# Patient Record
Sex: FEMALE | Race: WHITE | NOT HISPANIC OR LATINO | Employment: OTHER | URBAN - METROPOLITAN AREA
[De-identification: names, ages, dates, MRNs, and addresses within clinical notes are randomized per-mention and may not be internally consistent; named-entity substitution may affect disease eponyms.]

---

## 2023-08-19 PROBLEM — K55.9 VASCULAR DISORDER OF INTESTINE, UNSPECIFIED (MULTI): Status: ACTIVE | Noted: 2023-08-19

## 2023-08-19 PROBLEM — M15.4 EROSIVE OSTEOARTHRITIS: Status: ACTIVE | Noted: 2023-08-19

## 2023-08-19 PROBLEM — M24.619 JOINT ANKYLOSIS OF THE SHOULDER REGION: Status: ACTIVE | Noted: 2023-08-19

## 2023-08-19 PROBLEM — E03.9 HYPOTHYROIDISM: Status: ACTIVE | Noted: 2023-08-19

## 2023-08-19 PROBLEM — R01.1 MURMUR: Status: ACTIVE | Noted: 2023-08-19

## 2023-08-19 PROBLEM — R26.2 DIFFICULTY WALKING: Status: ACTIVE | Noted: 2023-08-19

## 2023-08-19 PROBLEM — E11.628 TYPE 2 DIABETES MELLITUS WITH OTHER SKIN COMPLICATIONS (MULTI): Status: ACTIVE | Noted: 2023-08-19

## 2023-08-19 PROBLEM — M25.361 INSTABILITY OF RIGHT KNEE JOINT: Status: ACTIVE | Noted: 2023-08-19

## 2023-08-19 PROBLEM — K55.1: Status: ACTIVE | Noted: 2023-08-19

## 2023-08-19 PROBLEM — I70.201: Status: ACTIVE | Noted: 2023-08-19

## 2023-08-19 PROBLEM — H02.831 DERMATOCHALASIS OF RIGHT UPPER EYELID: Status: ACTIVE | Noted: 2023-08-19

## 2023-08-19 PROBLEM — N18.30 CHRONIC KIDNEY DISEASE, STAGE III (MODERATE) (MULTI): Status: ACTIVE | Noted: 2023-08-19

## 2023-08-19 PROBLEM — J31.0 RHINITIS: Status: ACTIVE | Noted: 2023-08-19

## 2023-08-19 PROBLEM — H35.359 CYSTOID MACULAR RETINAL DEGENERATION: Status: ACTIVE | Noted: 2023-08-19

## 2023-08-19 PROBLEM — I25.119 ATHEROSCLEROTIC HEART DISEASE OF NATIVE CORONARY ARTERY WITH UNSPECIFIED ANGINA PECTORIS (CMS-HCC): Status: ACTIVE | Noted: 2023-08-19

## 2023-08-19 PROBLEM — E10.42 TYPE 1 DIABETES MELLITUS WITH DIABETIC POLYNEUROPATHY (MULTI): Status: ACTIVE | Noted: 2023-08-19

## 2023-08-19 PROBLEM — I10 ESSENTIAL HYPERTENSION: Status: ACTIVE | Noted: 2023-08-19

## 2023-08-19 PROBLEM — S09.90XA HEAD INJURY: Status: ACTIVE | Noted: 2023-08-19

## 2023-08-19 PROBLEM — E11.9 TYPE 2 DIABETES MELLITUS WITHOUT COMPLICATION (MULTI): Status: ACTIVE | Noted: 2023-08-19

## 2023-08-19 PROBLEM — G56.00 CARPAL TUNNEL SYNDROME: Status: ACTIVE | Noted: 2023-08-19

## 2023-08-19 PROBLEM — M81.0 AGE-RELATED OSTEOPOROSIS WITHOUT CURRENT PATHOLOGICAL FRACTURE: Status: ACTIVE | Noted: 2023-08-19

## 2023-08-19 PROBLEM — M17.0 ARTHRITIS OF BOTH KNEES: Status: ACTIVE | Noted: 2023-08-19

## 2023-08-19 PROBLEM — E10.649 HYPOGLYCEMIA UNAWARENESS ASSOCIATED WITH TYPE 1 DIABETES MELLITUS (MULTI): Status: ACTIVE | Noted: 2023-08-19

## 2023-08-19 PROBLEM — H35.342 MACULAR HOLE OF LEFT EYE: Status: ACTIVE | Noted: 2023-08-19

## 2023-08-19 PROBLEM — M11.262 CHONDROCALCINOSIS OF LEFT KNEE: Status: ACTIVE | Noted: 2023-08-19

## 2023-08-19 PROBLEM — E55.9 VITAMIN D DEFICIENCY: Status: ACTIVE | Noted: 2023-08-19

## 2023-08-19 PROBLEM — C91.10 CHRONIC LYMPHOCYTIC LEUKEMIA OF B-CELL TYPE NOT HAVING ACHIEVED REMISSION (MULTI): Status: ACTIVE | Noted: 2023-08-19

## 2023-08-19 PROBLEM — E78.5 HYPERLIPIDEMIA: Status: ACTIVE | Noted: 2023-08-19

## 2023-08-19 PROBLEM — G89.29 OTHER CHRONIC PAIN: Status: ACTIVE | Noted: 2023-08-19

## 2023-08-19 PROBLEM — D50.1 IRON DEFICIENCY ANEMIA DUE TO SIDEROPENIC DYSPHAGIA: Status: ACTIVE | Noted: 2023-08-19

## 2023-08-19 PROBLEM — Z95.1 PRESENCE OF AORTOCORONARY BYPASS GRAFT: Status: ACTIVE | Noted: 2023-08-19

## 2023-08-19 PROBLEM — Z96.1 ARTIFICIAL LENS PRESENT: Status: ACTIVE | Noted: 2023-08-19

## 2023-08-19 PROBLEM — H04.129 TEAR FILM INSUFFICIENCY: Status: ACTIVE | Noted: 2023-08-19

## 2023-08-19 PROBLEM — M47.816 LUMBAR SPONDYLOSIS: Status: ACTIVE | Noted: 2023-08-19

## 2023-08-19 PROBLEM — T82.898A: Status: ACTIVE | Noted: 2023-08-19

## 2023-08-19 PROBLEM — M25.519 SHOULDER JOINT PAIN: Status: ACTIVE | Noted: 2023-08-19

## 2023-08-19 PROBLEM — M16.10 PRIMARY LOCALIZED OSTEOARTHRITIS OF PELVIC REGION AND THIGH: Status: ACTIVE | Noted: 2023-08-19

## 2023-08-19 PROBLEM — H02.834 DERMATOCHALASIS OF LEFT UPPER EYELID: Status: ACTIVE | Noted: 2023-08-19

## 2023-08-19 PROBLEM — H52.7 UNSPECIFIED DISORDER OF REFRACTION: Status: ACTIVE | Noted: 2023-08-19

## 2023-08-19 PROBLEM — M25.569 KNEE PAIN: Status: ACTIVE | Noted: 2023-08-19

## 2023-08-19 PROBLEM — I65.23 CALCIFICATION OF BOTH CAROTID ARTERIES: Status: ACTIVE | Noted: 2023-08-19

## 2023-08-19 PROBLEM — E11.3292 BACKGROUND DIABETIC RETINOPATHY OF LEFT EYE DETERMINED BY EXAMINATION (MULTI): Status: ACTIVE | Noted: 2023-08-19

## 2023-08-19 PROBLEM — I73.9 PERIPHERAL VASCULAR DISEASE, UNSPECIFIED (CMS-HCC): Status: ACTIVE | Noted: 2023-08-19

## 2023-08-19 PROBLEM — I77.9 ARTERIOPATHY (CMS-HCC): Status: ACTIVE | Noted: 2023-08-19

## 2023-08-19 PROBLEM — G47.00 INSOMNIA: Status: ACTIVE | Noted: 2023-08-19

## 2023-08-19 PROBLEM — M17.11 OSTEOARTHRITIS OF RIGHT KNEE: Status: ACTIVE | Noted: 2023-08-19

## 2023-08-19 PROBLEM — E10.65 TYPE 1 DIABETES MELLITUS WITH HYPERGLYCEMIA (MULTI): Status: ACTIVE | Noted: 2023-08-19

## 2023-08-19 PROBLEM — I21.9 MYOCARDIAL INFARCTION (MULTI): Status: ACTIVE | Noted: 2023-08-19

## 2023-08-19 RX ORDER — LISINOPRIL 40 MG/1
1 TABLET ORAL DAILY
COMMUNITY
End: 2024-02-06

## 2023-08-19 RX ORDER — LANOLIN ALCOHOL/MO/W.PET/CERES
1 CREAM (GRAM) TOPICAL DAILY
Status: ON HOLD | COMMUNITY

## 2023-08-19 RX ORDER — HYDROCHLOROTHIAZIDE 25 MG/1
1 TABLET ORAL
COMMUNITY
Start: 2023-03-06 | End: 2023-11-21 | Stop reason: WASHOUT

## 2023-08-19 RX ORDER — FOLIC ACID 1 MG/1
1 TABLET ORAL DAILY
COMMUNITY
End: 2024-05-20 | Stop reason: SDUPTHER

## 2023-08-19 RX ORDER — NAPROXEN SODIUM 220 MG/1
1 TABLET, FILM COATED ORAL NIGHTLY
COMMUNITY
End: 2024-04-24 | Stop reason: HOSPADM

## 2023-08-19 RX ORDER — SULFASALAZINE 500 MG/1
2 TABLET, DELAYED RELEASE ORAL 2 TIMES DAILY
Status: ON HOLD | COMMUNITY

## 2023-08-19 RX ORDER — LEVOTHYROXINE SODIUM 100 UG/1
1 TABLET ORAL DAILY
Status: ON HOLD | COMMUNITY

## 2023-08-19 RX ORDER — METOPROLOL TARTRATE 25 MG/1
0.5 TABLET, FILM COATED ORAL 2 TIMES DAILY
COMMUNITY
End: 2024-01-29

## 2023-08-19 RX ORDER — GABAPENTIN 100 MG/1
1 CAPSULE ORAL 2 TIMES DAILY
Status: ON HOLD | COMMUNITY
Start: 2023-08-07

## 2023-08-19 RX ORDER — INSULIN LISPRO 100 [IU]/ML
1 INJECTION, SOLUTION SUBCUTANEOUS
Status: ON HOLD | COMMUNITY

## 2023-08-19 RX ORDER — ACETAMINOPHEN 500 MG/1
2 CAPSULE, LIQUID FILLED ORAL DAILY
COMMUNITY
Start: 2019-04-15 | End: 2024-04-24 | Stop reason: HOSPADM

## 2023-08-19 RX ORDER — INSULIN DEGLUDEC 100 U/ML
12 INJECTION, SOLUTION SUBCUTANEOUS EVERY MORNING
Status: ON HOLD | COMMUNITY

## 2023-08-19 RX ORDER — PRAVASTATIN SODIUM 40 MG/1
1 TABLET ORAL DAILY
COMMUNITY
End: 2024-02-21

## 2023-08-19 RX ORDER — ACETAMINOPHEN 500 MG
1 TABLET ORAL DAILY
Status: ON HOLD | COMMUNITY

## 2023-08-19 RX ORDER — ZOLEDRONIC ACID 4 MG/5ML
5 INJECTION INTRAVENOUS
COMMUNITY
End: 2023-10-17 | Stop reason: ALTCHOICE

## 2023-08-19 RX ORDER — AMLODIPINE BESYLATE 10 MG/1
1 TABLET ORAL DAILY
COMMUNITY
End: 2023-10-18

## 2023-08-19 RX ORDER — NITROGLYCERIN 0.4 MG/1
1 TABLET SUBLINGUAL
COMMUNITY
End: 2024-01-25 | Stop reason: SDUPTHER

## 2023-08-19 RX ORDER — GLUCAGON 3 MG/1
POWDER NASAL AS NEEDED
COMMUNITY
Start: 2021-04-19 | End: 2023-11-21 | Stop reason: WASHOUT

## 2023-09-18 ENCOUNTER — HOSPITAL ENCOUNTER (OUTPATIENT)
Dept: DATA CONVERSION | Facility: HOSPITAL | Age: 83
Discharge: HOME | End: 2023-09-18
Payer: MEDICARE

## 2023-09-18 DIAGNOSIS — R39.9 UNSPECIFIED SYMPTOMS AND SIGNS INVOLVING THE GENITOURINARY SYSTEM: ICD-10-CM

## 2023-09-18 LAB
BACTERIA SPEC CULT: NORMAL
BACTERIA UR QL AUTO: NEGATIVE
BILIRUB UR QL STRIP.AUTO: NEGATIVE
CC # UR: NORMAL /UL
CLARITY UR: CLEAR
COLOR UR: ABNORMAL
GLUCOSE UR STRIP.AUTO-MCNC: 50 MG/DL
HGB UR QL STRIP.AUTO: 1 /HPF (ref 0–3)
HGB UR QL: NEGATIVE
HYALINE CASTS UR QL AUTO: 3 /LPF
KETONES UR QL STRIP.AUTO: NEGATIVE
LEUKOCYTE ESTERASE UR QL STRIP.AUTO: ABNORMAL
MICROSCOPIC (UA): ABNORMAL
NITRITE UR QL STRIP.AUTO: NEGATIVE
PH UR STRIP.AUTO: 6 [PH] (ref 4.6–8)
PROT UR STRIP.AUTO-MCNC: NEGATIVE MG/DL
REPORT STATUS -LH SQ DATA CONVERSION: NORMAL
SERVICE CMNT-IMP: NORMAL
SP GR UR STRIP.AUTO: 1.01 (ref 1–1.03)
SPECIMEN SOURCE: NORMAL
SQUAMOUS UR QL AUTO: ABNORMAL /HPF
URINE CULTURE: ABNORMAL
UROBILINOGEN UR QL STRIP.AUTO: NORMAL MG/DL (ref 0–1)
WBC #/AREA URNS AUTO: 5 /HPF (ref 0–3)

## 2023-10-03 ENCOUNTER — OFFICE VISIT (OUTPATIENT)
Dept: ENDOCRINOLOGY | Facility: CLINIC | Age: 83
End: 2023-10-03
Payer: MEDICARE

## 2023-10-03 VITALS
WEIGHT: 125.6 LBS | SYSTOLIC BLOOD PRESSURE: 142 MMHG | DIASTOLIC BLOOD PRESSURE: 64 MMHG | HEART RATE: 64 BPM | BODY MASS INDEX: 17.52 KG/M2

## 2023-10-03 DIAGNOSIS — E03.9 HYPOTHYROIDISM, UNSPECIFIED TYPE: ICD-10-CM

## 2023-10-03 DIAGNOSIS — E10.65 TYPE 1 DIABETES MELLITUS WITH HYPERGLYCEMIA (MULTI): Primary | ICD-10-CM

## 2023-10-03 DIAGNOSIS — I10 BENIGN HYPERTENSION: ICD-10-CM

## 2023-10-03 DIAGNOSIS — E78.2 MIXED HYPERLIPIDEMIA: ICD-10-CM

## 2023-10-03 LAB — POC HEMOGLOBIN A1C: 6.7 % (ref 4.2–6.5)

## 2023-10-03 PROCEDURE — 3078F DIAST BP <80 MM HG: CPT | Performed by: INTERNAL MEDICINE

## 2023-10-03 PROCEDURE — 99214 OFFICE O/P EST MOD 30 MIN: CPT | Performed by: INTERNAL MEDICINE

## 2023-10-03 PROCEDURE — 1126F AMNT PAIN NOTED NONE PRSNT: CPT | Performed by: INTERNAL MEDICINE

## 2023-10-03 PROCEDURE — 3077F SYST BP >= 140 MM HG: CPT | Performed by: INTERNAL MEDICINE

## 2023-10-03 PROCEDURE — 83036 HEMOGLOBIN GLYCOSYLATED A1C: CPT | Performed by: INTERNAL MEDICINE

## 2023-10-03 ASSESSMENT — ENCOUNTER SYMPTOMS: DEPRESSION: 0

## 2023-10-03 ASSESSMENT — PATIENT HEALTH QUESTIONNAIRE - PHQ9
2. FEELING DOWN, DEPRESSED OR HOPELESS: NOT AT ALL
SUM OF ALL RESPONSES TO PHQ9 QUESTIONS 1 AND 2: 0
1. LITTLE INTEREST OR PLEASURE IN DOING THINGS: NOT AT ALL

## 2023-10-03 ASSESSMENT — PAIN SCALES - GENERAL: PAINLEVEL: 0-NO PAIN

## 2023-10-03 NOTE — PROGRESS NOTES
82yo with Diabetes 1 (dx 1981, +neuropathy/nepropathy/low sugar unawareness), HTN, Dyslipidemia, hypothyroid, osteoporosis, CLL, CVD. A1c was 6.2%, today 6.7%. Pt is testing sugars 4 times per day using mp 2. Pt is having low sugars 1-2 times/week no clear pattern. Pt is following a carb controlled diet and knows reasonable carb allowances. Pt is able to afford their medications. Pt has been walking her dog, active around her house.           Taking tresiba 12 (was 13) units qd, humalog (100-125) with 1/2 unit increase for every 25 increase in sugar, baseline 3-3-4-0.           Taking lisinopril 40mg, amlodipine 5mg, toprol 25mg 1/2 pill bid, hctz 25mg.           Taking pravastatin 40mg qhs for lipids and tolerating.           Taking levothyroxine 100mcg qd, euthyroid without obstructive sx.           Pt has had 3 treatments of IV reclast for osteoporosis sees rheumatology.           90 day mp 2 data: 72% in range, 2% low, pattern: mid 100's overnight, after breakfast low 200's,  low/mid 100's through the day (up to 180 after dinner).    Scheduled medications    Continuous medications    PRN medications      Current Outpatient Medications:     acetaminophen (Tylenol) 500 mg capsule, Take 2 tablets by mouth once daily., Disp: , Rfl:     amLODIPine (Norvasc) 10 mg tablet, Take 1 tablet (10 mg) by mouth once daily., Disp: , Rfl:     aspirin 81 mg chewable tablet, Chew 1 tablet (81 mg) once daily., Disp: , Rfl:     blood sugar diagnostic (ONETOUCH ULTRA BLUE TEST STRIP MISC), USE TO TEST BLOOD SUGARS 7 TIMES DAILY. DX: E10.65 sc 7/day, Disp: , Rfl:     cholecalciferol (Vitamin D-3) 50 mcg (2,000 unit) capsule, Take 1 capsule (50 mcg) by mouth early in the morning.., Disp: , Rfl:     cyanocobalamin (Vitamin B-12) 1,000 mcg tablet, Take 1 tablet (1,000 mcg) by mouth once daily., Disp: , Rfl:     folic acid (Folvite) 1 mg tablet, Take 1 tablet (1 mg) by mouth once daily., Disp: , Rfl:     gabapentin (Neurontin) 100  mg capsule, Take 1 capsule (100 mg) by mouth in the morning and at bedtime. In the morning and before bedtime, Disp: , Rfl:     glucagon (Baqsimi) 3 mg/actuation spray,non-aerosol, Administer into affected nostril(s) if needed (for severe hypoglycemia). As directed, Disp: , Rfl:     hydroCHLOROthiazide (HYDRODiuril) 25 mg tablet, Take 1 tablet (25 mg) by mouth once daily in the morning. Take before meals. For 90 days, Disp: , Rfl:     insulin degludec (Tresiba FlexTouch U-100) 100 unit/mL (3 mL) injection, Inject 11 Units under the skin once daily. As directed 1 box, Disp: , Rfl:     insulin lispro (HumaLOG  KwikPen U-100) 100 unit/mL injection, Inject 30 Units under the skin once daily. At meals, Disp: , Rfl:     levothyroxine (Synthroid, Levoxyl) 100 mcg tablet, Take 1 tablet (100 mcg) by mouth once daily., Disp: , Rfl:     lisinopril 40 mg tablet, Take 1 tablet (40 mg) by mouth once daily., Disp: , Rfl:     metoprolol tartrate (Lopressor) 25 mg tablet, Take 0.5 tablets (12.5 mg) by mouth 2 times a day., Disp: , Rfl:     nitroglycerin (Nitrostat) 0.4 mg SL tablet, Place 1 tablet (0.4 mg) under the tongue. Every 5 minutes x 3 doses prn for chest pain, Disp: , Rfl:     POLYETHYLENE GLYCOL 3350 ORAL, Take 1 packet by mouth once daily. With 8 oz of fluid, Disp: , Rfl:     pravastatin (Pravachol) 40 mg tablet, Take 1 tablet (40 mg) by mouth once daily., Disp: , Rfl:     sulfaSALAzine (Azulfidine) 500 mg DR tablet, Take 2 tablets (1,000 mg) by mouth 2 times a day., Disp: , Rfl:     zoledronic acid (Zometa) 4 mg/5 mL injection, Infuse 5 mL (4 mg) into a venous catheter yearly., Disp: , Rfl:       Cardiology: Lightheadedness-denies.  Chest pain-denies.  Leg edema-denies.  Palpitations-denies.  Respiratory: Cough-denies. Shortness of breath-denies.  Wheezing-denies.  Gastroenterology: Constipation-denies.  Diarrhea-denies.  Heartburn-on occ.  Endocrinology: Cold intolerance-denies.  Heat intolerance-denies.   Sweats-denies.  Neurology: Headache-denies.  Tremor-denies.  Neuropathy in extremities-positive  Psychology: Low energy-denies.  Irritability-denies.  Sleep disturbances-denies.    General Appearance: pleasant, cooperative, no acute distress  HEENT: no chemosis, no proptosis, no lid lag, no lid retraction  Neck: no lymphadenopathy, no thyromegaly, no dominant thyroid nodules  Heart: 3/6 rohit rsb, regular rate and rhythm, S1 and S2  Lungs: no wheezes, no rhonci, no rales  Extremities: no lower extremity swelling    1. Type 1 diabetes mellitus with hyperglycemia (CMS/HCC)  -reviewed 90 day of mp data, more lows when active, use 1/2 or 3/4 dose of prandial in this situation  -could consider further lowering basal if needed in future    2. Benign hypertension    -on meds, follows with cardiology, near target    3. Mixed hyperlipidemia  -on statin and tolerating    4. Hypothyroidism, unspecified type  -euthyroid    -follow up in 6 months  -lab tests reviewed from July 2023

## 2023-10-10 ENCOUNTER — HOSPITAL ENCOUNTER (OUTPATIENT)
Dept: CARDIOLOGY | Facility: HOSPITAL | Age: 83
Discharge: HOME | End: 2023-10-10
Payer: MEDICARE

## 2023-10-10 DIAGNOSIS — I35.0 NONRHEUMATIC AORTIC (VALVE) STENOSIS: ICD-10-CM

## 2023-10-10 LAB — EJECTION FRACTION APICAL 4 CHAMBER: 60.2

## 2023-10-10 PROCEDURE — 93306 TTE W/DOPPLER COMPLETE: CPT

## 2023-10-10 PROCEDURE — 93306 TTE W/DOPPLER COMPLETE: CPT | Performed by: INTERNAL MEDICINE

## 2023-10-17 ENCOUNTER — OFFICE VISIT (OUTPATIENT)
Dept: PRIMARY CARE | Facility: CLINIC | Age: 83
End: 2023-10-17
Payer: MEDICARE

## 2023-10-17 VITALS
TEMPERATURE: 97.1 F | OXYGEN SATURATION: 93 % | HEART RATE: 66 BPM | RESPIRATION RATE: 18 BRPM | WEIGHT: 124.3 LBS | SYSTOLIC BLOOD PRESSURE: 146 MMHG | HEIGHT: 58 IN | BODY MASS INDEX: 26.09 KG/M2 | DIASTOLIC BLOOD PRESSURE: 72 MMHG

## 2023-10-17 DIAGNOSIS — B00.1 COLD SORE: Primary | ICD-10-CM

## 2023-10-17 PROCEDURE — 3078F DIAST BP <80 MM HG: CPT | Performed by: NURSE PRACTITIONER

## 2023-10-17 PROCEDURE — 1159F MED LIST DOCD IN RCRD: CPT | Performed by: NURSE PRACTITIONER

## 2023-10-17 PROCEDURE — 3077F SYST BP >= 140 MM HG: CPT | Performed by: NURSE PRACTITIONER

## 2023-10-17 PROCEDURE — 1125F AMNT PAIN NOTED PAIN PRSNT: CPT | Performed by: NURSE PRACTITIONER

## 2023-10-17 PROCEDURE — 99213 OFFICE O/P EST LOW 20 MIN: CPT | Performed by: NURSE PRACTITIONER

## 2023-10-17 RX ORDER — VALACYCLOVIR HYDROCHLORIDE 1 G/1
2000 TABLET, FILM COATED ORAL 2 TIMES DAILY
Qty: 4 TABLET | Refills: 0 | Status: SHIPPED | OUTPATIENT
Start: 2023-10-17 | End: 2023-10-18

## 2023-10-17 ASSESSMENT — ENCOUNTER SYMPTOMS
CARDIOVASCULAR NEGATIVE: 1
RESPIRATORY NEGATIVE: 1
FEVER: 0

## 2023-10-17 ASSESSMENT — PAIN SCALES - GENERAL: PAINLEVEL: 10-WORST PAIN EVER

## 2023-10-17 NOTE — PROGRESS NOTES
"Subjective   Patient ID: Bonnie Jules is a 83 y.o. female who presents for Mouth Lesions (Pt states she has a cold sore on bottom lip for 2 weeks , states she has tied multiple thing over the counter but nothing is helping. Has two scabs on bottom lip.).    Mouth Lesions   The current episode started more than 2 weeks ago. The onset was gradual. The problem has been unchanged. The problem is mild. The symptoms are relieved by one or more OTC medications (tried different cold sore creams). The symptoms are aggravated by eating. Associated symptoms include mouth sores. Pertinent negatives include no fever. She has been Eating less than usual and drinking less than usual. Urine output has been normal. There were no sick contacts.        Review of Systems   Constitutional:  Negative for fever.   HENT:  Positive for mouth sores.    Respiratory: Negative.     Cardiovascular: Negative.        Objective   /72 (BP Location: Right arm, Patient Position: Sitting, BP Cuff Size: Adult)   Pulse 66   Temp 36.2 °C (97.1 °F) (Temporal)   Resp 18   Ht 1.473 m (4' 10\")   Wt 56.4 kg (124 lb 4.8 oz)   SpO2 93%   BMI 25.98 kg/m²     Physical Exam  Vitals and nursing note reviewed.   Constitutional:       Appearance: Normal appearance.   HENT:      Mouth/Throat:      Comments: Two cold sores bottom lip, vesicular with some scabbing  Cardiovascular:      Rate and Rhythm: Normal rate and regular rhythm.      Pulses: Normal pulses.      Heart sounds: Normal heart sounds.   Pulmonary:      Effort: Pulmonary effort is normal.      Breath sounds: Normal breath sounds.   Neurological:      Mental Status: She is alert.         Assessment/Plan   Diagnoses and all orders for this visit:  Cold sore  -     valACYclovir (Valtrex) 1 gram tablet; Take 2 tablets (2,000 mg) by mouth 2 times a day for 1 day.         "

## 2023-10-18 DIAGNOSIS — I10 PRIMARY HYPERTENSION: Primary | ICD-10-CM

## 2023-10-18 RX ORDER — AMLODIPINE BESYLATE 10 MG/1
10 TABLET ORAL DAILY
Qty: 90 TABLET | Refills: 3 | Status: ON HOLD | OUTPATIENT
Start: 2023-10-18 | End: 2024-10-17

## 2023-10-23 PROBLEM — R93.89 ABNORMAL COMPUTED TOMOGRAPHY SCAN: Status: ACTIVE | Noted: 2023-10-23
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
## 2023-10-23 PROBLEM — I10 HYPERTENSIVE DISORDER: Status: ACTIVE | Noted: 2023-10-23

## 2023-10-23 PROBLEM — M77.41 METATARSALGIA, RIGHT FOOT: Status: ACTIVE | Noted: 2023-06-12

## 2023-10-23 PROBLEM — R10.9 ABDOMINAL PAIN: Status: ACTIVE | Noted: 2023-10-23

## 2023-10-23 PROBLEM — E11.9 ABNORMAL METABOLIC STATE DUE TO DIABETES MELLITUS (MULTI): Status: ACTIVE | Noted: 2023-10-23

## 2023-10-23 PROBLEM — M17.12 OSTEOARTHRITIS OF LEFT KNEE: Status: ACTIVE | Noted: 2023-10-23

## 2023-10-23 PROBLEM — I10 BENIGN ESSENTIAL HYPERTENSION: Status: ACTIVE | Noted: 2023-10-23

## 2023-10-23 PROBLEM — G62.9 NEUROPATHY: Status: ACTIVE | Noted: 2023-06-12

## 2023-10-23 PROBLEM — R23.4 SKIN FISSURE: Status: ACTIVE | Noted: 2023-06-12

## 2023-10-23 PROBLEM — K92.1 HEMATOCHEZIA: Status: ACTIVE | Noted: 2023-10-23

## 2023-10-23 PROBLEM — L97.511 ULCER OF RIGHT FOOT, LIMITED TO BREAKDOWN OF SKIN (MULTI): Status: ACTIVE | Noted: 2023-06-12

## 2023-10-23 PROBLEM — A41.9 SEPSIS (MULTI): Status: ACTIVE | Noted: 2023-10-23

## 2023-10-23 PROBLEM — M19.90 OSTEOARTHRITIS: Status: ACTIVE | Noted: 2023-10-23

## 2023-10-23 PROBLEM — I95.9 LOW BLOOD PRESSURE: Status: ACTIVE | Noted: 2023-10-23

## 2023-10-23 PROBLEM — E07.9 DISORDER OF THYROID GLAND: Status: ACTIVE | Noted: 2023-10-23

## 2023-10-23 PROBLEM — B35.1 ONYCHOMYCOSIS: Status: ACTIVE | Noted: 2023-06-12

## 2023-10-23 PROBLEM — K52.9 COLITIS: Status: ACTIVE | Noted: 2023-10-23

## 2023-10-23 PROBLEM — I73.9 PERIPHERAL VASCULAR DISEASE (CMS-HCC): Status: ACTIVE | Noted: 2023-06-12

## 2023-10-23 PROBLEM — E87.20 LACTIC ACIDOSIS: Status: ACTIVE | Noted: 2023-10-23

## 2023-10-23 PROBLEM — I21.4 ACUTE NON-ST SEGMENT ELEVATION MYOCARDIAL INFARCTION (MULTI): Status: ACTIVE | Noted: 2023-10-23

## 2023-10-23 PROBLEM — R07.9 CHEST PAIN: Status: ACTIVE | Noted: 2023-10-23

## 2023-10-23 PROBLEM — Z95.1 HISTORY OF CORONARY ARTERY BYPASS GRAFT X 3: Status: ACTIVE | Noted: 2023-10-23

## 2023-10-23 PROBLEM — E11.9 DIABETES MELLITUS, TYPE 2 (MULTI): Status: ACTIVE | Noted: 2023-10-23

## 2023-10-23 PROBLEM — E10.9 TYPE 1 DIABETES MELLITUS (MULTI): Status: ACTIVE | Noted: 2023-06-12

## 2023-10-23 PROBLEM — E78.5 DYSLIPIDEMIA: Status: ACTIVE | Noted: 2023-10-23

## 2023-10-23 PROBLEM — E11.42 DIABETIC POLYNEUROPATHY ASSOCIATED WITH TYPE 2 DIABETES MELLITUS (MULTI): Status: ACTIVE | Noted: 2023-06-12

## 2023-10-23 PROBLEM — K55.9 ISCHEMIC COLITIS (MULTI): Status: ACTIVE | Noted: 2017-05-30

## 2023-10-23 PROBLEM — R41.0 DELIRIUM: Status: ACTIVE | Noted: 2023-10-23

## 2023-10-23 PROBLEM — M79.676 PAIN OF GREAT TOE: Status: ACTIVE | Noted: 2023-06-12

## 2023-10-23 PROBLEM — L84 PRE-ULCERATIVE CALLUSES: Status: ACTIVE | Noted: 2023-06-12

## 2023-10-23 PROBLEM — M25.559 HIP PAIN: Status: ACTIVE | Noted: 2023-10-23

## 2023-10-23 PROBLEM — E86.0 DEHYDRATION: Status: ACTIVE | Noted: 2023-10-23

## 2023-10-23 PROBLEM — K92.2 GASTROINTESTINAL HEMORRHAGE: Status: ACTIVE | Noted: 2023-10-23

## 2023-10-23 PROBLEM — R56.9 SEIZURE (MULTI): Status: ACTIVE | Noted: 2023-10-23

## 2023-10-23 PROBLEM — H35.3290 EXUDATIVE AGE-RELATED MACULAR DEGENERATION (MULTI): Status: ACTIVE | Noted: 2023-10-23

## 2023-10-23 PROBLEM — H35.81 MACULAR EDEMA: Status: ACTIVE | Noted: 2023-10-23

## 2023-10-23 PROBLEM — I35.9 AORTIC VALVE DISORDER: Status: ACTIVE | Noted: 2023-10-23

## 2023-10-23 PROBLEM — K62.5 RECTAL HEMORRHAGE: Status: ACTIVE | Noted: 2023-10-23

## 2023-10-23 PROBLEM — M12.9 ARTHROPATHY: Status: ACTIVE | Noted: 2023-06-12

## 2023-10-23 PROBLEM — N39.0 ACUTE URINARY TRACT INFECTION: Status: ACTIVE | Noted: 2023-10-23

## 2023-10-23 PROBLEM — F41.9 ANXIETY: Status: ACTIVE | Noted: 2023-10-23

## 2023-10-23 PROBLEM — E16.2 HYPOGLYCEMIA: Status: ACTIVE | Noted: 2023-10-23

## 2023-10-23 PROBLEM — M16.10 ARTHRITIS OF HIP: Status: ACTIVE | Noted: 2023-10-23

## 2023-10-23 PROBLEM — R11.2 NAUSEA & VOMITING: Status: ACTIVE | Noted: 2023-10-23

## 2023-10-23 PROBLEM — N18.30 CHRONIC KIDNEY DISEASE, STAGE 3 (MULTI): Status: ACTIVE | Noted: 2023-10-23

## 2023-10-23 PROBLEM — R55 NEAR SYNCOPE: Status: ACTIVE | Noted: 2023-10-23

## 2023-10-25 ENCOUNTER — OFFICE VISIT (OUTPATIENT)
Dept: ORTHOPEDIC SURGERY | Facility: CLINIC | Age: 83
End: 2023-10-25
Payer: MEDICARE

## 2023-10-25 VITALS — HEIGHT: 58 IN | BODY MASS INDEX: 26.45 KG/M2 | WEIGHT: 126 LBS

## 2023-10-25 DIAGNOSIS — M17.12 PRIMARY OSTEOARTHRITIS OF LEFT KNEE: ICD-10-CM

## 2023-10-25 DIAGNOSIS — M25.561 CHRONIC PAIN OF BOTH KNEES: Primary | ICD-10-CM

## 2023-10-25 DIAGNOSIS — M25.562 CHRONIC PAIN OF BOTH KNEES: Primary | ICD-10-CM

## 2023-10-25 DIAGNOSIS — M17.11 PRIMARY OSTEOARTHRITIS OF RIGHT KNEE: ICD-10-CM

## 2023-10-25 DIAGNOSIS — G89.29 CHRONIC PAIN OF BOTH KNEES: Primary | ICD-10-CM

## 2023-10-25 PROCEDURE — 1159F MED LIST DOCD IN RCRD: CPT | Performed by: PHYSICIAN ASSISTANT

## 2023-10-25 PROCEDURE — 99213 OFFICE O/P EST LOW 20 MIN: CPT | Performed by: PHYSICIAN ASSISTANT

## 2023-10-25 PROCEDURE — 1125F AMNT PAIN NOTED PAIN PRSNT: CPT | Performed by: PHYSICIAN ASSISTANT

## 2023-10-25 PROCEDURE — 2500000004 HC RX 250 GENERAL PHARMACY W/ HCPCS (ALT 636 FOR OP/ED): Performed by: PHYSICIAN ASSISTANT

## 2023-10-25 PROCEDURE — 2500000005 HC RX 250 GENERAL PHARMACY W/O HCPCS: Performed by: PHYSICIAN ASSISTANT

## 2023-10-25 PROCEDURE — 1160F RVW MEDS BY RX/DR IN RCRD: CPT | Performed by: PHYSICIAN ASSISTANT

## 2023-10-25 PROCEDURE — 20610 DRAIN/INJ JOINT/BURSA W/O US: CPT | Mod: 50 | Performed by: PHYSICIAN ASSISTANT

## 2023-10-25 PROCEDURE — 99213 OFFICE O/P EST LOW 20 MIN: CPT | Mod: 25 | Performed by: PHYSICIAN ASSISTANT

## 2023-10-25 PROCEDURE — 1036F TOBACCO NON-USER: CPT | Performed by: PHYSICIAN ASSISTANT

## 2023-10-25 RX ORDER — TRIAMCINOLONE ACETONIDE 40 MG/ML
40 INJECTION, SUSPENSION INTRA-ARTICULAR; INTRAMUSCULAR
Status: COMPLETED | OUTPATIENT
Start: 2023-10-25 | End: 2023-10-25

## 2023-10-25 RX ORDER — LIDOCAINE HYDROCHLORIDE 10 MG/ML
1 INJECTION INFILTRATION; PERINEURAL
Status: COMPLETED | OUTPATIENT
Start: 2023-10-25 | End: 2023-10-25

## 2023-10-25 RX ADMIN — LIDOCAINE HYDROCHLORIDE 1 ML: 10 INJECTION, SOLUTION INFILTRATION; PERINEURAL at 12:49

## 2023-10-25 RX ADMIN — TRIAMCINOLONE ACETONIDE 40 MG: 40 INJECTION, SUSPENSION INTRA-ARTICULAR; INTRAMUSCULAR at 12:49

## 2023-10-25 ASSESSMENT — PAIN - FUNCTIONAL ASSESSMENT: PAIN_FUNCTIONAL_ASSESSMENT: 0-10

## 2023-10-25 ASSESSMENT — PATIENT HEALTH QUESTIONNAIRE - PHQ9
1. LITTLE INTEREST OR PLEASURE IN DOING THINGS: NOT AT ALL
SUM OF ALL RESPONSES TO PHQ9 QUESTIONS 1 AND 2: 0
2. FEELING DOWN, DEPRESSED OR HOPELESS: NOT AT ALL

## 2023-10-25 ASSESSMENT — ENCOUNTER SYMPTOMS
DEPRESSION: 0
OCCASIONAL FEELINGS OF UNSTEADINESS: 0
LOSS OF SENSATION IN FEET: 0

## 2023-10-25 ASSESSMENT — PAIN SCALES - GENERAL: PAINLEVEL_OUTOF10: 6

## 2023-10-25 NOTE — PROGRESS NOTES
Subjective      Chief Complaint   Patient presents with    Right Knee - Pain, Injections        No surgery found     HPI  This 83 year old patient presents today with  bilateral knee pain. The patient states that this bilateral knee pain has been present for years. The patient rates the knee pain as 8. The patient states that the left and right knee pain is worse with and activity and bearing weight. The patient states the right knee is giving way and locking.The patient has tried ibuprofen and tylenol with no relief. Patient requests a discussion of further treatment options on examination today.     CARDIOLOGY:   Negative for chest pain, shortness of breath.   RESPIRATORY:   Negative for chest pain, shortness of breath.   MUSCULOSKELETAL:   See HPI for details.   NEUROLOGY:   Negative for tingling, numbness, weakness.    Objective    There were no vitals filed for this visit.    Knee Exam  Constitutional: Appears stated age. No apparent distress  Labored Breathing: No  Psychiatric: Normal mood and effect.   Neurological: alert and oriented x3  Skin: intact  MUSCULOSKELETAL: Neck: No tenderness. No pain or limitation with range of motion. Back: No tenderness. Straight leg test negative bilaterally. bilateral knee: There is diffuse tenderness over the left and right knee. diminished range of motion at the right knee from 10- 90 degrees and 5-110 degrees at the left. McMurrays is negative. Anterior drawer and lachmans are negative. There is not an effusion present. The right knee is not stable to valgus and varus stressing. The patient walks with a painful gait favoring the right knee while walking.    AP and lateral x-rays of the right knee done on 9- show osteoarthritis of the right and left knees with loss of joint surface cartilage, sclerosis and spurring.     Patient ID: Bonnie Jules is a 83 y.o. female.    L Inj/Asp: bilateral knee on 10/25/2023 12:49 PM  Indications: pain  Details: 22 G needle,  medial approach  Medications (Right): 40 mg triamcinolone acetonide 40 mg/mL; 1 mL lidocaine 10 mg/mL (1 %)  Medications (Left): 40 mg triamcinolone acetonide 40 mg/mL; 1 mL lidocaine 10 mg/mL (1 %)  Outcome: tolerated well, no immediate complications  Procedure, treatment alternatives, risks and benefits explained, specific risks discussed. Immediately prior to procedure a time out was called to verify the correct patient, procedure, equipment, support staff and site/side marked as required. Patient was prepped and draped in the usual sterile fashion.         Bonnie was seen today for pain and injections.  Diagnoses and all orders for this visit:  Chronic pain of both knees (Primary)  Primary osteoarthritis of right knee  Primary osteoarthritis of left knee  Options are discussed with the patient in detail. The patient is instructed regarding activity modification, ice, provider directed at home gentle strengthening and ROM exercises, and the appropriate use of Tylenol as needed for pain with its potential adverse reactions and side effects. The patient understands. The patient states that despite all the treatment listed above that this left and right knee pain is debilitating and  requests a discussion of further options. Cortisone injection to the left and right knee is discussed in the office today. This is done in the office today. See procedures below. Return as needed, Please note that this report has been produced using speech recognition software.  It may contain errors related to grammar, punctuation or spelling.  Electronically signed, but not reviewed.    Mayela Pickard PA-C

## 2023-11-15 ENCOUNTER — LAB (OUTPATIENT)
Dept: LAB | Facility: LAB | Age: 83
End: 2023-11-15
Payer: MEDICARE

## 2023-11-15 DIAGNOSIS — K52.9 NONINFECTIVE GASTROENTERITIS AND COLITIS, UNSPECIFIED: Primary | ICD-10-CM

## 2023-11-15 LAB
ALBUMIN SERPL-MCNC: 4 G/DL (ref 3.5–5)
ALP BLD-CCNC: 50 U/L (ref 35–125)
ALT SERPL-CCNC: 13 U/L (ref 5–40)
ANION GAP SERPL CALC-SCNC: 11 MMOL/L
APPEARANCE UR: CLEAR
AST SERPL-CCNC: 28 U/L (ref 5–40)
BASOPHILS # BLD AUTO: 0.02 X10*3/UL (ref 0–0.1)
BASOPHILS NFR BLD AUTO: 0.3 %
BILIRUB SERPL-MCNC: 0.3 MG/DL (ref 0.1–1.2)
BILIRUB UR STRIP.AUTO-MCNC: NEGATIVE MG/DL
BUN SERPL-MCNC: 24 MG/DL (ref 8–25)
CALCIUM SERPL-MCNC: 8.9 MG/DL (ref 8.5–10.4)
CHLORIDE SERPL-SCNC: 101 MMOL/L (ref 97–107)
CO2 SERPL-SCNC: 24 MMOL/L (ref 24–31)
COLOR UR: YELLOW
CREAT SERPL-MCNC: 1 MG/DL (ref 0.4–1.6)
EOSINOPHIL # BLD AUTO: 0.2 X10*3/UL (ref 0–0.4)
EOSINOPHIL NFR BLD AUTO: 3 %
ERYTHROCYTE [DISTWIDTH] IN BLOOD BY AUTOMATED COUNT: 13.2 % (ref 11.5–14.5)
GFR SERPL CREATININE-BSD FRML MDRD: 57 ML/MIN/1.73M*2
GLUCOSE SERPL-MCNC: 243 MG/DL (ref 65–99)
GLUCOSE UR STRIP.AUTO-MCNC: NORMAL MG/DL
HCT VFR BLD AUTO: 33.6 % (ref 36–46)
HGB BLD-MCNC: 10.9 G/DL (ref 12–16)
HYALINE CASTS #/AREA URNS AUTO: ABNORMAL /LPF
IMM GRANULOCYTES # BLD AUTO: 0.01 X10*3/UL (ref 0–0.5)
IMM GRANULOCYTES NFR BLD AUTO: 0.2 % (ref 0–0.9)
KETONES UR STRIP.AUTO-MCNC: NEGATIVE MG/DL
LEUKOCYTE ESTERASE UR QL STRIP.AUTO: ABNORMAL
LYMPHOCYTES # BLD AUTO: 3.86 X10*3/UL (ref 0.8–3)
LYMPHOCYTES NFR BLD AUTO: 58.8 %
MCH RBC QN AUTO: 31.8 PG (ref 26–34)
MCHC RBC AUTO-ENTMCNC: 32.4 G/DL (ref 32–36)
MCV RBC AUTO: 98 FL (ref 80–100)
MONOCYTES # BLD AUTO: 0.48 X10*3/UL (ref 0.05–0.8)
MONOCYTES NFR BLD AUTO: 7.3 %
NEUTROPHILS # BLD AUTO: 2 X10*3/UL (ref 1.6–5.5)
NEUTROPHILS NFR BLD AUTO: 30.4 %
NITRITE UR QL STRIP.AUTO: NEGATIVE
NRBC BLD-RTO: 0 /100 WBCS (ref 0–0)
PH UR STRIP.AUTO: 6 [PH]
PLATELET # BLD AUTO: 236 X10*3/UL (ref 150–450)
POTASSIUM SERPL-SCNC: 4.7 MMOL/L (ref 3.4–5.1)
PROT SERPL-MCNC: 5.9 G/DL (ref 5.9–7.9)
PROT UR STRIP.AUTO-MCNC: ABNORMAL MG/DL
RBC # BLD AUTO: 3.43 X10*6/UL (ref 4–5.2)
RBC # UR STRIP.AUTO: NEGATIVE /UL
RBC #/AREA URNS AUTO: ABNORMAL /HPF
SODIUM SERPL-SCNC: 136 MMOL/L (ref 133–145)
SP GR UR STRIP.AUTO: 1.02
TSH SERPL DL<=0.05 MIU/L-ACNC: 1.09 MIU/L (ref 0.27–4.2)
UROBILINOGEN UR STRIP.AUTO-MCNC: NORMAL MG/DL
WBC # BLD AUTO: 6.6 X10*3/UL (ref 4.4–11.3)
WBC #/AREA URNS AUTO: ABNORMAL /HPF

## 2023-11-15 PROCEDURE — 85025 COMPLETE CBC W/AUTO DIFF WBC: CPT

## 2023-11-15 PROCEDURE — 80053 COMPREHEN METABOLIC PANEL: CPT

## 2023-11-15 PROCEDURE — 84443 ASSAY THYROID STIM HORMONE: CPT | Mod: WAIVER OF LIABILITY ON FILE

## 2023-11-15 PROCEDURE — 81001 URINALYSIS AUTO W/SCOPE: CPT

## 2023-11-15 PROCEDURE — 36415 COLL VENOUS BLD VENIPUNCTURE: CPT

## 2023-11-21 ENCOUNTER — OFFICE VISIT (OUTPATIENT)
Dept: HEMATOLOGY/ONCOLOGY | Facility: CLINIC | Age: 83
End: 2023-11-21
Payer: MEDICARE

## 2023-11-21 VITALS
TEMPERATURE: 96.4 F | WEIGHT: 124.23 LBS | RESPIRATION RATE: 18 BRPM | BODY MASS INDEX: 27.95 KG/M2 | OXYGEN SATURATION: 96 % | HEIGHT: 56 IN | DIASTOLIC BLOOD PRESSURE: 84 MMHG | SYSTOLIC BLOOD PRESSURE: 180 MMHG | HEART RATE: 62 BPM

## 2023-11-21 DIAGNOSIS — C91.10 CLL (CHRONIC LYMPHOCYTIC LEUKEMIA) (MULTI): Primary | ICD-10-CM

## 2023-11-21 DIAGNOSIS — D64.9 ANEMIA, UNSPECIFIED TYPE: ICD-10-CM

## 2023-11-21 LAB
ALBUMIN SERPL BCP-MCNC: 4.1 G/DL (ref 3.4–5)
ALP SERPL-CCNC: 43 U/L (ref 33–136)
ALT SERPL W P-5'-P-CCNC: 13 U/L (ref 7–45)
ANION GAP SERPL CALC-SCNC: 13 MMOL/L (ref 10–20)
AST SERPL W P-5'-P-CCNC: 23 U/L (ref 9–39)
BASOPHILS # BLD AUTO: 0.01 X10*3/UL (ref 0–0.1)
BASOPHILS NFR BLD AUTO: 0.1 %
BILIRUB SERPL-MCNC: 0.4 MG/DL (ref 0–1.2)
BUN SERPL-MCNC: 21 MG/DL (ref 6–23)
CALCIUM SERPL-MCNC: 9.3 MG/DL (ref 8.6–10.3)
CHLORIDE SERPL-SCNC: 104 MMOL/L (ref 98–107)
CO2 SERPL-SCNC: 28 MMOL/L (ref 21–32)
CREAT SERPL-MCNC: 0.91 MG/DL (ref 0.5–1.05)
EOSINOPHIL # BLD AUTO: 0.28 X10*3/UL (ref 0–0.4)
EOSINOPHIL NFR BLD AUTO: 4.2 %
ERYTHROCYTE [DISTWIDTH] IN BLOOD BY AUTOMATED COUNT: 13.2 % (ref 11.5–14.5)
GFR SERPL CREATININE-BSD FRML MDRD: 64 ML/MIN/1.73M*2
GLUCOSE SERPL-MCNC: 171 MG/DL (ref 74–99)
HCT VFR BLD AUTO: 34.4 % (ref 36–46)
HGB BLD-MCNC: 11.3 G/DL (ref 12–16)
IMM GRANULOCYTES # BLD AUTO: 0.01 X10*3/UL (ref 0–0.5)
IMM GRANULOCYTES NFR BLD AUTO: 0.1 % (ref 0–0.9)
LDH SERPL L TO P-CCNC: 179 U/L (ref 84–246)
LYMPHOCYTES # BLD AUTO: 3.94 X10*3/UL (ref 0.8–3)
LYMPHOCYTES NFR BLD AUTO: 58.9 %
MCH RBC QN AUTO: 31.8 PG (ref 26–34)
MCHC RBC AUTO-ENTMCNC: 32.8 G/DL (ref 32–36)
MCV RBC AUTO: 97 FL (ref 80–100)
MONOCYTES # BLD AUTO: 0.48 X10*3/UL (ref 0.05–0.8)
MONOCYTES NFR BLD AUTO: 7.2 %
NEUTROPHILS # BLD AUTO: 1.97 X10*3/UL (ref 1.6–5.5)
NEUTROPHILS NFR BLD AUTO: 29.5 %
NRBC BLD-RTO: 0 /100 WBCS (ref 0–0)
PLATELET # BLD AUTO: 247 X10*3/UL (ref 150–450)
POTASSIUM SERPL-SCNC: 3.8 MMOL/L (ref 3.5–5.3)
PROT SERPL-MCNC: 6.5 G/DL (ref 6.4–8.2)
RBC # BLD AUTO: 3.55 X10*6/UL (ref 4–5.2)
SODIUM SERPL-SCNC: 141 MMOL/L (ref 136–145)
WBC # BLD AUTO: 6.7 X10*3/UL (ref 4.4–11.3)

## 2023-11-21 PROCEDURE — 1036F TOBACCO NON-USER: CPT | Performed by: NURSE PRACTITIONER

## 2023-11-21 PROCEDURE — 80053 COMPREHEN METABOLIC PANEL: CPT | Performed by: NURSE PRACTITIONER

## 2023-11-21 PROCEDURE — 1126F AMNT PAIN NOTED NONE PRSNT: CPT | Performed by: NURSE PRACTITIONER

## 2023-11-21 PROCEDURE — 36415 COLL VENOUS BLD VENIPUNCTURE: CPT | Performed by: NURSE PRACTITIONER

## 2023-11-21 PROCEDURE — 83615 LACTATE (LD) (LDH) ENZYME: CPT | Performed by: NURSE PRACTITIONER

## 2023-11-21 PROCEDURE — 3077F SYST BP >= 140 MM HG: CPT | Performed by: NURSE PRACTITIONER

## 2023-11-21 PROCEDURE — 3079F DIAST BP 80-89 MM HG: CPT | Performed by: NURSE PRACTITIONER

## 2023-11-21 PROCEDURE — 85025 COMPLETE CBC W/AUTO DIFF WBC: CPT | Performed by: NURSE PRACTITIONER

## 2023-11-21 PROCEDURE — 1159F MED LIST DOCD IN RCRD: CPT | Performed by: NURSE PRACTITIONER

## 2023-11-21 PROCEDURE — 99213 OFFICE O/P EST LOW 20 MIN: CPT | Performed by: NURSE PRACTITIONER

## 2023-11-21 PROCEDURE — 1160F RVW MEDS BY RX/DR IN RCRD: CPT | Performed by: NURSE PRACTITIONER

## 2023-11-21 RX ORDER — ZOLEDRONIC ACID 4 MG/5ML
4 INJECTION INTRAVENOUS
COMMUNITY
End: 2023-11-21 | Stop reason: WASHOUT

## 2023-11-21 RX ORDER — VALACYCLOVIR HYDROCHLORIDE 1 G/1
1000 TABLET, FILM COATED ORAL DAILY PRN
Status: ON HOLD | COMMUNITY
Start: 2023-11-03

## 2023-11-21 ASSESSMENT — PATIENT HEALTH QUESTIONNAIRE - PHQ9
1. LITTLE INTEREST OR PLEASURE IN DOING THINGS: NOT AT ALL
2. FEELING DOWN, DEPRESSED OR HOPELESS: NOT AT ALL
SUM OF ALL RESPONSES TO PHQ9 QUESTIONS 1 AND 2: 0

## 2023-11-21 ASSESSMENT — ENCOUNTER SYMPTOMS
DEPRESSION: 0
LOSS OF SENSATION IN FEET: 0
OCCASIONAL FEELINGS OF UNSTEADINESS: 0

## 2023-11-21 ASSESSMENT — COLUMBIA-SUICIDE SEVERITY RATING SCALE - C-SSRS
1. IN THE PAST MONTH, HAVE YOU WISHED YOU WERE DEAD OR WISHED YOU COULD GO TO SLEEP AND NOT WAKE UP?: NO
6. HAVE YOU EVER DONE ANYTHING, STARTED TO DO ANYTHING, OR PREPARED TO DO ANYTHING TO END YOUR LIFE?: NO
2. HAVE YOU ACTUALLY HAD ANY THOUGHTS OF KILLING YOURSELF?: NO

## 2023-11-21 ASSESSMENT — PAIN SCALES - GENERAL: PAINLEVEL: 0-NO PAIN

## 2023-11-21 NOTE — PROGRESS NOTES
Patient ID: Bonnie Jules is a 83 y.o. female.    Treatment Synopsis:  1. 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  2. 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  3. Status post admission to [facility] for syncope and anemia in December of 2012.  4. Status post admission to [facility] for lightheadedness and anemia in June of 2013.  5. 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  6. Started IVIg 0.4 mg/kg monthly on 02/07/2014.  7. Bendamustine started on 02/12/2014, stopped in April of 2014, secondary to side effects.  8. Started rituximab on 02/19/2014.    Interval History:  Patient returns today for follow-up evaluation for history of lymphocytosis secondary to combination of CLL and/or second population of CD5 positive lymphocytes of uncertain significance.  Most recently having received treatment with rituximab in February of 2014.  On presentation today she reports she is feeling well.  She has no complaints.  She has had no hospitalizations no notable infections since our last visit.    She denies any fever, chills or night sweats.  No cough, chest pain or shortness of breath.  No nausea or vomiting.  No constipation or diarrhea.  She has not had a lymphadenopathy her weight remained stable..  No current sign or symptoms of active bleeding or unusual bruising, chronic anemia.      Primary complaint is arthritis in her knees.     Review of Systems:  A review of systems has been completed and are negative for complaints except what is stated in the HPI and/or past medical history    Allergies:  Reviewed in EMR    Medications:  Medication list reviewed with patient and updated in EMR    Past Medical History:  Chronic back pain, osteoarthritis, hypertension, coronary artery disease, diabetes mellitus, history of herpes  "zoster, CLL (chronic lymphocytic leukemia)    Past Surgical History:  total hip replacement, hysterectomy, cholecystectomy, coronary artery bypass surgery    Vital Signs:  /84 (BP Location: Left arm, Patient Position: Sitting, BP Cuff Size: Adult)   Pulse 62   Temp 35.8 °C (96.4 °F) (Temporal)   Resp 18   Ht 1.419 m (4' 7.87\")   Wt 56.4 kg (124 lb 3.7 oz)   SpO2 96%   BMI 27.99 kg/m²     Physical Exam:  ECO-1  Pain: chronic knee pain, right  Constitutional: Well developed, awake/alert/oriented x3, no distress, alert and cooperative  Eyes: PER. sclera anicteric  ENMT: Oral mucosa moist  Respiratory/Thorax: Breathing is non-labored. Lungs are clear to auscultation bilaterally. No adventitious breath sounds  Cardiovascular: S1-S2. Regular rate and rhythm. No murmurs, rubs, or gallops appreciated  Gastrointestinal: Abdomen soft nontender, nondistended, normal active bowel sounds.  Musculoskeletal: ROM intact, no joint swelling, normal strength  Extremities: normal extremities, no cyanosis, no edema, no clubbing  Neurologic: alert and oriented x3. Nonfocal exam. No myoclonus  Psychological: Pleasant, appropriate and easily engaged     Labs:  CBC date/time       WBC     HGB     HCT     PLT     Neut      2023 13:13   10.1    12.7    38.7    276     4.14  14-Mar-2023 12:41   6.5     12.2    37.9    269     2.01  2022 12:50   5.6     10.9(L) 33.9(L) 325     1.49(L)    2023  WBC 6.7, hemoglobin 11.3, hematocrit 34.4, platelets 247,000    November 15, 2023  WBC 6.6, hemoglobin 10.9, hematocrit 33.6, platelets 236,000    Assessment:  CLL:  The patient will continue with observation alone at this time. No further treatment is planned at this time.  If she has a relapse, we could consider using venetoclax plus Gazyva.    Anemia:  Chronic in nature. Waxes and wanes.   No anemia today.  Has required IV iron in the past.     Immunodeficiency:  No recent infections.   The patient will " continue with observation alone without IVIg.    Plan:  - 3 month follow-up  - labs on return  - - - CBCd, CMP, LDH, ferritin, iron panel     CARMEN Braswell-CNP

## 2023-12-05 ENCOUNTER — TELEPHONE (OUTPATIENT)
Dept: HEMATOLOGY/ONCOLOGY | Facility: CLINIC | Age: 83
End: 2023-12-05
Payer: MEDICARE

## 2023-12-05 NOTE — TELEPHONE ENCOUNTER
I spoke to Bonnie.  I informed her that her labs were drawn on 11-21, the day she saw Miquel Hull CNP and Miquel reviewed her labs which are stable.  Her endocrinologist saw older labs and told Bonnie to call Mqiuel.  I explained that the most recent labs are the ones Miquel magalis on 11-21 and have been reviewed.  She did a teachback.

## 2024-01-25 ENCOUNTER — HOSPITAL ENCOUNTER (OUTPATIENT)
Dept: RADIOLOGY | Facility: CLINIC | Age: 84
Discharge: HOME | End: 2024-01-25
Payer: MEDICARE

## 2024-01-25 ENCOUNTER — TELEPHONE (OUTPATIENT)
Dept: CARDIOLOGY | Facility: HOSPITAL | Age: 84
End: 2024-01-25

## 2024-01-25 ENCOUNTER — OFFICE VISIT (OUTPATIENT)
Dept: ORTHOPEDIC SURGERY | Facility: CLINIC | Age: 84
End: 2024-01-25
Payer: MEDICARE

## 2024-01-25 DIAGNOSIS — I25.119 ATHEROSCLEROSIS OF NATIVE CORONARY ARTERY OF NATIVE HEART WITH ANGINA PECTORIS (CMS-HCC): Primary | ICD-10-CM

## 2024-01-25 DIAGNOSIS — G89.29 CHRONIC PAIN OF BOTH KNEES: Primary | ICD-10-CM

## 2024-01-25 DIAGNOSIS — M25.561 CHRONIC PAIN OF BOTH KNEES: Primary | ICD-10-CM

## 2024-01-25 DIAGNOSIS — M17.11 PRIMARY OSTEOARTHRITIS OF RIGHT KNEE: ICD-10-CM

## 2024-01-25 DIAGNOSIS — M23.51 RECURRENT RIGHT KNEE INSTABILITY: ICD-10-CM

## 2024-01-25 DIAGNOSIS — M25.562 CHRONIC PAIN OF BOTH KNEES: Primary | ICD-10-CM

## 2024-01-25 DIAGNOSIS — M25.569 KNEE PAIN: ICD-10-CM

## 2024-01-25 PROCEDURE — 73560 X-RAY EXAM OF KNEE 1 OR 2: CPT | Mod: BILATERAL PROCEDURE | Performed by: ORTHOPAEDIC SURGERY

## 2024-01-25 PROCEDURE — 73560 X-RAY EXAM OF KNEE 1 OR 2: CPT | Mod: 50

## 2024-01-25 PROCEDURE — 1036F TOBACCO NON-USER: CPT | Performed by: ORTHOPAEDIC SURGERY

## 2024-01-25 PROCEDURE — 99214 OFFICE O/P EST MOD 30 MIN: CPT | Performed by: ORTHOPAEDIC SURGERY

## 2024-01-25 PROCEDURE — 1159F MED LIST DOCD IN RCRD: CPT | Performed by: ORTHOPAEDIC SURGERY

## 2024-01-25 PROCEDURE — 1160F RVW MEDS BY RX/DR IN RCRD: CPT | Performed by: ORTHOPAEDIC SURGERY

## 2024-01-25 PROCEDURE — 1126F AMNT PAIN NOTED NONE PRSNT: CPT | Performed by: ORTHOPAEDIC SURGERY

## 2024-01-25 RX ORDER — SODIUM CHLORIDE 9 MG/ML
100 INJECTION, SOLUTION INTRAVENOUS CONTINUOUS
Status: CANCELLED | OUTPATIENT
Start: 2024-04-17

## 2024-01-25 RX ORDER — VANCOMYCIN 1 G/200ML
1000 INJECTION, SOLUTION INTRAVENOUS ONCE
Status: CANCELLED | OUTPATIENT
Start: 2024-04-17 | End: 2024-01-25

## 2024-01-25 ASSESSMENT — PAIN DESCRIPTION - DESCRIPTORS: DESCRIPTORS: THROBBING

## 2024-01-25 ASSESSMENT — PAIN - FUNCTIONAL ASSESSMENT: PAIN_FUNCTIONAL_ASSESSMENT: 0-10

## 2024-01-25 ASSESSMENT — PAIN SCALES - GENERAL: PAINLEVEL_OUTOF10: 5 - MODERATE PAIN

## 2024-01-25 NOTE — PROGRESS NOTES
Subjective      Chief Complaint   Patient presents with    Right Knee - Pain     Pt had bilateral knee injections on 10/25/23.  States they didn't really help.  Would like to discuss surgery.        No surgery found     HPI  This 83 year old patient presents today with right knee pain. The patient states that this right knee pain has been present for years. The patient rates the knee pain as 8. The patient states that the right knee pain is worse with and activity and bearing weight. The patient states the right knee is giving way and locking.The patient has tried ibuprofen and tylenol with no relief. She states that cortisone no longer gives relief of the right knee pain. Patient requests a discussion of further treatment options on examination today.     CARDIOLOGY:   Negative for chest pain, shortness of breath.   RESPIRATORY:   Negative for chest pain, shortness of breath.   MUSCULOSKELETAL:   See HPI for details.   NEUROLOGY:   Negative for tingling, numbness, weakness.    JOINT REPLACEMENT HISTORY    Primary joint affected: right knee     Duration of symptoms: years    Joint replacement related history:  Osteoarthritis Severe  Avascular necrosis: No    Failed nonsurgical treatment:   NSAID/ COXIB: Yes  Weight loss: Yes  Physical therapy: Yes  Intra-articular injection: Yes  Braces, orthotics, or assistive devices: Yes    Radiological indications for replacement:   Subchondral cyst: Yes  Subchondral sclerosis: Yes  Periarticular osteophytes: Yes  Joint subluxation: Yes  Joint space narrowing: Yes    Highest level of walking support:   Cane, wheelchair or walker    Pain History:   Pain at rest: Severe  Pain when weigth bearing: Severe  Pain with passive ROM: Severe  Pain related ADL limitation: Severe  Abnormal findings on physical exam: Severe    Ability to walk without significant pain:  Household ambulator or less than 1 block    Is the patients current pain regimen controlling their joint pain?  no      Objective    There were no vitals filed for this visit.    Knee Exam  Constitutional: Appears stated age. No apparent distress  Labored Breathing: No  Psychiatric: Normal mood and effect.   Neurological: alert and oriented x3  Skin: intact  MUSCULOSKELETAL: Neck: No tenderness. No pain or limitation with range of motion. Back: No tenderness. Straight leg test negative bilaterally. bilateral knee: There is diffuse tenderness over the left and right knee. diminished range of motion at the right knee from 10- 90 degrees and 5-110 degrees at the left. McMurrays is negative. Anterior drawer and lachmans are negative. There is not an effusion present. The right knee is not stable to valgus and varus stressing. There is a ten degree valgus deformity. The patient walks with a painful gait favoring the right knee while walking.    AP and lateral x-rays of the right knee done and read in office today show  severe osteoarthritis of the right  knee with loss of joint surface cartilage, sclerosis and spurring  worse at the lateral compartment and also significant valgus deformity of approximately 20 degrees.  I reviewed these x-rays with the patient in the office today..     Patient ID: Bonnie Jules is a 83 y.o. female.      Bonnie was seen today for pain.  Diagnoses and all orders for this visit:  Chronic pain of both knees (Primary)  Primary osteoarthritis of right knee  Recurrent right knee instability    Options are discussed with the patient in detail. The patient is instructed regarding ice, activity modification and risk for further injury with falling or trauma,   over-the-counter bracing and the use of a cane at all times for balance and support and continuing with physician directed at home gentle strengthening and range of motion exercises, and the appropriate use of Tylenol as needed for pain with its potential adverse reactions and side effects. The patient understands. The patient states that this right  knee pain is debilitating. The patient states that since this right knee pain has recurred that it is disabling and impairs the patient´s ability to walk and do other activities that are important. AP and lateral x-rays show osteoarthritis of the right knee that is worse at the medial compartment with loss of joint surface cartilage, bone on bone and sclerosis. Right total knee replacement with indications, alternatives, potential risks including but not limited to blood loss, blood clot, nerve damage, infection, death and stroke, benefits, unforseen risks, the rehab involved and the fact that no guarantee can be made were all discussed with the patient in detail. The patient understands, accepts and wishes to proceed because of the persistent right knee pain and the fact that activity modification, gentle strengthening and ROM exercises, physical therapy, Tylenol, ibuprofen, cortisone injections and visco supplementation did not relieve the right knee pain and because this right knee pain impairs the patient´s ability to complete the normal activities of daily living . I agree. We will be setting this up at a time that is convenient for the patient and as the schedule allows.  the patient has an appointment with her cardiologist, Dr. Araujo, and this operation is not scheduled until after she sees Dr. Araujo.The patient is to follow up  sooner as needed. Please note that this report has been produced using speech recognition software.  It may contain errors related to grammar, punctuation or spelling.  Electronically signed, but not reviewed.    Rudolph Liriano MD

## 2024-01-27 DIAGNOSIS — I25.119 ATHEROSCLEROSIS OF NATIVE CORONARY ARTERY OF NATIVE HEART WITH ANGINA PECTORIS (CMS-HCC): Primary | ICD-10-CM

## 2024-01-28 RX ORDER — NITROGLYCERIN 0.4 MG/1
0.4 TABLET SUBLINGUAL EVERY 5 MIN PRN
Qty: 25 TABLET | Refills: 11 | Status: SHIPPED | OUTPATIENT
Start: 2024-01-28 | End: 2024-05-20 | Stop reason: WASHOUT

## 2024-01-29 RX ORDER — METOPROLOL TARTRATE 25 MG/1
12.5 TABLET, FILM COATED ORAL 2 TIMES DAILY
Qty: 90 TABLET | Refills: 3 | Status: SHIPPED | OUTPATIENT
Start: 2024-01-29 | End: 2024-05-20 | Stop reason: SDUPTHER

## 2024-02-05 DIAGNOSIS — I10 ESSENTIAL HYPERTENSION: Primary | ICD-10-CM

## 2024-02-06 RX ORDER — LISINOPRIL 40 MG/1
40 TABLET ORAL DAILY
Qty: 90 TABLET | Refills: 3 | Status: SHIPPED | OUTPATIENT
Start: 2024-02-06 | End: 2024-06-11 | Stop reason: HOSPADM

## 2024-02-20 ENCOUNTER — OFFICE VISIT (OUTPATIENT)
Dept: HEMATOLOGY/ONCOLOGY | Facility: CLINIC | Age: 84
End: 2024-02-20
Payer: MEDICARE

## 2024-02-20 VITALS
HEART RATE: 68 BPM | RESPIRATION RATE: 18 BRPM | TEMPERATURE: 97.5 F | OXYGEN SATURATION: 94 % | WEIGHT: 124.34 LBS | DIASTOLIC BLOOD PRESSURE: 64 MMHG | BODY MASS INDEX: 28.01 KG/M2 | SYSTOLIC BLOOD PRESSURE: 172 MMHG

## 2024-02-20 DIAGNOSIS — D64.9 ANEMIA, UNSPECIFIED TYPE: Primary | ICD-10-CM

## 2024-02-20 DIAGNOSIS — E78.2 MIXED HYPERLIPIDEMIA: Primary | ICD-10-CM

## 2024-02-20 DIAGNOSIS — C91.10 CLL (CHRONIC LYMPHOCYTIC LEUKEMIA) (MULTI): ICD-10-CM

## 2024-02-20 LAB
ALBUMIN SERPL BCP-MCNC: 4.2 G/DL (ref 3.4–5)
ALP SERPL-CCNC: 45 U/L (ref 33–136)
ALT SERPL W P-5'-P-CCNC: 11 U/L (ref 7–45)
ANION GAP SERPL CALC-SCNC: 13 MMOL/L (ref 10–20)
AST SERPL W P-5'-P-CCNC: 22 U/L (ref 9–39)
BASOPHILS # BLD AUTO: 0.01 X10*3/UL (ref 0–0.1)
BASOPHILS NFR BLD AUTO: 0.2 %
BILIRUB SERPL-MCNC: 0.5 MG/DL (ref 0–1.2)
BUN SERPL-MCNC: 25 MG/DL (ref 6–23)
CALCIUM SERPL-MCNC: 8.9 MG/DL (ref 8.6–10.3)
CHLORIDE SERPL-SCNC: 104 MMOL/L (ref 98–107)
CO2 SERPL-SCNC: 26 MMOL/L (ref 21–32)
CREAT SERPL-MCNC: 0.94 MG/DL (ref 0.5–1.05)
EGFRCR SERPLBLD CKD-EPI 2021: 61 ML/MIN/1.73M*2
EOSINOPHIL # BLD AUTO: 0.21 X10*3/UL (ref 0–0.4)
EOSINOPHIL NFR BLD AUTO: 3.7 %
ERYTHROCYTE [DISTWIDTH] IN BLOOD BY AUTOMATED COUNT: 12.3 % (ref 11.5–14.5)
FERRITIN SERPL-MCNC: 61 NG/ML (ref 8–150)
GLUCOSE SERPL-MCNC: 118 MG/DL (ref 74–99)
HCT VFR BLD AUTO: 35.9 % (ref 36–46)
HGB BLD-MCNC: 11.6 G/DL (ref 12–16)
IMM GRANULOCYTES # BLD AUTO: 0.01 X10*3/UL (ref 0–0.5)
IMM GRANULOCYTES NFR BLD AUTO: 0.2 % (ref 0–0.9)
IRON SATN MFR SERPL: 35 % (ref 25–45)
IRON SERPL-MCNC: 107 UG/DL (ref 35–150)
LDH SERPL L TO P-CCNC: 162 U/L (ref 84–246)
LYMPHOCYTES # BLD AUTO: 3.09 X10*3/UL (ref 0.8–3)
LYMPHOCYTES NFR BLD AUTO: 53.7 %
MCH RBC QN AUTO: 30.4 PG (ref 26–34)
MCHC RBC AUTO-ENTMCNC: 32.3 G/DL (ref 32–36)
MCV RBC AUTO: 94 FL (ref 80–100)
MONOCYTES # BLD AUTO: 0.49 X10*3/UL (ref 0.05–0.8)
MONOCYTES NFR BLD AUTO: 8.5 %
NEUTROPHILS # BLD AUTO: 1.94 X10*3/UL (ref 1.6–5.5)
NEUTROPHILS NFR BLD AUTO: 33.7 %
NRBC BLD-RTO: 0 /100 WBCS (ref 0–0)
PLATELET # BLD AUTO: 207 X10*3/UL (ref 150–450)
POTASSIUM SERPL-SCNC: 4 MMOL/L (ref 3.5–5.3)
PROT SERPL-MCNC: 6.3 G/DL (ref 6.4–8.2)
RBC # BLD AUTO: 3.81 X10*6/UL (ref 4–5.2)
SODIUM SERPL-SCNC: 139 MMOL/L (ref 136–145)
TIBC SERPL-MCNC: 307 UG/DL (ref 240–445)
UIBC SERPL-MCNC: 200 UG/DL (ref 110–370)
WBC # BLD AUTO: 5.8 X10*3/UL (ref 4.4–11.3)

## 2024-02-20 PROCEDURE — 83615 LACTATE (LD) (LDH) ENZYME: CPT | Mod: WESLAB | Performed by: NURSE PRACTITIONER

## 2024-02-20 PROCEDURE — 83540 ASSAY OF IRON: CPT | Mod: WESLAB | Performed by: NURSE PRACTITIONER

## 2024-02-20 PROCEDURE — 1036F TOBACCO NON-USER: CPT | Performed by: NURSE PRACTITIONER

## 2024-02-20 PROCEDURE — 3077F SYST BP >= 140 MM HG: CPT | Performed by: NURSE PRACTITIONER

## 2024-02-20 PROCEDURE — 85025 COMPLETE CBC W/AUTO DIFF WBC: CPT | Performed by: NURSE PRACTITIONER

## 2024-02-20 PROCEDURE — 99213 OFFICE O/P EST LOW 20 MIN: CPT | Performed by: NURSE PRACTITIONER

## 2024-02-20 PROCEDURE — 1159F MED LIST DOCD IN RCRD: CPT | Performed by: NURSE PRACTITIONER

## 2024-02-20 PROCEDURE — 80053 COMPREHEN METABOLIC PANEL: CPT | Performed by: NURSE PRACTITIONER

## 2024-02-20 PROCEDURE — 82728 ASSAY OF FERRITIN: CPT | Mod: WESLAB | Performed by: NURSE PRACTITIONER

## 2024-02-20 PROCEDURE — 1160F RVW MEDS BY RX/DR IN RCRD: CPT | Performed by: NURSE PRACTITIONER

## 2024-02-20 PROCEDURE — 1125F AMNT PAIN NOTED PAIN PRSNT: CPT | Performed by: NURSE PRACTITIONER

## 2024-02-20 PROCEDURE — 3078F DIAST BP <80 MM HG: CPT | Performed by: NURSE PRACTITIONER

## 2024-02-20 ASSESSMENT — ENCOUNTER SYMPTOMS
DEPRESSION: 0
LOSS OF SENSATION IN FEET: 1
OCCASIONAL FEELINGS OF UNSTEADINESS: 1

## 2024-02-20 ASSESSMENT — PAIN SCALES - GENERAL: PAINLEVEL: 6

## 2024-02-20 NOTE — PROGRESS NOTES
Patient ID: Bonnie Jules is a 83 y.o. female.    Cancer History:  CLL    Treatment Synopsis:  1. Lymphocytosis secondary to combination of CLL and/or second population of CD5 positive lymphocytes of uncertain significance.  2. 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  3. Started IVIg 0.4 mg/kg monthly on 02/07/2014.  4. Bendamustine started on February 12, 2014, stopped in April of 2014, secondary to side effects.  5. Started rituximab on 02/19/2014.    Interval History:  Patient returns today for follow-up evaluation for history of lymphocytosis secondary to combination of CLL and/or second population of CD5 positive lymphocytes of uncertain significance.  Most recently having received treatment with rituximab in February of 2014.    On presentation today she reports she is feeling well.  She has no complaints.  She has had no hospitalizations no notable infections since our last visit.  She denies any fever, chills or night sweats.  No cough, chest pain or shortness of breath.  No nausea or vomiting.  No constipation or diarrhea.  She has not had a lymphadenopathy her weight remained stable. No current sign or symptoms of active bleeding or unusual bruising, chronic anemia.      Primary complaint is arthritis in her knees. She is scheduled to undergo right knee replacement in April.     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 sclera anicteric  ENMT: Oral mucosa moist  Respiratory/Thorax: Breathing is non-labored. Lungs are clear to auscultation bilaterally. No adventitious breath sounds  Cardiovascular: S1-S2. Regular rate and rhythm. No murmurs, rubs, or gallops appreciated  Gastrointestinal: Abdomen soft nontender, nondistended, normal active bowel sounds.  Musculoskeletal: ROM intact, no joint swelling, normal strength  Extremities: normal extremities, no cyanosis, no edema, no clubbing  Neurologic: alert and oriented x3. Nonfocal exam. No myoclonus  Psychological: Pleasant, appropriate and easily engaged     Labs:  CBC date/time       WBC     HGB     HCT     PLT     Neut      2023 13:13   10.1    12.7    38.7    276     4.14  14-Mar-2023 12:41   6.5     12.2    37.9    269     2.01  2022 12:50   5.6     10.9(L) 33.9(L) 325     1.49(L)    2023  WBC 6.7, hemoglobin 11.3, hematocrit 34.4, platelets 247,000    November 15, 2023  WBC 6.6, hemoglobin 10.9, hematocrit 33.6, platelets 236,000    2024  WBC 5.8, hemoglobin 11.6, hematocrit 35.9, platelets 207,000    Assessment:  CLL:  The patient will continue with observation alone at this time. No further treatment is planned at this time.  If she has a relapse, we could consider using venetoclax plus Gazyva.    Anemia:  Chronic in nature. Waxes and wanes.   Mild anemia today.  Has required IV iron in the past.     Immunodeficiency:  No recent infections.   The patient will continue with observation alone without IVIg.    Plan:  - 4 month follow-up  - labs on return  - - - CBCd, CMP, LDH, ferritin, iron panel       Miquel Hull  APRN-CNP

## 2024-02-21 RX ORDER — PRAVASTATIN SODIUM 40 MG/1
40 TABLET ORAL DAILY
Qty: 90 TABLET | Refills: 1 | Status: SHIPPED | OUTPATIENT
Start: 2024-02-21

## 2024-03-01 ENCOUNTER — APPOINTMENT (OUTPATIENT)
Dept: PREADMISSION TESTING | Facility: HOSPITAL | Age: 84
End: 2024-03-01
Payer: MEDICARE

## 2024-03-06 NOTE — PROGRESS NOTES
Subjective      Chief Complaint   Patient presents with    6 month follow up     Atherosclerotic heart disease of native coronary artery with unspecified angina pectoris     s/c 04/17/2024  rt knee           She does have a history of coronary disease and had open heart surgery in 2005 she also has hypertension as well as CLL.  Her last echocardiogram was in 2021 showing normal left ventricular function and aortic sclerosis.  Her last stress test was in 2013 which showed inferior scar but no ischemia.  She is to have a knee replacement in April.  She is not complaining of chest discomfort.  No complaints of PND or orthopnea.  The legs are not swelling on her.  NO palpitaions.  The BP has been high and says it is from the knee hurting her  The chol has been controlled           ROS     Past Surgical History:   Procedure Laterality Date    CARPAL TUNNEL RELEASE          Active Ambulatory Problems     Diagnosis Date Noted    Age-related osteoporosis without current pathological fracture 08/19/2023    Arteriopathy (CMS/[facility]) 08/19/2023    Arthritis of both knees 08/19/2023    Artificial lens present 08/19/2023    Calcification of both carotid arteries 08/19/2023    Background diabetic retinopathy of left eye determined by examination (CMS/[facility]) 08/19/2023    Atherosclerotic heart disease of native coronary artery with unspecified angina pectoris (CMS/[facility]) 08/19/2023    Chondrocalcinosis of left knee 08/19/2023    Chronic ischemic colitis (CMS/[facility]) 08/19/2023    Chronic kidney disease, stage III (moderate) (CMS/[facility]) 08/19/2023    Chronic lymphocytic leukemia of B-cell type not having achieved remission (CMS/[facility]) 08/19/2023    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    Difficulty walking 08/19/2023    Dermatochalasis of right upper eyelid 08/19/2023    Dermatochalasis of left upper eyelid 08/19/2023    Erosive osteoarthritis 08/19/2023    Essential hypertension 08/19/2023    Extravasation injury of IV  catheter site with other complication (CMS/[facility]) 08/19/2023    Head injury 08/19/2023    Hypoglycemia unawareness associated with type 1 diabetes mellitus (CMS/[facility]) 08/19/2023    Hypothyroidism 08/19/2023    Recurrent right knee instability 08/19/2023    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 08/19/2023    Joint ankylosis of the shoulder region 08/19/2023    Knee pain 08/19/2023    Macular hole of left eye 08/19/2023    Carpal tunnel syndrome 08/19/2023    Murmur 08/19/2023    Hyperlipidemia 08/19/2023    Myocardial infarction (CMS/[facility]) 08/19/2023    Other chronic pain 08/19/2023    Insomnia 08/19/2023    Peripheral vascular disease, unspecified (CMS/[facility]) 08/19/2023    Osteoarthritis of right knee 08/19/2023    Presence of aortocoronary bypass graft 08/19/2023    Primary localized osteoarthritis of pelvic region and thigh 08/19/2023    Rhinitis 08/19/2023    Shoulder joint pain 08/19/2023    Lumbar spondylosis 08/19/2023    Stenosis of right femoral artery (CMS/[facility]) 08/19/2023    Tear film insufficiency 08/19/2023    Type 1 diabetes mellitus with hyperglycemia (CMS/[facility]) 08/19/2023    Type 2 diabetes mellitus with other skin complications (CMS/[facility]) 08/19/2023    Type 1 diabetes mellitus with diabetic polyneuropathy (CMS/[facility]) 08/19/2023    Type 2 diabetes mellitus without complication (CMS/[facility]) 08/19/2023    Unspecified disorder of refraction 08/19/2023    Vascular disorder of intestine, unspecified (CMS/[facility]) 08/19/2023    Vitamin D deficiency 08/19/2023    History of coronary artery bypass graft x 3 10/23/2023    Hematochezia 10/23/2023    Gastrointestinal hemorrhage 10/23/2023    Exudative age-related macular degeneration (CMS/[facility]) 10/23/2023    Dyslipidemia 10/23/2023    Diabetic polyneuropathy associated with type 2 diabetes mellitus (CMS/[facility]) 06/12/2023    Delirium 10/23/2023    Lactic acidosis 10/23/2023    Macular edema 10/23/2023    Metatarsalgia, right foot 06/12/2023    Nausea & vomiting  10/23/2023    Neuropathy 06/12/2023    Onychomycosis 06/12/2023    Pain of great toe 06/12/2023    Pre-ulcerative calluses 06/12/2023    Rectal hemorrhage 10/23/2023    Seizure (CMS/[facility]) 10/23/2023    Skin fissure 06/12/2023    Ulcer of right foot, limited to breakdown of skin (CMS/[facility]) 06/12/2023    Osteoarthritis of left knee 10/23/2023    Chronic kidney disease, stage 3 (CMS/[facility]) 10/23/2023    Arthropathy 06/12/2023    Osteoarthritis 10/23/2023    Benign essential hypertension 10/23/2023    Hypertensive disorder 10/23/2023    Low blood pressure 10/23/2023    Near syncope 10/23/2023    Peripheral vascular disease (CMS/[facility]) 06/12/2023    Disorder of thyroid gland 10/23/2023    Hypoglycemia 10/23/2023    Colitis 10/23/2023    Ischemic colitis (CMS/[facility]) 05/30/2017    Abdominal pain 10/23/2023    Chest pain 10/23/2023    Hip pain 10/23/2023    Pain in joint, ankle and foot 08/07/2008    Sepsis (CMS/[facility]) 10/23/2023    Diabetes mellitus, type 2 (CMS/[facility]) 10/23/2023    Type 1 diabetes mellitus (CMS/[facility]) 06/12/2023    Dehydration 10/23/2023    Closed fracture of calcaneus 07/02/2008    Arthritis of hip 10/23/2023    Aortic valve disorder 10/23/2023    Anxiety 10/23/2023    Acute urinary tract infection 10/23/2023    Acute non-ST segment elevation myocardial infarction (CMS/[facility]) 10/23/2023    Accidental fall 10/23/2023    Abnormality of gait 08/07/2008    Abnormal metabolic state due to diabetes mellitus (CMS/[facility]) 10/23/2023    Abnormal computed tomography scan 10/23/2023    Preop cardiovascular exam 03/07/2024     Resolved Ambulatory Problems     Diagnosis Date Noted    No Resolved Ambulatory Problems     Past Medical History:   Diagnosis Date    CTS (carpal tunnel syndrome)     History of left hip replacement         Visit Vitals  /80   Pulse 55   Wt 57.6 kg (127 lb)   SpO2 96%   BMI 28.61 kg/m²   Smoking Status Never   BSA 1.51 m²        Objective     Constitutional:       Appearance: Healthy appearance.   Eyes:  "     Pupils: Pupils are equal, round, and reactive to light.   Neck:      Vascular: JVD normal.   Pulmonary:      Breath sounds: Normal breath sounds.   Cardiovascular:      PMI at left midclavicular line. Normal rate. Regular rhythm. Normal S1. Normal S2.       Murmurs: There is a grade 2/6 harsh systolic murmur.      No gallop.  No click. No rub.   Pulses:     Intact distal pulses.   Edema:     Peripheral edema absent.   Abdominal:      Palpations: Abdomen is soft.      Tenderness: There is no abdominal tenderness.   Musculoskeletal:      Extremities: No clubbing present.Skin:     General: Skin is warm and dry.   Neurological:      General: No focal deficit present.            Lab Review:         Lab Results   Component Value Date    CHOL 162 03/06/2023    CHOL 167 02/25/2022    CHOL 163 01/05/2022     Lab Results   Component Value Date    HDL 70 03/06/2023    HDL 74 02/25/2022    HDL 71 01/05/2022     Lab Results   Component Value Date    LDLCALC 67 03/06/2023    LDLCALC 75 02/25/2022    LDLCALC 73 01/05/2022     Lab Results   Component Value Date    TRIG 125 03/06/2023    TRIG 88 02/25/2022    TRIG 97 01/05/2022     No components found for: \"CHOLHDL\"     Assessment/Plan     Preop cardiovascular exam  She is to have TKR next month.  No angina and no chf.  The OHS was in 2005 and doing well. The last stress was years ago. The EKG is unchanged from 2021.  The BP is up and will adjust the med.  Cardiac wise is low risk for surgery and will clear her.    Aortic valve disorder  The valve sounds the same    Essential hypertension  Remains high and will adjus the meds.     "

## 2024-03-07 ENCOUNTER — OFFICE VISIT (OUTPATIENT)
Dept: CARDIOLOGY | Facility: CLINIC | Age: 84
End: 2024-03-07
Payer: MEDICARE

## 2024-03-07 VITALS
BODY MASS INDEX: 28.61 KG/M2 | OXYGEN SATURATION: 96 % | HEART RATE: 55 BPM | SYSTOLIC BLOOD PRESSURE: 180 MMHG | DIASTOLIC BLOOD PRESSURE: 80 MMHG | WEIGHT: 127 LBS

## 2024-03-07 DIAGNOSIS — I10 ESSENTIAL HYPERTENSION: ICD-10-CM

## 2024-03-07 DIAGNOSIS — I35.9 AORTIC VALVE DISORDER: ICD-10-CM

## 2024-03-07 DIAGNOSIS — Z01.810 PREOP CARDIOVASCULAR EXAM: Primary | ICD-10-CM

## 2024-03-07 PROCEDURE — 99214 OFFICE O/P EST MOD 30 MIN: CPT | Performed by: INTERNAL MEDICINE

## 2024-03-07 PROCEDURE — 1126F AMNT PAIN NOTED NONE PRSNT: CPT | Performed by: INTERNAL MEDICINE

## 2024-03-07 PROCEDURE — 1160F RVW MEDS BY RX/DR IN RCRD: CPT | Performed by: INTERNAL MEDICINE

## 2024-03-07 PROCEDURE — 93005 ELECTROCARDIOGRAM TRACING: CPT | Performed by: INTERNAL MEDICINE

## 2024-03-07 PROCEDURE — 93010 ELECTROCARDIOGRAM REPORT: CPT | Performed by: INTERNAL MEDICINE

## 2024-03-07 PROCEDURE — 1159F MED LIST DOCD IN RCRD: CPT | Performed by: INTERNAL MEDICINE

## 2024-03-07 PROCEDURE — 3079F DIAST BP 80-89 MM HG: CPT | Performed by: INTERNAL MEDICINE

## 2024-03-07 PROCEDURE — 1036F TOBACCO NON-USER: CPT | Performed by: INTERNAL MEDICINE

## 2024-03-07 PROCEDURE — 3077F SYST BP >= 140 MM HG: CPT | Performed by: INTERNAL MEDICINE

## 2024-03-07 RX ORDER — HYDRALAZINE HYDROCHLORIDE 50 MG/1
50 TABLET, FILM COATED ORAL 2 TIMES DAILY
Qty: 180 TABLET | Refills: 3 | Status: SHIPPED | OUTPATIENT
Start: 2024-03-07 | End: 2025-03-07

## 2024-03-07 RX ORDER — HYDROCHLOROTHIAZIDE 25 MG/1
25 TABLET ORAL DAILY
COMMUNITY
End: 2024-06-09 | Stop reason: ENTERED-IN-ERROR

## 2024-03-07 ASSESSMENT — PAIN SCALES - GENERAL: PAINLEVEL: 0-NO PAIN

## 2024-03-07 ASSESSMENT — ENCOUNTER SYMPTOMS
OCCASIONAL FEELINGS OF UNSTEADINESS: 0
LOSS OF SENSATION IN FEET: 0
DEPRESSION: 0

## 2024-03-07 NOTE — ASSESSMENT & PLAN NOTE
She is to have TKR next month.  No angina and no chf.  The OHS was in 2005 and doing well. The last stress was years ago. The EKG is unchanged from 2021.  The BP is up and will adjust the med.  Cardiac wise is low risk for surgery and will clear her.

## 2024-03-13 ENCOUNTER — OFFICE VISIT (OUTPATIENT)
Dept: PRIMARY CARE | Facility: CLINIC | Age: 84
End: 2024-03-13
Payer: MEDICARE

## 2024-03-13 VITALS
TEMPERATURE: 97 F | WEIGHT: 127.8 LBS | HEART RATE: 65 BPM | SYSTOLIC BLOOD PRESSURE: 140 MMHG | BODY MASS INDEX: 28.79 KG/M2 | DIASTOLIC BLOOD PRESSURE: 90 MMHG | OXYGEN SATURATION: 96 %

## 2024-03-13 DIAGNOSIS — J32.9 SINUSITIS, UNSPECIFIED CHRONICITY, UNSPECIFIED LOCATION: ICD-10-CM

## 2024-03-13 DIAGNOSIS — J06.9 UPPER RESPIRATORY TRACT INFECTION, UNSPECIFIED TYPE: Primary | ICD-10-CM

## 2024-03-13 DIAGNOSIS — Z71.85 IMMUNIZATION COUNSELING: ICD-10-CM

## 2024-03-13 DIAGNOSIS — Z71.9 HEALTH COUNSELING: ICD-10-CM

## 2024-03-13 PROCEDURE — 3077F SYST BP >= 140 MM HG: CPT | Performed by: FAMILY MEDICINE

## 2024-03-13 PROCEDURE — 3080F DIAST BP >= 90 MM HG: CPT | Performed by: FAMILY MEDICINE

## 2024-03-13 PROCEDURE — 99214 OFFICE O/P EST MOD 30 MIN: CPT | Performed by: FAMILY MEDICINE

## 2024-03-13 PROCEDURE — 1160F RVW MEDS BY RX/DR IN RCRD: CPT | Performed by: FAMILY MEDICINE

## 2024-03-13 PROCEDURE — 87637 SARSCOV2&INF A&B&RSV AMP PRB: CPT | Performed by: FAMILY MEDICINE

## 2024-03-13 PROCEDURE — 1159F MED LIST DOCD IN RCRD: CPT | Performed by: FAMILY MEDICINE

## 2024-03-13 PROCEDURE — 1036F TOBACCO NON-USER: CPT | Performed by: FAMILY MEDICINE

## 2024-03-13 RX ORDER — METHYLPREDNISOLONE 4 MG/1
TABLET ORAL
Qty: 21 TABLET | Refills: 0 | Status: SHIPPED | OUTPATIENT
Start: 2024-03-13 | End: 2024-03-20

## 2024-03-13 RX ORDER — AZITHROMYCIN 250 MG/1
TABLET, FILM COATED ORAL
Qty: 6 TABLET | Refills: 0 | Status: SHIPPED | OUTPATIENT
Start: 2024-03-13 | End: 2024-03-18

## 2024-03-13 NOTE — PROGRESS NOTES
"This is an 83 year old female with CHIEF COMPLAINT of HEAD COLD x one week with HEADACHE and has Hx VERTIGO with exacerbation and had HEAD ACHE resolving now and denies HEAD INJURY.    Has intermittent VERTIGO in past had CT 2022 no acute issues but SINUS issues:         IMPRESSION:     NO ACUTE INTRACRANIAL PROCESS.     AIR-FLUID LEVEL IN THE LEFT GLOBE. CORRELATE WITH HISTORY OF RECENT  OPHTHALMOLOGIC PROCEDURE.     PARANASAL SINUS DISEASE    BP is noted to be a bit elevated and she had FU with Dr Araujo and had med added but only one week along and received PRE OP clearance from him.    DENIES CP and DENIES SOB    STATES she \"gotta have something\" when adv to wait for AB to rule out COVID/RSV etc but she is obliged Z PACK and steroid dose pack    ROS is NEG for HEADACHE, NAUSEA, VOMITING, DIARRHEA, CHEST PAIN, SOB, and BLEEDING and as further REVIEWED BELOW.      Subjective   Bonnie Jules is a 83 y.o. female who presents for EVAL of URI Sxs primarily SINUS Sxs.    HPI:    Per nursing intake, pt here for No chief complaint on file.       Review of systems is essentially negative for all systems except for any identified issues in HPI above.    Objective     /90   Pulse 65   Temp 36.1 °C (97 °F) (Temporal)   Wt 58 kg (127 lb 12.8 oz)   SpO2 96%   BMI 28.79 kg/m²      Physical Examination:       GENERAL           General Appearance: well-appearing, well-developed, well-hydrated, well-nourished, no acute distress.        HEENT           NECK supple, no masses or thyromegaly, no carotid bruit.        EYES           Extraocular Movements: normal, bilateral eyes ZORAIDA, no conjunctival injection.        HEART           Rate and Rhythm regular rate and rhythm. Heart sounds: normal S1S2, no S3 or S4. Murmurs: none.        CHEST           Breath sounds: Clear to IPPA, RR<16 no use of accessory muscles.        ABDOMEN           General: Neg for LKKS or masses, no scleral icterus or jaundice.        " MUSCULOSKELETAL           Joints Demonstration: Neg for erythema, swelling or joint deformities. gross abnormalities no gross abnormalities.        EXTREMITIES           Lower Extremities: Neg for cyanosis, clubbing or edema.       Assessment/Plan   Problem List Items Addressed This Visit    None  Visit Diagnoses       Upper respiratory tract infection, unspecified type    -  Primary    Relevant Orders    Sars-CoV-2 and Influenza A/B PCR    RSV PCR    Health counseling        Immunization counseling        Sinusitis, unspecified chronicity, unspecified location        Relevant Orders    RSV PCR            FOLLOW UP:  PRN and as specified above         Ebonie Curtis M.D.

## 2024-03-14 LAB
FLUAV RNA RESP QL NAA+PROBE: NOT DETECTED
FLUBV RNA RESP QL NAA+PROBE: NOT DETECTED
RSV RNA RESP QL NAA+PROBE: NOT DETECTED
SARS-COV-2 RNA RESP QL NAA+PROBE: NOT DETECTED

## 2024-03-30 NOTE — PROGRESS NOTES
HPI   82 yo with Diabetes 1 (dx 1981, +neuropathy/nepropathy/low sugar unawareness), HTN, Dyslipidemia, hypothyroid, osteoporosis, CLL, CVD. A1c was 6.7%, this month 6.7%.     Pt is testing sugars 4 times per day using mp 2. Pt is having low sugars 1-2 times/week no clear pattern. Pt is following a carb controlled diet and knows reasonable carb allowances. Pt is able to afford their medications. Pt has been walking her dog, active around her house.           Taking tresiba 12 (was 13) units qd, humalog (100-125) with 1/2 unit increase for every 25 increase in sugar, baseline 3-3-4-0.           Taking lisinopril 40mg, amlodipine 10mg, toprol 25mg 1/2 pill bid, hctz 25mg, hydralazine 50mg bid.           Taking pravastatin 40mg qhs for lipids and tolerating.          Taking levothyroxine 100mcg qd, euthyroid without obstructive sx.           Pt has had 3 treatments of IV reclast for osteoporosis sees rheumatology.           90 day mp 2 data: 72% in range, 2% low, pattern: mid 100's overnight, after breakfast low 200's,  low/mid 100's through the day (up to 180 after dinner), mid/upper 100's at bedtime.        Current Outpatient Medications:     acetaminophen (Tylenol) 500 mg capsule, Take 2 tablets by mouth once daily., Disp: , Rfl:     aluminum-magnesium hydroxide 200-200 mg/5 mL suspension, Take 10 mL by mouth every 6 hours if needed for heartburn., Disp: , Rfl:     amLODIPine (Norvasc) 10 mg tablet, Take 1 tablet (10 mg) by mouth once daily., Disp: 90 tablet, Rfl: 3    aspirin 81 mg chewable tablet, Chew 1 tablet (81 mg) once daily at bedtime., Disp: , Rfl:     [START ON 4/16/2024] chlorhexidine (Peridex) 0.12 % solution, Use 15 mL in the mouth or throat if needed for wound care for up to 2 doses. Use cap to measure 15 mL.  Swish/gargle mouthwash for at least 30 seconds.  Do not swallow.  Use night before surgery after brushing teeth and morning of surgery after brushing teeth. Do not start before April 16,  2024., Disp: 120 mL, Rfl: 0    cholecalciferol (Vitamin D-3) 50 mcg (2,000 unit) capsule, Take 1 capsule (50 mcg) by mouth early in the morning.., Disp: , Rfl:     cyanocobalamin (Vitamin B-12) 1,000 mcg tablet, Take 1 tablet (1,000 mcg) by mouth once daily., Disp: , Rfl:     folic acid (Folvite) 1 mg tablet, Take 1 tablet (1 mg) by mouth once daily., Disp: , Rfl:     gabapentin (Neurontin) 100 mg capsule, Take 1 capsule (100 mg) by mouth in the morning and at bedtime. In the morning and before bedtime, Disp: , Rfl:     hydrALAZINE (Apresoline) 50 mg tablet, Take 1 tablet (50 mg) by mouth 2 times a day., Disp: 180 tablet, Rfl: 3    hydroCHLOROthiazide (HYDRODiuril) 25 mg tablet, Take 1 tablet (25 mg) by mouth once daily., Disp: , Rfl:     insulin degludec (Tresiba FlexTouch U-100) 100 unit/mL (3 mL) injection, Inject 11 Units under the skin once daily in the morning. As directed 1 box, Disp: , Rfl:     insulin lispro (HumaLOG  KwikPen U-100) 100 unit/mL injection, Inject 30 Units under the skin 3 times a day with meals. ON A SLIDING SCALE, Disp: , Rfl:     levothyroxine (Synthroid, Levoxyl) 100 mcg tablet, Take 1 tablet (100 mcg) by mouth once daily., Disp: , Rfl:     lisinopril 40 mg tablet, TAKE 1 TABLET BY MOUTH EVERY DAY, Disp: 90 tablet, Rfl: 3    metoprolol tartrate (Lopressor) 25 mg tablet, Take 0.5 tablets (12.5 mg) by mouth 2 times a day., Disp: 90 tablet, Rfl: 3    nitroglycerin (Nitrostat) 0.4 mg SL tablet, Place 1 tablet (0.4 mg) under the tongue every 5 minutes if needed for chest pain (1 tab every 5 min as needed may repeat up to 3x before seeking medical attention). Every 5 minutes x 3 doses prn for chest pain, Disp: 25 tablet, Rfl: 11    POLYETHYLENE GLYCOL 3350 ORAL, Take 1 packet by mouth once daily. With 8 oz of fluid, Disp: , Rfl:     pravastatin (Pravachol) 40 mg tablet, TAKE 1 TABLET BY MOUTH ONCE DAILY (Patient taking differently: Take 1 tablet (40 mg) by mouth once daily at bedtime.),  Disp: 90 tablet, Rfl: 1    sulfaSALAzine (Azulfidine) 500 mg DR tablet, Take 2 tablets (1,000 mg) by mouth 2 times a day., Disp: , Rfl:     valACYclovir (Valtrex) 1 gram tablet, Take 1 tablet (1,000 mg) by mouth if needed. Twice daily, Disp: , Rfl:       Allergies as of 04/09/2024 - Reviewed 04/09/2024   Allergen Reaction Noted    Quinolones Rash 10/03/2023    Chlorpheniramine-dextromethorp Hives 10/17/2023    Amoxicillin Hives 08/18/2023    Amoxicillin-pot clavulanate Rash 10/03/2023    Ciprofloxacin Rash 08/18/2023    Levofloxacin Rash 08/18/2023         Review of Systems   Cardiology: Lightheadedness-denies.  Chest pain-denies.  Leg edema-denies.  Palpitations-denies.  Respiratory: Cough-denies. Shortness of breath-denies.  Wheezing-denies.  Gastroenterology: Constipation-denies.  Diarrhea-denies.  Heartburn-denies.  Endocrinology: Cold intolerance-denies.  Heat intolerance-denies.  Sweats-denies.  Neurology: Headache-denies.  Tremor-denies.  Neuropathy in extremities-denies.  Psychology: Low energy-denies.  Irritability-denies.  Sleep disturbances-denies.      /54 (BP Location: Right arm, Patient Position: Sitting)   Pulse 67   Wt 57.2 kg (126 lb 3.2 oz)   LMP  (LMP Unknown)   BMI 25.49 kg/m²       Labs:  Lab Results   Component Value Date    WBC 5.8 02/20/2024    NRBC 0.0 02/20/2024    RBC 3.81 (L) 02/20/2024    HGB 11.6 (L) 02/20/2024    HCT 35.9 (L) 02/20/2024     02/20/2024     Lab Results   Component Value Date    CALCIUM 8.9 02/20/2024    AST 22 02/20/2024    ALKPHOS 45 02/20/2024    BILITOT 0.5 02/20/2024    PROT 6.3 (L) 02/20/2024    ALBUMIN 4.2 02/20/2024    GLOB 2.3 10/11/2022    AGR 1.3 (L) 10/11/2022     02/20/2024    K 4.0 02/20/2024     02/20/2024    CO2 26 02/20/2024    ANIONGAP 13 02/20/2024    BUN 25 (H) 02/20/2024    CREATININE 0.94 02/20/2024    UREACREAUR 18.9 10/11/2022    GLUCOSE 118 (H) 02/20/2024    ALT 11 02/20/2024    EGFR 61 02/20/2024     Lab Results    Component Value Date    CHOL 162 03/06/2023    TRIG 125 03/06/2023    HDL 70 03/06/2023    LDLCALC 67 03/06/2023     Lab Results   Component Value Date    MICROALBCREA 40.1 (H) 01/05/2022     Lab Results   Component Value Date    TSH 1.09 11/15/2023     Lab Results   Component Value Date    NVNTENRN48 785 11/17/2022     Lab Results   Component Value Date    HGBA1C 6.7 (H) 04/03/2024         Physical Exam   General Appearance: pleasant, cooperative, no acute distress  HEENT: no chemosis, no proptosis, no lid lag, no lid retraction  Neck: no lymphadenopathy, no thyromegaly, no dominant thyroid nodules  Heart: no murmurs, regular rate and rhythm, S1 and S2  Lungs: no wheezes, no rhonci, no rales  Extremities: no lower extremity swelling      Assessment/Plan   1. Type 1 diabetes mellitus with hyperglycemia (CMS/HCC)  -A1c and labs reviewed  -mp data reviewed    -overall doing well, dose partial dose when active  -ok for surgery from a dm standpt, having R knee replacment    2. Benign hypertension  -at target on therapy after resting, follows with cardiology no change    3. Mixed hyperlipidemia  -on statin and tolerating, labs reviewed, no change    4. Hypothyroidism, unspecified type  -euthyroid on therapy, no change, labs reviewed         Follow Up:  Ashley 6 months, 30 min    Medical Decision Making  Complexity of problem: Chronic illness of diabetes mellitus uncontrolled, progressing  Data analyzed and reviewed: Reviewed prior notes, blood glucose data, labs including HgbA1c, lipids, serum chemistries.  Ordered tests.   Risk of complications and morbidities: Is definite because of use of insulin and risk of hypoglycemia.  Prescription medications reviewed and modifications made.  Compliance assessed.  Addressed social determinants of health including food insecurity.

## 2024-04-03 ENCOUNTER — LAB (OUTPATIENT)
Dept: LAB | Facility: LAB | Age: 84
End: 2024-04-03
Payer: MEDICARE

## 2024-04-03 ENCOUNTER — APPOINTMENT (OUTPATIENT)
Dept: ENDOCRINOLOGY | Facility: CLINIC | Age: 84
End: 2024-04-03
Payer: MEDICARE

## 2024-04-03 ENCOUNTER — PRE-ADMISSION TESTING (OUTPATIENT)
Dept: PREADMISSION TESTING | Facility: HOSPITAL | Age: 84
End: 2024-04-03
Payer: MEDICARE

## 2024-04-03 VITALS
TEMPERATURE: 98.6 F | DIASTOLIC BLOOD PRESSURE: 93 MMHG | HEIGHT: 59 IN | HEART RATE: 71 BPM | SYSTOLIC BLOOD PRESSURE: 120 MMHG | BODY MASS INDEX: 25.68 KG/M2 | OXYGEN SATURATION: 99 % | WEIGHT: 127.4 LBS

## 2024-04-03 DIAGNOSIS — M17.11 PRIMARY OSTEOARTHRITIS OF RIGHT KNEE: ICD-10-CM

## 2024-04-03 DIAGNOSIS — R79.89 OTHER SPECIFIED ABNORMAL FINDINGS OF BLOOD CHEMISTRY: ICD-10-CM

## 2024-04-03 LAB
APPEARANCE UR: ABNORMAL
BACTERIA #/AREA URNS AUTO: ABNORMAL /HPF
BILIRUB UR STRIP.AUTO-MCNC: NEGATIVE MG/DL
COLOR UR: ABNORMAL
EST. AVERAGE GLUCOSE BLD GHB EST-MCNC: 146 MG/DL
GLUCOSE UR STRIP.AUTO-MCNC: NORMAL MG/DL
HBA1C MFR BLD: 6.7 %
HOLD SPECIMEN: NORMAL
KETONES UR STRIP.AUTO-MCNC: NEGATIVE MG/DL
LEUKOCYTE ESTERASE UR QL STRIP.AUTO: ABNORMAL
NITRITE UR QL STRIP.AUTO: NEGATIVE
PH UR STRIP.AUTO: 6 [PH]
PROT UR STRIP.AUTO-MCNC: NEGATIVE MG/DL
RBC # UR STRIP.AUTO: NEGATIVE /UL
RBC #/AREA URNS AUTO: ABNORMAL /HPF
SP GR UR STRIP.AUTO: 1.01
SQUAMOUS #/AREA URNS AUTO: ABNORMAL /HPF
TRANS CELLS #/AREA UR COMP ASSIST: ABNORMAL /HPF
UROBILINOGEN UR STRIP.AUTO-MCNC: NORMAL MG/DL
WBC #/AREA URNS AUTO: ABNORMAL /HPF

## 2024-04-03 PROCEDURE — 81001 URINALYSIS AUTO W/SCOPE: CPT

## 2024-04-03 PROCEDURE — 99204 OFFICE O/P NEW MOD 45 MIN: CPT | Performed by: PHYSICIAN ASSISTANT

## 2024-04-03 PROCEDURE — 87081 CULTURE SCREEN ONLY: CPT | Mod: WESLAB | Performed by: PHYSICIAN ASSISTANT

## 2024-04-03 PROCEDURE — 87086 URINE CULTURE/COLONY COUNT: CPT

## 2024-04-03 PROCEDURE — 36415 COLL VENOUS BLD VENIPUNCTURE: CPT

## 2024-04-03 PROCEDURE — 83036 HEMOGLOBIN GLYCOSYLATED A1C: CPT

## 2024-04-03 RX ORDER — CHLORHEXIDINE GLUCONATE ORAL RINSE 1.2 MG/ML
15 SOLUTION DENTAL AS NEEDED
Qty: 120 ML | Refills: 0 | Status: SHIPPED | OUTPATIENT
Start: 2024-04-16 | End: 2024-04-24 | Stop reason: HOSPADM

## 2024-04-03 ASSESSMENT — DUKE ACTIVITY SCORE INDEX (DASI)
CAN YOU DO MODERATE WORK AROUND THE HOUSE LIKE VACUUMING, SWEEPING FLOORS OR CARRYING GROCERIES: YES
CAN YOU HAVE SEXUAL RELATIONS: NO
CAN YOU CLIMB A FLIGHT OF STAIRS OR WALK UP A HILL: YES
DASI METS SCORE: 6.4
CAN YOU RUN A SHORT DISTANCE: NO
CAN YOU DO LIGHT WORK AROUND THE HOUSE LIKE DUSTING OR WASHING DISHES: YES
CAN YOU TAKE CARE OF YOURSELF (EAT, DRESS, BATHE, OR USE TOILET): YES
TOTAL_SCORE: 29.45
CAN YOU WALK A BLOCK OR TWO ON LEVEL GROUND: YES
CAN YOU WALK INDOORS, SUCH AS AROUND YOUR HOUSE: YES
CAN YOU DO YARD WORK LIKE RAKING LEAVES, WEEDING OR PUSHING A MOWER: YES
CAN YOU DO HEAVY WORK AROUND THE HOUSE LIKE SCRUBBING FLOORS OR LIFTING AND MOVING HEAVY FURNITURE: NO
CAN YOU PARTICIPATE IN STRENOUS SPORTS LIKE SWIMMING, SINGLES TENNIS, FOOTBALL, BASKETBALL, OR SKIING: NO
CAN YOU PARTICIPATE IN MODERATE RECREATIONAL ACTIVITIES LIKE GOLF, BOWLING, DANCING, DOUBLES TENNIS OR THROWING A BASEBALL OR FOOTBALL: YES

## 2024-04-03 ASSESSMENT — CHADS2 SCORE
DIABETES: YES
CHF: NO
AGE GREATER THAN OR EQUAL TO 75: YES
CHADS2 SCORE: 3
PRIOR STROKE OR TIA OR THROMBOEMBOLISM: NO
HYPERTENSION: YES

## 2024-04-03 ASSESSMENT — LIFESTYLE VARIABLES: SMOKING_STATUS: NONSMOKER

## 2024-04-03 ASSESSMENT — PAIN - FUNCTIONAL ASSESSMENT: PAIN_FUNCTIONAL_ASSESSMENT: 0-10

## 2024-04-03 ASSESSMENT — PAIN SCALES - GENERAL: PAINLEVEL_OUTOF10: 0 - NO PAIN

## 2024-04-03 NOTE — CPM/PAT H&P
CPM/PAT Evaluation       Name: Bonnie Jules (Bonnie Jules)  /Age: 1940/83 y.o.     In-Person       Chief Complaint: Right knee pain    HPI 83-year-old female complains of right knee pain, swelling and difficulty.  Patient states has gotten progressively worse over the past year.  Injections are no longer helpful.  Patient has history of coronary artery disease with CABG in .  She has history of peripheral vascular disease, hypothyroidism, anxiety, CLL, chronic kidney disease stage III, hypertension, sleep apnea, diabetes mellitus and osteoporosis.  She denies chest pain, shortness of breath, fevers or chills.  Cardiac clearance 2024 by Dr. Araujo.  She is scheduled for right total knee replacement 2020    Past Medical History:   Diagnosis Date    Anxiety     Chronic ischemic colitis (CMS/HCC)     With history of sepsis and infectious gastroenteritis 10/7/2022-10/11/2022    Chronic kidney disease, stage III (moderate) (CMS/HCC)     CLL (chronic lymphocytic leukemia) (CMS/HCC)     Coronary artery disease     CTS (carpal tunnel syndrome)     Diabetes mellitus (CMS/HCC)     GI bleed     Hypertension     Hypothyroidism     Osteoarthritis     Osteoporosis     Peripheral vascular disease (CMS/HCC)     Seizure disorder (CMS/HCC)        Past Surgical History:   Procedure Laterality Date    CARPAL TUNNEL RELEASE Bilateral     CATARACT EXTRACTION W/ INTRAOCULAR LENS  IMPLANT, BILATERAL      Left 8/10/2017, right 2017    CHOLECYSTECTOMY      COLONOSCOPY      CORONARY ARTERY BYPASS GRAFT  2005    X 3    HYSTERECTOMY      OTHER SURGICAL HISTORY      Bilateral heel spurs    OTHER SURGICAL HISTORY Right 2016    Femoral endarterectomy    TOTAL HIP ARTHROPLASTY Left        Patient Sexual activity questions deferred to the physician.    Family History   Problem Relation Name Age of Onset    Diabetes Mother      Diabetes Daughter      Other (RA) Daughter         Allergies   Allergen Reactions     Quinolones Rash    Chlorpheniramine-Dextromethorp Hives    Amoxicillin Hives    Amoxicillin-Pot Clavulanate Rash    Ciprofloxacin Rash    Levofloxacin Rash       Current Outpatient Medications   Medication Instructions    acetaminophen (Tylenol) 500 mg capsule 2 tablets, oral, Daily    aluminum-magnesium hydroxide 200-200 mg/5 mL suspension 10 mL, oral, Every 6 hours PRN    amLODIPine (NORVASC) 10 mg, oral, Daily    aspirin 81 mg chewable tablet 1 tablet, oral, Nightly    [START ON 4/16/2024] chlorhexidine (Peridex) 0.12 % solution 15 mL, Mouth/Throat, As needed, Use cap to measure 15 mL.  Swish/gargle mouthwash for at least 30 seconds.  Do not swallow.  Use night before surgery after brushing teeth and morning of surgery after brushing teeth.    cholecalciferol (Vitamin D-3) 50 mcg (2,000 unit) capsule 1 capsule, oral, Daily    cyanocobalamin (Vitamin B-12) 1,000 mcg tablet 1 tablet, oral, Daily    folic acid (Folvite) 1 mg tablet 1 tablet, oral, Daily    gabapentin (Neurontin) 100 mg capsule 1 capsule, oral, 2 times daily Nitrate, In the morning and before bedtime     hydrALAZINE (APRESOLINE) 50 mg, oral, 2 times daily    hydroCHLOROthiazide (HYDRODIURIL) 25 mg, oral, Daily    insulin degludec (TRESIBA FLEXTOUCH U-100) 11 Units, subcutaneous, Every morning, As directed 1 box     insulin lispro (HUMALOG KARLIE KWIKPEN U-100) 30 Units, subcutaneous, 3 times daily with meals, ON A SLIDING SCALE    levothyroxine (Synthroid, Levoxyl) 100 mcg tablet 1 tablet, oral, Daily    lisinopril 40 mg, oral, Daily    metoprolol tartrate (LOPRESSOR) 12.5 mg, oral, 2 times daily    nitroglycerin (NITROSTAT) 0.4 mg, sublingual, Every 5 min PRN, Every 5 minutes x 3 doses prn for chest pain    POLYETHYLENE GLYCOL 3350 ORAL 1 packet, oral, Daily, With 8 oz of fluid     pravastatin (PRAVACHOL) 40 mg, oral, Daily    sulfaSALAzine (Azulfidine) 500 mg DR tablet 2 tablets, oral, 2 times daily    valACYclovir (VALTREX) 1,000 mg, oral, As  "needed, Twice daily      Review of Systems   All other systems reviewed and are negative.       Physical Exam  Vitals reviewed. Physical exam within normal limits.   Constitutional:       Appearance: Normal appearance.   HENT:      Head: Normocephalic and atraumatic.   Neck:      Vascular: No carotid bruit.   Cardiovascular:      Rate and Rhythm: Normal rate and regular rhythm.      Heart sounds: Murmur heard.   Pulmonary:      Effort: Pulmonary effort is normal.      Breath sounds: Normal breath sounds.   Abdominal:      General: Bowel sounds are normal.      Palpations: Abdomen is soft.   Musculoskeletal:         General: Normal range of motion.      Cervical back: Normal range of motion.      Right lower leg: Edema present.      Left lower leg: Edema present.   Lymphadenopathy:      Cervical: No cervical adenopathy.   Skin:     General: Skin is warm and dry.   Neurological:      General: No focal deficit present.      Mental Status: She is alert and oriented to person, place, and time.      Gait: Gait abnormal.   Psychiatric:         Mood and Affect: Mood normal.         Behavior: Behavior normal.         Thought Content: Thought content normal.         Judgment: Judgment normal.        PAT AIRWAY:   Airway:     Mallampati::  IV    Neck ROM::  Full   upper dentures and lower dentures      Vitals  Heart Rate:  [71]   Temp:  [37 °C (98.6 °F)]   BP: (120)/(93)   Height:  [149.9 cm (4' 11\")]   Weight:  [57.8 kg (127 lb 6.4 oz)]   SpO2:  [99 %]        DASI Risk Score      Flowsheet Row Most Recent Value   DASI SCORE 29.45   METS Score (Will be calculated only when all the questions are answered) 6.4          Caprini DVT Assessment      Flowsheet Row Most Recent Value   DVT Score 16   Current Status Swollen legs, Elective major lower extremity arthroplasty   History Prior major surgery, Previous malignancy, Acute myocardial infarction, Sepsis, Inflammatory bowel disease   Age Over 75 years   BMI 30 or less      "     Modified Frailty Index    No data to display       CHADS2 Stroke Risk  Current as of 2 hours ago        N/A 3 - 100%: High Risk   2 - 3%: Medium Risk   0 - 2%: Low Risk     Last Change: N/A          This score determines the patient's risk of having a stroke if the patient has atrial fibrillation.        This score is not applicable to this patient. Components are not calculated.          Revised Cardiac Risk Index      Flowsheet Row Most Recent Value   Revised Cardiac Risk Calculator 2          Apfel Simplified Score      Flowsheet Row Most Recent Value   Apfel Simplified Score Calculator 2          Risk Analysis Index Results This Encounter    No data found in the last 1 encounters.       Stop Bang Score      Flowsheet Row Most Recent Value   Do you snore loudly? 0   Do you often feel tired or fatigued after your sleep? 0   Has anyone ever observed you stop breathing in your sleep? 0   Do you have or are you being treated for high blood pressure? 1   Recent BMI (Calculated) 25.7   Is BMI greater than 35 kg/m2? 0=No   Age older than 50 years old? 1=Yes   Is your neck circumference greater than 17 inches (Male) or 16 inches (Female)? 0   Gender - Male 0=No   STOP-BANG Total Score 2            Assessment and Plan:   -Osteoarthritis right knee     Plan: Right total knee replacement  -CAD, s/p CABG  -Hypertension, stable on lisinopril, amlodipine  -Hypothyroidism, stable on Synthroid  -Insulin-dependent diabetes  -Chronic ischemic colitis, stable on sulfasalazine  -Hyperlipidemia stable on Pravachol  -CLL  -Chronic kidney disease stage III  -ASA 3   Caprini DVT score 16   STOP-BANG total score 2   CHADS2 score 3   DASI score 29.45   METS score 6.4   RCRI score 2   Apfel score 2   ARISCAT score 49 = 42.1% high risk postop pulmonary complication  -Labs 2/20/2024 reviewed

## 2024-04-03 NOTE — PREPROCEDURE INSTRUCTIONS
Medication List            Accurate as of April 3, 2024 11:21 AM. Always use your most recent med list.                acetaminophen 500 mg capsule  Commonly known as: Tylenol  Medication Adjustments for Surgery: Take morning of surgery with sip of water, no other fluids     aluminum-magnesium hydroxide 200-200 mg/5 mL suspension  Medication Adjustments for Surgery: Other (Comment)  Notes to patient: HOLD DAY OF SURGERY     amLODIPine 10 mg tablet  Commonly known as: Norvasc  Take 1 tablet (10 mg) by mouth once daily.  Medication Adjustments for Surgery: Take morning of surgery with sip of water, no other fluids     aspirin 81 mg chewable tablet  Medication Adjustments for Surgery: Other (Comment)  Notes to patient: STOP 5 DAYS BEFORE SURGERY - LAST DOSE 4/12/24     cholecalciferol 50 mcg (2,000 unit) capsule  Commonly known as: Vitamin D-3  Medication Adjustments for Surgery: Stop 7 days before surgery     cyanocobalamin 1,000 mcg tablet  Commonly known as: Vitamin B-12  Medication Adjustments for Surgery: Stop 7 days before surgery     folic acid 1 mg tablet  Commonly known as: Folvite  Medication Adjustments for Surgery: Stop 7 days before surgery     gabapentin 100 mg capsule  Commonly known as: Neurontin  Medication Adjustments for Surgery: Take morning of surgery with sip of water, no other fluids     HumaLOG  KwikPen U-100 100 unit/mL injection  Generic drug: insulin lispro  Medication Adjustments for Surgery: Other (Comment)  Notes to patient: TAKE FULL DOSE WITH MEAL DAY BEFORE, HOLD DAY OF SURGERY     hydrALAZINE 50 mg tablet  Commonly known as: Apresoline  Take 1 tablet (50 mg) by mouth 2 times a day.  Medication Adjustments for Surgery: Take morning of surgery with sip of water, no other fluids     hydroCHLOROthiazide 25 mg tablet  Commonly known as: HYDRODiuril  Medication Adjustments for Surgery: Take morning of surgery with sip of water, no other fluids     levothyroxine 100 mcg  tablet  Commonly known as: Synthroid, Levoxyl  Medication Adjustments for Surgery: Take morning of surgery with sip of water, no other fluids     lisinopril 40 mg tablet  TAKE 1 TABLET BY MOUTH EVERY DAY  Medication Adjustments for Surgery: Other (Comment)  Notes to patient: HOLD DAY OF SURGERY     metoprolol tartrate 25 mg tablet  Commonly known as: Lopressor  Take 0.5 tablets (12.5 mg) by mouth 2 times a day.  Medication Adjustments for Surgery: Take morning of surgery with sip of water, no other fluids     nitroglycerin 0.4 mg SL tablet  Commonly known as: Nitrostat  Place 1 tablet (0.4 mg) under the tongue every 5 minutes if needed for chest pain (1 tab every 5 min as needed may repeat up to 3x before seeking medical attention). Every 5 minutes x 3 doses prn for chest pain  Medication Adjustments for Surgery: Take morning of surgery with sip of water, no other fluids     POLYETHYLENE GLYCOL 3350 ORAL  Medication Adjustments for Surgery: Other (Comment)  Notes to patient: HOLD DAY OF SURGERY     pravastatin 40 mg tablet  Commonly known as: Pravachol  TAKE 1 TABLET BY MOUTH ONCE DAILY  Medication Adjustments for Surgery: Other (Comment)  Notes to patient: TAKE EVENING DOSE     sulfaSALAzine 500 mg DR tablet  Commonly known as: Azulfidine     Tresiba FlexTouch U-100 100 unit/mL (3 mL) injection  Generic drug: insulin degludec  Medication Adjustments for Surgery: Other (Comment)  Notes to patient: TAKE 1/2 DOSE DAY OF SURGERY     valACYclovir 1 gram tablet  Commonly known as: Valtrex  Medication Adjustments for Surgery: Take morning of surgery with sip of water, no other fluids                 Preoperative Fasting Guidelines    Why must I stop eating and drinking near surgery time?  With sedation, food or liquid in your stomach can enter your lungs causing serious complications  Increases nausea and vomiting    When do I need to stop eating and drinking before my surgery?  Do not eat any food or drink any liquids  after midnight the night before your surgery/procedure.  You may have sips of water to take medications.      PAT DISCHARGE INSTRUCTIONS    Please call the Same Day Surgery (SDS) Department of the hospital where your procedure will be performed after 2:00 PM the day before your surgery. If you are scheduled on a Monday, or a Tuesday following a Monday holiday, you will need to call on the last business day prior to your surgery.    [facility]  [address] [place] [address], [id number] [phone]    [facility]  [address] [place] [address] [phone]    [facility]  [address] [place] [address]  [phone]    Please let your surgeon know if:      You develop any open sores, shingles, burning or painful urination as these may increase your risk of an infection.   You no longer wish to have the surgery.   Any other personal circumstances change that may lead to the need to cancel or defer this surgery-such as being sick or getting admitted to any hospital within one week of your planned procedure.    Your contact details change, such as a change of address or phone number.    Starting now:     Please DO NOT drink alcohol or smoke for 24 hours before surgery. It is well known that quitting smoking can make a huge difference to your health and recovery from surgery. The longer you abstain from smoking, the better your chances of a healthy recovery. If you need help with quitting, call 8-899-QUIT-NOW to be connected to a trained counselor who will discuss the best methods to help you quit.     Before your surgery:    Please stop all supplements 7 days prior to surgery. Or as directed by your surgeon.   Please stop taking NSAID pain medicine such as Advil and Motrin 7 days before surgery.    If you develop any fever, cough, cold, rashes, cuts, scratches, scrapes, urinary symptoms or infection  anywhere on your body (including teeth and gums) prior to surgery, please call your surgeon’s office as soon as possible. This may require treatment to reduce the chance of cancellation on the day of surgery.    The day before your surgery:   DIET- Please follow the diet instructions at the top of your packet.   Get a good night’s rest.  Use the special soap for bathing if you have been instructed to use one.    Scheduled surgery times may change and you will be notified if this occurs - please check your personal voicemail for any updates.     On the morning of surgery:   Wear comfortable, loose fitting clothes which open in the front. Please do not wear moisturizers, creams, lotions, makeup or perfume.    Please bring with you to surgery:   Photo ID and insurance card   Current list of medicines and allergies   Pacemaker/ Defibrillator/Heart stent cards   CPAP machine and mask    Slings/ splints/ crutches   A copy of your complete advanced directive/DHPOA.    Please do NOT bring with you to surgery:   All jewelry and valuables should be left at home.   Prosthetic devices such as contact lenses, hearing aids, dentures, eyelash extensions, hairpins and body piercings must be removed prior to going in to the surgical suite.    After outpatient surgery:   A responsible adult MUST accompany you at the time of discharge and stay with you for 24 hours after your surgery. You may NOT drive yourself home after surgery.    Do not drive, operate machinery, make critical decisions or do activities that require co-ordination or balance until after a night’s sleep.   Do not drink alcoholic beverages for 24 hours.   Instructions for resuming your medications will be provided by your surgeon.    CALL YOUR DOCTOR AFTER SURGERY IF YOU HAVE:     Chills and/or a fever of 101° F or higher.    Redness, swelling, pus or drainage from your surgical wound or a bad smell from the wound.    Lightheadedness, fainting or confusion.     Persistent vomiting (throwing up) and are not able to eat or drink for 12 hours.    Three or more loose, watery bowel movements in 24 hours (diarrhea).   Difficulty or pain while urinating( after non-urological surgery)    Pain and swelling in your legs, especially if it is only on one side.    Difficulty breathing or are breathing faster than normal.    Any new concerning symptoms.     Home Preoperative Antibacterial Shower    What is a home preoperative antibacterial shower?  This shower is a way of cleaning the skin with a germ killing solution before surgery. The solution contains chlorhexidine, commonly known as CHG. CHG is a skin cleanser with germ killing ability. Let your doctor know if you are allergic to chlorhexidine.      Why do I need to take a preoperative antibacterial shower?  Skin is not sterile. It is best to try to make your skin as free of germs as possible before surgery. Proper cleansing with a germ killing soap before surgery can lower the number of germs on your skin. This helps to reduce the risk of infection at the surgical site. Following the instructions listed below will help you prepare your skin for surgery.    How do I use the solution?      Steps: Begin using your CHG soap FIVE DAYS BEFORE your scheduled surgery on ___4/17/24 START WASH ON 4/13/24_____________.  First, wash and rinse your hair using the CHG soap. Keep CHG away soap away from ear canals and eyes.  Rinse completely, do not condition. Hair extensions should be removed.  Wash your face with your normal soap and rinse.  Apply the CHG solution to a clean wet washcloth. Turn the water off or move away from the water spray to avoid premature rinsing of the CHG soap as you are applying.  Firmly lather your entire body from neck down. Do not use on face.  Pay special attention to the areas(s) where your incision(s) will be located unless they are on your face.  Avoid scrubbing your skin too hard.  The important point is to  have the CHG soap sit on your skin for 3 minutes.  DO NOT wash with regular soap after you have used the CHG soap solution.  Pat yourself dry with a clean, freshly laundered towel.  DO NOT apply powders, deodorants or lotions.  Dress in clean, freshly laundered night clothes.  Be sure to sleep with clean, freshly laundered sheets.  Be aware that CHG will cause stains on fabrics; if you wash them with bleach after use. Rinse your washcloth and other linens that have contact with CHG completely. Use only non-chlorine detergents to launder the items used.  The morning of surgery is the fifth day. Repeat the above steps and dress in clean comfortable clothing.      Who should I call if I have any questions regarding the use of CHG soap?  Call the [facility] at [phone] if you have any questions.     Patient Information: Oral/Dental Rinse    What is oral/dental rinse?  It is a mouthwash. It is a way of cleaning the mouth with a germ killing solution before your surgery. The solution contains chlorhexidine, commonly know as CHG.  It is used inside the mouth to kill bacteria known as Staphylococcus aureus.  Let your doctor know if you are allergic to chlorhexidine.    Why do I need to use CHG oral/dental rinse?  The CHG oral/dental rinse helps to kill bacteria in your mouth known as Staphylococcus aureus. This reduces the risk of infection at the surgical site.    Using your CHG oral/dental rinse.  STEPS: use your CHG oral/dental rinse after you brush your teeth the night before (at bedtime) and the morning of your surgery. Follow the directions on your prescription label.  Use the cap on the container to measure 15ml (fill cap to fill line)  Swish (gargle if you can) the mouthwash in your mouth for at least 30 seconds, (do not swallow) spit out.  After you use your CHG rinse, do not rinse your mouth with water, drink or eat. Please refer to  prescription label for the appropriate time to resume oral intake.    What side effects might I have using the CHG oral/dental rinse?  CHG rinse will stick to the plaque on the teeth. Brush and floss just before use. Teeth brushing will help avoid staining of plaque during use.    Who should I contact if I have questions about the CHG oral/dental rinse?  Please call [facility], [facility] at [phone] if you have any questions.

## 2024-04-04 LAB — BACTERIA UR CULT: NORMAL

## 2024-04-05 LAB — STAPHYLOCOCCUS SPEC CULT: NORMAL

## 2024-04-09 ENCOUNTER — OFFICE VISIT (OUTPATIENT)
Dept: ENDOCRINOLOGY | Facility: CLINIC | Age: 84
End: 2024-04-09
Payer: MEDICARE

## 2024-04-09 VITALS
HEART RATE: 67 BPM | DIASTOLIC BLOOD PRESSURE: 54 MMHG | BODY MASS INDEX: 25.49 KG/M2 | SYSTOLIC BLOOD PRESSURE: 128 MMHG | WEIGHT: 126.2 LBS

## 2024-04-09 DIAGNOSIS — E03.9 HYPOTHYROIDISM, UNSPECIFIED TYPE: ICD-10-CM

## 2024-04-09 DIAGNOSIS — I10 BENIGN HYPERTENSION: ICD-10-CM

## 2024-04-09 DIAGNOSIS — E78.2 MIXED HYPERLIPIDEMIA: ICD-10-CM

## 2024-04-09 DIAGNOSIS — E10.65 TYPE 1 DIABETES MELLITUS WITH HYPERGLYCEMIA (MULTI): Primary | ICD-10-CM

## 2024-04-09 PROCEDURE — 1159F MED LIST DOCD IN RCRD: CPT | Performed by: INTERNAL MEDICINE

## 2024-04-09 PROCEDURE — 1036F TOBACCO NON-USER: CPT | Performed by: INTERNAL MEDICINE

## 2024-04-09 PROCEDURE — 3074F SYST BP LT 130 MM HG: CPT | Performed by: INTERNAL MEDICINE

## 2024-04-09 PROCEDURE — 99214 OFFICE O/P EST MOD 30 MIN: CPT | Performed by: INTERNAL MEDICINE

## 2024-04-09 PROCEDURE — 1160F RVW MEDS BY RX/DR IN RCRD: CPT | Performed by: INTERNAL MEDICINE

## 2024-04-09 PROCEDURE — 1125F AMNT PAIN NOTED PAIN PRSNT: CPT | Performed by: INTERNAL MEDICINE

## 2024-04-09 PROCEDURE — 3078F DIAST BP <80 MM HG: CPT | Performed by: INTERNAL MEDICINE

## 2024-04-09 ASSESSMENT — PAIN SCALES - GENERAL: PAINLEVEL: 6

## 2024-04-09 ASSESSMENT — ENCOUNTER SYMPTOMS: DEPRESSION: 0

## 2024-04-17 ENCOUNTER — ANESTHESIA EVENT (OUTPATIENT)
Dept: OPERATING ROOM | Facility: HOSPITAL | Age: 84
DRG: 469 | End: 2024-04-17
Payer: MEDICARE

## 2024-04-17 ENCOUNTER — APPOINTMENT (OUTPATIENT)
Dept: RADIOLOGY | Facility: HOSPITAL | Age: 84
DRG: 469 | End: 2024-04-17
Payer: MEDICARE

## 2024-04-17 ENCOUNTER — ANESTHESIA (OUTPATIENT)
Dept: OPERATING ROOM | Facility: HOSPITAL | Age: 84
DRG: 469 | End: 2024-04-17
Payer: MEDICARE

## 2024-04-17 ENCOUNTER — HOSPITAL ENCOUNTER (INPATIENT)
Facility: HOSPITAL | Age: 84
LOS: 7 days | Discharge: NURSING, REHAB OR LONG-TERM CARE | DRG: 469 | End: 2024-04-24
Attending: ORTHOPAEDIC SURGERY | Admitting: ORTHOPAEDIC SURGERY
Payer: MEDICARE

## 2024-04-17 DIAGNOSIS — R07.9 CHEST PAIN, UNSPECIFIED TYPE: ICD-10-CM

## 2024-04-17 DIAGNOSIS — N18.30 STAGE 3 CHRONIC KIDNEY DISEASE, UNSPECIFIED WHETHER STAGE 3A OR 3B CKD (MULTI): ICD-10-CM

## 2024-04-17 DIAGNOSIS — Z01.810 PREOP CARDIOVASCULAR EXAM: ICD-10-CM

## 2024-04-17 DIAGNOSIS — M25.561 CHRONIC PAIN OF BOTH KNEES: Primary | ICD-10-CM

## 2024-04-17 DIAGNOSIS — I21.4 ACUTE NON-ST SEGMENT ELEVATION MYOCARDIAL INFARCTION (MULTI): ICD-10-CM

## 2024-04-17 DIAGNOSIS — E86.0 DEHYDRATION: ICD-10-CM

## 2024-04-17 DIAGNOSIS — M25.562 CHRONIC PAIN OF BOTH KNEES: Primary | ICD-10-CM

## 2024-04-17 DIAGNOSIS — I21.4 NSTEMI (NON-ST ELEVATED MYOCARDIAL INFARCTION) (MULTI): ICD-10-CM

## 2024-04-17 DIAGNOSIS — N39.0 ACUTE URINARY TRACT INFECTION: ICD-10-CM

## 2024-04-17 DIAGNOSIS — R33.9 URINARY RETENTION: ICD-10-CM

## 2024-04-17 DIAGNOSIS — Z96.651 HISTORY OF TOTAL KNEE REPLACEMENT, RIGHT: ICD-10-CM

## 2024-04-17 DIAGNOSIS — H35.342 MACULAR HOLE OF LEFT EYE: ICD-10-CM

## 2024-04-17 DIAGNOSIS — G89.29 CHRONIC PAIN OF BOTH KNEES: Primary | ICD-10-CM

## 2024-04-17 DIAGNOSIS — E11.9 ABNORMAL METABOLIC STATE DUE TO DIABETES MELLITUS (MULTI): ICD-10-CM

## 2024-04-17 DIAGNOSIS — I15.9 SECONDARY HYPERTENSION: ICD-10-CM

## 2024-04-17 DIAGNOSIS — M17.11 PRIMARY OSTEOARTHRITIS OF RIGHT KNEE: ICD-10-CM

## 2024-04-17 LAB
GLUCOSE BLD MANUAL STRIP-MCNC: 183 MG/DL (ref 74–99)
GLUCOSE BLD MANUAL STRIP-MCNC: 271 MG/DL (ref 74–99)
GLUCOSE BLD MANUAL STRIP-MCNC: 300 MG/DL (ref 74–99)
GLUCOSE BLD MANUAL STRIP-MCNC: 321 MG/DL (ref 74–99)
GLUCOSE BLD MANUAL STRIP-MCNC: 324 MG/DL (ref 74–99)

## 2024-04-17 PROCEDURE — 2500000005 HC RX 250 GENERAL PHARMACY W/O HCPCS: Performed by: ORTHOPAEDIC SURGERY

## 2024-04-17 PROCEDURE — C1776 JOINT DEVICE (IMPLANTABLE): HCPCS | Performed by: ORTHOPAEDIC SURGERY

## 2024-04-17 PROCEDURE — 3600000018 HC OR TIME - INITIAL BASE CHARGE - PROCEDURE LEVEL SIX: Performed by: ORTHOPAEDIC SURGERY

## 2024-04-17 PROCEDURE — 7100000011 HC EXTENDED STAY RECOVERY HOURLY - NURSING UNIT

## 2024-04-17 PROCEDURE — 2780000003 HC OR 278 NO HCPCS: Performed by: ORTHOPAEDIC SURGERY

## 2024-04-17 PROCEDURE — 3700000001 HC GENERAL ANESTHESIA TIME - INITIAL BASE CHARGE: Performed by: ORTHOPAEDIC SURGERY

## 2024-04-17 PROCEDURE — A27447 PR TOTAL KNEE ARTHROPLASTY: Performed by: NURSE ANESTHETIST, CERTIFIED REGISTERED

## 2024-04-17 PROCEDURE — 2500000001 HC RX 250 WO HCPCS SELF ADMINISTERED DRUGS (ALT 637 FOR MEDICARE OP): Performed by: NURSE PRACTITIONER

## 2024-04-17 PROCEDURE — 2500000005 HC RX 250 GENERAL PHARMACY W/O HCPCS

## 2024-04-17 PROCEDURE — 3600000017 HC OR TIME - EACH INCREMENTAL 1 MINUTE - PROCEDURE LEVEL SIX: Performed by: ORTHOPAEDIC SURGERY

## 2024-04-17 PROCEDURE — 2500000005 HC RX 250 GENERAL PHARMACY W/O HCPCS: Performed by: PHYSICIAN ASSISTANT

## 2024-04-17 PROCEDURE — 0SRC0J9 REPLACEMENT OF RIGHT KNEE JOINT WITH SYNTHETIC SUBSTITUTE, CEMENTED, OPEN APPROACH: ICD-10-PCS | Performed by: ORTHOPAEDIC SURGERY

## 2024-04-17 PROCEDURE — A4649 SURGICAL SUPPLIES: HCPCS | Performed by: ORTHOPAEDIC SURGERY

## 2024-04-17 PROCEDURE — 99100 ANES PT EXTEME AGE<1 YR&>70: CPT | Performed by: ANESTHESIOLOGY

## 2024-04-17 PROCEDURE — 2500000004 HC RX 250 GENERAL PHARMACY W/ HCPCS (ALT 636 FOR OP/ED): Mod: JZ | Performed by: ORTHOPAEDIC SURGERY

## 2024-04-17 PROCEDURE — 82947 ASSAY GLUCOSE BLOOD QUANT: CPT | Mod: 91

## 2024-04-17 PROCEDURE — 2720000007 HC OR 272 NO HCPCS: Performed by: ORTHOPAEDIC SURGERY

## 2024-04-17 PROCEDURE — 2500000004 HC RX 250 GENERAL PHARMACY W/ HCPCS (ALT 636 FOR OP/ED): Performed by: ANESTHESIOLOGY

## 2024-04-17 PROCEDURE — 2500000002 HC RX 250 W HCPCS SELF ADMINISTERED DRUGS (ALT 637 FOR MEDICARE OP, ALT 636 FOR OP/ED): Performed by: NURSE PRACTITIONER

## 2024-04-17 PROCEDURE — G0378 HOSPITAL OBSERVATION PER HR: HCPCS

## 2024-04-17 PROCEDURE — 73560 X-RAY EXAM OF KNEE 1 OR 2: CPT | Mod: RT

## 2024-04-17 PROCEDURE — 1210000001 HC SEMI-PRIVATE ROOM DAILY

## 2024-04-17 PROCEDURE — 64450 NJX AA&/STRD OTHER PN/BRANCH: CPT | Performed by: ANESTHESIOLOGY

## 2024-04-17 PROCEDURE — 7100000001 HC RECOVERY ROOM TIME - INITIAL BASE CHARGE: Performed by: ORTHOPAEDIC SURGERY

## 2024-04-17 PROCEDURE — A27447 PR TOTAL KNEE ARTHROPLASTY: Performed by: ANESTHESIOLOGY

## 2024-04-17 PROCEDURE — 2500000001 HC RX 250 WO HCPCS SELF ADMINISTERED DRUGS (ALT 637 FOR MEDICARE OP): Performed by: PHYSICIAN ASSISTANT

## 2024-04-17 PROCEDURE — 2500000004 HC RX 250 GENERAL PHARMACY W/ HCPCS (ALT 636 FOR OP/ED): Mod: JZ | Performed by: PHYSICIAN ASSISTANT

## 2024-04-17 PROCEDURE — 73560 X-RAY EXAM OF KNEE 1 OR 2: CPT | Mod: RIGHT SIDE | Performed by: RADIOLOGY

## 2024-04-17 PROCEDURE — 27447 TOTAL KNEE ARTHROPLASTY: CPT | Performed by: ORTHOPAEDIC SURGERY

## 2024-04-17 PROCEDURE — 7100000002 HC RECOVERY ROOM TIME - EACH INCREMENTAL 1 MINUTE: Performed by: ORTHOPAEDIC SURGERY

## 2024-04-17 PROCEDURE — 3700000002 HC GENERAL ANESTHESIA TIME - EACH INCREMENTAL 1 MINUTE: Performed by: ORTHOPAEDIC SURGERY

## 2024-04-17 PROCEDURE — 9420000001 HC RT PATIENT EDUCATION 5 MIN

## 2024-04-17 PROCEDURE — 97161 PT EVAL LOW COMPLEX 20 MIN: CPT | Mod: GP | Performed by: PHYSICAL THERAPIST

## 2024-04-17 PROCEDURE — 2500000004 HC RX 250 GENERAL PHARMACY W/ HCPCS (ALT 636 FOR OP/ED)

## 2024-04-17 DEVICE — ATTUNE KNEE SYSTEM TIBIAL INSERT FIXED BEARING POSTERIOR STABILIZED 3 12MM AOX
Type: IMPLANTABLE DEVICE | Site: KNEE | Status: FUNCTIONAL
Brand: ATTUNE

## 2024-04-17 DEVICE — CONCISE CEMENT SCULPS KIT
Type: IMPLANTABLE DEVICE | Site: KNEE | Status: FUNCTIONAL
Brand: CONCISE

## 2024-04-17 DEVICE — TIBAL BASE ATTUNE FB, SZ 4 CEM: Type: IMPLANTABLE DEVICE | Site: KNEE | Status: FUNCTIONAL

## 2024-04-17 DEVICE — SIMPLEX® HV IS A FAST-SETTING ACRYLIC RESIN FOR USE IN BONE SURGERY. MIXING THE TWO SEPARATE STERILE COMPONENTS PRODUCES A DUCTILE BONE CEMENT WHICH, AFTER HARDENING, FIXES THE IMPLANT AND TRANSFERS STRESSES PRODUCED DURING MOVEMENT EVENLY TO THE BONE. SIMPLEX® HV CEMENT POWDER ALSO CONTAINS INSOLUBLE ZIRCONIUM DIOXIDE AS AN X-RAY CONTRAST MEDIUM. SIMPLEX® HV DOES NOT EMIT A SIGNAL AND DOES NOT POSE A SAFETY RISK IN A MAGNETIC RESONANCE ENVIRONMENT.
Type: IMPLANTABLE DEVICE | Site: KNEE | Status: FUNCTIONAL
Brand: SIMPLEX HV

## 2024-04-17 DEVICE — ATTUNE PATELLA MEDIALIZED DOME 35MM CEMENTED AOX
Type: IMPLANTABLE DEVICE | Site: KNEE | Status: FUNCTIONAL
Brand: ATTUNE

## 2024-04-17 DEVICE — ATTUNE KNEE SYSTEM FEMORAL POSTERIOR STABILIZED SIZE 3 RIGHT CEMENTED
Type: IMPLANTABLE DEVICE | Site: KNEE | Status: FUNCTIONAL
Brand: ATTUNE

## 2024-04-17 RX ORDER — INSULIN LISPRO 100 [IU]/ML
0-10 INJECTION, SOLUTION INTRAVENOUS; SUBCUTANEOUS
Status: DISCONTINUED | OUTPATIENT
Start: 2024-04-17 | End: 2024-04-18

## 2024-04-17 RX ORDER — DEXTROSE 50 % IN WATER (D50W) INTRAVENOUS SYRINGE
25
Status: DISCONTINUED | OUTPATIENT
Start: 2024-04-17 | End: 2024-04-21 | Stop reason: SDUPTHER

## 2024-04-17 RX ORDER — ONDANSETRON HYDROCHLORIDE 2 MG/ML
4 INJECTION, SOLUTION INTRAVENOUS ONCE AS NEEDED
Status: DISCONTINUED | OUTPATIENT
Start: 2024-04-17 | End: 2024-04-17 | Stop reason: HOSPADM

## 2024-04-17 RX ORDER — HYDRALAZINE HYDROCHLORIDE 20 MG/ML
5 INJECTION INTRAMUSCULAR; INTRAVENOUS EVERY 30 MIN PRN
Status: DISCONTINUED | OUTPATIENT
Start: 2024-04-17 | End: 2024-04-17 | Stop reason: HOSPADM

## 2024-04-17 RX ORDER — DEXTROSE 50 % IN WATER (D50W) INTRAVENOUS SYRINGE
25
Status: DISCONTINUED | OUTPATIENT
Start: 2024-04-17 | End: 2024-04-24 | Stop reason: HOSPADM

## 2024-04-17 RX ORDER — VANCOMYCIN 1 G/200ML
1000 INJECTION, SOLUTION INTRAVENOUS ONCE
Status: COMPLETED | OUTPATIENT
Start: 2024-04-17 | End: 2024-04-17

## 2024-04-17 RX ORDER — FENTANYL CITRATE 50 UG/ML
INJECTION, SOLUTION INTRAMUSCULAR; INTRAVENOUS AS NEEDED
Status: DISCONTINUED | OUTPATIENT
Start: 2024-04-17 | End: 2024-04-17

## 2024-04-17 RX ORDER — HYDROCHLOROTHIAZIDE 25 MG/1
25 TABLET ORAL DAILY
Status: DISCONTINUED | OUTPATIENT
Start: 2024-04-17 | End: 2024-04-22

## 2024-04-17 RX ORDER — VANCOMYCIN HYDROCHLORIDE 1 G/20ML
INJECTION, POWDER, LYOPHILIZED, FOR SOLUTION INTRAVENOUS AS NEEDED
Status: DISCONTINUED | OUTPATIENT
Start: 2024-04-17 | End: 2024-04-17 | Stop reason: HOSPADM

## 2024-04-17 RX ORDER — OXYCODONE AND ACETAMINOPHEN 5; 325 MG/1; MG/1
1 TABLET ORAL EVERY 4 HOURS PRN
Status: DISCONTINUED | OUTPATIENT
Start: 2024-04-17 | End: 2024-04-24 | Stop reason: HOSPADM

## 2024-04-17 RX ORDER — NALOXONE HYDROCHLORIDE 0.4 MG/ML
0.2 INJECTION, SOLUTION INTRAMUSCULAR; INTRAVENOUS; SUBCUTANEOUS EVERY 5 MIN PRN
Status: DISCONTINUED | OUTPATIENT
Start: 2024-04-17 | End: 2024-04-24 | Stop reason: HOSPADM

## 2024-04-17 RX ORDER — FENTANYL CITRATE 50 UG/ML
50 INJECTION, SOLUTION INTRAMUSCULAR; INTRAVENOUS ONCE
Status: COMPLETED | OUTPATIENT
Start: 2024-04-17 | End: 2024-04-17

## 2024-04-17 RX ORDER — ONDANSETRON HYDROCHLORIDE 2 MG/ML
4 INJECTION, SOLUTION INTRAVENOUS EVERY 8 HOURS PRN
Status: DISCONTINUED | OUTPATIENT
Start: 2024-04-17 | End: 2024-04-24 | Stop reason: HOSPADM

## 2024-04-17 RX ORDER — ONDANSETRON HYDROCHLORIDE 2 MG/ML
INJECTION, SOLUTION INTRAVENOUS AS NEEDED
Status: DISCONTINUED | OUTPATIENT
Start: 2024-04-17 | End: 2024-04-17

## 2024-04-17 RX ORDER — VANCOMYCIN 1 G/200ML
1 INJECTION, SOLUTION INTRAVENOUS EVERY 12 HOURS
Qty: 200 ML | Refills: 0 | Status: COMPLETED | OUTPATIENT
Start: 2024-04-17 | End: 2024-04-17

## 2024-04-17 RX ORDER — ONDANSETRON 4 MG/1
4 TABLET, FILM COATED ORAL EVERY 8 HOURS PRN
Status: DISCONTINUED | OUTPATIENT
Start: 2024-04-17 | End: 2024-04-24 | Stop reason: HOSPADM

## 2024-04-17 RX ORDER — ALBUTEROL SULFATE 0.83 MG/ML
2.5 SOLUTION RESPIRATORY (INHALATION) ONCE AS NEEDED
Status: DISCONTINUED | OUTPATIENT
Start: 2024-04-17 | End: 2024-04-17 | Stop reason: HOSPADM

## 2024-04-17 RX ORDER — INSULIN GLARGINE 100 [IU]/ML
11 INJECTION, SOLUTION SUBCUTANEOUS
Status: DISCONTINUED | OUTPATIENT
Start: 2024-04-18 | End: 2024-04-18

## 2024-04-17 RX ORDER — PROPOFOL 10 MG/ML
INJECTION, EMULSION INTRAVENOUS CONTINUOUS PRN
Status: DISCONTINUED | OUTPATIENT
Start: 2024-04-17 | End: 2024-04-17

## 2024-04-17 RX ORDER — DEXTROSE 50 % IN WATER (D50W) INTRAVENOUS SYRINGE
12.5
Status: DISCONTINUED | OUTPATIENT
Start: 2024-04-17 | End: 2024-04-21 | Stop reason: SDUPTHER

## 2024-04-17 RX ORDER — NALOXONE HYDROCHLORIDE 0.4 MG/ML
0.2 INJECTION, SOLUTION INTRAMUSCULAR; INTRAVENOUS; SUBCUTANEOUS EVERY 5 MIN PRN
Status: DISCONTINUED | OUTPATIENT
Start: 2024-04-17 | End: 2024-04-23 | Stop reason: SDUPTHER

## 2024-04-17 RX ORDER — SODIUM CHLORIDE 9 MG/ML
75 INJECTION, SOLUTION INTRAVENOUS CONTINUOUS
Status: DISCONTINUED | OUTPATIENT
Start: 2024-04-17 | End: 2024-04-19

## 2024-04-17 RX ORDER — LISINOPRIL 40 MG/1
40 TABLET ORAL DAILY
Status: DISCONTINUED | OUTPATIENT
Start: 2024-04-17 | End: 2024-04-22

## 2024-04-17 RX ORDER — METOPROLOL TARTRATE 25 MG/1
12.5 TABLET, FILM COATED ORAL 2 TIMES DAILY
Status: DISCONTINUED | OUTPATIENT
Start: 2024-04-17 | End: 2024-04-21

## 2024-04-17 RX ORDER — NITROGLYCERIN 0.4 MG/1
0.4 TABLET SUBLINGUAL EVERY 5 MIN PRN
Status: DISCONTINUED | OUTPATIENT
Start: 2024-04-17 | End: 2024-04-24 | Stop reason: HOSPADM

## 2024-04-17 RX ORDER — ACETAMINOPHEN 325 MG/1
650 TABLET ORAL EVERY 4 HOURS PRN
Status: DISCONTINUED | OUTPATIENT
Start: 2024-04-17 | End: 2024-04-24 | Stop reason: HOSPADM

## 2024-04-17 RX ORDER — GABAPENTIN 100 MG/1
100 CAPSULE ORAL 2 TIMES DAILY
Status: DISCONTINUED | OUTPATIENT
Start: 2024-04-17 | End: 2024-04-24 | Stop reason: HOSPADM

## 2024-04-17 RX ORDER — LIDOCAINE HYDROCHLORIDE 10 MG/ML
INJECTION INFILTRATION; PERINEURAL AS NEEDED
Status: DISCONTINUED | OUTPATIENT
Start: 2024-04-17 | End: 2024-04-17

## 2024-04-17 RX ORDER — HYDRALAZINE HYDROCHLORIDE 50 MG/1
50 TABLET, FILM COATED ORAL 2 TIMES DAILY
Status: DISCONTINUED | OUTPATIENT
Start: 2024-04-17 | End: 2024-04-21

## 2024-04-17 RX ORDER — SODIUM CHLORIDE, SODIUM LACTATE, POTASSIUM CHLORIDE, CALCIUM CHLORIDE 600; 310; 30; 20 MG/100ML; MG/100ML; MG/100ML; MG/100ML
100 INJECTION, SOLUTION INTRAVENOUS CONTINUOUS
Status: DISCONTINUED | OUTPATIENT
Start: 2024-04-17 | End: 2024-04-18

## 2024-04-17 RX ORDER — IPRATROPIUM BROMIDE 0.5 MG/2.5ML
500 SOLUTION RESPIRATORY (INHALATION) ONCE
Status: DISCONTINUED | OUTPATIENT
Start: 2024-04-17 | End: 2024-04-17 | Stop reason: HOSPADM

## 2024-04-17 RX ORDER — PROPOFOL 10 MG/ML
INJECTION, EMULSION INTRAVENOUS AS NEEDED
Status: DISCONTINUED | OUTPATIENT
Start: 2024-04-17 | End: 2024-04-17

## 2024-04-17 RX ORDER — LEVOTHYROXINE SODIUM 100 UG/1
100 TABLET ORAL
Status: DISCONTINUED | OUTPATIENT
Start: 2024-04-18 | End: 2024-04-24 | Stop reason: HOSPADM

## 2024-04-17 RX ORDER — DIPHENHYDRAMINE HYDROCHLORIDE 50 MG/ML
12.5 INJECTION INTRAMUSCULAR; INTRAVENOUS ONCE AS NEEDED
Status: DISCONTINUED | OUTPATIENT
Start: 2024-04-17 | End: 2024-04-17 | Stop reason: HOSPADM

## 2024-04-17 RX ORDER — OXYCODONE AND ACETAMINOPHEN 10; 325 MG/1; MG/1
1 TABLET ORAL EVERY 4 HOURS PRN
Status: DISCONTINUED | OUTPATIENT
Start: 2024-04-17 | End: 2024-04-24 | Stop reason: HOSPADM

## 2024-04-17 RX ORDER — PHENYLEPHRINE HCL IN 0.9% NACL 0.4MG/10ML
SYRINGE (ML) INTRAVENOUS AS NEEDED
Status: DISCONTINUED | OUTPATIENT
Start: 2024-04-17 | End: 2024-04-17

## 2024-04-17 RX ORDER — TRANEXAMIC ACID 100 MG/ML
INJECTION, SOLUTION INTRAVENOUS AS NEEDED
Status: DISCONTINUED | OUTPATIENT
Start: 2024-04-17 | End: 2024-04-17

## 2024-04-17 RX ORDER — INSULIN DEGLUDEC 100 U/ML
11 INJECTION, SOLUTION SUBCUTANEOUS
Status: DISCONTINUED | OUTPATIENT
Start: 2024-04-18 | End: 2024-04-17

## 2024-04-17 RX ORDER — DEXTROSE 50 % IN WATER (D50W) INTRAVENOUS SYRINGE
12.5
Status: DISCONTINUED | OUTPATIENT
Start: 2024-04-17 | End: 2024-04-24 | Stop reason: HOSPADM

## 2024-04-17 RX ORDER — OXYCODONE HYDROCHLORIDE 5 MG/1
5 TABLET ORAL EVERY 4 HOURS PRN
Status: DISCONTINUED | OUTPATIENT
Start: 2024-04-17 | End: 2024-04-17 | Stop reason: HOSPADM

## 2024-04-17 RX ORDER — POLYETHYLENE GLYCOL 3350 17 G/17G
17 POWDER, FOR SOLUTION ORAL DAILY
Status: DISCONTINUED | OUTPATIENT
Start: 2024-04-17 | End: 2024-04-24 | Stop reason: HOSPADM

## 2024-04-17 RX ORDER — DEXMEDETOMIDINE HYDROCHLORIDE 100 UG/ML
INJECTION, SOLUTION INTRAVENOUS AS NEEDED
Status: DISCONTINUED | OUTPATIENT
Start: 2024-04-17 | End: 2024-04-17

## 2024-04-17 RX ORDER — AMLODIPINE BESYLATE 10 MG/1
10 TABLET ORAL DAILY
Status: DISCONTINUED | OUTPATIENT
Start: 2024-04-17 | End: 2024-04-22

## 2024-04-17 RX ORDER — MIDAZOLAM HYDROCHLORIDE 1 MG/ML
1 INJECTION, SOLUTION INTRAMUSCULAR; INTRAVENOUS ONCE
Status: COMPLETED | OUTPATIENT
Start: 2024-04-17 | End: 2024-04-17

## 2024-04-17 RX ORDER — NALOXONE HYDROCHLORIDE 0.4 MG/ML
0.2 INJECTION, SOLUTION INTRAMUSCULAR; INTRAVENOUS; SUBCUTANEOUS EVERY 5 MIN PRN
Status: DISCONTINUED | OUTPATIENT
Start: 2024-04-17 | End: 2024-04-17 | Stop reason: SDUPTHER

## 2024-04-17 RX ORDER — PRAVASTATIN SODIUM 40 MG/1
40 TABLET ORAL NIGHTLY
Status: DISCONTINUED | OUTPATIENT
Start: 2024-04-17 | End: 2024-04-24 | Stop reason: HOSPADM

## 2024-04-17 RX ORDER — INSULIN LISPRO 100 [IU]/ML
0-20 INJECTION, SOLUTION INTRAVENOUS; SUBCUTANEOUS
Status: DISCONTINUED | OUTPATIENT
Start: 2024-04-17 | End: 2024-04-17

## 2024-04-17 RX ADMIN — MIDAZOLAM 1 MG: 1 INJECTION INTRAMUSCULAR; INTRAVENOUS at 07:30

## 2024-04-17 RX ADMIN — INSULIN LISPRO 6 UNITS: 100 INJECTION, SOLUTION INTRAVENOUS; SUBCUTANEOUS at 22:10

## 2024-04-17 RX ADMIN — Medication 100 MCG: at 08:12

## 2024-04-17 RX ADMIN — ONDANSETRON HYDROCHLORIDE 4 MG: 2 INJECTION INTRAMUSCULAR; INTRAVENOUS at 09:37

## 2024-04-17 RX ADMIN — SODIUM CHLORIDE, POTASSIUM CHLORIDE, SODIUM LACTATE AND CALCIUM CHLORIDE: 600; 310; 30; 20 INJECTION, SOLUTION INTRAVENOUS at 07:57

## 2024-04-17 RX ADMIN — SODIUM CHLORIDE 100 ML/HR: 900 INJECTION, SOLUTION INTRAVENOUS at 06:46

## 2024-04-17 RX ADMIN — FENTANYL CITRATE 50 MCG: 0.05 INJECTION, SOLUTION INTRAMUSCULAR; INTRAVENOUS at 07:52

## 2024-04-17 RX ADMIN — TRANEXAMIC ACID 1000 MG: 100 INJECTION, SOLUTION INTRAVENOUS at 09:19

## 2024-04-17 RX ADMIN — OXYCODONE AND ACETAMINOPHEN 1 TABLET: 5; 325 TABLET ORAL at 18:07

## 2024-04-17 RX ADMIN — PROMETHAZINE HYDROCHLORIDE 6.25 MG: 25 INJECTION INTRAMUSCULAR; INTRAVENOUS at 10:48

## 2024-04-17 RX ADMIN — DEXMEDETOMIDINE HYDROCHLORIDE 4 MCG: 100 INJECTION, SOLUTION, CONCENTRATE INTRAVENOUS at 08:40

## 2024-04-17 RX ADMIN — HYDRALAZINE HYDROCHLORIDE 50 MG: 50 TABLET, FILM COATED ORAL at 22:05

## 2024-04-17 RX ADMIN — Medication 100 MCG: at 09:46

## 2024-04-17 RX ADMIN — Medication 100 MCG: at 08:20

## 2024-04-17 RX ADMIN — Medication 100 MCG: at 08:11

## 2024-04-17 RX ADMIN — FENTANYL CITRATE 50 MCG: 50 INJECTION, SOLUTION INTRAMUSCULAR; INTRAVENOUS at 09:58

## 2024-04-17 RX ADMIN — VANCOMYCIN 1 G: 1 INJECTION, SOLUTION INTRAVENOUS at 17:49

## 2024-04-17 RX ADMIN — TRANEXAMIC ACID 1000 MG: 100 INJECTION, SOLUTION INTRAVENOUS at 08:14

## 2024-04-17 RX ADMIN — PROPOFOL 75 MCG/KG/MIN: 10 INJECTION, EMULSION INTRAVENOUS at 08:10

## 2024-04-17 RX ADMIN — Medication 100 MCG: at 09:52

## 2024-04-17 RX ADMIN — METOPROLOL TARTRATE 12.5 MG: 25 TABLET, FILM COATED ORAL at 22:05

## 2024-04-17 RX ADMIN — PRAVASTATIN SODIUM 40 MG: 40 TABLET ORAL at 22:05

## 2024-04-17 RX ADMIN — SODIUM CHLORIDE, POTASSIUM CHLORIDE, SODIUM LACTATE AND CALCIUM CHLORIDE: 600; 310; 30; 20 INJECTION, SOLUTION INTRAVENOUS at 08:10

## 2024-04-17 RX ADMIN — ACETAMINOPHEN 650 MG: 325 TABLET ORAL at 14:53

## 2024-04-17 RX ADMIN — DEXAMETHASONE SODIUM PHOSPHATE 6 MG: 4 INJECTION, SOLUTION INTRAMUSCULAR; INTRAVENOUS at 08:14

## 2024-04-17 RX ADMIN — LIDOCAINE HYDROCHLORIDE 5 ML: 10 INJECTION, SOLUTION INFILTRATION; PERINEURAL at 08:05

## 2024-04-17 RX ADMIN — PROPOFOL 90 MG: 10 INJECTION, EMULSION INTRAVENOUS at 08:05

## 2024-04-17 RX ADMIN — DEXMEDETOMIDINE HYDROCHLORIDE 4 MCG: 100 INJECTION, SOLUTION, CONCENTRATE INTRAVENOUS at 08:27

## 2024-04-17 RX ADMIN — GABAPENTIN 100 MG: 100 CAPSULE ORAL at 22:04

## 2024-04-17 RX ADMIN — FENTANYL CITRATE 25 MCG: 50 INJECTION, SOLUTION INTRAMUSCULAR; INTRAVENOUS at 10:01

## 2024-04-17 RX ADMIN — DEXMEDETOMIDINE HYDROCHLORIDE 4 MCG: 100 INJECTION, SOLUTION, CONCENTRATE INTRAVENOUS at 09:40

## 2024-04-17 RX ADMIN — Medication 2 L/MIN: at 13:15

## 2024-04-17 RX ADMIN — HYDROMORPHONE HYDROCHLORIDE 0.5 MG: 1 INJECTION, SOLUTION INTRAMUSCULAR; INTRAVENOUS; SUBCUTANEOUS at 10:59

## 2024-04-17 RX ADMIN — VANCOMYCIN 1000 MG: 1 INJECTION, SOLUTION INTRAVENOUS at 06:45

## 2024-04-17 RX ADMIN — FENTANYL CITRATE 50 MCG: 50 INJECTION, SOLUTION INTRAMUSCULAR; INTRAVENOUS at 09:41

## 2024-04-17 RX ADMIN — FENTANYL CITRATE 50 MCG: 50 INJECTION, SOLUTION INTRAMUSCULAR; INTRAVENOUS at 08:27

## 2024-04-17 RX ADMIN — INSULIN LISPRO 8 UNITS: 100 INJECTION, SOLUTION INTRAVENOUS; SUBCUTANEOUS at 17:49

## 2024-04-17 SDOH — HEALTH STABILITY: MENTAL HEALTH: CURRENT SMOKER: 0

## 2024-04-17 SDOH — SOCIAL STABILITY: SOCIAL INSECURITY: DO YOU FEEL UNSAFE GOING BACK TO THE PLACE WHERE YOU ARE LIVING?: NO

## 2024-04-17 SDOH — SOCIAL STABILITY: SOCIAL INSECURITY: ABUSE: ADULT

## 2024-04-17 SDOH — SOCIAL STABILITY: SOCIAL INSECURITY: ARE YOU OR HAVE YOU BEEN THREATENED OR ABUSED PHYSICALLY, EMOTIONALLY, OR SEXUALLY BY ANYONE?: NO

## 2024-04-17 SDOH — SOCIAL STABILITY: SOCIAL INSECURITY: HAS ANYONE EVER THREATENED TO HURT YOUR FAMILY OR YOUR PETS?: NO

## 2024-04-17 SDOH — SOCIAL STABILITY: SOCIAL INSECURITY: ARE THERE ANY APPARENT SIGNS OF INJURIES/BEHAVIORS THAT COULD BE RELATED TO ABUSE/NEGLECT?: NO

## 2024-04-17 SDOH — SOCIAL STABILITY: SOCIAL INSECURITY: HAVE YOU HAD ANY THOUGHTS OF HARMING ANYONE ELSE?: NO

## 2024-04-17 SDOH — SOCIAL STABILITY: SOCIAL INSECURITY: DOES ANYONE TRY TO KEEP YOU FROM HAVING/CONTACTING OTHER FRIENDS OR DOING THINGS OUTSIDE YOUR HOME?: NO

## 2024-04-17 SDOH — SOCIAL STABILITY: SOCIAL INSECURITY: HAVE YOU HAD THOUGHTS OF HARMING ANYONE ELSE?: NO

## 2024-04-17 SDOH — SOCIAL STABILITY: SOCIAL INSECURITY: WERE YOU ABLE TO COMPLETE ALL THE BEHAVIORAL HEALTH SCREENINGS?: YES

## 2024-04-17 SDOH — SOCIAL STABILITY: SOCIAL INSECURITY: DO YOU FEEL ANYONE HAS EXPLOITED OR TAKEN ADVANTAGE OF YOU FINANCIALLY OR OF YOUR PERSONAL PROPERTY?: NO

## 2024-04-17 ASSESSMENT — PAIN - FUNCTIONAL ASSESSMENT
PAIN_FUNCTIONAL_ASSESSMENT: 0-10
PAIN_FUNCTIONAL_ASSESSMENT: WONG-BAKER FACES
PAIN_FUNCTIONAL_ASSESSMENT: CPOT (CRITICAL CARE PAIN OBSERVATION TOOL)
PAIN_FUNCTIONAL_ASSESSMENT: 0-10
PAIN_FUNCTIONAL_ASSESSMENT: WONG-BAKER FACES
PAIN_FUNCTIONAL_ASSESSMENT: 0-10
PAIN_FUNCTIONAL_ASSESSMENT: CPOT (CRITICAL CARE PAIN OBSERVATION TOOL)
PAIN_FUNCTIONAL_ASSESSMENT: 0-10
PAIN_FUNCTIONAL_ASSESSMENT: CPOT (CRITICAL CARE PAIN OBSERVATION TOOL)
PAIN_FUNCTIONAL_ASSESSMENT: 0-10
PAIN_FUNCTIONAL_ASSESSMENT: CPOT (CRITICAL CARE PAIN OBSERVATION TOOL)

## 2024-04-17 ASSESSMENT — COGNITIVE AND FUNCTIONAL STATUS - GENERAL
STANDING UP FROM CHAIR USING ARMS: A LOT
PERSONAL GROOMING: A LITTLE
WALKING IN HOSPITAL ROOM: TOTAL
DRESSING REGULAR UPPER BODY CLOTHING: A LOT
MOVING FROM LYING ON BACK TO SITTING ON SIDE OF FLAT BED WITH BEDRAILS: A LITTLE
TURNING FROM BACK TO SIDE WHILE IN FLAT BAD: TOTAL
HELP NEEDED FOR BATHING: A LITTLE
MOVING TO AND FROM BED TO CHAIR: TOTAL
WALKING IN HOSPITAL ROOM: TOTAL
MOVING FROM LYING ON BACK TO SITTING ON SIDE OF FLAT BED WITH BEDRAILS: A LOT
MOBILITY SCORE: 8
DRESSING REGULAR LOWER BODY CLOTHING: A LITTLE
PATIENT BASELINE BEDBOUND: NO
TOILETING: A LOT
CLIMB 3 TO 5 STEPS WITH RAILING: TOTAL
MOVING FROM LYING ON BACK TO SITTING ON SIDE OF FLAT BED WITH BEDRAILS: A LOT
STANDING UP FROM CHAIR USING ARMS: A LITTLE
STANDING UP FROM CHAIR USING ARMS: A LOT
DAILY ACTIVITIY SCORE: 14
HELP NEEDED FOR BATHING: A LOT
DRESSING REGULAR UPPER BODY CLOTHING: A LITTLE
CLIMB 3 TO 5 STEPS WITH RAILING: TOTAL
TURNING FROM BACK TO SIDE WHILE IN FLAT BAD: A LITTLE
CLIMB 3 TO 5 STEPS WITH RAILING: A LOT
EATING MEALS: A LITTLE
TOILETING: A LITTLE
DAILY ACTIVITIY SCORE: 19
TURNING FROM BACK TO SIDE WHILE IN FLAT BAD: TOTAL
MOVING TO AND FROM BED TO CHAIR: TOTAL
PERSONAL GROOMING: A LITTLE
MOBILITY SCORE: 8
MOBILITY SCORE: 17
WALKING IN HOSPITAL ROOM: A LITTLE
DRESSING REGULAR LOWER BODY CLOTHING: A LOT
MOVING TO AND FROM BED TO CHAIR: A LITTLE

## 2024-04-17 ASSESSMENT — LIFESTYLE VARIABLES
HOW OFTEN DO YOU HAVE A DRINK CONTAINING ALCOHOL: MONTHLY OR LESS
HOW OFTEN DO YOU HAVE 6 OR MORE DRINKS ON ONE OCCASION: NEVER
HOW MANY STANDARD DRINKS CONTAINING ALCOHOL DO YOU HAVE ON A TYPICAL DAY: 1 OR 2
SKIP TO QUESTIONS 9-10: 1
AUDIT-C TOTAL SCORE: 1
AUDIT-C TOTAL SCORE: 1

## 2024-04-17 ASSESSMENT — PAIN SCALES - GENERAL
PAINLEVEL_OUTOF10: 10 - WORST POSSIBLE PAIN
PAINLEVEL_OUTOF10: 3
PAINLEVEL_OUTOF10: 0 - NO PAIN
PAINLEVEL_OUTOF10: 5 - MODERATE PAIN
PAINLEVEL_OUTOF10: 10 - WORST POSSIBLE PAIN
PAINLEVEL_OUTOF10: 7
PAINLEVEL_OUTOF10: 0 - NO PAIN
PAIN_LEVEL: 0

## 2024-04-17 ASSESSMENT — ENCOUNTER SYMPTOMS
NEUROLOGICAL NEGATIVE: 1
PSYCHIATRIC NEGATIVE: 1
CONSTITUTIONAL NEGATIVE: 1
EYES NEGATIVE: 1
HEMATOLOGIC/LYMPHATIC NEGATIVE: 1
ENDOCRINE NEGATIVE: 1
ALLERGIC/IMMUNOLOGIC NEGATIVE: 1
CARDIOVASCULAR NEGATIVE: 1
RESPIRATORY NEGATIVE: 1
NAUSEA: 1

## 2024-04-17 ASSESSMENT — COLUMBIA-SUICIDE SEVERITY RATING SCALE - C-SSRS
2. HAVE YOU ACTUALLY HAD ANY THOUGHTS OF KILLING YOURSELF?: NO
1. IN THE PAST MONTH, HAVE YOU WISHED YOU WERE DEAD OR WISHED YOU COULD GO TO SLEEP AND NOT WAKE UP?: NO
6. HAVE YOU EVER DONE ANYTHING, STARTED TO DO ANYTHING, OR PREPARED TO DO ANYTHING TO END YOUR LIFE?: NO

## 2024-04-17 ASSESSMENT — ACTIVITIES OF DAILY LIVING (ADL)
DRESSING YOURSELF: INDEPENDENT
GROOMING: INDEPENDENT
TOILETING: INDEPENDENT
FEEDING YOURSELF: INDEPENDENT
ADEQUATE_TO_COMPLETE_ADL: YES
BATHING: INDEPENDENT
HEARING - LEFT EAR: FUNCTIONAL
LACK_OF_TRANSPORTATION: NO
ADL_ASSISTANCE: INDEPENDENT
WALKS IN HOME: INDEPENDENT
PATIENT'S MEMORY ADEQUATE TO SAFELY COMPLETE DAILY ACTIVITIES?: YES
HEARING - RIGHT EAR: FUNCTIONAL
JUDGMENT_ADEQUATE_SAFELY_COMPLETE_DAILY_ACTIVITIES: YES
EFFECT OF PAIN ON DAILY ACTIVITIES: LIMITED ROM

## 2024-04-17 ASSESSMENT — PAIN SCALES - WONG BAKER
WONGBAKER_NUMERICALRESPONSE: NO HURT
WONGBAKER_NUMERICALRESPONSE: NO HURT

## 2024-04-17 ASSESSMENT — PATIENT HEALTH QUESTIONNAIRE - PHQ9
2. FEELING DOWN, DEPRESSED OR HOPELESS: NOT AT ALL
SUM OF ALL RESPONSES TO PHQ9 QUESTIONS 1 & 2: 0
1. LITTLE INTEREST OR PLEASURE IN DOING THINGS: NOT AT ALL

## 2024-04-17 ASSESSMENT — PAIN DESCRIPTION - ORIENTATION
ORIENTATION: RIGHT
ORIENTATION: RIGHT

## 2024-04-17 ASSESSMENT — PAIN DESCRIPTION - LOCATION
LOCATION: HEAD
LOCATION: KNEE
LOCATION: KNEE

## 2024-04-17 ASSESSMENT — PAIN DESCRIPTION - DESCRIPTORS: DESCRIPTORS: ACHING

## 2024-04-17 NOTE — ANESTHESIA POSTPROCEDURE EVALUATION
Patient: Bonnie Jules    Procedure Summary       Date: 04/17/24 Room / Location: [facility] OR 10 / Virtual [facility] OR    Anesthesia Start: 0757 Anesthesia Stop: 1022    Procedure: Arthroplasty Total Knee (Right: Knee) Diagnosis:       Primary osteoarthritis of right knee      (Primary osteoarthritis of right knee [M17.11])    Surgeons: Rudolph Liriano MD Responsible Provider: Jim Licea MD    Anesthesia Type: general, regional ASA Status: 3            Anesthesia Type: general, regional    Vitals Value Taken Time   /58 04/17/24 1131   Temp 36 °C (96.8 °F) 04/17/24 1018   Pulse 55 04/17/24 1134   Resp 16 04/17/24 1134   SpO2 100 % 04/17/24 1134   Vitals shown include unfiled device data.    Anesthesia Post Evaluation    Patient location during evaluation: PACU  Patient participation: complete - patient participated  Level of consciousness: awake  Pain score: 0  Pain management: adequate  Multimodal analgesia pain management approach  Airway patency: patent  Two or more strategies used to mitigate risk of obstructive sleep apnea  Cardiovascular status: acceptable  Respiratory status: acceptable  Hydration status: acceptable  Postoperative Nausea and Vomiting: none        There were no known notable events for this encounter.

## 2024-04-17 NOTE — ANESTHESIA PROCEDURE NOTES
Airway  Date/Time: 4/17/2024 8:06 AM  Urgency: elective    Airway not difficult    Staffing  Performed: SRNA   Authorized by: Jim Licea MD    Performed by: Sapna Kirk  Patient location during procedure: OR    Indications and Patient Condition  Indications for airway management: anesthesia  Spontaneous Ventilation: absent  Sedation level: deep  Preoxygenated: yes  Patient position: sniffing  Mask difficulty assessment: 1 - vent by mask    Final Airway Details  Final airway type: supraglottic airway      Successful airway: Supraglottic airway: I-gel.  Size 3     Number of attempts at approach: 1    Additional Comments  Teeth and lips in pre-anesthetic condition.

## 2024-04-17 NOTE — ANESTHESIA PREPROCEDURE EVALUATION
Patient: Bonnie Jules    Procedure Information       Date/Time: 04/17/24 0745    Procedure: Arthroplasty Total Knee (Right: Knee)    Location: [facility] / [facility]    Surgeons: Rudolph Liriano MD            Relevant Problems   Anesthesia (within normal limits)      Cardiac  Low risk- Araujo.  Ef 55-60.  Mild aortic stenosis.  Cabg 2005   (+) Acute non-ST segment elevation myocardial infarction (Multi)   (+) Aortic valve disorder   (+) Atherosclerotic heart disease of native coronary artery with unspecified angina pectoris (CMS-HCC)   (+) Benign essential hypertension   (+) Chest pain   (+) Essential hypertension   (+) History of coronary artery bypass graft   (+) Hyperlipidemia   (+) Hypertensive disorder   (+) Murmur   (+) Myocardial infarction (Multi)   (+) Peripheral vascular disease (CMS-HCC)   (+) Peripheral vascular disease, unspecified (CMS-HCC)   (+) Stenosis of right femoral artery (CMS-HCC)      Pulmonary (within normal limits)      Neuro   (+) Anxiety   (+) Carpal tunnel syndrome   (+) Diabetic polyneuropathy associated with type 2 diabetes mellitus (Multi)   (+) Seizure (Multi)      GI   (+) Gastrointestinal hemorrhage   (+) 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   (+) Rectal hemorrhage      /Renal   (+) Acute urinary tract infection      Liver (within normal limits)      Endocrine   (+) Background diabetic retinopathy of left eye determined by examination (Multi)   (+) Diabetes mellitus, type 2 (Multi)   (+) Diabetic polyneuropathy associated with type 2 diabetes mellitus (Multi)   (+) Hypothyroidism   (+) Type 1 diabetes mellitus (Multi)   (+) Type 1 diabetes mellitus with diabetic polyneuropathy (Multi)   (+) Type 1 diabetes mellitus with hyperglycemia (Multi)   (+) Type 2 diabetes mellitus with other skin complications (Multi)   (+) Type 2 diabetes mellitus without complication (Multi)      Hematology   (+) Chronic lymphocytic leukemia of B-cell type not having achieved  remission (Multi)   (+) 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      Musculoskeletal   (+) Carpal tunnel syndrome   (+) Chondrocalcinosis of left knee   (+) Erosive osteoarthritis   (+) Lumbar spondylosis   (+) Osteoarthritis   (+) Osteoarthritis of left knee   (+) Osteoarthritis of right knee   (+) Primary localized osteoarthritis of pelvic region and thigh      HEENT (within normal limits)      ID   (+) Acute urinary tract infection   (+) Onychomycosis      Skin (within normal limits)      GYN (within normal limits)       Clinical information reviewed:   Tobacco  Allergies  Meds   Med Hx  Surg Hx   Fam Hx  Soc Hx        NPO Detail:  NPO/Void Status  Carbohydrate Drink Given Prior to Surgery? : N  Date of Last Liquid: 04/16/24  Time of Last Liquid: 1900  Date of Last Solid: 04/16/24  Time of Last Solid: 1900  Time of Last Void: 0600         Physical Exam    Airway  Mallampati: III  TM distance: >3 FB  Neck ROM: full     Cardiovascular - normal exam     Dental   (+) upper dentures, lower dentures     Pulmonary - normal exam     Abdominal - normal exam             Anesthesia Plan    History of general anesthesia?: yes  History of complications of general anesthesia?: no    ASA 3     general and regional   (Adductor canal block)  The patient is not a current smoker.  Patient was not previously instructed to abstain from smoking on day of procedure.  Patient did not smoke on day of procedure.  Education provided regarding risk of obstructive sleep apnea.  intravenous induction   Anesthetic plan and risks discussed with patient.    Plan discussed with CRNA and CAA.

## 2024-04-17 NOTE — CONSULTS
Inpatient consult to Medicine  Consult performed by: CARMEN Mcleod-CNP  Consult ordered by: Mayela Pickard PA-C          Reason For Consult  Hospital management of HTN and diabetes. S/P right knee replacement 04/17.    History Of Present Illness  Bonnie Jules is a 83 y.o. female with a PMH of DM type 1, hypertension, hyperlipidemia, hypothyroidism, PVD, CAD with CABG in 2005, CLL, anemia, and osteoarthritis of right knee. Presented to [facility] for an anticipated right total knee replacement. Patient underwent a total right knee replacement on 04/17/2024 by Dr. Liriano. Patient was seen and cleared by cardiologist, Dr. Araujo, outpatient prior to coming in for surgery. Patient seen and examined with the daughter in the room. Patient was sleeping comfortably, even and normal respiratory rate. Patient on oxygen NC 2L. Patient easily awakens and answers questions appropriately but is drowsy and falls asleep during assessment.  Makes note of increased pain, drowsiness, and nausea. Patient denies shortness of breath, trouble breathing, chest pains, fever, chills, or vomiting. No dysuria, no abdominal pain. Denies diarrhea or constipation. No blurred vision, light headed, or dizziness. Daughter was able to provide PMH and current medication list. Medicine consulted for management of chronic medical conditions.      Past Medical History  She has a past medical history of Anxiety, Benign hypertension, Chronic ischemic colitis (Multi), Chronic kidney disease, stage III (moderate) (Multi), CLL (chronic lymphocytic leukemia) (Multi), Coronary artery disease, CTS (carpal tunnel syndrome), Diabetes mellitus (Multi), Essential (primary) hypertension, GI bleed, Hyperlipidemia, unspecified, Hypertension, Hypothyroidism, Hypothyroidism, unspecified type, Mixed hyperlipidemia, Osteoarthritis, Osteoporosis, Peripheral vascular disease (CMS-HCC), Seizure disorder (Multi), and Type 1 diabetes mellitus with hyperglycemia  (Multi).    Surgical History  She has a past surgical history that includes Carpal tunnel release (Bilateral); Total hip arthroplasty (Left, 2012); Coronary artery bypass graft (2005); Cholecystectomy; Cataract extraction w/ intraocular lens  implant, bilateral; Hysterectomy; Colonoscopy; Other surgical history; and Other surgical history (Right, 01/2016).     Social History  She reports that she has never smoked. She has been exposed to tobacco smoke. She has never used smokeless tobacco. She reports that she does not currently use alcohol. She reports that she does not currently use drugs.    Family History  Family History   Problem Relation Name Age of Onset    Diabetes Mother      Diabetes Daughter      Other (RA) Daughter          Allergies  Quinolones, Chlorpheniramine-dextromethorp, Amoxicillin, Amoxicillin-pot clavulanate, Ciprofloxacin, and Levofloxacin    Review of Systems  Review of Systems   Constitutional: Negative.   HENT: Negative.     Eyes: Negative.    Cardiovascular: Negative.    Respiratory: Negative.     Endocrine: Negative.    Hematologic/Lymphatic: Negative.    Skin: Negative.    Musculoskeletal:         Right knee pain   Gastrointestinal:  Positive for nausea.   Genitourinary: Negative.    Neurological: Negative.    Psychiatric/Behavioral: Negative.     Allergic/Immunologic: Negative.    All other systems reviewed and are negative.       Physical Exam  Physical Exam  Vitals and nursing note reviewed.   HENT:      Head: Normocephalic and atraumatic.      Mouth/Throat:      Mouth: Mucous membranes are moist.   Cardiovascular:      Rate and Rhythm: Normal rate and regular rhythm.      Heart sounds: Murmur heard.   Pulmonary:      Effort: Pulmonary effort is normal.      Breath sounds: Normal breath sounds.   Abdominal:      General: Abdomen is flat. Bowel sounds are normal.      Palpations: Abdomen is soft.   Musculoskeletal:      Cervical back: Normal range of motion.      Comments: Right knee  replacement, dry and intact dressing   Skin:     General: Skin is warm and dry.      Capillary Refill: Capillary refill takes less than 2 seconds.             Last Recorded Vitals  /51 (BP Location: Right arm, Patient Position: Lying)   Pulse 64   Temp 36.5 °C (97.7 °F) (Axillary)   Resp 14   Wt 57 kg (125 lb 10.6 oz)   SpO2 100%     Relevant Results  XR knee right 1-2 views    Result Date: 4/17/2024  Interpreted By:  Avni Correa, STUDY: XR KNEE RIGHT 1-2 VIEWS; ;  4/17/2024 12:13 pm   INDICATION: Signs/Symptoms:Post op knee.   COMPARISON: None.   ACCESSION NUMBER(S): [id number]   ORDERING CLINICIAN: KLAUS HANCOCK   FINDINGS: Right knee, two views   Interval right total knee arthroplasty in place. No periprosthetic fracture lucency seen. There is no dislocation. Postsurgical change the soft tissue. There is a moderate-sized effusion       No immediate complication after right total knee arthroplasty     MACRO: None   Signed by: Avni Correa 4/17/2024 12:33 PM Dictation workstation:   [id number]       Assessment/Plan     Diabetes Mellitus, type 1  -Monitor blood glucose  -Monitor glucose AC/HS with SSI coverage   -Hypoglycemia protocol   -HGbA1C 6.7 on 04/03/24    Right knee osteoarthritis  S/P total right knee replacement 04/17/2024 by Dr. Liriano  -PT/OT evaluate  -Pain management, bowel regimen  -Incentive spirometer  -Management per orthopedic surgery    Anemia  -mild   -Monitor H&H    CAD  -Continue home medications   -follows with Dr. Araujo outpatient  -stable    Chronic kidney disease  - Continue IV fluids  - Monitor BMP  - Renally dose meds  - Avoid nephrotoxic medications    Hypertension  -continue metoprolol with hold parameters and hydralazine, amlodipine  -hold hydrochlorothiazide for now  -Monitor blood pressure     Hyperlipidemia  -Continue home medications     Hypothyroidism  -Continue Synthroid     CLL  -In remission  -Monitor WBC     DVT  prophylaxis  -Eliquis  -SCD's    Disposition: Admitted to orthopedic surgery. Monitor pain, blood pressure and blood sugars, bowel regimen, PT/OT, CBC and BMP in AM. Discharger per ortho.     Thank you so much for this consult. Will continue to follow.        CARMEN Mcleod-CNP

## 2024-04-17 NOTE — CARE PLAN
Problem: Pain  Goal: My pain/discomfort is manageable  Outcome: Progressing     Problem: Safety  Goal: Patient will be injury free during hospitalization  Outcome: Progressing  Goal: I will remain free of falls  Outcome: Progressing     Problem: Daily Care  Goal: Daily care needs are met  Outcome: Progressing     Problem: Psychosocial Needs  Goal: Demonstrates ability to cope with hospitalization/illness  Outcome: Progressing  Goal: Collaborate with me, my family, and caregiver to identify my specific goals  Outcome: Progressing  Flowsheets (Taken 4/17/2024 1410)  Cultural Requests During Hospitalization: denies  Spiritual Requests During Hospitalization: denies     Problem: Discharge Barriers  Goal: My discharge needs are met  Outcome: Progressing   The patient's goals for the shift include      The clinical goals for the shift include pain control, saftey    Over the shift, the patient did not make progress toward the following goals. Barriers to progression include weakness, pain. Recommendations to address these barriers include pain control, saftey.

## 2024-04-17 NOTE — ANESTHESIA PROCEDURE NOTES
Peripheral Block    Patient location during procedure: pre-op  Start time: 4/17/2024 7:50 AM  End time: 4/17/2024 7:53 AM  Reason for block: at surgeon's request and post-op pain management  Staffing  Performed: attending   Authorized by: Jim Licea MD    Performed by: Jim Licea MD  Preanesthetic Checklist  Completed: patient identified, IV checked, site marked, risks and benefits discussed, surgical consent, monitors and equipment checked, pre-op evaluation and timeout performed   Timeout performed at: 4/17/2024 7:47 AM  Peripheral Block  Patient position: laying flat  Prep: ChloraPrep  Patient monitoring: heart rate, cardiac monitor and continuous pulse ox  Block type: adductor canal  Laterality: right  Injection technique: single-shot  Guidance: ultrasound guided  Needle  Needle gauge: 21 G  Needle length: 10 cm  Needle localization: ultrasound guidance  Test dose: negative  Assessment  Injection assessment: negative aspiration for heme, no paresthesia on injection, incremental injection and local visualized surrounding nerve on ultrasound  Paresthesia pain: none  Heart rate change: no  Slow fractionated injection: yes  Additional Notes  Ropivicaine 20 ml 0.5 % with 5 mg Decadron

## 2024-04-17 NOTE — OP NOTE
Arthroplasty Total Knee (R) Operative Note     Date: 2024  OR Location: [facility]    Name: Bonnie Jules, : 1940, Age: 83 y.o., MRN: [id number], Sex: female    Diagnosis  Pre-op Diagnosis     * Primary osteoarthritis of right knee [M17.11] Post-op Diagnosis     * Primary osteoarthritis of right knee [M17.11]     Procedures  Arthroplasty Total Knee  49524 - [place] ARTHRP KNE CONDYLE&PLATU MEDIAL&LAT COMPARTMENTS      Surgeons      * Rudolph Liriano - Primary    Resident/Fellow/Other Assistant:  Surgeons and Role:  * No surgeons found with a matching role *    Procedure Summary  Anesthesia: General  ASA: III  Anesthesia Staff: Anesthesiologist: Jim Licea MD  CRNA: CARMEN Murillo-CRNA  SRNA: Sapna Kirk  Estimated Blood Loss: 175 mL  Intra-op Medications:   Administrations occurring from 0745 to 1030 on 24:   Medication Name Total Dose   vancomycin (Vancocin) vial for injection 1 g   thrombin solution 5,000 Units   lactated Ringer's infusion Cannot be calculated   fentaNYL PF (Sublimaze) injection 50 mcg 50 mcg   vancomycin (Xellia) 1 g in 200 mL (Xellia) IVPB 1,000 mg Cannot be calculated              Anesthesia Record               Intraprocedure I/O Totals          Intake    Tranexamic Acid 0.00 mL    The total shown is the total volume documented since Anesthesia Start was filed.    Propofol Drip 0.00 mL    The total shown is the total volume documented since Anesthesia Start was filed.    lactated Ringer's infusion 1100.00 mL    Total Intake 1100 mL          Specimen: No specimens collected     Staff:   Circulator: Raquel Miguel RN; Malgorzata Heart RN  Scrub Person: Matilda Gill         Drains and/or Catheters: * None in log *    Tourniquet Times:     Total Tourniquet Time Documented:  Thigh (Right) - 59 minutes  Total: Thigh (Right) - 59 minutes      Implants:  Implants       Type Name Action Serial No.      Implant CEMENT, BONE, SIMPLEX HV FULL DOSE  40 GM -  [id number] Implanted      Implant CEMENT, BONE, SIMPLEX HV FULL DOSE  40 GM - [id number] Implanted      Implant CEMENT, SCULPS KIT, CONCISE, DISPOSABLE - [id number] Implanted      Joint Knee FEMORAL, ATTUNE PS, KENNEDY, SZ 3, RT - [id number] Implanted      Joint Knee TIBAL BASE ATTUNE FB, SZ 4 KENNEDY - [id number] Implanted      Joint Knee INSERT, ATTUNE PS FB, SZ 3, 12MM - [id number] Implanted      Joint Knee DOME, PATELLA, MEDIALIZED, 35MM - [id number] Implanted               Findings: SEE Procedure details below  Indications: Bonnie Jules is an 83 y.o. female who is having surgery for Primary osteoarthritis of right knee [M17.11].  The patient states that this right the pain is persistent and disabling and impairs the patient´s ability to walk and do other activities that are important. AP and lateral x-rays show osteoarthritis of the right knee that is worse at the medial compartment with loss of joint surface cartilage, bone on bone and sclerosis. Right total knee replacement with indications, alternatives, potential risks including but not limited to blood loss, blood clot, nerve damage, infection, death and stroke, benefits, unforseen risks, the rehab involved and the fact that no guarantee can be made were all discussed with the patient in detail. The patient understands, accepts and wishes to proceed because of the persistent right knee pain and the fact that activity modification, gentle strengthening and ROM exercises, physical therapy, Tylenol, ibuprofen, cortisone injections and visco supplementation did not relieve the right knee pain and because this right knee pain impairs the patient´s ability to complete the normal activities of daily living . I agree.     The patient was seen in the preoperative area. The risks, benefits, complications, treatment options, non-operative alternatives, expected recovery and outcomes were all again discussed with the patient. The possibilities of reaction to medication, pulmonary  aspiration, injury to surrounding structures, bleeding, recurrent infection, the need for additional procedures, failure to diagnose a condition, and creating a complication requiring transfusion or operation were all again discussed with the patient. The patient concurred with the proposed plan, giving informed consent.  The site of surgery was properly noted/marked if necessary per policy. The patient has been actively warmed in preoperative area. Preoperative antibiotics have been ordered and given within 1 hours of incision. Venous thrombosis prophylaxis have been ordered including unilateral sequential compression device    Procedure Details: The patient was taken to the operating room and was placed under general anesthesia without complications. Tourniquet was placed around the right thigh. The right knee was examined under anesthesia and there was loss of extension and also instability with stressing. The right knee was then prepped using triple prep of chlorhexidine, alcohol and ChloraPrep and after allowing the prep to dry was draped in the usual sterile manner. Time-out was done. The right lower extremity was exsanguinated using an Esmarch bandage and the tourniquet was inflated. I first made an anterior midline incision. I carried the incision down through skin and subcutaneous tissue. Throughout the entire procedure hemostasis was achieved using the Bovie and throughout the entire procedure the knee was irrigated with saline. The knee was soaked in Irrisept solution for 2 minutes and then irrigated until clear. The knee joint was entered via a median parapatellar arthrotomy incision. Immediately I noted that there was severe osteoarthritis of the right knee with complete loss of joint surface cartilage, sclerosis and bone-on-bone at the lateral, medial compartment and also at the patellofemoral compartment. I first balanced the soft tissues medially and laterally and then protected the medial and  lateral collateral ligaments throughout the entire procedure with Ricky retractors. I next removed the remnants of the medial and lateral menisci and anterior cruciate ligament. The femoral canal was found using an intramedullary drill. I made the femoral osteotomy at 10 millimeters and 5 degrees valgus. Using the anterior down Sizer I measured the femur at size 3. After making alignment marks in the femur and after checking with an Dario wing to be sure that I would not notch the femur I made the anterior, posterior and chamfer osteotomies along the lines of the size 3 femoral cutting template. I made the notch osteotomy using the size 3 femoral notch cutting template. I then protected the posterior structures with a 2 prong PCL retractor. I made tibial osteotomy along the lines of the tibial cutting template and measured the tibia at size 4. I placed 10 and 12 millimeter fixed bearing stabilized tibial insert trials and there was full extension and excellent stability to anterior-posterior and valgus-varus stressing with the 12 millimeter stabilized insert trial. With the knee in extension I marked alignment marks at the anterior tibia. I then measured the patella at 35 millimeters and using the patellar Reamer reamed away the arthritic surface of the patella. I drilled 3 peg holes in the patella along the lines of the 35 millimeter guide. I snapped the trial oval dome 3 peg 35 millimeter patella trial onto the patella. I placed the knee through range of motion and range of motion was from 0-125 degrees. There was excellent stability to anterior-posterior and valgus-varus stressing at 0 degrees, 30 degrees, 45 degrees, 60 degrees, 90 degrees and 120 degrees. The patella stayed in the midline with range of motion. I then removed all trials. I again placed the size 4 tibial trial onto the tibial along the alignment marks and reamed and then impacted the tibial along the lines of the size 4 tibial trial. I removed  the tibial trial. I then soaked the knee in Irrisept solution for 3 minutes and then irrigated the femoral, tibial and patellar surfaces until clear using the pulse lavage. We then dried the bony surfaces of the femur, tibia and patella. Two packets of simplex HV cement were mixed with 1 gram of vancomycin. When the cement was of the proper consistency I placed the cement over the tibial surface and impacted the size 4  attune fixed bearing tibial tray onto the tibia. There was excellent seating and excess cement was removed using a knife and curette. I then placed cement over the bony surfaces of the femur and the posterior condyles of the femoral prosthesis and impacted the size 3 right attune posterior stabilized femoral prosthesis on to the femur. There was excellent seating and excess cement was removed using a knife and curette. I then irrigated the knee with a pulse lavage and then impacted the size 3-12 millimeter fixed bearing stabilized PFC Sigma tibial insert onto the tibial tray. There was excellent seating. The knee was then held in extension until the cement hardened completely. I placed cement over the bony surface of the patella and snapped the 35 millimeter oval dome 3 peg patella onto the patella. There was excellent seating and the patellar spur prosthesis was held with the patellar clamp until the cement hardened completely. The knee was held in extension until the cement hardened completely. The knee was then soaked in Irrisept solution for 2 minutes and then irrigated until clear. The tourniquet was deflated and the knee was irrigated with saline and hemostasis was achieved using the Bovie. I made a thorough examination for any loose fragments of bone or cement and no loose fragments of bone or cement were seen. I then placed the knee through a range of motion and range of motion was from 0-125 degrees. The patella stayed in the midline with range of motion. The right knee was stable to  anterior-posterior and valgus-varus stressing at 0 degrees 30 degrees 45 degree 60 degrees 90 degrees and 120 degrees. The medial and lateral collateral ligaments were palpated and noted to be intact. The fascia was closed using interrupted and running 1. Vicryl suture. Subcutaneous tissue was closed using running 2 0 Vicryl. Skin was closed using running subcuticular Monocryl. Sterile Mepilex dressing was applied and the patient returned to the PACU in stable condition.  Complications:  None; patient tolerated the procedure well.    Disposition: PACU - hemodynamically stable.  Condition: stable         Additional Details: None  Attending Attestation: I performed the procedure.    Rudolph Liriano  Phone Number: [phone]

## 2024-04-17 NOTE — PROGRESS NOTES
Physical Therapy    Physical Therapy Evaluation    Patient Name: Bonnie Jules  MRN: [id number]  Today's Date: 4/17/2024   Time Calculation  Start Time: 1501  Stop Time: 1523  Time Calculation (min): 22 min    Assessment/Plan   PT Assessment Results: Decreased strength, Decreased endurance, Decreased range of motion, Impaired balance, Decreased mobility, Pain. Today she required significantly increased assist during functional mobility compared to her baseline. She needed instruction for R total knee precautions. The pt would benefit from continued skilled PT services for maximizing independence and safety prior to & after discharge (MODERATE intensity).  Rehab Prognosis: Good  Strengths: Support of extended family/friends, Premorbid level of function, Access to adaptive/assistive products, Housing layout  Barriers to Participation: Comorbidities  End of Session Communication: Bedside nurse    End of Session Patient Position: Bed, 3 rail up, Alarm off, not on at start of session (call light within reach; dtr at bedside)     PT Plan  Treatment/Interventions: Bed mobility, Transfer training, Gait training, Balance training, Strengthening, Range of motion, Therapeutic exercise, Therapeutic activity, Home exercise program  PT Plan: Skilled PT  PT Frequency: BID  PT Discharge Recommendations: Moderate intensity level of continued care  PT Recommended Transfer Status: Assist x1 (with  FWW)  PT - OK to Discharge: Yes      Subjective   General Visit Information:  General  Reason for Referral: Impaired functional mobility. This 83 y.o. female was admitted for an elective surgery. She was now s/p R TKA by Dr. Guy on 4/17/24.  Past Medical History Relevant to Rehab: anxiety, CAD s/p CABG x3, chronic lymphocytic leukemia, HTN, DM, diabetic polyneuropathy, chronic kidney disease, osteoporosis, PVD, sleep apnea, bilateral carpal tunnel release, L ALFONZO,  Family/Caregiver Present: Yes (dtr who helped provide some home set up &  prior level of function info)  Prior to Session Communication: Bedside nurse  Patient Position Received: Bed, 3 rail up, Alarm off, not on at start of session  General Comment: RN cleared pt for participation. She was lethargic but agreed to session and participated as able.    Home Living:  Type of Home: House  Lives With:  (daughter, not the one present this session)  Home Adaptive Equipment:  (straight cane, FWW)  Home Layout: Able to live on main level with bedroom/bathroom  Home Access: Ramped entrance  Bathroom Shower/Tub: Walk-in shower  Bathroom Equipment: Grab bars in shower  Home Living Comments: Dtr able to provide assist, if needed. Pt was never at home alone.    Prior Level of Function:  Prior Function Per Pt/Caregiver Report  Receives Help From: Family  ADL Assistance: Independent  Homemaking Assistance: Needs assistance  Ambulatory Assistance:  (Denied any falls in the past year.)  Transfers:  (mod I)  Gait: Independent  Stairs:  (mod I with rail)  Prior Function Comments: (+) driving    Precautions:  Hearing/Visual Limitations: Hearing WFl; wears bifocals  LE Weight Bearing Status: Weight Bearing as Tolerated (RLE)  Medical Precautions: Fall precautions (2 L/min O2 via NC (none at baseline))  Post-Surgical Precautions: Right total knee precautions     Objective   Pain:  Pain Scale 0-10. (9/10 surgical pain at R knee. Pain limited participation. Ice pack applied to site after session.)  Pain Interventions: Repositioned, Cold applied    Cognition:  Overall Cognitive Status: Within Functional Limits (A&O x4)  Impulsive:  (Not this session)    General Assessments:  Activity Tolerance  Endurance: Decreased tolerance for upright activites due to c/o R knee pain, posterior neck discomfort (FLACC 3/10), and lethargy. Once she returned to supine (HOB elevated), a towel roll placed behind her neck; she reported it felt comfortable.    Sensation  Not tested; reported baseline paresthesia in bilat feet  attributed to neuropathy    AROM/Strength  LLE AROM: WFL  RLE AROM: limited assessment due to c/o R knee pain and observed lethargy. Knee impaired as anticipated post-op. Able to tolerate sitting with >/=80° hip flex. DF </=5°.    LLE strength: based on observation, >/=3+/5  RLE strength: quad >/=3-/5. Hip flex, DF >/=3/5    Perception  Motor Planning: Appears intact    Coordination  Movements are Fluid and Coordinated: Yes (with slower rate of movement at RLE)    Postural Control  Postural Control: Impaired (required up to modA x1 due to retro lean in sitting)  Posture Comment: fwd head, protracted shoulders    Static Sitting Balance  Static Sitting-Balance Support: Feet unsupported, Bilateral upper extremity supported  Static Sitting-Level of Assistance: up to modA x1 due to retro lean  Static Sitting-Comment/Number of Minutes: <1 minute    Functional Assessments:  Bed Mobility  Bed Mobility 1: Supine to sitting  Level of Assistance 1: Maximum assistance (Assist to RLE, hips/pelvis (via draw sheet) & trunk. Cued for sequencing.)  Bed Mobility Comments 1: HOB elevated, used R bed rail    Bed Mobility  2: Sitting to supine  Level of Assistance 2: Maximum assistance (to BLE and trunk)  Bed Mobility Comments 2: HOB elevated    Transfers  Transfer: No (not safe to attempt due to impaired static sitting balance)       Outcome Measures:  [facility] Basic Mobility  Turning from your back to your side while in a flat bed without using bedrails: A lot  Moving from lying on your back to sitting on the side of a flat bed without using bedrails: Total  Moving to and from bed to chair (including a wheelchair): Total  Standing up from a chair using your arms (e.g. wheelchair or bedside chair): A lot  To walk in hospital room: Total  Climbing 3-5 steps with railing: Total  Basic Mobility - Total Score: 8    Encounter Problems       Encounter Problems (Active)       Functional Mobility       Pt will transition supine<>sit at flat bed  with mod I.       Start:  04/17/24    Expected End:  05/14/24            Pt will transfer sit<>stand with FWW & mod I.       Start:  04/17/24    Expected End:  05/14/24            Pt will ambulate >/=100 ft with FWW & mod I.       Start:  04/17/24    Expected End:  05/14/24            Pt will adhere to R total knee precautions during all functional mobility with S and at most 1 verbal cue.       Start:  04/17/24    Expected End:  05/14/24                   Education Documentation  Handouts, taught by Marianela Rock PT at 4/17/2024  4:57 PM.  Learner: Family, Patient  Readiness: Acceptance  Method: Explanation, Handout  Response: Verbalizes Understanding, Needs Reinforcement    Precautions, taught by Marianela Rock PT at 4/17/2024  4:57 PM.  Learner: Family, Patient  Readiness: Acceptance  Method: Explanation, Handout  Response: Verbalizes Understanding, Needs Reinforcement    Mobility Training, taught by Marianela Rock PT at 4/17/2024  4:57 PM.  Learner: Family, Patient  Readiness: Acceptance  Method: Explanation, Handout  Response: Verbalizes Understanding, Needs Reinforcement    Handouts, taught by Marianela Rock PT at 4/17/2024  4:56 PM.  Learner: Family, Patient  Readiness: Acceptance  Method: Explanation, Handout  Response: Verbalizes Understanding, Needs Reinforcement    Precautions, taught by Marianela Rock PT at 4/17/2024  4:56 PM.  Learner: Family, Patient  Readiness: Acceptance  Method: Explanation, Handout  Response: Verbalizes Understanding, Needs Reinforcement    Mobility Training, taught by Marianela Rock PT at 4/17/2024  4:56 PM.  Learner: Family, Patient  Readiness: Acceptance  Method: Explanation, Handout  Response: Verbalizes Understanding, Needs Reinforcement    Education Comments  Handout of post-TKA therex issued. Pt instructed to call for nursing staff assist for all mobility. She verbalized understanding. Instructed her in POC and frequency of tx.

## 2024-04-18 ENCOUNTER — APPOINTMENT (OUTPATIENT)
Dept: RADIOLOGY | Facility: HOSPITAL | Age: 84
DRG: 469 | End: 2024-04-18
Payer: MEDICARE

## 2024-04-18 LAB
ANION GAP SERPL CALC-SCNC: 14 MMOL/L
APPEARANCE UR: CLEAR
BILIRUB UR STRIP.AUTO-MCNC: NEGATIVE MG/DL
BUN SERPL-MCNC: 35 MG/DL (ref 8–25)
CALCIUM SERPL-MCNC: 8.7 MG/DL (ref 8.5–10.4)
CHLORIDE SERPL-SCNC: 104 MMOL/L (ref 97–107)
CO2 SERPL-SCNC: 21 MMOL/L (ref 24–31)
COLOR UR: YELLOW
CREAT SERPL-MCNC: 1.1 MG/DL (ref 0.4–1.6)
EGFRCR SERPLBLD CKD-EPI 2021: 51 ML/MIN/1.73M*2
ERYTHROCYTE [DISTWIDTH] IN BLOOD BY AUTOMATED COUNT: 13.7 % (ref 11.5–14.5)
GLUCOSE BLD MANUAL STRIP-MCNC: 212 MG/DL (ref 74–99)
GLUCOSE BLD MANUAL STRIP-MCNC: 221 MG/DL (ref 74–99)
GLUCOSE BLD MANUAL STRIP-MCNC: 327 MG/DL (ref 74–99)
GLUCOSE BLD MANUAL STRIP-MCNC: 335 MG/DL (ref 74–99)
GLUCOSE SERPL-MCNC: 211 MG/DL (ref 65–99)
GLUCOSE UR STRIP.AUTO-MCNC: ABNORMAL MG/DL
HCT VFR BLD AUTO: 29.7 % (ref 36–46)
HGB BLD-MCNC: 9.3 G/DL (ref 12–16)
IRON SATN MFR SERPL: 16 % (ref 12–50)
IRON SERPL-MCNC: 34 UG/DL (ref 30–160)
KETONES UR STRIP.AUTO-MCNC: NEGATIVE MG/DL
LEUKOCYTE ESTERASE UR QL STRIP.AUTO: NEGATIVE
MCH RBC QN AUTO: 30.6 PG (ref 26–34)
MCHC RBC AUTO-ENTMCNC: 31.3 G/DL (ref 32–36)
MCV RBC AUTO: 98 FL (ref 80–100)
NITRITE UR QL STRIP.AUTO: NEGATIVE
NRBC BLD-RTO: 0 /100 WBCS (ref 0–0)
PH UR STRIP.AUTO: 5.5 [PH]
PLATELET # BLD AUTO: 237 X10*3/UL (ref 150–450)
POTASSIUM SERPL-SCNC: 5 MMOL/L (ref 3.4–5.1)
PROT UR STRIP.AUTO-MCNC: NEGATIVE MG/DL
RBC # BLD AUTO: 3.04 X10*6/UL (ref 4–5.2)
RBC # UR STRIP.AUTO: NEGATIVE /UL
SODIUM SERPL-SCNC: 139 MMOL/L (ref 133–145)
SP GR UR STRIP.AUTO: 1.02
TIBC SERPL-MCNC: 215 UG/DL (ref 228–428)
UIBC SERPL-MCNC: 181 UG/DL (ref 110–370)
UROBILINOGEN UR STRIP.AUTO-MCNC: NORMAL MG/DL
WBC # BLD AUTO: 15 X10*3/UL (ref 4.4–11.3)

## 2024-04-18 PROCEDURE — 2500000004 HC RX 250 GENERAL PHARMACY W/ HCPCS (ALT 636 FOR OP/ED): Performed by: NURSE PRACTITIONER

## 2024-04-18 PROCEDURE — 99024 POSTOP FOLLOW-UP VISIT: CPT | Performed by: PHYSICIAN ASSISTANT

## 2024-04-18 PROCEDURE — 2500000002 HC RX 250 W HCPCS SELF ADMINISTERED DRUGS (ALT 637 FOR MEDICARE OP, ALT 636 FOR OP/ED): Performed by: NURSE PRACTITIONER

## 2024-04-18 PROCEDURE — 99222 1ST HOSP IP/OBS MODERATE 55: CPT | Performed by: NURSE PRACTITIONER

## 2024-04-18 PROCEDURE — 97166 OT EVAL MOD COMPLEX 45 MIN: CPT | Mod: GO

## 2024-04-18 PROCEDURE — 2500000001 HC RX 250 WO HCPCS SELF ADMINISTERED DRUGS (ALT 637 FOR MEDICARE OP): Performed by: PHYSICIAN ASSISTANT

## 2024-04-18 PROCEDURE — 2500000004 HC RX 250 GENERAL PHARMACY W/ HCPCS (ALT 636 FOR OP/ED): Performed by: PHYSICIAN ASSISTANT

## 2024-04-18 PROCEDURE — 71045 X-RAY EXAM CHEST 1 VIEW: CPT | Performed by: RADIOLOGY

## 2024-04-18 PROCEDURE — 81003 URINALYSIS AUTO W/O SCOPE: CPT | Performed by: NURSE PRACTITIONER

## 2024-04-18 PROCEDURE — 36415 COLL VENOUS BLD VENIPUNCTURE: CPT | Performed by: PHYSICIAN ASSISTANT

## 2024-04-18 PROCEDURE — 85027 COMPLETE CBC AUTOMATED: CPT | Performed by: PHYSICIAN ASSISTANT

## 2024-04-18 PROCEDURE — 97530 THERAPEUTIC ACTIVITIES: CPT | Mod: GP | Performed by: PHYSICAL THERAPIST

## 2024-04-18 PROCEDURE — 82947 ASSAY GLUCOSE BLOOD QUANT: CPT | Mod: 91

## 2024-04-18 PROCEDURE — 71045 X-RAY EXAM CHEST 1 VIEW: CPT

## 2024-04-18 PROCEDURE — 83550 IRON BINDING TEST: CPT | Mod: 91 | Performed by: NURSE PRACTITIONER

## 2024-04-18 PROCEDURE — 97110 THERAPEUTIC EXERCISES: CPT | Mod: GP | Performed by: PHYSICAL THERAPIST

## 2024-04-18 PROCEDURE — 1210000001 HC SEMI-PRIVATE ROOM DAILY

## 2024-04-18 PROCEDURE — 7100000011 HC EXTENDED STAY RECOVERY HOURLY - NURSING UNIT

## 2024-04-18 PROCEDURE — 80048 BASIC METABOLIC PNL TOTAL CA: CPT | Performed by: PHYSICIAN ASSISTANT

## 2024-04-18 PROCEDURE — 2500000001 HC RX 250 WO HCPCS SELF ADMINISTERED DRUGS (ALT 637 FOR MEDICARE OP): Performed by: NURSE PRACTITIONER

## 2024-04-18 RX ORDER — INSULIN LISPRO 100 [IU]/ML
0-10 INJECTION, SOLUTION INTRAVENOUS; SUBCUTANEOUS
Status: DISCONTINUED | OUTPATIENT
Start: 2024-04-18 | End: 2024-04-19

## 2024-04-18 RX ORDER — INSULIN GLARGINE 100 [IU]/ML
5 INJECTION, SOLUTION SUBCUTANEOUS
Status: DISCONTINUED | OUTPATIENT
Start: 2024-04-19 | End: 2024-04-20

## 2024-04-18 RX ADMIN — LEVOTHYROXINE SODIUM 100 MCG: 0.1 TABLET ORAL at 05:41

## 2024-04-18 RX ADMIN — OXYCODONE AND ACETAMINOPHEN 1 TABLET: 5; 325 TABLET ORAL at 02:19

## 2024-04-18 RX ADMIN — OXYCODONE AND ACETAMINOPHEN 1 TABLET: 5; 325 TABLET ORAL at 15:52

## 2024-04-18 RX ADMIN — OXYCODONE AND ACETAMINOPHEN 1 TABLET: 5; 325 TABLET ORAL at 08:16

## 2024-04-18 RX ADMIN — METOPROLOL TARTRATE 12.5 MG: 25 TABLET, FILM COATED ORAL at 08:17

## 2024-04-18 RX ADMIN — AMLODIPINE BESYLATE 10 MG: 10 TABLET ORAL at 08:17

## 2024-04-18 RX ADMIN — PRAVASTATIN SODIUM 40 MG: 40 TABLET ORAL at 21:31

## 2024-04-18 RX ADMIN — GABAPENTIN 100 MG: 100 CAPSULE ORAL at 21:32

## 2024-04-18 RX ADMIN — IRON SUCROSE 200 MG: 20 INJECTION, SOLUTION INTRAVENOUS at 10:59

## 2024-04-18 RX ADMIN — ACETAMINOPHEN 650 MG: 325 TABLET ORAL at 21:31

## 2024-04-18 RX ADMIN — METOPROLOL TARTRATE 12.5 MG: 25 TABLET, FILM COATED ORAL at 21:32

## 2024-04-18 RX ADMIN — APIXABAN 2.5 MG: 2.5 TABLET, FILM COATED ORAL at 08:17

## 2024-04-18 RX ADMIN — SODIUM CHLORIDE 75 ML/HR: 900 INJECTION, SOLUTION INTRAVENOUS at 23:25

## 2024-04-18 RX ADMIN — ONDANSETRON 4 MG: 2 INJECTION INTRAMUSCULAR; INTRAVENOUS at 10:59

## 2024-04-18 RX ADMIN — HYDRALAZINE HYDROCHLORIDE 50 MG: 50 TABLET, FILM COATED ORAL at 08:17

## 2024-04-18 RX ADMIN — INSULIN LISPRO 8 UNITS: 100 INJECTION, SOLUTION INTRAVENOUS; SUBCUTANEOUS at 17:36

## 2024-04-18 RX ADMIN — HYDRALAZINE HYDROCHLORIDE 50 MG: 50 TABLET, FILM COATED ORAL at 21:32

## 2024-04-18 RX ADMIN — SODIUM CHLORIDE 100 ML/HR: 900 INJECTION, SOLUTION INTRAVENOUS at 11:01

## 2024-04-18 RX ADMIN — INSULIN LISPRO 8 UNITS: 100 INJECTION, SOLUTION INTRAVENOUS; SUBCUTANEOUS at 11:57

## 2024-04-18 RX ADMIN — APIXABAN 2.5 MG: 2.5 TABLET, FILM COATED ORAL at 21:32

## 2024-04-18 RX ADMIN — GABAPENTIN 100 MG: 100 CAPSULE ORAL at 08:17

## 2024-04-18 RX ADMIN — ACETAMINOPHEN 650 MG: 325 TABLET ORAL at 10:58

## 2024-04-18 SDOH — ECONOMIC STABILITY: FOOD INSECURITY: WITHIN THE PAST 12 MONTHS, YOU WORRIED THAT YOUR FOOD WOULD RUN OUT BEFORE YOU GOT MONEY TO BUY MORE.: OFTEN TRUE

## 2024-04-18 SDOH — ECONOMIC STABILITY: INCOME INSECURITY: HOW HARD IS IT FOR YOU TO PAY FOR THE VERY BASICS LIKE FOOD, HOUSING, MEDICAL CARE, AND HEATING?: NOT HARD AT ALL

## 2024-04-18 SDOH — SOCIAL STABILITY: SOCIAL NETWORK: ARE YOU MARRIED, WIDOWED, DIVORCED, SEPARATED, NEVER MARRIED, OR LIVING WITH A PARTNER?: WIDOWED

## 2024-04-18 SDOH — SOCIAL STABILITY: SOCIAL NETWORK
DO YOU BELONG TO ANY CLUBS OR ORGANIZATIONS SUCH AS CHURCH GROUPS UNIONS, FRATERNAL OR ATHLETIC GROUPS, OR SCHOOL GROUPS?: NO

## 2024-04-18 SDOH — HEALTH STABILITY: MENTAL HEALTH
HOW OFTEN DO YOU NEED TO HAVE SOMEONE HELP YOU WHEN YOU READ INSTRUCTIONS, PAMPHLETS, OR OTHER WRITTEN MATERIAL FROM YOUR DOCTOR OR PHARMACY?: RARELY

## 2024-04-18 SDOH — ECONOMIC STABILITY: HOUSING INSECURITY
IN THE LAST 12 MONTHS, WAS THERE A TIME WHEN YOU DID NOT HAVE A STEADY PLACE TO SLEEP OR SLEPT IN A SHELTER (INCLUDING NOW)?: NO

## 2024-04-18 SDOH — ECONOMIC STABILITY: INCOME INSECURITY: IN THE PAST 12 MONTHS, HAS THE ELECTRIC, GAS, OIL, OR WATER COMPANY THREATENED TO SHUT OFF SERVICE IN YOUR HOME?: NO

## 2024-04-18 SDOH — SOCIAL STABILITY: SOCIAL INSECURITY: WITHIN THE LAST YEAR, HAVE YOU BEEN AFRAID OF YOUR PARTNER OR EX-PARTNER?: NO

## 2024-04-18 SDOH — HEALTH STABILITY: MENTAL HEALTH: HOW OFTEN DO YOU HAVE A DRINK CONTAINING ALCOHOL?: NEVER

## 2024-04-18 SDOH — SOCIAL STABILITY: SOCIAL INSECURITY
WITHIN THE LAST YEAR, HAVE YOU BEEN KICKED, HIT, SLAPPED, OR OTHERWISE PHYSICALLY HURT BY YOUR PARTNER OR EX-PARTNER?: NO

## 2024-04-18 SDOH — SOCIAL STABILITY: SOCIAL NETWORK
IN A TYPICAL WEEK, HOW MANY TIMES DO YOU TALK ON THE PHONE WITH FAMILY, FRIENDS, OR NEIGHBORS?: MORE THAN THREE TIMES A WEEK

## 2024-04-18 SDOH — SOCIAL STABILITY: SOCIAL NETWORK: HOW OFTEN DO YOU GET TOGETHER WITH FRIENDS OR RELATIVES?: MORE THAN THREE TIMES A WEEK

## 2024-04-18 SDOH — ECONOMIC STABILITY: INCOME INSECURITY: IN THE LAST 12 MONTHS, WAS THERE A TIME WHEN YOU WERE NOT ABLE TO PAY THE MORTGAGE OR RENT ON TIME?: NO

## 2024-04-18 SDOH — SOCIAL STABILITY: SOCIAL NETWORK: HOW OFTEN DO YOU ATTEND CHURCH OR RELIGIOUS SERVICES?: 1 TO 4 TIMES PER YEAR

## 2024-04-18 SDOH — SOCIAL STABILITY: SOCIAL INSECURITY: WITHIN THE LAST YEAR, HAVE YOU BEEN HUMILIATED OR EMOTIONALLY ABUSED IN OTHER WAYS BY YOUR PARTNER OR EX-PARTNER?: NO

## 2024-04-18 SDOH — HEALTH STABILITY: MENTAL HEALTH
STRESS IS WHEN SOMEONE FEELS TENSE, NERVOUS, ANXIOUS, OR CAN'T SLEEP AT NIGHT BECAUSE THEIR MIND IS TROUBLED. HOW STRESSED ARE YOU?: ONLY A LITTLE

## 2024-04-18 SDOH — ECONOMIC STABILITY: TRANSPORTATION INSECURITY
IN THE PAST 12 MONTHS, HAS THE LACK OF TRANSPORTATION KEPT YOU FROM MEDICAL APPOINTMENTS OR FROM GETTING MEDICATIONS?: NO

## 2024-04-18 SDOH — HEALTH STABILITY: PHYSICAL HEALTH: ON AVERAGE, HOW MANY MINUTES DO YOU ENGAGE IN EXERCISE AT THIS LEVEL?: 0 MIN

## 2024-04-18 SDOH — ECONOMIC STABILITY: HOUSING INSECURITY: IN THE LAST 12 MONTHS, HOW MANY PLACES HAVE YOU LIVED?: 1

## 2024-04-18 SDOH — ECONOMIC STABILITY: FOOD INSECURITY: WITHIN THE PAST 12 MONTHS, THE FOOD YOU BOUGHT JUST DIDN'T LAST AND YOU DIDN'T HAVE MONEY TO GET MORE.: OFTEN TRUE

## 2024-04-18 SDOH — HEALTH STABILITY: PHYSICAL HEALTH: ON AVERAGE, HOW MANY DAYS PER WEEK DO YOU ENGAGE IN MODERATE TO STRENUOUS EXERCISE (LIKE A BRISK WALK)?: 0 DAYS

## 2024-04-18 SDOH — ECONOMIC STABILITY: TRANSPORTATION INSECURITY
IN THE PAST 12 MONTHS, HAS LACK OF TRANSPORTATION KEPT YOU FROM MEETINGS, WORK, OR FROM GETTING THINGS NEEDED FOR DAILY LIVING?: NO

## 2024-04-18 SDOH — SOCIAL STABILITY: SOCIAL NETWORK: HOW OFTEN DO YOU ATTENT MEETINGS OF THE CLUB OR ORGANIZATION YOU BELONG TO?: NEVER

## 2024-04-18 SDOH — HEALTH STABILITY: MENTAL HEALTH: HOW MANY STANDARD DRINKS CONTAINING ALCOHOL DO YOU HAVE ON A TYPICAL DAY?: PATIENT DOES NOT DRINK

## 2024-04-18 ASSESSMENT — PAIN SCALES - GENERAL
PAINLEVEL_OUTOF10: 0 - NO PAIN
PAINLEVEL_OUTOF10: 5 - MODERATE PAIN
PAINLEVEL_OUTOF10: 3
PAINLEVEL_OUTOF10: 6
PAINLEVEL_OUTOF10: 5 - MODERATE PAIN
PAINLEVEL_OUTOF10: 0 - NO PAIN
PAINLEVEL_OUTOF10: 6
PAINLEVEL_OUTOF10: 7
PAINLEVEL_OUTOF10: 3
PAINLEVEL_OUTOF10: 0 - NO PAIN
PAINLEVEL_OUTOF10: 5 - MODERATE PAIN

## 2024-04-18 ASSESSMENT — COGNITIVE AND FUNCTIONAL STATUS - GENERAL
DAILY ACTIVITIY SCORE: 16
MOVING FROM LYING ON BACK TO SITTING ON SIDE OF FLAT BED WITH BEDRAILS: A LOT
STANDING UP FROM CHAIR USING ARMS: A LOT
CLIMB 3 TO 5 STEPS WITH RAILING: TOTAL
TURNING FROM BACK TO SIDE WHILE IN FLAT BAD: A LOT
TURNING FROM BACK TO SIDE WHILE IN FLAT BAD: A LOT
DRESSING REGULAR LOWER BODY CLOTHING: A LOT
DRESSING REGULAR UPPER BODY CLOTHING: A LITTLE
DAILY ACTIVITIY SCORE: 17
MOBILITY SCORE: 10
DRESSING REGULAR UPPER BODY CLOTHING: A LITTLE
HELP NEEDED FOR BATHING: A LITTLE
WALKING IN HOSPITAL ROOM: A LOT
WALKING IN HOSPITAL ROOM: TOTAL
WALKING IN HOSPITAL ROOM: TOTAL
MOBILITY SCORE: 10
PERSONAL GROOMING: A LITTLE
CLIMB 3 TO 5 STEPS WITH RAILING: TOTAL
TOILETING: A LOT
TURNING FROM BACK TO SIDE WHILE IN FLAT BAD: A LOT
MOVING FROM LYING ON BACK TO SITTING ON SIDE OF FLAT BED WITH BEDRAILS: A LITTLE
DRESSING REGULAR LOWER BODY CLOTHING: A LOT
MOBILITY SCORE: 12
MOVING TO AND FROM BED TO CHAIR: A LOT
HELP NEEDED FOR BATHING: A LOT
MOVING FROM LYING ON BACK TO SITTING ON SIDE OF FLAT BED WITH BEDRAILS: A LOT
CLIMB 3 TO 5 STEPS WITH RAILING: TOTAL
MOVING TO AND FROM BED TO CHAIR: TOTAL
TOILETING: A LOT
PERSONAL GROOMING: A LITTLE
STANDING UP FROM CHAIR USING ARMS: A LITTLE
STANDING UP FROM CHAIR USING ARMS: A LITTLE
MOVING TO AND FROM BED TO CHAIR: TOTAL

## 2024-04-18 ASSESSMENT — ACTIVITIES OF DAILY LIVING (ADL)
BATHING_ASSISTANCE: MODERATE
LACK_OF_TRANSPORTATION: NO
ADL_ASSISTANCE: INDEPENDENT

## 2024-04-18 ASSESSMENT — PAIN - FUNCTIONAL ASSESSMENT
PAIN_FUNCTIONAL_ASSESSMENT: 0-10
PAIN_FUNCTIONAL_ASSESSMENT: 0-10
PAIN_FUNCTIONAL_ASSESSMENT: WONG-BAKER FACES
PAIN_FUNCTIONAL_ASSESSMENT: 0-10
PAIN_FUNCTIONAL_ASSESSMENT: 0-10
PAIN_FUNCTIONAL_ASSESSMENT: FLACC (FACE, LEGS, ACTIVITY, CRY, CONSOLABILITY)
PAIN_FUNCTIONAL_ASSESSMENT: 0-10
PAIN_FUNCTIONAL_ASSESSMENT: 0-10
PAIN_FUNCTIONAL_ASSESSMENT: FLACC (FACE, LEGS, ACTIVITY, CRY, CONSOLABILITY)
PAIN_FUNCTIONAL_ASSESSMENT: 0-10
PAIN_FUNCTIONAL_ASSESSMENT: FLACC (FACE, LEGS, ACTIVITY, CRY, CONSOLABILITY)
PAIN_FUNCTIONAL_ASSESSMENT: FLACC (FACE, LEGS, ACTIVITY, CRY, CONSOLABILITY)
PAIN_FUNCTIONAL_ASSESSMENT: WONG-BAKER FACES

## 2024-04-18 ASSESSMENT — PAIN SCALES - WONG BAKER
WONGBAKER_NUMERICALRESPONSE: NO HURT
WONGBAKER_NUMERICALRESPONSE: NO HURT

## 2024-04-18 ASSESSMENT — ENCOUNTER SYMPTOMS
COUGH: 0
JOINT SWELLING: 1
LIGHT-HEADEDNESS: 0
PALPITATIONS: 0
VOMITING: 0
HEADACHES: 0
BRUISES/BLEEDS EASILY: 0
NUMBNESS: 1
DIZZINESS: 0
ACTIVITY CHANGE: 1
AGITATION: 0
SHORTNESS OF BREATH: 0
ABDOMINAL DISTENTION: 0
DIAPHORESIS: 0
COLOR CHANGE: 0
FATIGUE: 0
DIARRHEA: 1
CONFUSION: 0
CHILLS: 0
NAUSEA: 1
WHEEZING: 0
FEVER: 0
HEMATURIA: 0
ARTHRALGIAS: 1
WOUND: 0
BLOOD IN STOOL: 0
WEAKNESS: 0
APPETITE CHANGE: 0
ABDOMINAL PAIN: 0
DYSURIA: 0

## 2024-04-18 ASSESSMENT — PAIN DESCRIPTION - LOCATION
LOCATION: KNEE
LOCATION: KNEE

## 2024-04-18 ASSESSMENT — PAIN DESCRIPTION - ORIENTATION
ORIENTATION: RIGHT
ORIENTATION: RIGHT

## 2024-04-18 NOTE — PROGRESS NOTES
"Bonnie Jules is a 83 y.o. female on day 1 of admission presenting with Osteoarthritis.    Subjective   Patient status post right total knee replacement day #1.  She is resting in bed.  She does endorse some right knee pain.  She was able to work with physical therapy.  She denies chest pain shortness of breath.       Objective     Physical Exam  Right knee: Ace wrap is clean and dry. There is pain with gentle passive ROM in bed. Nontender in the right calf.  Neurovascular is intact    Last Recorded Vitals  Blood pressure 131/52, pulse 69, temperature 37 °C (98.6 °F), temperature source Oral, resp. rate 16, height 1.49 m (4' 10.66\"), weight 57 kg (125 lb 10.6 oz), SpO2 93%.  Intake/Output last 3 Shifts:  I/O last 3 completed shifts:  In: 2669.2 (46.8 mL/kg) [P.O.:600; I.V.:1869.2 (32.8 mL/kg); IV Piggyback:200]  Out: 215 (3.8 mL/kg) [Urine:200 (0.1 mL/kg/hr); Blood:15]  Weight: 57 kg     Relevant Results  XR knee right 1-2 views    Result Date: 4/17/2024  Interpreted By:  Avni Correa, STUDY: XR KNEE RIGHT 1-2 VIEWS; ;  4/17/2024 12:13 pm   INDICATION: Signs/Symptoms:Post op knee.   COMPARISON: None.   ACCESSION NUMBER(S): [id number]   ORDERING CLINICIAN: KLAUS HANCOCK   FINDINGS: Right knee, two views   Interval right total knee arthroplasty in place. No periprosthetic fracture lucency seen. There is no dislocation. Postsurgical change the soft tissue. There is a moderate-sized effusion       No immediate complication after right total knee arthroplasty     MACRO: None   Signed by: Avni Correa 4/17/2024 12:33 PM Dictation workstation:   [id number]      Scheduled medications  amLODIPine, 10 mg, oral, Daily  apixaban, 2.5 mg, oral, BID  gabapentin, 100 mg, oral, BID  hydrALAZINE, 50 mg, oral, BID  [Held by provider] hydroCHLOROthiazide, 25 mg, oral, Daily  [Held by provider] insulin glargine, 11 Units, subcutaneous, Daily before breakfast  insulin lispro, 0-10 Units, subcutaneous, q4h " [facility]  levothyroxine, 100 mcg, oral, Daily  [Held by provider] lisinopril, 40 mg, oral, Daily  metoprolol tartrate, 12.5 mg, oral, BID  polyethylene glycol, 17 g, oral, Daily  pravastatin, 40 mg, oral, Nightly      Continuous medications  oxygen, 2 L/min  sodium chloride 0.9%, 100 mL/hr, Last Rate: 100 mL/hr (04/17/24 6213)      PRN medications  PRN medications: acetaminophen, dextrose, dextrose, dextrose, dextrose, glucagon, glucagon, glucagon, glucagon, HYDROmorphone, HYDROmorphone, naloxone, naloxone, naloxone, nitroglycerin, ondansetron **OR** ondansetron, oxyCODONE-acetaminophen, oxyCODONE-acetaminophen  Results for orders placed or performed during the hospital encounter of 04/17/24 (from the past 24 hour(s))   POCT GLUCOSE   Result Value Ref Range    POCT Glucose 271 (H) 74 - 99 mg/dL   POCT GLUCOSE   Result Value Ref Range    POCT Glucose 321 (H) 74 - 99 mg/dL   POCT GLUCOSE   Result Value Ref Range    POCT Glucose 324 (H) 74 - 99 mg/dL   POCT GLUCOSE   Result Value Ref Range    POCT Glucose 300 (H) 74 - 99 mg/dL   CBC   Result Value Ref Range    WBC 15.0 (H) 4.4 - 11.3 x10*3/uL    nRBC 0.0 0.0 - 0.0 /100 WBCs    RBC 3.04 (L) 4.00 - 5.20 x10*6/uL    Hemoglobin 9.3 (L) 12.0 - 16.0 g/dL    Hematocrit 29.7 (L) 36.0 - 46.0 %    MCV 98 80 - 100 fL    MCH 30.6 26.0 - 34.0 pg    MCHC 31.3 (L) 32.0 - 36.0 g/dL    RDW 13.7 11.5 - 14.5 %    Platelets 237 150 - 450 x10*3/uL   Basic metabolic panel   Result Value Ref Range    Glucose 211 (H) 65 - 99 mg/dL    Sodium 139 133 - 145 mmol/L    Potassium 5.0 3.4 - 5.1 mmol/L    Chloride 104 97 - 107 mmol/L    Bicarbonate 21 (L) 24 - 31 mmol/L    Urea Nitrogen 35 (H) 8 - 25 mg/dL    Creatinine 1.10 0.40 - 1.60 mg/dL    eGFR 51 (L) >60 mL/min/1.73m*2    Calcium 8.7 8.5 - 10.4 mg/dL    Anion Gap 14 <=19 mmol/L   Iron and TIBC   Result Value Ref Range    Iron 34 30 - 160 ug/dL    UIBC 181 110 - 370 ug/dL    TIBC 215 (L) 228 - 428 ug/dL    % Saturation 16 12 - 50 %   POCT GLUCOSE    Result Value Ref Range    POCT Glucose 221 (H) 74 - 99 mg/dL                Assessment/Plan     RTK  -- PT/OT notes appreciated  -- Pain management  -- IS  -- Walker WBAT  -- Incision care  -- antibiotic prophylaxis    Anemia  -- blood conservation on consult  -- monitor H&H    HTN  -- hospital medicine on consult  -- metoprolol hold parameters     DVT prophylaxis  -- eliquis for DVT prophylaxis   -- SCDs    DM  -- sliding scale  -- monitor blood glucose    We will continue to medically optimize patient. Will plan on discharge home tomorrow with homehealthcare when patient is medically optimized.        I spent 30 minutes in the professional and overall care of this patient.      Mayela Pickard PA-C

## 2024-04-18 NOTE — NURSING NOTE
End of shift order review completed. Call light within reach. Pt continues on IV fluids per order. Dsg dry and intact to rt knee. Bed alarm in place for pt safety.

## 2024-04-18 NOTE — NURSING NOTE
Pt A&O x3 currently resting in bed. No complaints or concerns. Call light within reach.  Dsg dry and intact to rt knee.

## 2024-04-18 NOTE — PROGRESS NOTES
Physical Therapy    Physical Therapy Treatment    Patient Name: Bonnie Jules  MRN: [id number]  Today's Date: 4/18/2024  Time Calculation  Start Time: 1308  Stop Time: 1348  Time Calculation (min): 40 min       Assessment/Plan   PT Assessment  End of Session Communication: Bedside nurse  End of Session Patient Position: Bed, 3 rail up, Alarm off, not on at start of session (call light and needs within reach)     PT Plan  Treatment/Interventions: Bed mobility, Transfer training, Gait training, Balance training, Strengthening, Range of motion, Therapeutic exercise, Therapeutic activity, Home exercise program  PT Plan: Skilled PT  PT Frequency: BID  PT Discharge Recommendations: Moderate intensity level of continued care  PT Recommended Transfer Status: Assist x1 (with Jr FWW)  PT - OK to Discharge: Yes      General Visit Information:   PT  Visit  PT Received On: 04/18/24  General  Co-Treatment: OT (partial cotx for maximal safety with standing activties)  Prior to Session Communication: Bedside nurse  Patient Position Received: Bed, 3 rail up, Alarm off, not on at start of session  General Comment: Pt agreed to session and participated fully.    Subjective   Precautions:  Hearing/Visual Limitations: glasses donned  LE Weight Bearing Status: Weight Bearing as Tolerated (RLE)  Medical Precautions: Fall precautions (1.5 L/min O2 via NC)  Post-Surgical Precautions: Right total knee precautions    Vital Signs:  Heart Rate: 67  SpO2: 95 %  Patient Position: Sitting at EOB after amb; 1.5 L/min O2 via NC    Objective   Pain:  Pain Assessment: 0-10  Pain Score: 5 - Moderate pain  Pain Type: Surgical pain  Pain Location: Knee  Pain Orientation: Right  Pain Interventions: Ambulation/increased activity, Repositioned     Activity Tolerance:  Decreased tolerance for standing activities due to R knee pain. Pt reported nausea once seated after ambulation. Near end of session, in supine, she reported the nausea  resolved.    Treatments:  Therapeutic Exercise in supine. Cued as needed for proper form. She used HEP hand out (issued in the AM).  -bilat: AP, QS, GS x10 each  -RLE: hip/knee flex x10, SAQ x5 AAROM    Therapeutic Activity  -Pt unable to state any knee precautions prior to education.     Bed Mobility  Bed Mobility 1: Supine to sitting  Level of Assistance 1: Minimum assistance (assist to RLE. Increased time/effort but no assist for trunk elevation.)  Bed Mobility Comments 1: HOB elevated & used L bed rail.    Bed Mobility  2: Sitting to supine  Level of Assistance 2: Minimum assistance (assist to elevate RLE.)  Bed Mobility Comments 2: Bed flat/no rail; toward L side    Transfers  Technique 1: Sit to stand  Transfer Device 1: Walker (Jr FWW and EOB)  Transfer Level of Assistance 1: Moderate assistance (assist for lifting torque. Cued for walker safety & RLE start position.)  Trials/Comments 1: x1 trial    Technique 2: Stand to sit  Transfer Device 2: Walker  Transfer Level of Assistance 2:  (David x2. Assist for increased eccentric control. Cued for RLE start position & walker safety/BUE placement.)  Trials/Comments 2: x1 trial    Ambulation/Gait Training  Surface: Level tile  Device: Rolling walker (Jr FWW)  Assistance:  2 person assist: modA x1 & David x1. Assist for steadying at trunk and walker management. Cued for sequencing with walker, increased cervical ext, increased bilat elbow ext with R single limb stance.  Comments/Distance: 2 lateral steps toward L side at EOB. Slow pacing. Able to clear bilat feet. Steady gait. Distance was limtied by R knee pain.      Outcome Measures:  [facility] Basic Mobility  Turning from your back to your side while in a flat bed without using bedrails: A lot  Moving from lying on your back to sitting on the side of a flat bed without using bedrails: A lot  Moving to and from bed to chair (including a wheelchair): Total  Standing up from a chair using your arms (e.g. wheelchair or  bedside chair): A little  To walk in hospital room: Total  Climbing 3-5 steps with railing: Total  Basic Mobility - Total Score: 10    Education Documentation  Handouts, taught by Marianela Rock PT at 4/18/2024  3:44 PM.  Learner: Patient  Readiness: Acceptance  Method: Explanation, Demonstration, Handout  Response: Verbalizes Understanding, Demonstrated Understanding, Needs Reinforcement    Precautions, taught by Marianela Rock PT at 4/18/2024  3:44 PM.  Learner: Patient  Readiness: Acceptance  Method: Explanation, Demonstration, Handout  Response: Verbalizes Understanding, Demonstrated Understanding, Needs Reinforcement    Home Exercise Program, taught by Marianela Rock PT at 4/18/2024  3:44 PM.  Learner: Patient  Readiness: Acceptance  Method: Explanation, Demonstration, Handout  Response: Verbalizes Understanding, Demonstrated Understanding, Needs Reinforcement    Mobility Training, taught by Marianela Rock PT at 4/18/2024  3:44 PM.  Learner: Patient  Readiness: Acceptance  Method: Explanation, Demonstration, Handout  Response: Verbalizes Understanding, Demonstrated Understanding, Needs Reinforcement    Education Comments  No comments found.       Encounter Problems       Encounter Problems (Active)       Functional Mobility       Pt will transition supine<>sit at flat bed with mod I. (Progressing)       Start:  04/17/24    Expected End:  05/14/24            Pt will transfer sit<>stand with FWW & mod I. (Progressing)       Start:  04/17/24    Expected End:  05/14/24            Pt will ambulate >/=100 ft with FWW & mod I. (Progressing)       Start:  04/17/24    Expected End:  05/14/24            Pt will adhere to R total knee precautions during all functional mobility with S and at most 1 verbal cue. (Progressing)       Start:  04/17/24    Expected End:  05/14/24

## 2024-04-18 NOTE — PROGRESS NOTES
"Bonnie Jules is a 83 y.o. female on day 1 of admission presenting with Osteoarthritis.    Patient resides with her daughter in a single family home; patient has a ramp she uses to access the home. Patient reports feeling safe and being independent at home. Patient does not require oxygen and uses a walker to assist with ambulation at home. Patient is active with her primary care physician Dr. Curtis and uses [facility] pharmacy for her medication needs. Patient denies having a MH history or substance abuse issues. Patient does not have a LW/HCPOA.     TCC discussed with patient the recommendation for \"Moderate Intensity Rehabilitation\" Patient declines going to SNF however will consent to home care. Patient would like [facility] to provide services.   Message sen to Dr. Liriano requesting internal referral for services. Per Dr. Liriano they will put in referral for internal services tomorrow when they set patient up for discharge.     Referral sent to [facility], waiting for response     Courtney Robertson RN      "

## 2024-04-18 NOTE — PROGRESS NOTES
Bonnie Jules is a 83 y.o. female on day 1 of admission presenting with Osteoarthritis.      Subjective   Patient seen and examined. She is much more awake today. States that she has been up with physical therapy but experiencing knee pain. States that she is nauseous from the pain and was unable to eat her breakfast. Patient denies shortness of breath, trouble breathing, chest pains, fever, chills, or vomiting. No dysuria, no abdominal pain. Denies diarrhea or constipation. No blurred vision, light headed, or dizziness.          Objective     Last Recorded Vitals  /50 (BP Location: Left arm, Patient Position: Sitting)   Pulse 60   Temp 36.9 °C (98.4 °F) (Oral)   Resp 16   Wt 57 kg (125 lb 10.6 oz)   SpO2 92%   Intake/Output last 3 Shifts:    Intake/Output Summary (Last 24 hours) at 4/18/2024 1229  Last data filed at 4/18/2024 0900  Gross per 24 hour   Intake 1300 ml   Output 550 ml   Net 750 ml       Admission Weight  Weight: 57 kg (125 lb 10.6 oz) (04/17/24 0616)    Daily Weight  04/17/24 : 57 kg (125 lb 10.6 oz)    Image Results  XR chest 1 view  Narrative: Interpreted By:  Milo Leal,   STUDY:  XR CHEST 1 VIEW; 4/18/2024 10:05 am      INDICATION:  Signs/Symptoms:leukocytosis post op      COMPARISON:  None      ACCESSION NUMBER(S):  [id number]      ORDERING CLINICIAN:  THEODORE NAGY      FINDINGS:  The study is limited due to rotation and poor inspiratory effort,  with resultant crowding of the pulmonary vasculature. Median  sternotomy wires and surgical clips are again present, consistent  with previous coronary artery bypass graft. Small, less than 2 cm  metallic wire is also again seen above the proximal aspect of the  left clavicle. The cardiac silhouette is within normal limits for the  technique. Bilateral hilar prominence is noted, left more than right,  most likely due to pulmonary vascular congestion. The minimal  interstitial infiltrates in the perihilar regions, however  no  confluence infiltrates or significant pleural effusions are  identified. There is no pneumothorax.  Hiatal hernia is again noted.  Degenerative changes involve the spine and shoulders. Right rib  deformities are also again seen.      Impression: Limited study. Bilateral hilar prominence, left more than right is  most likely due to pulmonary vascular congestion; correlate  clinically and if there is suspicion for possible lymphadenopathy  further evaluated with contrast-enhanced CT scan. Additional chronic  findings as above.      Signed by: Milo Owensjeimy 4/18/2024 10:36 AM  Dictation workstation:   [id number]      Physical Exam  Vitals and nursing note reviewed.   Constitutional:       Appearance: Normal appearance.   HENT:      Head: Normocephalic and atraumatic.      Mouth/Throat:      Mouth: Mucous membranes are moist.   Eyes:      Extraocular Movements: Extraocular movements intact.      Pupils: Pupils are equal, round, and reactive to light.   Cardiovascular:      Rate and Rhythm: Normal rate and regular rhythm.      Pulses: Normal pulses.      Heart sounds: Murmur heard.   Pulmonary:      Effort: Pulmonary effort is normal.      Breath sounds: Normal breath sounds.   Abdominal:      General: Abdomen is flat. Bowel sounds are normal.      Palpations: Abdomen is soft.   Musculoskeletal:      Comments: Right knee replacement, dressing dry and intact.    Skin:     General: Skin is warm and dry.      Capillary Refill: Capillary refill takes less than 2 seconds.   Neurological:      General: No focal deficit present.      Mental Status: She is alert and oriented to person, place, and time.   Psychiatric:         Mood and Affect: Mood normal.         Behavior: Behavior normal.         Relevant Results  Lab Results   Component Value Date    GLUCOSE 211 (H) 04/18/2024    CALCIUM 8.7 04/18/2024     04/18/2024    K 5.0 04/18/2024    CO2 21 (L) 04/18/2024     04/18/2024    BUN 35 (H) 04/18/2024     CREATININE 1.10 04/18/2024      Lab Results   Component Value Date    WBC 15.0 (H) 04/18/2024    HGB 9.3 (L) 04/18/2024    HCT 29.7 (L) 04/18/2024    MCV 98 04/18/2024     04/18/2024         Assessment/Plan   Diabetes Mellitus, type 1  -Monitor blood glucose  -Monitor glucose AC/HS with SSI coverage   -Hypoglycemia protocol   -HGbA1C 6.7 on 04/03/24    Leukocytosis  -WBC now 15.0  -Chest xray showed no acute changes   -Pending urinalysis  -Completed vancomycin for surgical prophylaxis  -Monitor WBC      Right knee osteoarthritis  S/P total right knee replacement 04/17/2024 by Dr. Liriano  -PT/OT evaluate  -Pain management, bowel regimen  -Incentive spirometer  -Management per orthopedic surgery     Anemia  Likely secondary to recent surgery  -Monitor H&H, now 9.3  -Blood Conservation following  -IV Venofer      Hypertension  -Continue metoprolol with hold parameters and hydralazine, amlodipine  -Hold hydrochlorothiazide for now  -Monitor blood pressure     CAD  -Continue home medications   -Follows with Dr. Araujo outpatient  -Stable     Chronic kidney disease  - Continue IV fluids  - Monitor BMP  - Renally dose meds  - Avoid nephrotoxic medications     Hyperlipidemia  -Continue home medications      Hypothyroidism  -Continue Synthroid      CLL  -In remission  -Monitor WBC      DVT prophylaxis  -Eliquis  -SCD's    Disposition: Monitor pain, blood pressure and blood sugars, bowel regimen, PT/OT, IV iron, CBC in AM. Discharger per ortho.     Cally Mendez APRN-CNP

## 2024-04-18 NOTE — PROGRESS NOTES
Occupational Therapy                 Therapy Communication Note    Patient Name: Bonnie Jules  MRN: [id number]  Today's Date: 4/18/2024     Discipline: Occupational Therapy    Missed Visit Reason: Missed Visit Reason: Patient refused (OT shari received and chart reviewed. Pt requesting therapy return at a later time secondary to pain and fatigue, reporting she recently worked with PT)    Missed Time: Attempt

## 2024-04-18 NOTE — CONSULTS
Consults-blood management    Reason For Consult  Post op anemia    History Of Present Illness  Bonnie Jules is a 83 y.o. female presenting with OA right knee. She is 1 day s/p right TKA. Pt reports severe pain over past year, pain progressivly increasing, she had associated knee swelling and instability without falls. Pain becoming more frequent waking her at night, steroid injections no longer relieving pain. Opt for TKA. PHM CLL, CKD 3, PVD, DM, pt follows with hematology Miquel Hull, every 4months, reports CLL stable, no current treatments at this time. Preop mild chronic normocytic anemia, H/H 11.6/35.9, postop H/H  9.3/29.7. post op FE 34, %Tsat 16. Creat 1.1, baseline Creat 0.9-1.0.    .     Past Medical History  She has a past medical history of Anxiety, Benign hypertension, Chronic ischemic colitis (Multi), Chronic kidney disease, stage III (moderate) (Multi), CLL (chronic lymphocytic leukemia) (Multi), Coronary artery disease, CTS (carpal tunnel syndrome), Diabetes mellitus (Multi), Essential (primary) hypertension, GI bleed, Hyperlipidemia, unspecified, Hypertension, Hypothyroidism, Hypothyroidism, unspecified type, Mixed hyperlipidemia, Osteoarthritis, Osteoporosis, Peripheral vascular disease (CMS-[facility]), Seizure disorder (Multi), and Type 1 diabetes mellitus with hyperglycemia (Multi).    Surgical History  She has a past surgical history that includes Carpal tunnel release (Bilateral); Total hip arthroplasty (Left, 2012); Coronary artery bypass graft (2005); Cholecystectomy; Cataract extraction w/ intraocular lens  implant, bilateral; Hysterectomy; Colonoscopy; Other surgical history; and Other surgical history (Right, 01/2016).     Social History  She reports that she has never smoked. She has been exposed to tobacco smoke. She has never used smokeless tobacco. She reports that she does not currently use alcohol. She reports that she does not currently use drugs.    Family History  Family History    Problem Relation Name Age of Onset    Diabetes Mother      Diabetes Daughter      Other (RA) Daughter          Allergies  Quinolones, Chlorpheniramine-dextromethorp, Amoxicillin, Amoxicillin-pot clavulanate, Ciprofloxacin, and Levofloxacin    Review of Systems   Constitutional:  Positive for activity change. Negative for appetite change, chills, diaphoresis, fatigue and fever.   HENT:  Negative for congestion and nosebleeds.    Eyes:  Negative for visual disturbance.   Respiratory:  Negative for cough, shortness of breath and wheezing.    Cardiovascular:  Negative for chest pain, palpitations and leg swelling.   Gastrointestinal:  Positive for diarrhea and nausea. Negative for abdominal distention, abdominal pain, blood in stool and vomiting.        Hx colitis, alternates diarrhea and constipation, sulfasalazine daily, miralax prn, follows with Dr. Jackman,    Endocrine: Negative for cold intolerance and heat intolerance.   Genitourinary:  Negative for dysuria and hematuria.   Musculoskeletal:  Positive for arthralgias and joint swelling.   Skin:  Negative for color change, pallor, rash and wound.   Neurological:  Positive for numbness (diabetic neuropathy). Negative for dizziness, weakness, light-headedness and headaches.   Hematological:  Does not bruise/bleed easily.   Psychiatric/Behavioral:  Negative for agitation and confusion.         Physical Exam  Constitutional:       Appearance: Normal appearance.   HENT:      Head: Normocephalic and atraumatic.      Right Ear: External ear normal.      Left Ear: External ear normal.      Nose: Nose normal.      Mouth/Throat:      Pharynx: Oropharynx is clear.   Eyes:      Conjunctiva/sclera: Conjunctivae normal.      Pupils: Pupils are equal, round, and reactive to light.   Cardiovascular:      Rate and Rhythm: Normal rate and regular rhythm.      Heart sounds: Murmur heard.   Pulmonary:      Effort: Pulmonary effort is normal. No respiratory distress.      Breath  "sounds: Normal breath sounds. No wheezing, rhonchi or rales.   Chest:      Chest wall: No tenderness.   Abdominal:      General: Bowel sounds are normal. There is no distension.      Palpations: Abdomen is soft.      Tenderness: There is no abdominal tenderness.   Musculoskeletal:         General: Tenderness present.      Cervical back: Normal range of motion and neck supple.      Right lower leg: Edema present.      Left lower leg: No edema.      Comments: Right knee tenderness, right pedal edema   Skin:     General: Skin is warm and dry.      Capillary Refill: Capillary refill takes 2 to 3 seconds.      Coloration: Skin is not pale.      Findings: No bruising, erythema, lesion or rash.      Comments: Ace wrap right leg dry/intact   Neurological:      General: No focal deficit present.      Mental Status: She is alert and oriented to person, place, and time. Mental status is at baseline.   Psychiatric:         Mood and Affect: Mood normal.         Behavior: Behavior normal.          Last Recorded Vitals  Blood pressure 131/52, pulse 69, temperature 37 °C (98.6 °F), temperature source Oral, resp. rate 16, height 1.49 m (4' 10.66\"), weight 57 kg (125 lb 10.6 oz), SpO2 93%.    Relevant Results  Results for orders placed or performed during the hospital encounter of 04/17/24 (from the past 24 hour(s))   POCT GLUCOSE   Result Value Ref Range    POCT Glucose 321 (H) 74 - 99 mg/dL   POCT GLUCOSE   Result Value Ref Range    POCT Glucose 324 (H) 74 - 99 mg/dL   POCT GLUCOSE   Result Value Ref Range    POCT Glucose 300 (H) 74 - 99 mg/dL   CBC   Result Value Ref Range    WBC 15.0 (H) 4.4 - 11.3 x10*3/uL    nRBC 0.0 0.0 - 0.0 /100 WBCs    RBC 3.04 (L) 4.00 - 5.20 x10*6/uL    Hemoglobin 9.3 (L) 12.0 - 16.0 g/dL    Hematocrit 29.7 (L) 36.0 - 46.0 %    MCV 98 80 - 100 fL    MCH 30.6 26.0 - 34.0 pg    MCHC 31.3 (L) 32.0 - 36.0 g/dL    RDW 13.7 11.5 - 14.5 %    Platelets 237 150 - 450 x10*3/uL   Basic metabolic panel   Result Value " Ref Range    Glucose 211 (H) 65 - 99 mg/dL    Sodium 139 133 - 145 mmol/L    Potassium 5.0 3.4 - 5.1 mmol/L    Chloride 104 97 - 107 mmol/L    Bicarbonate 21 (L) 24 - 31 mmol/L    Urea Nitrogen 35 (H) 8 - 25 mg/dL    Creatinine 1.10 0.40 - 1.60 mg/dL    eGFR 51 (L) >60 mL/min/1.73m*2    Calcium 8.7 8.5 - 10.4 mg/dL    Anion Gap 14 <=19 mmol/L   Iron and TIBC   Result Value Ref Range    Iron 34 30 - 160 ug/dL    UIBC 181 110 - 370 ug/dL    TIBC 215 (L) 228 - 428 ug/dL    % Saturation 16 12 - 50 %   POCT GLUCOSE   Result Value Ref Range    POCT Glucose 221 (H) 74 - 99 mg/dL     Current Facility-Administered Medications:     acetaminophen (Tylenol) tablet 650 mg, 650 mg, oral, q4h PRN, Mayela iPckard PA-C, 650 mg at 04/18/24 1058    amLODIPine (Norvasc) tablet 10 mg, 10 mg, oral, Daily, Luna Smith APRN-CNP, 10 mg at 04/18/24 0817    apixaban (Eliquis) tablet 2.5 mg, 2.5 mg, oral, BID, Mayela Pickard PA-C, 2.5 mg at 04/18/24 0817    dextrose 50 % injection 12.5 g, 12.5 g, intravenous, q15 min PRN, Cally Mendez APRN-CNP    dextrose 50 % injection 12.5 g, 12.5 g, intravenous, q15 min PRN, Cally Mendez APRN-CNP    dextrose 50 % injection 25 g, 25 g, intravenous, q15 min PRN, Cally Mendez APRN-CNP    dextrose 50 % injection 25 g, 25 g, intravenous, q15 min PRN, CARMEN Mcleod-CNP    gabapentin (Neurontin) capsule 100 mg, 100 mg, oral, BID, Mayela Pickard PA-C, 100 mg at 04/18/24 0817    glucagon (Glucagen) injection 1 mg, 1 mg, intramuscular, q15 min PRN, CARMEN Mcleod-CNP    glucagon (Glucagen) injection 1 mg, 1 mg, intramuscular, q15 min PRN, Cally Mendez APRN-CNP    glucagon (Glucagen) injection 1 mg, 1 mg, intramuscular, q15 min PRN, Cally Mendez APRN-CNP    glucagon (Glucagen) injection 1 mg, 1 mg, intramuscular, q15 min PRN, Cally Mendez APRN-CNP    hydrALAZINE (Apresoline) tablet 50 mg, 50 mg, oral, BID, Mayela Pickard PA-C, 50 mg at 04/18/24  0817    [Held by provider] hydroCHLOROthiazide (HYDRODiuril) tablet 25 mg, 25 mg, oral, Daily, Mayela Pickard PA-C    HYDROmorphone (Dilaudid) injection 0.5 mg, 0.5 mg, intravenous, q4h PRN, Mayela Pickard PA-C    HYDROmorphone (Dilaudid) injection 0.5 mg, 0.5 mg, intravenous, q2h PRN, Mayela Pickard PA-C    [Held by provider] insulin glargine (Lantus) injection 11 Units, 11 Units, subcutaneous, Daily before breakfast, CARMEN Mcleod-CNP    insulin lispro (HumaLOG) injection 0-10 Units, 0-10 Units, subcutaneous, q4h MARI, CARMEN Barillas-CNP, 6 Units at 04/17/24 2210    iron sucrose (Venofer) injection 200 mg, 200 mg, intravenous, Daily, CARMEN Boyd-CNP, 200 mg at 04/18/24 1059    levothyroxine (Synthroid, Levoxyl) tablet 100 mcg, 100 mcg, oral, Daily, Mayela Pickard PA-C, 100 mcg at 04/18/24 0541    [Held by provider] lisinopril tablet 40 mg, 40 mg, oral, Daily, Mayela Pickard PA-C    metoprolol tartrate (Lopressor) tablet 12.5 mg, 12.5 mg, oral, BID, EMILIANA Barillas, 12.5 mg at 04/18/24 0817    naloxone (Narcan) injection 0.2 mg, 0.2 mg, intravenous, q5 min PRN, Mayela Pickard PA-C    naloxone (Narcan) injection 0.2 mg, 0.2 mg, intravenous, q5 min PRN, Mayela Pickard PA-C    naloxone (Narcan) injection 0.2 mg, 0.2 mg, intravenous, q5 min PRN, Mayela Pickard PA-C    nitroglycerin (Nitrostat) SL tablet 0.4 mg, 0.4 mg, sublingual, q5 min PRN, Mayela Pickard PA-C    ondansetron (Zofran) tablet 4 mg, 4 mg, oral, q8h PRN **OR** ondansetron (Zofran) injection 4 mg, 4 mg, intravenous, q8h PRN, Mayela Pickard PA-C, 4 mg at 04/18/24 1059    oxyCODONE-acetaminophen (Percocet)  mg per tablet 1 tablet, 1 tablet, oral, q4h PRN, Mayela Pickard PA-C    oxyCODONE-acetaminophen (Percocet) 5-325 mg per tablet 1 tablet, 1 tablet, oral, q4h PRN, Mayela Pickard PA-C, 1 tablet at 04/18/24 0816    oxygen (O2) therapy, 2 L/min, inhalation, Continuous, Mayela Pickard PA-C,  2 L/min at 04/17/24 1315    polyethylene glycol (Glycolax, Miralax) packet 17 g, 17 g, oral, Daily, Mayela Pickard PA-C    pravastatin (Pravachol) tablet 40 mg, 40 mg, oral, Nightly, Mayela Pickard PA-C, 40 mg at 04/17/24 2205    sodium chloride 0.9% infusion, 100 mL/hr, intravenous, Continuous, Luna Smith, CARMEN-CNP, Last Rate: 100 mL/hr at 04/18/24 1101, 100 mL/hr at 04/18/24 1101      Assessment/Plan   OA  S/p right TKA  CLL  Chronic normocytic anemia  DM  CKD 3  Postop normocytic anemia  Anemia of acute blood loss combined with anemia of chronic disease/ CLL/ CKD stage 3.   Venofer 200mg IV X2  Monitor H/H

## 2024-04-18 NOTE — CARE PLAN
The patient's goals for the shift include  worth with therapy and comfort    The clinical goals for the shift include pain control

## 2024-04-18 NOTE — PROGRESS NOTES
Physical Therapy    Physical Therapy Treatment    Patient Name: Bonnie Jules  MRN: [id number]  Today's Date: 4/18/2024  Time Calculation  Start Time: 0834  Stop Time: 0908  Time Calculation (min): 34 min       Assessment/Plan   PT Assessment Results: Decreased strength, Decreased endurance, Decreased range of motion, Impaired balance, Decreased mobility, Pain. She made slow and steady progress toward her goals. Today she required moderately increased assist during functional mobility compared to her baseline. She needed instruction for post-TKA precautions and walker safety. The pt would benefit from continued skilled PT services for maximizing independence and safety prior to & after discharge (MODERATE intensity).  Rehab Prognosis: Good  Strengths: Support of extended family/friends, Premorbid level of function, Access to adaptive/assistive products, Housing layout  Barriers to Participation: Comorbidities  End of Session Communication: Bedside nurse  End of Session Patient Position: Bed, 3 rail up, Alarm off, not on at start of session (call light and needs within reach)     PT Plan  Treatment/Interventions: Bed mobility, Transfer training, Gait training, Balance training, Strengthening, Range of motion, Therapeutic exercise, Therapeutic activity, Home exercise program  PT Plan: Skilled PT  PT Frequency: BID  PT Discharge Recommendations: Moderate intensity level of continued care  PT Recommended Transfer Status: Assist x1 (with Jr FWW)  PT - OK to Discharge: Yes      General Visit Information:   PT  Visit  PT Received On: 04/18/24  General  Reason for Referral: Impaired functional mobility. This 83 y.o. female was admitted for an elective surgery. She was now s/p R TKA by Dr. Guy on 4/17/24.  Past Medical History Relevant to Rehab: anxiety, CAD s/p CABG x3, chronic lymphocytic leukemia, HTN, DM, diabetic polyneuropathy, chronic kidney disease, osteoporosis, PVD, sleep apnea, bilateral carpal tunnel release, L  ALFONZO,  Family/Caregiver Present: Yes (dtr who helped provide some home set up & prior level of function info)  Prior to Session Communication: Bedside nurse  Patient Position Received: Bed, 3 rail up, Alarm off, not on at start of session  General Comment: RN cleared pt for participation. She agreed to session and participated as able due to R knee pain.    Subjective   Precautions:  Hearing/Visual Limitations: glasses donned  LE Weight Bearing Status: Weight Bearing as Tolerated (RLE)  Medical Precautions: Fall precautions  Post-Surgical Precautions: Right total knee precautions    Vital Signs:  Heart Rate: 69  SpO2: 93 %  Patient Position: Sitting (at EOB; on room air)    Objective   Pain:  Pain Assessment: FLACC (Face, Legs, Activity, Cry, Consolability)  Pain Score: 5 - Moderate pain  Pain Type: Surgical pain  Pain Location: Knee  Pain Orientation: Right  Pain Interventions: Repositioned, Ambulation/increased activity (RN reported recent administration of pain meds (before session))    Static Sitting Balance  Static Sitting-Balance Support: Feet supported, Bilateral upper extremity supported  Static Sitting-Level of Assistance:  (close S)  Static Sitting-Comment/Number of Minutes: </=10 minutes    Static Standing Balance  Static Standing-Balance Support: Bilateral upper extremity supported (at Jr FWW)  Static Standing-Level of Assistance: minAx1/CGA x1  Static Standing-Comment/Number of Minutes: x2 trials, each </=25 sec     Activity Tolerance:  Pt tolerated ~25 minutes of  exercise/activity with multiple rests. R knee pain was the greatest limiting factor.    Treatments:  Therapeutic Exercise in supine, x10 each. Cued as needed for proper form.  -bilat: AP, QS  -R hip/knee flex    Therapeutic Activity  Pt stated 1/3 total knee precautions before education.    Bed Mobility  Bed Mobility 1: Supine to sitting  Level of Assistance 1: Moderate assistance (assist to RLE and trunk. Cued for sequencing.)  Bed Mobility  Comments 1: HOB elevated & used L bed rail    Bed Mobility  2: Sitting to supine  Level of Assistance 2: Minimum assistance (assist to elevate RLE. Cued for sequencing.)  Bed Mobility Comments 2: Bed flat & no rail but pt reached for armrest of nearby chair with LUE    Ambulation/Gait Training  Attempted lateral stepping at EOB. With repeated attempts she moved LLE minimally toward L side (skimmed floor) but was unable to step with RLE.    Transfers  Technique 1: Sit to stand  Transfer Device 1: Walker (Jr FWW)  Transfer Level of Assistance 1:  (Abigail x1 with L hand starting on bed rail; modA x1 with L hand starting on bed. R hand started on stabilzed walker. Assist for lifting torque. Cued for walker safety & RLE position.)  Trials/Comments 1: x2 trials at EOB    Technique 2: Stand to sit  Transfer Device 2: Walker (Jr FWW)  Transfer Level of Assistance 2: Minimum assistance (assist for increased eccentric control. Cued for RLE start position & walker safety/BUE placement.)  Trials/Comments 2: x2 trials at EOB    Outcome Measures:  [facility] Basic Mobility  Turning from your back to your side while in a flat bed without using bedrails: A lot  Moving from lying on your back to sitting on the side of a flat bed without using bedrails: A lot  Moving to and from bed to chair (including a wheelchair): Total  Standing up from a chair using your arms (e.g. wheelchair or bedside chair): A little  To walk in hospital room: Total  Climbing 3-5 steps with railing: Total  Basic Mobility - Total Score: 10    Education Documentation  Precautions, taught by Marianela Rock PT at 4/18/2024  9:30 AM.  Learner: Patient  Readiness: Acceptance  Method: Explanation  Response: Verbalizes Understanding, Demonstrated Understanding, Needs Reinforcement    Home Exercise Program, taught by Marianela Rock PT at 4/18/2024  9:30 AM.  Learner: Patient  Readiness: Acceptance  Method: Explanation  Response: Verbalizes Understanding, Demonstrated  Understanding, Needs Reinforcement    Mobility Training, taught by Marianela Rock, PT at 4/18/2024  9:30 AM.  Learner: Patient  Readiness: Acceptance  Method: Explanation  Response: Verbalizes Understanding, Demonstrated Understanding, Needs Reinforcement    Education Comments  No comments found.         Encounter Problems       Encounter Problems (Active)       Functional Mobility       Pt will transition supine<>sit at flat bed with mod I. (Progressing)       Start:  04/17/24    Expected End:  05/14/24            Pt will transfer sit<>stand with FWW & mod I. (Progressing)       Start:  04/17/24    Expected End:  05/14/24            Pt will ambulate >/=100 ft with FWW & mod I. (Not Progressing)       Start:  04/17/24    Expected End:  05/14/24            Pt will adhere to R total knee precautions during all functional mobility with S and at most 1 verbal cue. (Progressing)       Start:  04/17/24    Expected End:  05/14/24

## 2024-04-18 NOTE — CARE PLAN
The patient's goals for the shift include  rest    The clinical goals for the shift include pain control, saftey

## 2024-04-18 NOTE — PROGRESS NOTES
Occupational Therapy    Evaluation  Patient Name: Bonnie Jules  MRN: [id number]  : 1940  Today's Date: 24  Time Calculation  Start Time: 1307  Stop Time: 1327  Time Calculation (min): 20 min       Assessment:  OT Assessment: Pt would benefit from acute OT services  End of Session Communication: Bedside nurse  End of Session Patient Position: Bed, 2 rail up (PT present with pt)  OT Assessment Results: Decreased ADL status, Decreased endurance, Decreased functional mobility  Strengths: Support of extended family/friends, Premorbid level of function  Plan:  Treatment Interventions: ADL retraining, Functional transfer training, Equipment evaluation/education, Patient/family training  OT Frequency: 5 times per week  OT Discharge Recommendations: Moderate intensity level of continued care  OT - OK to Discharge: Yes  Treatment Interventions: ADL retraining, Functional transfer training, Equipment evaluation/education, Patient/family training    Subjective     General:   OT Received On: 24  General  Reason for Referral: Pt s/p R TKA by Dr. Guy on 24.  Past Medical History Relevant to Rehab: anxiety, CAD s/p CABG x3, chronic lymphocytic leukemia, HTN, DM, diabetic polyneuropathy, chronic kidney disease, osteoporosis, PVD, sleep apnea, bilateral carpal tunnel release, L ALFONZO,  Missed Visit: No  Missed Visit Reason: No show  Family/Caregiver Present: No  Co-Treatment: PT  Prior to Session Communication: Bedside nurse  Patient Position Received: Bed, 3 rail up, Alarm off, not on at start of session  General Comment: Cleared by nursing. Pt pleasant and agreeable to therapy  Precautions:  Hearing/Visual Limitations: wears glasses  LE Weight Bearing Status: Weight Bearing as Tolerated (RLE)  Medical Precautions: Fall precautions, Oxygen therapy device and L/min  Post-Surgical Precautions: Right total knee precautions  Precautions Comment: Reviewed TKA precautions, handout provided  Vital Signs:  SpO2:   (91-95%)  Pain:  Pain Assessment  Pain Assessment: 0-10  Pain Score: 5 - Moderate pain  Pain Type: Surgical pain  Pain Location: Knee  Pain Orientation: Right    Objective   Cognition:  Overall Cognitive Status: Within Functional Limits           Home Living:  Type of Home: House  Lives With: Adult children (dtr)  Home Adaptive Equipment: Cane (RW)  Home Layout: Able to live on main level with bedroom/bathroom  Home Access: Ramped entrance  Bathroom Shower/Tub: Walk-in shower  Bathroom Equipment: Grab bars in shower  Prior Function:  Level of [place]: Independent with ADLs and functional transfers, Needs assistance with homemaking  Receives Help From: Family  ADL Assistance: Independent  Homemaking Assistance: Needs assistance  Driving/Transportation: Independent  Ambulatory Assistance: Independent  Hand Dominance: Right  Prior Function Comments: Pt denied h/o falls       ADL:  Eating Assistance: Independent  Grooming Assistance:  (set up)  Bathing Assistance: Moderate  UE Dressing Assistance:  (set up)  LE Dressing Assistance: Moderate  Toileting Assistance with Device: Maximal     Activity Tolerance:  Endurance: Decreased tolerance for upright activites  Functional Standing Tolerance:     Bed Mobility/Transfers: Bed Mobility  Bed Mobility:  (min A for assist with advancement of RLE off bed, able to elevate trunk with increased time/effort and use of bed rail with HOB elevated for supine>seated EOB; min A for assist with trunk and RLE seated EOB>supine with HOB flat)    Transfers  Transfer:  (mod A for balance and controlled descent sit<>stand bed level, VCs for safe hand and leg placement)    Functional Mobility:  Functional Mobility  Functional Mobility Performed:  (mod A x2 for balance for 2 sidesteps with use of RW, increased time and effort to advance RLE)  Sitting Balance:  Static Sitting Balance  Static Sitting-Level of Assistance: Close supervision     Vision:Vision - Basic Assessment  Current Vision:  Wears glasses all the time  Sensation:  Sensation Comment: pt denied numbness/paresthesia BUEs  Strength:  Strength Comments: WFL      Hand Function:  Hand Function  Gross Grasp: Functional  Coordination: Functional  Extremities: RUE   RUE : Within Functional Limits and LUE   LUE: Within Functional Limits    Outcome Measures: [facility] Daily Activity  Putting on and taking off regular lower body clothing: A lot  Bathing (including washing, rinsing, drying): A lot  Putting on and taking off regular upper body clothing: A little  Toileting, which includes using toilet, bedpan or urinal: A lot  Taking care of personal grooming such as brushing teeth: A little  Eating Meals: None  Daily Activity - Total Score: 16    Education Documentation  Handouts, taught by Rosa Pearson OT at 4/18/2024  3:03 PM.  Learner: Patient  Readiness: Acceptance  Method: Explanation, Demonstration, Handout  Response: Verbalizes Understanding, Needs Reinforcement    Body Mechanics, taught by Rosa Pearson OT at 4/18/2024  3:03 PM.  Learner: Patient  Readiness: Acceptance  Method: Explanation, Demonstration, Handout  Response: Verbalizes Understanding, Needs Reinforcement    Precautions, taught by Rosa Pearson OT at 4/18/2024  3:03 PM.  Learner: Patient  Readiness: Acceptance  Method: Explanation, Demonstration, Handout  Response: Verbalizes Understanding, Needs Reinforcement    ADL Training, taught by Rosa Pearson OT at 4/18/2024  3:03 PM.  Learner: Patient  Readiness: Acceptance  Method: Explanation, Demonstration, Handout  Response: Verbalizes Understanding, Needs Reinforcement    Education Comments  No comments found.      Goals:  Encounter Problems       Encounter Problems (Active)       ADLs       Pt will complete ADL tasks at mod I with use of AE prn  (Progressing)       Start:  04/18/24    Expected End:  05/09/24               Functional Mobility       Pt will perform functional mobility  household distances at mod I with use of RW.    (Progressing)       Start:  04/18/24    Expected End:  05/09/24               OT Transfers       Pt will perform functional transfers at mod I (Progressing)       Start:  04/18/24    Expected End:  05/09/24               Precautions       Pt will independently maintain TKA precautions while completing ADL/IADL tasks and functional transfers/mobility.  (Progressing)       Start:  04/18/24    Expected End:  05/09/24

## 2024-04-19 ENCOUNTER — HOME HEALTH ADMISSION (OUTPATIENT)
Dept: HOME HEALTH SERVICES | Facility: HOME HEALTH | Age: 84
End: 2024-04-19
Payer: MEDICARE

## 2024-04-19 ENCOUNTER — APPOINTMENT (OUTPATIENT)
Dept: RADIOLOGY | Facility: HOSPITAL | Age: 84
DRG: 469 | End: 2024-04-19
Payer: MEDICARE

## 2024-04-19 LAB
ANION GAP SERPL CALC-SCNC: 11 MMOL/L
BUN SERPL-MCNC: 32 MG/DL (ref 8–25)
CALCIUM SERPL-MCNC: 8.1 MG/DL (ref 8.5–10.4)
CHLORIDE SERPL-SCNC: 101 MMOL/L (ref 97–107)
CO2 SERPL-SCNC: 21 MMOL/L (ref 24–31)
CREAT SERPL-MCNC: 1 MG/DL (ref 0.4–1.6)
EGFRCR SERPLBLD CKD-EPI 2021: 57 ML/MIN/1.73M*2
ERYTHROCYTE [DISTWIDTH] IN BLOOD BY AUTOMATED COUNT: 13.9 % (ref 11.5–14.5)
GLUCOSE BLD MANUAL STRIP-MCNC: 187 MG/DL (ref 74–99)
GLUCOSE BLD MANUAL STRIP-MCNC: 189 MG/DL (ref 74–99)
GLUCOSE BLD MANUAL STRIP-MCNC: 280 MG/DL (ref 74–99)
GLUCOSE BLD MANUAL STRIP-MCNC: 346 MG/DL (ref 74–99)
GLUCOSE SERPL-MCNC: 268 MG/DL (ref 65–99)
HCT VFR BLD AUTO: 28.1 % (ref 36–46)
HGB BLD-MCNC: 8.9 G/DL (ref 12–16)
HOLD SPECIMEN: NORMAL
MCH RBC QN AUTO: 30.9 PG (ref 26–34)
MCHC RBC AUTO-ENTMCNC: 31.7 G/DL (ref 32–36)
MCV RBC AUTO: 98 FL (ref 80–100)
NRBC BLD-RTO: 0 /100 WBCS (ref 0–0)
PLATELET # BLD AUTO: 192 X10*3/UL (ref 150–450)
POTASSIUM SERPL-SCNC: 4.7 MMOL/L (ref 3.4–5.1)
POTASSIUM SERPL-SCNC: 5.1 MMOL/L (ref 3.4–5.1)
RBC # BLD AUTO: 2.88 X10*6/UL (ref 4–5.2)
SODIUM SERPL-SCNC: 133 MMOL/L (ref 133–145)
WBC # BLD AUTO: 12.1 X10*3/UL (ref 4.4–11.3)

## 2024-04-19 PROCEDURE — 71045 X-RAY EXAM CHEST 1 VIEW: CPT

## 2024-04-19 PROCEDURE — 84132 ASSAY OF SERUM POTASSIUM: CPT | Performed by: NURSE PRACTITIONER

## 2024-04-19 PROCEDURE — 94760 N-INVAS EAR/PLS OXIMETRY 1: CPT

## 2024-04-19 PROCEDURE — 97530 THERAPEUTIC ACTIVITIES: CPT | Mod: GP | Performed by: PHYSICAL THERAPIST

## 2024-04-19 PROCEDURE — 78580 LUNG PERFUSION IMAGING: CPT

## 2024-04-19 PROCEDURE — 99024 POSTOP FOLLOW-UP VISIT: CPT | Performed by: PHYSICIAN ASSISTANT

## 2024-04-19 PROCEDURE — 97530 THERAPEUTIC ACTIVITIES: CPT | Mod: GO

## 2024-04-19 PROCEDURE — A9540 TC99M MAA: HCPCS | Performed by: PHYSICIAN ASSISTANT

## 2024-04-19 PROCEDURE — RXMED WILLOW AMBULATORY MEDICATION CHARGE

## 2024-04-19 PROCEDURE — 3430000001 HC RX 343 DIAGNOSTIC RADIOPHARMACEUTICALS: Performed by: PHYSICIAN ASSISTANT

## 2024-04-19 PROCEDURE — 94762 N-INVAS EAR/PLS OXIMTRY CONT: CPT

## 2024-04-19 PROCEDURE — 2500000002 HC RX 250 W HCPCS SELF ADMINISTERED DRUGS (ALT 637 FOR MEDICARE OP, ALT 636 FOR OP/ED): Performed by: NURSE PRACTITIONER

## 2024-04-19 PROCEDURE — 82947 ASSAY GLUCOSE BLOOD QUANT: CPT

## 2024-04-19 PROCEDURE — 71045 X-RAY EXAM CHEST 1 VIEW: CPT | Performed by: RADIOLOGY

## 2024-04-19 PROCEDURE — 9420000001 HC RT PATIENT EDUCATION 5 MIN

## 2024-04-19 PROCEDURE — 97110 THERAPEUTIC EXERCISES: CPT | Mod: GP | Performed by: PHYSICAL THERAPIST

## 2024-04-19 PROCEDURE — 80048 BASIC METABOLIC PNL TOTAL CA: CPT | Performed by: NURSE PRACTITIONER

## 2024-04-19 PROCEDURE — 2500000004 HC RX 250 GENERAL PHARMACY W/ HCPCS (ALT 636 FOR OP/ED): Performed by: NURSE PRACTITIONER

## 2024-04-19 PROCEDURE — 36415 COLL VENOUS BLD VENIPUNCTURE: CPT | Performed by: NURSE PRACTITIONER

## 2024-04-19 PROCEDURE — 2500000001 HC RX 250 WO HCPCS SELF ADMINISTERED DRUGS (ALT 637 FOR MEDICARE OP): Performed by: PHYSICIAN ASSISTANT

## 2024-04-19 PROCEDURE — 78830 RP LOCLZJ TUM SPECT W/CT 1: CPT | Performed by: STUDENT IN AN ORGANIZED HEALTH CARE EDUCATION/TRAINING PROGRAM

## 2024-04-19 PROCEDURE — 85027 COMPLETE CBC AUTOMATED: CPT | Performed by: PHYSICIAN ASSISTANT

## 2024-04-19 PROCEDURE — 1210000001 HC SEMI-PRIVATE ROOM DAILY

## 2024-04-19 PROCEDURE — 2500000004 HC RX 250 GENERAL PHARMACY W/ HCPCS (ALT 636 FOR OP/ED): Performed by: PHYSICIAN ASSISTANT

## 2024-04-19 PROCEDURE — 2500000001 HC RX 250 WO HCPCS SELF ADMINISTERED DRUGS (ALT 637 FOR MEDICARE OP): Performed by: NURSE PRACTITIONER

## 2024-04-19 PROCEDURE — 94640 AIRWAY INHALATION TREATMENT: CPT

## 2024-04-19 PROCEDURE — 7100000011 HC EXTENDED STAY RECOVERY HOURLY - NURSING UNIT

## 2024-04-19 RX ORDER — PROCHLORPERAZINE EDISYLATE 5 MG/ML
10 INJECTION INTRAMUSCULAR; INTRAVENOUS EVERY 6 HOURS PRN
Status: DISCONTINUED | OUTPATIENT
Start: 2024-04-19 | End: 2024-04-24 | Stop reason: HOSPADM

## 2024-04-19 RX ORDER — PROCHLORPERAZINE MALEATE 10 MG
10 TABLET ORAL EVERY 6 HOURS PRN
Status: DISCONTINUED | OUTPATIENT
Start: 2024-04-19 | End: 2024-04-24 | Stop reason: HOSPADM

## 2024-04-19 RX ORDER — PROCHLORPERAZINE 25 MG/1
25 SUPPOSITORY RECTAL EVERY 12 HOURS PRN
Status: DISCONTINUED | OUTPATIENT
Start: 2024-04-19 | End: 2024-04-24 | Stop reason: HOSPADM

## 2024-04-19 RX ORDER — INSULIN LISPRO 100 [IU]/ML
0-15 INJECTION, SOLUTION INTRAVENOUS; SUBCUTANEOUS
Status: DISCONTINUED | OUTPATIENT
Start: 2024-04-19 | End: 2024-04-20

## 2024-04-19 RX ORDER — OXYCODONE AND ACETAMINOPHEN 5; 325 MG/1; MG/1
1 TABLET ORAL EVERY 6 HOURS PRN
Qty: 28 TABLET | Refills: 0 | Status: SHIPPED | OUTPATIENT
Start: 2024-04-19 | End: 2024-04-27

## 2024-04-19 RX ORDER — IPRATROPIUM BROMIDE AND ALBUTEROL SULFATE 2.5; .5 MG/3ML; MG/3ML
3 SOLUTION RESPIRATORY (INHALATION) EVERY 8 HOURS
Status: DISCONTINUED | OUTPATIENT
Start: 2024-04-19 | End: 2024-04-20

## 2024-04-19 RX ADMIN — IRON SUCROSE 200 MG: 20 INJECTION, SOLUTION INTRAVENOUS at 06:42

## 2024-04-19 RX ADMIN — KIT FOR THE PREPARATION OF TECHNETIUM TC 99M ALBUMIN AGGREGATED 4.2 MILLICURIE: 2.5 INJECTION, POWDER, FOR SOLUTION INTRAVENOUS at 15:10

## 2024-04-19 RX ADMIN — INSULIN LISPRO 6 UNITS: 100 INJECTION, SOLUTION INTRAVENOUS; SUBCUTANEOUS at 08:45

## 2024-04-19 RX ADMIN — OXYCODONE AND ACETAMINOPHEN 1 TABLET: 5; 325 TABLET ORAL at 00:50

## 2024-04-19 RX ADMIN — HYDRALAZINE HYDROCHLORIDE 50 MG: 50 TABLET, FILM COATED ORAL at 20:54

## 2024-04-19 RX ADMIN — AMLODIPINE BESYLATE 10 MG: 10 TABLET ORAL at 08:43

## 2024-04-19 RX ADMIN — LEVOTHYROXINE SODIUM 100 MCG: 0.1 TABLET ORAL at 06:42

## 2024-04-19 RX ADMIN — GABAPENTIN 100 MG: 100 CAPSULE ORAL at 20:54

## 2024-04-19 RX ADMIN — METOPROLOL TARTRATE 12.5 MG: 25 TABLET, FILM COATED ORAL at 20:53

## 2024-04-19 RX ADMIN — INSULIN GLARGINE 5 UNITS: 100 INJECTION, SOLUTION SUBCUTANEOUS at 08:00

## 2024-04-19 RX ADMIN — GABAPENTIN 100 MG: 100 CAPSULE ORAL at 08:43

## 2024-04-19 RX ADMIN — IPRATROPIUM BROMIDE AND ALBUTEROL SULFATE 3 ML: 2.5; .5 SOLUTION RESPIRATORY (INHALATION) at 22:12

## 2024-04-19 RX ADMIN — APIXABAN 2.5 MG: 2.5 TABLET, FILM COATED ORAL at 20:54

## 2024-04-19 RX ADMIN — PRAVASTATIN SODIUM 40 MG: 40 TABLET ORAL at 20:54

## 2024-04-19 RX ADMIN — INSULIN LISPRO 8 UNITS: 100 INJECTION, SOLUTION INTRAVENOUS; SUBCUTANEOUS at 12:17

## 2024-04-19 RX ADMIN — HYDRALAZINE HYDROCHLORIDE 50 MG: 50 TABLET, FILM COATED ORAL at 08:43

## 2024-04-19 RX ADMIN — INSULIN LISPRO 3 UNITS: 100 INJECTION, SOLUTION INTRAVENOUS; SUBCUTANEOUS at 16:59

## 2024-04-19 RX ADMIN — APIXABAN 2.5 MG: 2.5 TABLET, FILM COATED ORAL at 08:43

## 2024-04-19 RX ADMIN — METOPROLOL TARTRATE 12.5 MG: 25 TABLET, FILM COATED ORAL at 08:43

## 2024-04-19 RX ADMIN — ONDANSETRON 4 MG: 2 INJECTION INTRAMUSCULAR; INTRAVENOUS at 10:28

## 2024-04-19 ASSESSMENT — COGNITIVE AND FUNCTIONAL STATUS - GENERAL
MOBILITY SCORE: 8
HELP NEEDED FOR BATHING: A LOT
WALKING IN HOSPITAL ROOM: TOTAL
MOVING TO AND FROM BED TO CHAIR: TOTAL
DRESSING REGULAR UPPER BODY CLOTHING: A LITTLE
MOVING FROM LYING ON BACK TO SITTING ON SIDE OF FLAT BED WITH BEDRAILS: A LOT
MOVING TO AND FROM BED TO CHAIR: TOTAL
CLIMB 3 TO 5 STEPS WITH RAILING: TOTAL
PERSONAL GROOMING: A LITTLE
DRESSING REGULAR LOWER BODY CLOTHING: A LOT
STANDING UP FROM CHAIR USING ARMS: A LOT
DRESSING REGULAR LOWER BODY CLOTHING: A LOT
STANDING UP FROM CHAIR USING ARMS: TOTAL
MOBILITY SCORE: 9
WALKING IN HOSPITAL ROOM: TOTAL
MOVING FROM LYING ON BACK TO SITTING ON SIDE OF FLAT BED WITH BEDRAILS: A LOT
TOILETING: A LOT
MOBILITY SCORE: 9
DRESSING REGULAR UPPER BODY CLOTHING: A LITTLE
TURNING FROM BACK TO SIDE WHILE IN FLAT BAD: A LOT
WALKING IN HOSPITAL ROOM: TOTAL
TOILETING: A LOT
DAILY ACTIVITIY SCORE: 16
CLIMB 3 TO 5 STEPS WITH RAILING: TOTAL
TURNING FROM BACK TO SIDE WHILE IN FLAT BAD: A LOT
WALKING IN HOSPITAL ROOM: TOTAL
DAILY ACTIVITIY SCORE: 16
TURNING FROM BACK TO SIDE WHILE IN FLAT BAD: A LOT
MOVING TO AND FROM BED TO CHAIR: TOTAL
MOVING FROM LYING ON BACK TO SITTING ON SIDE OF FLAT BED WITH BEDRAILS: A LOT
MOBILITY SCORE: 8
PERSONAL GROOMING: A LITTLE
STANDING UP FROM CHAIR USING ARMS: TOTAL
TURNING FROM BACK TO SIDE WHILE IN FLAT BAD: A LOT
MOVING FROM LYING ON BACK TO SITTING ON SIDE OF FLAT BED WITH BEDRAILS: A LOT
CLIMB 3 TO 5 STEPS WITH RAILING: TOTAL
MOVING TO AND FROM BED TO CHAIR: TOTAL
CLIMB 3 TO 5 STEPS WITH RAILING: TOTAL
HELP NEEDED FOR BATHING: A LOT
STANDING UP FROM CHAIR USING ARMS: A LOT

## 2024-04-19 ASSESSMENT — PAIN SCALES - GENERAL
PAINLEVEL_OUTOF10: 4
PAINLEVEL_OUTOF10: 4
PAINLEVEL_OUTOF10: 6
PAINLEVEL_OUTOF10: 0 - NO PAIN
PAINLEVEL_OUTOF10: 4
PAINLEVEL_OUTOF10: 0 - NO PAIN
PAINLEVEL_OUTOF10: 6

## 2024-04-19 ASSESSMENT — PAIN DESCRIPTION - ORIENTATION: ORIENTATION: RIGHT

## 2024-04-19 ASSESSMENT — PAIN - FUNCTIONAL ASSESSMENT
PAIN_FUNCTIONAL_ASSESSMENT: 0-10
PAIN_FUNCTIONAL_ASSESSMENT: 0-10
PAIN_FUNCTIONAL_ASSESSMENT: FLACC (FACE, LEGS, ACTIVITY, CRY, CONSOLABILITY)
PAIN_FUNCTIONAL_ASSESSMENT: FLACC (FACE, LEGS, ACTIVITY, CRY, CONSOLABILITY)
PAIN_FUNCTIONAL_ASSESSMENT: 0-10
PAIN_FUNCTIONAL_ASSESSMENT: 0-10

## 2024-04-19 ASSESSMENT — PAIN DESCRIPTION - LOCATION: LOCATION: KNEE

## 2024-04-19 NOTE — PROGRESS NOTES
"Bonnie Jules is a 83 y.o. female on day 1 of admission presenting with Osteoarthritis.    Subjective   Patient status post right total knee replacement day #2.  She is resting in bed.  She does endorse some right knee pain.  She was able to work with physical therapy.  She denies chest pain shortness of breath.       Objective     Physical Exam  Right knee: Patient is on 3L of O2. Ace wrap is clean and dry. There is pain with gentle passive ROM in bed. Nontender in the right calf.  Neurovascular is intact    Last Recorded Vitals  Blood pressure 139/57, pulse 81, temperature 36.9 °C (98.4 °F), temperature source Oral, resp. rate 17, height 1.49 m (4' 10.66\"), weight 57 kg (125 lb 10.6 oz), SpO2 (!) 87%.  Intake/Output last 3 Shifts:  I/O last 3 completed shifts:  In: 2420 (42.5 mL/kg) [P.O.:1160; I.V.:1050 (18.4 mL/kg); IV Piggyback:210]  Out: 800 (14 mL/kg) [Urine:800 (0.4 mL/kg/hr)]  Weight: 57 kg     Relevant Results  XR knee right 1-2 views    Result Date: 4/17/2024  Interpreted By:  Avni Correa, STUDY: XR KNEE RIGHT 1-2 VIEWS; ;  4/17/2024 12:13 pm   INDICATION: Signs/Symptoms:Post op knee.   COMPARISON: None.   ACCESSION NUMBER(S): [id number]   ORDERING CLINICIAN: KLAUS HANCOCK   FINDINGS: Right knee, two views   Interval right total knee arthroplasty in place. No periprosthetic fracture lucency seen. There is no dislocation. Postsurgical change the soft tissue. There is a moderate-sized effusion       No immediate complication after right total knee arthroplasty     MACRO: None   Signed by: Avni Correa 4/17/2024 12:33 PM Dictation workstation:   [id number]      Scheduled medications  amLODIPine, 10 mg, oral, Daily  apixaban, 2.5 mg, oral, BID  gabapentin, 100 mg, oral, BID  hydrALAZINE, 50 mg, oral, BID  [Held by provider] hydroCHLOROthiazide, 25 mg, oral, Daily  insulin glargine, 5 Units, subcutaneous, Daily before breakfast  insulin lispro, 0-10 Units, subcutaneous, TID with " meals  levothyroxine, 100 mcg, oral, Daily  [Held by provider] lisinopril, 40 mg, oral, Daily  metoprolol tartrate, 12.5 mg, oral, BID  polyethylene glycol, 17 g, oral, Daily  pravastatin, 40 mg, oral, Nightly      Continuous medications  oxygen, 2 L/min  sodium chloride 0.9%, 75 mL/hr, Last Rate: 75 mL/hr (04/19/24 0641)      PRN medications  PRN medications: acetaminophen, dextrose, dextrose, dextrose, dextrose, glucagon, glucagon, glucagon, glucagon, HYDROmorphone, HYDROmorphone, naloxone, naloxone, naloxone, nitroglycerin, ondansetron **OR** ondansetron, oxyCODONE-acetaminophen, oxyCODONE-acetaminophen  Results for orders placed or performed during the hospital encounter of 04/17/24 (from the past 24 hour(s))   POCT GLUCOSE   Result Value Ref Range    POCT Glucose 327 (H) 74 - 99 mg/dL   Urinalysis with Reflex Culture and Microscopic   Result Value Ref Range    Color, Urine Yellow Light-Yellow, Yellow, Dark-Yellow    Appearance, Urine Clear Clear    Specific Gravity, Urine 1.025 1.005 - 1.035    pH, Urine 5.5 5.0, 5.5, 6.0, 6.5, 7.0, 7.5, 8.0    Protein, Urine NEGATIVE NEGATIVE, 10 (TRACE), 20 (TRACE) mg/dL    Glucose, Urine 1000 (4+) (A) Normal mg/dL    Blood, Urine NEGATIVE NEGATIVE    Ketones, Urine NEGATIVE NEGATIVE mg/dL    Bilirubin, Urine NEGATIVE NEGATIVE    Urobilinogen, Urine Normal Normal mg/dL    Nitrite, Urine NEGATIVE NEGATIVE    Leukocyte Esterase, Urine NEGATIVE NEGATIVE   Extra Urine Gray Tube   Result Value Ref Range    Extra Tube Hold for add-ons.    POCT GLUCOSE   Result Value Ref Range    POCT Glucose 335 (H) 74 - 99 mg/dL   POCT GLUCOSE   Result Value Ref Range    POCT Glucose 212 (H) 74 - 99 mg/dL   CBC   Result Value Ref Range    WBC 12.1 (H) 4.4 - 11.3 x10*3/uL    nRBC 0.0 0.0 - 0.0 /100 WBCs    RBC 2.88 (L) 4.00 - 5.20 x10*6/uL    Hemoglobin 8.9 (L) 12.0 - 16.0 g/dL    Hematocrit 28.1 (L) 36.0 - 46.0 %    MCV 98 80 - 100 fL    MCH 30.9 26.0 - 34.0 pg    MCHC 31.7 (L) 32.0 - 36.0 g/dL     RDW 13.9 11.5 - 14.5 %    Platelets 192 150 - 450 x10*3/uL   Basic Metabolic Panel   Result Value Ref Range    Glucose 268 (H) 65 - 99 mg/dL    Sodium 133 133 - 145 mmol/L    Potassium 5.1 3.4 - 5.1 mmol/L    Chloride 101 97 - 107 mmol/L    Bicarbonate 21 (L) 24 - 31 mmol/L    Urea Nitrogen 32 (H) 8 - 25 mg/dL    Creatinine 1.00 0.40 - 1.60 mg/dL    eGFR 57 (L) >60 mL/min/1.73m*2    Calcium 8.1 (L) 8.5 - 10.4 mg/dL    Anion Gap 11 <=19 mmol/L   POCT GLUCOSE   Result Value Ref Range    POCT Glucose 280 (H) 74 - 99 mg/dL                Assessment/Plan     RTK  -- PT/OT notes appreciated  -- Pain management  -- IS  -- Walker WBAT  -- Incision care  -- antibiotic prophylaxis    Anemia  -- blood conservation on consult  -- monitor H&H    HTN  -- hospital medicine on consult  -- metoprolol hold parameters     DVT prophylaxis  -- eliquis for DVT prophylaxis   -- SCDs    DM  -- sliding scale  -- monitor blood glucose    We will continue to medically optimize patient. Patient is slowly advancing with PT. She is a MAX assist of 2. We will continue to medically optimize. Patient may benefit from skilled upon discharge depending on how she advances with PT. We will continue to evaluate.        I spent 30 minutes in the professional and overall care of this patient.      Mayela Pickard PA-C

## 2024-04-19 NOTE — PROGRESS NOTES
04/19/24 0815   Discharge Planning   Who is requesting discharge planning? Provider   Home or Post Acute Services In home services   Type of Home Care Services Home OT;Home PT   Patient expects to be discharged to: [facility]-need internal HHC order   Does the patient need discharge transport arranged? No     Dc plan not secured-need internal HHC order and to secure SOC    Per Rn during rounds pt was max assist last PT assessment. MSW will follow with recs today.     Pt is currently in extended recovery status. She has Medicare and unless she meets criteria for inpatient she cannot go to SNF.    ADOD 4/20

## 2024-04-19 NOTE — CARE PLAN
The patient's goals for the shift include      The clinical goals for the shift include decreased pain    Over the shift, the patient did not make progress toward the following goals. Barriers to progression include   . Recommendations to address these barriers include   .

## 2024-04-19 NOTE — NURSING NOTE
Handed over care of Pt to oncoming RN. No change noted from initial shift assessment. Call light in reach.

## 2024-04-19 NOTE — PROGRESS NOTES
Physical Therapy    Physical Therapy Treatment    Patient Name: Bonnie Jules  MRN: [id number]  Today's Date: 4/19/2024  Time Calculation  Start Time: 1352  Stop Time: 1430  Time Calculation (min): 38 min       Assessment/Plan   PT Assessment  End of Session Communication: Bedside nurse  End of Session Patient Position: Bed, 3 rail up, Alarm off, not on at start of session (call light and needs within reach)     PT Plan  Treatment/Interventions: Bed mobility, Transfer training, Gait training, Balance training, Strengthening, Range of motion, Therapeutic exercise, Therapeutic activity, Home exercise program  PT Plan: Skilled PT  PT Frequency: BID  PT Discharge Recommendations: Moderate intensity level of continued care  PT Recommended Transfer Status: Assist x1 (with Jr FWW)  PT - OK to Discharge: Yes      General Visit Information:   PT  Visit  PT Received On: 04/19/24  General  Co-Treatment: OT (partial cotx for maximal safety with standing activities)  Prior to Session Communication: PCT/NA/CTA  Patient Position Received: Bed, 3 rail up, Alarm off, not on at start of session  General Comment: Pt agreed to tx with focus on therex.    Subjective   Precautions:  LE Weight Bearing Status: Weight Bearing as Tolerated (RLE)  Medical Precautions: Fall precautions (2 L/min O2 via NC)  Post-Surgical Precautions: Right total knee precautions    Objective   Pain:  Pain Assessment: 0-10  Pain Score: 4  Pain Type: Surgical pain  Pain Location: Knee  Pain Orientation: Right     Treatments:  Therapeutic Exercise in supine. Cued as needed for proper form.   -bilat: AP, QS x20 each  -RLE: hip/knee flex 2 x10, SAQ x10 AAROM    Therapeutic Activity  Pt unable to state any total knee precautions prior to education. After instruction and prolonged time (to complete therex), she stated 2/3 precautions. She required education to recall no kneeling.    Outcome Measures:  [facility] Basic Mobility  Turning from your back to your side while in  a flat bed without using bedrails: A lot  Moving from lying on your back to sitting on the side of a flat bed without using bedrails: A lot  Moving to and from bed to chair (including a wheelchair): Total  Standing up from a chair using your arms (e.g. wheelchair or bedside chair): Total  To walk in hospital room: Total  Climbing 3-5 steps with railing: Total  Basic Mobility - Total Score: 8    Education Documentation  Handouts, taught by Marianela Rock PT at 4/19/2024  3:57 PM.  Learner: Patient  Readiness: Acceptance  Method: Explanation, Handout  Response: Verbalizes Understanding, Demonstrated Understanding, Needs Reinforcement    Precautions, taught by Marianela Rock PT at 4/19/2024  3:57 PM.  Learner: Patient  Readiness: Acceptance  Method: Explanation, Handout  Response: Verbalizes Understanding, Demonstrated Understanding, Needs Reinforcement    Home Exercise Program, taught by Marianela Rock PT at 4/19/2024  3:57 PM.  Learner: Patient  Readiness: Acceptance  Method: Explanation, Handout  Response: Verbalizes Understanding, Demonstrated Understanding, Needs Reinforcement    Mobility Training, taught by Marianela Rock PT at 4/19/2024 12:00 PM.  Learner: Patient  Readiness: Acceptance  Method: Explanation  Response: Verbalizes Understanding, Demonstrated Understanding, Needs Reinforcement    Education Comments  No comments found.        OP EDUCATION:       Encounter Problems       Encounter Problems (Active)       Functional Mobility       Pt will transition supine<>sit at flat bed with mod I. (Progressing)       Start:  04/17/24    Expected End:  04/22/24            Pt will transfer sit<>stand with FWW & mod I. (Progressing)       Start:  04/17/24    Expected End:  04/22/24            Pt will ambulate >/=100 ft with FWW & mod I. (Progressing)       Start:  04/17/24    Expected End:  04/22/24            Pt will adhere to R total knee precautions during all functional mobility with S and at most 1 verbal cue.  (Progressing)       Start:  04/17/24    Expected End:  04/22/24

## 2024-04-19 NOTE — PROGRESS NOTES
Bonnie Jules is a 83 y.o. female on day 1 of admission presenting with Osteoarthritis.      Subjective   Patient seen and examined. Patient was sitting up in her chair with 2L on NC. Patient had just finished therapy. She states that she is in a lot of pain to her knee, and noted that she was very nauseous. Also noted some increased work of breathing and increased short of breath on exertion but denies any chest pain. Patient requested that she go lay back down.       Objective     Last Recorded Vitals  /53 (BP Location: Left arm, Patient Position: Lying)   Pulse 73   Temp 36.6 °C (97.9 °F) (Oral)   Resp 16   Wt 57 kg (125 lb 10.6 oz)   SpO2 91%   Intake/Output last 3 Shifts:    Intake/Output Summary (Last 24 hours) at 4/19/2024 1320  Last data filed at 4/19/2024 1134  Gross per 24 hour   Intake 1130 ml   Output 700 ml   Net 430 ml       Admission Weight  Weight: 57 kg (125 lb 10.6 oz) (04/17/24 0616)    Daily Weight  04/17/24 : 57 kg (125 lb 10.6 oz)    Image Results  XR chest 1 view    Result Date: 4/19/2024  Interpreted By:  Marco Hollis, STUDY: XR CHEST 1 VIEW; 4/19/2024 11:53 am   INDICATION: Signs/Symptoms:dyspnea.   COMPARISON: 04/18/2024   ACCESSION NUMBER(S): [id number]   ORDERING CLINICIAN: THEODORE NAGY   TECHNIQUE: 1 view of the chest was performed.   FINDINGS: The lungs are adequately inflated. No acute consolidation. No significant pleural effusion. No pneumothorax.  The cardiomediastinal silhouette is within normal limits. Median sternotomy wires are intact. Old posterior right rib fractures are again noted. Small, less than 2 cm metallic wire is also again seen above the proximal aspect of the left clavicle.       Stable examination. No acute cardiopulmonary disease.   Signed by: Marco Hollis 4/19/2024 1:19 PM Dictation workstation:   [id number]    XR chest 1 view    Result Date: 4/18/2024  Interpreted By:  Milo Leal, STUDY: XR CHEST 1 VIEW; 4/18/2024 10:05 am    INDICATION: Signs/Symptoms:leukocytosis post op   COMPARISON: None   ACCESSION NUMBER(S): [id number]   ORDERING CLINICIAN: THEODORE NAGY   FINDINGS: The study is limited due to rotation and poor inspiratory effort, with resultant crowding of the pulmonary vasculature. Median sternotomy wires and surgical clips are again present, consistent with previous coronary artery bypass graft. Small, less than 2 cm metallic wire is also again seen above the proximal aspect of the left clavicle. The cardiac silhouette is within normal limits for the technique. Bilateral hilar prominence is noted, left more than right, most likely due to pulmonary vascular congestion. The minimal interstitial infiltrates in the perihilar regions, however no confluence infiltrates or significant pleural effusions are identified. There is no pneumothorax. Hiatal hernia is again noted. Degenerative changes involve the spine and shoulders. Right rib deformities are also again seen.       Limited study. Bilateral hilar prominence, left more than right is most likely due to pulmonary vascular congestion; correlate clinically and if there is suspicion for possible lymphadenopathy further evaluated with contrast-enhanced CT scan. Additional chronic findings as above.   Signed by: Milo Leal 4/18/2024 10:36 AM Dictation workstation:   [id number]    XR knee right 1-2 views    Result Date: 4/17/2024  Interpreted By:  Avni Correa, STUDY: XR KNEE RIGHT 1-2 VIEWS; ;  4/17/2024 12:13 pm   INDICATION: Signs/Symptoms:Post op knee.   COMPARISON: None.   ACCESSION NUMBER(S): [id number]   ORDERING CLINICIAN: KLAUS HANCOCK   FINDINGS: Right knee, two views   Interval right total knee arthroplasty in place. No periprosthetic fracture lucency seen. There is no dislocation. Postsurgical change the soft tissue. There is a moderate-sized effusion       No immediate complication after right total knee arthroplasty     MACRO: None   Signed by: Avni  Sunny 4/17/2024 12:33 PM Dictation workstation:   [id number]      Physical Exam  Vitals and nursing note reviewed.   Constitutional:       Appearance: Normal appearance.   Eyes:      Extraocular Movements: Extraocular movements intact.      Pupils: Pupils are equal, round, and reactive to light.   Cardiovascular:      Rate and Rhythm: Normal rate and regular rhythm.      Pulses: Normal pulses.      Heart sounds: Normal heart sounds.   Pulmonary:      Breath sounds: Normal breath sounds.   Abdominal:      General: Abdomen is flat. Bowel sounds are normal.      Palpations: Abdomen is soft.   Musculoskeletal:      Comments: Right surgical knee, dry and intact dressing   Skin:     General: Skin is warm and dry.      Capillary Refill: Capillary refill takes less than 2 seconds.   Neurological:      General: No focal deficit present.      Mental Status: She is alert and oriented to person, place, and time.   Psychiatric:         Mood and Affect: Mood normal.         Behavior: Behavior normal.         Relevant Results  Lab Results   Component Value Date    GLUCOSE 268 (H) 04/19/2024    CALCIUM 8.1 (L) 04/19/2024     04/19/2024    K 4.7 04/19/2024    CO2 21 (L) 04/19/2024     04/19/2024    BUN 32 (H) 04/19/2024    CREATININE 1.00 04/19/2024     Lab Results   Component Value Date    WBC 12.1 (H) 04/19/2024    HGB 8.9 (L) 04/19/2024    HCT 28.1 (L) 04/19/2024    MCV 98 04/19/2024     04/19/2024         Assessment/Plan   Diabetes Mellitus, type 1  -Monitor blood glucose  -Monitor glucose AC/HS with SSI coverage   -Hypoglycemia protocol   -HGbA1C 6.7 on 04/03/24    Acute hypoxic respiratory failure  Dyspnea on exertion  Baseline is roomair, oxygen saturation in the 80s on room air.  -Continue oxygen NC 2L  -NM lung perfusion ordered by ortho  -Repeat chest xray was stable with no acute changes  -Encourage incentive spirometer      Nausea   -Continue Zofran  -Ordered Compazine    Leukocytosis  -WBC  improving now 12.1, was 15.0  -Most recent chest xray sowed no acute changes   -UA negative   -Completed vancomycin for surgical prophylaxis  -Monitor WBC      Right knee osteoarthritis  S/P total right knee replacement 04/17/2024 by Dr. Liriano  -PT/OT evaluate  -Pain management, bowel regimen  -Incentive spirometer  -Management per orthopedic surgery     Anemia  Likely secondary to recent surgery  -Monitor H&H  -Blood Conservation following  -Completed IV Venofer      Hypertension  -Continue metoprolol with hold parameters and hydralazine, amlodipine  -Hold hydrochlorothiazide and lisinopril for now, potassium repeat normal  -Monitor blood pressure      CAD  -Continue home medications   -Follows with Dr. Araujo outpatient  -Stable     Chronic kidney disease  -Hold off on IV fluids  -Monitor BMP  -Renally dose meds  -Avoid nephrotoxic medications     Hyperlipidemia  -Continue home medications      Hypothyroidism  -Continue Synthroid      CLL  -In remission  -Monitor WBC      DVT prophylaxis  -Eliquis  -SCD's     Disposition: Discharge to home with HHC per ortho. Recommend holding lisinopril and hydrochlorothiazide and then repeat BMP on 04/22 to monitor potassium. Labs ordered. Follow up with PCP to discuss results and resume blood pressure medications.     CARMEN Mcleod-CNP

## 2024-04-19 NOTE — PROGRESS NOTES
Physical Therapy    Physical Therapy Treatment    Patient Name: Bonnie Jules  MRN: [id number]  Today's Date: 4/19/2024  Time Calculation  Start Time: 0951  Stop Time: 1037  Time Calculation (min): 46 min       Assessment/Plan   PT Assessment She made slower than anticipated progress toward her goals. Today she required significantly increased assist during functional mobility compared to her baseline; standing activities required 2 person assist. The pt would benefit from continued skilled PT services for maximizing independence and safety prior to & after discharge (MODERATE intensity).   End of Session Communication: Bedside nurse  Assessment Comment:   End of Session Patient Position: Up in chair, Alarm off, not on at start of session (call light and needs within reach. RN ok'd pt being in chair without alarm.)     PT Plan  Treatment/Interventions: Bed mobility, Transfer training, Gait training, Balance training, Strengthening, Range of motion, Therapeutic exercise, Therapeutic activity, Home exercise program  PT Plan: Skilled PT  PT Frequency: BID  PT Discharge Recommendations: Moderate intensity level of continued care  PT Recommended Transfer Status: Assist x1 (with Jr BARNESW)  PT - OK to Discharge: Yes      General Visit Information:   PT  Visit  PT Received On: 04/19/24  General  Co-Treatment: OT (partial cotx for maximal safety with standing activities)  Prior to Session Communication: Bedside nurse  Patient Position Received: Bed, 3 rail up, Alarm off, not on at start of session  General Comment: RN cleared her for particpation. Pt agreed to session and participated fuly.    Subjective   Precautions:  LE Weight Bearing Status: Weight Bearing as Tolerated (RLE)  Medical Precautions: Fall precautions (2 L/min O2 via NC)  Post-Surgical Precautions: Right total knee precautions  Precautions Comment: Pt unable to state knee precautions prior to education.    Vital Signs:  Heart Rate: 72  SpO2: 92 %  Patient  Position: Sitting at EOB on 2 lpm O2 via NC. SpO2 ranged 87-94% throughout session with lowest value after standing exertion. Instructed in & performed pursed lip breathing with timely return to >/=90%.    Objective   Pain:  Pain Assessment: 0-10  Pain Score: 4  Pain Type: Surgical pain  Pain Location: Knee  Pain Orientation: Right  Pain Interventions: Ambulation/increased activity, Repositioned, Cold applied (ice pack to R knee after tx)     Activity Tolerance:  Standing activities limited due to R knee pain and c/o nausea.    Treatments:  Therapeutic Exercise cued as needed for proper form to obtain maximal benefits.  Seated EOB: R LAQ x5  Supported sitting: R LAQ x5, knee flex 2 x5; bilat AP x20.    Bed Mobility  Bed Mobility 1: Supine to sitting  Level of Assistance 1:  (assist mainly to RLE and slightly to trunk during final ~25% of elevation)  Bed Mobility Comments 1: HOB elevated & used L bed rail    Transfers  Technique 1: Sit to stand  Transfer Device 1: Walker (EOB)  Transfer Level of Assistance 1:  (ModA x2. Assist for lifting torque. Cued for walker safety/BUE placement.)  Trials/Comments 1: x2 trials at EOB    Technique 2: Stand to sit  Transfer Device 2: Walker  Transfer Level of Assistance 2:  (David x2. Assist for steadying. Cued for RLE start position & walker safety/BUE placement.)  Trials/Comments 2: x2 trials: EOB & bedside chair using armrests    Technique 3: Stand pivot, To left (EOB>bedside chair)  Transfer Device 3: Walker  Transfer Level of Assistance 3:  (ModA x2. Assist for steadying at trunk and walker management. Cued for sequencing.)    Ambulation/Gait Training  Surface 1: Level tile  Device 1: Rolling walker (Jr FWW)  Assistance 1:  (2 person: modA x1 & David x1. Assist for steadying at trunk & walker management. Cued for sequencing with walker, increased cervical ext, increased bilat elbow ext with R single limb stance, and deeper respirations.)  Comments/Distance: 2 lateral steps  toward L side at EOB. Slow pacing. Able to clear bilat feet. Steady gait. Distance was limtied by c/o nause; also noted quick fatigue.    Outcome Measures:  [facility] Basic Mobility  Turning from your back to your side while in a flat bed without using bedrails: A lot  Moving from lying on your back to sitting on the side of a flat bed without using bedrails: A lot  Moving to and from bed to chair (including a wheelchair): Total  Standing up from a chair using your arms (e.g. wheelchair or bedside chair): A lot  To walk in hospital room: Total  Climbing 3-5 steps with railing: Total  Basic Mobility - Total Score: 9    Education Documentation  Precautions, taught by Marianela Rock PT at 4/19/2024 11:14 AM.  Learner: Patient  Readiness: Acceptance  Method: Explanation  Response: Verbalizes Understanding, Demonstrated Understanding, Needs Reinforcement    Home Exercise Program, taught by Marianela Rock PT at 4/19/2024 11:14 AM.  Learner: Patient  Readiness: Acceptance  Method: Explanation  Response: Verbalizes Understanding, Demonstrated Understanding, Needs Reinforcement    Mobility Training, taught by Marianela Rock PT at 4/19/2024 11:14 AM.  Learner: Patient  Readiness: Acceptance  Method: Explanation  Response: Verbalizes Understanding, Demonstrated Understanding, Needs Reinforcement    Education Comments  No comments found.           Encounter Problems       Encounter Problems (Active)       Functional Mobility       Pt will transition supine<>sit at flat bed with mod I. (Progressing)       Start:  04/17/24    Expected End:  04/22/24            Pt will transfer sit<>stand with FWW & mod I. (Progressing)       Start:  04/17/24    Expected End:  04/22/24            Pt will ambulate >/=100 ft with FWW & mod I. (Progressing)       Start:  04/17/24    Expected End:  04/22/24            Pt will adhere to R total knee precautions during all functional mobility with S and at most 1 verbal cue. (Progressing)       Start:   04/17/24    Expected End:  04/22/24

## 2024-04-19 NOTE — PROGRESS NOTES
Occupational Therapy    OT Treatment    Patient Name: Bonnie Jules  MRN: [id number]  Today's Date: 4/19/2024  Time Calculation  Start Time: 0952  Stop Time: 1020  Time Calculation (min): 28 min         Assessment:  OT Assessment: Pt would benefit from acute OT services  End of Session Communication: Bedside nurse  End of Session Patient Position: Up in chair, Alarm off, not on at start of session (PT present)  OT Assessment Results: Decreased ADL status, Decreased endurance, Decreased functional mobility  Strengths: Support of extended family/friends, Premorbid level of function  Plan:  Treatment Interventions: ADL retraining, Functional transfer training, Equipment evaluation/education, Patient/family training  OT Frequency: 5 times per week  OT Discharge Recommendations: Moderate intensity level of continued care  OT - OK to Discharge: Yes  Treatment Interventions: ADL retraining, Functional transfer training, Equipment evaluation/education, Patient/family training    Subjective   Previous Visit Info:  OT Last Visit  OT Received On: 04/19/24  General:  General  Reason for Referral: Pt s/p R TKA by Dr. Guy on 4/17/24.  Past Medical History Relevant to Rehab: anxiety, CAD s/p CABG x3, chronic lymphocytic leukemia, HTN, DM, diabetic polyneuropathy, chronic kidney disease, osteoporosis, PVD, sleep apnea, bilateral carpal tunnel release, L ALFONZO,  Missed Visit: No  Missed Visit Reason: No show  Family/Caregiver Present: No  Co-Treatment: PT (patrial co-treat)  Prior to Session Communication: Bedside nurse  Patient Position Received: Bed, 3 rail up, Alarm off, not on at start of session  General Comment: Cleared by nursing. Pt pleasant and agreeable to therapy  Precautions:  Hearing/Visual Limitations: wears glasses  LE Weight Bearing Status: Weight Bearing as Tolerated (RLE)  Medical Precautions: Fall precautions, Oxygen therapy device and L/min (2LO2)  Post-Surgical Precautions: Right total knee  precautions  Precautions Comment: Reviewed TKA precautions, handout provided  Vital Signs:  Vital Signs  SpO2: (!) 87 % (87-94% on 2LO2)  Pain:  Pain Assessment  Pain Assessment: 0-10  Pain Score: 4  Pain Type: Surgical pain  Pain Location: Knee  Pain Orientation: Right    Objective    Cognition:  Cognition  Overall Cognitive Status: Within Functional Limits       Activities of Daily Living:      Toileting  Toileting Level of Assistance: Maximum assistance  Where Assessed:  (standing bedside)  Toileting Comments: pt found with soiled bedding, max A to perform salo care in standing  Functional Standing Tolerance:     Bed Mobility/Transfers: Bed Mobility  Bed Mobility:  (min A for assist with trunk upright with HOB elevated and use of bed rail and advancement of RLE off bed, increased time and effort to complete)    Transfers  Transfer:  (mod A x2 for balance for sit>stand and mod A for controlled descent stand>sit x2 trails bed level; mod A for controlled descent stand>sit chair level; VCs for safe hand and leg placement)      Functional Mobility:  Functional Mobility  Functional Mobility Performed:  (mod A x2 for balance for 3 side steps and ~3 steps bed>chair with use of RW)  Sitting Balance:  Static Sitting Balance  Static Sitting-Level of Assistance: Close supervision    Strength:  Strength Comments: WFL  Other Activity:       RUE   RUE : Within Functional Limits and LUE   LUE: Within Functional Limits    Outcome Measures:[facility] Daily Activity  Putting on and taking off regular lower body clothing: A lot  Bathing (including washing, rinsing, drying): A lot  Putting on and taking off regular upper body clothing: A little  Toileting, which includes using toilet, bedpan or urinal: A lot  Taking care of personal grooming such as brushing teeth: A little  Eating Meals: None  Daily Activity - Total Score: 16    Education Documentation  Handouts, taught by Rosa Pearson OT at 4/18/2024  3:03 PM.  Learner:  Patient  Readiness: Acceptance  Method: Explanation, Demonstration, Handout  Response: Verbalizes Understanding, Needs Reinforcement    Body Mechanics, taught by Rosa Pearson OT at 4/18/2024  3:03 PM.  Learner: Patient  Readiness: Acceptance  Method: Explanation, Demonstration, Handout  Response: Verbalizes Understanding, Needs Reinforcement    Precautions, taught by Rosa Pearson OT at 4/18/2024  3:03 PM.  Learner: Patient  Readiness: Acceptance  Method: Explanation, Demonstration, Handout  Response: Verbalizes Understanding, Needs Reinforcement    ADL Training, taught by Rosa Pearson OT at 4/18/2024  3:03 PM.  Learner: Patient  Readiness: Acceptance  Method: Explanation, Demonstration, Handout  Response: Verbalizes Understanding, Needs Reinforcement    Education Comments  No comments found.      Goals:  Encounter Problems       Encounter Problems (Active)       ADLs       Pt will complete ADL tasks at mod I with use of AE prn  (Progressing)       Start:  04/18/24    Expected End:  04/22/24               Functional Mobility       Pt will perform functional mobility household distances at mod I with use of RW.    (Progressing)       Start:  04/18/24    Expected End:  04/22/24               OT Transfers       Pt will perform functional transfers at mod I (Progressing)       Start:  04/18/24    Expected End:  04/22/24               Precautions       Pt will independently maintain TKA precautions while completing ADL/IADL tasks and functional transfers/mobility.  (Progressing)       Start:  04/18/24    Expected End:  04/22/24

## 2024-04-19 NOTE — PROGRESS NOTES
Physical Therapy    Physical Therapy Treatment    Patient Name: Bonnie Jules  MRN: [id number]  Today's Date: 4/19/2024  Time Calculation  Start Time: 1119  Stop Time: 1130  Time Calculation (min): 11 min       Assessment/Plan   End of Session Patient Position: Bed, 3 rail up, Alarm off, not on at start of session (call ligth and needs within reach)     PT Plan  Treatment/Interventions: Bed mobility, Transfer training, Gait training, Balance training, Strengthening, Range of motion, Therapeutic exercise, Therapeutic activity, Home exercise program  PT Plan: Skilled PT  PT Frequency: BID  PT Discharge Recommendations: Moderate intensity level of continued care  PT Recommended Transfer Status: Assist x1 (with Jr FWW)  PT - OK to Discharge: Yes      General Visit Information:   PT  Visit  PT Received On: 04/19/24  General  Patient Position Received: Up in chair, Alarm off, not on at start of session  General Comment: Pt requested to return to bed. She used the bed pan in sitting (at bedside chair) prior to stand pivot transfer chair>bed.    Subjective   Precautions:  LE Weight Bearing Status: Weight Bearing as Tolerated (RLE)  Medical Precautions: Fall precautions (2 L/min O2 via NC)  Post-Surgical Precautions: Right total knee precautions  Precautions Comment: Pt unable to state knee precautions prior to education.      Objective   Pain:  Pain Assessment: FLACC (Face, Legs, Activity, Cry, Consolability)  Pain Score: 6  Pain Type: Surgical pain  Pain Location: Knee  Pain Orientation: Right  Pain Interventions: Repositioned    Treatments:  Therapeutic Activity  Static stand at FWW with CGA x1 while PCNA performed front hygiene for pt.    Bed Mobility  Bed Mobility: Sitting to supine  Level of Assistance:  Abigail x2 to elevate RLE and control trunk descent. Cued minimally for sequencing.  Comments: Bed flat/no rail; toward L side    Transfers  Technique 1: Sit to stand  Transfer Device 1: Walker (armrest)  Transfer Level  of Assistance 1:  (ModA x2. Assist for lifting torque. Cued for walker safety/BUE placement & RLE start position then increased trunk extension.)  Trials/Comments 1: x2 trials at bedside chair    Technique 2: Stand to sit  Transfer Device 2: Walker (armrest or EOB)  Transfer Level of Assistance 2:  (Abigail x2. Assist for steadying and at EOB for increased eccentric control. Cued for RLE start position & walker safety/BUE placement.)  Trials/Comments 2: x1 trial each: bedside chair & EOB    Technique 3: Stand pivot, To left (bedside chair>EOB)  Transfer Device 3: Walker  Transfer Level of Assistance 3:  (ModA x2. Assist for steadying at trunk and walker management. Cued for sequencing.)    Outcome Measures:  [facility] Basic Mobility  Turning from your back to your side while in a flat bed without using bedrails: A lot  Moving from lying on your back to sitting on the side of a flat bed without using bedrails: A lot  Moving to and from bed to chair (including a wheelchair): Total  Standing up from a chair using your arms (e.g. wheelchair or bedside chair): Total  To walk in hospital room: Total  Climbing 3-5 steps with railing: Total  Basic Mobility - Total Score: 8    Education Documentation  Mobility Training, taught by Marianela Rock PT at 4/19/2024 12:00 PM.  Learner: Patient  Readiness: Acceptance  Method: Explanation  Response: Verbalizes Understanding, Demonstrated Understanding, Needs Reinforcement    Education Comments  No comments found.     Encounter Problems       Encounter Problems (Active)       Functional Mobility       Pt will transition supine<>sit at flat bed with mod I. (Progressing)       Start:  04/17/24    Expected End:  04/22/24            Pt will transfer sit<>stand with FWW & mod I. (Progressing)       Start:  04/17/24    Expected End:  04/22/24            Pt will ambulate >/=100 ft with FWW & mod I. (Progressing)       Start:  04/17/24    Expected End:  04/22/24            Pt will adhere to R total  knee precautions during all functional mobility with S and at most 1 verbal cue. (Progressing)       Start:  04/17/24    Expected End:  04/22/24

## 2024-04-20 ENCOUNTER — PHARMACY VISIT (OUTPATIENT)
Dept: PHARMACY | Facility: CLINIC | Age: 84
End: 2024-04-20
Payer: COMMERCIAL

## 2024-04-20 LAB
ANION GAP SERPL CALC-SCNC: 10 MMOL/L
BUN SERPL-MCNC: 30 MG/DL (ref 8–25)
CALCIUM SERPL-MCNC: 8.4 MG/DL (ref 8.5–10.4)
CHLORIDE SERPL-SCNC: 102 MMOL/L (ref 97–107)
CO2 SERPL-SCNC: 21 MMOL/L (ref 24–31)
CREAT SERPL-MCNC: 1 MG/DL (ref 0.4–1.6)
EGFRCR SERPLBLD CKD-EPI 2021: 57 ML/MIN/1.73M*2
ERYTHROCYTE [DISTWIDTH] IN BLOOD BY AUTOMATED COUNT: 13.8 % (ref 11.5–14.5)
GLUCOSE BLD MANUAL STRIP-MCNC: 165 MG/DL (ref 74–99)
GLUCOSE BLD MANUAL STRIP-MCNC: 266 MG/DL (ref 74–99)
GLUCOSE BLD MANUAL STRIP-MCNC: 407 MG/DL (ref 74–99)
GLUCOSE BLD MANUAL STRIP-MCNC: 449 MG/DL (ref 74–99)
GLUCOSE SERPL-MCNC: 253 MG/DL (ref 65–99)
HCT VFR BLD AUTO: 27.4 % (ref 36–46)
HGB BLD-MCNC: 9.1 G/DL (ref 12–16)
MCH RBC QN AUTO: 30.6 PG (ref 26–34)
MCHC RBC AUTO-ENTMCNC: 33.2 G/DL (ref 32–36)
MCV RBC AUTO: 92 FL (ref 80–100)
NRBC BLD-RTO: 0 /100 WBCS (ref 0–0)
PLATELET # BLD AUTO: 192 X10*3/UL (ref 150–450)
POTASSIUM SERPL-SCNC: 4.7 MMOL/L (ref 3.4–5.1)
RBC # BLD AUTO: 2.97 X10*6/UL (ref 4–5.2)
SODIUM SERPL-SCNC: 133 MMOL/L (ref 133–145)
WBC # BLD AUTO: 13.4 X10*3/UL (ref 4.4–11.3)

## 2024-04-20 PROCEDURE — 36415 COLL VENOUS BLD VENIPUNCTURE: CPT | Performed by: PHYSICIAN ASSISTANT

## 2024-04-20 PROCEDURE — 94664 DEMO&/EVAL PT USE INHALER: CPT

## 2024-04-20 PROCEDURE — 2500000001 HC RX 250 WO HCPCS SELF ADMINISTERED DRUGS (ALT 637 FOR MEDICARE OP): Performed by: PHYSICIAN ASSISTANT

## 2024-04-20 PROCEDURE — 2500000005 HC RX 250 GENERAL PHARMACY W/O HCPCS: Performed by: NURSE PRACTITIONER

## 2024-04-20 PROCEDURE — 2500000002 HC RX 250 W HCPCS SELF ADMINISTERED DRUGS (ALT 637 FOR MEDICARE OP, ALT 636 FOR OP/ED): Performed by: NURSE PRACTITIONER

## 2024-04-20 PROCEDURE — 82947 ASSAY GLUCOSE BLOOD QUANT: CPT | Mod: 91

## 2024-04-20 PROCEDURE — 2500000002 HC RX 250 W HCPCS SELF ADMINISTERED DRUGS (ALT 637 FOR MEDICARE OP, ALT 636 FOR OP/ED): Mod: MUE | Performed by: ORTHOPAEDIC SURGERY

## 2024-04-20 PROCEDURE — 97530 THERAPEUTIC ACTIVITIES: CPT | Mod: GP,CQ

## 2024-04-20 PROCEDURE — 80048 BASIC METABOLIC PNL TOTAL CA: CPT | Performed by: NURSE PRACTITIONER

## 2024-04-20 PROCEDURE — 1210000001 HC SEMI-PRIVATE ROOM DAILY

## 2024-04-20 PROCEDURE — 7100000011 HC EXTENDED STAY RECOVERY HOURLY - NURSING UNIT

## 2024-04-20 PROCEDURE — 2500000005 HC RX 250 GENERAL PHARMACY W/O HCPCS: Performed by: PHYSICIAN ASSISTANT

## 2024-04-20 PROCEDURE — 2500000004 HC RX 250 GENERAL PHARMACY W/ HCPCS (ALT 636 FOR OP/ED): Performed by: PHYSICIAN ASSISTANT

## 2024-04-20 PROCEDURE — 85027 COMPLETE CBC AUTOMATED: CPT | Performed by: PHYSICIAN ASSISTANT

## 2024-04-20 PROCEDURE — 2500000001 HC RX 250 WO HCPCS SELF ADMINISTERED DRUGS (ALT 637 FOR MEDICARE OP): Performed by: NURSE PRACTITIONER

## 2024-04-20 PROCEDURE — 9420000001 HC RT PATIENT EDUCATION 5 MIN

## 2024-04-20 PROCEDURE — 97110 THERAPEUTIC EXERCISES: CPT | Mod: GP,CQ

## 2024-04-20 PROCEDURE — 94640 AIRWAY INHALATION TREATMENT: CPT | Mod: MUE

## 2024-04-20 PROCEDURE — 94760 N-INVAS EAR/PLS OXIMETRY 1: CPT | Mod: MUE

## 2024-04-20 PROCEDURE — 97535 SELF CARE MNGMENT TRAINING: CPT | Mod: GO,CO

## 2024-04-20 RX ORDER — ADHESIVE BANDAGE
30 BANDAGE TOPICAL ONCE
Status: COMPLETED | OUTPATIENT
Start: 2024-04-20 | End: 2024-04-20

## 2024-04-20 RX ORDER — INSULIN LISPRO 100 [IU]/ML
0-20 INJECTION, SOLUTION INTRAVENOUS; SUBCUTANEOUS
Status: DISCONTINUED | OUTPATIENT
Start: 2024-04-20 | End: 2024-04-24 | Stop reason: HOSPADM

## 2024-04-20 RX ORDER — INSULIN LISPRO 100 [IU]/ML
5 INJECTION, SOLUTION INTRAVENOUS; SUBCUTANEOUS ONCE
Status: COMPLETED | OUTPATIENT
Start: 2024-04-20 | End: 2024-04-20

## 2024-04-20 RX ORDER — INSULIN GLARGINE 100 [IU]/ML
11 INJECTION, SOLUTION SUBCUTANEOUS
Status: DISCONTINUED | OUTPATIENT
Start: 2024-04-21 | End: 2024-04-24 | Stop reason: HOSPADM

## 2024-04-20 RX ORDER — IPRATROPIUM BROMIDE AND ALBUTEROL SULFATE 2.5; .5 MG/3ML; MG/3ML
3 SOLUTION RESPIRATORY (INHALATION)
Status: DISCONTINUED | OUTPATIENT
Start: 2024-04-20 | End: 2024-04-24 | Stop reason: HOSPADM

## 2024-04-20 RX ADMIN — GABAPENTIN 100 MG: 100 CAPSULE ORAL at 22:01

## 2024-04-20 RX ADMIN — METOPROLOL TARTRATE 12.5 MG: 25 TABLET, FILM COATED ORAL at 09:40

## 2024-04-20 RX ADMIN — APIXABAN 2.5 MG: 2.5 TABLET, FILM COATED ORAL at 09:41

## 2024-04-20 RX ADMIN — OXYCODONE AND ACETAMINOPHEN 1 TABLET: 5; 325 TABLET ORAL at 22:07

## 2024-04-20 RX ADMIN — APIXABAN 2.5 MG: 2.5 TABLET, FILM COATED ORAL at 22:01

## 2024-04-20 RX ADMIN — AMLODIPINE BESYLATE 10 MG: 10 TABLET ORAL at 09:40

## 2024-04-20 RX ADMIN — IPRATROPIUM BROMIDE AND ALBUTEROL SULFATE 3 ML: 2.5; .5 SOLUTION RESPIRATORY (INHALATION) at 12:35

## 2024-04-20 RX ADMIN — ONDANSETRON 4 MG: 2 INJECTION INTRAMUSCULAR; INTRAVENOUS at 12:06

## 2024-04-20 RX ADMIN — METOPROLOL TARTRATE 12.5 MG: 25 TABLET, FILM COATED ORAL at 22:03

## 2024-04-20 RX ADMIN — IPRATROPIUM BROMIDE AND ALBUTEROL SULFATE 3 ML: 2.5; .5 SOLUTION RESPIRATORY (INHALATION) at 20:44

## 2024-04-20 RX ADMIN — LEVOTHYROXINE SODIUM 100 MCG: 0.1 TABLET ORAL at 05:58

## 2024-04-20 RX ADMIN — INSULIN GLARGINE 5 UNITS: 100 INJECTION, SOLUTION SUBCUTANEOUS at 08:00

## 2024-04-20 RX ADMIN — IPRATROPIUM BROMIDE AND ALBUTEROL SULFATE 3 ML: 2.5; .5 SOLUTION RESPIRATORY (INHALATION) at 07:05

## 2024-04-20 RX ADMIN — GABAPENTIN 100 MG: 100 CAPSULE ORAL at 09:40

## 2024-04-20 RX ADMIN — HYDRALAZINE HYDROCHLORIDE 50 MG: 50 TABLET, FILM COATED ORAL at 09:40

## 2024-04-20 RX ADMIN — MAGNESIUM HYDROXIDE 30 ML: 1200 LIQUID ORAL at 12:06

## 2024-04-20 RX ADMIN — PRAVASTATIN SODIUM 40 MG: 40 TABLET ORAL at 22:04

## 2024-04-20 RX ADMIN — INSULIN LISPRO 12 UNITS: 100 INJECTION, SOLUTION INTRAVENOUS; SUBCUTANEOUS at 17:37

## 2024-04-20 RX ADMIN — Medication 3 L/MIN: at 07:05

## 2024-04-20 RX ADMIN — OXYCODONE AND ACETAMINOPHEN 1 TABLET: 5; 325 TABLET ORAL at 00:56

## 2024-04-20 RX ADMIN — Medication: at 20:00

## 2024-04-20 RX ADMIN — Medication 2 L/MIN: at 12:35

## 2024-04-20 RX ADMIN — INSULIN LISPRO 5 UNITS: 100 INJECTION, SOLUTION INTRAVENOUS; SUBCUTANEOUS at 14:29

## 2024-04-20 RX ADMIN — POLYETHYLENE GLYCOL 3350 17 G: 17 POWDER, FOR SOLUTION ORAL at 09:00

## 2024-04-20 RX ADMIN — HYDRALAZINE HYDROCHLORIDE 50 MG: 50 TABLET, FILM COATED ORAL at 22:02

## 2024-04-20 RX ADMIN — INSULIN LISPRO 20 UNITS: 100 INJECTION, SOLUTION INTRAVENOUS; SUBCUTANEOUS at 12:05

## 2024-04-20 ASSESSMENT — COGNITIVE AND FUNCTIONAL STATUS - GENERAL
STANDING UP FROM CHAIR USING ARMS: A LITTLE
DRESSING REGULAR LOWER BODY CLOTHING: A LOT
WALKING IN HOSPITAL ROOM: A LOT
DAILY ACTIVITIY SCORE: 15
STANDING UP FROM CHAIR USING ARMS: A LITTLE
TURNING FROM BACK TO SIDE WHILE IN FLAT BAD: A LITTLE
HELP NEEDED FOR BATHING: A LOT
PERSONAL GROOMING: A LITTLE
PERSONAL GROOMING: A LITTLE
MOBILITY SCORE: 12
DRESSING REGULAR LOWER BODY CLOTHING: A LOT
MOVING TO AND FROM BED TO CHAIR: A LOT
MOVING FROM LYING ON BACK TO SITTING ON SIDE OF FLAT BED WITH BEDRAILS: A LOT
MOVING TO AND FROM BED TO CHAIR: A LITTLE
DRESSING REGULAR LOWER BODY CLOTHING: A LOT
DAILY ACTIVITIY SCORE: 16
HELP NEEDED FOR BATHING: A LOT
MOVING FROM LYING ON BACK TO SITTING ON SIDE OF FLAT BED WITH BEDRAILS: A LITTLE
DRESSING REGULAR UPPER BODY CLOTHING: A LITTLE
CLIMB 3 TO 5 STEPS WITH RAILING: A LOT
HELP NEEDED FOR BATHING: A LOT
DRESSING REGULAR UPPER BODY CLOTHING: A LITTLE
DAILY ACTIVITIY SCORE: 15
CLIMB 3 TO 5 STEPS WITH RAILING: A LOT
CLIMB 3 TO 5 STEPS WITH RAILING: A LOT
WALKING IN HOSPITAL ROOM: A LOT
MOVING TO AND FROM BED TO CHAIR: A LOT
PERSONAL GROOMING: A LOT
DAILY ACTIVITIY SCORE: 16
MOVING FROM LYING ON BACK TO SITTING ON SIDE OF FLAT BED WITH BEDRAILS: A LITTLE
WALKING IN HOSPITAL ROOM: A LOT
MOBILITY SCORE: 16
DRESSING REGULAR UPPER BODY CLOTHING: A LITTLE
DRESSING REGULAR LOWER BODY CLOTHING: A LOT
TOILETING: A LOT
TURNING FROM BACK TO SIDE WHILE IN FLAT BAD: A LITTLE
STANDING UP FROM CHAIR USING ARMS: A LOT
TOILETING: A LOT
TOILETING: A LOT
DRESSING REGULAR UPPER BODY CLOTHING: A LITTLE
TOILETING: A LOT
TURNING FROM BACK TO SIDE WHILE IN FLAT BAD: A LOT
HELP NEEDED FOR BATHING: A LOT
MOBILITY SCORE: 15
PERSONAL GROOMING: A LITTLE
EATING MEALS: A LITTLE

## 2024-04-20 ASSESSMENT — PAIN SCALES - GENERAL
PAINLEVEL_OUTOF10: 8
PAINLEVEL_OUTOF10: 5 - MODERATE PAIN
PAINLEVEL_OUTOF10: 6
PAINLEVEL_OUTOF10: 4

## 2024-04-20 ASSESSMENT — PAIN - FUNCTIONAL ASSESSMENT
PAIN_FUNCTIONAL_ASSESSMENT: 0-10

## 2024-04-20 ASSESSMENT — PAIN DESCRIPTION - LOCATION
LOCATION: KNEE
LOCATION: BACK

## 2024-04-20 ASSESSMENT — PAIN DESCRIPTION - ORIENTATION: ORIENTATION: RIGHT

## 2024-04-20 ASSESSMENT — ACTIVITIES OF DAILY LIVING (ADL): HOME_MANAGEMENT_TIME_ENTRY: 32

## 2024-04-20 NOTE — SIGNIFICANT EVENT
Home O2 certification per order from JERRY Smith:    On RA at rest, the pt's SPO2 dropped to 88% and she was placed on NC 2L which increased her SPO2 to 93%.     During exertion on NC 2L, the pt's SPO2 maintained greater than 93%.     The RN and Luna Smith were notified, no complications.

## 2024-04-20 NOTE — PROGRESS NOTES
Bonnie Jules is a 83 y.o. female on day 1 of admission presenting with Osteoarthritis.      Subjective   Patient seen and examined. Awake/alert/oriented. Sitting up in a chair. Remains on oxygen. VQ and CXR results reviewed. Encouraged incentive spirometer. Still having intermittent nausea. Has not had BM.        Objective     Last Recorded Vitals  BP (!) 104/46 (BP Location: Left arm, Patient Position: Lying)   Pulse 95   Temp 36.9 °C (98.4 °F) (Oral)   Resp 18   Wt 57 kg (125 lb 10.6 oz)   SpO2 93%   Intake/Output last 3 Shifts:    Intake/Output Summary (Last 24 hours) at 4/20/2024 1005  Last data filed at 4/19/2024 1901  Gross per 24 hour   Intake 600 ml   Output 550 ml   Net 50 ml       Admission Weight  Weight: 57 kg (125 lb 10.6 oz) (04/17/24 0616)    Daily Weight  04/17/24 : 57 kg (125 lb 10.6 oz)    Image Results  NM Lung Perfusion  Narrative: Interpreted By:  Demarcus Beckham,   STUDY:  NM LUNG PERFUSION;  4/19/2024 3:43 pm      INDICATION:  Signs/Symptoms:rule out PE.      COMPARISON:  Chest radiograph on 04/19/2024      ACCESSION NUMBER(S):  [id number]      ORDERING CLINICIAN:  KLAUS HANCOCK      TECHNIQUE:  DIVISION OF NUCLEAR MEDICINE  PERFUSION LUNG SCAN      Multiple perfusion images of the lungs were obtained after the  intravenous administration of 4.2 mCi of Tc-99m MAA. SPECT/CT images  of the lungs were also obtained.          FINDINGS:  Perfusion images demonstrate mild heterogeneity within both lungs. No  distinct wedge-shaped subsegmental or segmental perfusion defect seen  on SPECT CT to suggest acute pulmonary embolism (low probability).      Low-dose CT demonstrates small bilateral pleural effusions along with  bibasilar atelectasis      Impression: 1.  No distinct wedge-shaped subsegmental or segmental perfusion  defect seen on SPECT CT to suggest acute pulmonary embolism (low  probability).  2. Low-dose CT demonstrates small bilateral pleural effusions along  with bibasilar  atelectasis.      I personally reviewed the images/study. This study was interpreted at  [facility], [place], [place].      MACRO:  None      Signed by: Demarcus Beckham 4/19/2024 4:03 PM  Dictation workstation:   [id number]  XR chest 1 view  Narrative: Interpreted By:  Marco Hollis,   STUDY:  XR CHEST 1 VIEW; 4/19/2024 11:53 am      INDICATION:  Signs/Symptoms:dyspnea.      COMPARISON:  04/18/2024      ACCESSION NUMBER(S):  [id number]      ORDERING CLINICIAN:  THEODORE NAGY      TECHNIQUE:  1 view of the chest was performed.      FINDINGS:  The lungs are adequately inflated. No acute consolidation. No  significant pleural effusion. No pneumothorax.  The cardiomediastinal  silhouette is within normal limits. Median sternotomy wires are  intact. Old posterior right rib fractures are again noted. Small,  less than 2 cm metallic wire is also again seen above the proximal  aspect of the left clavicle.      Impression: Stable examination. No acute cardiopulmonary disease.      Signed by: Marco Hollis 4/19/2024 1:19 PM  Dictation workstation:   [id number]      Physical Exam  Vitals reviewed.   Constitutional:       Appearance: Normal appearance.   HENT:      Head: Normocephalic and atraumatic.   Eyes:      Extraocular Movements: Extraocular movements intact.      Conjunctiva/sclera: Conjunctivae normal.   Cardiovascular:      Rate and Rhythm: Normal rate and regular rhythm.   Pulmonary:      Effort: Pulmonary effort is normal.      Breath sounds: No wheezing or rhonchi.      Comments: Breath sounds clear, diminished with occasional crackles heard bilaterally posteriorly  Abdominal:      General: Bowel sounds are normal.      Palpations: Abdomen is soft.      Tenderness: There is no abdominal tenderness.   Musculoskeletal:         General: Normal range of motion.   Skin:     General: Skin is warm and dry.   Neurological:      General: No focal deficit present.      Mental Status:  She is alert and oriented to person, place, and time.         Relevant Results  Lab Results   Component Value Date    GLUCOSE 253 (H) 04/20/2024    CALCIUM 8.4 (L) 04/20/2024     04/20/2024    K 4.7 04/20/2024    CO2 21 (L) 04/20/2024     04/20/2024    BUN 30 (H) 04/20/2024    CREATININE 1.00 04/20/2024     Lab Results   Component Value Date    WBC 13.4 (H) 04/20/2024    HGB 9.1 (L) 04/20/2024    HCT 27.4 (L) 04/20/2024    MCV 92 04/20/2024     04/20/2024     NM Lung Perfusion  Result Date: 4/19/2024  1.  No distinct wedge-shaped subsegmental or segmental perfusion defect seen on SPECT CT to suggest acute pulmonary embolism (low probability). 2. Low-dose CT demonstrates small bilateral pleural effusions along with bibasilar atelectasis.   I personally reviewed the images/study. This study was interpreted at [place], [place].   MACRO: None   Signed by: Demarcus Beckham 4/19/2024 4:03 PM Dictation workstation:   [id number]    XR chest 1 view  Result Date: 4/19/2024  Stable examination. No acute cardiopulmonary disease.   Signed by: Marco Hollis 4/19/2024 1:19 PM Dictation workstation:   [id number]    XR chest 1 vie    Result Date: 4/18/2024  Limited study. Bilateral hilar prominence, left more than right is most likely due to pulmonary vascular congestion; correlate clinically and if there is suspicion for possible lymphadenopathy further evaluated with contrast-enhanced CT scan. Additional chronic findings as above.   Signed by: Milo Leal 4/18/2024 10:36 AM Dictation workstation:   [id number]    XR knee right 1-2 views  Result Date: 4/17/2024  No immediate complication after right total knee arthroplasty     MACRO: None   Signed by: Avni Correa 4/17/2024 12:33 PM Dictation workstation:   [id number] \       Assessment/Plan      Principal Problem:    Osteoarthritis  Active Problems:    History of coronary artery bypass graft    Chronic pain  of both knees    Primary osteoarthritis of right knee    Acute hypoxic respiratory failure  Patient desatted into the 80s on room air, she does not wear oxygen at home  Worsening dyspnea on exertion, improved today  CXR reviewed, no acute process  VQ scan ordered, low probability for PE  Encourage IS  Will add duonebs  Per surgery, possible discharge today, recommend oxygen cert prior to discharge  Trial on room air, if still hypoxic would recommend getting pulmonary input     Diabetes Mellitus Type I with hyperglycemia  Monitor blood glucose  Sliding scale insulin  Hypoglycemia protocol  hgbA1C 6.7 4/3/24     Right knee osteoarthritis  Status post right total knee arthroplasty by Dr. Liriano on 4/17/24  Pain management  Bowel regimen  Incentive spirometer  PT/OT  Management per orthopedic surgery     Nausea  PRN Zofran, added Compazine PRN     Anemia  Likely acute blood loss anemia  Iron studies reviewed  Blood management following  Given IV Venofer  Repeat CBC in AM     Hypertension  Continue metoprolol with hold parameters, continue amlodipine and hydralazine  Resume hydrochlorothiazide      Coronary artery disease  Resume aspirin per orthopedic surgery  Continue statin  Follows with Dr. Araujo outpatient, cleared for surgery prior to coming in  Stable     CKD  Monitor renal function close  Avoid nephrotoxic medications  Renally dose meds  Repeat BMP in the morning     Hypothyroidism  Continue Synthroid     CLL  In remission  Monitor WBCs     Leukocytosis  Elevated this morning, likely reactive from surgery  CXR with pulmonary vascular congestion  UA negative     Hyperlipidemia  Statin     Plan  Admitted to orthopedic surgery  Supplemental oxygen-Trial on room air  VQ scan  Pain management, bowel regimen  Incentive spirometer  Monitor blood glucose close  Lantus resumed at decreased dose, SSI increased  Antiemetics  PT/OT  CBC and BMP in the morning  DVT prophylaxis: Eliquis, SCDs     If remains hypoxic would get  pulmonary input  Home oxygen cert prior to discharge    Addendum: Home oxygen cert done, patient needs 2L oxygen via NC continuous. This is new from before surgery. CXR and VQ reviewed. No PE, no acute cardiopulmonary process. Pulmonary consult placed. Discussed with RN, she is aware.               CARMEN Barillas-CNP

## 2024-04-20 NOTE — NURSING NOTE
"Discussed with pt Inc of urine throughout the night states   \"Well I couldn't get out of bed\"  pt is choosing to go home and not SNF educated about skin breakdown and inc pt agrees to call when needs to void  Voices understanding Up in chair on bedpan call light in reach  "

## 2024-04-20 NOTE — PROGRESS NOTES
Occupational Therapy    OT Treatment    Patient Name: Bonnie Jules  MRN: [id number]  Today's Date: 4/20/2024  Time Calculation  Start Time: 1044  Stop Time: 1116  Time Calculation (min): 32 min         Assessment:  OT Assessment: Gradual progress made towards OT goals. Continue with current OT POC to increase strength, balance and functional tolerance to maximize safety and independence during ADLs.  Evaluation/Treatment Tolerance: Patient limited by fatigue, Patient limited by pain  End of Session Communication: Bedside nurse, PCT/NA/CTA  End of Session Patient Position: Bed, 3 rail up, Alarm off, caregiver present (PCA and RRT present in room, all needs in reach)  OT Assessment Results: Decreased ADL status, Decreased endurance, Decreased functional mobility  Evaluation/Treatment Tolerance: Patient limited by fatigue, Patient limited by pain  Plan:  Treatment Interventions: ADL retraining, Functional transfer training, Equipment evaluation/education, Patient/family training  OT Frequency: 5 times per week  OT Discharge Recommendations: Moderate intensity level of continued care  OT - OK to Discharge: Yes  Treatment Interventions: ADL retraining, Functional transfer training, Equipment evaluation/education, Patient/family training    Subjective   Previous Visit Info:  OT Last Visit  OT Received On: 04/20/24  General:  General  Reason for Referral: impaired ADLs s/p R TKA  Past Medical History Relevant to Rehab: anxiety, CAD s/p CABG x3, chronic lymphocytic leukemia, HTN, DM, diabetic polyneuropathy, chronic kidney disease, osteoporosis, PVD, sleep apnea, bilateral carpal tunnel release, L ALFONZO,  Prior to Session Communication: Bedside nurse  Patient Position Received: Up in chair, Alarm off, not on at start of session  General Comment: Pt cleared for OT session per nursing, cooperative with encouragement. Increased time and rest breaks required for task completion secondary to fatigue.  Precautions:  Hearing/Visual  Limitations: hearing WFL, glasses  Medical Precautions: Fall precautions  Post-Surgical Precautions: Right total knee precautions  Precautions Comment: Pt unable to state knee precautions prior to education. RLE bandaged, continuous pulse-ox, telemetry  Vital Signs:  Vital Signs  SpO2: 92 % (RRT into assess SpO2 throughout functional tasks, O2 desaturation observed to 88% however recovers to >90% with rest break on room air.)  Pain:  Pain Assessment  Pain Assessment: 0-10  Pain Score: 5 - Moderate pain  Pain Type: Surgical pain  Pain Location: Knee  Pain Orientation: Right  Clinical Progression: Not changed  Pain Interventions: Repositioned, Ambulation/increased activity    Objective    Cognition:  Cognition  Overall Cognitive Status: Within Functional Limits  Orientation Level: Oriented X4  Safety/Judgement: Exceptions to WFL  Insight: Moderate  Impulsive: Within functional limits  Task Initiation: Initiates with cues  Processing Speed: Within funtional limits  Coordination:  Movements are Fluid and Coordinated: No  Upper Body Coordination: WFL  Lower Body Coordination: slower rate of movements R>L  Activities of Daily Living: UE Bathing  UE Bathing Comments: UB bathing tasks completed seated in bedside chair with set-up and close supervision. Increased time required to complete    LE Bathing  LE Bathing Comments: LLE bathing tasks completed with David and increased time/ effort from seated position. RLE bathing deferred this date d/t bandage.    UE Dressing  UE Dressing Comments: David required to don/[facility] gown from seated position for line management    Toileting  Toileting Comments: pt seated on bedpan in chair upon arrival, perineal hygiene completed in supported standing with modA  Functional Standing Tolerance:  Time: ~4min  Activity: ADL completion with FWW  Functional Standing Tolerance Comments: Tasks completed in standing to increase functional tolerance and strength to maximize safety and  independence during ADLs.  Bed Mobility/Transfers: Bed Mobility  Bed Mobility: Yes  Bed Mobility 1  Bed Mobility 1: Sitting to supine  Level of Assistance 1: Maximum assistance  Bed Mobility Comments 1: increased assistance required for BLE management    Transfers  Transfer: Yes  Transfer 1  Trials/Comments 1: sit<>stands completed from standard chair height with FWW and modA- verbal/ tactile cues required for proper hand placement to increase safety and decrease risk of falls. Chair>bed completed with maxA as a stand-pivot d/t reports of increased fatigue.      Functional Mobility:  Functional Mobility  Functional Mobility Performed: Yes  Functional Mobility 1  Surface 1: Level tile  Device 1: Rolling walker  Assistance 1: Minimum assistance  Quality of Functional Mobility 1: Inconsistent stride length  Comments 1: Pt unable to complete short household distances this date d/t reports of increased R knee/ back pain and fatigue. Mod verbal/ tactile cues required for sequencing to increase safety and decrease risk of falls.    Outcome Measures:[facility] Daily Activity  Putting on and taking off regular lower body clothing: A lot  Bathing (including washing, rinsing, drying): A lot  Putting on and taking off regular upper body clothing: A little  Toileting, which includes using toilet, bedpan or urinal: A lot  Taking care of personal grooming such as brushing teeth: A little  Eating Meals: A little  Daily Activity - Total Score: 15        Education Documentation  Body Mechanics, taught by PANTERA Woo at 4/20/2024  5:21 PM.  Learner: Patient  Readiness: Acceptance  Method: Explanation, Demonstration  Response: Needs Reinforcement    Precautions, taught by PANTERA Woo at 4/20/2024  5:21 PM.  Learner: Patient  Readiness: Acceptance  Method: Explanation, Demonstration  Response: Needs Reinforcement    ADL Training, taught by PANTERA Woo at 4/20/2024  5:21 PM.  Learner: Patient  Readiness:  Acceptance  Method: Explanation, Demonstration  Response: Needs Reinforcement    Education Comments  Education provided on role of OT/POC, safety awareness throughout functional tasks/transfers, TKA precautions, importance of activity/ rest routine, EC/WS techniques, and use of call light for assistance. Questions, comments and concerns addressed regarding OT.      Goals:  Encounter Problems       Encounter Problems (Active)       ADLs       Pt will complete ADL tasks at mod I with use of AE prn  (Progressing)       Start:  04/18/24    Expected End:  04/22/24               Functional Mobility       Pt will perform functional mobility household distances at mod I with use of RW.    (Progressing)       Start:  04/18/24    Expected End:  04/22/24               OT Transfers       Pt will perform functional transfers at mod I (Progressing)       Start:  04/18/24    Expected End:  04/22/24               Precautions       Pt will independently maintain TKA precautions while completing ADL/IADL tasks and functional transfers/mobility.  (Progressing)       Start:  04/18/24    Expected End:  04/22/24

## 2024-04-20 NOTE — PROGRESS NOTES
Physical Therapy    Physical Therapy Treatment    Patient Name: Bonnie Jules  MRN: [id number]  Today's Date: 4/20/2024  Time Calculation  Start Time: 0830  Stop Time: 0910  Time Calculation (min): 40 min       Assessment/Plan   PT Assessment  PT Assessment Results: Decreased strength, Decreased range of motion, Decreased endurance, Impaired balance, Decreased mobility, Decreased coordination, Decreased safety awareness, Orthopedic restrictions, Pain  Rehab Prognosis: Fair  End of Session Communication: Bedside nurse, PCT/NA/CTA  Assessment Comment: Patient presents with decreased strength and decreased functional mobility, difficulty with transfers and gait with mod/maxA for all transfers. Very unsteady and unsafe during gait. High fall risk.  End of Session Patient Position:  (Up in chair with all needs met and call light in reach. RN aware.)     PT Plan  Treatment/Interventions: Bed mobility, Transfer training, Gait training, Balance training, Strengthening, Range of motion, Therapeutic exercise, Therapeutic activity, Home exercise program  PT Plan: Skilled PT  PT Frequency: BID  PT Discharge Recommendations: Moderate intensity level of continued care  PT Recommended Transfer Status: Assist x1 (with Jr FWW)  PT - OK to Discharge: Yes      General Visit Information:   PT  Visit  PT Received On: 04/20/24  Response to Previous Treatment: Patient with no complaints from previous session.  General  Prior to Session Communication: Bedside nurse  General Comment:  (Patient cleared by RN for therapy, patient agreeable at this time. Patient supine in bed.)    Subjective   Precautions:  Precautions  LE Weight Bearing Status: Weight Bearing as Tolerated  Post-Surgical Precautions: Right total knee precautions    Objective   Pain:  Pain Assessment  Pain Assessment: 0-10  Pain Score: 5 - Moderate pain  Pain Type: Surgical pain  Pain Location: Knee  Pain Orientation: Right  Cognition:   A&Ox3  Postural Control:  Static Sitting  "Balance  Static Sitting-Balance Support: Bilateral upper extremity supported  Static Sitting-Level of Assistance: Distant supervision  Static Sitting-Comment/Number of Minutes: 5    Activity Tolerance:  Activity Tolerance  Endurance: Tolerates less than 10 min exercise with changes in vital signs  Treatments:  Therapeutic Exercise  Therapeutic Exercise Performed: Yes  Therapeutic Exercise Activity 1: Seated GS 10x2  Therapeutic Exercise Activity 2: Seated AP 10x2  Therapeutic Exercise Activity 3: Seated hip ABD 10x2 B  Therapeutic Exercise Activity 4: Seated AAROM knee ext RLE 10x2  Therapeutic Exercise Activity 5: Seated AAROM knee flex 5\" H RLE 10x2    Therapeutic Activity  Therapeutic Activity Performed: Yes  Therapeutic Activity 1:   Supine>sit: with mod-maxA for trunk elevation with HOB elevated, maxA to scoot/squate hips to EOB and for management of RLE.     Sit>stand: with maxA for support and safety with cues for hand placements.     Bed>chair: with maxA for balance and safety with 2WW with decreased WBing on RLE, difficulty weight shifting, inconsistent step length/height, non-reciprocal gait cycle, difficulty progressing BLEs with pivoting present, and very slow gait with cues for AD management/progression.     Stand>sit: with maxA for control with cues for hand placement.    Outcome Measures:      Education Documentation  No documentation found.  Education Comments  No comments found.      Encounter Problems       Encounter Problems (Active)       Functional Mobility       Pt will transition supine<>sit at flat bed with mod I. (Progressing)       Start:  04/17/24    Expected End:  04/22/24            Pt will transfer sit<>stand with FWW & mod I. (Progressing)       Start:  04/17/24    Expected End:  04/22/24            Pt will ambulate >/=100 ft with FWW & mod I. (Progressing)       Start:  04/17/24    Expected End:  04/22/24            Pt will adhere to R total knee precautions during all functional " mobility with S and at most 1 verbal cue. (Progressing)       Start:  04/17/24    Expected End:  04/22/24

## 2024-04-21 ENCOUNTER — APPOINTMENT (OUTPATIENT)
Dept: RADIOLOGY | Facility: HOSPITAL | Age: 84
DRG: 469 | End: 2024-04-21
Payer: MEDICARE

## 2024-04-21 ENCOUNTER — APPOINTMENT (OUTPATIENT)
Dept: CARDIOLOGY | Facility: HOSPITAL | Age: 84
DRG: 469 | End: 2024-04-21
Payer: MEDICARE

## 2024-04-21 LAB
ANION GAP SERPL CALC-SCNC: 14 MMOL/L
APPEARANCE UR: CLEAR
APTT PPP: 27.8 SECONDS (ref 22–32.5)
BILIRUB UR STRIP.AUTO-MCNC: NEGATIVE MG/DL
BUN SERPL-MCNC: 41 MG/DL (ref 8–25)
CALCIUM SERPL-MCNC: 8.2 MG/DL (ref 8.5–10.4)
CHLORIDE SERPL-SCNC: 97 MMOL/L (ref 97–107)
CK SERPL-CCNC: 223 U/L (ref 24–195)
CO2 SERPL-SCNC: 20 MMOL/L (ref 24–31)
COLOR UR: YELLOW
CREAT SERPL-MCNC: 1.4 MG/DL (ref 0.4–1.6)
EGFRCR SERPLBLD CKD-EPI 2021: 38 ML/MIN/1.73M*2
ERYTHROCYTE [DISTWIDTH] IN BLOOD BY AUTOMATED COUNT: 13.9 % (ref 11.5–14.5)
ERYTHROCYTE [DISTWIDTH] IN BLOOD BY AUTOMATED COUNT: 13.9 % (ref 11.5–14.5)
GLUCOSE BLD MANUAL STRIP-MCNC: 147 MG/DL (ref 74–99)
GLUCOSE BLD MANUAL STRIP-MCNC: 167 MG/DL (ref 74–99)
GLUCOSE BLD MANUAL STRIP-MCNC: 294 MG/DL (ref 74–99)
GLUCOSE BLD MANUAL STRIP-MCNC: 302 MG/DL (ref 74–99)
GLUCOSE SERPL-MCNC: 254 MG/DL (ref 65–99)
GLUCOSE UR STRIP.AUTO-MCNC: ABNORMAL MG/DL
HCT VFR BLD AUTO: 26.6 % (ref 36–46)
HCT VFR BLD AUTO: 28 % (ref 36–46)
HGB BLD-MCNC: 8.5 G/DL (ref 12–16)
HGB BLD-MCNC: 8.7 G/DL (ref 12–16)
KETONES UR STRIP.AUTO-MCNC: NEGATIVE MG/DL
LEUKOCYTE ESTERASE UR QL STRIP.AUTO: NEGATIVE
MCH RBC QN AUTO: 30.6 PG (ref 26–34)
MCH RBC QN AUTO: 31.4 PG (ref 26–34)
MCHC RBC AUTO-ENTMCNC: 31.1 G/DL (ref 32–36)
MCHC RBC AUTO-ENTMCNC: 32 G/DL (ref 32–36)
MCV RBC AUTO: 98 FL (ref 80–100)
MCV RBC AUTO: 99 FL (ref 80–100)
NITRITE UR QL STRIP.AUTO: NEGATIVE
NRBC BLD-RTO: 0 /100 WBCS (ref 0–0)
NRBC BLD-RTO: 0 /100 WBCS (ref 0–0)
PH UR STRIP.AUTO: 5 [PH]
PLATELET # BLD AUTO: 220 X10*3/UL (ref 150–450)
PLATELET # BLD AUTO: 223 X10*3/UL (ref 150–450)
POTASSIUM SERPL-SCNC: 5.1 MMOL/L (ref 3.4–5.1)
PROT UR STRIP.AUTO-MCNC: NEGATIVE MG/DL
RBC # BLD AUTO: 2.71 X10*6/UL (ref 4–5.2)
RBC # BLD AUTO: 2.84 X10*6/UL (ref 4–5.2)
RBC # UR STRIP.AUTO: NEGATIVE /UL
SODIUM SERPL-SCNC: 131 MMOL/L (ref 133–145)
SP GR UR STRIP.AUTO: 1.02
TROPONIN T SERPL-MCNC: 577 NG/L
TROPONIN T SERPL-MCNC: 680 NG/L
TROPONIN T SERPL-MCNC: 821 NG/L
UROBILINOGEN UR STRIP.AUTO-MCNC: NORMAL MG/DL
WBC # BLD AUTO: 10.3 X10*3/UL (ref 4.4–11.3)
WBC # BLD AUTO: 10.8 X10*3/UL (ref 4.4–11.3)

## 2024-04-21 PROCEDURE — 36415 COLL VENOUS BLD VENIPUNCTURE: CPT | Performed by: NURSE PRACTITIONER

## 2024-04-21 PROCEDURE — 94640 AIRWAY INHALATION TREATMENT: CPT | Mod: MUE

## 2024-04-21 PROCEDURE — 76770 US EXAM ABDO BACK WALL COMP: CPT

## 2024-04-21 PROCEDURE — 71045 X-RAY EXAM CHEST 1 VIEW: CPT | Performed by: RADIOLOGY

## 2024-04-21 PROCEDURE — 2500000005 HC RX 250 GENERAL PHARMACY W/O HCPCS: Performed by: NURSE PRACTITIONER

## 2024-04-21 PROCEDURE — 2060000001 HC INTERMEDIATE ICU ROOM DAILY

## 2024-04-21 PROCEDURE — 9420000001 HC RT PATIENT EDUCATION 5 MIN

## 2024-04-21 PROCEDURE — 1210000001 HC SEMI-PRIVATE ROOM DAILY

## 2024-04-21 PROCEDURE — 76770 US EXAM ABDO BACK WALL COMP: CPT | Performed by: RADIOLOGY

## 2024-04-21 PROCEDURE — 80048 BASIC METABOLIC PNL TOTAL CA: CPT | Performed by: NURSE PRACTITIONER

## 2024-04-21 PROCEDURE — 84484 ASSAY OF TROPONIN QUANT: CPT | Performed by: NURSE PRACTITIONER

## 2024-04-21 PROCEDURE — 71045 X-RAY EXAM CHEST 1 VIEW: CPT

## 2024-04-21 PROCEDURE — 97116 GAIT TRAINING THERAPY: CPT | Mod: GP,CQ

## 2024-04-21 PROCEDURE — 2500000004 HC RX 250 GENERAL PHARMACY W/ HCPCS (ALT 636 FOR OP/ED): Performed by: PHYSICIAN ASSISTANT

## 2024-04-21 PROCEDURE — 85027 COMPLETE CBC AUTOMATED: CPT | Mod: 91 | Performed by: NURSE PRACTITIONER

## 2024-04-21 PROCEDURE — 2500000001 HC RX 250 WO HCPCS SELF ADMINISTERED DRUGS (ALT 637 FOR MEDICARE OP): Performed by: INTERNAL MEDICINE

## 2024-04-21 PROCEDURE — 97110 THERAPEUTIC EXERCISES: CPT | Mod: GP,CQ

## 2024-04-21 PROCEDURE — 99024 POSTOP FOLLOW-UP VISIT: CPT | Performed by: ORTHOPAEDIC SURGERY

## 2024-04-21 PROCEDURE — 7100000011 HC EXTENDED STAY RECOVERY HOURLY - NURSING UNIT

## 2024-04-21 PROCEDURE — 97530 THERAPEUTIC ACTIVITIES: CPT | Mod: GP,CQ

## 2024-04-21 PROCEDURE — 85027 COMPLETE CBC AUTOMATED: CPT | Performed by: NURSE PRACTITIONER

## 2024-04-21 PROCEDURE — 99221 1ST HOSP IP/OBS SF/LOW 40: CPT | Performed by: INTERNAL MEDICINE

## 2024-04-21 PROCEDURE — 81003 URINALYSIS AUTO W/O SCOPE: CPT | Performed by: INTERNAL MEDICINE

## 2024-04-21 PROCEDURE — 2500000001 HC RX 250 WO HCPCS SELF ADMINISTERED DRUGS (ALT 637 FOR MEDICARE OP): Performed by: PHYSICIAN ASSISTANT

## 2024-04-21 PROCEDURE — 82947 ASSAY GLUCOSE BLOOD QUANT: CPT

## 2024-04-21 PROCEDURE — 2500000002 HC RX 250 W HCPCS SELF ADMINISTERED DRUGS (ALT 637 FOR MEDICARE OP, ALT 636 FOR OP/ED): Performed by: ORTHOPAEDIC SURGERY

## 2024-04-21 PROCEDURE — 2500000004 HC RX 250 GENERAL PHARMACY W/ HCPCS (ALT 636 FOR OP/ED): Performed by: NURSE PRACTITIONER

## 2024-04-21 PROCEDURE — 2500000001 HC RX 250 WO HCPCS SELF ADMINISTERED DRUGS (ALT 637 FOR MEDICARE OP): Performed by: NURSE PRACTITIONER

## 2024-04-21 PROCEDURE — 2500000004 HC RX 250 GENERAL PHARMACY W/ HCPCS (ALT 636 FOR OP/ED): Performed by: INTERNAL MEDICINE

## 2024-04-21 PROCEDURE — 93005 ELECTROCARDIOGRAM TRACING: CPT

## 2024-04-21 PROCEDURE — 85730 THROMBOPLASTIN TIME PARTIAL: CPT | Performed by: NURSE PRACTITIONER

## 2024-04-21 PROCEDURE — 82550 ASSAY OF CK (CPK): CPT | Performed by: INTERNAL MEDICINE

## 2024-04-21 PROCEDURE — 94760 N-INVAS EAR/PLS OXIMETRY 1: CPT

## 2024-04-21 PROCEDURE — 2500000002 HC RX 250 W HCPCS SELF ADMINISTERED DRUGS (ALT 637 FOR MEDICARE OP, ALT 636 FOR OP/ED): Performed by: NURSE PRACTITIONER

## 2024-04-21 RX ORDER — FUROSEMIDE 10 MG/ML
20 INJECTION INTRAMUSCULAR; INTRAVENOUS ONCE
Status: COMPLETED | OUTPATIENT
Start: 2024-04-21 | End: 2024-04-21

## 2024-04-21 RX ORDER — NITROGLYCERIN 40 MG/1
1 PATCH TRANSDERMAL DAILY
Status: DISCONTINUED | OUTPATIENT
Start: 2024-04-21 | End: 2024-04-24 | Stop reason: HOSPADM

## 2024-04-21 RX ORDER — HEPARIN SODIUM 5000 [USP'U]/ML
INJECTION, SOLUTION INTRAVENOUS; SUBCUTANEOUS AS NEEDED
Status: DISCONTINUED | OUTPATIENT
Start: 2024-04-21 | End: 2024-04-22

## 2024-04-21 RX ORDER — HEPARIN SODIUM 10000 [USP'U]/100ML
0-4000 INJECTION, SOLUTION INTRAVENOUS CONTINUOUS
Status: DISCONTINUED | OUTPATIENT
Start: 2024-04-21 | End: 2024-04-22

## 2024-04-21 RX ORDER — METOPROLOL TARTRATE 25 MG/1
25 TABLET, FILM COATED ORAL 2 TIMES DAILY
Status: DISCONTINUED | OUTPATIENT
Start: 2024-04-21 | End: 2024-04-24 | Stop reason: HOSPADM

## 2024-04-21 RX ADMIN — IPRATROPIUM BROMIDE AND ALBUTEROL SULFATE 3 ML: 2.5; .5 SOLUTION RESPIRATORY (INHALATION) at 07:19

## 2024-04-21 RX ADMIN — LISINOPRIL 40 MG: 40 TABLET ORAL at 09:12

## 2024-04-21 RX ADMIN — HYDROCHLOROTHIAZIDE 25 MG: 25 TABLET ORAL at 09:12

## 2024-04-21 RX ADMIN — FUROSEMIDE 20 MG: 10 INJECTION, SOLUTION INTRAMUSCULAR; INTRAVENOUS at 11:46

## 2024-04-21 RX ADMIN — INSULIN LISPRO 12 UNITS: 100 INJECTION, SOLUTION INTRAVENOUS; SUBCUTANEOUS at 09:12

## 2024-04-21 RX ADMIN — Medication: at 20:00

## 2024-04-21 RX ADMIN — METOPROLOL TARTRATE 12.5 MG: 25 TABLET, FILM COATED ORAL at 09:12

## 2024-04-21 RX ADMIN — APIXABAN 2.5 MG: 2.5 TABLET, FILM COATED ORAL at 09:12

## 2024-04-21 RX ADMIN — METOPROLOL TARTRATE 25 MG: 25 TABLET, FILM COATED ORAL at 21:24

## 2024-04-21 RX ADMIN — INSULIN GLARGINE 11 UNITS: 100 INJECTION, SOLUTION SUBCUTANEOUS at 08:00

## 2024-04-21 RX ADMIN — POLYETHYLENE GLYCOL 3350 17 G: 17 POWDER, FOR SOLUTION ORAL at 09:14

## 2024-04-21 RX ADMIN — IPRATROPIUM BROMIDE AND ALBUTEROL SULFATE 3 ML: 2.5; .5 SOLUTION RESPIRATORY (INHALATION) at 19:33

## 2024-04-21 RX ADMIN — LEVOTHYROXINE SODIUM 100 MCG: 0.1 TABLET ORAL at 05:10

## 2024-04-21 RX ADMIN — GABAPENTIN 100 MG: 100 CAPSULE ORAL at 21:24

## 2024-04-21 RX ADMIN — OXYCODONE AND ACETAMINOPHEN 1 TABLET: 5; 325 TABLET ORAL at 05:12

## 2024-04-21 RX ADMIN — Medication 2 L/MIN: at 07:19

## 2024-04-21 RX ADMIN — INSULIN LISPRO 12 UNITS: 100 INJECTION, SOLUTION INTRAVENOUS; SUBCUTANEOUS at 13:09

## 2024-04-21 RX ADMIN — HYDRALAZINE HYDROCHLORIDE 50 MG: 50 TABLET, FILM COATED ORAL at 09:12

## 2024-04-21 RX ADMIN — AMLODIPINE BESYLATE 10 MG: 10 TABLET ORAL at 09:12

## 2024-04-21 RX ADMIN — IPRATROPIUM BROMIDE AND ALBUTEROL SULFATE 3 ML: 2.5; .5 SOLUTION RESPIRATORY (INHALATION) at 12:17

## 2024-04-21 RX ADMIN — HEPARIN SODIUM 700 UNITS/HR: 10000 INJECTION, SOLUTION INTRAVENOUS at 17:41

## 2024-04-21 RX ADMIN — GABAPENTIN 100 MG: 100 CAPSULE ORAL at 09:12

## 2024-04-21 RX ADMIN — NITROGLYCERIN 1 PATCH: 0.2 PATCH TRANSDERMAL at 16:45

## 2024-04-21 RX ADMIN — PRAVASTATIN SODIUM 40 MG: 40 TABLET ORAL at 21:24

## 2024-04-21 ASSESSMENT — ENCOUNTER SYMPTOMS
FATIGUE: 1
NAUSEA: 0
COUGH: 0
ARTHRALGIAS: 0
MYALGIAS: 0
FEVER: 0
DYSURIA: 0
CONFUSION: 0
HEMATURIA: 0
SINUS PAIN: 0
COLOR CHANGE: 0
DIARRHEA: 0
CHEST TIGHTNESS: 0
CHILLS: 0
HEADACHES: 0
VOMITING: 0
POLYDIPSIA: 0
POLYPHAGIA: 0
SHORTNESS OF BREATH: 1
DIZZINESS: 0
HALLUCINATIONS: 0

## 2024-04-21 ASSESSMENT — PAIN - FUNCTIONAL ASSESSMENT
PAIN_FUNCTIONAL_ASSESSMENT: 0-10

## 2024-04-21 ASSESSMENT — PAIN DESCRIPTION - DESCRIPTORS: DESCRIPTORS: ACHING

## 2024-04-21 ASSESSMENT — COGNITIVE AND FUNCTIONAL STATUS - GENERAL
WALKING IN HOSPITAL ROOM: A LITTLE
TURNING FROM BACK TO SIDE WHILE IN FLAT BAD: A LOT
MOVING FROM LYING ON BACK TO SITTING ON SIDE OF FLAT BED WITH BEDRAILS: A LITTLE
MOVING FROM LYING ON BACK TO SITTING ON SIDE OF FLAT BED WITH BEDRAILS: A LITTLE
DRESSING REGULAR LOWER BODY CLOTHING: A LOT
CLIMB 3 TO 5 STEPS WITH RAILING: A LOT
STANDING UP FROM CHAIR USING ARMS: A LITTLE
TOILETING: A LOT
MOVING TO AND FROM BED TO CHAIR: A LOT
MOVING TO AND FROM BED TO CHAIR: A LOT
MOBILITY SCORE: 15
MOVING FROM LYING ON BACK TO SITTING ON SIDE OF FLAT BED WITH BEDRAILS: A LITTLE
DAILY ACTIVITIY SCORE: 16
TURNING FROM BACK TO SIDE WHILE IN FLAT BAD: A LOT
PERSONAL GROOMING: A LITTLE
CLIMB 3 TO 5 STEPS WITH RAILING: A LOT
STANDING UP FROM CHAIR USING ARMS: A LITTLE
STANDING UP FROM CHAIR USING ARMS: A LITTLE
WALKING IN HOSPITAL ROOM: A LITTLE
HELP NEEDED FOR BATHING: A LOT
CLIMB 3 TO 5 STEPS WITH RAILING: A LOT
TURNING FROM BACK TO SIDE WHILE IN FLAT BAD: A LOT
MOBILITY SCORE: 15
DRESSING REGULAR UPPER BODY CLOTHING: A LITTLE
MOVING TO AND FROM BED TO CHAIR: A LOT
WALKING IN HOSPITAL ROOM: A LITTLE
MOBILITY SCORE: 15

## 2024-04-21 ASSESSMENT — PAIN SCALES - GENERAL
PAINLEVEL_OUTOF10: 0 - NO PAIN
PAINLEVEL_OUTOF10: 0 - NO PAIN
PAINLEVEL_OUTOF10: 2
PAINLEVEL_OUTOF10: 2
PAINLEVEL_OUTOF10: 0 - NO PAIN
PAINLEVEL_OUTOF10: 6
PAINLEVEL_OUTOF10: 2

## 2024-04-21 ASSESSMENT — PAIN DESCRIPTION - ORIENTATION: ORIENTATION: RIGHT

## 2024-04-21 ASSESSMENT — ACTIVITIES OF DAILY LIVING (ADL): EFFECT OF PAIN ON DAILY ACTIVITIES: LIMITED ROM

## 2024-04-21 ASSESSMENT — PAIN DESCRIPTION - LOCATION: LOCATION: KNEE

## 2024-04-21 NOTE — NURSING NOTE
Patient lying in bed comfortably,. Eyes closed and breathing unlabored. Satting well on oxygen. Call light within reach. Bed alarm in use

## 2024-04-21 NOTE — PROGRESS NOTES
Physical Therapy    Physical Therapy Treatment    Patient Name: Bonnie Jules  MRN: [id number]  Today's Date: 4/21/2024  Time Calculation  Start Time: 1430  Stop Time: 1455  Time Calculation (min): 25 min       Assessment/Plan   PT Assessment  PT Assessment Results: Decreased strength, Decreased range of motion, Decreased endurance, Impaired balance, Decreased mobility, Decreased coordination, Decreased safety awareness, Orthopedic restrictions, Pain  Rehab Prognosis: Fair  Strengths: Support of extended family/friends, Rehab experience, Attitude of self  Barriers to Participation: Comorbidities  End of Session Communication: Bedside nurse, PCT/NA/CTA  Assessment Comment: Pt with EKG and labs after this a.m. presentation of SOB and chest pain. Cleared for pm therapy. Opted for bed exercises only at this time. Luna Smith CNP in to see pt during session to advise pt of troponin of 577 and to hold any PT at this time pending follow up with Dr. Sousa  End of Session Patient Position: Bed, 2 rail up (RN present.)  PT Plan  Inpatient/Swing Bed or Outpatient: Inpatient  PT Plan  Treatment/Interventions: Bed mobility, Transfer training, Gait training, Stair training, Balance training, Range of motion, Therapeutic exercise, Therapeutic activity, Home exercise program  PT Plan: Skilled PT  PT Frequency: BID  PT Discharge Recommendations: Moderate intensity level of continued care  PT Recommended Transfer Status: Assist x1, Assistive device  PT - OK to Discharge: Yes    General Visit Information:   PT  Visit  PT Received On: 04/21/24  Response to Previous Treatment: Patient reporting fatigue but able to participate.  General  Reason for Referral: Impaired functional mobility. This 83 y.o. female was admitted for an elective surgery. She was now s/p R TKA by Dr. Guy on 4/17/24.  Past Medical History Relevant to Rehab: anxiety, CAD s/p CABG x3, chronic lymphocytic leukemia, HTN, DM, diabetic polyneuropathy, chronic kidney  disease, osteoporosis, PVD, sleep apnea, bilateral carpal tunnel release, L ALFONZO,  Missed Visit: No  Family/Caregiver Present: Yes  Caregiver Feedback: Daughter at bedside  Prior to Session Communication: Bedside nurse  Patient Position Received: Bed, 2 rail up, Alarm off, not on at start of session  Preferred Learning Style: verbal, visual  General Comment: Improved SOB and sternal chest pain pm vs am    Subjective   Precautions:  Precautions  Hearing/Visual Limitations: hearing WFL, glasses  LE Weight Bearing Status: Weight Bearing as Tolerated  Medical Precautions: Fall precautions  Post-Surgical Precautions: Right total knee precautions  Precautions Comment: Does not recall surgical knee precautions at this time. Education provided as needed.  Vital Signs:     Objective   Pain:  Pain Assessment  Pain Score: 0 - No pain    Cognition:  Cognition  Overall Cognitive Status: Within Functional Limits  Orientation Level: Oriented X4  Processing Speed: Delayed     Activity Tolerance:  Activity Tolerance  Endurance: Tolerates 10 - 20 min exercise with multiple rests  Activity Tolerance Comments: Rest breaks for fatigue with good recovery  Rate of Perceived Exertion (RPE):  (5)  Treatments:  Therapeutic Exercise  Therapeutic Exercise Performed: Yes  Therapeutic Exercise Activity 1: B AP x20  Therapeutic Exercise Activity 2: B SLR x20  Therapeutic Exercise Activity 3: B QS x20  Therapeutic Exercise Activity 4: B GS x20  Therapeutic Exercise Activity 5: B SAQ x20  Therapeutic Exercise Activity 6: Performed supine in bed    Stairs  Stairs: No    Outcome Measures:  [facility] Basic Mobility  Turning from your back to your side while in a flat bed without using bedrails: A little  Moving from lying on your back to sitting on the side of a flat bed without using bedrails: A lot  Moving to and from bed to chair (including a wheelchair): A lot  Standing up from a chair using your arms (e.g. wheelchair or bedside chair): A little  To  walk in hospital room: A little  Climbing 3-5 steps with railing: A lot  Basic Mobility - Total Score: 15    OP EDUCATION:  Outpatient Education  Individual(s) Educated: Patient  Education Provided: Body Mechanics  Patient Response to Education: Patient/Caregiver Verbalized Understanding of Information  Education Comment: Would benefit from continued reinforcement for duration of acute stay.    Encounter Problems       Encounter Problems (Active)       Functional Mobility       Pt will transition supine<>sit at flat bed with mod I. (Progressing)       Start:  04/17/24    Expected End:  04/22/24            Pt will transfer sit<>stand with FWW & mod I. (Progressing)       Start:  04/17/24    Expected End:  04/22/24            Pt will ambulate >/=100 ft with FWW & mod I. (Progressing)       Start:  04/17/24    Expected End:  04/22/24            Pt will adhere to R total knee precautions during all functional mobility with S and at most 1 verbal cue. (Progressing)       Start:  04/17/24    Expected End:  04/22/24               Pain - Adult

## 2024-04-21 NOTE — CONSULTS
Consults    Reason For Consult  JOSÉ on CKD Stage 2/3    History Of Present Illness  Bonnie Jules is a 83 y.o. female with a past medical history of hypertension, CLL who presented to the hospital for a total knee replacement surgery.  Patient is status post right knee total replacement surgery we are consulted as her creatinine today bumped to 1.4.  Baseline creatinine 0.9-1.1. Does not see a Nephrologist. The patient reports mild shortness of breath, fatigue otherwise denies any symptoms however she was found to have over 1 L of urinary retention today and a Layton catheter was placed.  She is also on a ACE inhibitor and hydrochlorothiazide.  We are consulted for management of acute kidney injury.     Past Medical History  She has a past medical history of Anxiety, Benign hypertension, Chronic ischemic colitis (Multi), Chronic kidney disease, stage III (moderate) (Multi), CLL (chronic lymphocytic leukemia) (Multi), Coronary artery disease, CTS (carpal tunnel syndrome), Diabetes mellitus (Multi), Essential (primary) hypertension, GI bleed, Hyperlipidemia, unspecified, Hypertension, Hypothyroidism, Hypothyroidism, unspecified type, Mixed hyperlipidemia, Osteoarthritis, Osteoporosis, Peripheral vascular disease (CMS-[facility]), Seizure disorder (Multi), and Type 1 diabetes mellitus with hyperglycemia (Multi).    Surgical History  She has a past surgical history that includes Carpal tunnel release (Bilateral); Total hip arthroplasty (Left, 2012); Coronary artery bypass graft (2005); Cholecystectomy; Cataract extraction w/ intraocular lens  implant, bilateral; Hysterectomy; Colonoscopy; Other surgical history; and Other surgical history (Right, 01/2016).     Social History  She reports that she has never smoked. She has been exposed to tobacco smoke. She has never used smokeless tobacco. She reports that she does not currently use alcohol. She reports that she does not currently use drugs.    Family History  Family History    Problem Relation Name Age of Onset    Diabetes Mother      Diabetes Daughter      Other (RA) Daughter          Allergies  Quinolones, Chlorpheniramine-dextromethorp, Amoxicillin, Amoxicillin-pot clavulanate, Ciprofloxacin, and Levofloxacin    Review of Systems   Constitutional:  Positive for fatigue. Negative for chills and fever.   HENT:  Negative for congestion and sinus pain.    Respiratory:  Positive for shortness of breath. Negative for cough and chest tightness.    Cardiovascular:  Negative for chest pain and leg swelling.   Gastrointestinal:  Negative for diarrhea, nausea and vomiting.   Endocrine: Negative for polydipsia, polyphagia and polyuria.   Genitourinary:  Negative for dysuria and hematuria.   Musculoskeletal:  Negative for arthralgias and myalgias.   Skin:  Negative for color change and rash.   Neurological:  Negative for dizziness, syncope and headaches.   Psychiatric/Behavioral:  Negative for confusion and hallucinations.           Physical Exam  Constitutional:       General: She is awake. She is not in acute distress.     Appearance: She is not toxic-appearing.   HENT:      Head: Normocephalic and atraumatic.      Mouth/Throat:      Mouth: Mucous membranes are dry.   Eyes:      General: No scleral icterus.     Comments: Conjunctiva clear   Neck:      Vascular: No JVD.      Comments: supple  Cardiovascular:      Rate and Rhythm: Regular rhythm.      Heart sounds:      No friction rub.   Pulmonary:      Effort: Pulmonary effort is normal.      Breath sounds: Normal breath sounds.   Abdominal:      General: Bowel sounds are normal.      Palpations: Abdomen is soft.      Tenderness: There is no guarding or rebound.   Musculoskeletal:      Cervical back: Neck supple.      Left lower leg: No edema.      Comments: R leg is wrapped   Skin:     General: Skin is warm and dry.   Neurological:      Mental Status: She is alert.      Comments: Cooperative with exam   Psychiatric:         Mood and Affect:  "Mood and affect normal.            Last Recorded Vitals  Blood pressure 104/56, pulse 93, temperature 37.4 °C (99.3 °F), temperature source Axillary, resp. rate 17, height 1.49 m (4' 10.66\"), weight 57 kg (125 lb 10.6 oz), SpO2 93%.    Relevant Results  Results for orders placed or performed during the hospital encounter of 04/17/24 (from the past 24 hour(s))   POCT GLUCOSE   Result Value Ref Range    POCT Glucose 266 (H) 74 - 99 mg/dL   POCT GLUCOSE   Result Value Ref Range    POCT Glucose 165 (H) 74 - 99 mg/dL   CBC   Result Value Ref Range    WBC 10.8 4.4 - 11.3 x10*3/uL    nRBC 0.0 0.0 - 0.0 /100 WBCs    RBC 2.84 (L) 4.00 - 5.20 x10*6/uL    Hemoglobin 8.7 (L) 12.0 - 16.0 g/dL    Hematocrit 28.0 (L) 36.0 - 46.0 %    MCV 99 80 - 100 fL    MCH 30.6 26.0 - 34.0 pg    MCHC 31.1 (L) 32.0 - 36.0 g/dL    RDW 13.9 11.5 - 14.5 %    Platelets 223 150 - 450 x10*3/uL   Basic Metabolic Panel   Result Value Ref Range    Glucose 254 (H) 65 - 99 mg/dL    Sodium 131 (L) 133 - 145 mmol/L    Potassium 5.1 3.4 - 5.1 mmol/L    Chloride 97 97 - 107 mmol/L    Bicarbonate 20 (L) 24 - 31 mmol/L    Urea Nitrogen 41 (H) 8 - 25 mg/dL    Creatinine 1.40 0.40 - 1.60 mg/dL    eGFR 38 (L) >60 mL/min/1.73m*2    Calcium 8.2 (L) 8.5 - 10.4 mg/dL    Anion Gap 14 <=19 mmol/L   POCT GLUCOSE   Result Value Ref Range    POCT Glucose 302 (H) 74 - 99 mg/dL   Serial Troponin, Initial ([facility])   Result Value Ref Range    Troponin T, High Sensitivity 577 (HH) <=14 ng/L   Creatine Kinase   Result Value Ref Range    Creatine Kinase 223 (H) 24 - 195 U/L   POCT GLUCOSE   Result Value Ref Range    POCT Glucose 294 (H) 74 - 99 mg/dL   Serial Troponin, 2 Hour ([facility])   Result Value Ref Range    Troponin T, High Sensitivity 680 (HH) <=14 ng/L   Urinalysis with Reflex Culture and Microscopic   Result Value Ref Range    Color, Urine Yellow Light-Yellow, Yellow, Dark-Yellow    Appearance, Urine Clear Clear    Specific Gravity, Urine 1.017 1.005 - 1.035    pH, Urine 5.0 " 5.0, 5.5, 6.0, 6.5, 7.0, 7.5, 8.0    Protein, Urine NEGATIVE NEGATIVE, 10 (TRACE), 20 (TRACE) mg/dL    Glucose, Urine 300 (3+) (A) Normal mg/dL    Blood, Urine NEGATIVE NEGATIVE    Ketones, Urine NEGATIVE NEGATIVE mg/dL    Bilirubin, Urine NEGATIVE NEGATIVE    Urobilinogen, Urine Normal Normal mg/dL    Nitrite, Urine NEGATIVE NEGATIVE    Leukocyte Esterase, Urine NEGATIVE NEGATIVE          Assessment/Plan   Acute kidney injury secondary to urinary retention, also on ace inhibitor  Chronic disease stage II/III - baseline Cr 0.9-1.1  Urinary retention  Hypoxia  Diabetes mellitus type 1  Hypertension    Plan: Layton catheter was placed, will check a renal ultrasound, urinalysis.  Hold ACE inhibitor and hydrochlorothiazide.  Notably she did receive a dose of IV Lasix this morning.  Will hold amlodipine as her blood pressure is on the softer side to allow for permissive hypertension.  Urology to be consulted by the primary team.  Thank you for your consultation.    Bethany Rolle MD

## 2024-04-21 NOTE — PROGRESS NOTES
Physical Therapy    Physical Therapy Treatment    Patient Name: Bonnie Jules  MRN: [id number]  Today's Date: 4/21/2024          Assessment/Plan   PT Assessment  PT Assessment Results: Decreased strength, Decreased range of motion, Decreased endurance, Impaired balance, Decreased mobility, Decreased coordination, Decreased safety awareness, Orthopedic restrictions, Pain  Rehab Prognosis: Fair  Strengths: Support of extended family/friends  Barriers to Participation: Comorbidities  End of Session Communication: Bedside nurse, PCT/NA/CTA  Assessment Comment: Patient presents with decreased strength and decreased functional mobility, impaired balance, increased falls risk.  End of Session Patient Position: Up in bathroom (Nursing aware)  PT Plan  Inpatient/Swing Bed or Outpatient: Inpatient  PT Plan  Treatment/Interventions: Bed mobility, Transfer training, Gait training, Balance training, Strengthening, Range of motion, Endurance training, Therapeutic exercise, Therapeutic activity, Home exercise program, Positioning  PT Plan: Skilled PT  PT Frequency: BID  PT Discharge Recommendations: Moderate intensity level of continued care  PT Recommended Transfer Status: Assist x1  PT - OK to Discharge: Yes      General Visit Information:   PT  Visit  PT Received On: 04/21/24  Response to Previous Treatment: Patient reporting fatigue but able to participate.  General  Reason for Referral: Impaired functional mobility. This 83 y.o. female was admitted for an elective surgery. She was now s/p R TKA by Dr. Guy on 4/17/24.  Past Medical History Relevant to Rehab: anxiety, CAD s/p CABG x3, chronic lymphocytic leukemia, HTN, DM, diabetic polyneuropathy, chronic kidney disease, osteoporosis, PVD, sleep apnea, bilateral carpal tunnel release, L ALFONZO,  Missed Visit: No  Family/Caregiver Present: No  Prior to Session Communication: Bedside nurse  Patient Position Received: Bed, 2 rail up, Alarm off, not on at start of session  Preferred  Learning Style: verbal, visual  General Comment: Pt reports sternal chest pain and SOB; RN aware, Ok to proceed with PT    Subjective   Precautions:  Precautions  Hearing/Visual Limitations: hearing WFL, glasses  LE Weight Bearing Status: Weight Bearing as Tolerated  Medical Precautions: Fall precautions  Post-Surgical Precautions: Right total knee precautions  Precautions Comment: Does not recall surgical knee precautions at this time. Education provided as needed.  Vital Signs:     Objective   Pain:  Pain Assessment  Pain Score: 2  Pain Type: Surgical pain  Pain Location: Knee  Pain Orientation: Right  Pain Descriptors: Aching  Pain Frequency: Constant/continuous  Pain Onset: Ongoing  Effect of Pain on Daily Activities: Limited ROM  Pain Interventions: Ambulation/increased activity  Response to Interventions: Unchanged  Cognition:  Cognition  Overall Cognitive Status: Within Functional Limits  Orientation Level: Oriented X4  Processing Speed: Delayed  Postural Control:  Postural Control  Posture Comment: Very erect posture with SOB this date  Static Sitting Balance  Static Sitting-Balance Support: Feet supported, Left upper extremity supported  Static Sitting-Level of Assistance: Contact guard  Static Sitting-Comment/Number of Minutes: 3 min EOB; 20 min chair; 4 min toilet  Static Standing Balance  Static Standing-Balance Support: Bilateral upper extremity supported  Static Standing-Level of Assistance: Minimum assistance  Static Standing-Comment/Number of Minutes: +RW  Dynamic Standing Balance  Dynamic Standing-Balance Support: Bilateral upper extremity supported  Dynamic Standing-Balance: Turning  Dynamic Standing-Comments: +RW; David to steady     Activity Tolerance:  Activity Tolerance  Endurance: Tolerates 30 min exercise with multiple rests  Activity Tolerance Comments: Rest breaks for fatigue and SOSA with fair recovery  Rate of Perceived Exertion (RPE):  (5)  Treatments:  Therapeutic Exercise  Therapeutic  Exercise Performed: Yes  Therapeutic Exercise Activity 1: B AP x20  Therapeutic Exercise Activity 2: B LAQ x20 (Reduced ROM RLE)  Therapeutic Exercise Activity 3: B HS x20  Therapeutic Exercise Activity 4: B HF x20  Therapeutic Exercise Activity 5: B GS x20  Therapeutic Exercise Activity 6: Performed seated in bedside chair    Therapeutic Activity  Therapeutic Activity Performed: Yes  Therapeutic Activity 1: Functional transfers, mobility, education    Bed Mobility  Bed Mobility: Yes  Bed Mobility 1  Bed Mobility 1: Sitting to supine, Scooting  Level of Assistance 1: Moderate assistance, Moderate verbal cues  Bed Mobility Comments 1: Assist to complete trunk up and RLE off bed (Side rail; draw sheet as needed for assist.)    Ambulation/Gait Training  Ambulation/Gait Training Performed: Yes  Ambulation/Gait Training 1  Surface 1: Level tile  Device 1: Rolling walker  Quality of Gait 1: Wide base of support, Diminished heel strike, Decreased step length (Decreased genny.)  Comments/Distance (ft) 1: 40'x1  Transfers  Transfer: Yes  Transfer 1  Transfer From 1: Bed to  Transfer to 1: Chair with arms  Technique 1: Sit to stand, Stand to sit, To left  Transfer Device 1: Walker  Transfer Level of Assistance 1: Minimum assistance, Moderate verbal cues  Trials/Comments 1: x1 (Cueing for technique and safety)  Transfers 2  Transfer From 2: Chair with arms to  Transfer to 2: Toilet  Technique 2: Sit to stand, Stand to sit  Transfer Device 2: Walker  Transfer Level of Assistance 2: Minimum assistance, Moderate verbal cues  Trials/Comments 2: x1 (Cueing for technique and safety)    Outcome Measures:  [facility] Basic Mobility  Turning from your back to your side while in a flat bed without using bedrails: A little  Moving from lying on your back to sitting on the side of a flat bed without using bedrails: A lot  Moving to and from bed to chair (including a wheelchair): A lot  Standing up from a chair using your arms (e.g.  wheelchair or bedside chair): A little  To walk in hospital room: A little  Climbing 3-5 steps with railing: A lot  Basic Mobility - Total Score: 15    OP EDUCATION:  Outpatient Education  Individual(s) Educated: Patient  Education Provided: Body Mechanics, Fall Risk, Home Exercise Program, Other (Energy conservation; therapeutic breathing as needed for safety and comfort.)  Patient Response to Education: Patient/Caregiver Verbalized Understanding of Information  Education Comment: Would benefit from continued reinforcement for duration of acute stay.    Encounter Problems       Encounter Problems (Active)       Functional Mobility       Pt will transition supine<>sit at flat bed with mod I. (Progressing)       Start:  04/17/24    Expected End:  04/22/24            Pt will transfer sit<>stand with FWW & mod I. (Progressing)       Start:  04/17/24    Expected End:  04/22/24            Pt will ambulate >/=100 ft with FWW & mod I. (Progressing)       Start:  04/17/24    Expected End:  04/22/24            Pt will adhere to R total knee precautions during all functional mobility with S and at most 1 verbal cue. (Progressing)       Start:  04/17/24    Expected End:  04/22/24               Pain - Adult

## 2024-04-21 NOTE — PROGRESS NOTES
"Bonnie Jules is a 83 y.o. female on day 1 of admission presenting with Osteoarthritis.      Subjective   Patient seen and examined. Awake/alert/oriented. Resting in bed. Reports shortness of breath and chest pain that she describes as \"pressure.\" Still having nausea, states she did move her bowels a little bit today. Does report feeling lightheaded. No fevers or chills.        Objective     Last Recorded Vitals  /72 (BP Location: Left arm, Patient Position: Lying)   Pulse 90   Temp 36.9 °C (98.4 °F) (Oral)   Resp 18   Wt 57 kg (125 lb 10.6 oz)   SpO2 93%   Intake/Output last 3 Shifts:    Intake/Output Summary (Last 24 hours) at 4/21/2024 1136  Last data filed at 4/21/2024 0530  Gross per 24 hour   Intake 300 ml   Output 0 ml   Net 300 ml       Admission Weight  Weight: 57 kg (125 lb 10.6 oz) (04/17/24 0616)    Daily Weight  04/17/24 : 57 kg (125 lb 10.6 oz)    Image Results  NM Lung Perfusion  Narrative: Interpreted By:  Demarcus Beckham,   STUDY:  NM LUNG PERFUSION;  4/19/2024 3:43 pm      INDICATION:  Signs/Symptoms:rule out PE.      COMPARISON:  Chest radiograph on 04/19/2024      ACCESSION NUMBER(S):  [id number]      ORDERING CLINICIAN:  KLAUS HANCOCK      TECHNIQUE:  DIVISION OF NUCLEAR MEDICINE  PERFUSION LUNG SCAN      Multiple perfusion images of the lungs were obtained after the  intravenous administration of 4.2 mCi of Tc-99m MAA. SPECT/CT images  of the lungs were also obtained.          FINDINGS:  Perfusion images demonstrate mild heterogeneity within both lungs. No  distinct wedge-shaped subsegmental or segmental perfusion defect seen  on SPECT CT to suggest acute pulmonary embolism (low probability).      Low-dose CT demonstrates small bilateral pleural effusions along with  bibasilar atelectasis      Impression: 1.  No distinct wedge-shaped subsegmental or segmental perfusion  defect seen on SPECT CT to suggest acute pulmonary embolism (low  probability).  2. Low-dose CT demonstrates small " bilateral pleural effusions along  with bibasilar atelectasis.      I personally reviewed the images/study. This study was interpreted at  [facility], [place], [place].      MACRO:  None      Signed by: Demarcus eBckham 4/19/2024 4:03 PM  Dictation workstation:   [id number]  XR chest 1 view  Narrative: Interpreted By:  Marco Hollis,   STUDY:  XR CHEST 1 VIEW; 4/19/2024 11:53 am      INDICATION:  Signs/Symptoms:dyspnea.      COMPARISON:  04/18/2024      ACCESSION NUMBER(S):  [id number]      ORDERING CLINICIAN:  THEODORE NAGY      TECHNIQUE:  1 view of the chest was performed.      FINDINGS:  The lungs are adequately inflated. No acute consolidation. No  significant pleural effusion. No pneumothorax.  The cardiomediastinal  silhouette is within normal limits. Median sternotomy wires are  intact. Old posterior right rib fractures are again noted. Small,  less than 2 cm metallic wire is also again seen above the proximal  aspect of the left clavicle.      Impression: Stable examination. No acute cardiopulmonary disease.      Signed by: Marco Hollis 4/19/2024 1:19 PM  Dictation workstation:   [id number]      Physical Exam  Vitals reviewed.   Constitutional:       Appearance: Normal appearance.   HENT:      Head: Normocephalic and atraumatic.   Eyes:      Extraocular Movements: Extraocular movements intact.      Conjunctiva/sclera: Conjunctivae normal.   Cardiovascular:      Rate and Rhythm: Normal rate and regular rhythm.   Pulmonary:      Effort: Pulmonary effort is normal.      Breath sounds: No wheezing or rhonchi.      Comments: Breath sounds clear, diminished bilaterally   Abdominal:      General: Bowel sounds are normal.      Palpations: Abdomen is soft.      Tenderness: There is no abdominal tenderness.   Musculoskeletal:         General: Normal range of motion.   Skin:     General: Skin is warm and dry.   Neurological:      General: No focal deficit present.      Mental  Status: She is alert and oriented to person, place, and time.         Relevant Results  Lab Results   Component Value Date    GLUCOSE 254 (H) 04/21/2024    CALCIUM 8.2 (L) 04/21/2024     (L) 04/21/2024    K 5.1 04/21/2024    CO2 20 (L) 04/21/2024    CL 97 04/21/2024    BUN 41 (H) 04/21/2024    CREATININE 1.40 04/21/2024     Lab Results   Component Value Date    WBC 10.8 04/21/2024    HGB 8.7 (L) 04/21/2024    HCT 28.0 (L) 04/21/2024    MCV 99 04/21/2024     04/21/2024     NM Lung Perfusion  Result Date: 4/19/2024  1.  No distinct wedge-shaped subsegmental or segmental perfusion defect seen on SPECT CT to suggest acute pulmonary embolism (low probability). 2. Low-dose CT demonstrates small bilateral pleural effusions along with bibasilar atelectasis.   I personally reviewed the images/study. This study was interpreted at [place], [place].   MACRO: None   Signed by: Demarcus Beckham 4/19/2024 4:03 PM Dictation workstation:   [id number]    XR chest 1 view  Result Date: 4/19/2024  Stable examination. No acute cardiopulmonary disease.   Signed by: Marco Hollis 4/19/2024 1:19 PM Dictation workstation:   [id number]    XR chest 1 vie    Result Date: 4/18/2024  Limited study. Bilateral hilar prominence, left more than right is most likely due to pulmonary vascular congestion; correlate clinically and if there is suspicion for possible lymphadenopathy further evaluated with contrast-enhanced CT scan. Additional chronic findings as above.   Signed by: Milo Leal 4/18/2024 10:36 AM Dictation workstation:   [id number]    XR knee right 1-2 views  Result Date: 4/17/2024  No immediate complication after right total knee arthroplasty     MACRO: None   Signed by: Avni Correa 4/17/2024 12:33 PM Dictation workstation:   [id number] \       Assessment/Plan      Principal Problem:    Osteoarthritis  Active Problems:    History of coronary artery bypass graft     Chronic pain of both knees    Primary osteoarthritis of right knee    NSTEMI  Complained of mild chest pressure  STAT EKG and troponin ordered  Heparin gtt  Troponin in the 500s, repeat 600s  Cardiology consulted, appreciate recs  Monitor on tele  NPO after midnight    Acute hypoxic respiratory failure  Patient desatted into the 80s on room air, she does not wear oxygen at home  Worsening dyspnea on exertion, improved today  CXR reviewed, no acute process  VQ scan ordered, low probability for PE  Encourage IS  duonebs  Given Lasix x one dose per pulm  Repeat CXR pending  Pulmonary following, appreciate recs    Acute on CKD  Creatinine jumped from 1.0 to 1.4 today  Home Hydrochlorothiazide and lisinopril just resumed yesterday, placed back on hold  Will consult nephrology, appreciate recommendations  Monitor renal function close  Avoid nephrotoxic medications  Renally dose meds  Repeat BMP in the morning    Hyperkalemia  Potassium 5.1, low potassium diet  Repeat in AM  Nephrology consulted, appreciate recs     Diabetes Mellitus Type I with hyperglycemia  Monitor blood glucose  Sliding scale insulin  Hypoglycemia protocol  hgbA1C 6.7 4/3/24     Right knee osteoarthritis  Status post right total knee arthroplasty by Dr. Liriano on 4/17/24  Pain management  Bowel regimen  Incentive spirometer  PT/OT  Management per orthopedic surgery     Nausea  PRN Zofran, added Compazine PRN     Anemia  Likely acute blood loss anemia  Iron studies reviewed  Blood management following  Given IV Venofer  Repeat CBC in AM     Hypertension  Continue metoprolol with hold parameters, continue amlodipine and hydralazine  Hold lisinopril and hydrochlorothiazide      Coronary artery disease  Resume aspirin per orthopedic surgery  Continue statin  Follows with Dr. Araujo outpatient, cleared for surgery prior to coming in  Stable     Hypothyroidism  Continue Synthroid     CLL  In remission  Monitor WBCs     Leukocytosis-resolved  Elevated this  morning, likely reactive from surgery  CXR with pulmonary vascular congestion  UA negative     Hyperlipidemia  Statin     Plan  Admitted to orthopedic surgery  Supplemental oxygen-Trial on room air, failed, did qualify for home O2  Repeat CXR today  Hold lisinopril and hydrochlorothiazide   IV Lasix ordered per pulm  Pain management, bowel regimen  Incentive spirometer  Monitor blood glucose close  Lantus resumed at home dose, SSI  Antiemetics  PT/OT  CBC and BMP in the morning  DVT prophylaxis: Eliquis, SCDs  Consult pulmonary and nephrology, appreciate recs  Consider cardiology consult for chest pain, STAT EKG and troponin ordered    Addendum: Patient had urinary retention 1L, evangelista catheter ordered, will consult urology. Discussed with Dr. Rolle. Patient having mild chest discomfort that she described as chest pressure. Was given Lasix by pulm earlier for suspected fluid overload. Ordered STAT EKG and troponin. EKG showed NSR , left axis deviation, ST and T wave abnormality, no EKG to compare in MUSE. Troponin came back 577. Cardiology consulted.   Ordered low level Heparin gtt, no bolus as patient has been on eliquis. NPO after midnight, stopped eliquis. Will likely need transfer to SDU. Discussed with Dr. Sousa. Chest pain has since resolved.             Luna Smith, APRN-CNP

## 2024-04-21 NOTE — NURSING NOTE
Patient up walking to bathroom with walker and oxygen. On 3lnc and never dropped below 89%. Patient needs reminding to breathe through nose and not mouth when nasal canula is in. Tolerated walking with walker well. Call light within reach. Bed alarm in use

## 2024-04-21 NOTE — PROGRESS NOTES
"Bonnie Jules is a 83 y.o. female on day 1 of admission presenting with Osteoarthritis.    Subjective   Patient in the restroom.  Discussed with nursing.  Pulmonology saw her and added some diuretics.  Also consult to nephrology as her creatinine has been increasing.  She has been arranged for home O2 if discharge can be arranged for today.       Objective     Physical Exam  Deferred    Last Recorded Vitals  Blood pressure 105/72, pulse 90, temperature 36.9 °C (98.4 °F), temperature source Oral, resp. rate 18, height 1.49 m (4' 10.66\"), weight 57 kg (125 lb 10.6 oz), SpO2 93%.      Relevant Results      Results for orders placed or performed during the hospital encounter of 04/17/24 (from the past 24 hour(s))   POCT GLUCOSE   Result Value Ref Range    POCT Glucose 449 (H) 74 - 99 mg/dL   POCT GLUCOSE   Result Value Ref Range    POCT Glucose 407 (H) 74 - 99 mg/dL   POCT GLUCOSE   Result Value Ref Range    POCT Glucose 266 (H) 74 - 99 mg/dL   POCT GLUCOSE   Result Value Ref Range    POCT Glucose 165 (H) 74 - 99 mg/dL   CBC   Result Value Ref Range    WBC 10.8 4.4 - 11.3 x10*3/uL    nRBC 0.0 0.0 - 0.0 /100 WBCs    RBC 2.84 (L) 4.00 - 5.20 x10*6/uL    Hemoglobin 8.7 (L) 12.0 - 16.0 g/dL    Hematocrit 28.0 (L) 36.0 - 46.0 %    MCV 99 80 - 100 fL    MCH 30.6 26.0 - 34.0 pg    MCHC 31.1 (L) 32.0 - 36.0 g/dL    RDW 13.9 11.5 - 14.5 %    Platelets 223 150 - 450 x10*3/uL   Basic Metabolic Panel   Result Value Ref Range    Glucose 254 (H) 65 - 99 mg/dL    Sodium 131 (L) 133 - 145 mmol/L    Potassium 5.1 3.4 - 5.1 mmol/L    Chloride 97 97 - 107 mmol/L    Bicarbonate 20 (L) 24 - 31 mmol/L    Urea Nitrogen 41 (H) 8 - 25 mg/dL    Creatinine 1.40 0.40 - 1.60 mg/dL    eGFR 38 (L) >60 mL/min/1.73m*2    Calcium 8.2 (L) 8.5 - 10.4 mg/dL    Anion Gap 14 <=19 mmol/L   POCT GLUCOSE   Result Value Ref Range    POCT Glucose 302 (H) 74 - 99 mg/dL       Assessment/Plan   Principal Problem:    Osteoarthritis  Active Problems:    History of " coronary artery bypass graft    Chronic pain of both knees    Primary osteoarthritis of right knee      Problem List:  Right total knee: Status post total knee replacement.  Continue postoperative course.  DVT risk: Eliquis twice daily  Okay for discharge when consultants are agreeable.  Home O2 has been set up.        Kentrell Guy MD

## 2024-04-21 NOTE — NURSING NOTE
Heparin gtt initiated at 700units/hr through 22 rt forearm  pt resting with eyes closed arouses easily call light in reach dtr at bedside

## 2024-04-21 NOTE — PROGRESS NOTES
Patient with an active discharge. Patient not ready for discharge. The plan is to return home with [facility]. Internal order needed.  Will follow.      04/21/24 1223   Discharge Planning   Home or Post Acute Services In home services   Type of Home Care Services Home OT;Home PT   Patient expects to be discharged to: Home with [facility]   Does the patient need discharge transport arranged? No

## 2024-04-21 NOTE — NURSING NOTE
Ace bandage to rt knee removed pt working with PT call light in reach and dtr at bedside call light In reach

## 2024-04-21 NOTE — PROGRESS NOTES
"Bonnie Jules is a 83 y.o. female on day 1 of admission presenting with Osteoarthritis.    Subjective   Patient is resting comfortably.  Does not appear to be in any respiratory or pulmonary distress.       Objective     Physical Exam  General: The patient does not appear to be in any respiratory or pulmonary distress.  However, she continues to require supplemental oxygen secondary to hypoxia.  Ventilation perfusion scan shows low probability of pulmonary embolism.    Right knee and lower extremity: Dressing is dry.  Neurovascular is intact.  Right calf and thigh are nontender and nonswollen.  Last Recorded Vitals  Blood pressure 105/72, pulse 90, temperature 36.9 °C (98.4 °F), temperature source Oral, resp. rate 18, height 1.49 m (4' 10.66\"), weight 57 kg (125 lb 10.6 oz), SpO2 93%.  Intake/Output last 3 Shifts:  I/O last 3 completed shifts:  In: 300 (5.3 mL/kg) [P.O.:300]  Out: 301 (5.3 mL/kg) [Urine:301 (0.1 mL/kg/hr)]  Weight: 57 kg     Relevant Results      Scheduled medications  amLODIPine, 10 mg, oral, Daily  apixaban, 2.5 mg, oral, BID  gabapentin, 100 mg, oral, BID  hydrALAZINE, 50 mg, oral, BID  hydroCHLOROthiazide, 25 mg, oral, Daily  insulin glargine, 11 Units, subcutaneous, Daily before breakfast  insulin lispro, 0-20 Units, subcutaneous, TID with meals  ipratropium-albuteroL, 3 mL, nebulization, TID  levothyroxine, 100 mcg, oral, Daily  lisinopril, 40 mg, oral, Daily  metoprolol tartrate, 12.5 mg, oral, BID  oxygen, , inhalation, Continuous - Inhalation  polyethylene glycol, 17 g, oral, Daily  pravastatin, 40 mg, oral, Nightly      Continuous medications  oxygen, 2 L/min, Last Rate: 3 L/min (04/20/24 0705)      PRN medications  PRN medications: acetaminophen, dextrose, dextrose, dextrose, dextrose, glucagon, glucagon, glucagon, glucagon, HYDROmorphone, HYDROmorphone, naloxone, naloxone, naloxone, nitroglycerin, ondansetron **OR** ondansetron, oxyCODONE-acetaminophen, oxyCODONE-acetaminophen, " prochlorperazine **OR** prochlorperazine **OR** prochlorperazine  Results for orders placed or performed during the hospital encounter of 04/17/24 (from the past 24 hour(s))   POCT GLUCOSE   Result Value Ref Range    POCT Glucose 449 (H) 74 - 99 mg/dL   POCT GLUCOSE   Result Value Ref Range    POCT Glucose 407 (H) 74 - 99 mg/dL   POCT GLUCOSE   Result Value Ref Range    POCT Glucose 266 (H) 74 - 99 mg/dL   POCT GLUCOSE   Result Value Ref Range    POCT Glucose 165 (H) 74 - 99 mg/dL   CBC   Result Value Ref Range    WBC 10.8 4.4 - 11.3 x10*3/uL    nRBC 0.0 0.0 - 0.0 /100 WBCs    RBC 2.84 (L) 4.00 - 5.20 x10*6/uL    Hemoglobin 8.7 (L) 12.0 - 16.0 g/dL    Hematocrit 28.0 (L) 36.0 - 46.0 %    MCV 99 80 - 100 fL    MCH 30.6 26.0 - 34.0 pg    MCHC 31.1 (L) 32.0 - 36.0 g/dL    RDW 13.9 11.5 - 14.5 %    Platelets 223 150 - 450 x10*3/uL   Basic Metabolic Panel   Result Value Ref Range    Glucose 254 (H) 65 - 99 mg/dL    Sodium 131 (L) 133 - 145 mmol/L    Potassium 5.1 3.4 - 5.1 mmol/L    Chloride 97 97 - 107 mmol/L    Bicarbonate 20 (L) 24 - 31 mmol/L    Urea Nitrogen 41 (H) 8 - 25 mg/dL    Creatinine 1.40 0.40 - 1.60 mg/dL    eGFR 38 (L) >60 mL/min/1.73m*2    Calcium 8.2 (L) 8.5 - 10.4 mg/dL    Anion Gap 14 <=19 mmol/L   POCT GLUCOSE   Result Value Ref Range    POCT Glucose 302 (H) 74 - 99 mg/dL                            Assessment/Plan   Principal Problem:    Osteoarthritis  Active Problems:    History of coronary artery bypass graft    Chronic pain of both knees    Primary osteoarthritis of right knee    Hypoxia    Plan: Patient is stable orthopedically.  However, this patient is being evaluated by the pulmonologist and we are awaiting recommendations prior to discharge.       I spent 30 minutes in the professional and overall care of this patient.      Rudolph Liriano MD

## 2024-04-21 NOTE — NURSING NOTE
Patient very pleasant. Satting at 84% while sleeping. Awake on 3l in the 90's. Patient breathing through mouth and snoring. Adjusted oxygen so she breathes through mouth and now 94% on 4l. Took all meds whole with water. Dressing to right knee cdi. Call light within reach. Bed alarm in use.

## 2024-04-21 NOTE — CONSULTS
Inpatient consult to Cardiology  Consult performed by: Mike Sousa DO  Consult ordered by: Luna Smith, CARMEN-CNP  Reason for consult: Chest pain        History Of Present Illness:    Bonnie Jules is a 83 y.o. female presenting with elective total right knee arthroplasty.  The patient has established atherosclerotic heart disease.  She had a three-vessel bypass surgery done back in 2005.  She did have a diagnostic cardiac catheterization procedure done in September 2019 secondary to do an on ST elevation myocardial infarction.  On this study the left main trunk had a long 70% stenosis, the left anterior sending artery was chronically occluded in its proximal segment and there was a subtotal occlusion of a posterolateral branch of the circumflex artery.  There was also an 80% stenosis in the midportion of the native right coronary artery.  The left internal mammary artery graft to the LAD with a widely patent.  There was a widely patent free right internal mammary artery off the LIMA to the posterolateral branch.  The saphenous vein bypass graft to the right coronary artery was occluded.  At that point it was felt that the occlusion of the vein graft to the right coronary artery was likely the culprit vessel causing the non-ST elevation myocardial infarction.  The procedure was very complex with a tortuous iliac artery and aorta the patient did receive significant radiation and contrast during the procedure thus it was recommended that standard medical therapy be pursued and the patient followed on a clinical basis.  She has done well from a cardiac standpoint over the last 5 years.  She was seen by Dr. Araujo prior to the orthopedic procedure and was felt to be at an acceptable cardiovascular risk.  The patient underwent the surgery last week was making a slow recovery.  She has significant hypoxia and the pulmonary service was consulted and a VQ scan ordered which was low probability for pulmonary embolus.   Over the last few days she has also had a mild tightness sensation across the chest which has been rather constant.  A high-sensitivity troponin level was drawn today and was found to be elevated at 577 prompting consultation to our service.  On my examination patient still has a mild degree of the chest discomfort again which has been constant over the last 2 to 3 days.     Last Recorded Vitals:  Vitals:    04/21/24 0007 04/21/24 0500 04/21/24 0719 04/21/24 1217   BP: 107/55 121/56 105/72 104/56   BP Location: Right arm Left arm Left arm Left arm   Patient Position: Lying Lying Lying Lying   Pulse: 88 82 90 93   Resp: 17 17 18 17   Temp: 36.7 °C (98.1 °F) 36.7 °C (98.1 °F) 36.9 °C (98.4 °F) 37.4 °C (99.3 °F)   TempSrc: Oral Oral Oral Axillary   SpO2: 93% 92% 93% 93%   Weight:       Height:           Last Labs:  CBC - 4/21/2024:  5:56 AM  10.8 8.7 223    28.0      CMP - 4/21/2024:  5:56 AM  8.2 6.3 22 --- 0.5   _ 4.2 11 45      PTT - No results in last year.  _   _ _     POC HEMOGLOBIN A1c   Date/Time Value Ref Range Status   10/03/2023 11:28 AM 6.7 (A) 4.2 - 6.5 % Final     Hemoglobin A1C   Date/Time Value Ref Range Status   04/03/2024 11:55 AM 6.7 (H) See below % Final   10/08/2022 06:35 AM 6.3 (H) 4.0 - 6.0 % Final     Comment:     Hemoglobin A1C levels are related to mean blood glucose during the   preceding 2-3 months. The relationship table below may be used as a   general guide. Each 1% increase in HGB A1C is a reflection of an   increase in mean glucose of approximately 30 mg/dl.   Reference: [employer], volume 29, supplement 1 Jan. 2006                        HGB A1C ................. Approx. Mean Glucose   _______________________________________________   6%   ...............................  120 mg/dl   7%   ...............................  150 mg/dl   8%   ...............................  180 mg/dl   9%   ...............................  210 mg/dl   10%  ...............................  240  "mg/dl  Performed at [address] [address]     01/05/2022 09:42 AM 6.6 (H) 4.0 - 6.0 % Final     Comment:     Hemoglobin A1C levels are related to mean blood glucose during the   preceding 2-3 months. The relationship table below may be used as a   general guide. Each 1% increase in HGB A1C is a reflection of an   increase in mean glucose of approximately 30 mg/dl.   Reference: Diabetes [employer], volume 29, supplement 1 Jan. 2006                        HGB A1C ................. Approx. Mean Glucose   _______________________________________________   6%   ...............................  120 mg/dl   7%   ...............................  150 mg/dl   8%   ...............................  180 mg/dl   9%   ...............................  210 mg/dl   10%  ...............................  240 mg/dl  Performed at [address] [address]       LDL Calculated   Date/Time Value Ref Range Status   03/06/2023 10:37 AM 67 65 - 130 MG/DL Final   02/25/2022 10:29 AM 75 65 - 130 MG/DL Final   01/05/2022 09:42 AM 73 65 - 130 MG/DL Final      Last I/O:  I/O last 3 completed shifts:  In: 300 (5.3 mL/kg) [P.O.:300]  Out: 301 (5.3 mL/kg) [Urine:301 (0.1 mL/kg/hr)]  Weight: 57 kg     Past Cardiology Tests (Last 3 Years):  EKG:  ECG 12 lead (Clinic Performed) 03/07/2024    Echo:  Transthoracic Echo (TTE) Complete 10/10/2023  Left ventricular ejection fraction 55 to 60%  Mild aortic valve stenosis with mean gradient of 13 mmHg, peak gradient 26 mmHg and aortic valve area of 1.4 cm².    Ejection Fractions:  No results found for: \"EF\"  Cath:  No results found for this or any previous visit from the past 1095 days.    Stress Test:  No results found for this or any previous visit from the past 1095 days.    Cardiac Imaging:  No results found for this or any previous visit from the past 1095 days.      Past Medical History:  She has a past medical history of Anxiety, Benign hypertension, Chronic " ischemic colitis (Multi), Chronic kidney disease, stage III (moderate) (Multi), CLL (chronic lymphocytic leukemia) (Multi), Coronary artery disease, CTS (carpal tunnel syndrome), Diabetes mellitus (Multi), Essential (primary) hypertension, GI bleed, Hyperlipidemia, unspecified, Hypertension, Hypothyroidism, Hypothyroidism, unspecified type, Mixed hyperlipidemia, Osteoarthritis, Osteoporosis, Peripheral vascular disease (CMS-[facility]), Seizure disorder (Multi), and Type 1 diabetes mellitus with hyperglycemia (Multi).    Past Surgical History:  She has a past surgical history that includes Carpal tunnel release (Bilateral); Total hip arthroplasty (Left, ); Coronary artery bypass graft (); Cholecystectomy; Cataract extraction w/ intraocular lens  implant, bilateral; Hysterectomy; Colonoscopy; Other surgical history; and Other surgical history (Right, 2016).      Social History:  She reports that she has never smoked. She has been exposed to tobacco smoke. She has never used smokeless tobacco. She reports that she does not currently use alcohol. She reports that she does not currently use drugs.    Family History:  Father  at age 75 of uncertain etiology.  Patient's mother  at age 46 from complications of diabetes.     Allergies:  Quinolones, Chlorpheniramine-dextromethorp, Amoxicillin, Amoxicillin-pot clavulanate, Ciprofloxacin, and Levofloxacin    Inpatient Medications:  Scheduled medications   Medication Dose Route Frequency    [Held by provider] amLODIPine  10 mg oral Daily    [Held by provider] apixaban  2.5 mg oral BID    gabapentin  100 mg oral BID    hydrALAZINE  50 mg oral BID    [Held by provider] hydroCHLOROthiazide  25 mg oral Daily    insulin glargine  11 Units subcutaneous Daily before breakfast    insulin lispro  0-20 Units subcutaneous TID with meals    ipratropium-albuteroL  3 mL nebulization TID    levothyroxine  100 mcg oral Daily    [Held by provider] lisinopril  40 mg oral Daily     metoprolol tartrate  12.5 mg oral BID    oxygen   inhalation Continuous - Inhalation    polyethylene glycol  17 g oral Daily    pravastatin  40 mg oral Nightly     PRN medications   Medication    acetaminophen    dextrose    dextrose    glucagon    glucagon    heparin (porcine)    HYDROmorphone    HYDROmorphone    naloxone    naloxone    naloxone    nitroglycerin    ondansetron    Or    ondansetron    oxyCODONE-acetaminophen    oxyCODONE-acetaminophen    prochlorperazine    Or    prochlorperazine    Or    prochlorperazine     Continuous Medications   Medication Dose Last Rate    heparin  0-4,000 Units/hr      oxygen  2 L/min 3 L/min (04/20/24 0705)     Outpatient Medications:  Current Outpatient Medications   Medication Instructions    acetaminophen (Tylenol) 500 mg capsule 2 tablets, oral, Daily    aluminum-magnesium hydroxide 200-200 mg/5 mL suspension 10 mL, oral, Every 6 hours PRN    amLODIPine (NORVASC) 10 mg, oral, Daily    aspirin 81 mg chewable tablet 1 tablet, oral, Nightly    cholecalciferol (Vitamin D-3) 50 mcg (2,000 unit) capsule 1 capsule, oral, Daily    cyanocobalamin (Vitamin B-12) 1,000 mcg tablet 1 tablet, oral, Daily    Eliquis 2.5 mg, oral, 2 times daily    folic acid (Folvite) 1 mg tablet 1 tablet, oral, Daily    gabapentin (Neurontin) 100 mg capsule 1 capsule, oral, 2 times daily Nitrate, In the morning and before bedtime     hydrALAZINE (APRESOLINE) 50 mg, oral, 2 times daily    hydroCHLOROthiazide (HYDRODIURIL) 25 mg, oral, Daily    insulin degludec (TRESIBA FLEXTOUCH U-100) 11 Units, subcutaneous, Every morning, As directed 1 box     insulin lispro (HUMALOG KARLIE KWIKPEN U-100) 30 Units, subcutaneous, 3 times daily with meals, ON A SLIDING SCALE    levothyroxine (Synthroid, Levoxyl) 100 mcg tablet 1 tablet, oral, Daily    lisinopril 40 mg, oral, Daily    metoprolol tartrate (LOPRESSOR) 12.5 mg, oral, 2 times daily    nitroglycerin (NITROSTAT) 0.4 mg, sublingual, Every 5 min PRN, Every 5  minutes x 3 doses prn for chest pain    oxyCODONE-acetaminophen (Percocet) 5-325 mg tablet Take 1 tablet by mouth every 6 hours as needed for severe pain (7 - 10) for up to 7 days.    POLYETHYLENE GLYCOL 3350 ORAL 1 packet, oral, Daily, With 8 oz of fluid     pravastatin (PRAVACHOL) 40 mg, oral, Daily    sulfaSALAzine (Azulfidine) 500 mg DR tablet 2 tablets, oral, 2 times daily    valACYclovir (VALTREX) 1,000 mg, oral, As needed, Twice daily        Physical Exam:  Constitutional:       Appearance: Not in distress.   Eyes:      Conjunctiva/sclera: Conjunctivae normal.   HENT:    Mouth/Throat:      Pharynx: Oropharynx is clear.   Neck:      Vascular: No carotid bruit. JVD normal.   Pulmonary:      Breath sounds: Normal breath sounds. No wheezing. No rales.   Cardiovascular:      Regular rhythm.      Murmurs: There is a grade 2/6 mid frequency early systolic murmur.      No gallop.  No click. No rub.   Abdominal:      Palpations: Abdomen is soft.      Tenderness: There is no abdominal tenderness.   Musculoskeletal:         General: No deformity. Neurological:      General: No focal deficit present.           Assessment/Plan     Possible perioperative non-ST elevation myocardial infarction  Atherosclerotic heart disease status post CABG 2005  Status post total knee arthroplasty procedure 4/17/2024  Mild aortic valve stenosis  Essential hypertension  Hyperlipidemia  Acute kidney injury    4/21: Patient with mild chest discomfort following her total knee arthroplasty procedure.  She was also hypoxic and had a low probability ventilation/perfusion scan done.  With her persistent discomfort a high-sensitivity troponin was drawn and found to be elevated.  She also has evidence of an acute kidney injury with her serum creatinine rising from 1.0-1.4.  At this point would recommend that we start anticoagulation therapy with unfractionated heparin.  Would also place her Eliquis therapy on hold at this time.  Will titrate up dose  of her beta-blocker and add topical nitroglycerin as well.  Will order echocardiogram study for tomorrow to assess left ventricular size and function.  Will attempt to treat conservatively at this time and will follow on a clinical basis.       Code Status:  Full Code    I spent 55 minutes in the professional and overall care of this patient.        Mike Sousa, DO

## 2024-04-21 NOTE — CONSULTS
Inpatient consult to Pulmonology  Consult performed by: Freddie Mcmullen MD  Consult ordered by: CARMEN Barillas-CNP          Pulmonary Consult    Reason For Consult  Hypoxia  History Of Present Illness  Bonnie Jules is a 83 y.o. female presenting with Osteoarthritis.  Presented with right knee replacement.  Hypoxic with exertion.  Poorly desatted into the 80s on room air.  Complains of dyspnea on exertion.  States that she has had several surgeries without going home with oxygen.  However last surgery was her heart surgery in 2005.  Denies any chronic lung disease.  Never smoker.  She has had a chest x-ray couple days ago which was unrevealing and a VQ scan showing low probability for PE.     Past Medical History  She has a past medical history of Anxiety, Benign hypertension, Chronic ischemic colitis (Multi), Chronic kidney disease, stage III (moderate) (Multi), CLL (chronic lymphocytic leukemia) (Multi), Coronary artery disease, CTS (carpal tunnel syndrome), Diabetes mellitus (Multi), Essential (primary) hypertension, GI bleed, Hyperlipidemia, unspecified, Hypertension, Hypothyroidism, Hypothyroidism, unspecified type, Mixed hyperlipidemia, Osteoarthritis, Osteoporosis, Peripheral vascular disease (CMS-[facility]), Seizure disorder (Multi), and Type 1 diabetes mellitus with hyperglycemia (Multi).    Surgical History  She has a past surgical history that includes Carpal tunnel release (Bilateral); Total hip arthroplasty (Left, 2012); Coronary artery bypass graft (2005); Cholecystectomy; Cataract extraction w/ intraocular lens  implant, bilateral; Hysterectomy; Colonoscopy; Other surgical history; and Other surgical history (Right, 01/2016).     Social History  She reports that she has never smoked. She has been exposed to tobacco smoke. She has never used smokeless tobacco. She reports that she does not currently use alcohol. She reports that she does not currently use drugs.        Family History  Family  "History   Problem Relation Name Age of Onset    Diabetes Mother      Diabetes Daughter      Other (RA) Daughter          Allergies  Quinolones, Chlorpheniramine-dextromethorp, Amoxicillin, Amoxicillin-pot clavulanate, Ciprofloxacin, and Levofloxacin    Review of Systems   All other systems reviewed and are negative.      Last Recorded Vitals  Blood pressure 105/72, pulse 90, temperature 36.9 °C (98.4 °F), temperature source Oral, resp. rate 18, height 1.49 m (4' 10.66\"), weight 57 kg (125 lb 10.6 oz), SpO2 93%.    Intake/Output    Intake/Output Summary (Last 24 hours) at 4/21/2024 0920  Last data filed at 4/21/2024 0530  Gross per 24 hour   Intake 300 ml   Output 0 ml   Net 300 ml       Physical Exam  Vitals reviewed.   Constitutional:       Appearance: Normal appearance.   HENT:      Head: Normocephalic and atraumatic.      Mouth/Throat:      Mouth: Mucous membranes are moist.   Eyes:      Extraocular Movements: Extraocular movements intact.      Pupils: Pupils are equal, round, and reactive to light.   Cardiovascular:      Rate and Rhythm: Normal rate and regular rhythm.   Pulmonary:      Effort: Pulmonary effort is normal.      Breath sounds: Normal air entry. Rales present.   Abdominal:      General: Abdomen is flat. Bowel sounds are normal.      Palpations: Abdomen is soft.   Musculoskeletal:         General: Normal range of motion.      Cervical back: Normal range of motion and neck supple.   Skin:     General: Skin is warm and dry.   Neurological:      General: No focal deficit present.      Mental Status: She is alert and oriented to person, place, and time. Mental status is at baseline.      ...    Oxygen Therapy  SpO2: 93 %  Medical Gas Therapy: Supplemental oxygen  O2 Delivery Method: Nasal cannula (2L)  FiO2 (%):  [32 %] 32 %    Lines and Tubes:  Peripheral IV 04/17/24 20 G Left;Dorsal Hand (Active)   Placement Date/Time: 04/17/24 0644   Hand Hygiene Completed: Yes  Size (Gauge): 20 G  Orientation: " Left;Dorsal  Location: Hand  Site Prep: Alcohol  Comfort Measures: Verbal  Local Anesthetic: None  Technique: Anatomical landmarks  Placed by: LAKIA...   Number of days: 4         Scheduled medications  amLODIPine, 10 mg, oral, Daily  apixaban, 2.5 mg, oral, BID  furosemide, 20 mg, intravenous, Once  gabapentin, 100 mg, oral, BID  hydrALAZINE, 50 mg, oral, BID  [Held by provider] hydroCHLOROthiazide, 25 mg, oral, Daily  insulin glargine, 11 Units, subcutaneous, Daily before breakfast  insulin lispro, 0-20 Units, subcutaneous, TID with meals  ipratropium-albuteroL, 3 mL, nebulization, TID  levothyroxine, 100 mcg, oral, Daily  [Held by provider] lisinopril, 40 mg, oral, Daily  metoprolol tartrate, 12.5 mg, oral, BID  oxygen, , inhalation, Continuous - Inhalation  polyethylene glycol, 17 g, oral, Daily  pravastatin, 40 mg, oral, Nightly      Continuous medications  oxygen, 2 L/min, Last Rate: 3 L/min (04/20/24 0705)      PRN medications  PRN medications: acetaminophen, dextrose, dextrose, dextrose, dextrose, glucagon, glucagon, glucagon, glucagon, HYDROmorphone, HYDROmorphone, naloxone, naloxone, naloxone, nitroglycerin, ondansetron **OR** ondansetron, oxyCODONE-acetaminophen, oxyCODONE-acetaminophen, prochlorperazine **OR** prochlorperazine **OR** prochlorperazine    Relevant Results  Results from last 7 days   Lab Units 04/21/24  0556 04/20/24  0720 04/19/24  0636   WBC AUTO x10*3/uL 10.8 13.4* 12.1*   HEMOGLOBIN g/dL 8.7* 9.1* 8.9*   HEMATOCRIT % 28.0* 27.4* 28.1*   PLATELETS AUTO x10*3/uL 223 192 192      Results from last 7 days   Lab Units 04/21/24  0556 04/20/24  0720 04/19/24  1040 04/19/24  0636   SODIUM mmol/L 131* 133  --  133   POTASSIUM mmol/L 5.1 4.7 4.7 5.1   CHLORIDE mmol/L 97 102  --  101   CO2 mmol/L 20* 21*  --  21*   BUN mg/dL 41* 30*  --  32*   CREATININE mg/dL 1.40 1.00  --  1.00   GLUCOSE mg/dL 254* 253*  --  268*   CALCIUM mg/dL 8.2* 8.4*  --  8.1*          XR chest 1 view  04/19/2024    Narrative  Interpreted By:  Marco Hollis,  STUDY:  XR CHEST 1 VIEW; 4/19/2024 11:53 am    INDICATION:  Signs/Symptoms:dyspnea.    COMPARISON:  04/18/2024    ACCESSION NUMBER(S):  [id number]    ORDERING CLINICIAN:  THEODORE NAGY    TECHNIQUE:  1 view of the chest was performed.    FINDINGS:  The lungs are adequately inflated. No acute consolidation. No  significant pleural effusion. No pneumothorax.  The cardiomediastinal  silhouette is within normal limits. Median sternotomy wires are  intact. Old posterior right rib fractures are again noted. Small,  less than 2 cm metallic wire is also again seen above the proximal  aspect of the left clavicle.    Impression  Stable examination. No acute cardiopulmonary disease.    Signed by: Marco Hollis 4/19/2024 1:19 PM  Dictation workstation:   [id number]      Assessment/Plan   Principal Problem:    Osteoarthritis  Active Problems:    History of coronary artery bypass graft    Chronic pain of both knees    Primary osteoarthritis of right knee    Postop hypoxia.  Repeat chest x-ray.  Bibasilar Rales on exam.  Question volume overload.  Will give small dose of Lasix.  She qualifies for home O2.  She can be discharged today with oxygen.  Follow-up in 1 to 2 weeks in the office.      Freddie Mcmullen MD  [facility]

## 2024-04-22 ENCOUNTER — APPOINTMENT (OUTPATIENT)
Dept: CARDIOLOGY | Facility: HOSPITAL | Age: 84
DRG: 469 | End: 2024-04-22
Payer: MEDICARE

## 2024-04-22 ENCOUNTER — APPOINTMENT (OUTPATIENT)
Dept: RADIOLOGY | Facility: HOSPITAL | Age: 84
DRG: 469 | End: 2024-04-22
Payer: MEDICARE

## 2024-04-22 LAB
ALBUMIN SERPL-MCNC: 2.7 G/DL (ref 3.5–5)
ANION GAP SERPL CALC-SCNC: 13 MMOL/L
AORTIC VALVE MEAN GRADIENT: 14.2 MMHG
AORTIC VALVE PEAK VELOCITY: 2.53 M/S
APTT PPP: 27.9 SECONDS (ref 22–32.5)
APTT PPP: 45.1 SECONDS (ref 22–32.5)
ATRIAL RATE: 88 BPM
AV PEAK GRADIENT: 25.6 MMHG
AVA (PEAK VEL): 1.04 CM2
AVA (VTI): 1.06 CM2
BUN SERPL-MCNC: 48 MG/DL (ref 8–25)
CALCIUM SERPL-MCNC: 8.1 MG/DL (ref 8.5–10.4)
CHLORIDE SERPL-SCNC: 99 MMOL/L (ref 97–107)
CO2 SERPL-SCNC: 22 MMOL/L (ref 24–31)
CREAT SERPL-MCNC: 1.3 MG/DL (ref 0.4–1.6)
EGFRCR SERPLBLD CKD-EPI 2021: 41 ML/MIN/1.73M*2
ERYTHROCYTE [DISTWIDTH] IN BLOOD BY AUTOMATED COUNT: 13.7 % (ref 11.5–14.5)
GLUCOSE BLD MANUAL STRIP-MCNC: 133 MG/DL (ref 74–99)
GLUCOSE BLD MANUAL STRIP-MCNC: 168 MG/DL (ref 74–99)
GLUCOSE BLD MANUAL STRIP-MCNC: 223 MG/DL (ref 74–99)
GLUCOSE BLD MANUAL STRIP-MCNC: 237 MG/DL (ref 74–99)
GLUCOSE BLD MANUAL STRIP-MCNC: 293 MG/DL (ref 74–99)
GLUCOSE SERPL-MCNC: 193 MG/DL (ref 65–99)
HCT VFR BLD AUTO: 21.7 % (ref 36–46)
HGB BLD-MCNC: 7.3 G/DL (ref 12–16)
HOLD SPECIMEN: NORMAL
INR PPP: 1 (ref 0.9–1.2)
LEFT VENTRICULAR OUTFLOW TRACT DIAMETER: 1.76 CM
MCH RBC QN AUTO: 30.8 PG (ref 26–34)
MCHC RBC AUTO-ENTMCNC: 33.6 G/DL (ref 32–36)
MCV RBC AUTO: 92 FL (ref 80–100)
NRBC BLD-RTO: 0 /100 WBCS (ref 0–0)
P AXIS: -10 DEGREES
P OFFSET: 168 MS
P ONSET: 137 MS
PHOSPHATE SERPL-MCNC: 2.8 MG/DL (ref 2.5–4.5)
PLATELET # BLD AUTO: 207 X10*3/UL (ref 150–450)
POTASSIUM SERPL-SCNC: 4.7 MMOL/L (ref 3.4–5.1)
PR INTERVAL: 144 MS
PROTHROMBIN TIME: 11 SECONDS (ref 9.3–12.7)
Q ONSET: 209 MS
QRS COUNT: 14 BEATS
QRS DURATION: 114 MS
QT INTERVAL: 366 MS
QTC CALCULATION(BAZETT): 442 MS
QTC FREDERICIA: 416 MS
R AXIS: -49 DEGREES
RBC # BLD AUTO: 2.37 X10*6/UL (ref 4–5.2)
RIGHT VENTRICLE PEAK SYSTOLIC PRESSURE: 39.4 MMHG
SODIUM SERPL-SCNC: 134 MMOL/L (ref 133–145)
T AXIS: 93 DEGREES
T OFFSET: 392 MS
TROPONIN T SERPL-MCNC: 869 NG/L
VENTRICULAR RATE: 88 BPM
WBC # BLD AUTO: 7.7 X10*3/UL (ref 4.4–11.3)

## 2024-04-22 PROCEDURE — 80069 RENAL FUNCTION PANEL: CPT | Performed by: INTERNAL MEDICINE

## 2024-04-22 PROCEDURE — C1751 CATH, INF, PER/CENT/MIDLINE: HCPCS

## 2024-04-22 PROCEDURE — 84484 ASSAY OF TROPONIN QUANT: CPT | Performed by: INTERNAL MEDICINE

## 2024-04-22 PROCEDURE — 94664 DEMO&/EVAL PT USE INHALER: CPT

## 2024-04-22 PROCEDURE — 82947 ASSAY GLUCOSE BLOOD QUANT: CPT

## 2024-04-22 PROCEDURE — 97116 GAIT TRAINING THERAPY: CPT | Mod: GP

## 2024-04-22 PROCEDURE — 99024 POSTOP FOLLOW-UP VISIT: CPT | Performed by: ORTHOPAEDIC SURGERY

## 2024-04-22 PROCEDURE — 9420000001 HC RT PATIENT EDUCATION 5 MIN

## 2024-04-22 PROCEDURE — 2500000001 HC RX 250 WO HCPCS SELF ADMINISTERED DRUGS (ALT 637 FOR MEDICARE OP)

## 2024-04-22 PROCEDURE — 93325 DOPPLER ECHO COLOR FLOW MAPG: CPT | Performed by: INTERNAL MEDICINE

## 2024-04-22 PROCEDURE — 71046 X-RAY EXAM CHEST 2 VIEWS: CPT

## 2024-04-22 PROCEDURE — 1210000001 HC SEMI-PRIVATE ROOM DAILY

## 2024-04-22 PROCEDURE — 99233 SBSQ HOSP IP/OBS HIGH 50: CPT | Performed by: NURSE PRACTITIONER

## 2024-04-22 PROCEDURE — 2500000004 HC RX 250 GENERAL PHARMACY W/ HCPCS (ALT 636 FOR OP/ED): Performed by: NURSE PRACTITIONER

## 2024-04-22 PROCEDURE — 36415 COLL VENOUS BLD VENIPUNCTURE: CPT | Performed by: INTERNAL MEDICINE

## 2024-04-22 PROCEDURE — 97530 THERAPEUTIC ACTIVITIES: CPT | Mod: GO,CO

## 2024-04-22 PROCEDURE — 2500000005 HC RX 250 GENERAL PHARMACY W/O HCPCS: Performed by: NURSE PRACTITIONER

## 2024-04-22 PROCEDURE — 93321 DOPPLER ECHO F-UP/LMTD STD: CPT | Performed by: INTERNAL MEDICINE

## 2024-04-22 PROCEDURE — 85730 THROMBOPLASTIN TIME PARTIAL: CPT | Performed by: ORTHOPAEDIC SURGERY

## 2024-04-22 PROCEDURE — 2500000001 HC RX 250 WO HCPCS SELF ADMINISTERED DRUGS (ALT 637 FOR MEDICARE OP): Performed by: INTERNAL MEDICINE

## 2024-04-22 PROCEDURE — 36410 VNPNXR 3YR/> PHY/QHP DX/THER: CPT

## 2024-04-22 PROCEDURE — 2780000003 HC OR 278 NO HCPCS

## 2024-04-22 PROCEDURE — 2500000004 HC RX 250 GENERAL PHARMACY W/ HCPCS (ALT 636 FOR OP/ED): Performed by: INTERNAL MEDICINE

## 2024-04-22 PROCEDURE — 71046 X-RAY EXAM CHEST 2 VIEWS: CPT | Performed by: RADIOLOGY

## 2024-04-22 PROCEDURE — 2500000001 HC RX 250 WO HCPCS SELF ADMINISTERED DRUGS (ALT 637 FOR MEDICARE OP): Performed by: PHYSICIAN ASSISTANT

## 2024-04-22 PROCEDURE — 85610 PROTHROMBIN TIME: CPT | Performed by: INTERNAL MEDICINE

## 2024-04-22 PROCEDURE — 2500000002 HC RX 250 W HCPCS SELF ADMINISTERED DRUGS (ALT 637 FOR MEDICARE OP, ALT 636 FOR OP/ED): Performed by: ORTHOPAEDIC SURGERY

## 2024-04-22 PROCEDURE — 94640 AIRWAY INHALATION TREATMENT: CPT | Mod: MUE

## 2024-04-22 PROCEDURE — 93325 DOPPLER ECHO COLOR FLOW MAPG: CPT

## 2024-04-22 PROCEDURE — 93308 TTE F-UP OR LMTD: CPT | Performed by: INTERNAL MEDICINE

## 2024-04-22 PROCEDURE — 85027 COMPLETE CBC AUTOMATED: CPT | Performed by: INTERNAL MEDICINE

## 2024-04-22 RX ORDER — CYANOCOBALAMIN 1000 UG/ML
1000 INJECTION, SOLUTION INTRAMUSCULAR; SUBCUTANEOUS ONCE
Status: COMPLETED | OUTPATIENT
Start: 2024-04-22 | End: 2024-04-22

## 2024-04-22 RX ORDER — NITROGLYCERIN 40 MG/1
1 PATCH TRANSDERMAL DAILY
Status: DISCONTINUED | OUTPATIENT
Start: 2024-04-22 | End: 2024-04-22

## 2024-04-22 RX ORDER — LIDOCAINE HYDROCHLORIDE 10 MG/ML
5 INJECTION INFILTRATION; PERINEURAL ONCE
Status: DISCONTINUED | OUTPATIENT
Start: 2024-04-22 | End: 2024-04-23

## 2024-04-22 RX ORDER — NAPROXEN SODIUM 220 MG/1
81 TABLET, FILM COATED ORAL DAILY
Status: DISCONTINUED | OUTPATIENT
Start: 2024-04-22 | End: 2024-04-24 | Stop reason: HOSPADM

## 2024-04-22 RX ORDER — FOLIC ACID 1 MG/1
1 TABLET ORAL DAILY
Status: DISCONTINUED | OUTPATIENT
Start: 2024-04-22 | End: 2024-04-24 | Stop reason: HOSPADM

## 2024-04-22 RX ADMIN — APIXABAN 2.5 MG: 2.5 TABLET, FILM COATED ORAL at 17:05

## 2024-04-22 RX ADMIN — OXYCODONE AND ACETAMINOPHEN 1 TABLET: 5; 325 TABLET ORAL at 23:10

## 2024-04-22 RX ADMIN — IPRATROPIUM BROMIDE AND ALBUTEROL SULFATE 3 ML: 2.5; .5 SOLUTION RESPIRATORY (INHALATION) at 11:47

## 2024-04-22 RX ADMIN — INSULIN LISPRO 12 UNITS: 100 INJECTION, SOLUTION INTRAVENOUS; SUBCUTANEOUS at 12:09

## 2024-04-22 RX ADMIN — GABAPENTIN 100 MG: 100 CAPSULE ORAL at 22:49

## 2024-04-22 RX ADMIN — NITROGLYCERIN 1 PATCH: 0.2 PATCH TRANSDERMAL at 12:10

## 2024-04-22 RX ADMIN — LEVOTHYROXINE SODIUM 100 MCG: 0.1 TABLET ORAL at 06:30

## 2024-04-22 RX ADMIN — ASPIRIN 81 MG CHEWABLE TABLET 81 MG: 81 TABLET CHEWABLE at 13:27

## 2024-04-22 RX ADMIN — IRON SUCROSE 200 MG: 20 INJECTION, SOLUTION INTRAVENOUS at 12:00

## 2024-04-22 RX ADMIN — Medication: at 06:58

## 2024-04-22 RX ADMIN — Medication: at 20:00

## 2024-04-22 RX ADMIN — INSULIN LISPRO 8 UNITS: 100 INJECTION, SOLUTION INTRAVENOUS; SUBCUTANEOUS at 17:05

## 2024-04-22 RX ADMIN — CYANOCOBALAMIN 1000 MCG: 1000 INJECTION, SOLUTION INTRAMUSCULAR; SUBCUTANEOUS at 12:09

## 2024-04-22 RX ADMIN — IPRATROPIUM BROMIDE AND ALBUTEROL SULFATE 3 ML: 2.5; .5 SOLUTION RESPIRATORY (INHALATION) at 19:26

## 2024-04-22 RX ADMIN — PRAVASTATIN SODIUM 40 MG: 40 TABLET ORAL at 22:49

## 2024-04-22 RX ADMIN — IPRATROPIUM BROMIDE AND ALBUTEROL SULFATE 3 ML: 2.5; .5 SOLUTION RESPIRATORY (INHALATION) at 06:57

## 2024-04-22 RX ADMIN — METOPROLOL TARTRATE 25 MG: 25 TABLET, FILM COATED ORAL at 22:49

## 2024-04-22 ASSESSMENT — COGNITIVE AND FUNCTIONAL STATUS - GENERAL
MOVING FROM LYING ON BACK TO SITTING ON SIDE OF FLAT BED WITH BEDRAILS: A LITTLE
HELP NEEDED FOR BATHING: A LOT
MOVING TO AND FROM BED TO CHAIR: A LOT
TURNING FROM BACK TO SIDE WHILE IN FLAT BAD: A LOT
WALKING IN HOSPITAL ROOM: A LOT
HELP NEEDED FOR BATHING: A LOT
CLIMB 3 TO 5 STEPS WITH RAILING: A LOT
MOBILITY SCORE: 14
PERSONAL GROOMING: A LOT
EATING MEALS: A LITTLE
MOBILITY SCORE: 13
EATING MEALS: A LITTLE
DRESSING REGULAR UPPER BODY CLOTHING: A LITTLE
MOVING FROM LYING ON BACK TO SITTING ON SIDE OF FLAT BED WITH BEDRAILS: A LOT
TOILETING: A LOT
CLIMB 3 TO 5 STEPS WITH RAILING: A LOT
TOILETING: A LOT
PERSONAL GROOMING: A LITTLE
DRESSING REGULAR LOWER BODY CLOTHING: A LOT
DAILY ACTIVITIY SCORE: 15
DRESSING REGULAR UPPER BODY CLOTHING: A LOT
WALKING IN HOSPITAL ROOM: A LITTLE
MOVING TO AND FROM BED TO CHAIR: A LOT
DAILY ACTIVITIY SCORE: 13
TURNING FROM BACK TO SIDE WHILE IN FLAT BAD: A LOT
STANDING UP FROM CHAIR USING ARMS: A LOT
STANDING UP FROM CHAIR USING ARMS: A LITTLE
DRESSING REGULAR LOWER BODY CLOTHING: A LOT

## 2024-04-22 ASSESSMENT — PAIN DESCRIPTION - ORIENTATION: ORIENTATION: RIGHT

## 2024-04-22 ASSESSMENT — PAIN - FUNCTIONAL ASSESSMENT
PAIN_FUNCTIONAL_ASSESSMENT: FLACC (FACE, LEGS, ACTIVITY, CRY, CONSOLABILITY)
PAIN_FUNCTIONAL_ASSESSMENT: FLACC (FACE, LEGS, ACTIVITY, CRY, CONSOLABILITY)
PAIN_FUNCTIONAL_ASSESSMENT: 0-10

## 2024-04-22 ASSESSMENT — PAIN SCALES - GENERAL
PAINLEVEL_OUTOF10: 0 - NO PAIN
PAINLEVEL_OUTOF10: 3
PAINLEVEL_OUTOF10: 4
PAINLEVEL_OUTOF10: 3

## 2024-04-22 ASSESSMENT — PAIN DESCRIPTION - LOCATION: LOCATION: KNEE

## 2024-04-22 NOTE — PROGRESS NOTES
Occupational Therapy    OT Treatment    Patient Name: Bonnie Jules  MRN: [id number]  Today's Date: 4/22/2024  Time Calculation  Start Time: 1351  Stop Time: 1415  Time Calculation (min): 24 min         Assessment:  OT Assessment: Gradual progress made towards OT goals. Continue with current OT POC to increase strength, balance and functional tolerance to maximize safety and independence during ADLs.  Evaluation/Treatment Tolerance: Patient limited by fatigue  End of Session Communication: Bedside nurse  End of Session Patient Position: Up in chair, Alarm off, caregiver present (all needs in reach)  OT Assessment Results: Decreased ADL status, Decreased endurance, Decreased functional mobility  Evaluation/Treatment Tolerance: Patient limited by fatigue  Plan:  Treatment Interventions: ADL retraining, Functional transfer training, Equipment evaluation/education, Patient/family training  OT Frequency: 5 times per week  OT Discharge Recommendations: Moderate intensity level of continued care  OT - OK to Discharge: Yes  Treatment Interventions: ADL retraining, Functional transfer training, Equipment evaluation/education, Patient/family training    Subjective   Previous Visit Info:  OT Last Visit  OT Received On: 04/22/24  General:  General  Reason for Referral: impaired ADls s/p R TKA  Past Medical History Relevant to Rehab: anxiety, CAD s/p CABG x3, chronic lymphocytic leukemia, HTN, DM, diabetic polyneuropathy, chronic kidney disease, osteoporosis, PVD, sleep apnea, bilateral carpal tunnel release, L ALFONZO,  Missed Visit: Yes  Missed Visit Reason: Patient placed on medical hold (Pt currently off floor for ECHO for elevated troponins. No OT tx completed at this time.)  Family/Caregiver Present: Yes  Caregiver Feedback: Daughter at bedside- questions, comments and concerns addressed regarding OT.  Co-Treatment: PT  Co-Treatment Reason: d/t pt decreased functional tolerance, co-tx provided to maximize safety and functional  outcomes.  Prior to Session Communication: Bedside nurse  Patient Position Received: Bed, 3 rail up, Alarm off, not on at start of session  General Comment: Pt cleared for therapy session per nursing and Dr. Liriano, increased time and rest breaks required for task completion secondary to SOB/ fatigue.  Precautions:  Hearing/Visual Limitations: hearing WFL, glasses  LE Weight Bearing Status: Weight Bearing as Tolerated (RLE)  Medical Precautions: Fall precautions, Oxygen therapy device and L/min (2L O2 via NC)  Post-Surgical Precautions: Right total knee precautions  Precautions Comment: Does not recall surgical knee precautions at this time. Education provided as needed.  Vital Signs:  Vital Signs  SpO2: 92 % (SpO2 88-96% on 2L O2 via NC)  Pain:  Pain Assessment  Pain Assessment: FLACC (Face, Legs, Activity, Cry, Consolability)  Pain Score: 3  Pain Type: Surgical pain  Pain Location: Knee  Pain Orientation: Right  Clinical Progression: Not changed  Pain Interventions: Repositioned, Ambulation/increased activity    Objective    Cognition:  Cognition  Overall Cognitive Status: Within Functional Limits  Orientation Level: Oriented X4  Safety/Judgement: Exceptions to WFL  Insight: Mild  Impulsive: Within functional limits  Task Initiation: Initiates with cues  Processing Speed: Within funtional limits  Coordination:  Movements are Fluid and Coordinated: No  Upper Body Coordination: WFL  Lower Body Coordination: slower rate of movements R>L    Functional Standing Tolerance:  Time: ~4min  Activity: static standing with FWW and CGA  Functional Standing Tolerance Comments: Tasks completed in standing to increase functional tolerance and strength to maximize safety and independence during ADLs.  Bed Mobility/Transfers: Bed Mobility  Bed Mobility: Yes  Bed Mobility 1  Bed Mobility 1: Supine to sitting  Level of Assistance 1: Moderate assistance, Moderate verbal cues  Bed Mobility Comments 1: HOB minimally elevated- increased  time and effort required for task completion. ModA provided for RLE and for trunk elevation    Transfers  Transfer: Yes  Transfer 1  Trials/Comments 1: sit<>stands completed from standard EOB height with FWW and David- min verbal/ tactile cues required for proper hand placement to increase safety and decrease risk of falls.      Functional Mobility:  Functional Mobility  Functional Mobility Performed: Yes  Functional Mobility 1  Surface 1: Level tile  Device 1: Rolling walker  Assistance 1: Minimum assistance  Quality of Functional Mobility 1: Inconsistent stride length  Comments 1: Functional mobility trials completed at short household distances with increased time. Mod verbal/ tactile cues required for sequencing to increase safety and decrease risk of falls. SpO2 briefly 88% on 2L O2 via NC however recovers to 96% within 1min of seated rest break.      Outcome Measures:[facility] Daily Activity  Putting on and taking off regular lower body clothing: A lot  Bathing (including washing, rinsing, drying): A lot  Putting on and taking off regular upper body clothing: A little  Toileting, which includes using toilet, bedpan or urinal: A lot  Taking care of personal grooming such as brushing teeth: A little  Eating Meals: A little  Daily Activity - Total Score: 15        Education Documentation  Body Mechanics, taught by PANTERA Woo at 4/22/2024  5:16 PM.  Learner: Family, Patient  Readiness: Acceptance  Method: Explanation, Demonstration  Response: Needs Reinforcement    Precautions, taught by PANTERA Woo at 4/22/2024  5:16 PM.  Learner: Family, Patient  Readiness: Acceptance  Method: Explanation, Demonstration  Response: Needs Reinforcement    ADL Training, taught by PANTERA Woo at 4/22/2024  5:16 PM.  Learner: Family, Patient  Readiness: Acceptance  Method: Explanation, Demonstration  Response: Needs Reinforcement    Education Comments  Education provided on role of OT/POC, safety  awareness throughout functional tasks/transfers, importance of activity/ rest routine, EC/WS techniques, and use of call light for assistance. Questions, comments and concerns addressed regarding OT.      Goals:  Encounter Problems       Encounter Problems (Active)       ADLs       Pt will complete ADL tasks at mod I with use of AE prn  (Progressing)       Start:  04/18/24    Expected End:  04/22/24               Functional Mobility       Pt will perform functional mobility household distances at mod I with use of RW.    (Progressing)       Start:  04/18/24    Expected End:  04/22/24               OT Transfers       Pt will perform functional transfers at mod I (Progressing)       Start:  04/18/24    Expected End:  04/22/24               Precautions       Pt will independently maintain TKA precautions while completing ADL/IADL tasks and functional transfers/mobility.  (Progressing)       Start:  04/18/24    Expected End:  04/22/24

## 2024-04-22 NOTE — CARE PLAN
The patient's goals for the shift include      The clinical goals for the shift include pain control        Problem: Pain  Goal: My pain/discomfort is manageable  Outcome: Progressing     Problem: Safety  Goal: Patient will be injury free during hospitalization  Outcome: Progressing  Goal: I will remain free of falls  Outcome: Progressing     Problem: Daily Care  Goal: Daily care needs are met  Outcome: Progressing     Problem: Psychosocial Needs  Goal: Demonstrates ability to cope with hospitalization/illness  Outcome: Progressing  Goal: Collaborate with me, my family, and caregiver to identify my specific goals  Outcome: Progressing     Problem: Discharge Barriers  Goal: My discharge needs are met  Outcome: Progressing     Problem: Skin  Goal: Decreased wound size/increased tissue granulation at next dressing change  Outcome: Progressing  Flowsheets (Taken 4/21/2024 0130 by Aileen Knapp RN)  Decreased wound size/increased tissue granulation at next dressing change:   Promote sleep for wound healing   Utilize specialty bed per algorithm   Protective dressings over bony prominences  Goal: Participates in plan/prevention/treatment measures  Outcome: Progressing  Flowsheets (Taken 4/21/2024 0130 by Aileen Knapp RN)  Participates in plan/prevention/treatment measures:   Discuss with provider PT/OT consult   Increase activity/out of bed for meals   Elevate heels  Goal: Prevent/manage excess moisture  Outcome: Progressing  Flowsheets (Taken 4/21/2024 0130 by Aileen Knapp RN)  Prevent/manage excess moisture:   Cleanse incontinence/protect with barrier cream   Moisturize dry skin   Use wicking fabric (obtain order)   Follow provider orders for dressing changes   Monitor for/manage infection if present  Goal: Prevent/minimize sheer/friction injuries  Outcome: Progressing  Flowsheets (Taken 4/21/2024 0130 by Aileen Knapp RN)  Prevent/minimize sheer/friction injuries:   Complete micro-shifts as needed if patient  unable. Adjust patient position to relieve pressure points, not a full turn   HOB 30 degrees or less   Increase activity/out of bed for meals   Turn/reposition every 2 hours/use positioning/transfer devices   Use pull sheet   Utilize specialty bed per algorithm  Goal: Promote/optimize nutrition  Outcome: Progressing  Flowsheets (Taken 4/21/2024 0130 by Aileen Knapp RN)  Promote/optimize nutrition:   Assist with feeding   Offer water/supplements/favorite foods   Monitor/record intake including meals  Goal: Promote skin healing  Outcome: Progressing  Flowsheets (Taken 4/21/2024 0130 by Aileen Knapp RN)  Promote skin healing:   Ensure correct size (line/device) and apply per  instructions   Assess skin/pad under line(s)/device(s)   Protective dressings over bony prominences   Rotate device position/do not position patient on device   Turn/reposition every 2 hours/use positioning/transfer devices     Problem: ADLs  Goal: Pt will complete ADL tasks at mod I with use of AE prn   Outcome: Progressing     Problem: Respiratory  Goal: No signs of respiratory distress (eg. Use of accessory muscles. Peds grunting)  Outcome: Progressing     Problem: Pain - Adult  Goal: Verbalizes/displays adequate comfort level or baseline comfort level  Outcome: Progressing     Problem: Safety - Adult  Goal: Free from fall injury  Outcome: Progressing     Problem: Discharge Planning  Goal: Discharge to home or other facility with appropriate resources  Outcome: Progressing     Problem: Chronic Conditions and Co-morbidities  Goal: Patient's chronic conditions and co-morbidity symptoms are monitored and maintained or improved  Outcome: Progressing     Problem: Diabetes  Goal: Achieve decreasing blood glucose levels by end of shift  Outcome: Progressing  Goal: Increase stability of blood glucose readings by end of shift  Outcome: Progressing  Goal: Decrease in ketones present in urine by end of shift  Outcome: Progressing  Goal:  Maintain electrolyte levels within acceptable range throughout shift  Outcome: Progressing  Goal: Maintain glucose levels >70mg/dl to <250mg/dl throughout shift  Outcome: Progressing  Goal: No changes in neurological exam by end of shift  Outcome: Progressing  Goal: Learn about and adhere to nutrition recommendations by end of shift  Outcome: Progressing  Goal: Vital signs within normal range for age by end of shift  Outcome: Progressing  Goal: Increase self care and/or family involovement by end of shift  Outcome: Progressing  Goal: Receive DSME education by end of shift  Outcome: Progressing     Problem: Pain  Goal: Takes deep breaths with improved pain control throughout the shift  Outcome: Progressing  Goal: Turns in bed with improved pain control throughout the shift  Outcome: Progressing  Goal: Walks with improved pain control throughout the shift  Outcome: Progressing  Goal: Performs ADL's with improved pain control throughout shift  Outcome: Progressing  Goal: Participates in PT with improved pain control throughout the shift  Outcome: Progressing  Goal: Free from opioid side effects throughout the shift  Outcome: Progressing  Goal: Free from acute confusion related to pain meds throughout the shift  Outcome: Progressing

## 2024-04-22 NOTE — PROCEDURES
Vascular Access Team Procedure Note     Visit Date: 4/22/2024      Patient Name: Bonnie Jules         MRN: [id number]             Procedure:  Called to place piv, patient arms assessed, unable to find adequate veins for piv start, Dr. Sariah cueto with midline in dominant arm. Patient and daughter agreeable to placement.   Single lumen midline placed to Rt basilic vein by Fernanda Landin RN [facility] without difficulty. Patient tolerated well. VILEKA Lee aware of placment and ok to use.                          Adelia Arias RN  4/22/2024  1:24 PM

## 2024-04-22 NOTE — CARE PLAN
Problem: Functional Mobility  Goal: Pt will transition supine<>sit at flat bed with mod I.  Outcome: Progressing  Goal: Pt will transfer sit<>stand with FWW & mod I.  Outcome: Progressing  Goal: Pt will ambulate >/=100 ft with FWW & mod I.  Outcome: Progressing

## 2024-04-22 NOTE — PROGRESS NOTES
"Bonnie Jules is a 83 y.o. female on day 1 of admission presenting with Osteoarthritis.    Subjective   The patient is resting comfortably in bed.  She does not complain of any chest pain or shortness of breath.       Objective   General: The patient is resting comfortably in bed.  She is on supplemental oxygen and also a heparin drip as ordered by the cardiologist    Musculoskeletal: Right knee dressing is clean and dry.  Right calf and thigh are nontender and nonswollen.  Physical Exam    Last Recorded Vitals  Blood pressure 107/60, pulse 102, temperature 38.6 °C (101.5 °F), temperature source Oral, resp. rate 17, height 1.49 m (4' 10.66\"), weight 57 kg (125 lb 10.6 oz), SpO2 95%.  Intake/Output last 3 Shifts:  I/O last 3 completed shifts:  In: 920 (16.1 mL/kg) [P.O.:920]  Out: 1850 (32.5 mL/kg) [Urine:1850 (0.9 mL/kg/hr)]  Weight: 57 kg     Relevant Results      Scheduled medications  folic acid, 1 mg, oral, Daily  gabapentin, 100 mg, oral, BID  insulin glargine, 11 Units, subcutaneous, Daily before breakfast  insulin lispro, 0-20 Units, subcutaneous, TID with meals  ipratropium-albuteroL, 3 mL, nebulization, TID  iron sucrose, 200 mg, intravenous, Once  levothyroxine, 100 mcg, oral, Daily  lidocaine, 5 mL, infiltration, Once  metoprolol tartrate, 25 mg, oral, BID  nitroglycerin, 1 patch, transdermal, Daily  oxygen, , inhalation, Continuous - Inhalation  polyethylene glycol, 17 g, oral, Daily  pravastatin, 40 mg, oral, Nightly      Continuous medications  heparin, 0-4,000 Units/hr, Last Rate: 1,000 Units/hr (04/22/24 1132)  oxygen, 2 L/min, Last Rate: 3 L/min (04/20/24 0705)      PRN medications  PRN medications: acetaminophen, dextrose, dextrose, glucagon, glucagon, heparin (porcine), HYDROmorphone, HYDROmorphone, naloxone, naloxone, naloxone, nitroglycerin, ondansetron **OR** ondansetron, oxyCODONE-acetaminophen, oxyCODONE-acetaminophen, prochlorperazine **OR** prochlorperazine **OR** " prochlorperazine  Results for orders placed or performed during the hospital encounter of 04/17/24 (from the past 24 hour(s))   Serial Troponin, 2 Hour ([facility])   Result Value Ref Range    Troponin T, High Sensitivity 680 (HH) <=14 ng/L   Urinalysis with Reflex Culture and Microscopic   Result Value Ref Range    Color, Urine Yellow Light-Yellow, Yellow, Dark-Yellow    Appearance, Urine Clear Clear    Specific Gravity, Urine 1.017 1.005 - 1.035    pH, Urine 5.0 5.0, 5.5, 6.0, 6.5, 7.0, 7.5, 8.0    Protein, Urine NEGATIVE NEGATIVE, 10 (TRACE), 20 (TRACE) mg/dL    Glucose, Urine 300 (3+) (A) Normal mg/dL    Blood, Urine NEGATIVE NEGATIVE    Ketones, Urine NEGATIVE NEGATIVE mg/dL    Bilirubin, Urine NEGATIVE NEGATIVE    Urobilinogen, Urine Normal Normal mg/dL    Nitrite, Urine NEGATIVE NEGATIVE    Leukocyte Esterase, Urine NEGATIVE NEGATIVE   Extra Urine Gray Tube   Result Value Ref Range    Extra Tube Hold for add-ons.    CBC   Result Value Ref Range    WBC 10.3 4.4 - 11.3 x10*3/uL    nRBC 0.0 0.0 - 0.0 /100 WBCs    RBC 2.71 (L) 4.00 - 5.20 x10*6/uL    Hemoglobin 8.5 (L) 12.0 - 16.0 g/dL    Hematocrit 26.6 (L) 36.0 - 46.0 %    MCV 98 80 - 100 fL    MCH 31.4 26.0 - 34.0 pg    MCHC 32.0 32.0 - 36.0 g/dL    RDW 13.9 11.5 - 14.5 %    Platelets 220 150 - 450 x10*3/uL   POCT GLUCOSE   Result Value Ref Range    POCT Glucose 147 (H) 74 - 99 mg/dL   aPTT   Result Value Ref Range    aPTT 27.8 22.0 - 32.5 seconds   POCT GLUCOSE   Result Value Ref Range    POCT Glucose 167 (H) 74 - 99 mg/dL   Serial Troponin, 6 Hour ([facility])   Result Value Ref Range    Troponin T, High Sensitivity 821 (HH) <=14 ng/L   POCT GLUCOSE   Result Value Ref Range    POCT Glucose 168 (H) 74 - 99 mg/dL   Coagulation Screen   Result Value Ref Range    Protime 11.0 9.3 - 12.7 seconds    INR 1.0 0.9 - 1.2    aPTT 27.9 22.0 - 32.5 seconds   CBC   Result Value Ref Range    WBC 7.7 4.4 - 11.3 x10*3/uL    nRBC 0.0 0.0 - 0.0 /100 WBCs    RBC 2.37 (L) 4.00 - 5.20  x10*6/uL    Hemoglobin 7.3 (L) 12.0 - 16.0 g/dL    Hematocrit 21.7 (L) 36.0 - 46.0 %    MCV 92 80 - 100 fL    MCH 30.8 26.0 - 34.0 pg    MCHC 33.6 32.0 - 36.0 g/dL    RDW 13.7 11.5 - 14.5 %    Platelets 207 150 - 450 x10*3/uL   Renal Function Panel   Result Value Ref Range    Glucose 193 (H) 65 - 99 mg/dL    Sodium 134 133 - 145 mmol/L    Potassium 4.7 3.4 - 5.1 mmol/L    Chloride 99 97 - 107 mmol/L    Bicarbonate 22 (L) 24 - 31 mmol/L    Urea Nitrogen 48 (H) 8 - 25 mg/dL    Creatinine 1.30 0.40 - 1.60 mg/dL    eGFR 41 (L) >60 mL/min/1.73m*2    Calcium 8.1 (L) 8.5 - 10.4 mg/dL    Phosphorus 2.8 2.5 - 4.5 mg/dL    Albumin 2.7 (L) 3.5 - 5.0 g/dL    Anion Gap 13 <=19 mmol/L   Troponin T   Result Value Ref Range    Troponin T, High Sensitivity 869 (HH) <=14 ng/L   POCT GLUCOSE   Result Value Ref Range    POCT Glucose 237 (H) 74 - 99 mg/dL   Transthoracic Echo (TTE) Limited   Result Value Ref Range    AV pk delmi 2.53 m/s    LVOT diam 1.76 cm    AV mn grad 14.2 mmHg    RVSP 39.4 mmHg    AV pk grad 25.6 mmHg    Aortic Valve Area by Continuity of VTI 1.06 cm2    Aortic Valve Area by Continuity of Peak Velocity 1.04 cm2   APTT   Result Value Ref Range    aPTT 45.1 (H) 22.0 - 32.5 seconds   POCT GLUCOSE   Result Value Ref Range    POCT Glucose 293 (H) 74 - 99 mg/dL                            Assessment/Plan   Principal Problem:    Osteoarthritis  Active Problems:    History of coronary artery bypass graft    Chronic pain of both knees    Primary osteoarthritis of right knee    Hypoxia, kidney failure and elevated troponins   Plan: I had a long detailed discussion with the patient at the bedside in the presence of her daughter.  I reviewed my understanding of the findings of the urologist, nephrologist, pulmonologist and cardiologist.  At present we will continue to evaluate this patient and I appreciate the input of and have reviewed the consults of the above specialists.  Will continue to observe this patient and await  improvement of her medical conditions.  She is not yet stable for discharge home.  The patient and her daughter both appreciated my input on this patient's medical problems.    I spent 45 minutes in the professional and overall care of this patient.      Rudolph Liriano MD

## 2024-04-22 NOTE — PROGRESS NOTES
"Bonnie Jules is a 83 y.o. female on day 1 of admission seen in follow-up for acute hypoxia  Subjective   On 2 L nasal cannula oxygen; 94%.  Endorses improved breathing and right knee pain.  Afebrile.       Objective     Physical Exam  Vitals and nursing note reviewed.   Constitutional:       Appearance: Normal appearance.   HENT:      Head: Normocephalic and atraumatic.      Nose: Nose normal.      Mouth/Throat:      Mouth: Mucous membranes are moist.   Eyes:      Extraocular Movements: Extraocular movements intact.      Conjunctiva/sclera: Conjunctivae normal.      Pupils: Pupils are equal, round, and reactive to light.   Cardiovascular:      Rate and Rhythm: Normal rate and regular rhythm.      Pulses: Normal pulses.      Heart sounds: Normal heart sounds.   Pulmonary:      Effort: Pulmonary effort is normal.      Comments: Lungs diminished but clear.  Abdominal:      General: Bowel sounds are normal.      Palpations: Abdomen is soft.   Musculoskeletal:         General: Normal range of motion.      Comments: Right lower extremity with dry and intact dressing.   Skin:     General: Skin is warm and dry.      Capillary Refill: Capillary refill takes less than 2 seconds.   Neurological:      General: No focal deficit present.      Mental Status: She is alert and oriented to person, place, and time.   Psychiatric:         Mood and Affect: Mood normal.         Behavior: Behavior normal.         Last Recorded Vitals  Blood pressure (!) 103/47, pulse 103, temperature 37.2 °C (99 °F), temperature source Oral, resp. rate 17, height 1.49 m (4' 10.66\"), weight 57 kg (125 lb 10.6 oz), SpO2 93%.  Intake/Output last 3 Shifts:  I/O last 3 completed shifts:  In: 920 (16.1 mL/kg) [P.O.:920]  Out: 1850 (32.5 mL/kg) [Urine:1850 (0.9 mL/kg/hr)]  Weight: 57 kg     Intake/Output Summary (Last 24 hours) at 4/22/2024 0803  Last data filed at 4/22/2024 0578  Gross per 24 hour   Intake 620 ml   Output 1850 ml   Net -1230 ml      [Held by " provider] amLODIPine, 10 mg, oral, Daily  [Held by provider] apixaban, 2.5 mg, oral, BID  gabapentin, 100 mg, oral, BID  [Held by provider] hydroCHLOROthiazide, 25 mg, oral, Daily  insulin glargine, 11 Units, subcutaneous, Daily before breakfast  insulin lispro, 0-20 Units, subcutaneous, TID with meals  ipratropium-albuteroL, 3 mL, nebulization, TID  levothyroxine, 100 mcg, oral, Daily  [Held by provider] lisinopril, 40 mg, oral, Daily  metoprolol tartrate, 25 mg, oral, BID  nitroglycerin, 1 patch, transdermal, Daily  oxygen, , inhalation, Continuous - Inhalation  polyethylene glycol, 17 g, oral, Daily  pravastatin, 40 mg, oral, Nightly       PRN medications: acetaminophen, dextrose, dextrose, glucagon, glucagon, heparin (porcine), HYDROmorphone, HYDROmorphone, naloxone, naloxone, naloxone, nitroglycerin, ondansetron **OR** ondansetron, oxyCODONE-acetaminophen, oxyCODONE-acetaminophen, prochlorperazine **OR** prochlorperazine **OR** prochlorperazine   heparin, 0-4,000 Units/hr, Last Rate: 900 Units/hr (04/22/24 0630)  oxygen, 2 L/min, Last Rate: 3 L/min (04/20/24 0705)         Relevant Results  Results for orders placed or performed during the hospital encounter of 04/17/24 (from the past 24 hour(s))   Serial Troponin, Initial ([facility])   Result Value Ref Range    Troponin T, High Sensitivity 577 (HH) <=14 ng/L   Creatine Kinase   Result Value Ref Range    Creatine Kinase 223 (H) 24 - 195 U/L   POCT GLUCOSE   Result Value Ref Range    POCT Glucose 294 (H) 74 - 99 mg/dL   ECG 12 Lead   Result Value Ref Range    Ventricular Rate 88 BPM    Atrial Rate 88 BPM    TN Interval 144 ms    QRS Duration 114 ms    QT Interval 366 ms    QTC Calculation(Bazett) 442 ms    P Axis -10 degrees    R Axis -49 degrees    T Axis 93 degrees    QRS Count 14 beats    Q Onset 209 ms    P Onset 137 ms    P Offset 168 ms    T Offset 392 ms    QTC Fredericia 416 ms   Serial Troponin, 2 Hour ([facility])   Result Value Ref Range    Troponin T, High  Sensitivity 680 (HH) <=14 ng/L   Urinalysis with Reflex Culture and Microscopic   Result Value Ref Range    Color, Urine Yellow Light-Yellow, Yellow, Dark-Yellow    Appearance, Urine Clear Clear    Specific Gravity, Urine 1.017 1.005 - 1.035    pH, Urine 5.0 5.0, 5.5, 6.0, 6.5, 7.0, 7.5, 8.0    Protein, Urine NEGATIVE NEGATIVE, 10 (TRACE), 20 (TRACE) mg/dL    Glucose, Urine 300 (3+) (A) Normal mg/dL    Blood, Urine NEGATIVE NEGATIVE    Ketones, Urine NEGATIVE NEGATIVE mg/dL    Bilirubin, Urine NEGATIVE NEGATIVE    Urobilinogen, Urine Normal Normal mg/dL    Nitrite, Urine NEGATIVE NEGATIVE    Leukocyte Esterase, Urine NEGATIVE NEGATIVE   Extra Urine Gray Tube   Result Value Ref Range    Extra Tube Hold for add-ons.    CBC   Result Value Ref Range    WBC 10.3 4.4 - 11.3 x10*3/uL    nRBC 0.0 0.0 - 0.0 /100 WBCs    RBC 2.71 (L) 4.00 - 5.20 x10*6/uL    Hemoglobin 8.5 (L) 12.0 - 16.0 g/dL    Hematocrit 26.6 (L) 36.0 - 46.0 %    MCV 98 80 - 100 fL    MCH 31.4 26.0 - 34.0 pg    MCHC 32.0 32.0 - 36.0 g/dL    RDW 13.9 11.5 - 14.5 %    Platelets 220 150 - 450 x10*3/uL   POCT GLUCOSE   Result Value Ref Range    POCT Glucose 147 (H) 74 - 99 mg/dL   aPTT   Result Value Ref Range    aPTT 27.8 22.0 - 32.5 seconds   POCT GLUCOSE   Result Value Ref Range    POCT Glucose 167 (H) 74 - 99 mg/dL   Serial Troponin, 6 Hour ([facility])   Result Value Ref Range    Troponin T, High Sensitivity 821 (HH) <=14 ng/L   POCT GLUCOSE   Result Value Ref Range    POCT Glucose 168 (H) 74 - 99 mg/dL   Coagulation Screen   Result Value Ref Range    Protime 11.0 9.3 - 12.7 seconds    INR 1.0 0.9 - 1.2    aPTT 27.9 22.0 - 32.5 seconds   CBC   Result Value Ref Range    WBC 7.7 4.4 - 11.3 x10*3/uL    nRBC 0.0 0.0 - 0.0 /100 WBCs    RBC 2.37 (L) 4.00 - 5.20 x10*6/uL    Hemoglobin 7.3 (L) 12.0 - 16.0 g/dL    Hematocrit 21.7 (L) 36.0 - 46.0 %    MCV 92 80 - 100 fL    MCH 30.8 26.0 - 34.0 pg    MCHC 33.6 32.0 - 36.0 g/dL    RDW 13.7 11.5 - 14.5 %    Platelets 207 150  - 450 x10*3/uL   Renal Function Panel   Result Value Ref Range    Glucose 193 (H) 65 - 99 mg/dL    Sodium 134 133 - 145 mmol/L    Potassium 4.7 3.4 - 5.1 mmol/L    Chloride 99 97 - 107 mmol/L    Bicarbonate 22 (L) 24 - 31 mmol/L    Urea Nitrogen 48 (H) 8 - 25 mg/dL    Creatinine 1.30 0.40 - 1.60 mg/dL    eGFR 41 (L) >60 mL/min/1.73m*2    Calcium 8.1 (L) 8.5 - 10.4 mg/dL    Phosphorus 2.8 2.5 - 4.5 mg/dL    Albumin 2.7 (L) 3.5 - 5.0 g/dL    Anion Gap 13 <=19 mmol/L   Troponin T   Result Value Ref Range    Troponin T, High Sensitivity 869 (HH) <=14 ng/L   POCT GLUCOSE   Result Value Ref Range    POCT Glucose 237 (H) 74 - 99 mg/dL        NM Lung Perfusion  Result Date: 4/19/2024  FINDINGS: Perfusion images demonstrate mild heterogeneity within both lungs. No distinct wedge-shaped subsegmental or segmental perfusion defect seen on SPECT CT to suggest acute pulmonary embolism (low probability).   Low-dose CT demonstrates small bilateral pleural effusions along with bibasilar atelectasis  1.  No distinct wedge-shaped subsegmental or segmental perfusion defect seen on SPECT CT to suggest acute pulmonary embolism (low probability). 2. Low-dose CT demonstrates small bilateral pleural effusions along with bibasilar atelectasis.       XR chest 1 view  Result Date: 4/19/2024  FINDINGS: The lungs are adequately inflated. No acute consolidation. No significant pleural effusion. No pneumothorax.  The cardiomediastinal silhouette is within normal limits. Median sternotomy wires are intact. Old posterior right rib fractures are again noted. Small, less than 2 cm metallic wire is also again seen above the proximal aspect of the left clavicle. Stable examination. No acute cardiopulmonary disease.       XR chest 1 view  Result Date: 4/18/2024  FINDINGS: The study is limited due to rotation and poor inspiratory effort, with resultant crowding of the pulmonary vasculature. Median sternotomy wires and surgical clips are again present,  consistent with previous coronary artery bypass graft. Small, less than 2 cm metallic wire is also again seen above the proximal aspect of the left clavicle. The cardiac silhouette is within normal limits for the technique. Bilateral hilar prominence is noted, left more than right, most likely due to pulmonary vascular congestion. The minimal interstitial infiltrates in the perihilar regions, however no confluence infiltrates or significant pleural effusions are identified. There is no pneumothorax. Hiatal hernia is again noted. Degenerative changes involve the spine and shoulders. Right rib deformities are also again seen.   Limited study. Bilateral hilar prominence, left more than right is most likely due to pulmonary vascular congestion; correlate clinically and if there is suspicion for possible lymphadenopathy further evaluated with contrast-enhanced CT scan. Additional chronic findings as above.       XR knee right 1-2 views  Result Date: 4/17/2024  FINDINGS: Right knee, two views   Interval right total knee arthroplasty in place. No periprosthetic fracture lucency seen. There is no dislocation. Postsurgical change the soft tissue. There is a moderate-sized effusion. No immediate complication after right total knee arthroplasty       Impression  Status post right total knee replacement  Acute postoperative hypoxia  Acute kidney injury on chronic kidney disease stage II-III      Plan  Wean oxygen as sats allow  Home O2 certification prior to discharge  Incentive spirometry/pulmonary hygiene  Heparin drip with transition to Eliquis per Orthopedics  20 mg IV Lasix given x 1 dose yesterday  Repeat chest x-ray pending  Analgesia  PT/OT/out of bed    CARMEN Quintanilla-CNP  [facility]

## 2024-04-22 NOTE — PROGRESS NOTES
Occupational Therapy                 Therapy Communication Note    Patient Name: Bonnie Jules  MRN: [id number]  Today's Date: 4/22/2024     Discipline: Occupational Therapy    Missed Visit Reason: Missed Visit Reason: Patient placed on medical hold (Pt currently off floor for ECHO for elevated troponins. No OT tx completed at this time.)    Missed Time: Attempt    Comment: Attempt at 0910

## 2024-04-22 NOTE — PROGRESS NOTES
"Bonnie Jules is a 83 y.o. female on day 1 of admission presenting with Osteoarthritis.    Subjective   Patient resting comfortably in bed. Remains on heparin drip. Continues to have occasional chest discomfort.       Objective     Physical Exam  Cardiovascular:      Rate and Rhythm: Normal rate and regular rhythm.      Heart sounds: Murmur heard.      No friction rub. No gallop.   Pulmonary:      Effort: Pulmonary effort is normal.      Breath sounds: No wheezing, rhonchi or rales.   Musculoskeletal:      Right lower leg: No edema.      Left lower leg: No edema.   Skin:     General: Skin is warm and dry.   Neurological:      Mental Status: She is alert and oriented to person, place, and time.   Psychiatric:         Mood and Affect: Mood normal.         Behavior: Behavior normal.         Last Recorded Vitals  Blood pressure 107/60, pulse 102, temperature 38.6 °C (101.5 °F), temperature source Oral, resp. rate 17, height 1.49 m (4' 10.66\"), weight 57 kg (125 lb 10.6 oz), SpO2 95%.  Intake/Output last 3 Shifts:  I/O last 3 completed shifts:  In: 920 (16.1 mL/kg) [P.O.:920]  Out: 1850 (32.5 mL/kg) [Urine:1850 (0.9 mL/kg/hr)]  Weight: 57 kg     Relevant Results  Results for orders placed or performed during the hospital encounter of 04/17/24 (from the past 24 hour(s))   POCT GLUCOSE   Result Value Ref Range    POCT Glucose 294 (H) 74 - 99 mg/dL   ECG 12 Lead   Result Value Ref Range    Ventricular Rate 88 BPM    Atrial Rate 88 BPM    GA Interval 144 ms    QRS Duration 114 ms    QT Interval 366 ms    QTC Calculation(Bazett) 442 ms    P Axis -10 degrees    R Axis -49 degrees    T Axis 93 degrees    QRS Count 14 beats    Q Onset 209 ms    P Onset 137 ms    P Offset 168 ms    T Offset 392 ms    QTC Fredericia 416 ms   Serial Troponin, 2 Hour ([facility])   Result Value Ref Range    Troponin T, High Sensitivity 680 (HH) <=14 ng/L   Urinalysis with Reflex Culture and Microscopic   Result Value Ref Range    Color, Urine Yellow " Light-Yellow, Yellow, Dark-Yellow    Appearance, Urine Clear Clear    Specific Gravity, Urine 1.017 1.005 - 1.035    pH, Urine 5.0 5.0, 5.5, 6.0, 6.5, 7.0, 7.5, 8.0    Protein, Urine NEGATIVE NEGATIVE, 10 (TRACE), 20 (TRACE) mg/dL    Glucose, Urine 300 (3+) (A) Normal mg/dL    Blood, Urine NEGATIVE NEGATIVE    Ketones, Urine NEGATIVE NEGATIVE mg/dL    Bilirubin, Urine NEGATIVE NEGATIVE    Urobilinogen, Urine Normal Normal mg/dL    Nitrite, Urine NEGATIVE NEGATIVE    Leukocyte Esterase, Urine NEGATIVE NEGATIVE   Extra Urine Gray Tube   Result Value Ref Range    Extra Tube Hold for add-ons.    CBC   Result Value Ref Range    WBC 10.3 4.4 - 11.3 x10*3/uL    nRBC 0.0 0.0 - 0.0 /100 WBCs    RBC 2.71 (L) 4.00 - 5.20 x10*6/uL    Hemoglobin 8.5 (L) 12.0 - 16.0 g/dL    Hematocrit 26.6 (L) 36.0 - 46.0 %    MCV 98 80 - 100 fL    MCH 31.4 26.0 - 34.0 pg    MCHC 32.0 32.0 - 36.0 g/dL    RDW 13.9 11.5 - 14.5 %    Platelets 220 150 - 450 x10*3/uL   POCT GLUCOSE   Result Value Ref Range    POCT Glucose 147 (H) 74 - 99 mg/dL   aPTT   Result Value Ref Range    aPTT 27.8 22.0 - 32.5 seconds   POCT GLUCOSE   Result Value Ref Range    POCT Glucose 167 (H) 74 - 99 mg/dL   Serial Troponin, 6 Hour ([facility])   Result Value Ref Range    Troponin T, High Sensitivity 821 (HH) <=14 ng/L   POCT GLUCOSE   Result Value Ref Range    POCT Glucose 168 (H) 74 - 99 mg/dL   Coagulation Screen   Result Value Ref Range    Protime 11.0 9.3 - 12.7 seconds    INR 1.0 0.9 - 1.2    aPTT 27.9 22.0 - 32.5 seconds   CBC   Result Value Ref Range    WBC 7.7 4.4 - 11.3 x10*3/uL    nRBC 0.0 0.0 - 0.0 /100 WBCs    RBC 2.37 (L) 4.00 - 5.20 x10*6/uL    Hemoglobin 7.3 (L) 12.0 - 16.0 g/dL    Hematocrit 21.7 (L) 36.0 - 46.0 %    MCV 92 80 - 100 fL    MCH 30.8 26.0 - 34.0 pg    MCHC 33.6 32.0 - 36.0 g/dL    RDW 13.7 11.5 - 14.5 %    Platelets 207 150 - 450 x10*3/uL   Renal Function Panel   Result Value Ref Range    Glucose 193 (H) 65 - 99 mg/dL    Sodium 134 133 - 145 mmol/L     Potassium 4.7 3.4 - 5.1 mmol/L    Chloride 99 97 - 107 mmol/L    Bicarbonate 22 (L) 24 - 31 mmol/L    Urea Nitrogen 48 (H) 8 - 25 mg/dL    Creatinine 1.30 0.40 - 1.60 mg/dL    eGFR 41 (L) >60 mL/min/1.73m*2    Calcium 8.1 (L) 8.5 - 10.4 mg/dL    Phosphorus 2.8 2.5 - 4.5 mg/dL    Albumin 2.7 (L) 3.5 - 5.0 g/dL    Anion Gap 13 <=19 mmol/L   Troponin T   Result Value Ref Range    Troponin T, High Sensitivity 869 (HH) <=14 ng/L   POCT GLUCOSE   Result Value Ref Range    POCT Glucose 237 (H) 74 - 99 mg/dL   Transthoracic Echo (TTE) Limited   Result Value Ref Range    AV pk delmi 2.53 m/s    LVOT diam 1.76 cm    AV mn grad 14.2 mmHg    RVSP 39.4 mmHg    AV pk grad 25.6 mmHg    Aortic Valve Area by Continuity of VTI 1.06 cm2    Aortic Valve Area by Continuity of Peak Velocity 1.04 cm2   APTT   Result Value Ref Range    aPTT 45.1 (H) 22.0 - 32.5 seconds   POCT GLUCOSE   Result Value Ref Range    POCT Glucose 293 (H) 74 - 99 mg/dL                     Assessment/Plan   Principal Problem:    Osteoarthritis  Active Problems:    History of coronary artery bypass graft    Chronic pain of both knees    Primary osteoarthritis of right knee    Possible perioperative non-ST elevation myocardial infarction  Atherosclerotic heart disease status post CABG 2005  Status post total knee arthroplasty procedure 4/17/2024  Mild aortic valve stenosis  Essential hypertension  Hyperlipidemia  Acute kidney injury     4/21: Patient with mild chest discomfort following her total knee arthroplasty procedure.  She was also hypoxic and had a low probability ventilation/perfusion scan done.  With her persistent discomfort a high-sensitivity troponin was drawn and found to be elevated.  She also has evidence of an acute kidney injury with her serum creatinine rising from 1.0-1.4.  At this point would recommend that we start anticoagulation therapy with unfractionated heparin.  Would also place her Eliquis therapy on hold at this time.  Will titrate up  dose of her beta-blocker and add topical nitroglycerin as well.  Will order echocardiogram study for tomorrow to assess left ventricular size and function.  Will attempt to treat conservatively at this time and will follow on a clinical basis.    4/22: As above.  Patient remained on a heparin drip overnight.  Still reporting occasional mild midsternal chest discomfort, but patient is really unable to tell me if this is improved since yesterday.  Blood pressure is low normal with her last recorded at 107/60 this morning.  Patient on 2 L nasal cannula with pulse oximetry of 95%.  Echocardiogram completed this morning with a moderately decreased ejection fraction of 40 to 45%, entire anterior septum, mid anterior segment, apex and mid inferior septal segment are abnormal, moderate mitral annular calcification, mildly elevated RVSP, moderate aortic valve stenosis and multiple wall motion abnormalities. I am going to discontinue the heparin at this time and resume her Eliquis. Additionally would start this patient on a low dose aspirin 81 mg for conservative medical management. Continue metoprolol tartrate 25 mg BID. Will continue to follow this patient.         Felicitas Spencer, APRN-CNP

## 2024-04-22 NOTE — CARE PLAN
Dr. Rolle on consult for elevated creatinine; per yesterdays notes by Dr. Rolle, evangelista was placed with 1 L of urine out. Pt has extensive hx including CKD III.  Plan is home with [facility]-- internal order has been placed.  16:48 Phoned pt in her room to discuss discharge plan: pt is declining SNF; she wants to go home with [facility]      DISCHARGE PLAN: HOME WITH [facility]/PT/OT

## 2024-04-22 NOTE — PROGRESS NOTES
Physical Therapy                 Therapy Communication Note    Patient Name: Bonnie Jules  MRN: [id number]  Today's Date: 4/22/2024     Discipline: Physical Therapy    Missed Visit Reason: Missed Visit Reason: Patient in a medical procedure (Patient leaving nursing quiroz for testing (echo))    Missed Time: Attempt

## 2024-04-22 NOTE — PROGRESS NOTES
Bonnie Jules is a 83 y.o. female on day 1 of admission presenting with Osteoarthritis. She is 5day s/p right TKA. H/H trend down 7.3/21.7, post op course complicated by JOSÉ, urinary retention, hypoxia, possible non-ST MI, cardiology and nephrology on consult. Initial on heparin drip, discontinued this morning.Eliquis started,  Creat down to 1.3,     Subjective   Resting in bed, c/o tired/fatigue, post op pain controlled, denies headache, vertigo, fever, chills, currently denies chest pain, palpitation, +tachcyardia, denies sob, arguello, +occasional cough,  denies hemoptysis, abd pain, tenderness, bloating, naseua, appetite fair, says she had small bm recent, denies hematochezia, evangelista drain clear yellow. Denies epripheral        Objective     Physical Exam  Constitutional:       Appearance: Normal appearance.   HENT:      Head: Normocephalic and atraumatic.      Right Ear: External ear normal.      Left Ear: External ear normal.      Nose: Nose normal.      Mouth/Throat:      Mouth: Mucous membranes are dry.      Pharynx: Oropharynx is clear.   Eyes:      Conjunctiva/sclera: Conjunctivae normal.      Pupils: Pupils are equal, round, and reactive to light.   Cardiovascular:      Rate and Rhythm: Regular rhythm. Tachycardia present.      Heart sounds: Murmur heard.   Pulmonary:      Effort: Pulmonary effort is normal. No respiratory distress.      Breath sounds: Normal breath sounds. No wheezing, rhonchi or rales.      Comments: Oxygen 2Lnc, lungs diminished/clear  Chest:      Chest wall: No tenderness.   Abdominal:      General: Bowel sounds are normal. There is no distension.      Palpations: Abdomen is soft.      Tenderness: There is no abdominal tenderness.   Genitourinary:     Comments: Evangelista cath drain clear yellow urine  Musculoskeletal:         General: Swelling and tenderness present.      Cervical back: Normal range of motion and neck supple.      Right lower leg: No edema.      Left lower leg: No edema.       "Comments: Trace swelling and tenderness right knee   Skin:     General: Skin is warm and dry.      Capillary Refill: Capillary refill takes 2 to 3 seconds.      Coloration: Skin is not pale.      Findings: No bruising, erythema, lesion or rash.      Comments: Dressing right knee, dry/intact   Neurological:      General: No focal deficit present.      Mental Status: She is alert and oriented to person, place, and time. Mental status is at baseline.   Psychiatric:         Mood and Affect: Mood normal.         Behavior: Behavior normal.         Last Recorded Vitals  Blood pressure 107/60, pulse 102, temperature 38.6 °C (101.5 °F), temperature source Oral, resp. rate 17, height 1.49 m (4' 10.66\"), weight 57 kg (125 lb 10.6 oz), SpO2 95%.  Intake/Output last 3 Shifts:  I/O last 3 completed shifts:  In: 920 (16.1 mL/kg) [P.O.:920]  Out: 1850 (32.5 mL/kg) [Urine:1850 (0.9 mL/kg/hr)]  Weight: 57 kg     Relevant Results  Results for orders placed or performed during the hospital encounter of 04/17/24 (from the past 24 hour(s))   Serial Troponin, 2 Hour ([facility])   Result Value Ref Range    Troponin T, High Sensitivity 680 (HH) <=14 ng/L   Urinalysis with Reflex Culture and Microscopic   Result Value Ref Range    Color, Urine Yellow Light-Yellow, Yellow, Dark-Yellow    Appearance, Urine Clear Clear    Specific Gravity, Urine 1.017 1.005 - 1.035    pH, Urine 5.0 5.0, 5.5, 6.0, 6.5, 7.0, 7.5, 8.0    Protein, Urine NEGATIVE NEGATIVE, 10 (TRACE), 20 (TRACE) mg/dL    Glucose, Urine 300 (3+) (A) Normal mg/dL    Blood, Urine NEGATIVE NEGATIVE    Ketones, Urine NEGATIVE NEGATIVE mg/dL    Bilirubin, Urine NEGATIVE NEGATIVE    Urobilinogen, Urine Normal Normal mg/dL    Nitrite, Urine NEGATIVE NEGATIVE    Leukocyte Esterase, Urine NEGATIVE NEGATIVE   Extra Urine Gray Tube   Result Value Ref Range    Extra Tube Hold for add-ons.    CBC   Result Value Ref Range    WBC 10.3 4.4 - 11.3 x10*3/uL    nRBC 0.0 0.0 - 0.0 /100 WBCs    RBC 2.71 (L) " 4.00 - 5.20 x10*6/uL    Hemoglobin 8.5 (L) 12.0 - 16.0 g/dL    Hematocrit 26.6 (L) 36.0 - 46.0 %    MCV 98 80 - 100 fL    MCH 31.4 26.0 - 34.0 pg    MCHC 32.0 32.0 - 36.0 g/dL    RDW 13.9 11.5 - 14.5 %    Platelets 220 150 - 450 x10*3/uL   POCT GLUCOSE   Result Value Ref Range    POCT Glucose 147 (H) 74 - 99 mg/dL   aPTT   Result Value Ref Range    aPTT 27.8 22.0 - 32.5 seconds   POCT GLUCOSE   Result Value Ref Range    POCT Glucose 167 (H) 74 - 99 mg/dL   Serial Troponin, 6 Hour ([facility])   Result Value Ref Range    Troponin T, High Sensitivity 821 (HH) <=14 ng/L   POCT GLUCOSE   Result Value Ref Range    POCT Glucose 168 (H) 74 - 99 mg/dL   Coagulation Screen   Result Value Ref Range    Protime 11.0 9.3 - 12.7 seconds    INR 1.0 0.9 - 1.2    aPTT 27.9 22.0 - 32.5 seconds   CBC   Result Value Ref Range    WBC 7.7 4.4 - 11.3 x10*3/uL    nRBC 0.0 0.0 - 0.0 /100 WBCs    RBC 2.37 (L) 4.00 - 5.20 x10*6/uL    Hemoglobin 7.3 (L) 12.0 - 16.0 g/dL    Hematocrit 21.7 (L) 36.0 - 46.0 %    MCV 92 80 - 100 fL    MCH 30.8 26.0 - 34.0 pg    MCHC 33.6 32.0 - 36.0 g/dL    RDW 13.7 11.5 - 14.5 %    Platelets 207 150 - 450 x10*3/uL   Renal Function Panel   Result Value Ref Range    Glucose 193 (H) 65 - 99 mg/dL    Sodium 134 133 - 145 mmol/L    Potassium 4.7 3.4 - 5.1 mmol/L    Chloride 99 97 - 107 mmol/L    Bicarbonate 22 (L) 24 - 31 mmol/L    Urea Nitrogen 48 (H) 8 - 25 mg/dL    Creatinine 1.30 0.40 - 1.60 mg/dL    eGFR 41 (L) >60 mL/min/1.73m*2    Calcium 8.1 (L) 8.5 - 10.4 mg/dL    Phosphorus 2.8 2.5 - 4.5 mg/dL    Albumin 2.7 (L) 3.5 - 5.0 g/dL    Anion Gap 13 <=19 mmol/L   Troponin T   Result Value Ref Range    Troponin T, High Sensitivity 869 (HH) <=14 ng/L   POCT GLUCOSE   Result Value Ref Range    POCT Glucose 237 (H) 74 - 99 mg/dL   Transthoracic Echo (TTE) Limited   Result Value Ref Range    AV pk delmi 2.53 m/s    LVOT diam 1.76 cm    AV mn grad 14.2 mmHg    RVSP 39.4 mmHg    AV pk grad 25.6 mmHg    Aortic Valve Area by  Continuity of VTI 1.06 cm2    Aortic Valve Area by Continuity of Peak Velocity 1.04 cm2   APTT   Result Value Ref Range    aPTT 45.1 (H) 22.0 - 32.5 seconds   POCT GLUCOSE   Result Value Ref Range    POCT Glucose 293 (H) 74 - 99 mg/dL      Current Facility-Administered Medications:     acetaminophen (Tylenol) tablet 650 mg, 650 mg, oral, q4h PRN, Mayela Pickard PA-C, 650 mg at 04/18/24 2131    apixaban (Eliquis) tablet 2.5 mg, 2.5 mg, oral, q12h, EMILIANA Kovacs    aspirin chewable tablet 81 mg, 81 mg, oral, Daily, EMILIANA Kovacs, 81 mg at 04/22/24 1327    dextrose 50 % injection 12.5 g, 12.5 g, intravenous, q15 min PRN, EMILIANA Mcleod    dextrose 50 % injection 25 g, 25 g, intravenous, q15 min PRN, EMILIANA Mcleod    folic acid (Folvite) tablet 1 mg, 1 mg, oral, Daily, EMILIANA Boyd    gabapentin (Neurontin) capsule 100 mg, 100 mg, oral, BID, Mayela Pickard PA-C, 100 mg at 04/21/24 2124    glucagon (Glucagen) injection 1 mg, 1 mg, intramuscular, q15 min PRN, EMILIANA Mcleod    glucagon (Glucagen) injection 1 mg, 1 mg, intramuscular, q15 min PRN, EMILIANA Mcleod    HYDROmorphone (Dilaudid) injection 0.5 mg, 0.5 mg, intravenous, q4h PRN, Mayela Pickard PA-C    HYDROmorphone (Dilaudid) injection 0.5 mg, 0.5 mg, intravenous, q2h PRN, Mayela Pickard PA-C    insulin glargine (Lantus) injection 11 Units, 11 Units, subcutaneous, Daily before breakfast, EMILIANA Barillas, 11 Units at 04/21/24 0800    insulin lispro (HumaLOG) injection 0-20 Units, 0-20 Units, subcutaneous, TID with meals, Luna Smith, CARMEN-CNP, 12 Units at 04/22/24 1209    ipratropium-albuteroL (Duo-Neb) 0.5-2.5 mg/3 mL nebulizer solution 3 mL, 3 mL, nebulization, TID, Rudolph Liriano MD, 3 mL at 04/22/24 1147    levothyroxine (Synthroid, Levoxyl) tablet 100 mcg, 100 mcg, oral, Daily, Mayela Pickard PA-C, 100 mcg at 04/22/24 0630    lidocaine  (Xylocaine) 10 mg/mL (1 %) injection 50 mg, 5 mL, infiltration, Once, Amy Razo, APRN-CNP    metoprolol tartrate (Lopressor) tablet 25 mg, 25 mg, oral, BID, Mike Sousa DO, 25 mg at 04/21/24 2124    naloxone (Narcan) injection 0.2 mg, 0.2 mg, intravenous, q5 min PRN, Mayela Pickard PA-C    naloxone (Narcan) injection 0.2 mg, 0.2 mg, intravenous, q5 min PRN, Mayela Pickard PA-C    naloxone (Narcan) injection 0.2 mg, 0.2 mg, intravenous, q5 min PRN, Mayela Pickard PA-C    nitroglycerin (Nitrodur) 0.2 mg/hr patch 1 patch, 1 patch, transdermal, Daily, Mike Sousa DO, 1 patch at 04/22/24 1210    nitroglycerin (Nitrostat) SL tablet 0.4 mg, 0.4 mg, sublingual, q5 min PRN, Mayela Pickard PA-C    ondansetron (Zofran) tablet 4 mg, 4 mg, oral, q8h PRN **OR** ondansetron (Zofran) injection 4 mg, 4 mg, intravenous, q8h PRN, Mayela Pickard PA-C, 4 mg at 04/20/24 1206    oxyCODONE-acetaminophen (Percocet)  mg per tablet 1 tablet, 1 tablet, oral, q4h PRN, Mayela Pickard PA-C    oxyCODONE-acetaminophen (Percocet) 5-325 mg per tablet 1 tablet, 1 tablet, oral, q4h PRN, Mayela Pickard PA-C, 1 tablet at 04/21/24 0512    oxygen (O2) therapy, 2 L/min, inhalation, Continuous, Mayela Pickard PA-C, Last Rate: 180,000 mL/hr at 04/20/24 0705, 3 L/min at 04/20/24 0705    oxygen (O2) therapy, , inhalation, Continuous - Inhalation, Luna Smith, APRN-CNP, Start at 04/22/24 0658    polyethylene glycol (Glycolax, Miralax) packet 17 g, 17 g, oral, Daily, Mayela Pickard PA-C, 17 g at 04/21/24 0914    pravastatin (Pravachol) tablet 40 mg, 40 mg, oral, Nightly, Mayela Pickard PA-C, 40 mg at 04/21/24 2124    prochlorperazine (Compazine) tablet 10 mg, 10 mg, oral, q6h PRN **OR** prochlorperazine (Compazine) injection 10 mg, 10 mg, intravenous, q6h PRN **OR** prochlorperazine (Compazine) suppository 25 mg, 25 mg, rectal, q12h PRN, Luna Smith, CARMEN-CNP                              Assessment/Plan   Principal  Problem:    Osteoarthritis  Active Problems:    History of coronary artery bypass graft    Chronic pain of both knees    Primary osteoarthritis of right knee  S/p right TKA  JOSÉ, CKD 2-3  CLL  Chronic normocytic anemia  DM  Postop normocytic anemia  Anemia of acute blood loss combined with anemia of chronic disease/ CLL/ CKD stage 2-3.   Venofer 200mg IV X2  Add additional dose Venofer 200mg IV X1  B12 1000mcg IM X1  Folic acid 1mg po daily  Lab to microsample blood draws-signage placed on door     Monitor H/H   RBC transfusion as needed to maintain HGB over 7        Jessie Bear, APRN-CNP

## 2024-04-22 NOTE — NURSING NOTE
Labs drawn and sent for results, notified Hospitalist of elevated Trops, aptt lab draw set for 9am, no c/o pain nor discomfort at this time, pt currently resting, call light is within reach, care plan in progress.

## 2024-04-22 NOTE — PROGRESS NOTES
"Bonnie Jules is a 83 y.o. female on day 1 of admission presenting with Osteoarthritis.    Subjective   Patient seen for acute kidney injury on top of chronic kidney disease stage III he is feeling much better she is more comfortable no shortness of breath no GI symptoms no overnight events noted.       Objective     Physical Exam  Neck:      Vascular: No carotid bruit.   Cardiovascular:      Rate and Rhythm: Normal rate and regular rhythm.      Heart sounds: No murmur heard.     No friction rub. No gallop.   Pulmonary:      Breath sounds: No wheezing, rhonchi or rales.   Chest:      Chest wall: No tenderness.   Abdominal:      General: There is no distension.      Tenderness: There is no abdominal tenderness. There is no guarding or rebound.   Musculoskeletal:         General: No swelling or tenderness.      Cervical back: Neck supple.      Right lower leg: No edema.      Left lower leg: No edema.   Lymphadenopathy:      Cervical: No cervical adenopathy.         Last Recorded Vitals  Blood pressure 107/60, pulse 102, temperature 38.6 °C (101.5 °F), temperature source Oral, resp. rate 17, height 1.49 m (4' 10.66\"), weight 57 kg (125 lb 10.6 oz), SpO2 95%.    Intake/Output last 3 Shifts:  I/O last 3 completed shifts:  In: 920 (16.1 mL/kg) [P.O.:920]  Out: 1850 (32.5 mL/kg) [Urine:1850 (0.9 mL/kg/hr)]  Weight: 57 kg     Current Facility-Administered Medications:     acetaminophen (Tylenol) tablet 650 mg, 650 mg, oral, q4h PRN, Mayela Pickard PA-C, 650 mg at 04/18/24 2131    cyanocobalamin (Vitamin B-12) injection 1,000 mcg, 1,000 mcg, intramuscular, Once, CARMEN Boyd-CNP    dextrose 50 % injection 12.5 g, 12.5 g, intravenous, q15 min PRN, CARMEN Mcleod-CNP    dextrose 50 % injection 25 g, 25 g, intravenous, q15 min PRN, CARMEN Mcleod-CNP    folic acid (Folvite) tablet 1 mg, 1 mg, oral, Daily, CARMEN Boyd-CNP    gabapentin (Neurontin) capsule 100 mg, 100 mg, oral, BID, Mayela " BRENDA Pickard PA-C, 100 mg at 04/21/24 2124    glucagon (Glucagen) injection 1 mg, 1 mg, intramuscular, q15 min PRN, EMILIANA Mcleod    glucagon (Glucagen) injection 1 mg, 1 mg, intramuscular, q15 min PRN, EMILIANA Mcleod    heparin (porcine) injection 1,500-3,000 Units, 1,500-3,000 Units, intravenous, PRN, EMILIANA Barillas    heparin 25,000 Units in dextrose 5% 250 mL (100 Units/mL) infusion (premix), 0-4,000 Units/hr, intravenous, Continuous, EMILIANA Barillas, Last Rate: 10 mL/hr at 04/22/24 1132, 1,000 Units/hr at 04/22/24 1132    HYDROmorphone (Dilaudid) injection 0.5 mg, 0.5 mg, intravenous, q4h PRN, Mayela Pickard PA-C    HYDROmorphone (Dilaudid) injection 0.5 mg, 0.5 mg, intravenous, q2h PRN, Mayela Pickard PA-C    insulin glargine (Lantus) injection 11 Units, 11 Units, subcutaneous, Daily before breakfast, EMILIANA Barillas, 11 Units at 04/21/24 0800    insulin lispro (HumaLOG) injection 0-20 Units, 0-20 Units, subcutaneous, TID with meals, EMILIANA Barillas, 12 Units at 04/21/24 1309    ipratropium-albuteroL (Duo-Neb) 0.5-2.5 mg/3 mL nebulizer solution 3 mL, 3 mL, nebulization, TID, Rudolph Liriano MD, 3 mL at 04/22/24 1147    iron sucrose (Venofer) injection 200 mg, 200 mg, intravenous, Once, EMILIANA Boyd    levothyroxine (Synthroid, Levoxyl) tablet 100 mcg, 100 mcg, oral, Daily, Mayela Pickard PA-C, 100 mcg at 04/22/24 0630    metoprolol tartrate (Lopressor) tablet 25 mg, 25 mg, oral, BID, Mike Sousa DO, 25 mg at 04/21/24 2124    naloxone (Narcan) injection 0.2 mg, 0.2 mg, intravenous, q5 min PRN, Mayela Pickard PA-C    naloxone (Narcan) injection 0.2 mg, 0.2 mg, intravenous, q5 min PRN, Mayela iPckard PA-C    naloxone (Narcan) injection 0.2 mg, 0.2 mg, intravenous, q5 min PRN, Mayela Pickard PA-C    nitroglycerin (Nitrodur) 0.2 mg/hr patch 1 patch, 1 patch, transdermal, Daily, Mike Sousa DO, 1 patch at 04/21/24  1645    nitroglycerin (Nitrostat) SL tablet 0.4 mg, 0.4 mg, sublingual, q5 min PRN, Mayela Pickard PA-C    ondansetron (Zofran) tablet 4 mg, 4 mg, oral, q8h PRN **OR** ondansetron (Zofran) injection 4 mg, 4 mg, intravenous, q8h PRN, Mayela Pickard PA-C, 4 mg at 04/20/24 1206    oxyCODONE-acetaminophen (Percocet)  mg per tablet 1 tablet, 1 tablet, oral, q4h PRN, Mayela Pickard PA-C    oxyCODONE-acetaminophen (Percocet) 5-325 mg per tablet 1 tablet, 1 tablet, oral, q4h PRN, Mayela Pickard PA-C, 1 tablet at 04/21/24 0512    oxygen (O2) therapy, 2 L/min, inhalation, Continuous, Mayela Pickard PA-C, Last Rate: 180,000 mL/hr at 04/20/24 0705, 3 L/min at 04/20/24 0705    oxygen (O2) therapy, , inhalation, Continuous - Inhalation, EMILIANA Barillas, Start at 04/22/24 0658    polyethylene glycol (Glycolax, Miralax) packet 17 g, 17 g, oral, Daily, Mayela Pickard PA-C, 17 g at 04/21/24 0914    pravastatin (Pravachol) tablet 40 mg, 40 mg, oral, Nightly, Mayela Pickard PA-C, 40 mg at 04/21/24 2124    prochlorperazine (Compazine) tablet 10 mg, 10 mg, oral, q6h PRN **OR** prochlorperazine (Compazine) injection 10 mg, 10 mg, intravenous, q6h PRN **OR** prochlorperazine (Compazine) suppository 25 mg, 25 mg, rectal, q12h PRN, CARMEN Barillas-CNP   Relevant Results    Results for orders placed or performed during the hospital encounter of 04/17/24 (from the past 96 hour(s))   Urinalysis with Reflex Culture and Microscopic   Result Value Ref Range    Color, Urine Yellow Light-Yellow, Yellow, Dark-Yellow    Appearance, Urine Clear Clear    Specific Gravity, Urine 1.025 1.005 - 1.035    pH, Urine 5.5 5.0, 5.5, 6.0, 6.5, 7.0, 7.5, 8.0    Protein, Urine NEGATIVE NEGATIVE, 10 (TRACE), 20 (TRACE) mg/dL    Glucose, Urine 1000 (4+) (A) Normal mg/dL    Blood, Urine NEGATIVE NEGATIVE    Ketones, Urine NEGATIVE NEGATIVE mg/dL    Bilirubin, Urine NEGATIVE NEGATIVE    Urobilinogen, Urine Normal Normal mg/dL     Nitrite, Urine NEGATIVE NEGATIVE    Leukocyte Esterase, Urine NEGATIVE NEGATIVE   Extra Urine Gray Tube   Result Value Ref Range    Extra Tube Hold for add-ons.    POCT GLUCOSE   Result Value Ref Range    POCT Glucose 335 (H) 74 - 99 mg/dL   POCT GLUCOSE   Result Value Ref Range    POCT Glucose 212 (H) 74 - 99 mg/dL   CBC   Result Value Ref Range    WBC 12.1 (H) 4.4 - 11.3 x10*3/uL    nRBC 0.0 0.0 - 0.0 /100 WBCs    RBC 2.88 (L) 4.00 - 5.20 x10*6/uL    Hemoglobin 8.9 (L) 12.0 - 16.0 g/dL    Hematocrit 28.1 (L) 36.0 - 46.0 %    MCV 98 80 - 100 fL    MCH 30.9 26.0 - 34.0 pg    MCHC 31.7 (L) 32.0 - 36.0 g/dL    RDW 13.9 11.5 - 14.5 %    Platelets 192 150 - 450 x10*3/uL   Basic Metabolic Panel   Result Value Ref Range    Glucose 268 (H) 65 - 99 mg/dL    Sodium 133 133 - 145 mmol/L    Potassium 5.1 3.4 - 5.1 mmol/L    Chloride 101 97 - 107 mmol/L    Bicarbonate 21 (L) 24 - 31 mmol/L    Urea Nitrogen 32 (H) 8 - 25 mg/dL    Creatinine 1.00 0.40 - 1.60 mg/dL    eGFR 57 (L) >60 mL/min/1.73m*2    Calcium 8.1 (L) 8.5 - 10.4 mg/dL    Anion Gap 11 <=19 mmol/L   POCT GLUCOSE   Result Value Ref Range    POCT Glucose 280 (H) 74 - 99 mg/dL   Potassium   Result Value Ref Range    Potassium 4.7 3.4 - 5.1 mmol/L   POCT GLUCOSE   Result Value Ref Range    POCT Glucose 346 (H) 74 - 99 mg/dL   POCT GLUCOSE   Result Value Ref Range    POCT Glucose 187 (H) 74 - 99 mg/dL   POCT GLUCOSE   Result Value Ref Range    POCT Glucose 189 (H) 74 - 99 mg/dL   CBC   Result Value Ref Range    WBC 13.4 (H) 4.4 - 11.3 x10*3/uL    nRBC 0.0 0.0 - 0.0 /100 WBCs    RBC 2.97 (L) 4.00 - 5.20 x10*6/uL    Hemoglobin 9.1 (L) 12.0 - 16.0 g/dL    Hematocrit 27.4 (L) 36.0 - 46.0 %    MCV 92 80 - 100 fL    MCH 30.6 26.0 - 34.0 pg    MCHC 33.2 32.0 - 36.0 g/dL    RDW 13.8 11.5 - 14.5 %    Platelets 192 150 - 450 x10*3/uL   Basic Metabolic Panel   Result Value Ref Range    Glucose 253 (H) 65 - 99 mg/dL    Sodium 133 133 - 145 mmol/L    Potassium 4.7 3.4 - 5.1 mmol/L     Chloride 102 97 - 107 mmol/L    Bicarbonate 21 (L) 24 - 31 mmol/L    Urea Nitrogen 30 (H) 8 - 25 mg/dL    Creatinine 1.00 0.40 - 1.60 mg/dL    eGFR 57 (L) >60 mL/min/1.73m*2    Calcium 8.4 (L) 8.5 - 10.4 mg/dL    Anion Gap 10 <=19 mmol/L   POCT GLUCOSE   Result Value Ref Range    POCT Glucose 449 (H) 74 - 99 mg/dL   POCT GLUCOSE   Result Value Ref Range    POCT Glucose 407 (H) 74 - 99 mg/dL   POCT GLUCOSE   Result Value Ref Range    POCT Glucose 266 (H) 74 - 99 mg/dL   POCT GLUCOSE   Result Value Ref Range    POCT Glucose 165 (H) 74 - 99 mg/dL   CBC   Result Value Ref Range    WBC 10.8 4.4 - 11.3 x10*3/uL    nRBC 0.0 0.0 - 0.0 /100 WBCs    RBC 2.84 (L) 4.00 - 5.20 x10*6/uL    Hemoglobin 8.7 (L) 12.0 - 16.0 g/dL    Hematocrit 28.0 (L) 36.0 - 46.0 %    MCV 99 80 - 100 fL    MCH 30.6 26.0 - 34.0 pg    MCHC 31.1 (L) 32.0 - 36.0 g/dL    RDW 13.9 11.5 - 14.5 %    Platelets 223 150 - 450 x10*3/uL   Basic Metabolic Panel   Result Value Ref Range    Glucose 254 (H) 65 - 99 mg/dL    Sodium 131 (L) 133 - 145 mmol/L    Potassium 5.1 3.4 - 5.1 mmol/L    Chloride 97 97 - 107 mmol/L    Bicarbonate 20 (L) 24 - 31 mmol/L    Urea Nitrogen 41 (H) 8 - 25 mg/dL    Creatinine 1.40 0.40 - 1.60 mg/dL    eGFR 38 (L) >60 mL/min/1.73m*2    Calcium 8.2 (L) 8.5 - 10.4 mg/dL    Anion Gap 14 <=19 mmol/L   POCT GLUCOSE   Result Value Ref Range    POCT Glucose 302 (H) 74 - 99 mg/dL   Serial Troponin, Initial ([facility])   Result Value Ref Range    Troponin T, High Sensitivity 577 (HH) <=14 ng/L   Creatine Kinase   Result Value Ref Range    Creatine Kinase 223 (H) 24 - 195 U/L   POCT GLUCOSE   Result Value Ref Range    POCT Glucose 294 (H) 74 - 99 mg/dL   ECG 12 Lead   Result Value Ref Range    Ventricular Rate 88 BPM    Atrial Rate 88 BPM    ID Interval 144 ms    QRS Duration 114 ms    QT Interval 366 ms    QTC Calculation(Bazett) 442 ms    P Axis -10 degrees    R Axis -49 degrees    T Axis 93 degrees    QRS Count 14 beats    Q Onset 209 ms    P Onset  137 ms    P Offset 168 ms    T Offset 392 ms    QTC Fredericia 416 ms   Serial Troponin, 2 Hour ([facility])   Result Value Ref Range    Troponin T, High Sensitivity 680 (HH) <=14 ng/L   Urinalysis with Reflex Culture and Microscopic   Result Value Ref Range    Color, Urine Yellow Light-Yellow, Yellow, Dark-Yellow    Appearance, Urine Clear Clear    Specific Gravity, Urine 1.017 1.005 - 1.035    pH, Urine 5.0 5.0, 5.5, 6.0, 6.5, 7.0, 7.5, 8.0    Protein, Urine NEGATIVE NEGATIVE, 10 (TRACE), 20 (TRACE) mg/dL    Glucose, Urine 300 (3+) (A) Normal mg/dL    Blood, Urine NEGATIVE NEGATIVE    Ketones, Urine NEGATIVE NEGATIVE mg/dL    Bilirubin, Urine NEGATIVE NEGATIVE    Urobilinogen, Urine Normal Normal mg/dL    Nitrite, Urine NEGATIVE NEGATIVE    Leukocyte Esterase, Urine NEGATIVE NEGATIVE   Extra Urine Gray Tube   Result Value Ref Range    Extra Tube Hold for add-ons.    CBC   Result Value Ref Range    WBC 10.3 4.4 - 11.3 x10*3/uL    nRBC 0.0 0.0 - 0.0 /100 WBCs    RBC 2.71 (L) 4.00 - 5.20 x10*6/uL    Hemoglobin 8.5 (L) 12.0 - 16.0 g/dL    Hematocrit 26.6 (L) 36.0 - 46.0 %    MCV 98 80 - 100 fL    MCH 31.4 26.0 - 34.0 pg    MCHC 32.0 32.0 - 36.0 g/dL    RDW 13.9 11.5 - 14.5 %    Platelets 220 150 - 450 x10*3/uL   POCT GLUCOSE   Result Value Ref Range    POCT Glucose 147 (H) 74 - 99 mg/dL   aPTT   Result Value Ref Range    aPTT 27.8 22.0 - 32.5 seconds   POCT GLUCOSE   Result Value Ref Range    POCT Glucose 167 (H) 74 - 99 mg/dL   Serial Troponin, 6 Hour ([facility])   Result Value Ref Range    Troponin T, High Sensitivity 821 (HH) <=14 ng/L   POCT GLUCOSE   Result Value Ref Range    POCT Glucose 168 (H) 74 - 99 mg/dL   Coagulation Screen   Result Value Ref Range    Protime 11.0 9.3 - 12.7 seconds    INR 1.0 0.9 - 1.2    aPTT 27.9 22.0 - 32.5 seconds   CBC   Result Value Ref Range    WBC 7.7 4.4 - 11.3 x10*3/uL    nRBC 0.0 0.0 - 0.0 /100 WBCs    RBC 2.37 (L) 4.00 - 5.20 x10*6/uL    Hemoglobin 7.3 (L) 12.0 - 16.0 g/dL    Hematocrit  21.7 (L) 36.0 - 46.0 %    MCV 92 80 - 100 fL    MCH 30.8 26.0 - 34.0 pg    MCHC 33.6 32.0 - 36.0 g/dL    RDW 13.7 11.5 - 14.5 %    Platelets 207 150 - 450 x10*3/uL   Renal Function Panel   Result Value Ref Range    Glucose 193 (H) 65 - 99 mg/dL    Sodium 134 133 - 145 mmol/L    Potassium 4.7 3.4 - 5.1 mmol/L    Chloride 99 97 - 107 mmol/L    Bicarbonate 22 (L) 24 - 31 mmol/L    Urea Nitrogen 48 (H) 8 - 25 mg/dL    Creatinine 1.30 0.40 - 1.60 mg/dL    eGFR 41 (L) >60 mL/min/1.73m*2    Calcium 8.1 (L) 8.5 - 10.4 mg/dL    Phosphorus 2.8 2.5 - 4.5 mg/dL    Albumin 2.7 (L) 3.5 - 5.0 g/dL    Anion Gap 13 <=19 mmol/L   Troponin T   Result Value Ref Range    Troponin T, High Sensitivity 869 (HH) <=14 ng/L   POCT GLUCOSE   Result Value Ref Range    POCT Glucose 237 (H) 74 - 99 mg/dL   Transthoracic Echo (TTE) Limited   Result Value Ref Range    AV pk delmi 2.53 m/s    LVOT diam 1.76 cm    AV mn grad 14.2 mmHg    RVSP 39.4 mmHg    AV pk grad 25.6 mmHg    Aortic Valve Area by Continuity of VTI 1.06 cm2    Aortic Valve Area by Continuity of Peak Velocity 1.04 cm2   APTT   Result Value Ref Range    aPTT 45.1 (H) 22.0 - 32.5 seconds   POCT GLUCOSE   Result Value Ref Range    POCT Glucose 293 (H) 74 - 99 mg/dL       Assessment/Plan     Acute kidney injury secondary to urinary retention, also on ace inhibitor hide acute kidney injury is much improved creatinine down to 1.3 mg/dL continue [place] catheter and monitor renal function very closely with the daughter  Chronic disease stage II/III - baseline Cr 0.9-1.1  Urinary retention  Hypoxia  Diabetes mellitus type 1  Hypertension           Maximus Rolle MD

## 2024-04-22 NOTE — PROGRESS NOTES
"Bonnie Jules is a 83 y.o. female on day 1 of admission presenting with Osteoarthritis.    Subjective   Patient resting in bed. Continues to have slight chest pressure. Denies shortness of breath, abdominal pain, fevers, chills. Has some pain in right knee incision.        Objective     Physical Exam  Constitutional:       Appearance: Normal appearance.   HENT:      Head: Normocephalic and atraumatic.      Mouth/Throat:      Mouth: Mucous membranes are moist.      Pharynx: Oropharynx is clear.   Eyes:      Extraocular Movements: Extraocular movements intact.      Conjunctiva/sclera: Conjunctivae normal.      Pupils: Pupils are equal, round, and reactive to light.   Cardiovascular:      Rate and Rhythm: Normal rate and regular rhythm.      Pulses: Normal pulses.      Heart sounds: Normal heart sounds.   Pulmonary:      Effort: Pulmonary effort is normal.      Breath sounds: Normal breath sounds.   Abdominal:      General: Bowel sounds are normal.      Palpations: Abdomen is soft.      Tenderness: There is no abdominal tenderness.   Musculoskeletal:      Cervical back: Normal range of motion and neck supple.      Comments: Right knee incision with dry, intact dressing in place.    Skin:     General: Skin is warm and dry.      Capillary Refill: Capillary refill takes less than 2 seconds.   Neurological:      General: No focal deficit present.      Mental Status: She is alert and oriented to person, place, and time.   Psychiatric:         Mood and Affect: Mood normal.         Behavior: Behavior normal.         Last Recorded Vitals  Blood pressure 107/60, pulse 102, temperature 38.6 °C (101.5 °F), temperature source Oral, resp. rate 17, height 1.49 m (4' 10.66\"), weight 57 kg (125 lb 10.6 oz), SpO2 95%.  Intake/Output last 3 Shifts:  I/O last 3 completed shifts:  In: 920 (16.1 mL/kg) [P.O.:920]  Out: 1850 (32.5 mL/kg) [Urine:1850 (0.9 mL/kg/hr)]  Weight: 57 kg     Relevant Results  Results for orders placed or " performed during the hospital encounter of 04/17/24 (from the past 24 hour(s))   Serial Troponin, Initial ([facility])   Result Value Ref Range    Troponin T, High Sensitivity 577 (HH) <=14 ng/L   Creatine Kinase   Result Value Ref Range    Creatine Kinase 223 (H) 24 - 195 U/L   POCT GLUCOSE   Result Value Ref Range    POCT Glucose 294 (H) 74 - 99 mg/dL   ECG 12 Lead   Result Value Ref Range    Ventricular Rate 88 BPM    Atrial Rate 88 BPM    MD Interval 144 ms    QRS Duration 114 ms    QT Interval 366 ms    QTC Calculation(Bazett) 442 ms    P Axis -10 degrees    R Axis -49 degrees    T Axis 93 degrees    QRS Count 14 beats    Q Onset 209 ms    P Onset 137 ms    P Offset 168 ms    T Offset 392 ms    QTC Fredericia 416 ms   Serial Troponin, 2 Hour ([facility])   Result Value Ref Range    Troponin T, High Sensitivity 680 (HH) <=14 ng/L   Urinalysis with Reflex Culture and Microscopic   Result Value Ref Range    Color, Urine Yellow Light-Yellow, Yellow, Dark-Yellow    Appearance, Urine Clear Clear    Specific Gravity, Urine 1.017 1.005 - 1.035    pH, Urine 5.0 5.0, 5.5, 6.0, 6.5, 7.0, 7.5, 8.0    Protein, Urine NEGATIVE NEGATIVE, 10 (TRACE), 20 (TRACE) mg/dL    Glucose, Urine 300 (3+) (A) Normal mg/dL    Blood, Urine NEGATIVE NEGATIVE    Ketones, Urine NEGATIVE NEGATIVE mg/dL    Bilirubin, Urine NEGATIVE NEGATIVE    Urobilinogen, Urine Normal Normal mg/dL    Nitrite, Urine NEGATIVE NEGATIVE    Leukocyte Esterase, Urine NEGATIVE NEGATIVE   Extra Urine Gray Tube   Result Value Ref Range    Extra Tube Hold for add-ons.    CBC   Result Value Ref Range    WBC 10.3 4.4 - 11.3 x10*3/uL    nRBC 0.0 0.0 - 0.0 /100 WBCs    RBC 2.71 (L) 4.00 - 5.20 x10*6/uL    Hemoglobin 8.5 (L) 12.0 - 16.0 g/dL    Hematocrit 26.6 (L) 36.0 - 46.0 %    MCV 98 80 - 100 fL    MCH 31.4 26.0 - 34.0 pg    MCHC 32.0 32.0 - 36.0 g/dL    RDW 13.9 11.5 - 14.5 %    Platelets 220 150 - 450 x10*3/uL   POCT GLUCOSE   Result Value Ref Range    POCT Glucose 147 (H) 74 - 99  mg/dL   aPTT   Result Value Ref Range    aPTT 27.8 22.0 - 32.5 seconds   POCT GLUCOSE   Result Value Ref Range    POCT Glucose 167 (H) 74 - 99 mg/dL   Serial Troponin, 6 Hour ([facility])   Result Value Ref Range    Troponin T, High Sensitivity 821 (HH) <=14 ng/L   POCT GLUCOSE   Result Value Ref Range    POCT Glucose 168 (H) 74 - 99 mg/dL   Coagulation Screen   Result Value Ref Range    Protime 11.0 9.3 - 12.7 seconds    INR 1.0 0.9 - 1.2    aPTT 27.9 22.0 - 32.5 seconds   CBC   Result Value Ref Range    WBC 7.7 4.4 - 11.3 x10*3/uL    nRBC 0.0 0.0 - 0.0 /100 WBCs    RBC 2.37 (L) 4.00 - 5.20 x10*6/uL    Hemoglobin 7.3 (L) 12.0 - 16.0 g/dL    Hematocrit 21.7 (L) 36.0 - 46.0 %    MCV 92 80 - 100 fL    MCH 30.8 26.0 - 34.0 pg    MCHC 33.6 32.0 - 36.0 g/dL    RDW 13.7 11.5 - 14.5 %    Platelets 207 150 - 450 x10*3/uL   Renal Function Panel   Result Value Ref Range    Glucose 193 (H) 65 - 99 mg/dL    Sodium 134 133 - 145 mmol/L    Potassium 4.7 3.4 - 5.1 mmol/L    Chloride 99 97 - 107 mmol/L    Bicarbonate 22 (L) 24 - 31 mmol/L    Urea Nitrogen 48 (H) 8 - 25 mg/dL    Creatinine 1.30 0.40 - 1.60 mg/dL    eGFR 41 (L) >60 mL/min/1.73m*2    Calcium 8.1 (L) 8.5 - 10.4 mg/dL    Phosphorus 2.8 2.5 - 4.5 mg/dL    Albumin 2.7 (L) 3.5 - 5.0 g/dL    Anion Gap 13 <=19 mmol/L   Troponin T   Result Value Ref Range    Troponin T, High Sensitivity 869 (HH) <=14 ng/L   POCT GLUCOSE   Result Value Ref Range    POCT Glucose 237 (H) 74 - 99 mg/dL   Transthoracic Echo (TTE) Limited   Result Value Ref Range    AV pk delmi 2.53 m/s    LVOT diam 1.76 cm    AV mn grad 14.2 mmHg    RVSP 39.4 mmHg    AV pk grad 25.6 mmHg    Aortic Valve Area by Continuity of VTI 1.06 cm2    Aortic Valve Area by Continuity of Peak Velocity 1.04 cm2   APTT   Result Value Ref Range    aPTT 45.1 (H) 22.0 - 32.5 seconds   POCT GLUCOSE   Result Value Ref Range    POCT Glucose 293 (H) 74 - 99 mg/dL     Transthoracic Echo (TTE) Limited    Result Date: 4/22/2024           [place] [id number]            Phone [phone] TRANSTHORACIC ECHOCARDIOGRAM REPORT  Patient Name:     REBECCA TERRY TATIANALEANNA      Reading Physician:  [id number] Noah Araujo MD Study Date:       4/22/2024            Ordering Provider:  [id number] CECY SAM MRN/PID:          [id number]             Fellow: Accession#:       [id number]         Nurse: Date of           1940 / 83 years Sonographer:        Norma Smart [facility] Birth/Age: Gender:           F                    Additional Staff: Height:           149.86 cm            Admit Date: Weight:           56.70 kg             Admission Status:   Inpatient - Routine BSA / BMI:        1.51 m2 / 25.25      Department          [facility]                   kg/m2                Location: Blood Pressure: 103 /47 mmHg Study Type:    TRANSTHORACIC ECHO (TTE) LIMITED Diagnosis/ICD: Non ST elevation (NSTEMI) myocardial infarction-I21.4 Indication:    NSTEMI CPT Codes:     Echo Limited-41818; Doppler Limited-29869; Color Doppler-76241 Patient History: Valve Disorders:   Aortic Stenosis. Diabetes:          Yes Pertinent History: HTN, Hyperlipidemia, Chest Pain and ASHD, PVD,MI. Study Detail: The following Echo studies were performed: 2D, M-Mode, Doppler and               color flow.  PHYSICIAN INTERPRETATION: Left Ventricle: Left ventricular systolic function is mildly to moderately decreased, with an estimated ejection fraction of 40-45%. There are multiple wall motion abnormalities. The left ventricular cavity size is mild to moderately dilated. Spectral Doppler shows a normal pattern of left ventricular diastolic filling. LV Wall Scoring: The entire anterior septum, mid anterior segment, and apex are akinetic. The mid inferoseptal segment is hypokinetic. All remaining scored segments are normal. Left Atrium: The left atrium is normal in size. Right Ventricle: The right  ventricle is normal in size. There is normal right ventricular global systolic function. Right Atrium: The right atrium is normal in size. Aortic Valve: The aortic valve is trileaflet. There is diffuse moderate aortic valve thickening. There is evidence of moderate aortic valve stenosis. There is no evidence of aortic valve regurgitation. The peak instantaneous gradient of the aortic valve is 25.6 mmHg. The mean gradient of the aortic valve is 14.2 mmHg. Mitral Valve: The mitral valve is normal in structure. There is moderate mitral annular calcification. There is mild mitral valve regurgitation. Tricuspid Valve: The tricuspid valve is structurally normal. There is mild tricuspid regurgitation. The Doppler estimated RVSP is mildly elevated at 39.4 mmHg. Pulmonic Valve: The pulmonic valve is not well visualized. There is no indication of pulmonic valve regurgitation. Pericardium: There is no pericardial effusion noted. Aorta: The aortic root is normal.  CONCLUSIONS:  1. Left ventricular systolic function is mildly to moderately decreased with a 40-45% estimated ejection fraction.  2. Entire anterior septum, mid anterior segment, apex, and mid inferoseptal segment are abnormal.  3. There is moderate mitral annular calcification.  4. Mildly elevated RVSP.  5. Moderate aortic valve stenosis.  6. There are multiple wall motion abnormalities. QUANTITATIVE DATA SUMMARY: LA VOLUME:                             Normal Ranges: LA Vol A4C:       74.3 ml   (22+/-6mL/m2) LA Vol Index A4C: 49.2ml/m2 LA Vol A4C:       69.0 ml AORTIC VALVE:                                    Normal Ranges: AoV Vmax:                2.53 m/s  (<=1.7m/s) AoV Peak P.6 mmHg (<20mmHg) AoV Mean P.2 mmHg (1.7-11.5mmHg) LVOT Max Maldonado:            1.07 m/s  (<=1.1m/s) AoV VTI:                 48.89 cm  (18-25cm) LVOT VTI:                21.34 cm LVOT Diameter:           1.76 cm   (1.8-2.4cm) AoV Area, VTI:           1.06 cm2   (2.5-5.5cm2) AoV Area,Vmax:           1.04 cm2  (2.5-4.5cm2) AoV Dimensionless Index: 0.44 TRICUSPID VALVE/RVSP:                             Normal Ranges: Peak TR Velocity: 3.02 m/s RV Syst Pressure: 39.4 mmHg (< 30mmHg)  [id number] Noah Araujo MD Electronically signed on 4/22/2024 at 10:08:19 AM  Wall Scoring  ** Final **     ECG 12 Lead    Result Date: 4/22/2024  Normal sinus rhythm Left axis deviation Septal infarct , age undetermined ST & T wave abnormality, consider lateral ischemia Abnormal ECG No previous ECGs available       Assessment/Plan   Principal Problem:    Osteoarthritis  Active Problems:    History of coronary artery bypass graft    Chronic pain of both knees    Primary osteoarthritis of right knee    NSTEMI   Troponin elevated    Continues to have mild chest pressure    Cardiology consulted - appreciate recs    Continue IV heparin protocol    Echo completed today    Continue telemetry    NTG patch and metoprolol     Acute hypoxic respiratory failure    O2 via nasal cannula    Pulmonology consulted - appreciate recs    Encourage IS    VQ scan with low probability of PE    Duonebs      CKD    Nephrology following - appreciate recs    Avoid nephrotoxic medications    Hold lisinopril and hydrochlorothiazide in the setting of hypotension    AM labs     DM type I    Monitor glucose  with SSI coverage    Hypoglycemia protocol     Right knee osteoarthritis    S/P right totla knee arthroplasty 4/17/24   Pain management per primary service    Bowel regimen    PT/OT     Anemia    Blood management consulted - appreciate recs    IV Venofer given    Monitor labs     HTN    Amlodipine, hydrochlorothiazide, lisinopril held in the setting of hypotension    Resume as tolerated   Cardiology following     Hypothyroidism    Continue levothyroxine    Urinary retention    Continue evangelista for now    Urology consulted - appreciate recs     Plan    IV heparin    Cardiology, urology, and nephrology following    Await further  recs from cardiology after echo    Home with C at time of discharge.      Amy Razo, APRN-CNP

## 2024-04-22 NOTE — CONSULTS
Inpatient consult to Urology  Consult performed by: Amy Han MD  Consult ordered by: CARMEN Barillas-CNP      Pt admitted with orthopedic surgical issue, has had post op urinary retention and a decrease in gfr.  She is currently off floor for an echocardiogram and spoke w pt's family.  Will return tomorrow, expectation of evangelista for approx 48 hours and another trial of void if all other clinical parameters are improving

## 2024-04-22 NOTE — PROGRESS NOTES
Physical Therapy    Physical Therapy Treatment    Patient Name: Bonnie Jules  MRN: [id number]  Today's Date: 4/22/2024  Time Calculation  Start Time: 1405  Stop Time: 1425  Time Calculation (min): 20 min       Assessment/Plan   PT Assessment  Rehab Prognosis: Good  Evaluation/Treatment Tolerance: Patient limited by fatigue  End of Session Communication: Bedside nurse  Assessment Comment: Slow progress regarding functional mobility;  tolerance to activity slow improving;  fall risk  End of Session Patient Position: Up in chair (family present)  PT Plan  Inpatient/Swing Bed or Outpatient: Inpatient  PT Plan  Treatment/Interventions: Bed mobility, Transfer training, Gait training, Stair training, Balance training, Range of motion, Therapeutic exercise, Therapeutic activity, Home exercise program  PT Plan: Skilled PT  PT Frequency:  (10 times per week)  PT Discharge Recommendations: Moderate intensity level of continued care  PT Recommended Transfer Status: Assist x1  PT - OK to Discharge: Yes (with skilled physical therapy services at next level of care)      General Visit Information:   PT  Visit  PT Received On: 04/22/24  General  Missed Visit: Yes  Missed Visit Reason: Patient in a medical procedure (Patient leaving nursing quiroz for testing (echo))  Family/Caregiver Present: Yes  Co-Treatment: OT  Co-Treatment Reason: Limited tolerance to activity  Prior to Session Communication: Bedside nurse  Patient Position Received: Bed, 4 rail up  General Comment: RN cleared patient for treatment.  Patient reports agreeable to treatment.  Dr. Alvarez states he wants patient mobilizing this date.    Subjective   Precautions:  Precautions  LE Weight Bearing Status: Weight Bearing as Tolerated  Medical Precautions: Fall precautions  Post-Surgical Precautions: Right total knee precautions  Vital Signs:  Vital Signs  SpO2: 92 %    Objective   Pain:  Pain Assessment  Pain Assessment: FLACC (Face, Legs, Activity, Cry,  Consolability)  Pain Score: 3  Pain Type: Surgical pain  Pain Location: Knee  Pain Orientation: Right  Cognition:  Cognition  Overall Cognitive Status: Within Functional Limits  Postural Control:  Static Sitting Balance  Static Sitting-Balance Support: Bilateral upper extremity supported  Static Sitting-Level of Assistance: Close supervision  Static Sitting-Comment/Number of Minutes: Supervision for balance  Static Standing Balance  Static Standing-Balance Support: Bilateral upper extremity supported  Static Standing-Level of Assistance: Minimum assistance  Static Standing-Comment/Number of Minutes: Assist with balance during static standing with rolling walker          Activity Tolerance:  Activity Tolerance  Endurance: Decreased tolerance for upright activites  Activity Tolerance Comments: Fatigue  Treatments:  Therapeutic Exercise  Therapeutic Exercise Performed: Yes  Therapeutic Exercise Activity 1: ankle pumps, heel slides 10 reps x 1 set              Bed Mobility  Bed Mobility: Yes  Bed Mobility 1  Bed Mobility 1: Supine to sitting  Level of Assistance 1: Moderate assistance  Bed Mobility Comments 1: Assist with trunk-up and right LE during supine to sit.    Ambulation/Gait Training  Ambulation/Gait Training Performed: Yes  Ambulation/Gait Training 1  Surface 1: Level tile  Device 1: Rolling walker  Assistance 1: Minimum assistance  Comments/Distance (ft) 1: 12 feet x 1 with rolling walker and assist with balance;  verbal cues for gait sequencing;  patient ambulates with slow genny, non-reciprocating gait pattern, decreased step length keith LE (right greater than left), and forward flexed posture.  Transfers  Transfer: Yes  Transfer 1  Transfer From 1: Sit to  Transfer to 1: Stand  Technique 1: Sit to stand  Transfer Device 1: Walker  Transfer Level of Assistance 1: Minimum assistance  Trials/Comments 1: Assist with trunk support and balance;  verbal cues for hand placement.  Transfers 2  Transfer From 2:  Stand to  Transfer to 2: Sit  Technique 2: Stand to sit  Transfer Device 2: Walker  Transfer Level of Assistance 2: Minimum assistance  Trials/Comments 2: Assist with trunk support and balance;  verbal cues for hand placement and right LE position.    Stairs  Stairs: No         Outcome Measures:  [facility] Basic Mobility  Turning from your back to your side while in a flat bed without using bedrails: A lot  Moving from lying on your back to sitting on the side of a flat bed without using bedrails: A lot  Moving to and from bed to chair (including a wheelchair): A lot  Standing up from a chair using your arms (e.g. wheelchair or bedside chair): A little  To walk in hospital room: A little  Climbing 3-5 steps with railing: A lot  Basic Mobility - Total Score: 14    Education Documentation  No documentation found.  Education Comments  No comments found.        OP EDUCATION:       Encounter Problems       Encounter Problems (Active)       Functional Mobility       Pt will transition supine<>sit at flat bed with mod I. (Progressing)       Start:  04/17/24    Expected End:  05/06/24            Pt will transfer sit<>stand with FWW & mod I. (Progressing)       Start:  04/17/24    Expected End:  05/06/24            Pt will ambulate >/=100 ft with FWW & mod I. (Progressing)       Start:  04/17/24    Expected End:  05/06/24            Pt will adhere to R total knee precautions during all functional mobility with S and at most 1 verbal cue. (Progressing)       Start:  04/17/24    Expected End:  05/06/24               Pain - Adult

## 2024-04-22 NOTE — CARE PLAN
The patient's goals for the shift include  echo    The clinical goals for the shift include pain control

## 2024-04-23 LAB
ANION GAP SERPL CALC-SCNC: 12 MMOL/L
BUN SERPL-MCNC: 42 MG/DL (ref 8–25)
CALCIUM SERPL-MCNC: 7.9 MG/DL (ref 8.5–10.4)
CHLORIDE SERPL-SCNC: 96 MMOL/L (ref 97–107)
CO2 SERPL-SCNC: 22 MMOL/L (ref 24–31)
CREAT SERPL-MCNC: 1 MG/DL (ref 0.4–1.6)
EGFRCR SERPLBLD CKD-EPI 2021: 57 ML/MIN/1.73M*2
ERYTHROCYTE [DISTWIDTH] IN BLOOD BY AUTOMATED COUNT: 13.9 % (ref 11.5–14.5)
GLUCOSE BLD MANUAL STRIP-MCNC: 119 MG/DL (ref 74–99)
GLUCOSE BLD MANUAL STRIP-MCNC: 186 MG/DL (ref 74–99)
GLUCOSE BLD MANUAL STRIP-MCNC: 308 MG/DL (ref 74–99)
GLUCOSE BLD MANUAL STRIP-MCNC: 333 MG/DL (ref 74–99)
GLUCOSE SERPL-MCNC: 268 MG/DL (ref 65–99)
HCT VFR BLD AUTO: 24.2 % (ref 36–46)
HGB BLD-MCNC: 7.6 G/DL (ref 12–16)
MCH RBC QN AUTO: 30.5 PG (ref 26–34)
MCHC RBC AUTO-ENTMCNC: 31.4 G/DL (ref 32–36)
MCV RBC AUTO: 97 FL (ref 80–100)
NRBC BLD-RTO: 0 /100 WBCS (ref 0–0)
PLATELET # BLD AUTO: 236 X10*3/UL (ref 150–450)
POTASSIUM SERPL-SCNC: 5.2 MMOL/L (ref 3.4–5.1)
RBC # BLD AUTO: 2.49 X10*6/UL (ref 4–5.2)
SODIUM SERPL-SCNC: 130 MMOL/L (ref 133–145)
WBC # BLD AUTO: 7.9 X10*3/UL (ref 4.4–11.3)

## 2024-04-23 PROCEDURE — 1210000001 HC SEMI-PRIVATE ROOM DAILY

## 2024-04-23 PROCEDURE — 80048 BASIC METABOLIC PNL TOTAL CA: CPT | Performed by: NURSE PRACTITIONER

## 2024-04-23 PROCEDURE — 2500000001 HC RX 250 WO HCPCS SELF ADMINISTERED DRUGS (ALT 637 FOR MEDICARE OP): Performed by: INTERNAL MEDICINE

## 2024-04-23 PROCEDURE — 2500000004 HC RX 250 GENERAL PHARMACY W/ HCPCS (ALT 636 FOR OP/ED): Performed by: NURSE PRACTITIONER

## 2024-04-23 PROCEDURE — 82947 ASSAY GLUCOSE BLOOD QUANT: CPT

## 2024-04-23 PROCEDURE — 97116 GAIT TRAINING THERAPY: CPT | Mod: GP,CQ

## 2024-04-23 PROCEDURE — 85027 COMPLETE CBC AUTOMATED: CPT | Performed by: NURSE PRACTITIONER

## 2024-04-23 PROCEDURE — 2500000001 HC RX 250 WO HCPCS SELF ADMINISTERED DRUGS (ALT 637 FOR MEDICARE OP)

## 2024-04-23 PROCEDURE — 2500000002 HC RX 250 W HCPCS SELF ADMINISTERED DRUGS (ALT 637 FOR MEDICARE OP, ALT 636 FOR OP/ED): Performed by: ORTHOPAEDIC SURGERY

## 2024-04-23 PROCEDURE — 2500000001 HC RX 250 WO HCPCS SELF ADMINISTERED DRUGS (ALT 637 FOR MEDICARE OP): Performed by: PHYSICIAN ASSISTANT

## 2024-04-23 PROCEDURE — 94640 AIRWAY INHALATION TREATMENT: CPT

## 2024-04-23 PROCEDURE — 2500000004 HC RX 250 GENERAL PHARMACY W/ HCPCS (ALT 636 FOR OP/ED): Performed by: INTERNAL MEDICINE

## 2024-04-23 PROCEDURE — 2500000001 HC RX 250 WO HCPCS SELF ADMINISTERED DRUGS (ALT 637 FOR MEDICARE OP): Performed by: NURSE PRACTITIONER

## 2024-04-23 PROCEDURE — 9420000001 HC RT PATIENT EDUCATION 5 MIN

## 2024-04-23 PROCEDURE — 99024 POSTOP FOLLOW-UP VISIT: CPT | Performed by: PHYSICIAN ASSISTANT

## 2024-04-23 PROCEDURE — 99233 SBSQ HOSP IP/OBS HIGH 50: CPT | Performed by: NURSE PRACTITIONER

## 2024-04-23 PROCEDURE — 2500000004 HC RX 250 GENERAL PHARMACY W/ HCPCS (ALT 636 FOR OP/ED): Performed by: PHYSICIAN ASSISTANT

## 2024-04-23 PROCEDURE — 97110 THERAPEUTIC EXERCISES: CPT | Mod: GP,CQ

## 2024-04-23 PROCEDURE — 2500000005 HC RX 250 GENERAL PHARMACY W/O HCPCS: Performed by: NURSE PRACTITIONER

## 2024-04-23 PROCEDURE — 97535 SELF CARE MNGMENT TRAINING: CPT | Mod: GO

## 2024-04-23 PROCEDURE — 2500000002 HC RX 250 W HCPCS SELF ADMINISTERED DRUGS (ALT 637 FOR MEDICARE OP, ALT 636 FOR OP/ED): Performed by: NURSE PRACTITIONER

## 2024-04-23 RX ORDER — FUROSEMIDE 20 MG/1
20 TABLET ORAL DAILY
Status: DISCONTINUED | OUTPATIENT
Start: 2024-04-23 | End: 2024-04-24 | Stop reason: HOSPADM

## 2024-04-23 RX ORDER — FUROSEMIDE 20 MG/1
10 TABLET ORAL ONCE
Status: DISCONTINUED | OUTPATIENT
Start: 2024-04-23 | End: 2024-04-23

## 2024-04-23 RX ADMIN — FOLIC ACID 1 MG: 1 TABLET ORAL at 10:13

## 2024-04-23 RX ADMIN — IRON SUCROSE 200 MG: 20 INJECTION, SOLUTION INTRAVENOUS at 17:38

## 2024-04-23 RX ADMIN — METOPROLOL TARTRATE 25 MG: 25 TABLET, FILM COATED ORAL at 10:13

## 2024-04-23 RX ADMIN — IPRATROPIUM BROMIDE AND ALBUTEROL SULFATE 3 ML: 2.5; .5 SOLUTION RESPIRATORY (INHALATION) at 07:49

## 2024-04-23 RX ADMIN — INSULIN GLARGINE 11 UNITS: 100 INJECTION, SOLUTION SUBCUTANEOUS at 09:26

## 2024-04-23 RX ADMIN — ONDANSETRON 4 MG: 2 INJECTION INTRAMUSCULAR; INTRAVENOUS at 08:26

## 2024-04-23 RX ADMIN — LEVOTHYROXINE SODIUM 100 MCG: 0.1 TABLET ORAL at 06:33

## 2024-04-23 RX ADMIN — Medication: at 07:51

## 2024-04-23 RX ADMIN — INSULIN LISPRO 16 UNITS: 100 INJECTION, SOLUTION INTRAVENOUS; SUBCUTANEOUS at 12:27

## 2024-04-23 RX ADMIN — NITROGLYCERIN 1 PATCH: 0.2 PATCH TRANSDERMAL at 09:25

## 2024-04-23 RX ADMIN — FUROSEMIDE 20 MG: 20 TABLET ORAL at 12:27

## 2024-04-23 RX ADMIN — GABAPENTIN 100 MG: 100 CAPSULE ORAL at 10:13

## 2024-04-23 RX ADMIN — ACETAMINOPHEN 650 MG: 325 TABLET ORAL at 09:25

## 2024-04-23 RX ADMIN — IPRATROPIUM BROMIDE AND ALBUTEROL SULFATE 3 ML: 2.5; .5 SOLUTION RESPIRATORY (INHALATION) at 19:41

## 2024-04-23 RX ADMIN — IPRATROPIUM BROMIDE AND ALBUTEROL SULFATE 3 ML: 2.5; .5 SOLUTION RESPIRATORY (INHALATION) at 11:41

## 2024-04-23 RX ADMIN — POLYETHYLENE GLYCOL 3350 17 G: 17 POWDER, FOR SOLUTION ORAL at 10:13

## 2024-04-23 RX ADMIN — METOPROLOL TARTRATE 25 MG: 25 TABLET, FILM COATED ORAL at 20:27

## 2024-04-23 RX ADMIN — PRAVASTATIN SODIUM 40 MG: 40 TABLET ORAL at 20:27

## 2024-04-23 RX ADMIN — APIXABAN 2.5 MG: 2.5 TABLET, FILM COATED ORAL at 17:38

## 2024-04-23 RX ADMIN — ASPIRIN 81 MG CHEWABLE TABLET 81 MG: 81 TABLET CHEWABLE at 10:13

## 2024-04-23 RX ADMIN — OXYCODONE AND ACETAMINOPHEN 1 TABLET: 5; 325 TABLET ORAL at 20:30

## 2024-04-23 RX ADMIN — Medication: at 20:00

## 2024-04-23 RX ADMIN — APIXABAN 2.5 MG: 2.5 TABLET, FILM COATED ORAL at 06:33

## 2024-04-23 RX ADMIN — INSULIN LISPRO 16 UNITS: 100 INJECTION, SOLUTION INTRAVENOUS; SUBCUTANEOUS at 09:26

## 2024-04-23 RX ADMIN — GABAPENTIN 100 MG: 100 CAPSULE ORAL at 20:27

## 2024-04-23 ASSESSMENT — COGNITIVE AND FUNCTIONAL STATUS - GENERAL
PERSONAL GROOMING: A LITTLE
STANDING UP FROM CHAIR USING ARMS: A LITTLE
DAILY ACTIVITIY SCORE: 17
MOVING TO AND FROM BED TO CHAIR: A LITTLE
MOVING FROM LYING ON BACK TO SITTING ON SIDE OF FLAT BED WITH BEDRAILS: A LITTLE
DRESSING REGULAR LOWER BODY CLOTHING: A LOT
HELP NEEDED FOR BATHING: A LOT
TOILETING: A LOT
DAILY ACTIVITIY SCORE: 17
TURNING FROM BACK TO SIDE WHILE IN FLAT BAD: A LOT
TURNING FROM BACK TO SIDE WHILE IN FLAT BAD: A LITTLE
MOVING TO AND FROM BED TO CHAIR: A LOT
MOVING FROM LYING ON BACK TO SITTING ON SIDE OF FLAT BED WITH BEDRAILS: A LOT
STANDING UP FROM CHAIR USING ARMS: A LOT
CLIMB 3 TO 5 STEPS WITH RAILING: A LOT
PERSONAL GROOMING: A LITTLE
DRESSING REGULAR LOWER BODY CLOTHING: A LOT
MOBILITY SCORE: 17
MOBILITY SCORE: 13
CLIMB 3 TO 5 STEPS WITH RAILING: A LOT
WALKING IN HOSPITAL ROOM: A LITTLE
HELP NEEDED FOR BATHING: A LOT
WALKING IN HOSPITAL ROOM: A LITTLE
TOILETING: A LOT

## 2024-04-23 ASSESSMENT — PAIN SCALES - GENERAL
PAINLEVEL_OUTOF10: 5 - MODERATE PAIN
PAINLEVEL_OUTOF10: 3
PAINLEVEL_OUTOF10: 0 - NO PAIN
PAINLEVEL_OUTOF10: 3
PAINLEVEL_OUTOF10: 3
PAINLEVEL_OUTOF10: 2

## 2024-04-23 ASSESSMENT — PAIN - FUNCTIONAL ASSESSMENT
PAIN_FUNCTIONAL_ASSESSMENT: 0-10
PAIN_FUNCTIONAL_ASSESSMENT: FLACC (FACE, LEGS, ACTIVITY, CRY, CONSOLABILITY)
PAIN_FUNCTIONAL_ASSESSMENT: 0-10
PAIN_FUNCTIONAL_ASSESSMENT: FLACC (FACE, LEGS, ACTIVITY, CRY, CONSOLABILITY)

## 2024-04-23 ASSESSMENT — PAIN DESCRIPTION - ORIENTATION: ORIENTATION: RIGHT

## 2024-04-23 ASSESSMENT — PAIN DESCRIPTION - LOCATION: LOCATION: KNEE

## 2024-04-23 NOTE — PROGRESS NOTES
Pt wants to return home with [facility]. Requested an INTERNAL referral for home care be placed by Dr. Liriano. Pt does not have a SOC.     DISCHARGE PLAN TO RETURN HOME WITH [facility], NEED INTERNAL REFERRAL AND SOC.     DO NOT DISCHARGE WITHOUT SPEAKING TO CARE COORDINATION.        04/23/24 1229   Discharge Planning   Living Arrangements Children   Support Systems Family members;Children   Patient expects to be discharged to: home with [facility], Need INTERNAL referral for home care   Does the patient need discharge transport arranged? No

## 2024-04-23 NOTE — PROGRESS NOTES
Patient seen for acute kidney injury on chronic kidney disease creatinine stable at 1.0 I will increase her Lasix to 20 mg daily and monitor potassium very closely

## 2024-04-23 NOTE — PROGRESS NOTES
"Bonnie Jules is a 83 y.o. female on day 2 of admission presenting with Osteoarthritis.    Subjective   Patient status post right total knee replacement day #6.  She is resting in bed and is seen in the presence of her daughter.  She does endorse some right knee pain.  She was able to work with physical therapy.  Patient and daughter refusing SNF, wishing to return home. She denies chest pain shortness of breath.       Objective     Physical Exam  Right knee: Patient is on 3L of O2. Mepilex dressing clean and dry. There is pain with gentle passive ROM in bed. Nontender in the right calf.  Neurovascular is intact    Last Recorded Vitals  Blood pressure (!) 100/47, pulse 68, temperature 36.7 °C (98.1 °F), temperature source Oral, resp. rate 17, height 1.49 m (4' 10.66\"), weight 57 kg (125 lb 10.6 oz), SpO2 92%.  Intake/Output last 3 Shifts:  I/O last 3 completed shifts:  In: 0 (0 mL/kg)   Out: 1800 (31.6 mL/kg) [Urine:1800 (0.9 mL/kg/hr)]  Weight: 57 kg     Relevant Results  XR knee right 1-2 views    Result Date: 4/17/2024  Interpreted By:  Avni Correa, STUDY: XR KNEE RIGHT 1-2 VIEWS; ;  4/17/2024 12:13 pm   INDICATION: Signs/Symptoms:Post op knee.   COMPARISON: None.   ACCESSION NUMBER(S): [id number]   ORDERING CLINICIAN: KLAUS HANCOCK   FINDINGS: Right knee, two views   Interval right total knee arthroplasty in place. No periprosthetic fracture lucency seen. There is no dislocation. Postsurgical change the soft tissue. There is a moderate-sized effusion       No immediate complication after right total knee arthroplasty     MACRO: None   Signed by: Avni Correa 4/17/2024 12:33 PM Dictation workstation:   [id number]      Scheduled medications  apixaban, 2.5 mg, oral, q12h  aspirin, 81 mg, oral, Daily  folic acid, 1 mg, oral, Daily  furosemide, 20 mg, oral, Daily  gabapentin, 100 mg, oral, BID  insulin glargine, 11 Units, subcutaneous, Daily before breakfast  insulin lispro, 0-20 Units, subcutaneous, " TID with meals  ipratropium-albuteroL, 3 mL, nebulization, TID  levothyroxine, 100 mcg, oral, Daily  metoprolol tartrate, 25 mg, oral, BID  nitroglycerin, 1 patch, transdermal, Daily  oxygen, , inhalation, Continuous - Inhalation  polyethylene glycol, 17 g, oral, Daily  pravastatin, 40 mg, oral, Nightly      Continuous medications  oxygen, 2 L/min, Last Rate: 3 L/min (04/20/24 0705)      PRN medications  PRN medications: acetaminophen, dextrose, dextrose, glucagon, glucagon, HYDROmorphone, HYDROmorphone, naloxone, naloxone, nitroglycerin, ondansetron **OR** ondansetron, oxyCODONE-acetaminophen, oxyCODONE-acetaminophen, prochlorperazine **OR** prochlorperazine **OR** prochlorperazine  Results for orders placed or performed during the hospital encounter of 04/17/24 (from the past 24 hour(s))   POCT GLUCOSE   Result Value Ref Range    POCT Glucose 223 (H) 74 - 99 mg/dL   POCT GLUCOSE   Result Value Ref Range    POCT Glucose 133 (H) 74 - 99 mg/dL   Basic Metabolic Panel   Result Value Ref Range    Glucose 268 (H) 65 - 99 mg/dL    Sodium 130 (L) 133 - 145 mmol/L    Potassium 5.2 (H) 3.4 - 5.1 mmol/L    Chloride 96 (L) 97 - 107 mmol/L    Bicarbonate 22 (L) 24 - 31 mmol/L    Urea Nitrogen 42 (H) 8 - 25 mg/dL    Creatinine 1.00 0.40 - 1.60 mg/dL    eGFR 57 (L) >60 mL/min/1.73m*2    Calcium 7.9 (L) 8.5 - 10.4 mg/dL    Anion Gap 12 <=19 mmol/L   CBC   Result Value Ref Range    WBC 7.9 4.4 - 11.3 x10*3/uL    nRBC 0.0 0.0 - 0.0 /100 WBCs    RBC 2.49 (L) 4.00 - 5.20 x10*6/uL    Hemoglobin 7.6 (L) 12.0 - 16.0 g/dL    Hematocrit 24.2 (L) 36.0 - 46.0 %    MCV 97 80 - 100 fL    MCH 30.5 26.0 - 34.0 pg    MCHC 31.4 (L) 32.0 - 36.0 g/dL    RDW 13.9 11.5 - 14.5 %    Platelets 236 150 - 450 x10*3/uL   POCT GLUCOSE   Result Value Ref Range    POCT Glucose 308 (H) 74 - 99 mg/dL   POCT GLUCOSE   Result Value Ref Range    POCT Glucose 333 (H) 74 - 99 mg/dL              Assessment/Plan     RTK  -- PT/OT notes appreciated  -- Continue current  pain regimen  -- IS  -- Walker WBAT  -- Incision care  -- antibiotic prophylaxis  -- Patient refusing SNF, wishing to return home with [facility].     NSTEMI  -- Cardiology on consult  -- has signed off  -- Continue Eliquis  -- Continue Aspirin 81mg daily  -- metoprolol 25mg BID    Acute Hypoxic Respiratory Failure  -- pulmonology on Consult  -- patient requiring 3 L  -- home O2 certification prior to discharge    CKD  -- nephrology on consult  -- creatinine stable  -- lasix 20mg daily  -- monitor potassium closely.     Anemia  -- blood conservation on consult  -- monitor H&H    HTN  -- hospital medicine on consult    DVT prophylaxis  -- eliquis for DVT prophylaxis   -- SCDs    DM  -- sliding scale  -- monitor blood glucose    We will continue to medically optimize patient. Patient is slowly advancing with PT. patient is seen today in the presence of her daughter.  She endorses nausea at this morning with emesis.  Potassium slightly elevated and sodium low.  Nephrology is following and will continue to monitor potassium closely.  Cardiology notes reviewed and she is stable from cardiac standpoint with metoprolol 25 mg twice daily and aspirin 81 mg with Eliquis.  Will appreciate input from hospitalist team and nephrology.  Will continue to medically optimize patient for discharge.  She is wishing to return home with home health care upon discharge and refusing skilled facility.  I discussed case with attending Dr. Liriano and we will continue to monitor the patient overnight and plan on discharge home tomorrow if she is medically optimized.       I spent 30 minutes in the professional and overall care of this patient.      Mayela Pickard PA-C

## 2024-04-23 NOTE — PROGRESS NOTES
04/23/24 1411   Current Planned Discharge Disposition   Current Planned Discharge Disposition Home H

## 2024-04-23 NOTE — PROGRESS NOTES
Physical Therapy    Physical Therapy Treatment    Patient Name: Bonnie Jules  MRN: [id number]  Today's Date: 4/23/2024  Time Calculation  Start Time: 1447  Stop Time: 1511  Time Calculation (min): 24 min       Assessment/Plan   PT Assessment  End of Session Communication: Bedside nurse  End of Session Patient Position: Up in chair, Alarm off, not on at start of session  PT Plan  Inpatient/Swing Bed or Outpatient: Inpatient  PT Plan  Treatment/Interventions: Bed mobility, Transfer training, Gait training, Stair training, Balance training, Range of motion, Therapeutic exercise, Therapeutic activity, Home exercise program  PT Plan: Skilled PT  PT Frequency: 6 times per week  PT Discharge Recommendations: Moderate intensity level of continued care  PT Recommended Transfer Status: Assist x1  PT - OK to Discharge: Yes (with skilled physical therapy services at next level of care)      General Visit Information:   PT  Visit  PT Received On: 04/23/24  General  Family/Caregiver Present: Yes  Caregiver Feedback: Daughter present at begining of tx session.  Prior to Session Communication: Bedside nurse  Patient Position Received: Bed, 4 rail up, Alarm off, not on at start of session  General Comment: Cleared by nursing to be seen for therapy, pt agreeable with tx, supine in bed upon arrival.    Subjective   Precautions:  Precautions  LE Weight Bearing Status: Weight Bearing as Tolerated       Objective   Pain:  Pain Assessment  Pain Assessment: FLACC (Face, Legs, Activity, Cry, Consolability)  Pain Score: 3  Pain Type: Surgical pain  Pain Location: Knee  Pain Orientation: Right     Postural Control:  Static Sitting Balance  Static Sitting-Balance Support: Bilateral upper extremity supported  Static Sitting-Level of Assistance: Close supervision  Static Standing Balance  Static Standing-Balance Support: Bilateral upper extremity supported  Static Standing-Level of Assistance: Minimum assistance     Treatments:  Therapeutic  Exercise  Therapeutic Exercise Performed: Yes  Therapeutic Exercise Activity 1: Bilateral ankle pumps x15  Therapeutic Exercise Activity 2: Bilateral heel slides x15  Therapeutic Exercise Activity 3: Bilateral hip abduction x15  Therapeutic Exercise Activity 4: Quad sets x15    Therapeutic Activity  Therapeutic Activity Performed: No    Bed Mobility  Bed Mobility: Yes  Bed Mobility 1  Bed Mobility 1: Supine to sitting  Level of Assistance 1: Minimum assistance  Bed Mobility Comments 1: Min assist for trunk during supine to sit, min assist to scoot EOB with use of draw sheet.    Ambulation/Gait Training  Ambulation/Gait Training Performed: Yes  Ambulation/Gait Training 1  Surface 1: Level tile  Device 1: Rolling walker  Assistance 1: Minimum assistance  Quality of Gait 1: Narrow base of support, Diminished heel strike, Decreased step length, Forward flexed posture  Comments/Distance (ft) 1: 12' with wheeled walker, min assist for balance.  Transfers  Transfer: Yes  Transfer 1  Transfer From 1: Sit to  Transfer to 1: Stand  Technique 1: Sit to stand, Stand to sit  Transfer Device 1: Walker  Transfer Level of Assistance 1: Minimum assistance  Trials/Comments 1: Min assist for trunk up during sit to stand, cues for proper hand placement, decreased eccentric control in sitting.    Outcome Measures:  [facility] Basic Mobility  Turning from your back to your side while in a flat bed without using bedrails: A little  Moving from lying on your back to sitting on the side of a flat bed without using bedrails: A little  Moving to and from bed to chair (including a wheelchair): A little  Standing up from a chair using your arms (e.g. wheelchair or bedside chair): A little  To walk in hospital room: A little  Climbing 3-5 steps with railing: A lot  Basic Mobility - Total Score: 17         Encounter Problems       Encounter Problems (Active)       Functional Mobility       Pt will transition supine<>sit at flat bed with mod I.  (Progressing)       Start:  04/17/24    Expected End:  05/06/24            Pt will transfer sit<>stand with FWW & mod I. (Progressing)       Start:  04/17/24    Expected End:  05/06/24            Pt will ambulate >/=100 ft with FWW & mod I. (Progressing)       Start:  04/17/24    Expected End:  05/06/24            Pt will adhere to R total knee precautions during all functional mobility with S and at most 1 verbal cue. (Progressing)       Start:  04/17/24    Expected End:  05/06/24               Pain - Adult

## 2024-04-23 NOTE — PROGRESS NOTES
"Bonnie Jules is a 83 y.o. female on day 2 of admission presenting with Osteoarthritis.    Subjective   Patient resting in bed. Denies chest pain, shortness of breath, abdominal pain. States she worked with therapy today. Has some knee pain related to surgery        Objective     Physical Exam  Constitutional:       Appearance: Normal appearance.   HENT:      Head: Normocephalic and atraumatic.      Mouth/Throat:      Mouth: Mucous membranes are moist.      Pharynx: Oropharynx is clear.   Eyes:      Extraocular Movements: Extraocular movements intact.      Conjunctiva/sclera: Conjunctivae normal.      Pupils: Pupils are equal, round, and reactive to light.   Cardiovascular:      Rate and Rhythm: Normal rate and regular rhythm.      Pulses: Normal pulses.      Heart sounds: Normal heart sounds.   Pulmonary:      Effort: Pulmonary effort is normal.      Breath sounds: Normal breath sounds.   Abdominal:      General: Bowel sounds are normal.      Palpations: Abdomen is soft.      Tenderness: There is no abdominal tenderness.   Musculoskeletal:      Cervical back: Normal range of motion and neck supple.      Comments: Right knee with intact dressing.    Skin:     General: Skin is warm and dry.      Capillary Refill: Capillary refill takes less than 2 seconds.   Neurological:      General: No focal deficit present.      Mental Status: She is alert and oriented to person, place, and time.   Psychiatric:         Mood and Affect: Mood normal.         Behavior: Behavior normal.         Last Recorded Vitals  Blood pressure (!) 100/47, pulse 68, temperature 36.7 °C (98.1 °F), temperature source Oral, resp. rate 17, height 1.49 m (4' 10.66\"), weight 57 kg (125 lb 10.6 oz), SpO2 92%.  Intake/Output last 3 Shifts:  I/O last 3 completed shifts:  In: 0 (0 mL/kg)   Out: 1800 (31.6 mL/kg) [Urine:1800 (0.9 mL/kg/hr)]  Weight: 57 kg     Relevant Results  Results for orders placed or performed during the hospital encounter of 04/17/24 " (from the past 24 hour(s))   POCT GLUCOSE   Result Value Ref Range    POCT Glucose 223 (H) 74 - 99 mg/dL   POCT GLUCOSE   Result Value Ref Range    POCT Glucose 133 (H) 74 - 99 mg/dL   Basic Metabolic Panel   Result Value Ref Range    Glucose 268 (H) 65 - 99 mg/dL    Sodium 130 (L) 133 - 145 mmol/L    Potassium 5.2 (H) 3.4 - 5.1 mmol/L    Chloride 96 (L) 97 - 107 mmol/L    Bicarbonate 22 (L) 24 - 31 mmol/L    Urea Nitrogen 42 (H) 8 - 25 mg/dL    Creatinine 1.00 0.40 - 1.60 mg/dL    eGFR 57 (L) >60 mL/min/1.73m*2    Calcium 7.9 (L) 8.5 - 10.4 mg/dL    Anion Gap 12 <=19 mmol/L   CBC   Result Value Ref Range    WBC 7.9 4.4 - 11.3 x10*3/uL    nRBC 0.0 0.0 - 0.0 /100 WBCs    RBC 2.49 (L) 4.00 - 5.20 x10*6/uL    Hemoglobin 7.6 (L) 12.0 - 16.0 g/dL    Hematocrit 24.2 (L) 36.0 - 46.0 %    MCV 97 80 - 100 fL    MCH 30.5 26.0 - 34.0 pg    MCHC 31.4 (L) 32.0 - 36.0 g/dL    RDW 13.9 11.5 - 14.5 %    Platelets 236 150 - 450 x10*3/uL   POCT GLUCOSE   Result Value Ref Range    POCT Glucose 308 (H) 74 - 99 mg/dL   POCT GLUCOSE   Result Value Ref Range    POCT Glucose 333 (H) 74 - 99 mg/dL     XR chest 2 views    Result Date: 4/22/2024  Interpreted By:  Milo Leal, STUDY: XR CHEST 2 VIEWS; 4/22/2024 3:15 pm   INDICATION: Signs/Symptoms:Follow-up   COMPARISON: 04/21/2020.   ACCESSION NUMBER(S): [id number]   ORDERING CLINICIAN: ROWDY RICE   TECHNIQUE: Number of films: Two-view radiographs of the chest were obtained.   FINDINGS: Median sternotomy wires and surgical clips are again present, consistent with previous coronary artery bypass graft. The heart and mediastinum are normal. There is prominence of the interstitial markings bilaterally. Bilateral pleural effusions are also seen, best seen on the lateral view. Degenerative changes involve the spine and shoulders. Cholecystectomy clips are present.       Prominence of the interstitial markings and small bilateral pleural effusions without confluence infiltrates.   Signed  by: Milo Leal 4/22/2024 5:00 PM Dictation workstation:   [id number]    Bedside Midline Imaging    Result Date: 4/22/2024  These images are not reportable by radiology and will not be interpreted by  Radiologists.    US renal complete    Result Date: 4/22/2024  Interpreted By:  Klever Watson, STUDY: US RENAL COMPLETE;  4/21/2024 5:12 pm   INDICATION: Signs/Symptoms:JOSÉ.   COMPARISON: None.   ACCESSION NUMBER(S): [id number]   ORDERING CLINICIAN: MYAH KAUR   TECHNIQUE: Real-time sonographic evaluation of the kidneys and urinary bladder was performed.   FINDINGS: RIGHT KIDNEY: Right kidney measures  10.8 cm.  No shadowing calculus or right hydronephrosis is visualized.  No discrete renal lesion is visualized.   LEFT KIDNEY: Left kidney measures  9 cm.  No shadowing calculus or left hydronephrosis is visualized.  No discrete renal lesion is visualized.   BLADDER: Urinary bladder was fully decompressed by Layton catheter and could not be evaluated.       No hydronephrosis bilaterally.   Urinary bladder fully decompressed by Layton catheter and could not be evaluated.   MACRO: None.   Signed by: Klever Watson 4/22/2024 1:32 PM Dictation workstation:   [id number]    Transthoracic Echo (TTE) Limited    Result Date: 4/22/2024           [place] [address]            Phone [phone] TRANSTHORACIC ECHOCARDIOGRAM REPORT  Patient Name:     REBECCA TERRY TYLER      Reading Physician:  [id number] Noah Araujo MD Study Date:       4/22/2024            Ordering Provider:  [id number] YARELI ABRAHAM MRN/PID:          [id number]             Fellow: Accession#:       [id number]         Nurse: Date of           1940 / 83 years Sonographer:        Norma Smart [facility] Birth/Age: Gender:           F                    Additional Staff: Height:           149.86 cm            Admit Date: Weight:           56.70 kg              Admission Status:   Inpatient - Routine BSA / BMI:        1.51 m2 / 25.25      [facility]          [facility]                   [facility]                Location: Blood Pressure: 103 /47 mmHg Study Type:    TRANSTHORACIC ECHO (TTE) LIMITED Diagnosis/ICD: Non ST elevation (NSTEMI) myocardial infarction-I21.4 Indication:    NSTEMI CPT Codes:     Echo Limited-24871; Doppler Limited-91580; Color Doppler-62711 Patient History: Valve Disorders:   Aortic Stenosis. Diabetes:          Yes Pertinent History: HTN, Hyperlipidemia, Chest Pain and ASHD, PVD,MI. Study Detail: The following Echo studies were performed: 2D, M-Mode, Doppler and               color flow.  PHYSICIAN INTERPRETATION: Left Ventricle: Left ventricular systolic function is mildly to moderately decreased, with an estimated ejection fraction of 40-45%. There are multiple wall motion abnormalities. The left ventricular cavity size is mild to moderately dilated. Spectral Doppler shows a normal pattern of left ventricular diastolic filling. LV Wall Scoring: The entire anterior septum, mid anterior segment, and apex are akinetic. The mid inferoseptal segment is hypokinetic. All remaining scored segments are normal. Left Atrium: The left atrium is normal in size. Right Ventricle: The right ventricle is normal in size. There is normal right ventricular global systolic function. Right Atrium: The right atrium is normal in size. Aortic Valve: The aortic valve is trileaflet. There is diffuse moderate aortic valve thickening. There is evidence of moderate aortic valve stenosis. There is no evidence of aortic valve regurgitation. The peak instantaneous gradient of the aortic valve is 25.6 mmHg. The mean gradient of the aortic valve is 14.2 mmHg. Mitral Valve: The mitral valve is normal in structure. There is moderate mitral annular calcification. There is mild mitral valve regurgitation. Tricuspid Valve: The tricuspid valve is structurally normal. There is mild  tricuspid regurgitation. The Doppler estimated RVSP is mildly elevated at 39.4 mmHg. Pulmonic Valve: The pulmonic valve is not well visualized. There is no indication of pulmonic valve regurgitation. Pericardium: There is no pericardial effusion noted. Aorta: The aortic root is normal.  CONCLUSIONS:  1. Left ventricular systolic function is mildly to moderately decreased with a 40-45% estimated ejection fraction.  2. Entire anterior septum, mid anterior segment, apex, and mid inferoseptal segment are abnormal.  3. There is moderate mitral annular calcification.  4. Mildly elevated RVSP.  5. Moderate aortic valve stenosis.  6. There are multiple wall motion abnormalities. QUANTITATIVE DATA SUMMARY: LA VOLUME:                             Normal Ranges: LA Vol A4C:       74.3 ml   (22+/-6mL/m2) LA Vol Index A4C: 49.2ml/m2 LA Vol A4C:       69.0 ml AORTIC VALVE:                                    Normal Ranges: AoV Vmax:                2.53 m/s  (<=1.7m/s) AoV Peak P.6 mmHg (<20mmHg) AoV Mean P.2 mmHg (1.7-11.5mmHg) LVOT Max Maldonado:            1.07 m/s  (<=1.1m/s) AoV VTI:                 48.89 cm  (18-25cm) LVOT VTI:                21.34 cm LVOT Diameter:           1.76 cm   (1.8-2.4cm) AoV Area, VTI:           1.06 cm2  (2.5-5.5cm2) AoV Area,Vmax:           1.04 cm2  (2.5-4.5cm2) AoV Dimensionless Index: 0.44 TRICUSPID VALVE/RVSP:                             Normal Ranges: Peak TR Velocity: 3.02 m/s RV Syst Pressure: 39.4 mmHg (< 30mmHg)  [id number] Noah Araujo MD Electronically signed on 2024 at 10:08:19 AM  Wall Scoring  ** Final **        Assessment/Plan   Principal Problem:    Osteoarthritis  Active Problems:    History of coronary artery bypass graft    Chronic pain of both knees    Primary osteoarthritis of right knee    NSTEMI              Troponin elevated               Chest pain resolved at this time              Cardiology consulted - appreciate recs               Continue  IV heparin protocol               Echo completed today               Continue telemetry               NTG patch, ASA and metoprolol      Acute hypoxic respiratory failure               O2 via nasal cannula               Pulmonology consulted - appreciate recs               Encourage IS               VQ scan with low probability of PE               Duonebs                 CKD               Nephrology following - appreciate recs               Avoid nephrotoxic medications               Hold lisinopril and hydrochlorothiazide in the setting of hypotension               AM labs    Low potassium diet    Lasix increased today      DM type I               Monitor glucose with SSI coverage    Continue daily Lantus               Hypoglycemia protocol      Right knee osteoarthritis               S/P right totla knee arthroplasty 4/17/24              Pain management per primary service               Bowel regimen               PT/OT      Anemia               Blood management consulted - appreciate recs               IV Venofer given               Monitor labs      HTN               Amlodipine, hydrochlorothiazide, lisinopril held in the setting of hypotension               Resume as tolerated              Cardiology following      Hypothyroidism               Continue levothyroxine     Urinary retention               Layton discontinued this morning               Urology consulted - appreciate recs      Plan               Cardiology, urology, and nephrology following               Home with [facility] at time of discharge.     Amy Razo, APRN-CNP

## 2024-04-23 NOTE — CARE PLAN
The patient's goals for the shift include  comfort, work with therapy     The clinical goals for the shift include pain control

## 2024-04-23 NOTE — NURSING NOTE
End of shift order review completed. Call light within reach.  Pt had bladder scan this evening, results were 228ml in bladder.

## 2024-04-23 NOTE — CONSULTS
Reason For Consult  Urinary retention    History Of Present Illness  Bonnie Jules is a 83 y.o. female who underwent a right knee arthroplasty on 4/17/2024.  She has been in urinary retention and has failed a voiding trial.  Catheter was removed today.  She still has not voided.  No known significant voiding issues prior to admission.  She did have a mild elevation in her renal function test with a decrease in her GFR.  Today it is much better.  Urine culture is negative.  The Layton catheter was removed again today for voiding trial.  She has not yet voided.  Past Medical History  She has a past medical history of Anxiety, Benign hypertension, Chronic ischemic colitis (Multi), Chronic kidney disease, stage III (moderate) (Multi), CLL (chronic lymphocytic leukemia) (Multi), Coronary artery disease, CTS (carpal tunnel syndrome), Diabetes mellitus (Multi), Essential (primary) hypertension, GI bleed, Hyperlipidemia, unspecified, Hypertension, Hypothyroidism, Hypothyroidism, unspecified type, Mixed hyperlipidemia, Osteoarthritis, Osteoporosis, Peripheral vascular disease (CMS-[facility]), Seizure disorder (Multi), and Type 1 diabetes mellitus with hyperglycemia (Multi).    Surgical History  She has a past surgical history that includes Carpal tunnel release (Bilateral); Total hip arthroplasty (Left, 2012); Coronary artery bypass graft (2005); Cholecystectomy; Cataract extraction w/ intraocular lens  implant, bilateral; Hysterectomy; Colonoscopy; Other surgical history; and Other surgical history (Right, 01/2016).     Social History  She reports that she has never smoked. She has been exposed to tobacco smoke. She has never used smokeless tobacco. She reports that she does not currently use alcohol. She reports that she does not currently use drugs.    Family History  Family History   Problem Relation Name Age of Onset    Diabetes Mother      Diabetes Daughter      Other (RA) Daughter          Allergies  Quinolones,  "Chlorpheniramine-dextromethorp, Amoxicillin, Amoxicillin-pot clavulanate, Ciprofloxacin, and Levofloxacin    Review of Systems  As per admission HPI     Physical Exam  Awake and alert but not completely oriented  Neck: Supple  Lungs: Normal respiratory pattern  Abdomen: Soft:  Extremities: Moves all 4     Last Recorded Vitals  Blood pressure (!) 99/44, pulse 88, temperature 36.1 °C (97 °F), temperature source Oral, resp. rate 18, height 1.49 m (4' 10.66\"), weight 57 kg (125 lb 10.6 oz), SpO2 94%.    Relevant Results      XR chest 2 views    Result Date: 4/22/2024  Interpreted By:  Milo Leal, STUDY: XR CHEST 2 VIEWS; 4/22/2024 3:15 pm   INDICATION: Signs/Symptoms:Follow-up   COMPARISON: 04/21/2020.   ACCESSION NUMBER(S): [id number]   ORDERING CLINICIAN: ROWDY RICE   TECHNIQUE: Number of films: Two-view radiographs of the chest were obtained.   FINDINGS: Median sternotomy wires and surgical clips are again present, consistent with previous coronary artery bypass graft. The heart and mediastinum are normal. There is prominence of the interstitial markings bilaterally. Bilateral pleural effusions are also seen, best seen on the lateral view. Degenerative changes involve the spine and shoulders. Cholecystectomy clips are present.       Prominence of the interstitial markings and small bilateral pleural effusions without confluence infiltrates.   Signed by: Milo Leal 4/22/2024 5:00 PM Dictation workstation:   [id number]    Bedside Midline Imaging    Result Date: 4/22/2024  These images are not reportable by radiology and will not be interpreted by  Radiologists.    US renal complete    Result Date: 4/22/2024  Interpreted By:  Klever Watson, STUDY: US RENAL COMPLETE;  4/21/2024 5:12 pm   INDICATION: Signs/Symptoms:JOSÉ.   COMPARISON: None.   ACCESSION NUMBER(S): [id number]   ORDERING CLINICIAN: YMAH KAUR   TECHNIQUE: Real-time sonographic evaluation of the kidneys and urinary bladder was performed.  "  FINDINGS: RIGHT KIDNEY: Right kidney measures  10.8 cm.  No shadowing calculus or right hydronephrosis is visualized.  No discrete renal lesion is visualized.   LEFT KIDNEY: Left kidney measures  9 cm.  No shadowing calculus or left hydronephrosis is visualized.  No discrete renal lesion is visualized.   BLADDER: Urinary bladder was fully decompressed by Layton catheter and could not be evaluated.       No hydronephrosis bilaterally.   Urinary bladder fully decompressed by Layton catheter and could not be evaluated.   MACRO: None.   Signed by: Klever Watson 4/22/2024 1:32 PM Dictation workstation:   [id number]    Transthoracic Echo (TTE) Limited    Result Date: 4/22/2024           [place] [address]            Phone [phone] TRANSTHORACIC ECHOCARDIOGRAM REPORT  Patient Name:     REBECCA SCOTT      Reading Physician:  [id number] Noah Araujo MD Study Date:       4/22/2024            Ordering Provider:  [id number] YARELI ABRAHAM MRN/PID:          [id number]             Fellow: Accession#:       [id number]         Nurse: Date of           1940 / 83 years Sonographer:        Norma Smart PARTH Birth/Age: Gender:           F                    Additional Staff: Height:           149.86 cm            Admit Date: Weight:           56.70 kg             Admission Status:   Inpatient - Routine BSA / BMI:        1.51 m2 / 25.25      Department          [facility]                   kg/m2                Location: Blood Pressure: 103 /47 mmHg Study Type:    TRANSTHORACIC ECHO (TTE) LIMITED Diagnosis/ICD: Non ST elevation (NSTEMI) myocardial infarction-I21.4 Indication:    NSTEMI CPT Codes:     Echo Limited-04574; Doppler Limited-51150; Color Doppler-98941 Patient History: Valve Disorders:   Aortic Stenosis. Diabetes:          Yes Pertinent History: HTN, Hyperlipidemia, Chest Pain and ASHD, PVD,MI. Study Detail: The  following Echo studies were performed: 2D, M-Mode, Doppler and               color flow.  PHYSICIAN INTERPRETATION: Left Ventricle: Left ventricular systolic function is mildly to moderately decreased, with an estimated ejection fraction of 40-45%. There are multiple wall motion abnormalities. The left ventricular cavity size is mild to moderately dilated. Spectral Doppler shows a normal pattern of left ventricular diastolic filling. LV Wall Scoring: The entire anterior septum, mid anterior segment, and apex are akinetic. The mid inferoseptal segment is hypokinetic. All remaining scored segments are normal. Left Atrium: The left atrium is normal in size. Right Ventricle: The right ventricle is normal in size. There is normal right ventricular global systolic function. Right Atrium: The right atrium is normal in size. Aortic Valve: The aortic valve is trileaflet. There is diffuse moderate aortic valve thickening. There is evidence of moderate aortic valve stenosis. There is no evidence of aortic valve regurgitation. The peak instantaneous gradient of the aortic valve is 25.6 mmHg. The mean gradient of the aortic valve is 14.2 mmHg. Mitral Valve: The mitral valve is normal in structure. There is moderate mitral annular calcification. There is mild mitral valve regurgitation. Tricuspid Valve: The tricuspid valve is structurally normal. There is mild tricuspid regurgitation. The Doppler estimated RVSP is mildly elevated at 39.4 mmHg. Pulmonic Valve: The pulmonic valve is not well visualized. There is no indication of pulmonic valve regurgitation. Pericardium: There is no pericardial effusion noted. Aorta: The aortic root is normal.  CONCLUSIONS:  1. Left ventricular systolic function is mildly to moderately decreased with a 40-45% estimated ejection fraction.  2. Entire anterior septum, mid anterior segment, apex, and mid inferoseptal segment are abnormal.  3. There is moderate mitral annular calcification.  4. Mildly  elevated RVSP.  5. Moderate aortic valve stenosis.  6. There are multiple wall motion abnormalities. QUANTITATIVE DATA SUMMARY: LA VOLUME:                             Normal Ranges: LA Vol A4C:       74.3 ml   (22+/-6mL/m2) LA Vol Index A4C: 49.2ml/m2 LA Vol A4C:       69.0 ml AORTIC VALVE:                                    Normal Ranges: AoV Vmax:                2.53 m/s  (<=1.7m/s) AoV Peak P.6 mmHg (<20mmHg) AoV Mean P.2 mmHg (1.7-11.5mmHg) LVOT Max Maldonado:            1.07 m/s  (<=1.1m/s) AoV VTI:                 48.89 cm  (18-25cm) LVOT VTI:                21.34 cm LVOT Diameter:           1.76 cm   (1.8-2.4cm) AoV Area, VTI:           1.06 cm2  (2.5-5.5cm2) AoV Area,Vmax:           1.04 cm2  (2.5-4.5cm2) AoV Dimensionless Index: 0.44 TRICUSPID VALVE/RVSP:                             Normal Ranges: Peak TR Velocity: 3.02 m/s RV Syst Pressure: 39.4 mmHg (< 30mmHg)  [id number] Noah Araujo MD Electronically signed on 2024 at 10:08:19 AM  Wall Scoring  ** Final **       Results for orders placed or performed during the hospital encounter of 24 (from the past 24 hour(s))   POCT GLUCOSE   Result Value Ref Range    POCT Glucose 133 (H) 74 - 99 mg/dL   Basic Metabolic Panel   Result Value Ref Range    Glucose 268 (H) 65 - 99 mg/dL    Sodium 130 (L) 133 - 145 mmol/L    Potassium 5.2 (H) 3.4 - 5.1 mmol/L    Chloride 96 (L) 97 - 107 mmol/L    Bicarbonate 22 (L) 24 - 31 mmol/L    Urea Nitrogen 42 (H) 8 - 25 mg/dL    Creatinine 1.00 0.40 - 1.60 mg/dL    eGFR 57 (L) >60 mL/min/1.73m*2    Calcium 7.9 (L) 8.5 - 10.4 mg/dL    Anion Gap 12 <=19 mmol/L   CBC   Result Value Ref Range    WBC 7.9 4.4 - 11.3 x10*3/uL    nRBC 0.0 0.0 - 0.0 /100 WBCs    RBC 2.49 (L) 4.00 - 5.20 x10*6/uL    Hemoglobin 7.6 (L) 12.0 - 16.0 g/dL    Hematocrit 24.2 (L) 36.0 - 46.0 %    MCV 97 80 - 100 fL    MCH 30.5 26.0 - 34.0 pg    MCHC 31.4 (L) 32.0 - 36.0 g/dL    RDW 13.9 11.5 - 14.5 %    Platelets 236 150 - 450  x10*3/uL   POCT GLUCOSE   Result Value Ref Range    POCT Glucose 308 (H) 74 - 99 mg/dL   POCT GLUCOSE   Result Value Ref Range    POCT Glucose 333 (H) 74 - 99 mg/dL   POCT GLUCOSE   Result Value Ref Range    POCT Glucose 119 (H) 74 - 99 mg/dL         Assessment/Plan     Urinary retention: Catheter removed today for voiding trial.  Will have her bladder volume monitored and if she cannot void once her bladder volume is greater than 400 cc I will have a catheter replaced.  If that is the case, she should keep it in for 5 to 7 days before getting another voiding trial.        Eric Duncan MD

## 2024-04-23 NOTE — PROGRESS NOTES
"Bonnie Jules is a 83 y.o. female on day 2 of admission presenting with Osteoarthritis.    Subjective   Patient resting comfortably in bed. Complaining of a headache this morning.        Objective     Physical Exam  Cardiovascular:      Rate and Rhythm: Normal rate and regular rhythm.      Heart sounds: Murmur heard.      No friction rub. No gallop.   Pulmonary:      Effort: Pulmonary effort is normal.      Breath sounds: No wheezing, rhonchi or rales.      Comments: Diminished breath sounds in bilateral bases.   Musculoskeletal:      Right lower leg: No edema.      Left lower leg: No edema.   Skin:     General: Skin is warm and dry.   Neurological:      Mental Status: She is alert and oriented to person, place, and time.   Psychiatric:         Mood and Affect: Mood normal.         Behavior: Behavior normal.         Last Recorded Vitals  Blood pressure 118/56, pulse 76, temperature 36.8 °C (98.2 °F), temperature source Oral, resp. rate 16, height 1.49 m (4' 10.66\"), weight 57 kg (125 lb 10.6 oz), SpO2 99%.  Intake/Output last 3 Shifts:  I/O last 3 completed shifts:  In: 0 (0 mL/kg)   Out: 1800 (31.6 mL/kg) [Urine:1800 (0.9 mL/kg/hr)]  Weight: 57 kg     Relevant Results  Results for orders placed or performed during the hospital encounter of 04/17/24 (from the past 24 hour(s))   Transthoracic Echo (TTE) Limited   Result Value Ref Range    AV pk delmi 2.53 m/s    LVOT diam 1.76 cm    AV mn grad 14.2 mmHg    RVSP 39.4 mmHg    AV pk grad 25.6 mmHg    Aortic Valve Area by Continuity of VTI 1.06 cm2    Aortic Valve Area by Continuity of Peak Velocity 1.04 cm2   APTT   Result Value Ref Range    aPTT 45.1 (H) 22.0 - 32.5 seconds   POCT GLUCOSE   Result Value Ref Range    POCT Glucose 293 (H) 74 - 99 mg/dL   POCT GLUCOSE   Result Value Ref Range    POCT Glucose 223 (H) 74 - 99 mg/dL   POCT GLUCOSE   Result Value Ref Range    POCT Glucose 133 (H) 74 - 99 mg/dL   Basic Metabolic Panel   Result Value Ref Range    Glucose 268 " (H) 65 - 99 mg/dL    Sodium 130 (L) 133 - 145 mmol/L    Potassium 5.2 (H) 3.4 - 5.1 mmol/L    Chloride 96 (L) 97 - 107 mmol/L    Bicarbonate 22 (L) 24 - 31 mmol/L    Urea Nitrogen 42 (H) 8 - 25 mg/dL    Creatinine 1.00 0.40 - 1.60 mg/dL    eGFR 57 (L) >60 mL/min/1.73m*2    Calcium 7.9 (L) 8.5 - 10.4 mg/dL    Anion Gap 12 <=19 mmol/L   CBC   Result Value Ref Range    WBC 7.9 4.4 - 11.3 x10*3/uL    nRBC 0.0 0.0 - 0.0 /100 WBCs    RBC 2.49 (L) 4.00 - 5.20 x10*6/uL    Hemoglobin 7.6 (L) 12.0 - 16.0 g/dL    Hematocrit 24.2 (L) 36.0 - 46.0 %    MCV 97 80 - 100 fL    MCH 30.5 26.0 - 34.0 pg    MCHC 31.4 (L) 32.0 - 36.0 g/dL    RDW 13.9 11.5 - 14.5 %    Platelets 236 150 - 450 x10*3/uL   POCT GLUCOSE   Result Value Ref Range    POCT Glucose 308 (H) 74 - 99 mg/dL              Assessment/Plan   Principal Problem:    Osteoarthritis  Active Problems:    History of coronary artery bypass graft    Chronic pain of both knees    Primary osteoarthritis of right knee    Possible perioperative non-ST elevation myocardial infarction  Atherosclerotic heart disease status post CABG 2005  Status post total knee arthroplasty procedure 4/17/2024  Mild aortic valve stenosis  Essential hypertension  Hyperlipidemia  Acute kidney injury     4/21: Patient with mild chest discomfort following her total knee arthroplasty procedure.  She was also hypoxic and had a low probability ventilation/perfusion scan done.  With her persistent discomfort a high-sensitivity troponin was drawn and found to be elevated.  She also has evidence of an acute kidney injury with her serum creatinine rising from 1.0-1.4.  At this point would recommend that we start anticoagulation therapy with unfractionated heparin.  Would also place her Eliquis therapy on hold at this time.  Will titrate up dose of her beta-blocker and add topical nitroglycerin as well.  Will order echocardiogram study for tomorrow to assess left ventricular size and function.  Will attempt to treat  conservatively at this time and will follow on a clinical basis.     4/22: As above.  Patient remained on a heparin drip overnight.  Still reporting occasional mild midsternal chest discomfort, but patient is really unable to tell me if this is improved since yesterday.  Blood pressure is low normal with her last recorded at 107/60 this morning.  Patient on 2 L nasal cannula with pulse oximetry of 95%.  Echocardiogram completed this morning with a moderately decreased ejection fraction of 40 to 45%, entire anterior septum, mid anterior segment, apex and mid inferior septal segment are abnormal, moderate mitral annular calcification, mildly elevated RVSP, moderate aortic valve stenosis and multiple wall motion abnormalities. I am going to discontinue the heparin at this time and resume her Eliquis. Additionally would start this patient on a low dose aspirin 81 mg for conservative medical management. Continue metoprolol tartrate 25 mg BID. Will continue to follow this patient.     4/23: Patient denies any further chest pain or pressure. She was able to work with physical therapy yesterday without any anginal symptoms. She is complaining of a headache and some nausea this morning. Denies shortness of breath. She remains hemodynamically stable. Blood pressures normotensive with last recorded at 118/56. Patient currently on 3L NC with pulse oximetry of 93%. Labs this morning show a low sodium of 130, slightly elevated potassium of 5.2, creatinine 1.0, and hemoglobin of 7.6. Nephrology is following. As stated previously, will manage her coronary artery disease with beta blocker, statin therapy and low-dose aspirin. Will continue with nitrate therapy via nitroglycerin patches. Patient remains on Eliquis per orthopedic surgery. Again, may pursue a nuclear stress test in the future should the patient have persistent anginal symptoms. She should follow up with Dr. Araujo in the outpatient setting in 1-2 weeks.             Felicitas Spencer, APRN-CNP

## 2024-04-23 NOTE — CONSULTS
Wound Care Consult     Visit Date: 4/23/2024      Patient Name: Bonnie Jules         MRN: [id number]           YOB: 1940     Reason for Consult: right heel wound        Wound History: Patient with Deep Tissue Injury (DTI) to Right heel noted 4/22/24, dorsal foot and lateral foot as of 4/23/24. Patient had RTKR on 4/17/24.     A 83 y.o. year old female admitted for Principal Problem:    Osteoarthritis  Active Problems:    History of coronary artery bypass graft    Chronic pain of both knees    Primary osteoarthritis of right knee      Past Medical History:   Diagnosis Date    Anxiety     Benign hypertension     Chronic ischemic colitis (Multi)     With history of sepsis and infectious gastroenteritis 10/7/2022-10/11/2022    Chronic kidney disease, stage III (moderate) (Multi)     CLL (chronic lymphocytic leukemia) (Multi)     Coronary artery disease     CTS (carpal tunnel syndrome)     Diabetes mellitus (Multi)     Essential (primary) hypertension     GI bleed     Hyperlipidemia, unspecified     Hypertension     Hypothyroidism     Hypothyroidism, unspecified type     Mixed hyperlipidemia     Osteoarthritis     Osteoporosis     Peripheral vascular disease (CMS-[facility])     Seizure disorder (Multi)     Type 1 diabetes mellitus with hyperglycemia (Multi)       Past Surgical History:   Procedure Laterality Date    CARPAL TUNNEL RELEASE Bilateral     CATARACT EXTRACTION W/ INTRAOCULAR LENS  IMPLANT, BILATERAL      Left 8/10/2017, right 8/31/2017    CHOLECYSTECTOMY      COLONOSCOPY      CORONARY ARTERY BYPASS GRAFT  2005    X 3    HYSTERECTOMY      OTHER SURGICAL HISTORY      Bilateral heel spurs    OTHER SURGICAL HISTORY Right 01/2016    Femoral endarterectomy    TOTAL HIP ARTHROPLASTY Left 2012       Scheduled medications  apixaban, 2.5 mg, oral, q12h  aspirin, 81 mg, oral, Daily  folic acid, 1 mg, oral, Daily  furosemide, 20 mg, oral, Daily  gabapentin, 100 mg, oral, BID  insulin glargine, 11 Units,  subcutaneous, Daily before breakfast  insulin lispro, 0-20 Units, subcutaneous, TID with meals  ipratropium-albuteroL, 3 mL, nebulization, TID  levothyroxine, 100 mcg, oral, Daily  metoprolol tartrate, 25 mg, oral, BID  nitroglycerin, 1 patch, transdermal, Daily  oxygen, , inhalation, Continuous - Inhalation  polyethylene glycol, 17 g, oral, Daily  pravastatin, 40 mg, oral, Nightly      Continuous medications  oxygen, 2 L/min, Last Rate: 3 L/min (04/20/24 0705)      PRN medications  PRN medications: acetaminophen, dextrose, dextrose, glucagon, glucagon, HYDROmorphone, HYDROmorphone, naloxone, naloxone, nitroglycerin, ondansetron **OR** ondansetron, oxyCODONE-acetaminophen, oxyCODONE-acetaminophen, prochlorperazine **OR** prochlorperazine **OR** prochlorperazine    Allergies   Allergen Reactions    Quinolones Rash    Chlorpheniramine-Dextromethorp Hives    Amoxicillin Hives    Amoxicillin-Pot Clavulanate Rash    Ciprofloxacin Rash    Levofloxacin Rash        Pertinent Labs:   [place], Urine   Date Value Ref Range Status   01/05/2022 51 (H) 0 - 23 MG/L Final     Albumin   Date Value Ref Range Status   04/22/2024 2.7 (L) 3.5 - 5.0 g/dL Final       Wound Assessment:  Wound 04/17/24 Incision Knee Right (Active)   Site Assessment Unable to assess 04/23/24 1400   Alyson-Wound Assessment Clean;Dry;Intact 04/20/24 0500   Closure Sutures 04/17/24 0959   Drainage Description None 04/23/24 0900   Drainage Amount None 04/23/24 0900   Dressing Silver dressing 04/23/24 1400   Dressing Changed New 04/17/24 0959   Dressing Status Clean;Dry;Old drainage 04/23/24 1400       Wound 04/22/24 Pressure Injury Heel Dorsal;Right (Active)   Wound Image   04/23/24 0459   Site Assessment Purple;Clean;Dry;Intact 04/23/24 1400   Alyson-Wound Assessment Clean;Dry;Intact 04/23/24 1400   Pressure Injury Stage DTPI 04/23/24 1400   Shape Round 04/23/24 0459   Drainage Description None 04/23/24 1400   Drainage Amount None 04/23/24 1400   Dressing  Foam;Silicone border dressing 04/23/24 1400   Dressing Changed New 04/23/24 0459   Dressing Status Clean;Dry 04/23/24 1400       Wound 04/23/24 Pressure Injury Foot Dorsal foot;Right (Active)   Site Assessment Burgundy;Clean;Dry;Intact 04/23/24 1400   Alyson-Wound Assessment Clean;Dry;Intact 04/23/24 1400   Pressure Injury Stage DTPI 04/23/24 1400   Drainage Description None 04/23/24 1400   Drainage Amount None 04/23/24 1400   Dressing Open to air 04/23/24 1400       Wound 04/23/24 Pressure Injury Foot Right;Lateral (Active)   Site Assessment Burgundy;Clean;Dry;Intact 04/23/24 1400   Alyson-Wound Assessment Clean;Dry;Intact 04/23/24 1400   Pressure Injury Stage DTPI 04/23/24 1400   Drainage Description None 04/23/24 1400   Drainage Amount None 04/23/24 1400   Dressing Foam;Silicone border dressing 04/23/24 1400   Dressing Status Clean;Dry 04/23/24 1400       Wound Team Summary Assessment:     Exam conducted on day 6 of stay with knowledge of Floor Nurse. Introductions made to patient and daughter, alert and oriented. On exam patient sitting up in bed. Heel borders in place, peeled back. Patient has DTI to right dorsal and lateral foot and right heel. Heel is soft and boggy. Surgical dressing to right knee with small shadowing distally. Patient just starting lunch so sacrum not assessed at this time. Patient denies any sacral or buttock pain. TruVue boot applied to right foot. Left foot clean, dry, intact. Patient on a Centrella bedsystem, recommend Waffle mattress overlay. Irma David RN updated, to continue pressure injury prevention interventions, woundcare, Waffle mattress, and nursing to continue to follow providers orders. Reconsult Wound RN PRN. Provider notified. Lazara MCKEON RN        Wound Team Plan: Continue to follow Provider orders. Right heel/foot- pad, protect, and offload.      Lazara Thayer RN  4/23/2024  2:28 PM

## 2024-04-23 NOTE — PROGRESS NOTES
Occupational Therapy    OT Treatment    Patient Name: Bonnie Jules  MRN: [id number]  Today's Date: 4/23/2024  Time Calculation  Start Time: 1008  Stop Time: 1050  Time Calculation (min): 42 min         Assessment:  Evaluation/Treatment Tolerance: Patient limited by fatigue  End of Session Communication: Bedside nurse  End of Session Patient Position: Bed, 3 rail up, Alarm on  Evaluation/Treatment Tolerance: Patient limited by fatigue  Plan:  Treatment Interventions: ADL retraining, Functional transfer training, Equipment evaluation/education, Patient/family training  OT Frequency: 5 times per week  OT Discharge Recommendations: Moderate intensity level of continued care  OT - OK to Discharge: Yes  Treatment Interventions: ADL retraining, Functional transfer training, Equipment evaluation/education, Patient/family training    Subjective   Previous Visit Info:  OT Last Visit  OT Received On: 04/23/24  General:  General  Reason for Referral: impaired ADls s/p R TKA  Past Medical History Relevant to Rehab: anxiety, CAD s/p CABG x3, chronic lymphocytic leukemia, HTN, DM, diabetic polyneuropathy, chronic kidney disease, osteoporosis, PVD, sleep apnea, bilateral carpal tunnel release, L ALFONZO,  Family/Caregiver Present: Yes  Caregiver Feedback: Daughter at bedside  Prior to Session Communication: Bedside nurse  Patient Position Received: Bed, 4 rail up, Alarm off, not on at start of session  Preferred Learning Style: verbal, visual  General Comment: Pt was cleared by nursing prior to start of session . Pt and family where pleasant and agreeable throughout session  Precautions:  Hearing/Visual Limitations: hearing WFL, glasses  LE Weight Bearing Status: Weight Bearing as Tolerated  Medical Precautions: Fall precautions, Oxygen therapy device and L/min (3LO2)  Vital Signs:     Pain:  Pain Assessment  Pain Assessment: 0-10  Pain Score: 3 (while lying still in bed 0/10)  Pain Type: Surgical pain  Pain Location: Knee  Pain  Orientation: Right  Clinical Progression: Not changed    Objective    Cognition:  Cognition  Overall Cognitive Status: Within Functional Limits  Orientation Level: Oriented X4  Insight: Within function limits  Impulsive: Within functional limits  Processing Speed: Within funtional limits  Coordination:  Movements are Fluid and Coordinated: No  Coordination Comment: UE are WFL, but RLE  has delayed movement  Activities of Daily Living: Grooming  Grooming Level of Assistance: Setup  Grooming Where Assessed: Chair (Bedside chair)  Grooming Comments: The pt demonstrated the ability to complete ORAL hygiene while seated in a bedside chair . This task required setup and extended time   to complete    Toileting  Toileting Level of Assistance: Maximum assistance  Where Assessed: Other (Comment) (Bed pan / bedside chair)  Toileting Comments: The pt completed toileting while seated in  a bedside chair in a bedpan .This task required MAX  arm in assistance  with a FWW to  order to complete perineal hygiene  Functional Standing Tolerance:     Bed Mobility/Transfers: Bed Mobility  Bed Mobility: Yes  Bed Mobility 1  Bed Mobility 1: Supine to sitting  Level of Assistance 1: Moderate assistance  Bed Mobility Comments 1: The pt demonstrated the ability to go from the lying supine position to sitting up right at EOB . This task was completed with the HOB elevated, the use of grab rails and MOD A with  a drawsheet to scoot towards the EOB  Bed Mobility 2  Bed Mobility  2: Sitting to supine  Level of Assistance 2: Moderate assistance  Bed Mobility Comments 2: While seated at the EOB the Pt required MOD arm in assistance for the  LB in order to lay  supine in bed , and the use of a drawsheet to be brought near HOB    Transfers  Transfer: Yes  Transfer 1  Transfer From 1: Bed to  Transfer to 1: Chair with arms  Technique 1: Sit to stand  Transfer Device 1: Walker  Transfer Level of Assistance 1: Moderate assistance, Minimal  verbal cues  Trials/Comments 1: The pt demonstrated the ability to stand from the EOB and transfer to a bedside chair . This task require a FWW ,MOD arm in assistance for standing up right , and for safety  Min verbal cuing on TKA  precautions  Transfers 2  Transfer From 2: Stand to  Transfer to 2: Sit  Technique 2: Stand to sit  Transfer Device 2: Walker  Transfer Level of Assistance 2: Minimal verbal cues  Trials/Comments 2: With the use of a FWW and MOD arm in assistance. the Pt demonstrated the ability to stand from the bedside chair  and transfer to the EOB , With min verbal cuing for TKA precautions    Outcome Measures:[facility] Daily Activity  Putting on and taking off regular lower body clothing: A lot  Bathing (including washing, rinsing, drying): A lot  Putting on and taking off regular upper body clothing: None  Toileting, which includes using toilet, bedpan or urinal: A lot  Taking care of personal grooming such as brushing teeth: A little  Eating Meals: None  Daily Activity - Total Score: 17        Education Documentation  Handouts, taught by JEANNE Tompkins at 4/23/2024 12:35 PM.  Learner: Family, Patient  Readiness: Eager  Method: Explanation, Demonstration  Response: Verbalizes Understanding, Demonstrated Understanding, Needs Reinforcement    Body Mechanics, taught by JEANNE Tompkins at 4/23/2024 12:35 PM.  Learner: Family, Patient  Readiness: Eager  Method: Explanation, Demonstration  Response: Verbalizes Understanding, Demonstrated Understanding, Needs Reinforcement    Precautions, taught by JEANNE Tompkins at 4/23/2024 12:35 PM.  Learner: Family, Patient  Readiness: Eager  Method: Explanation, Demonstration  Response: Verbalizes Understanding, Demonstrated Understanding, Needs Reinforcement    ADL Training, taught by JEANNE Tompkins at 4/23/2024 12:35 PM.  Learner: Family, Patient  Readiness: Eager  Method: Explanation, Demonstration  Response: Verbalizes Understanding,  Demonstrated Understanding, Needs Reinforcement    Education Comments  Education provided on OT POC, importance of participation in OT, safety awareness throughout functional tasks/ transfers and use of call light for assistance.             Goals:  Encounter Problems       Encounter Problems (Active)       ADLs       Pt will complete ADL tasks at mod I with use of AE prn  (Progressing)       Start:  04/18/24    Expected End:  04/22/24               Functional Mobility       Pt will perform functional mobility household distances at mod I with use of RW.    (Progressing)       Start:  04/18/24    Expected End:  04/22/24               OT Transfers       Pt will perform functional transfers at mod I (Progressing)       Start:  04/18/24    Expected End:  04/22/24               Precautions       Pt will independently maintain TKA precautions while completing ADL/IADL tasks and functional transfers/mobility.  (Progressing)       Start:  04/18/24    Expected End:  04/22/24                   RUMA IZQUIERDOPANTERA

## 2024-04-23 NOTE — PROGRESS NOTES
"Bonnie Jules is a 83 y.o. female on day 2 of admission seen in follow-up for acute hypoxia  Subjective   Daughter at bedside.  On room air; O2 sats 88-89%.  Placed on 3 L nasal cannula to maintain O2 sats greater than or equal to 92%.  No respiratory complaints.  Endorses right knee pain only.  Afebrile.       Objective     Physical Exam  Vitals and nursing note reviewed.   Constitutional:       Appearance: Normal appearance.   HENT:      Head: Normocephalic and atraumatic.      Nose: Nose normal.      Mouth/Throat:      Mouth: Mucous membranes are moist.   Eyes:      Extraocular Movements: Extraocular movements intact.      Conjunctiva/sclera: Conjunctivae normal.      Pupils: Pupils are equal, round, and reactive to light.   Cardiovascular:      Rate and Rhythm: Normal rate and regular rhythm.      Pulses: Normal pulses.      Heart sounds: Murmur heard.   Pulmonary:      Effort: Pulmonary effort is normal.      Comments: Lungs diminished but clear.  Abdominal:      General: Bowel sounds are normal.      Palpations: Abdomen is soft.   Musculoskeletal:         General: Normal range of motion.   Skin:     General: Skin is warm and dry.      Capillary Refill: Capillary refill takes less than 2 seconds.   Neurological:      General: No focal deficit present.      Mental Status: She is alert and oriented to person, place, and time.   Psychiatric:         Mood and Affect: Mood normal.         Behavior: Behavior normal.         Last Recorded Vitals  Blood pressure 118/56, pulse 76, temperature 36.8 °C (98.2 °F), temperature source Oral, resp. rate 16, height 1.49 m (4' 10.66\"), weight 57 kg (125 lb 10.6 oz), SpO2 99%.  Intake/Output last 3 Shifts:  I/O last 3 completed shifts:  In: 0 (0 mL/kg)   Out: 1800 (31.6 mL/kg) [Urine:1800 (0.9 mL/kg/hr)]  Weight: 57 kg     Intake/Output Summary (Last 24 hours) at 4/23/2024 1014  Last data filed at 4/23/2024 0800  Gross per 24 hour   Intake --   Output 1170 ml   Net -1170 ml    "   apixaban, 2.5 mg, oral, q12h  aspirin, 81 mg, oral, Daily  folic acid, 1 mg, oral, Daily  gabapentin, 100 mg, oral, BID  insulin glargine, 11 Units, subcutaneous, Daily before breakfast  insulin lispro, 0-20 Units, subcutaneous, TID with meals  ipratropium-albuteroL, 3 mL, nebulization, TID  levothyroxine, 100 mcg, oral, Daily  metoprolol tartrate, 25 mg, oral, BID  nitroglycerin, 1 patch, transdermal, Daily  oxygen, , inhalation, Continuous - Inhalation  polyethylene glycol, 17 g, oral, Daily  pravastatin, 40 mg, oral, Nightly       PRN medications: acetaminophen, dextrose, dextrose, glucagon, glucagon, HYDROmorphone, HYDROmorphone, naloxone, naloxone, naloxone, nitroglycerin, ondansetron **OR** ondansetron, oxyCODONE-acetaminophen, oxyCODONE-acetaminophen, prochlorperazine **OR** prochlorperazine **OR** prochlorperazine   oxygen, 2 L/min, Last Rate: 3 L/min (04/20/24 0705)         Relevant Results  Results for orders placed or performed during the hospital encounter of 04/17/24 (from the past 24 hour(s))   APTT   Result Value Ref Range    aPTT 45.1 (H) 22.0 - 32.5 seconds   POCT GLUCOSE   Result Value Ref Range    POCT Glucose 293 (H) 74 - 99 mg/dL   POCT GLUCOSE   Result Value Ref Range    POCT Glucose 223 (H) 74 - 99 mg/dL   POCT GLUCOSE   Result Value Ref Range    POCT Glucose 133 (H) 74 - 99 mg/dL   Basic Metabolic Panel   Result Value Ref Range    Glucose 268 (H) 65 - 99 mg/dL    Sodium 130 (L) 133 - 145 mmol/L    Potassium 5.2 (H) 3.4 - 5.1 mmol/L    Chloride 96 (L) 97 - 107 mmol/L    Bicarbonate 22 (L) 24 - 31 mmol/L    Urea Nitrogen 42 (H) 8 - 25 mg/dL    Creatinine 1.00 0.40 - 1.60 mg/dL    eGFR 57 (L) >60 mL/min/1.73m*2    Calcium 7.9 (L) 8.5 - 10.4 mg/dL    Anion Gap 12 <=19 mmol/L   CBC   Result Value Ref Range    WBC 7.9 4.4 - 11.3 x10*3/uL    nRBC 0.0 0.0 - 0.0 /100 WBCs    RBC 2.49 (L) 4.00 - 5.20 x10*6/uL    Hemoglobin 7.6 (L) 12.0 - 16.0 g/dL    Hematocrit 24.2 (L) 36.0 - 46.0 %    MCV 97 80 -  100 fL    MCH 30.5 26.0 - 34.0 pg    MCHC 31.4 (L) 32.0 - 36.0 g/dL    RDW 13.9 11.5 - 14.5 %    Platelets 236 150 - 450 x10*3/uL   POCT GLUCOSE   Result Value Ref Range    POCT Glucose 308 (H) 74 - 99 mg/dL      XR chest 2 views  Result date: 4/22/2024  FINDINGS: Median sternotomy wires and surgical clips are again present, consistent with previous coronary artery bypass graft. The heart and mediastinum are normal. There is prominence of the interstitial markings bilaterally. Bilateral pleural effusions are also seen, best seen on the lateral view. Degenerative changes involve the spine and  shoulders. Cholecystectomy clips are present. IMPRESSION: Prominence of the interstitial markings and small bilateral pleural effusions without confluence infiltrates.    NM Lung Perfusion  Result Date: 4/19/2024  FINDINGS: Perfusion images demonstrate mild heterogeneity within both lungs. No distinct wedge-shaped subsegmental or segmental perfusion defect seen on SPECT CT to suggest acute pulmonary embolism (low probability).   Low-dose CT demonstrates small bilateral pleural effusions along with bibasilar atelectasis  1.  No distinct wedge-shaped subsegmental or segmental perfusion defect seen on SPECT CT to suggest acute pulmonary embolism (low probability). 2. Low-dose CT demonstrates small bilateral pleural effusions along with bibasilar atelectasis.       XR chest 1 view  Result Date: 4/19/2024  FINDINGS: The lungs are adequately inflated. No acute consolidation. No significant pleural effusion. No pneumothorax.  The cardiomediastinal silhouette is within normal limits. Median sternotomy wires are intact. Old posterior right rib fractures are again noted. Small, less than 2 cm metallic wire is also again seen above the proximal aspect of the left clavicle. Stable examination. No acute cardiopulmonary disease.       XR chest 1 view  Result Date: 4/18/2024  FINDINGS: The study is limited due to rotation and poor inspiratory  effort, with resultant crowding of the pulmonary vasculature. Median sternotomy wires and surgical clips are again present, consistent with previous coronary artery bypass graft. Small, less than 2 cm metallic wire is also again seen above the proximal aspect of the left clavicle. The cardiac silhouette is within normal limits for the technique. Bilateral hilar prominence is noted, left more than right, most likely due to pulmonary vascular congestion. The minimal interstitial infiltrates in the perihilar regions, however no confluence infiltrates or significant pleural effusions are identified. There is no pneumothorax. Hiatal hernia is again noted. Degenerative changes involve the spine and shoulders. Right rib deformities are also again seen.   Limited study. Bilateral hilar prominence, left more than right is most likely due to pulmonary vascular congestion; correlate clinically and if there is suspicion for possible lymphadenopathy further evaluated with contrast-enhanced CT scan. Additional chronic findings as above.       XR knee right 1-2 views  Result Date: 4/17/2024  FINDINGS: Right knee, two views   Interval right total knee arthroplasty in place. No periprosthetic fracture lucency seen. There is no dislocation. Postsurgical change the soft tissue. There is a moderate-sized effusion. 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

## 2024-04-23 NOTE — NURSING NOTE
Pt A&O x3 currently resting in bed. No complaints or concerns. Call light within reach.  Dsg dry and intact to rt knee.  Pt continues on tele per order.

## 2024-04-23 NOTE — SIGNIFICANT EVENT
Patient was paced on room air form 2L N/C spo2 dropped to 87% on room air at rest from 97% on 2L At rest

## 2024-04-23 NOTE — PROGRESS NOTES
Bonnie Jules is a 83 y.o. female on day 2 of admission presenting with Osteoarthritis. She is 6day s/p right TKA. Increased H/H 7.6/24.2.     Subjective   Resting in bed, states she is feeling better. Post op pain controlled, room air pulse ox 88%, oxygen 3Lnc, pulse ox 94%. +SOSA, occasional cough, denies dyspnea at rest or with conversation. Denies headache, vertigo, fever, chills, chest pain, palpitation, tachycardia, abd pain, tenderness. C/o nausea this morning, Zofran affective, denies hematemesis, or nausea at this time, appetite improved. Incontinent of urine. +chronic peripheral neuropathy, denies peripheral edema,      Objective     Physical Exam  Constitutional:       Appearance: Normal appearance.   HENT:      Head: Normocephalic and atraumatic.      Right Ear: External ear normal.      Left Ear: External ear normal.      Nose: Nose normal.      Mouth/Throat:      Pharynx: Oropharynx is clear.   Eyes:      Conjunctiva/sclera: Conjunctivae normal.      Pupils: Pupils are equal, round, and reactive to light.   Cardiovascular:      Rate and Rhythm: Regular rhythm.      Heart sounds: Murmur heard.   Pulmonary:      Effort: Pulmonary effort is normal. No respiratory distress.      Breath sounds: No wheezing, rhonchi or rales.      Comments: Lungs diminished, no dyspnea noted at rest.   Chest:      Chest wall: No tenderness.   Abdominal:      General: Bowel sounds are normal. There is no distension.      Palpations: Abdomen is soft.      Tenderness: There is no abdominal tenderness.   Musculoskeletal:         General: Swelling and tenderness present.      Cervical back: Normal range of motion and neck supple.      Right lower leg: No edema.      Left lower leg: No edema.      Comments: Trace swelling and tenderness right knee   Skin:     General: Skin is warm and dry.      Capillary Refill: Capillary refill takes 2 to 3 seconds.      Coloration: Skin is not pale.      Findings: No bruising, erythema, lesion  "or rash.      Comments: Dressing right knee dry/intact,    Neurological:      General: No focal deficit present.      Mental Status: She is alert and oriented to person, place, and time. Mental status is at baseline.   Psychiatric:         Mood and Affect: Mood normal.         Behavior: Behavior normal.         Last Recorded Vitals  Blood pressure (!) 99/44, pulse 88, temperature 36.1 °C (97 °F), temperature source Oral, resp. rate 18, height 1.49 m (4' 10.66\"), weight 57 kg (125 lb 10.6 oz), SpO2 94%.  Intake/Output last 3 Shifts:  I/O last 3 completed shifts:  In: 0 (0 mL/kg)   Out: 1800 (31.6 mL/kg) [Urine:1800 (0.9 mL/kg/hr)]  Weight: 57 kg     Relevant Results  Results for orders placed or performed during the hospital encounter of 04/17/24 (from the past 24 hour(s))   POCT GLUCOSE   Result Value Ref Range    POCT Glucose 133 (H) 74 - 99 mg/dL   Basic Metabolic Panel   Result Value Ref Range    Glucose 268 (H) 65 - 99 mg/dL    Sodium 130 (L) 133 - 145 mmol/L    Potassium 5.2 (H) 3.4 - 5.1 mmol/L    Chloride 96 (L) 97 - 107 mmol/L    Bicarbonate 22 (L) 24 - 31 mmol/L    Urea Nitrogen 42 (H) 8 - 25 mg/dL    Creatinine 1.00 0.40 - 1.60 mg/dL    eGFR 57 (L) >60 mL/min/1.73m*2    Calcium 7.9 (L) 8.5 - 10.4 mg/dL    Anion Gap 12 <=19 mmol/L   CBC   Result Value Ref Range    WBC 7.9 4.4 - 11.3 x10*3/uL    nRBC 0.0 0.0 - 0.0 /100 WBCs    RBC 2.49 (L) 4.00 - 5.20 x10*6/uL    Hemoglobin 7.6 (L) 12.0 - 16.0 g/dL    Hematocrit 24.2 (L) 36.0 - 46.0 %    MCV 97 80 - 100 fL    MCH 30.5 26.0 - 34.0 pg    MCHC 31.4 (L) 32.0 - 36.0 g/dL    RDW 13.9 11.5 - 14.5 %    Platelets 236 150 - 450 x10*3/uL   POCT GLUCOSE   Result Value Ref Range    POCT Glucose 308 (H) 74 - 99 mg/dL   POCT GLUCOSE   Result Value Ref Range    POCT Glucose 333 (H) 74 - 99 mg/dL   POCT GLUCOSE   Result Value Ref Range    POCT Glucose 119 (H) 74 - 99 mg/dL      Current Facility-Administered Medications:     acetaminophen (Tylenol) tablet 650 mg, 650 mg, " oral, q4h PRN, Mayela Pickard PA-C, 650 mg at 04/23/24 0925    apixaban (Eliquis) tablet 2.5 mg, 2.5 mg, oral, q12h, CARMEN Kovacs-CNP, 2.5 mg at 04/23/24 0633    aspirin chewable tablet 81 mg, 81 mg, oral, Daily, CARMEN Kovacs-CNP, 81 mg at 04/23/24 1013    dextrose 50 % injection 12.5 g, 12.5 g, intravenous, q15 min PRN, CARMEN Mcleod-CNP    dextrose 50 % injection 25 g, 25 g, intravenous, q15 min PRN, EMILIANA Mcleod    folic acid (Folvite) tablet 1 mg, 1 mg, oral, Daily, CARMEN Boyd-CNP, 1 mg at 04/23/24 1013    furosemide (Lasix) tablet 20 mg, 20 mg, oral, Daily, Maximus Rolle MD, 20 mg at 04/23/24 1227    gabapentin (Neurontin) capsule 100 mg, 100 mg, oral, BID, Mayela Pickard PA-C, 100 mg at 04/23/24 1013    glucagon (Glucagen) injection 1 mg, 1 mg, intramuscular, q15 min PRN, EMILIANA Mcleod    glucagon (Glucagen) injection 1 mg, 1 mg, intramuscular, q15 min PRN, EMILIANA Mcleod    HYDROmorphone (Dilaudid) injection 0.5 mg, 0.5 mg, intravenous, q4h PRN, Mayela Pickard PA-C    HYDROmorphone (Dilaudid) injection 0.5 mg, 0.5 mg, intravenous, q2h PRN, Mayela Pickard PA-C    insulin glargine (Lantus) injection 11 Units, 11 Units, subcutaneous, Daily before breakfast, EMILIANA Barillas, 11 Units at 04/23/24 0926    insulin lispro (HumaLOG) injection 0-20 Units, 0-20 Units, subcutaneous, TID with meals, EMILIANA Barillas, 16 Units at 04/23/24 1227    ipratropium-albuteroL (Duo-Neb) 0.5-2.5 mg/3 mL nebulizer solution 3 mL, 3 mL, nebulization, TID, Rudolph Liriano MD, 3 mL at 04/23/24 1141    iron sucrose (Venofer) injection 200 mg, 200 mg, intravenous, Once, Jessie Bear, APRN-CNP    levothyroxine (Synthroid, Levoxyl) tablet 100 mcg, 100 mcg, oral, Daily, Mayela Pickard PA-C, 100 mcg at 04/23/24 0633    metoprolol tartrate (Lopressor) tablet 25 mg, 25 mg, oral, BID, Mike Sousa DO, 25 mg at 04/23/24 1013     naloxone (Narcan) injection 0.2 mg, 0.2 mg, intravenous, q5 min PRN, Mayela Pickard PA-C    naloxone (Narcan) injection 0.2 mg, 0.2 mg, intravenous, q5 min PRN, Mayela Pickard PA-C    nitroglycerin (Nitrodur) 0.2 mg/hr patch 1 patch, 1 patch, transdermal, Daily, Mike Sousa DO, 1 patch at 04/23/24 0925    nitroglycerin (Nitrostat) SL tablet 0.4 mg, 0.4 mg, sublingual, q5 min PRN, Mayela Pickard PA-C    ondansetron (Zofran) tablet 4 mg, 4 mg, oral, q8h PRN **OR** ondansetron (Zofran) injection 4 mg, 4 mg, intravenous, q8h PRN, Mayela Pickard PA-C, 4 mg at 04/23/24 0826    oxyCODONE-acetaminophen (Percocet)  mg per tablet 1 tablet, 1 tablet, oral, q4h PRN, Mayela Pickard PA-C    oxyCODONE-acetaminophen (Percocet) 5-325 mg per tablet 1 tablet, 1 tablet, oral, q4h PRN, Mayela Pickard PA-C, 1 tablet at 04/22/24 2310    oxygen (O2) therapy, 2 L/min, inhalation, Continuous, Mayela Pickard PA-C, Last Rate: 180,000 mL/hr at 04/20/24 0705, 3 L/min at 04/20/24 0705    oxygen (O2) therapy, , inhalation, Continuous - Inhalation, Luna Smith, APRN-CNP, Start at 04/23/24 0751    polyethylene glycol (Glycolax, Miralax) packet 17 g, 17 g, oral, Daily, Mayela Pickard PA-C, 17 g at 04/23/24 1013    pravastatin (Pravachol) tablet 40 mg, 40 mg, oral, Nightly, Mayela Pickard PA-C, 40 mg at 04/22/24 2249    prochlorperazine (Compazine) tablet 10 mg, 10 mg, oral, q6h PRN **OR** prochlorperazine (Compazine) injection 10 mg, 10 mg, intravenous, q6h PRN **OR** prochlorperazine (Compazine) suppository 25 mg, 25 mg, rectal, q12h PRN, Luna Smith, APRN-CNP                              Assessment/Plan   Principal Problem:    Osteoarthritis  Active Problems:    History of coronary artery bypass graft    Chronic pain of both knees    Primary osteoarthritis of right knee  S/p right TKA  JOSÉ, CKD 2-3  CLL  Chronic normocytic anemia  DM  Postop normocytic anemia  Anemia of acute blood loss combined with anemia of  chronic disease/ CLL/ CKD stage 2-3.   Venofer 200mg IV X3  Add additional dose Venofer 200mg IV X1  S/p B12 1000mcg IM X1  Folic acid 1mg po daily  Lab to microsample blood draws-signage placed on door     Monitor H/H   RBC transfusion as needed to maintain HGB over 7      Jessie Bear, APRN-CNP

## 2024-04-23 NOTE — NURSING NOTE
Patient with Rt arm single lumen midline, dressing D&I, flushes easily and with positive blood return, clamped and curos cap applied.

## 2024-04-24 ENCOUNTER — TELEPHONE (OUTPATIENT)
Dept: ORTHOPEDIC SURGERY | Facility: CLINIC | Age: 84
End: 2024-04-24
Payer: MEDICARE

## 2024-04-24 VITALS
OXYGEN SATURATION: 95 % | HEIGHT: 59 IN | HEART RATE: 80 BPM | SYSTOLIC BLOOD PRESSURE: 118 MMHG | TEMPERATURE: 97.5 F | RESPIRATION RATE: 18 BRPM | WEIGHT: 125.66 LBS | BODY MASS INDEX: 25.33 KG/M2 | DIASTOLIC BLOOD PRESSURE: 61 MMHG

## 2024-04-24 PROBLEM — R33.9 URINARY RETENTION: Status: ACTIVE | Noted: 2024-04-24

## 2024-04-24 LAB
ANION GAP SERPL CALC-SCNC: 11 MMOL/L
BUN SERPL-MCNC: 40 MG/DL (ref 8–25)
CALCIUM SERPL-MCNC: 8 MG/DL (ref 8.5–10.4)
CHLORIDE SERPL-SCNC: 98 MMOL/L (ref 97–107)
CO2 SERPL-SCNC: 23 MMOL/L (ref 24–31)
CREAT SERPL-MCNC: 1.1 MG/DL (ref 0.4–1.6)
EGFRCR SERPLBLD CKD-EPI 2021: 51 ML/MIN/1.73M*2
ERYTHROCYTE [DISTWIDTH] IN BLOOD BY AUTOMATED COUNT: 13.8 % (ref 11.5–14.5)
GLUCOSE BLD MANUAL STRIP-MCNC: 128 MG/DL (ref 74–99)
GLUCOSE BLD MANUAL STRIP-MCNC: 263 MG/DL (ref 74–99)
GLUCOSE BLD MANUAL STRIP-MCNC: 99 MG/DL (ref 74–99)
GLUCOSE SERPL-MCNC: 226 MG/DL (ref 65–99)
HCT VFR BLD AUTO: 26 % (ref 36–46)
HGB BLD-MCNC: 8.3 G/DL (ref 12–16)
MCH RBC QN AUTO: 31.1 PG (ref 26–34)
MCHC RBC AUTO-ENTMCNC: 31.9 G/DL (ref 32–36)
MCV RBC AUTO: 97 FL (ref 80–100)
NRBC BLD-RTO: 0 /100 WBCS (ref 0–0)
PLATELET # BLD AUTO: 298 X10*3/UL (ref 150–450)
POTASSIUM SERPL-SCNC: 5.1 MMOL/L (ref 3.4–5.1)
RBC # BLD AUTO: 2.67 X10*6/UL (ref 4–5.2)
SODIUM SERPL-SCNC: 132 MMOL/L (ref 133–145)
WBC # BLD AUTO: 8.2 X10*3/UL (ref 4.4–11.3)

## 2024-04-24 PROCEDURE — 2500000001 HC RX 250 WO HCPCS SELF ADMINISTERED DRUGS (ALT 637 FOR MEDICARE OP): Performed by: NURSE PRACTITIONER

## 2024-04-24 PROCEDURE — 97116 GAIT TRAINING THERAPY: CPT | Mod: GP

## 2024-04-24 PROCEDURE — 97110 THERAPEUTIC EXERCISES: CPT | Mod: GP

## 2024-04-24 PROCEDURE — 99024 POSTOP FOLLOW-UP VISIT: CPT | Performed by: ORTHOPAEDIC SURGERY

## 2024-04-24 PROCEDURE — 94762 N-INVAS EAR/PLS OXIMTRY CONT: CPT

## 2024-04-24 PROCEDURE — 94640 AIRWAY INHALATION TREATMENT: CPT

## 2024-04-24 PROCEDURE — 2500000001 HC RX 250 WO HCPCS SELF ADMINISTERED DRUGS (ALT 637 FOR MEDICARE OP): Performed by: INTERNAL MEDICINE

## 2024-04-24 PROCEDURE — 2500000002 HC RX 250 W HCPCS SELF ADMINISTERED DRUGS (ALT 637 FOR MEDICARE OP, ALT 636 FOR OP/ED): Performed by: NURSE PRACTITIONER

## 2024-04-24 PROCEDURE — 80048 BASIC METABOLIC PNL TOTAL CA: CPT | Performed by: NURSE PRACTITIONER

## 2024-04-24 PROCEDURE — 2500000001 HC RX 250 WO HCPCS SELF ADMINISTERED DRUGS (ALT 637 FOR MEDICARE OP)

## 2024-04-24 PROCEDURE — 99024 POSTOP FOLLOW-UP VISIT: CPT | Performed by: PHYSICIAN ASSISTANT

## 2024-04-24 PROCEDURE — 97535 SELF CARE MNGMENT TRAINING: CPT | Mod: GO

## 2024-04-24 PROCEDURE — 2500000002 HC RX 250 W HCPCS SELF ADMINISTERED DRUGS (ALT 637 FOR MEDICARE OP, ALT 636 FOR OP/ED): Performed by: ORTHOPAEDIC SURGERY

## 2024-04-24 PROCEDURE — 2500000004 HC RX 250 GENERAL PHARMACY W/ HCPCS (ALT 636 FOR OP/ED): Performed by: INTERNAL MEDICINE

## 2024-04-24 PROCEDURE — 2500000001 HC RX 250 WO HCPCS SELF ADMINISTERED DRUGS (ALT 637 FOR MEDICARE OP): Performed by: PHYSICIAN ASSISTANT

## 2024-04-24 PROCEDURE — 99231 SBSQ HOSP IP/OBS SF/LOW 25: CPT | Performed by: NURSE PRACTITIONER

## 2024-04-24 PROCEDURE — 51702 INSERT TEMP BLADDER CATH: CPT

## 2024-04-24 PROCEDURE — 2500000004 HC RX 250 GENERAL PHARMACY W/ HCPCS (ALT 636 FOR OP/ED): Performed by: PHYSICIAN ASSISTANT

## 2024-04-24 PROCEDURE — 9420000001 HC RT PATIENT EDUCATION 5 MIN

## 2024-04-24 PROCEDURE — 82947 ASSAY GLUCOSE BLOOD QUANT: CPT

## 2024-04-24 PROCEDURE — 85027 COMPLETE CBC AUTOMATED: CPT | Performed by: NURSE PRACTITIONER

## 2024-04-24 RX ORDER — METOPROLOL TARTRATE 25 MG/1
25 TABLET, FILM COATED ORAL 2 TIMES DAILY
Qty: 60 TABLET | Refills: 0 | Status: SHIPPED | OUTPATIENT
Start: 2024-04-24 | End: 2024-05-24

## 2024-04-24 RX ORDER — NAPROXEN SODIUM 220 MG/1
81 TABLET, FILM COATED ORAL DAILY
Qty: 30 TABLET | Refills: 0 | Status: SHIPPED | OUTPATIENT
Start: 2024-04-25 | End: 2024-05-25

## 2024-04-24 RX ORDER — FUROSEMIDE 20 MG/1
20 TABLET ORAL DAILY
Qty: 30 TABLET | Refills: 0 | Status: SHIPPED | OUTPATIENT
Start: 2024-04-25 | End: 2024-05-20 | Stop reason: ALTCHOICE

## 2024-04-24 RX ORDER — FOLIC ACID 1 MG/1
1 TABLET ORAL DAILY
Qty: 30 TABLET | Refills: 0 | Status: SHIPPED | OUTPATIENT
Start: 2024-04-25 | End: 2024-05-25

## 2024-04-24 RX ORDER — NITROGLYCERIN 40 MG/1
1 PATCH TRANSDERMAL DAILY
Qty: 30 PATCH | Refills: 0 | Status: SHIPPED | OUTPATIENT
Start: 2024-04-25 | End: 2024-05-22 | Stop reason: ALTCHOICE

## 2024-04-24 RX ADMIN — IPRATROPIUM BROMIDE AND ALBUTEROL SULFATE 3 ML: 2.5; .5 SOLUTION RESPIRATORY (INHALATION) at 07:30

## 2024-04-24 RX ADMIN — NITROGLYCERIN 1 PATCH: 0.2 PATCH TRANSDERMAL at 08:51

## 2024-04-24 RX ADMIN — POLYETHYLENE GLYCOL 3350 17 G: 17 POWDER, FOR SOLUTION ORAL at 08:51

## 2024-04-24 RX ADMIN — LEVOTHYROXINE SODIUM 100 MCG: 0.1 TABLET ORAL at 06:50

## 2024-04-24 RX ADMIN — FUROSEMIDE 20 MG: 20 TABLET ORAL at 08:51

## 2024-04-24 RX ADMIN — OXYCODONE AND ACETAMINOPHEN 1 TABLET: 10; 325 TABLET ORAL at 02:23

## 2024-04-24 RX ADMIN — APIXABAN 2.5 MG: 2.5 TABLET, FILM COATED ORAL at 17:23

## 2024-04-24 RX ADMIN — ONDANSETRON 4 MG: 2 INJECTION INTRAMUSCULAR; INTRAVENOUS at 11:18

## 2024-04-24 RX ADMIN — APIXABAN 2.5 MG: 2.5 TABLET, FILM COATED ORAL at 06:50

## 2024-04-24 RX ADMIN — INSULIN GLARGINE 11 UNITS: 100 INJECTION, SOLUTION SUBCUTANEOUS at 08:00

## 2024-04-24 RX ADMIN — FOLIC ACID 1 MG: 1 TABLET ORAL at 08:51

## 2024-04-24 RX ADMIN — ASPIRIN 81 MG CHEWABLE TABLET 81 MG: 81 TABLET CHEWABLE at 08:51

## 2024-04-24 RX ADMIN — METOPROLOL TARTRATE 25 MG: 25 TABLET, FILM COATED ORAL at 08:53

## 2024-04-24 RX ADMIN — GABAPENTIN 100 MG: 100 CAPSULE ORAL at 08:51

## 2024-04-24 RX ADMIN — ONDANSETRON 4 MG: 2 INJECTION INTRAMUSCULAR; INTRAVENOUS at 03:19

## 2024-04-24 RX ADMIN — INSULIN LISPRO 12 UNITS: 100 INJECTION, SOLUTION INTRAVENOUS; SUBCUTANEOUS at 08:52

## 2024-04-24 ASSESSMENT — COGNITIVE AND FUNCTIONAL STATUS - GENERAL
STANDING UP FROM CHAIR USING ARMS: A LITTLE
CLIMB 3 TO 5 STEPS WITH RAILING: A LOT
DAILY ACTIVITIY SCORE: 15
HELP NEEDED FOR BATHING: A LOT
MOBILITY SCORE: 16
TURNING FROM BACK TO SIDE WHILE IN FLAT BAD: A LITTLE
MOVING TO AND FROM BED TO CHAIR: A LITTLE
DAILY ACTIVITIY SCORE: 18
DRESSING REGULAR UPPER BODY CLOTHING: A LITTLE
TOILETING: A LOT
WALKING IN HOSPITAL ROOM: A LITTLE
EATING MEALS: A LITTLE
TURNING FROM BACK TO SIDE WHILE IN FLAT BAD: A LITTLE
MOVING TO AND FROM BED TO CHAIR: A LITTLE
DRESSING REGULAR LOWER BODY CLOTHING: A LOT
CLIMB 3 TO 5 STEPS WITH RAILING: A LOT
WALKING IN HOSPITAL ROOM: A LOT
HELP NEEDED FOR BATHING: A LITTLE
DRESSING REGULAR LOWER BODY CLOTHING: A LOT
PERSONAL GROOMING: A LITTLE
PERSONAL GROOMING: A LITTLE
TOILETING: A LOT
MOVING FROM LYING ON BACK TO SITTING ON SIDE OF FLAT BED WITH BEDRAILS: A LITTLE
MOVING FROM LYING ON BACK TO SITTING ON SIDE OF FLAT BED WITH BEDRAILS: A LITTLE
STANDING UP FROM CHAIR USING ARMS: A LITTLE
MOBILITY SCORE: 17

## 2024-04-24 ASSESSMENT — PAIN SCALES - GENERAL
PAINLEVEL_OUTOF10: 8
PAINLEVEL_OUTOF10: 2
PAINLEVEL_OUTOF10: 3
PAINLEVEL_OUTOF10: 4

## 2024-04-24 ASSESSMENT — PAIN - FUNCTIONAL ASSESSMENT
PAIN_FUNCTIONAL_ASSESSMENT: WONG-BAKER FACES
PAIN_FUNCTIONAL_ASSESSMENT: 0-10

## 2024-04-24 ASSESSMENT — ACTIVITIES OF DAILY LIVING (ADL): HOME_MANAGEMENT_TIME_ENTRY: 16

## 2024-04-24 ASSESSMENT — PAIN SCALES - WONG BAKER: WONGBAKER_NUMERICALRESPONSE: HURTS LITTLE BIT

## 2024-04-24 ASSESSMENT — PAIN DESCRIPTION - LOCATION: LOCATION: KNEE

## 2024-04-24 NOTE — NURSING NOTE
[id number]: Pt observed with no void, bladder scan obtained and over 500cc of urine realized, Pt assisted with ambulation to the  restroom to void but was unsuccessful, evangelista catheter place per order.

## 2024-04-24 NOTE — PROGRESS NOTES
[facility] Wilson confirmed they can accept the pt on this day. 7000 completed, [facility] spoke with pts daughter who is agreeable for dc, also made pt aware. Transport set via roundtrip for 6 pm, facility is aware. alyson Ricketts summary and AVS sent to the facility.     04/24/24 1552   Discharge Planning   Patient expects to be discharged to: [facility]   Does the patient need discharge transport arranged? Yes   RoundTrip coordination needed? Yes   Has discharge transport been arranged? Yes   What day is the transport expected? 04/24/24   What time is the transport expected? 1800

## 2024-04-24 NOTE — PROGRESS NOTES
Pt unable to void, to have evangelista placed. If pt is ready for dc this worker will make [facility] aware for soc.      04/24/24 1769   Discharge Planning   Patient expects to be discharged to: Home with [facility]

## 2024-04-24 NOTE — TELEPHONE ENCOUNTER
Patients daughter and family have decided that they would like  Patient to go into a rehab.  She states she was given our number and contact you directly with this decision.

## 2024-04-24 NOTE — CARE PLAN
Problem: Functional Mobility  Goal: Pt will transition supine<>sit at flat bed with mod I.  Outcome: Progressing  Goal: Pt will transfer sit<>stand with FWW & mod I.  Outcome: Progressing  Goal: Pt will ambulate >/=100 ft with FWW & mod I.  Outcome: Progressing  Goal: Pt will adhere to R total knee precautions during all functional mobility with S and at most 1 verbal cue.  Outcome: Progressing

## 2024-04-24 NOTE — PROGRESS NOTES
Seen for JOSÉ on CKD stage III creatinine back to baseline at 1.1 mg/dL K to discharge from renal point review discussed with orthopedic surgery follow-up with us in 2 weeks

## 2024-04-24 NOTE — NURSING NOTE
Vascular Access Nurse Note    VAN rounding complete. Patient right arm Midline evaluated. Dressing clean, dry, intact and occlusive. Flushed X 1, positive Blood return noted. Curos cap applied.

## 2024-04-24 NOTE — DISCHARGE SUMMARY
Discharge Diagnosis  Osteoarthritis    Issues Requiring Follow-Up  #1) history of right total knee replacement #2) urinary retention with subsequent kidney failure #3) hypoxemia #4) non-STEMI evaluated by the cardiologist.    Test Results Pending At Discharge  Pending Labs       No current pending labs.            Hospital Course   Right total knee replacement with subsequent rehabilitation.  This patient did develop urinary retention which resulted in kidney failure.  She has developed hypoxia and is being treated with supplemental oxygen.  She was diagnosed with a non-STEMI by the cardiologist and has been evaluated by the cardiologist.    Pertinent Physical Exam At Time of Discharge  Physical Exam  Patient is resting comfortably in bed.  Home Medications     Medication List      START taking these medications     Eliquis 2.5 mg tablet; Generic drug: apixaban; Take 1 tablet (2.5 mg) by   mouth 2 times a day for 14 days.   furosemide 20 mg tablet; Commonly known as: Lasix; Take 1 tablet (20 mg)   by mouth once daily.; Start taking on: April 25, 2024   oxyCODONE-acetaminophen 5-325 mg tablet; Commonly known as: Percocet;   Take 1 tablet by mouth every 6 hours as needed for severe pain (7 - 10)   for up to 7 days.     CHANGE how you take these medications     aspirin 81 mg chewable tablet; Chew and swallow 1 tablet (81 mg) by   mouth once daily.; Start taking on: April 25, 2024; What changed: when to   take this   * folic acid 1 mg tablet; Commonly known as: Folvite; What changed:   Another medication with the same name was added. Make sure you understand   how and when to take each.   * folic acid 1 mg tablet; Commonly known as: Folvite; Take 1 tablet (1   mg) by mouth once daily.; Start taking on: April 25, 2024; What changed:   You were already taking a medication with the same name, and this   prescription was added. Make sure you understand how and when to take   each.   * metoprolol tartrate 25 mg tablet;  Commonly known as: Lopressor; Take   0.5 tablets (12.5 mg) by mouth 2 times a day.; What changed: Another   medication with the same name was added. Make sure you understand how and   when to take each.   * metoprolol tartrate 25 mg tablet; Commonly known as: Lopressor; Take 1   tablet (25 mg) by mouth 2 times a day.; What changed: You were already   taking a medication with the same name, and this prescription was added.   Make sure you understand how and when to take each.   * nitroglycerin 0.4 mg SL tablet; Commonly known as: Nitrostat; Place 1   tablet (0.4 mg) under the tongue every 5 minutes if needed for chest pain   (1 tab every 5 min as needed may repeat up to 3x before seeking medical   attention). Every 5 minutes x 3 doses prn for chest pain; What changed:   Another medication with the same name was added. Make sure you understand   how and when to take each.   * nitroglycerin 0.2 mg/hr patch; Commonly known as: Nitrodur; Place 1   patch over 12 hours on the skin once daily.; Start taking on: April 25, 2024; What changed: You were already taking a medication with the same   name, and this prescription was added. Make sure you understand how and   when to take each.   pravastatin 40 mg tablet; Commonly known as: Pravachol; TAKE 1 TABLET BY   MOUTH ONCE DAILY; What changed: when to take this  * This list has 6 medication(s) that are the same as other medications   prescribed for you. Read the directions carefully, and ask your doctor or   other care provider to review them with you.     CONTINUE taking these medications     aluminum-magnesium hydroxide 200-200 mg/5 mL suspension   amLODIPine 10 mg tablet; Commonly known as: Norvasc; Take 1 tablet (10   mg) by mouth once daily.   cholecalciferol 50 mcg (2,000 unit) capsule; Commonly known as: Vitamin   D-3   cyanocobalamin 1,000 mcg tablet; Commonly known as: Vitamin B-12   gabapentin 100 mg capsule; Commonly known as: Neurontin   HumaLOG Junior Whyte  U-100 100 unit/mL injection; Generic drug:   insulin lispro   hydrALAZINE 50 mg tablet; Commonly known as: Apresoline; Take 1 tablet   (50 mg) by mouth 2 times a day.   hydroCHLOROthiazide 25 mg tablet; Commonly known as: HYDRODiuril   levothyroxine 100 mcg tablet; Commonly known as: Synthroid, Levoxyl   lisinopril 40 mg tablet; TAKE 1 TABLET BY MOUTH EVERY DAY   POLYETHYLENE GLYCOL 3350 ORAL   sulfaSALAzine 500 mg DR tablet; Commonly known as: Azulfidine   Tresiba FlexTouch U-100 100 unit/mL (3 mL) injection; Generic drug:   insulin degludec   valACYclovir 1 gram tablet; Commonly known as: Valtrex     STOP taking these medications     acetaminophen 500 mg capsule; Commonly known as: Tylenol   chlorhexidine 0.12 % solution; Commonly known as: Peridex       Outpatient Follow-Up  Future Appointments   Date Time Provider Department Center   5/10/2024 10:30 AM Mayela Pickard PA-C [id number] [facility]   6/18/2024  9:00 AM CARMEN Braswell-CNP [id number] [facility]   8/23/2024 10:15 AM REFRACTION MACARENA [id number] OPHTH1 [id number] [facility]   8/23/2024 10:30 AM Stephon Hayward MD [id number] [facility]   9/19/2024 10:00 AM Noah Araujo MD [id number] [facility]   10/8/2024 11:00 AM Bryce Ramirez MD [id number] [facility]       Rudolph Liriano MD

## 2024-04-24 NOTE — PROGRESS NOTES
"Bonnie Jules is a 83 y.o. female on day 3 of admission presenting with Osteoarthritis.    Subjective   Patient resting in bed. Denies chest pain, shortness of breath, abdominal pain, fevers, chills. Discussion with patient today regarding safety concerns about discharging to home after knee surgery with new oxygen needs and evangelista. Patient verbalized understanding of concerns.        Objective     Physical Exam  Constitutional:       Appearance: Normal appearance.   HENT:      Head: Normocephalic and atraumatic.      Mouth/Throat:      Mouth: Mucous membranes are moist.      Pharynx: Oropharynx is clear.   Eyes:      Extraocular Movements: Extraocular movements intact.      Conjunctiva/sclera: Conjunctivae normal.      Pupils: Pupils are equal, round, and reactive to light.   Cardiovascular:      Rate and Rhythm: Normal rate and regular rhythm.      Pulses: Normal pulses.      Heart sounds: Normal heart sounds.   Pulmonary:      Effort: Pulmonary effort is normal.      Breath sounds: Normal breath sounds.   Abdominal:      General: Bowel sounds are normal.      Palpations: Abdomen is soft.      Tenderness: There is no abdominal tenderness.   Musculoskeletal:      Cervical back: Normal range of motion and neck supple.      Comments: Right knee with intact dressing noted. Tender to touch    Skin:     General: Skin is warm and dry.      Capillary Refill: Capillary refill takes less than 2 seconds.   Neurological:      General: No focal deficit present.      Mental Status: She is alert and oriented to person, place, and time.   Psychiatric:         Mood and Affect: Mood normal.         Behavior: Behavior normal.         Last Recorded Vitals  Blood pressure 118/61, pulse 80, temperature 36.4 °C (97.5 °F), temperature source Oral, resp. rate 18, height 1.49 m (4' 10.66\"), weight 57 kg (125 lb 10.6 oz), SpO2 95%.  Intake/Output last 3 Shifts:  I/O last 3 completed shifts:  In: - (0 mL/kg)   Out: 1820 (31.9 mL/kg) " [Urine:1800 (0.9 mL/kg/hr); Emesis/NG output:20]  Weight: 57 kg     Relevant Results  Results for orders placed or performed during the hospital encounter of 04/17/24 (from the past 24 hour(s))   POCT GLUCOSE   Result Value Ref Range    POCT Glucose 119 (H) 74 - 99 mg/dL   POCT GLUCOSE   Result Value Ref Range    POCT Glucose 186 (H) 74 - 99 mg/dL   Basic Metabolic Panel   Result Value Ref Range    Glucose 226 (H) 65 - 99 mg/dL    Sodium 132 (L) 133 - 145 mmol/L    Potassium 5.1 3.4 - 5.1 mmol/L    Chloride 98 97 - 107 mmol/L    Bicarbonate 23 (L) 24 - 31 mmol/L    Urea Nitrogen 40 (H) 8 - 25 mg/dL    Creatinine 1.10 0.40 - 1.60 mg/dL    eGFR 51 (L) >60 mL/min/1.73m*2    Calcium 8.0 (L) 8.5 - 10.4 mg/dL    Anion Gap 11 <=19 mmol/L   CBC   Result Value Ref Range    WBC 8.2 4.4 - 11.3 x10*3/uL    nRBC 0.0 0.0 - 0.0 /100 WBCs    RBC 2.67 (L) 4.00 - 5.20 x10*6/uL    Hemoglobin 8.3 (L) 12.0 - 16.0 g/dL    Hematocrit 26.0 (L) 36.0 - 46.0 %    MCV 97 80 - 100 fL    MCH 31.1 26.0 - 34.0 pg    MCHC 31.9 (L) 32.0 - 36.0 g/dL    RDW 13.8 11.5 - 14.5 %    Platelets 298 150 - 450 x10*3/uL   POCT GLUCOSE   Result Value Ref Range    POCT Glucose 263 (H) 74 - 99 mg/dL   POCT GLUCOSE   Result Value Ref Range    POCT Glucose 128 (H) 74 - 99 mg/dL     XR chest 2 views    Result Date: 4/22/2024  Interpreted By:  Milo Leal, STUDY: XR CHEST 2 VIEWS; 4/22/2024 3:15 pm   INDICATION: Signs/Symptoms:Follow-up   COMPARISON: 04/21/2020.   ACCESSION NUMBER(S): [id number]   ORDERING CLINICIAN: ROWDY RICE   TECHNIQUE: Number of films: Two-view radiographs of the chest were obtained.   FINDINGS: Median sternotomy wires and surgical clips are again present, consistent with previous coronary artery bypass graft. The heart and mediastinum are normal. There is prominence of the interstitial markings bilaterally. Bilateral pleural effusions are also seen, best seen on the lateral view. Degenerative changes involve the spine and shoulders.  Cholecystectomy clips are present.       Prominence of the interstitial markings and small bilateral pleural effusions without confluence infiltrates.   Signed by: Milo Leal 4/22/2024 5:00 PM Dictation workstation:   [id number]    Bedside Midline Imaging    Result Date: 4/22/2024  These images are not reportable by radiology and will not be interpreted by  Radiologists.       This patient has a urinary catheter   Reason for the urinary catheter remaining today? urinary retention/bladder outlet obstruction, acute or chronic               Assessment/Plan   Principal Problem:    Osteoarthritis  Active Problems:    History of coronary artery bypass graft    Chronic pain of both knees    Primary osteoarthritis of right knee    Urinary retention    NSTEMI              Troponin elevated               Chest pain resolved at this time              Cardiology consulted - appreciate recs               Echo completed               Continue telemetry               NTG patch, ASA and metoprolol    Follow up with cardiology as outpatient for possible NM stress test      Acute hypoxic respiratory failure               O2 via nasal cannula               Pulmonology consulted - appreciate recs               Encourage IS               VQ scan with low probability of PE               Duonebs    Discharge to SNF with oxygen.    Follow up with pulmonology as outpatient                 CKD               Nephrology following - appreciate recs               Avoid nephrotoxic medications               Hold lisinopril and hydrochlorothiazide in the setting of hypotension               AM labs               Low potassium diet               Continue lasix    Follow up with nephrology as outpatient      DM type I               Monitor glucose with SSI coverage               Continue daily Lantus               Hypoglycemia protocol      Right knee osteoarthritis               S/P right totla knee arthroplasty 4/17/24              Pain  management per primary service               Bowel regimen               PT/OT    Patient now agreeable to SNF. Await acceptance.      Anemia               Blood management consulted - appreciate recs               IV Venofer given               Monitor labs      HTN               Amlodipine, hydrochlorothiazide, lisinopril held in the setting of hypotension               Resume as tolerated                Hypothyroidism               Continue levothyroxine     Urinary retention               Evangelista discontinued 4/23   Patient unable to void, evangelista reinserted 4/24              Urology consulted - appreciate recs    Plan for evangelista for 5-7 days. Follow up with urology as outpatient.      Plan               Cardiology, urology, and nephrology following               Patient now agreeable to SNF. Await acceptance     Amy Razo, APRN-CNP

## 2024-04-24 NOTE — PROGRESS NOTES
"Bonnie Jules is a 83 y.o. female on day 3 of admission presenting with Osteoarthritis.    Subjective   Patient status post right total knee replacement day #7.  She is resting in bed.  She does endorse some right knee pain.  She was able to work with physical therapy.  Patient and daughter refusing SNF, wishing to return home. She denies chest pain shortness of breath.       Objective     Physical Exam  Right knee: Patient is on 3L of O2. Mepilex dressing clean and dry. There is pain with gentle passive ROM in bed. Nontender in the right calf.  Neurovascular is intact    Last Recorded Vitals  Blood pressure 116/60, pulse 74, temperature 36.1 °C (97 °F), temperature source Oral, resp. rate 18, height 1.49 m (4' 10.66\"), weight 57 kg (125 lb 10.6 oz), SpO2 94%.  Intake/Output last 3 Shifts:  I/O last 3 completed shifts:  In: - (0 mL/kg)   Out: 1820 (31.9 mL/kg) [Urine:1800 (0.9 mL/kg/hr); Emesis/NG output:20]  Weight: 57 kg     Relevant Results  XR knee right 1-2 views    Result Date: 4/17/2024  Interpreted By:  Avni Correa, STUDY: XR KNEE RIGHT 1-2 VIEWS; ;  4/17/2024 12:13 pm   INDICATION: Signs/Symptoms:Post op knee.   COMPARISON: None.   ACCESSION NUMBER(S): [id number]   ORDERING CLINICIAN: KALUS HANCOCK   FINDINGS: Right knee, two views   Interval right total knee arthroplasty in place. No periprosthetic fracture lucency seen. There is no dislocation. Postsurgical change the soft tissue. There is a moderate-sized effusion       No immediate complication after right total knee arthroplasty     MACRO: None   Signed by: Avni Correa 4/17/2024 12:33 PM Dictation workstation:   [id number]      Scheduled medications  apixaban, 2.5 mg, oral, q12h  aspirin, 81 mg, oral, Daily  folic acid, 1 mg, oral, Daily  furosemide, 20 mg, oral, Daily  gabapentin, 100 mg, oral, BID  insulin glargine, 11 Units, subcutaneous, Daily before breakfast  insulin lispro, 0-20 Units, subcutaneous, TID with " meals  ipratropium-albuteroL, 3 mL, nebulization, TID  levothyroxine, 100 mcg, oral, Daily  metoprolol tartrate, 25 mg, oral, BID  nitroglycerin, 1 patch, transdermal, Daily  oxygen, , inhalation, Continuous - Inhalation  polyethylene glycol, 17 g, oral, Daily  pravastatin, 40 mg, oral, Nightly      Continuous medications  oxygen, 2 L/min, Last Rate: 3 L/min (04/20/24 0705)      PRN medications  PRN medications: acetaminophen, dextrose, dextrose, glucagon, glucagon, HYDROmorphone, HYDROmorphone, naloxone, naloxone, nitroglycerin, ondansetron **OR** ondansetron, oxyCODONE-acetaminophen, oxyCODONE-acetaminophen, prochlorperazine **OR** prochlorperazine **OR** prochlorperazine  Results for orders placed or performed during the hospital encounter of 04/17/24 (from the past 24 hour(s))   POCT GLUCOSE   Result Value Ref Range    POCT Glucose 333 (H) 74 - 99 mg/dL   POCT GLUCOSE   Result Value Ref Range    POCT Glucose 119 (H) 74 - 99 mg/dL   POCT GLUCOSE   Result Value Ref Range    POCT Glucose 186 (H) 74 - 99 mg/dL   Basic Metabolic Panel   Result Value Ref Range    Glucose 226 (H) 65 - 99 mg/dL    Sodium 132 (L) 133 - 145 mmol/L    Potassium 5.1 3.4 - 5.1 mmol/L    Chloride 98 97 - 107 mmol/L    Bicarbonate 23 (L) 24 - 31 mmol/L    Urea Nitrogen 40 (H) 8 - 25 mg/dL    Creatinine 1.10 0.40 - 1.60 mg/dL    eGFR 51 (L) >60 mL/min/1.73m*2    Calcium 8.0 (L) 8.5 - 10.4 mg/dL    Anion Gap 11 <=19 mmol/L   CBC   Result Value Ref Range    WBC 8.2 4.4 - 11.3 x10*3/uL    nRBC 0.0 0.0 - 0.0 /100 WBCs    RBC 2.67 (L) 4.00 - 5.20 x10*6/uL    Hemoglobin 8.3 (L) 12.0 - 16.0 g/dL    Hematocrit 26.0 (L) 36.0 - 46.0 %    MCV 97 80 - 100 fL    MCH 31.1 26.0 - 34.0 pg    MCHC 31.9 (L) 32.0 - 36.0 g/dL    RDW 13.8 11.5 - 14.5 %    Platelets 298 150 - 450 x10*3/uL   POCT GLUCOSE   Result Value Ref Range    POCT Glucose 263 (H) 74 - 99 mg/dL              Assessment/Plan     RTK  -- PT/OT notes appreciated  -- Continue current pain regimen  --  IS  -- Walker WBAT  -- Incision care  -- antibiotic prophylaxis  -- Patient refusing SNF, wishing to return home with [facility].     NSTEMI  -- Cardiology on consult  -- has signed off  -- Continue Eliquis  -- Continue Aspirin 81mg daily  -- metoprolol 25mg BID    Acute Hypoxic Respiratory Failure  -- pulmonology on Consult  -- patient requiring 3 L  -- home O2 certification prior to discharge    CKD  -- nephrology on consult  -- creatinine stable  -- lasix 20mg daily  -- monitor potassium closely.   -- patient has evangelista placed, void trial in 5-7 days    Anemia  -- blood conservation on consult  -- monitor H&H    HTN  -- hospital medicine on consult    DVT prophylaxis  -- eliquis for DVT prophylaxis   -- SCDs    DM  -- sliding scale  -- monitor blood glucose    We will continue to medically optimize patient. Patient is slowly advancing with PT. Planning on discharge today home with home health as patient is refusing SNF.  Nephrology and Urology following.  Cardiology notes reviewed and she is stable from cardiac standpoint with metoprolol 25 mg twice daily and aspirin 81 mg with Eliquis.  Will appreciate input from hospitalist team and nephrology.  Will continue to medically optimize patient for discharge.  I discussed case with attending Dr. Liriano .       I spent 30 minutes in the professional and overall care of this patient.      Mayela Pickard PA-C

## 2024-04-24 NOTE — PROGRESS NOTES
Occupational Therapy    OT Treatment    Patient Name: Bonnie Jules  MRN: [id number]  Today's Date: 4/24/2024  Time Calculation  Start Time: 1341  Stop Time: 1357  Time Calculation (min): 16 min         Assessment:  OT Assessment: Pt treatment this date was limited by Pt fatigue.  The pt throughout session did verbally  demonstrate  and maintain TKA precautions, while conducting ADL task during treatment. Th pt required close supervision and set up for UB dressing and grooming, But required Min A for bed mobility going from sitting to supine. Pt to continue skilled therapy in order to continue to make progress towards goals stated in POC , and to maximize pt potential prior to discharge  Evaluation/Treatment Tolerance: Patient limited by fatigue  End of Session Communication: Bedside nurse  End of Session Patient Position: Bed, 3 rail up, Alarm off, caregiver present  Evaluation/Treatment Tolerance: Patient limited by fatigue  Plan:  Treatment Interventions: ADL retraining, Functional transfer training, Equipment evaluation/education, Patient/family training  OT Frequency: 5 times per week  OT Discharge Recommendations: Moderate intensity level of continued care  OT - OK to Discharge: Yes  Treatment Interventions: ADL retraining, Functional transfer training, Equipment evaluation/education, Patient/family training    Subjective   Previous Visit Info:  OT Last Visit  OT Received On: 04/24/24  General:  General  Reason for Referral: impaired ADls s/p R TKA  Past Medical History Relevant to Rehab: anxiety, CAD s/p CABG x3, chronic lymphocytic leukemia, HTN, DM, diabetic polyneuropathy, chronic kidney disease, osteoporosis, PVD, sleep apnea, bilateral carpal tunnel release, L ALFONZO,  Family/Caregiver Present: Yes  Caregiver Feedback: Daughter present  Prior to Session Communication: Bedside nurse  Patient Position Received: Bed, 4 rail up  General Comment: nursing cleared pt for treatment prior to start . Pt and family   was agreeable and pleasant throughout session  Precautions:  Hearing/Visual Limitations: hearing WFL, glasses  LE Weight Bearing Status: Weight Bearing as Tolerated  Medical Precautions: Fall precautions  Vital Signs:     Pain:  Pain Assessment  Pain Assessment: Alexander-Baker FACES  Alexander-Baker FACES Pain Rating: Hurts little bit  Pain Type: Surgical pain  Pain Location: Knee  Pain Orientation: Right  Clinical Progression: Not changed  Pain Interventions: Repositioned    Objective    Cognition:  Cognition  Overall Cognitive Status: Within Functional Limits  Orientation Level: Oriented X4  Insight: Within function limits  Impulsive: Within functional limits  Processing Speed: Within funtional limits  Coordination:  Movements are Fluid and Coordinated: No  Upper Body Coordination: WFL    Activities of Daily Living: Grooming  Grooming Level of Assistance: Setup  Grooming Where Assessed: Edge of bed  Grooming Comments: The pt demonstrated the ability to complete Grooming task while seated at the EOB with  setup and distant supervision required    UE Dressing  UE Dressing Level of Assistance: Setup, Close supervision  UE Dressing Where Assessed: Edge of bed  UE Dressing Comments: The pt demonstrated the ability to conduct UB dressing of  a hospital gown  while seated at the EOB . This task required set up , and close supervision to complete . The pt was also able to maange telemetry  threw gown with close supervision    Bed Mobility/Transfers: Bed Mobility  Bed Mobility: Yes  Bed Mobility 1  Bed Mobility 1: Supine to sitting  Level of Assistance 1: Close supervision  Bed Mobility Comments 1: The pt demonstrated the ability to go from the lying supine position to sitting upright  at EOB with close supervision and increased time to complete task  Bed Mobility 2  Bed Mobility  2: Sitting to supine  Level of Assistance 2: Minimum assistance  Bed Mobility Comments 2: The Pt demonstrated the ability to go from sitting upright at  EOB to lying supine in bed with close supervision, but the Pt required MIN A with the use of a drawsheet to center self and be brought near HOB    Outcome Measures:[facility] Daily Activity  Putting on and taking off regular lower body clothing: A lot  Bathing (including washing, rinsing, drying): A little  Putting on and taking off regular upper body clothing: None  Toileting, which includes using toilet, bedpan or urinal: A lot  Taking care of personal grooming such as brushing teeth: A little  Eating Meals: None  Daily Activity - Total Score: 18        Education Documentation  Handouts, taught by JEANNE Tompkins at 4/24/2024  2:24 PM.  Learner: Patient  Readiness: Eager  Method: Explanation, Demonstration  Response: Verbalizes Understanding, Demonstrated Understanding, Needs Reinforcement    Body Mechanics, taught by JEANNE Tompkins at 4/24/2024  2:24 PM.  Learner: Patient  Readiness: Eager  Method: Explanation, Demonstration  Response: Verbalizes Understanding, Demonstrated Understanding, Needs Reinforcement    Precautions, taught by JEANNE Tompkins at 4/24/2024  2:24 PM.  Learner: Patient  Readiness: Eager  Method: Explanation, Demonstration  Response: Verbalizes Understanding, Demonstrated Understanding, Needs Reinforcement    ADL Training, taught by JEANNE Tompkins at 4/24/2024  2:24 PM.  Learner: Patient  Readiness: Eager  Method: Explanation, Demonstration  Response: Verbalizes Understanding, Demonstrated Understanding, Needs Reinforcement    Education Comments  Education provided on OT POC, importance of participation in OT, safety awareness throughout functional tasks/ transfers and use of call light for assistance.         Goals:  Encounter Problems       Encounter Problems (Active)       ADLs       Pt will complete ADL tasks at mod I with use of AE prn  (Progressing)       Start:  04/18/24    Expected End:  04/22/24               Functional Mobility       Pt will perform functional  mobility household distances at mod I with use of RW.    (Progressing)       Start:  04/18/24    Expected End:  04/22/24               OT Transfers       Pt will perform functional transfers at mod I (Progressing)       Start:  04/18/24    Expected End:  04/22/24               Precautions       Pt will independently maintain TKA precautions while completing ADL/IADL tasks and functional transfers/mobility.  (Progressing)       Start:  04/18/24    Expected End:  04/22/24                   RUMA IZQUIERDOPANTERA

## 2024-04-24 NOTE — PROGRESS NOTES
[facility] received message from bedside Rn stating that per pts daughter and Dr Liriano plan has changed and pt is [facility], [facility] given of [facility] - referrals sent via careport. No precert is needed however await full acceptance from a facility.      04/24/24 1316   Discharge Planning   Patient expects to be discharged to: SNF acceptance pending   Does the patient need discharge transport arranged? Yes   RoundTrip coordination needed? Yes   Has discharge transport been arranged? No

## 2024-04-24 NOTE — NURSING NOTE
Attempted to call report to jeb sierra, nurse unable to take report at this time, she will call back

## 2024-04-24 NOTE — CARE PLAN
The patient's goals for the shift include      The clinical goals for the shift include echo

## 2024-04-24 NOTE — PROGRESS NOTES
Physical Therapy    Physical Therapy Treatment    Patient Name: Bonnie Jules  MRN: [id number]  Today's Date: 4/24/2024  Time Calculation  Start Time: 1047  Stop Time: 1117  Time Calculation (min): 30 min       Assessment/Plan   PT Assessment  Rehab Prognosis: Good  Evaluation/Treatment Tolerance: Patient limited by fatigue (lightheadedness;  nausea)  End of Session Communication: Bedside nurse  Assessment Comment: Slow progress regarding functional mobility;  tolerance to activity limited by nausea this date;  fall risk  End of Session Patient Position: Up in chair  PT Plan  Inpatient/Swing Bed or Outpatient: Inpatient  PT Plan  Treatment/Interventions: Bed mobility, Transfer training, Gait training, Stair training, Balance training, Range of motion, Therapeutic exercise, Therapeutic activity, Home exercise program  PT Plan: Skilled PT  PT Frequency: 6 times per week  PT Discharge Recommendations: Moderate intensity level of continued care  PT Recommended Transfer Status: Assist x1  PT - OK to Discharge: Yes (with skilled physical therapy services at next level of care)      General Visit Information:   PT  Visit  PT Received On: 04/24/24  General  Prior to Session Communication: Bedside nurse  Patient Position Received: Bed, 4 rail up  General Comment: RN cleared patient for treatment.  Patient reports agreeable to treatment.    Subjective   Precautions:  Precautions  LE Weight Bearing Status: Weight Bearing as Tolerated  Medical Precautions: Fall precautions  Vital Signs: O2 sat 92% with O2 at 3 liters via nasal cannula       Objective   Pain:  Pain Assessment  Pain Assessment: 0-10  Pain Score: 3  Pain Type: Surgical pain  Pain Location: Knee  Pain Orientation: Right  Cognition:  Cognition  Overall Cognitive Status: Within Functional Limits  Postural Control:  Static Sitting Balance  Static Sitting-Balance Support: Bilateral upper extremity supported  Static Sitting-Level of Assistance: Close supervision  Static  "Sitting-Comment/Number of Minutes: Supervision for balance while sitting on side of bed.  Static Standing Balance  Static Standing-Balance Support: Bilateral upper extremity supported  Static Standing-Level of Assistance: Minimum assistance  Static Standing-Comment/Number of Minutes: Assist with balance during static standing with rolling walker          Activity Tolerance:  Activity Tolerance  Endurance: Decreased tolerance for upright activites  Activity Tolerance Comments: nausea; lightheadedness; fatigue  Treatments:  Therapeutic Exercise  Therapeutic Exercise Performed: Yes  Therapeutic Exercise Activity 1: ankle pumps, quad sets, heel slides, SLR, TKE10 reps x 1 set with assist right LE;  written HEP issued              Bed Mobility  Bed Mobility: Yes  Bed Mobility 1  Bed Mobility 1: Supine to sitting  Level of Assistance 1: Close supervision  Bed Mobility Comments 1: Increased time and effort required to achieve supine to sit    Ambulation/Gait Training  Ambulation/Gait Training Performed: Yes  Ambulation/Gait Training 1  Surface 1: Level tile  Device 1: Rolling walker  Assistance 1: Minimum assistance  Comments/Distance (ft) 1: 5 feet x 1 with rolling walker and assist with balance;  patient ambulates with slow genny, non-reciprocating gait pattern, decreased step length keith LE \"shuffling steps\", and forward flexed posture;  ambulation terminated due to nausea (RN notified)  Transfers  Transfer: Yes  Transfer 1  Transfer From 1: Sit to  Transfer to 1: Stand  Technique 1: Sit to stand  Transfer Device 1: Walker  Transfer Level of Assistance 1: Minimum assistance  Trials/Comments 1: Assist with trunk support and balance.  Transfers 2  Transfer From 2: Stand to  Transfer to 2: Sit  Technique 2: Stand to sit  Transfer Device 2: Walker  Transfer Level of Assistance 2: Minimum assistance  Trials/Comments 2: Assist with trunk support and balance    Stairs  Stairs: No         Outcome Measures:  [facility] Basic " Mobility  Turning from your back to your side while in a flat bed without using bedrails: A little  Moving from lying on your back to sitting on the side of a flat bed without using bedrails: A little  Moving to and from bed to chair (including a wheelchair): A little  Standing up from a chair using your arms (e.g. wheelchair or bedside chair): A little  To walk in hospital room: A lot  Climbing 3-5 steps with railing: A lot  Basic Mobility - Total Score: 16    Education Documentation  No documentation found.  Education Comments  No comments found.        OP EDUCATION:       Encounter Problems       Encounter Problems (Active)       Functional Mobility       Pt will transition supine<>sit at flat bed with mod I. (Progressing)       Start:  04/17/24    Expected End:  05/06/24            Pt will transfer sit<>stand with FWW & mod I. (Progressing)       Start:  04/17/24    Expected End:  05/06/24            Pt will ambulate >/=100 ft with FWW & mod I. (Progressing)       Start:  04/17/24    Expected End:  05/06/24            Pt will adhere to R total knee precautions during all functional mobility with S and at most 1 verbal cue. (Progressing)       Start:  04/17/24    Expected End:  05/06/24               Pain - Adult

## 2024-04-24 NOTE — PROGRESS NOTES
FOLLOWUP FOR: Postop hypoxia    SUBJECTIVE  Less short of breath.  Still on 3 L nasal cannula, sat 94%    PHYSICAL EXAM        4/23/2024     4:46 AM 4/23/2024     7:50 AM 4/23/2024    11:28 AM 4/23/2024     3:00 PM 4/23/2024     4:23 PM 4/23/2024    11:00 PM 4/24/2024     5:00 AM   Vitals   Systolic 114 118 100 92 99 106 116   Diastolic 64 56 47 55 44 59 60   Heart Rate 81 76 68 88  91 74   Temp 37 °C (98.6 °F) 36.8 °C (98.2 °F) 36.7 °C (98.1 °F) 36.1 °C (97 °F)  36.1 °C (97 °F)    Resp 16 16 17 18  18 18       Vital signs reviewed   NAD, awake and alert, O2   Lungs Symmetric excursions.  Auscultation - diminished but clear, no wheezing/rales/rhonchi.     Cor RSR No murmur, rub, gallop   Abd soft and nontender, no rebound or distention, no HS megaly   Ext no CCE   Neuro no focal deficits.  moves all extremities symmetrically.  speech normal.  affect normal    LABS    Serum chemistries are unremarkable.  CBC is stable with hemoglobin 8.3 g      ASSESSMENT:    Postop hypoxia  Chronic kidney disease  Postop TKR    PLAN    Home oxygen evaluation.  Continue versus bronchodilators and aerosolized steroids  Diuresis  Follow renal function  Okay to discharge from pulmonary standpoint.  Will see him in 3 weeks as outpatient with follow-up chest x-ray.    Ben Randall MD, [facility]

## 2024-04-24 NOTE — PROGRESS NOTES
Bonnie Jules is a 83 y.o. female on day 3 of admission presenting with Osteoarthritis. She is 7day s/p right TKA, H/H trending up 8.3/26  Family wants pt to go to rehab.   Subjective   C/o generalized weakness, post op pain controlled, +lightheaded when sitting at bedside with assist of therapy.BP/vitals stable. Denies sob, arguello, cough, oxygen 2Lnc, +nausea with emesis this morning, zofran affective.  Denies hematemesis, abd pain, tenderness, bloating, appetite improved. Says she had bm 2days ago, passing flatus, evangelista cath reinsert for urinary retention. Drain clear yellow urine. +chronic lower ext neuropathy, denies peripheral edema,         Objective     Physical Exam  Constitutional:       Appearance: Normal appearance.   HENT:      Head: Normocephalic and atraumatic.      Right Ear: External ear normal.      Left Ear: External ear normal.      Nose: Nose normal.      Mouth/Throat:      Pharynx: Oropharynx is clear.   Eyes:      Conjunctiva/sclera: Conjunctivae normal.      Pupils: Pupils are equal, round, and reactive to light.   Cardiovascular:      Rate and Rhythm: Normal rate and regular rhythm.      Heart sounds: Murmur heard.   Pulmonary:      Effort: Pulmonary effort is normal. No respiratory distress.      Breath sounds: No wheezing, rhonchi or rales.      Comments: Oxygen 2Lnc, lungs diminished but clear, no cough  Chest:      Chest wall: No tenderness.   Abdominal:      General: Abdomen is flat. Bowel sounds are normal. There is no distension.      Palpations: Abdomen is soft.      Tenderness: There is no abdominal tenderness.   Genitourinary:     Comments: Evangelista cath drain clear yellow urine  Musculoskeletal:         General: Swelling and tenderness present.      Cervical back: Normal range of motion and neck supple.      Right lower leg: No edema.      Left lower leg: No edema.      Comments: Left knee trace swelling and tenderness   Skin:     General: Skin is warm and dry.      Capillary Refill:  "Capillary refill takes 2 to 3 seconds.      Coloration: Skin is not pale.      Findings: No bruising, erythema, lesion or rash.      Comments: Dressing right knee dry/intact, heal protector to right foot   Neurological:      General: No focal deficit present.      Mental Status: She is alert and oriented to person, place, and time. Mental status is at baseline.   Psychiatric:         Mood and Affect: Mood normal.         Behavior: Behavior normal.         Last Recorded Vitals  Blood pressure 118/61, pulse 80, temperature 36.4 °C (97.5 °F), temperature source Oral, resp. rate 18, height 1.49 m (4' 10.66\"), weight 57 kg (125 lb 10.6 oz), SpO2 95%.  Intake/Output last 3 Shifts:  I/O last 3 completed shifts:  In: - (0 mL/kg)   Out: 1820 (31.9 mL/kg) [Urine:1800 (0.9 mL/kg/hr); Emesis/NG output:20]  Weight: 57 kg     Relevant Results  Results for orders placed or performed during the hospital encounter of 04/17/24 (from the past 24 hour(s))   POCT GLUCOSE   Result Value Ref Range    POCT Glucose 119 (H) 74 - 99 mg/dL   POCT GLUCOSE   Result Value Ref Range    POCT Glucose 186 (H) 74 - 99 mg/dL   Basic Metabolic Panel   Result Value Ref Range    Glucose 226 (H) 65 - 99 mg/dL    Sodium 132 (L) 133 - 145 mmol/L    Potassium 5.1 3.4 - 5.1 mmol/L    Chloride 98 97 - 107 mmol/L    Bicarbonate 23 (L) 24 - 31 mmol/L    Urea Nitrogen 40 (H) 8 - 25 mg/dL    Creatinine 1.10 0.40 - 1.60 mg/dL    eGFR 51 (L) >60 mL/min/1.73m*2    Calcium 8.0 (L) 8.5 - 10.4 mg/dL    Anion Gap 11 <=19 mmol/L   CBC   Result Value Ref Range    WBC 8.2 4.4 - 11.3 x10*3/uL    nRBC 0.0 0.0 - 0.0 /100 WBCs    RBC 2.67 (L) 4.00 - 5.20 x10*6/uL    Hemoglobin 8.3 (L) 12.0 - 16.0 g/dL    Hematocrit 26.0 (L) 36.0 - 46.0 %    MCV 97 80 - 100 fL    MCH 31.1 26.0 - 34.0 pg    MCHC 31.9 (L) 32.0 - 36.0 g/dL    RDW 13.8 11.5 - 14.5 %    Platelets 298 150 - 450 x10*3/uL   POCT GLUCOSE   Result Value Ref Range    POCT Glucose 263 (H) 74 - 99 mg/dL   POCT GLUCOSE "   Result Value Ref Range    POCT Glucose 128 (H) 74 - 99 mg/dL      Current Facility-Administered Medications:     acetaminophen (Tylenol) tablet 650 mg, 650 mg, oral, q4h PRN, Mayela Pickard PA-C, 650 mg at 04/23/24 0925    apixaban (Eliquis) tablet 2.5 mg, 2.5 mg, oral, q12h, CARMEN Kovacs-CNP, 2.5 mg at 04/24/24 0650    aspirin chewable tablet 81 mg, 81 mg, oral, Daily, CARMEN Kovacs-CNP, 81 mg at 04/24/24 0851    dextrose 50 % injection 12.5 g, 12.5 g, intravenous, q15 min PRN, EMILIANA Mcleod    dextrose 50 % injection 25 g, 25 g, intravenous, q15 min PRN, EMILIANA Mcleod    folic acid (Folvite) tablet 1 mg, 1 mg, oral, Daily, CARMEN Boyd-CNP, 1 mg at 04/24/24 0851    furosemide (Lasix) tablet 20 mg, 20 mg, oral, Daily, Maximus Rolle MD, 20 mg at 04/24/24 0851    gabapentin (Neurontin) capsule 100 mg, 100 mg, oral, BID, Mayela Pickard PA-C, 100 mg at 04/24/24 0851    glucagon (Glucagen) injection 1 mg, 1 mg, intramuscular, q15 min PRN, EMILIANA Mcleod    glucagon (Glucagen) injection 1 mg, 1 mg, intramuscular, q15 min PRN, EMILIANA Mcleod    HYDROmorphone (Dilaudid) injection 0.5 mg, 0.5 mg, intravenous, q4h PRN, Mayela Pickard PA-C    HYDROmorphone (Dilaudid) injection 0.5 mg, 0.5 mg, intravenous, q2h PRN, Mayela Pickard PA-C    insulin glargine (Lantus) injection 11 Units, 11 Units, subcutaneous, Daily before breakfast, EMILIANA Barillas, 11 Units at 04/24/24 0800    insulin lispro (HumaLOG) injection 0-20 Units, 0-20 Units, subcutaneous, TID with meals, Luna Smith, APRN-CNP, 12 Units at 04/24/24 0852    ipratropium-albuteroL (Duo-Neb) 0.5-2.5 mg/3 mL nebulizer solution 3 mL, 3 mL, nebulization, TID, Rudolph Liriano MD, 3 mL at 04/24/24 0730    levothyroxine (Synthroid, Levoxyl) tablet 100 mcg, 100 mcg, oral, Daily, Mayela Pickard PA-C, 100 mcg at 04/24/24 0650    metoprolol tartrate (Lopressor) tablet 25 mg,  25 mg, oral, BID, Mike Sousa DO, 25 mg at 04/24/24 0853    naloxone (Narcan) injection 0.2 mg, 0.2 mg, intravenous, q5 min PRN, Mayela Pickard PA-C    naloxone (Narcan) injection 0.2 mg, 0.2 mg, intravenous, q5 min PRN, Mayela Pickard PA-C    nitroglycerin (Nitrodur) 0.2 mg/hr patch 1 patch, 1 patch, transdermal, Daily, Mike Sousa DO, 1 patch at 04/24/24 0851    nitroglycerin (Nitrostat) SL tablet 0.4 mg, 0.4 mg, sublingual, q5 min PRN, Mayela Pickard PA-C    ondansetron (Zofran) tablet 4 mg, 4 mg, oral, q8h PRN **OR** ondansetron (Zofran) injection 4 mg, 4 mg, intravenous, q8h PRN, Mayela Pickard PA-C, 4 mg at 04/24/24 1118    oxyCODONE-acetaminophen (Percocet)  mg per tablet 1 tablet, 1 tablet, oral, q4h PRN, Mayela Pickard PA-C, 1 tablet at 04/24/24 0223    oxyCODONE-acetaminophen (Percocet) 5-325 mg per tablet 1 tablet, 1 tablet, oral, q4h PRN, Mayela Pickard PA-C, 1 tablet at 04/23/24 2030    oxygen (O2) therapy, 2 L/min, inhalation, Continuous, Mayela Pickard PA-C, Last Rate: 180,000 mL/hr at 04/20/24 0705, 3 L/min at 04/20/24 0705    oxygen (O2) therapy, , inhalation, Continuous - Inhalation, Luna Smith, APRN-CNP, Start at 04/23/24 2000    polyethylene glycol (Glycolax, Miralax) packet 17 g, 17 g, oral, Daily, Mayela Pickard PA-C, 17 g at 04/24/24 0851    pravastatin (Pravachol) tablet 40 mg, 40 mg, oral, Nightly, Mayela Pickard PA-C, 40 mg at 04/23/24 2027    prochlorperazine (Compazine) tablet 10 mg, 10 mg, oral, q6h PRN **OR** prochlorperazine (Compazine) injection 10 mg, 10 mg, intravenous, q6h PRN **OR** prochlorperazine (Compazine) suppository 25 mg, 25 mg, rectal, q12h PRN, Luna Smith, CARMEN-CNP                        Assessment/Plan   Principal Problem:    Osteoarthritis  Active Problems:    History of coronary artery bypass graft    Chronic pain of both knees    Primary osteoarthritis of right knee  S/p right TKA  JOSÉ, CKD 2-3  CLL  Chronic normocytic  anemia  DM  Postop normocytic anemia  Anemia of acute blood loss combined with anemia of chronic disease/ CLL/ CKD stage 2-3.   Venofer 200mg IV X4  S/p B12 1000mcg IM X1  Folic acid 1mg po daily  Lab to microsample blood draws-signage placed on door     Monitor H/H   RBC transfusion as needed to maintain HGB over 7        Jessie Bear, APRN-CNP

## 2024-04-25 ENCOUNTER — DOCUMENTATION (OUTPATIENT)
Dept: HOME HEALTH SERVICES | Facility: HOME HEALTH | Age: 84
End: 2024-04-25
Payer: MEDICARE

## 2024-04-26 ENCOUNTER — LAB REQUISITION (OUTPATIENT)
Dept: LAB | Facility: HOSPITAL | Age: 84
End: 2024-04-26

## 2024-04-26 ENCOUNTER — NURSING HOME VISIT (OUTPATIENT)
Dept: POST ACUTE CARE | Facility: EXTERNAL LOCATION | Age: 84
End: 2024-04-26
Payer: MEDICARE

## 2024-04-26 DIAGNOSIS — I10 ESSENTIAL HYPERTENSION: Primary | ICD-10-CM

## 2024-04-26 DIAGNOSIS — E78.2 MIXED HYPERLIPIDEMIA: ICD-10-CM

## 2024-04-26 DIAGNOSIS — R33.9 URINARY RETENTION: ICD-10-CM

## 2024-04-26 DIAGNOSIS — E07.9 DISORDER OF THYROID GLAND: ICD-10-CM

## 2024-04-26 DIAGNOSIS — Z96.651 HISTORY OF TOTAL RIGHT KNEE REPLACEMENT: ICD-10-CM

## 2024-04-26 DIAGNOSIS — Z79.899 OTHER LONG TERM (CURRENT) DRUG THERAPY: ICD-10-CM

## 2024-04-26 DIAGNOSIS — E11.9 TYPE 2 DIABETES MELLITUS WITHOUT COMPLICATION, WITH LONG-TERM CURRENT USE OF INSULIN (MULTI): ICD-10-CM

## 2024-04-26 DIAGNOSIS — Z79.4 TYPE 2 DIABETES MELLITUS WITHOUT COMPLICATION, WITH LONG-TERM CURRENT USE OF INSULIN (MULTI): ICD-10-CM

## 2024-04-26 DIAGNOSIS — R26.2 DIFFICULTY IN WALKING: ICD-10-CM

## 2024-04-26 DIAGNOSIS — Z95.1 HISTORY OF CORONARY ARTERY BYPASS GRAFT: ICD-10-CM

## 2024-04-26 LAB
ALBUMIN SERPL BCP-MCNC: 3.3 G/DL (ref 3.4–5)
ALP SERPL-CCNC: 71 U/L (ref 33–136)
ALT SERPL W P-5'-P-CCNC: 20 U/L (ref 7–45)
ANION GAP SERPL CALC-SCNC: 13 MMOL/L (ref 10–20)
AST SERPL W P-5'-P-CCNC: 35 U/L (ref 9–39)
BASOPHILS # BLD AUTO: 0.02 X10*3/UL (ref 0–0.1)
BASOPHILS NFR BLD AUTO: 0.3 %
BILIRUB SERPL-MCNC: 0.3 MG/DL (ref 0–1.2)
BUN SERPL-MCNC: 32 MG/DL (ref 6–23)
CALCIUM SERPL-MCNC: 8.6 MG/DL (ref 8.6–10.3)
CHLORIDE SERPL-SCNC: 95 MMOL/L (ref 98–107)
CO2 SERPL-SCNC: 29 MMOL/L (ref 21–32)
CREAT SERPL-MCNC: 1.28 MG/DL (ref 0.5–1.05)
EGFRCR SERPLBLD CKD-EPI 2021: 42 ML/MIN/1.73M*2
EOSINOPHIL # BLD AUTO: 0.3 X10*3/UL (ref 0–0.4)
EOSINOPHIL NFR BLD AUTO: 3.8 %
ERYTHROCYTE [DISTWIDTH] IN BLOOD BY AUTOMATED COUNT: 13.8 % (ref 11.5–14.5)
GLUCOSE SERPL-MCNC: 42 MG/DL (ref 74–99)
HCT VFR BLD AUTO: 28.9 % (ref 36–46)
HGB BLD-MCNC: 9.1 G/DL (ref 12–16)
IMM GRANULOCYTES # BLD AUTO: 0.02 X10*3/UL (ref 0–0.5)
IMM GRANULOCYTES NFR BLD AUTO: 0.3 % (ref 0–0.9)
LYMPHOCYTES # BLD AUTO: 3.57 X10*3/UL (ref 0.8–3)
LYMPHOCYTES NFR BLD AUTO: 45 %
MCH RBC QN AUTO: 31.4 PG (ref 26–34)
MCHC RBC AUTO-ENTMCNC: 31.5 G/DL (ref 32–36)
MCV RBC AUTO: 100 FL (ref 80–100)
MONOCYTES # BLD AUTO: 0.73 X10*3/UL (ref 0.05–0.8)
MONOCYTES NFR BLD AUTO: 9.2 %
NEUTROPHILS # BLD AUTO: 3.29 X10*3/UL (ref 1.6–5.5)
NEUTROPHILS NFR BLD AUTO: 41.4 %
NRBC BLD-RTO: 0 /100 WBCS (ref 0–0)
PLATELET # BLD AUTO: 480 X10*3/UL (ref 150–450)
POLYCHROMASIA BLD QL SMEAR: NORMAL
POTASSIUM SERPL-SCNC: 4.5 MMOL/L (ref 3.5–5.3)
PROT SERPL-MCNC: 5.5 G/DL (ref 6.4–8.2)
RBC # BLD AUTO: 2.9 X10*6/UL (ref 4–5.2)
RBC MORPH BLD: NORMAL
SODIUM SERPL-SCNC: 132 MMOL/L (ref 136–145)
WBC # BLD AUTO: 7.9 X10*3/UL (ref 4.4–11.3)

## 2024-04-26 PROCEDURE — 99306 1ST NF CARE HIGH MDM 50: CPT | Performed by: INTERNAL MEDICINE

## 2024-04-26 PROCEDURE — 80053 COMPREHEN METABOLIC PANEL: CPT

## 2024-04-26 PROCEDURE — 85025 COMPLETE CBC W/AUTO DIFF WBC: CPT

## 2024-04-26 NOTE — LETTER
Patient: Bonnie Jules  : 1940    Encounter Date: 2024    [facility]: [facility]  HISTORY AND PHYSICAL  NOTE      Bonnie Jules is a 83 y.o. female that is being seen  today for admission for rehab after hospitalisation .  Subjective  Patient is 83-year-old female with history of diabetes, hypertension, osteoarthritis of the knee s/p right knee replacement, urinary retention s/p Layton catheter placement and failed voiding trial who is being seen for admission for rehab after hospitalization.  Patient was admitted to the hospital for elective right knee surgery.  Postop patient had urinary retention and had a Layton catheter.  Patient kidney functions were elevated and were being monitored.  Patient blood sugars have been all over the chart.  Patient has been on insulin.  Denies significant pain.  Patient does have history of dementia.      ROS  Negative for fever or chills  Negative for sore throat, ear pain, nasal discharge  Negative for cough, shortness of breath or wheezing  Negative for chest pain, palpitations, swelling of legs  Negative for abdominal pain, constipation, diarrhea, blood in the stools  Negative for urinary complaints  Negative for headache, dizziness, weakness or numbness  Positive for right knee pain  Negative for depression or anxiety  All other systems reviewed and were negative     Vital signs are stable      Physical Exam  Constitutional: Patient does not appear to be in any acute distress  Head and Face: NCAT  Eyes: Normal external exam, EOMI  ENT: Normal external inspection of ears and nose. Oropharynx normal.  Cardiovascular: RRR, S1/S2, no murmurs, rubs, or gallops, radial pulses +2, no edema of extremities  Pulmonary: CTAB, no respiratory distress.  Abdomen: +BS, soft, non-tender, nondistended, no guarding or rebound, no masses noted  Patient has Layton catheter  MSK: Patient has surgical wound over the right knee.  Mild swelling of the right  leg.  Skin- No lesions, contusions, or erythema.  Peripheral puslses palpable bilaterally 2+  Neuro: AAO X3, Cranial nerves 2-12 grossly intact,DTR 2+ in all 4 limbs   Psychiatric: Judgment intact. Appropriate mood and behavior    LABS   [unfilled]  Lab Results   Component Value Date    GLUCOSE 137 (H) 05/16/2024    CALCIUM 9.0 05/16/2024     05/16/2024    K 4.8 05/16/2024    CO2 24 05/16/2024    CL 97 05/16/2024    BUN 36 (H) 05/16/2024    CREATININE 1.40 05/16/2024     Lab Results   Component Value Date    ALT 15 05/16/2024    AST 25 05/16/2024    ALKPHOS 96 05/16/2024    BILITOT 0.2 05/16/2024     Lab Results   Component Value Date    WBC 6.3 05/16/2024    HGB 9.6 (L) 05/16/2024    HCT 30.1 (L) 05/16/2024    MCV 99 05/16/2024     05/16/2024     Lab Results   Component Value Date    CHOL 162 03/06/2023    CHOL 167 02/25/2022    CHOL 163 01/05/2022     Lab Results   Component Value Date    HDL 70 03/06/2023    HDL 74 02/25/2022    HDL 71 01/05/2022     Lab Results   Component Value Date    LDLCALC 67 03/06/2023    LDLCALC 75 02/25/2022    LDLCALC 73 01/05/2022     Lab Results   Component Value Date    TRIG 125 03/06/2023    TRIG 88 02/25/2022    TRIG 97 01/05/2022     Lab Results   Component Value Date    HGBA1C 6.7 (H) 04/03/2024     Other labs not included in the list above were reviewed either before or during this encounter.    History   Past Medical History:   Diagnosis Date   • Anxiety    • Benign hypertension    • Chronic ischemic colitis (Multi)     With history of sepsis and infectious gastroenteritis 10/7/2022-10/11/2022   • Chronic kidney disease, stage III (moderate) (Multi)    • CLL (chronic lymphocytic leukemia) (Multi)    • Coronary artery disease    • CTS (carpal tunnel syndrome)    • Diabetes mellitus (Multi)    • Essential (primary) hypertension    • GI bleed    • Hyperlipidemia, unspecified    • Hypertension    • Hypothyroidism    • Hypothyroidism, unspecified type    • Mixed  hyperlipidemia    • Osteoarthritis    • Osteoporosis    • Peripheral vascular disease (CMS-[facility])    • Right knee pain    • Seizure disorder (Multi)    • Type 1 diabetes mellitus with hyperglycemia (Multi)      Past Surgical History:   Procedure Laterality Date   • CARPAL TUNNEL RELEASE Bilateral    • CATARACT EXTRACTION W/ INTRAOCULAR LENS  IMPLANT, BILATERAL      Left 8/10/2017, right 2017   • CHOLECYSTECTOMY     • COLONOSCOPY     • CORONARY ARTERY BYPASS GRAFT  2005    X 3   • HYSTERECTOMY     • KNEE SURGERY Right 2024    total   • OTHER SURGICAL HISTORY      Bilateral heel spurs   • OTHER SURGICAL HISTORY Right 2016    Femoral endarterectomy   • TOTAL HIP ARTHROPLASTY Left      Family History   Problem Relation Name Age of Onset   • Diabetes Mother     • Diabetes Daughter     • Other (RA) Daughter       Allergies   Allergen Reactions   • Quinolones Rash   • Chlorpheniramine-Dextromethorp Hives   • Amoxicillin Hives   • Amoxicillin-Pot Clavulanate Rash   • Ciprofloxacin Rash   • Levofloxacin Rash     Current Outpatient Medications on File Prior to Visit   Medication Sig Dispense Refill   • amLODIPine (Norvasc) 10 mg tablet Take 1 tablet (10 mg) by mouth once daily. 90 tablet 3   • [] aspirin 81 mg chewable tablet Chew and swallow 1 tablet (81 mg) by mouth once daily. 30 tablet 0   • cholecalciferol (Vitamin D-3) 50 mcg (2,000 unit) capsule Take 1 capsule (50 mcg) by mouth early in the morning..     • cyanocobalamin (Vitamin B-12) 1,000 mcg tablet Take 1 tablet (1,000 mcg) by mouth once daily.     • [] folic acid (Folvite) 1 mg tablet Take 1 tablet (1 mg) by mouth once daily. 30 tablet 0   • gabapentin (Neurontin) 100 mg capsule Take 1 capsule (100 mg) by mouth in the morning and at bedtime. In the morning and before bedtime     • hydrALAZINE (Apresoline) 50 mg tablet Take 1 tablet (50 mg) by mouth 2 times a day. 180 tablet 3   • hydroCHLOROthiazide (HYDRODiuril) 25 mg tablet Take 1  tablet (25 mg) by mouth once daily.     • insulin degludec (Tresiba FlexTouch U-100) 100 unit/mL (3 mL) injection Inject 12 Units under the skin once daily in the morning. As directed 1 box     • insulin lispro (HumaLOG  KwikPen U-100) 100 unit/mL injection Inject 30 Units under the skin 3 times daily (morning, midday, late afternoon). ON A SLIDING SCALE     • levothyroxine (Synthroid, Levoxyl) 100 mcg tablet Take 1 tablet (100 mcg) by mouth once daily.     • lisinopril 40 mg tablet TAKE 1 TABLET BY MOUTH EVERY DAY 90 tablet 3   • metoprolol tartrate (Lopressor) 25 mg tablet Take 1 tablet (25 mg) by mouth 2 times a day. 60 tablet 0   • POLYETHYLENE GLYCOL 3350 ORAL Take 1 packet by mouth once daily. With 8 oz of fluid as needed     • pravastatin (Pravachol) 40 mg tablet TAKE 1 TABLET BY MOUTH ONCE DAILY (Patient taking differently: Take 1 tablet (40 mg) by mouth once daily at bedtime.) 90 tablet 1   • sulfaSALAzine (Azulfidine) 500 mg DR tablet Take 2 tablets (1,000 mg) by mouth 2 times a day.     • valACYclovir (Valtrex) 1 gram tablet Take 1 tablet (1,000 mg) by mouth if needed. Twice daily     • [DISCONTINUED] nitroglycerin (Nitrodur) 0.2 mg/hr patch Place 1 patch over 12 hours on the skin once daily. 30 patch 0     No current facility-administered medications on file prior to visit.     Immunization History   Administered Date(s) Administered   • Flu vaccine, quadrivalent, high-dose, preservative free, age 65y+ (FLUZONE) 10/30/2020, 10/04/2021   • Influenza, High Dose Seasonal, Preservative Free 10/08/2018, 09/22/2019   • Influenza, seasonal, injectable 09/28/2012   • Moderna COVID-19 vaccine, Fall 2023, 12 yeasrs and older (50mcg/0.5mL) 10/25/2023   • Moderna SARS-CoV-2 Vaccination 03/17/2021, 04/16/2021, 12/02/2021, 04/21/2022   • Pneumococcal conjugate vaccine, 13-valent (PREVNAR 13) 03/22/2018   • Pneumococcal polysaccharide vaccine, 23-valent, age 2 years and older (PNEUMOVAX 23) 07/11/2019   • Tdap  vaccine, age 7 year and older (BOOSTRIX, ADACEL) 03/26/2018     Patient's medical history was reviewed and updated either before or during this encounter.  ASSESSMENT / PLAN:  Diagnoses and all orders for this visit:  Essential hypertension  Mixed hyperlipidemia  History of coronary artery bypass graft  Type 2 diabetes mellitus without complication, with long-term current use of insulin (Multi)  Disorder of thyroid gland  Urinary retention  History of total right knee replacement  Difficulty in walking    Patient is being seen for admission for rehab after hospitalization.  Patient had right knee replacement done and is on pain medication.  Patient also had elevated kidney functions in the hospital which will be monitored.  Patient does have a Layton catheter for acute retention of the urine.  Patient will have a voiding trial.  Patient has diabetes and is on insulin.  Will continue to monitor blood sugars.      Arnulfo Clarke MD       Electronically Signed By: Arnuflo Clarke MD   5/27/24 10:44 AM

## 2024-04-29 ENCOUNTER — NURSING HOME VISIT (OUTPATIENT)
Dept: POST ACUTE CARE | Facility: EXTERNAL LOCATION | Age: 84
End: 2024-04-29
Payer: MEDICARE

## 2024-04-29 DIAGNOSIS — R33.9 URINARY RETENTION: ICD-10-CM

## 2024-04-29 DIAGNOSIS — R26.2 DIFFICULTY WALKING: ICD-10-CM

## 2024-04-29 DIAGNOSIS — I10 ESSENTIAL HYPERTENSION: ICD-10-CM

## 2024-04-29 DIAGNOSIS — N18.31 STAGE 3A CHRONIC KIDNEY DISEASE (MULTI): ICD-10-CM

## 2024-04-29 DIAGNOSIS — E78.2 MIXED HYPERLIPIDEMIA: ICD-10-CM

## 2024-04-29 DIAGNOSIS — E78.5 DYSLIPIDEMIA: ICD-10-CM

## 2024-04-29 DIAGNOSIS — Z79.4 TYPE 2 DIABETES MELLITUS WITHOUT COMPLICATION, WITH LONG-TERM CURRENT USE OF INSULIN (MULTI): ICD-10-CM

## 2024-04-29 DIAGNOSIS — E11.9 TYPE 2 DIABETES MELLITUS WITHOUT COMPLICATION, WITH LONG-TERM CURRENT USE OF INSULIN (MULTI): ICD-10-CM

## 2024-04-29 DIAGNOSIS — Z96.651 STATUS POST RIGHT KNEE REPLACEMENT: Primary | ICD-10-CM

## 2024-04-29 DIAGNOSIS — E03.9 ACQUIRED HYPOTHYROIDISM: ICD-10-CM

## 2024-04-29 PROCEDURE — 99310 SBSQ NF CARE HIGH MDM 45: CPT | Performed by: INTERNAL MEDICINE

## 2024-04-29 NOTE — PROGRESS NOTES
[facility]: [facility]  HISTORY AND PHYSICAL  NOTE      Bonnie Jules is a 83 y.o. female that is being seen  today for admission for rehab after hospitalisation .  Subjective   Patient is 83-year-old female with history of diabetes, hypertension, osteoarthritis of the knee s/p right knee replacement, urinary retention s/p Layton catheter placement and failed voiding trial who is being seen for admission for rehab after hospitalization.  Patient was admitted to the hospital for elective right knee surgery.  Postop patient had urinary retention and had a Layton catheter.  Patient kidney functions were elevated and were being monitored.  Patient blood sugars have been all over the chart.  Patient has been on insulin.  Denies significant pain.  Patient does have history of dementia.      ROS  Negative for fever or chills  Negative for sore throat, ear pain, nasal discharge  Negative for cough, shortness of breath or wheezing  Negative for chest pain, palpitations, swelling of legs  Negative for abdominal pain, constipation, diarrhea, blood in the stools  Negative for urinary complaints  Negative for headache, dizziness, weakness or numbness  Positive for right knee pain  Negative for depression or anxiety  All other systems reviewed and were negative     Vital signs are stable      Physical Exam  Constitutional: Patient does not appear to be in any acute distress  Head and Face: NCAT  Eyes: Normal external exam, EOMI  ENT: Normal external inspection of ears and nose. Oropharynx normal.  Cardiovascular: RRR, S1/S2, no murmurs, rubs, or gallops, radial pulses +2, no edema of extremities  Pulmonary: CTAB, no respiratory distress.  Abdomen: +BS, soft, non-tender, nondistended, no guarding or rebound, no masses noted  Patient has Layton catheter  MSK: Patient has surgical wound over the right knee.  Mild swelling of the right leg.  Skin- No lesions, contusions, or erythema.  Peripheral puslses  palpable bilaterally 2+  Neuro: AAO X3, Cranial nerves 2-12 grossly intact,DTR 2+ in all 4 limbs   Psychiatric: Judgment intact. Appropriate mood and behavior    LABS   [unfilled]  Lab Results   Component Value Date    GLUCOSE 137 (H) 05/16/2024    CALCIUM 9.0 05/16/2024     05/16/2024    K 4.8 05/16/2024    CO2 24 05/16/2024    CL 97 05/16/2024    BUN 36 (H) 05/16/2024    CREATININE 1.40 05/16/2024     Lab Results   Component Value Date    ALT 15 05/16/2024    AST 25 05/16/2024    ALKPHOS 96 05/16/2024    BILITOT 0.2 05/16/2024     Lab Results   Component Value Date    WBC 6.3 05/16/2024    HGB 9.6 (L) 05/16/2024    HCT 30.1 (L) 05/16/2024    MCV 99 05/16/2024     05/16/2024     Lab Results   Component Value Date    CHOL 162 03/06/2023    CHOL 167 02/25/2022    CHOL 163 01/05/2022     Lab Results   Component Value Date    HDL 70 03/06/2023    HDL 74 02/25/2022    HDL 71 01/05/2022     Lab Results   Component Value Date    LDLCALC 67 03/06/2023    LDLCALC 75 02/25/2022    LDLCALC 73 01/05/2022     Lab Results   Component Value Date    TRIG 125 03/06/2023    TRIG 88 02/25/2022    TRIG 97 01/05/2022     Lab Results   Component Value Date    HGBA1C 6.7 (H) 04/03/2024     Other labs not included in the list above were reviewed either before or during this encounter.    History    Past Medical History:   Diagnosis Date    Anxiety     Benign hypertension     Chronic ischemic colitis (Multi)     With history of sepsis and infectious gastroenteritis 10/7/2022-10/11/2022    Chronic kidney disease, stage III (moderate) (Multi)     CLL (chronic lymphocytic leukemia) (Multi)     Coronary artery disease     CTS (carpal tunnel syndrome)     Diabetes mellitus (Multi)     Essential (primary) hypertension     GI bleed     Hyperlipidemia, unspecified     Hypertension     Hypothyroidism     Hypothyroidism, unspecified type     Mixed hyperlipidemia     Osteoarthritis     Osteoporosis     Peripheral vascular disease  (CMS-[facility])     Right knee pain     Seizure disorder (Multi)     Type 1 diabetes mellitus with hyperglycemia (Multi)      Past Surgical History:   Procedure Laterality Date    CARPAL TUNNEL RELEASE Bilateral     CATARACT EXTRACTION W/ INTRAOCULAR LENS  IMPLANT, BILATERAL      Left 8/10/2017, right 2017    CHOLECYSTECTOMY      COLONOSCOPY      CORONARY ARTERY BYPASS GRAFT  2005    X 3    HYSTERECTOMY      KNEE SURGERY Right 2024    total    OTHER SURGICAL HISTORY      Bilateral heel spurs    OTHER SURGICAL HISTORY Right 2016    Femoral endarterectomy    TOTAL HIP ARTHROPLASTY Left      Family History   Problem Relation Name Age of Onset    Diabetes Mother      Diabetes Daughter      Other (RA) Daughter       Allergies   Allergen Reactions    Quinolones Rash    Chlorpheniramine-Dextromethorp Hives    Amoxicillin Hives    Amoxicillin-Pot Clavulanate Rash    Ciprofloxacin Rash    Levofloxacin Rash     Current Outpatient Medications on File Prior to Visit   Medication Sig Dispense Refill    amLODIPine (Norvasc) 10 mg tablet Take 1 tablet (10 mg) by mouth once daily. 90 tablet 3    [] aspirin 81 mg chewable tablet Chew and swallow 1 tablet (81 mg) by mouth once daily. 30 tablet 0    cholecalciferol (Vitamin D-3) 50 mcg (2,000 unit) capsule Take 1 capsule (50 mcg) by mouth early in the morning..      cyanocobalamin (Vitamin B-12) 1,000 mcg tablet Take 1 tablet (1,000 mcg) by mouth once daily.      [] folic acid (Folvite) 1 mg tablet Take 1 tablet (1 mg) by mouth once daily. 30 tablet 0    gabapentin (Neurontin) 100 mg capsule Take 1 capsule (100 mg) by mouth in the morning and at bedtime. In the morning and before bedtime      hydrALAZINE (Apresoline) 50 mg tablet Take 1 tablet (50 mg) by mouth 2 times a day. 180 tablet 3    hydroCHLOROthiazide (HYDRODiuril) 25 mg tablet Take 1 tablet (25 mg) by mouth once daily.      insulin degludec (Tresiba FlexTouch U-100) 100 unit/mL (3 mL) injection  Inject 12 Units under the skin once daily in the morning. As directed 1 box      insulin lispro (HumaLOG  KwikPen U-100) 100 unit/mL injection Inject 30 Units under the skin 3 times daily (morning, midday, late afternoon). ON A SLIDING SCALE      levothyroxine (Synthroid, Levoxyl) 100 mcg tablet Take 1 tablet (100 mcg) by mouth once daily.      lisinopril 40 mg tablet TAKE 1 TABLET BY MOUTH EVERY DAY 90 tablet 3    metoprolol tartrate (Lopressor) 25 mg tablet Take 1 tablet (25 mg) by mouth 2 times a day. 60 tablet 0    POLYETHYLENE GLYCOL 3350 ORAL Take 1 packet by mouth once daily. With 8 oz of fluid as needed      pravastatin (Pravachol) 40 mg tablet TAKE 1 TABLET BY MOUTH ONCE DAILY (Patient taking differently: Take 1 tablet (40 mg) by mouth once daily at bedtime.) 90 tablet 1    sulfaSALAzine (Azulfidine) 500 mg DR tablet Take 2 tablets (1,000 mg) by mouth 2 times a day.      valACYclovir (Valtrex) 1 gram tablet Take 1 tablet (1,000 mg) by mouth if needed. Twice daily      [DISCONTINUED] nitroglycerin (Nitrodur) 0.2 mg/hr patch Place 1 patch over 12 hours on the skin once daily. 30 patch 0     No current facility-administered medications on file prior to visit.     Immunization History   Administered Date(s) Administered    Flu vaccine, quadrivalent, high-dose, preservative free, age 65y+ (FLUZONE) 10/30/2020, 10/04/2021    Influenza, High Dose Seasonal, Preservative Free 10/08/2018, 09/22/2019    Influenza, seasonal, injectable 09/28/2012    Moderna COVID-19 vaccine, Fall 2023, 12 yeasrs and older (50mcg/0.5mL) 10/25/2023    Moderna SARS-CoV-2 Vaccination 03/17/2021, 04/16/2021, 12/02/2021, 04/21/2022    Pneumococcal conjugate vaccine, 13-valent (PREVNAR 13) 03/22/2018    Pneumococcal polysaccharide vaccine, 23-valent, age 2 years and older (PNEUMOVAX 23) 07/11/2019    Tdap vaccine, age 7 year and older (BOOSTRIX, ADACEL) 03/26/2018     Patient's medical history was reviewed and updated either before or  during this encounter.  ASSESSMENT / PLAN:  Diagnoses and all orders for this visit:  Essential hypertension  Mixed hyperlipidemia  History of coronary artery bypass graft  Type 2 diabetes mellitus without complication, with long-term current use of insulin (Multi)  Disorder of thyroid gland  Urinary retention  History of total right knee replacement  Difficulty in walking    Patient is being seen for admission for rehab after hospitalization.  Patient had right knee replacement done and is on pain medication.  Patient also had elevated kidney functions in the hospital which will be monitored.  Patient does have a Layton catheter for acute retention of the urine.  Patient will have a voiding trial.  Patient has diabetes and is on insulin.  Will continue to monitor blood sugars.      Arnulfo Clarke MD

## 2024-04-29 NOTE — LETTER
Patient: Bonnie Jules  : 1940    Encounter Date: 2024    [facility]: [facility]  PROGRESS NOTE      Bonnie Jules is a 83 y.o. female that is being seen  today for FOLLOW UP at rehab.  Subjective  Patient is a 83-year-old female who is being seen for follow-up at rehab.  Patient had right knee replacement.  Patient also had acute retention of urine and has Layton catheter.  Patient failed voiding trial.  Patient blood sugars have been high so dose of insulin adjusted.  Patient is on long-acting as well as on sliding scale insulin.  Patient's lab work reviewed.  Patient sodium levels were low.  Patient has been on fluid restriction.  Patient also has mild elevation of the kidney functions in the hospital which is improving.  Patient is getting physical therapy.      ROS  Negative for fever or chills  Negative for sore throat, ear pain, nasal discharge  Negative for cough, shortness of breath or wheezing  Negative for chest pain, palpitations, swelling of legs  Negative for abdominal pain, constipation, diarrhea, blood in the stools  Negative for urinary complaints  Negative for headache, dizziness, weakness or numbness  Positive for right knee pain and difficulty in walking  Negative for depression or anxiety  All other systems reviewed and were negative     Vital signs are stable    Physical Exam  Constitutional: Patient does not appear to be in any acute distress  Head and Face: NCAT  Eyes: Normal external exam, EOMI  ENT: Normal external inspection of ears and nose. Oropharynx normal.  Cardiovascular: RRR, S1/S2, no murmurs, rubs, or gallops, radial pulses +2, no edema of extremities  Pulmonary: CTAB, no respiratory distress.  Abdomen: +BS, soft, non-tender, nondistended, no guarding or rebound, no masses noted  MSK: Mild swelling of the right knee and right leg.  Skin- No lesions, contusions, or erythema.  Peripheral puslses palpable bilaterally 2+  Neuro: AAO X3, Cranial  nerves 2-12 grossly intact,DTR 2+ in all 4 limbs       LABS   [unfilled]  Lab Results   Component Value Date    GLUCOSE 42 (L) 04/26/2024    CALCIUM 8.6 04/26/2024     (L) 04/26/2024    K 4.5 04/26/2024    CO2 29 04/26/2024    CL 95 (L) 04/26/2024    BUN 32 (H) 04/26/2024    CREATININE 1.28 (H) 04/26/2024     Lab Results   Component Value Date    ALT 20 04/26/2024    AST 35 04/26/2024    ALKPHOS 71 04/26/2024    BILITOT 0.3 04/26/2024     Lab Results   Component Value Date    WBC 7.9 04/26/2024    HGB 9.1 (L) 04/26/2024    HCT 28.9 (L) 04/26/2024     04/26/2024     (H) 04/26/2024     Lab Results   Component Value Date    CHOL 162 03/06/2023    CHOL 167 02/25/2022    CHOL 163 01/05/2022     Lab Results   Component Value Date    HDL 70 03/06/2023    HDL 74 02/25/2022    HDL 71 01/05/2022     Lab Results   Component Value Date    LDLCALC 67 03/06/2023    LDLCALC 75 02/25/2022    LDLCALC 73 01/05/2022     Lab Results   Component Value Date    TRIG 125 03/06/2023    TRIG 88 02/25/2022    TRIG 97 01/05/2022     Lab Results   Component Value Date    HGBA1C 6.7 (H) 04/03/2024     Other labs not included in the list above were reviewed either before or during this encounter.    History   Past Medical History:   Diagnosis Date   • Anxiety    • Benign hypertension    • Chronic ischemic colitis (Multi)     With history of sepsis and infectious gastroenteritis 10/7/2022-10/11/2022   • Chronic kidney disease, stage III (moderate) (Multi)    • CLL (chronic lymphocytic leukemia) (Multi)    • Coronary artery disease    • CTS (carpal tunnel syndrome)    • Diabetes mellitus (Multi)    • Essential (primary) hypertension    • GI bleed    • Hyperlipidemia, unspecified    • Hypertension    • Hypothyroidism    • Hypothyroidism, unspecified type    • Mixed hyperlipidemia    • Osteoarthritis    • Osteoporosis    • Peripheral vascular disease (CMS-HCC)    • Seizure disorder (Multi)    • Type 1 diabetes mellitus with  hyperglycemia (Multi)      Past Surgical History:   Procedure Laterality Date   • CARPAL TUNNEL RELEASE Bilateral    • CATARACT EXTRACTION W/ INTRAOCULAR LENS  IMPLANT, BILATERAL      Left 8/10/2017, right 8/31/2017   • CHOLECYSTECTOMY     • COLONOSCOPY     • CORONARY ARTERY BYPASS GRAFT  2005    X 3   • HYSTERECTOMY     • OTHER SURGICAL HISTORY      Bilateral heel spurs   • OTHER SURGICAL HISTORY Right 01/2016    Femoral endarterectomy   • TOTAL HIP ARTHROPLASTY Left 2012     Family History   Problem Relation Name Age of Onset   • Diabetes Mother     • Diabetes Daughter     • Other (RA) Daughter       Allergies   Allergen Reactions   • Quinolones Rash   • Chlorpheniramine-Dextromethorp Hives   • Amoxicillin Hives   • Amoxicillin-Pot Clavulanate Rash   • Ciprofloxacin Rash   • Levofloxacin Rash     Current Outpatient Medications on File Prior to Visit   Medication Sig Dispense Refill   • aluminum-magnesium hydroxide 200-200 mg/5 mL suspension Take 10 mL by mouth every 6 hours if needed for heartburn.     • amLODIPine (Norvasc) 10 mg tablet Take 1 tablet (10 mg) by mouth once daily. 90 tablet 3   • apixaban (Eliquis) 2.5 mg tablet Take 1 tablet (2.5 mg) by mouth 2 times a day for 14 days. 28 tablet 0   • aspirin 81 mg chewable tablet Chew and swallow 1 tablet (81 mg) by mouth once daily. 30 tablet 0   • cholecalciferol (Vitamin D-3) 50 mcg (2,000 unit) capsule Take 1 capsule (50 mcg) by mouth early in the morning..     • cyanocobalamin (Vitamin B-12) 1,000 mcg tablet Take 1 tablet (1,000 mcg) by mouth once daily.     • folic acid (Folvite) 1 mg tablet Take 1 tablet (1 mg) by mouth once daily.     • folic acid (Folvite) 1 mg tablet Take 1 tablet (1 mg) by mouth once daily. 30 tablet 0   • furosemide (Lasix) 20 mg tablet Take 1 tablet (20 mg) by mouth once daily. 30 tablet 0   • gabapentin (Neurontin) 100 mg capsule Take 1 capsule (100 mg) by mouth in the morning and at bedtime. In the morning and before bedtime     •  hydrALAZINE (Apresoline) 50 mg tablet Take 1 tablet (50 mg) by mouth 2 times a day. 180 tablet 3   • hydroCHLOROthiazide (HYDRODiuril) 25 mg tablet Take 1 tablet (25 mg) by mouth once daily.     • insulin degludec (Tresiba FlexTouch U-100) 100 unit/mL (3 mL) injection Inject 11 Units under the skin once daily in the morning. As directed 1 box     • insulin lispro (HumaLOG  KwikPen U-100) 100 unit/mL injection Inject 30 Units under the skin 3 times a day with meals. ON A SLIDING SCALE     • levothyroxine (Synthroid, Levoxyl) 100 mcg tablet Take 1 tablet (100 mcg) by mouth once daily.     • lisinopril 40 mg tablet TAKE 1 TABLET BY MOUTH EVERY DAY 90 tablet 3   • metoprolol tartrate (Lopressor) 25 mg tablet Take 0.5 tablets (12.5 mg) by mouth 2 times a day. 90 tablet 3   • metoprolol tartrate (Lopressor) 25 mg tablet Take 1 tablet (25 mg) by mouth 2 times a day. 60 tablet 0   • nitroglycerin (Nitrodur) 0.2 mg/hr patch Place 1 patch over 12 hours on the skin once daily. 30 patch 0   • nitroglycerin (Nitrostat) 0.4 mg SL tablet Place 1 tablet (0.4 mg) under the tongue every 5 minutes if needed for chest pain (1 tab every 5 min as needed may repeat up to 3x before seeking medical attention). Every 5 minutes x 3 doses prn for chest pain 25 tablet 11   • POLYETHYLENE GLYCOL 3350 ORAL Take 1 packet by mouth once daily. With 8 oz of fluid     • pravastatin (Pravachol) 40 mg tablet TAKE 1 TABLET BY MOUTH ONCE DAILY (Patient taking differently: Take 1 tablet (40 mg) by mouth once daily at bedtime.) 90 tablet 1   • sulfaSALAzine (Azulfidine) 500 mg DR tablet Take 2 tablets (1,000 mg) by mouth 2 times a day.     • valACYclovir (Valtrex) 1 gram tablet Take 1 tablet (1,000 mg) by mouth if needed. Twice daily       No current facility-administered medications on file prior to visit.     Immunization History   Administered Date(s) Administered   • Flu vaccine, quadrivalent, high-dose, preservative free, age 65y+ (FLUZONE)  10/30/2020, 10/04/2021   • Influenza, High Dose Seasonal, Preservative Free 10/08/2018, 09/22/2019   • Influenza, seasonal, injectable 09/28/2012   • Moderna COVID-19 vaccine, Fall 2023, 12 yeasrs and older (50mcg/0.5mL) 10/25/2023   • Moderna SARS-CoV-2 Vaccination 03/17/2021, 04/16/2021, 12/02/2021, 04/21/2022   • Pneumococcal conjugate vaccine, 13-valent (PREVNAR 13) 03/22/2018   • Pneumococcal polysaccharide vaccine, 23-valent, age 2 years and older (PNEUMOVAX 23) 07/11/2019   • Tdap vaccine, age 7 year and older (BOOSTRIX, ADACEL) 03/26/2018     Patient's medical history was reviewed and updated either before or during this encounter.  ASSESSMENT / PLAN:  Diagnoses and all orders for this visit:  Status post right knee replacement  Essential hypertension  Mixed hyperlipidemia  Dyslipidemia  Acquired hypothyroidism  Type 2 diabetes mellitus without complication, with long-term current use of insulin (Multi)  Urinary retention  Stage 3a chronic kidney disease (Multi)  Difficulty walking    Patient is being seen for a follow-up at at the nursing home.  Patient failed voiding trial and has a Layton catheter.  Patient has diabetes and blood sugars were elevated so dose of insulin has been adjusted.  Patient is on thyroid supplementation for hypothyroidism.  Kidney functions are improving      Arnulfo Clarke MD       Electronically Signed By: Arnulfo Clarke MD   5/27/24 10:53 AM

## 2024-05-03 ENCOUNTER — NURSING HOME VISIT (OUTPATIENT)
Dept: POST ACUTE CARE | Facility: EXTERNAL LOCATION | Age: 84
End: 2024-05-03
Payer: MEDICARE

## 2024-05-03 DIAGNOSIS — E78.2 MIXED HYPERLIPIDEMIA: ICD-10-CM

## 2024-05-03 DIAGNOSIS — E11.9 TYPE 2 DIABETES MELLITUS WITHOUT COMPLICATION, WITH LONG-TERM CURRENT USE OF INSULIN (MULTI): ICD-10-CM

## 2024-05-03 DIAGNOSIS — E03.9 ACQUIRED HYPOTHYROIDISM: ICD-10-CM

## 2024-05-03 DIAGNOSIS — Z79.4 TYPE 2 DIABETES MELLITUS WITHOUT COMPLICATION, WITH LONG-TERM CURRENT USE OF INSULIN (MULTI): ICD-10-CM

## 2024-05-03 DIAGNOSIS — R33.9 URINARY RETENTION: ICD-10-CM

## 2024-05-03 DIAGNOSIS — I10 ESSENTIAL HYPERTENSION: Primary | ICD-10-CM

## 2024-05-03 DIAGNOSIS — Z95.1 HISTORY OF CORONARY ARTERY BYPASS GRAFT: ICD-10-CM

## 2024-05-03 PROCEDURE — 99310 SBSQ NF CARE HIGH MDM 45: CPT | Performed by: INTERNAL MEDICINE

## 2024-05-03 NOTE — LETTER
Patient: Bonnie Jules  : 1940    Encounter Date: 2024    [facility]: [facility]  PROGRESS NOTE      Bonnie Jules is a 83 y.o. female that is being seen  today for follow-up at rehab  Subjective  Patient is being seen for follow-up at rehab.  Patient's blood sugars have been better controlled after adjustment of insulin.  Patient patient failed voiding trial and has a Layton catheter.  Patient had follow-up with the nephrologist.  Patient's pain is fairly controlled.  Sodium levels have been better kidney functions are improving.  Patient has been getting physical therapy      ROS  Negative for fever or chills  Negative for sore throat, ear pain, nasal discharge  Negative for cough, shortness of breath or wheezing  Negative for chest pain, palpitations, swelling of legs  Negative for abdominal pain, constipation, diarrhea, blood in the stools  Negative for urinary complaints  Negative for headache, dizziness, weakness or numbness  Positive for right knee pain  Negative for depression or anxiety  All other systems reviewed and were negative   Vitals:    24 1451   BP: 124/68   Pulse: 78   Temp: 36.9 °C (98.4 °F)      There were no vitals filed for this visit.  There is no height or weight on file to calculate BMI.  Physical Exam  Constitutional: Patient does not appear to be in any acute distress  Head and Face: NCAT  Eyes: Normal external exam, EOMI  ENT: Normal external inspection of ears and nose. Oropharynx normal.  Cardiovascular: RRR, S1/S2, no murmurs, rubs, or gallops, radial pulses +2, no edema of extremities  Pulmonary: CTAB, no respiratory distress.  Abdomen: +BS, soft, non-tender, nondistended, no guarding or rebound, no masses noted  MSK: Difficulty in walking and right knee pain.  Skin- No lesions, contusions, or erythema.  Peripheral puslses palpable bilaterally 2+  Neuro: AAO X3, Cranial nerves 2-12 grossly intact,DTR 2+ in all 4 limbs   Psychiatric:  Judgment intact. Appropriate mood and behavior    LABS   [unfilled]  Lab Results   Component Value Date    GLUCOSE 137 (H) 05/16/2024    CALCIUM 9.0 05/16/2024     05/16/2024    K 4.8 05/16/2024    CO2 24 05/16/2024    CL 97 05/16/2024    BUN 36 (H) 05/16/2024    CREATININE 1.40 05/16/2024     Lab Results   Component Value Date    ALT 15 05/16/2024    AST 25 05/16/2024    ALKPHOS 96 05/16/2024    BILITOT 0.2 05/16/2024     Lab Results   Component Value Date    WBC 6.3 05/16/2024    HGB 9.6 (L) 05/16/2024    HCT 30.1 (L) 05/16/2024    MCV 99 05/16/2024     05/16/2024     Lab Results   Component Value Date    CHOL 162 03/06/2023    CHOL 167 02/25/2022    CHOL 163 01/05/2022     Lab Results   Component Value Date    HDL 70 03/06/2023    HDL 74 02/25/2022    HDL 71 01/05/2022     Lab Results   Component Value Date    LDLCALC 67 03/06/2023    LDLCALC 75 02/25/2022    LDLCALC 73 01/05/2022     Lab Results   Component Value Date    TRIG 125 03/06/2023    TRIG 88 02/25/2022    TRIG 97 01/05/2022     Lab Results   Component Value Date    HGBA1C 6.7 (H) 04/03/2024     Other labs not included in the list above were reviewed either before or during this encounter.    History   Past Medical History:   Diagnosis Date   • Anxiety    • Benign hypertension    • Chronic ischemic colitis (Multi)     With history of sepsis and infectious gastroenteritis 10/7/2022-10/11/2022   • Chronic kidney disease, stage III (moderate) (Multi)    • CLL (chronic lymphocytic leukemia) (Multi)    • Coronary artery disease    • CTS (carpal tunnel syndrome)    • Diabetes mellitus (Multi)    • Essential (primary) hypertension    • GI bleed    • Hyperlipidemia, unspecified    • Hypertension    • Hypothyroidism    • Hypothyroidism, unspecified type    • Mixed hyperlipidemia    • Osteoarthritis    • Osteoporosis    • Peripheral vascular disease (CMS-HCC)    • Right knee pain    • Seizure disorder (Multi)    • Type 1 diabetes mellitus with  hyperglycemia (Multi)      Past Surgical History:   Procedure Laterality Date   • CARPAL TUNNEL RELEASE Bilateral    • CATARACT EXTRACTION W/ INTRAOCULAR LENS  IMPLANT, BILATERAL      Left 8/10/2017, right 2017   • CHOLECYSTECTOMY     • COLONOSCOPY     • CORONARY ARTERY BYPASS GRAFT  2005    X 3   • HYSTERECTOMY     • KNEE SURGERY Right 2024    total   • OTHER SURGICAL HISTORY      Bilateral heel spurs   • OTHER SURGICAL HISTORY Right 2016    Femoral endarterectomy   • TOTAL HIP ARTHROPLASTY Left      Family History   Problem Relation Name Age of Onset   • Diabetes Mother     • Diabetes Daughter     • Other (RA) Daughter       Allergies   Allergen Reactions   • Quinolones Rash   • Chlorpheniramine-Dextromethorp Hives   • Amoxicillin Hives   • Amoxicillin-Pot Clavulanate Rash   • Ciprofloxacin Rash   • Levofloxacin Rash     Current Outpatient Medications on File Prior to Visit   Medication Sig Dispense Refill   • amLODIPine (Norvasc) 10 mg tablet Take 1 tablet (10 mg) by mouth once daily. 90 tablet 3   • [] aspirin 81 mg chewable tablet Chew and swallow 1 tablet (81 mg) by mouth once daily. 30 tablet 0   • cholecalciferol (Vitamin D-3) 50 mcg (2,000 unit) capsule Take 1 capsule (50 mcg) by mouth early in the morning..     • cyanocobalamin (Vitamin B-12) 1,000 mcg tablet Take 1 tablet (1,000 mcg) by mouth once daily.     • [] folic acid (Folvite) 1 mg tablet Take 1 tablet (1 mg) by mouth once daily. 30 tablet 0   • gabapentin (Neurontin) 100 mg capsule Take 1 capsule (100 mg) by mouth in the morning and at bedtime. In the morning and before bedtime     • hydrALAZINE (Apresoline) 50 mg tablet Take 1 tablet (50 mg) by mouth 2 times a day. 180 tablet 3   • hydroCHLOROthiazide (HYDRODiuril) 25 mg tablet Take 1 tablet (25 mg) by mouth once daily.     • insulin degludec (Tresiba FlexTouch U-100) 100 unit/mL (3 mL) injection Inject 12 Units under the skin once daily in the morning. As directed  1 box     • insulin lispro (HumaLOG  KwikPen U-100) 100 unit/mL injection Inject 30 Units under the skin 3 times daily (morning, midday, late afternoon). ON A SLIDING SCALE     • levothyroxine (Synthroid, Levoxyl) 100 mcg tablet Take 1 tablet (100 mcg) by mouth once daily.     • lisinopril 40 mg tablet TAKE 1 TABLET BY MOUTH EVERY DAY 90 tablet 3   • metoprolol tartrate (Lopressor) 25 mg tablet Take 1 tablet (25 mg) by mouth 2 times a day. 60 tablet 0   • POLYETHYLENE GLYCOL 3350 ORAL Take 1 packet by mouth once daily. With 8 oz of fluid as needed     • pravastatin (Pravachol) 40 mg tablet TAKE 1 TABLET BY MOUTH ONCE DAILY (Patient taking differently: Take 1 tablet (40 mg) by mouth once daily at bedtime.) 90 tablet 1   • sulfaSALAzine (Azulfidine) 500 mg DR tablet Take 2 tablets (1,000 mg) by mouth 2 times a day.     • valACYclovir (Valtrex) 1 gram tablet Take 1 tablet (1,000 mg) by mouth if needed. Twice daily     • [DISCONTINUED] nitroglycerin (Nitrodur) 0.2 mg/hr patch Place 1 patch over 12 hours on the skin once daily. 30 patch 0     No current facility-administered medications on file prior to visit.     Immunization History   Administered Date(s) Administered   • Flu vaccine, quadrivalent, high-dose, preservative free, age 65y+ (FLUZONE) 10/30/2020, 10/04/2021   • Influenza, High Dose Seasonal, Preservative Free 10/08/2018, 09/22/2019   • Influenza, seasonal, injectable 09/28/2012   • Moderna COVID-19 vaccine, Fall 2023, 12 yeasrs and older (50mcg/0.5mL) 10/25/2023   • Moderna SARS-CoV-2 Vaccination 03/17/2021, 04/16/2021, 12/02/2021, 04/21/2022   • Pneumococcal conjugate vaccine, 13-valent (PREVNAR 13) 03/22/2018   • Pneumococcal polysaccharide vaccine, 23-valent, age 2 years and older (PNEUMOVAX 23) 07/11/2019   • Tdap vaccine, age 7 year and older (BOOSTRIX, ADACEL) 03/26/2018     Patient's medical history was reviewed and updated either before or during this encounter.  ASSESSMENT / PLAN:  Diagnoses  and all orders for this visit:  Essential hypertension  Mixed hyperlipidemia  History of coronary artery bypass graft  Acquired hypothyroidism  Type 2 diabetes mellitus without complication, with long-term current use of insulin (Multi)  Urinary retention    Patient is being seen for follow-up at rehab.  Patient had right knee replacement done.  Getting physical therapy.  Patient blood sugars have been better controlled after adjustment of the insulin.  Lab work reviewed kidney functions are improving sodium levels have been better.      Arnulfo Clarke MD       Electronically Signed By: Arnulfo Clarke MD   5/27/24 11:18 AM

## 2024-05-05 VITALS — HEART RATE: 78 BPM | TEMPERATURE: 98.4 F | DIASTOLIC BLOOD PRESSURE: 68 MMHG | SYSTOLIC BLOOD PRESSURE: 124 MMHG

## 2024-05-05 NOTE — PROGRESS NOTES
[facility]: [facility]  PROGRESS NOTE      Bonnie Jules is a 83 y.o. female that is being seen  today for FOLLOW UP at rehab.  Subjective   Patient is a 83-year-old female who is being seen for follow-up at rehab.  Patient had right knee replacement.  Patient also had acute retention of urine and has Layton catheter.  Patient failed voiding trial.  Patient blood sugars have been high so dose of insulin adjusted.  Patient is on long-acting as well as on sliding scale insulin.  Patient's lab work reviewed.  Patient sodium levels were low.  Patient has been on fluid restriction.  Patient also has mild elevation of the kidney functions in the hospital which is improving.  Patient is getting physical therapy.      ROS  Negative for fever or chills  Negative for sore throat, ear pain, nasal discharge  Negative for cough, shortness of breath or wheezing  Negative for chest pain, palpitations, swelling of legs  Negative for abdominal pain, constipation, diarrhea, blood in the stools  Negative for urinary complaints  Negative for headache, dizziness, weakness or numbness  Positive for right knee pain and difficulty in walking  Negative for depression or anxiety  All other systems reviewed and were negative     Vital signs are stable    Physical Exam  Constitutional: Patient does not appear to be in any acute distress  Head and Face: NCAT  Eyes: Normal external exam, EOMI  ENT: Normal external inspection of ears and nose. Oropharynx normal.  Cardiovascular: RRR, S1/S2, no murmurs, rubs, or gallops, radial pulses +2, no edema of extremities  Pulmonary: CTAB, no respiratory distress.  Abdomen: +BS, soft, non-tender, nondistended, no guarding or rebound, no masses noted  MSK: Mild swelling of the right knee and right leg.  Skin- No lesions, contusions, or erythema.  Peripheral puslses palpable bilaterally 2+  Neuro: AAO X3, Cranial nerves 2-12 grossly intact,DTR 2+ in all 4 limbs       LABS    [unfilled]  Lab Results   Component Value Date    GLUCOSE 42 (L) 04/26/2024    CALCIUM 8.6 04/26/2024     (L) 04/26/2024    K 4.5 04/26/2024    CO2 29 04/26/2024    CL 95 (L) 04/26/2024    BUN 32 (H) 04/26/2024    CREATININE 1.28 (H) 04/26/2024     Lab Results   Component Value Date    ALT 20 04/26/2024    AST 35 04/26/2024    ALKPHOS 71 04/26/2024    BILITOT 0.3 04/26/2024     Lab Results   Component Value Date    WBC 7.9 04/26/2024    HGB 9.1 (L) 04/26/2024    HCT 28.9 (L) 04/26/2024     04/26/2024     (H) 04/26/2024     Lab Results   Component Value Date    CHOL 162 03/06/2023    CHOL 167 02/25/2022    CHOL 163 01/05/2022     Lab Results   Component Value Date    HDL 70 03/06/2023    HDL 74 02/25/2022    HDL 71 01/05/2022     Lab Results   Component Value Date    LDLCALC 67 03/06/2023    LDLCALC 75 02/25/2022    LDLCALC 73 01/05/2022     Lab Results   Component Value Date    TRIG 125 03/06/2023    TRIG 88 02/25/2022    TRIG 97 01/05/2022     Lab Results   Component Value Date    HGBA1C 6.7 (H) 04/03/2024     Other labs not included in the list above were reviewed either before or during this encounter.    History    Past Medical History:   Diagnosis Date    Anxiety     Benign hypertension     Chronic ischemic colitis (Multi)     With history of sepsis and infectious gastroenteritis 10/7/2022-10/11/2022    Chronic kidney disease, stage III (moderate) (Multi)     CLL (chronic lymphocytic leukemia) (Multi)     Coronary artery disease     CTS (carpal tunnel syndrome)     Diabetes mellitus (Multi)     Essential (primary) hypertension     GI bleed     Hyperlipidemia, unspecified     Hypertension     Hypothyroidism     Hypothyroidism, unspecified type     Mixed hyperlipidemia     Osteoarthritis     Osteoporosis     Peripheral vascular disease (CMS-HCC)     Seizure disorder (Multi)     Type 1 diabetes mellitus with hyperglycemia (Multi)      Past Surgical History:   Procedure Laterality Date     CARPAL TUNNEL RELEASE Bilateral     CATARACT EXTRACTION W/ INTRAOCULAR LENS  IMPLANT, BILATERAL      Left 8/10/2017, right 8/31/2017    CHOLECYSTECTOMY      COLONOSCOPY      CORONARY ARTERY BYPASS GRAFT  2005    X 3    HYSTERECTOMY      OTHER SURGICAL HISTORY      Bilateral heel spurs    OTHER SURGICAL HISTORY Right 01/2016    Femoral endarterectomy    TOTAL HIP ARTHROPLASTY Left 2012     Family History   Problem Relation Name Age of Onset    Diabetes Mother      Diabetes Daughter      Other (RA) Daughter       Allergies   Allergen Reactions    Quinolones Rash    Chlorpheniramine-Dextromethorp Hives    Amoxicillin Hives    Amoxicillin-Pot Clavulanate Rash    Ciprofloxacin Rash    Levofloxacin Rash     Current Outpatient Medications on File Prior to Visit   Medication Sig Dispense Refill    aluminum-magnesium hydroxide 200-200 mg/5 mL suspension Take 10 mL by mouth every 6 hours if needed for heartburn.      amLODIPine (Norvasc) 10 mg tablet Take 1 tablet (10 mg) by mouth once daily. 90 tablet 3    apixaban (Eliquis) 2.5 mg tablet Take 1 tablet (2.5 mg) by mouth 2 times a day for 14 days. 28 tablet 0    aspirin 81 mg chewable tablet Chew and swallow 1 tablet (81 mg) by mouth once daily. 30 tablet 0    cholecalciferol (Vitamin D-3) 50 mcg (2,000 unit) capsule Take 1 capsule (50 mcg) by mouth early in the morning..      cyanocobalamin (Vitamin B-12) 1,000 mcg tablet Take 1 tablet (1,000 mcg) by mouth once daily.      folic acid (Folvite) 1 mg tablet Take 1 tablet (1 mg) by mouth once daily.      folic acid (Folvite) 1 mg tablet Take 1 tablet (1 mg) by mouth once daily. 30 tablet 0    furosemide (Lasix) 20 mg tablet Take 1 tablet (20 mg) by mouth once daily. 30 tablet 0    gabapentin (Neurontin) 100 mg capsule Take 1 capsule (100 mg) by mouth in the morning and at bedtime. In the morning and before bedtime      hydrALAZINE (Apresoline) 50 mg tablet Take 1 tablet (50 mg) by mouth 2 times a day. 180 tablet 3     hydroCHLOROthiazide (HYDRODiuril) 25 mg tablet Take 1 tablet (25 mg) by mouth once daily.      insulin degludec (Tresiba FlexTouch U-100) 100 unit/mL (3 mL) injection Inject 11 Units under the skin once daily in the morning. As directed 1 box      insulin lispro (HumaLOG  KwikPen U-100) 100 unit/mL injection Inject 30 Units under the skin 3 times a day with meals. ON A SLIDING SCALE      levothyroxine (Synthroid, Levoxyl) 100 mcg tablet Take 1 tablet (100 mcg) by mouth once daily.      lisinopril 40 mg tablet TAKE 1 TABLET BY MOUTH EVERY DAY 90 tablet 3    metoprolol tartrate (Lopressor) 25 mg tablet Take 0.5 tablets (12.5 mg) by mouth 2 times a day. 90 tablet 3    metoprolol tartrate (Lopressor) 25 mg tablet Take 1 tablet (25 mg) by mouth 2 times a day. 60 tablet 0    nitroglycerin (Nitrodur) 0.2 mg/hr patch Place 1 patch over 12 hours on the skin once daily. 30 patch 0    nitroglycerin (Nitrostat) 0.4 mg SL tablet Place 1 tablet (0.4 mg) under the tongue every 5 minutes if needed for chest pain (1 tab every 5 min as needed may repeat up to 3x before seeking medical attention). Every 5 minutes x 3 doses prn for chest pain 25 tablet 11    POLYETHYLENE GLYCOL 3350 ORAL Take 1 packet by mouth once daily. With 8 oz of fluid      pravastatin (Pravachol) 40 mg tablet TAKE 1 TABLET BY MOUTH ONCE DAILY (Patient taking differently: Take 1 tablet (40 mg) by mouth once daily at bedtime.) 90 tablet 1    sulfaSALAzine (Azulfidine) 500 mg DR tablet Take 2 tablets (1,000 mg) by mouth 2 times a day.      valACYclovir (Valtrex) 1 gram tablet Take 1 tablet (1,000 mg) by mouth if needed. Twice daily       No current facility-administered medications on file prior to visit.     Immunization History   Administered Date(s) Administered    Flu vaccine, quadrivalent, high-dose, preservative free, age 65y+ (FLUZONE) 10/30/2020, 10/04/2021    Influenza, High Dose Seasonal, Preservative Free 10/08/2018, 09/22/2019    Influenza,  seasonal, injectable 09/28/2012    Moderna COVID-19 vaccine, Fall 2023, 12 yeasrs and older (50mcg/0.5mL) 10/25/2023    Moderna SARS-CoV-2 Vaccination 03/17/2021, 04/16/2021, 12/02/2021, 04/21/2022    Pneumococcal conjugate vaccine, 13-valent (PREVNAR 13) 03/22/2018    Pneumococcal polysaccharide vaccine, 23-valent, age 2 years and older (PNEUMOVAX 23) 07/11/2019    Tdap vaccine, age 7 year and older (BOOSTRIX, ADACEL) 03/26/2018     Patient's medical history was reviewed and updated either before or during this encounter.  ASSESSMENT / PLAN:  Diagnoses and all orders for this visit:  Status post right knee replacement  Essential hypertension  Mixed hyperlipidemia  Dyslipidemia  Acquired hypothyroidism  Type 2 diabetes mellitus without complication, with long-term current use of insulin (Multi)  Urinary retention  Stage 3a chronic kidney disease (Multi)  Difficulty walking    Patient is being seen for a follow-up at at the nursing home.  Patient failed voiding trial and has a Layton catheter.  Patient has diabetes and blood sugars were elevated so dose of insulin has been adjusted.  Patient is on thyroid supplementation for hypothyroidism.  Kidney functions are improving      Arnulfo Clarke MD

## 2024-05-05 NOTE — PROGRESS NOTES
[facility]: [facility]  PROGRESS NOTE      Bonnie Jules is a 83 y.o. female that is being seen  today for follow-up at rehab  Subjective   Patient is being seen for follow-up at rehab.  Patient's blood sugars have been better controlled after adjustment of insulin.  Patient patient failed voiding trial and has a Layton catheter.  Patient had follow-up with the nephrologist.  Patient's pain is fairly controlled.  Sodium levels have been better kidney functions are improving.  Patient has been getting physical therapy      ROS  Negative for fever or chills  Negative for sore throat, ear pain, nasal discharge  Negative for cough, shortness of breath or wheezing  Negative for chest pain, palpitations, swelling of legs  Negative for abdominal pain, constipation, diarrhea, blood in the stools  Negative for urinary complaints  Negative for headache, dizziness, weakness or numbness  Positive for right knee pain  Negative for depression or anxiety  All other systems reviewed and were negative   Vitals:    05/03/24 1451   BP: 124/68   Pulse: 78   Temp: 36.9 °C (98.4 °F)      There were no vitals filed for this visit.  There is no height or weight on file to calculate BMI.  Physical Exam  Constitutional: Patient does not appear to be in any acute distress  Head and Face: NCAT  Eyes: Normal external exam, EOMI  ENT: Normal external inspection of ears and nose. Oropharynx normal.  Cardiovascular: RRR, S1/S2, no murmurs, rubs, or gallops, radial pulses +2, no edema of extremities  Pulmonary: CTAB, no respiratory distress.  Abdomen: +BS, soft, non-tender, nondistended, no guarding or rebound, no masses noted  MSK: Difficulty in walking and right knee pain.  Skin- No lesions, contusions, or erythema.  Peripheral puslses palpable bilaterally 2+  Neuro: AAO X3, Cranial nerves 2-12 grossly intact,DTR 2+ in all 4 limbs   Psychiatric: Judgment intact. Appropriate mood and behavior    LABS   [unfilled]  Lab  Results   Component Value Date    GLUCOSE 137 (H) 05/16/2024    CALCIUM 9.0 05/16/2024     05/16/2024    K 4.8 05/16/2024    CO2 24 05/16/2024    CL 97 05/16/2024    BUN 36 (H) 05/16/2024    CREATININE 1.40 05/16/2024     Lab Results   Component Value Date    ALT 15 05/16/2024    AST 25 05/16/2024    ALKPHOS 96 05/16/2024    BILITOT 0.2 05/16/2024     Lab Results   Component Value Date    WBC 6.3 05/16/2024    HGB 9.6 (L) 05/16/2024    HCT 30.1 (L) 05/16/2024    MCV 99 05/16/2024     05/16/2024     Lab Results   Component Value Date    CHOL 162 03/06/2023    CHOL 167 02/25/2022    CHOL 163 01/05/2022     Lab Results   Component Value Date    HDL 70 03/06/2023    HDL 74 02/25/2022    HDL 71 01/05/2022     Lab Results   Component Value Date    LDLCALC 67 03/06/2023    LDLCALC 75 02/25/2022    LDLCALC 73 01/05/2022     Lab Results   Component Value Date    TRIG 125 03/06/2023    TRIG 88 02/25/2022    TRIG 97 01/05/2022     Lab Results   Component Value Date    HGBA1C 6.7 (H) 04/03/2024     Other labs not included in the list above were reviewed either before or during this encounter.    History    Past Medical History:   Diagnosis Date    Anxiety     Benign hypertension     Chronic ischemic colitis (Multi)     With history of sepsis and infectious gastroenteritis 10/7/2022-10/11/2022    Chronic kidney disease, stage III (moderate) (Multi)     CLL (chronic lymphocytic leukemia) (Multi)     Coronary artery disease     CTS (carpal tunnel syndrome)     Diabetes mellitus (Multi)     Essential (primary) hypertension     GI bleed     Hyperlipidemia, unspecified     Hypertension     Hypothyroidism     Hypothyroidism, unspecified type     Mixed hyperlipidemia     Osteoarthritis     Osteoporosis     Peripheral vascular disease (CMS-HCC)     Right knee pain     Seizure disorder (Multi)     Type 1 diabetes mellitus with hyperglycemia (Multi)      Past Surgical History:   Procedure Laterality Date    CARPAL TUNNEL  RELEASE Bilateral     CATARACT EXTRACTION W/ INTRAOCULAR LENS  IMPLANT, BILATERAL      Left 8/10/2017, right 2017    CHOLECYSTECTOMY      COLONOSCOPY      CORONARY ARTERY BYPASS GRAFT  2005    X 3    HYSTERECTOMY      KNEE SURGERY Right 2024    total    OTHER SURGICAL HISTORY      Bilateral heel spurs    OTHER SURGICAL HISTORY Right 2016    Femoral endarterectomy    TOTAL HIP ARTHROPLASTY Left      Family History   Problem Relation Name Age of Onset    Diabetes Mother      Diabetes Daughter      Other (RA) Daughter       Allergies   Allergen Reactions    Quinolones Rash    Chlorpheniramine-Dextromethorp Hives    Amoxicillin Hives    Amoxicillin-Pot Clavulanate Rash    Ciprofloxacin Rash    Levofloxacin Rash     Current Outpatient Medications on File Prior to Visit   Medication Sig Dispense Refill    amLODIPine (Norvasc) 10 mg tablet Take 1 tablet (10 mg) by mouth once daily. 90 tablet 3    [] aspirin 81 mg chewable tablet Chew and swallow 1 tablet (81 mg) by mouth once daily. 30 tablet 0    cholecalciferol (Vitamin D-3) 50 mcg (2,000 unit) capsule Take 1 capsule (50 mcg) by mouth early in the morning..      cyanocobalamin (Vitamin B-12) 1,000 mcg tablet Take 1 tablet (1,000 mcg) by mouth once daily.      [] folic acid (Folvite) 1 mg tablet Take 1 tablet (1 mg) by mouth once daily. 30 tablet 0    gabapentin (Neurontin) 100 mg capsule Take 1 capsule (100 mg) by mouth in the morning and at bedtime. In the morning and before bedtime      hydrALAZINE (Apresoline) 50 mg tablet Take 1 tablet (50 mg) by mouth 2 times a day. 180 tablet 3    hydroCHLOROthiazide (HYDRODiuril) 25 mg tablet Take 1 tablet (25 mg) by mouth once daily.      insulin degludec (Tresiba FlexTouch U-100) 100 unit/mL (3 mL) injection Inject 12 Units under the skin once daily in the morning. As directed 1 box      insulin lispro (HumaLOG  KwikPen U-100) 100 unit/mL injection Inject 30 Units under the skin 3 times  daily (morning, midday, late afternoon). ON A SLIDING SCALE      levothyroxine (Synthroid, Levoxyl) 100 mcg tablet Take 1 tablet (100 mcg) by mouth once daily.      lisinopril 40 mg tablet TAKE 1 TABLET BY MOUTH EVERY DAY 90 tablet 3    metoprolol tartrate (Lopressor) 25 mg tablet Take 1 tablet (25 mg) by mouth 2 times a day. 60 tablet 0    POLYETHYLENE GLYCOL 3350 ORAL Take 1 packet by mouth once daily. With 8 oz of fluid as needed      pravastatin (Pravachol) 40 mg tablet TAKE 1 TABLET BY MOUTH ONCE DAILY (Patient taking differently: Take 1 tablet (40 mg) by mouth once daily at bedtime.) 90 tablet 1    sulfaSALAzine (Azulfidine) 500 mg DR tablet Take 2 tablets (1,000 mg) by mouth 2 times a day.      valACYclovir (Valtrex) 1 gram tablet Take 1 tablet (1,000 mg) by mouth if needed. Twice daily      [DISCONTINUED] nitroglycerin (Nitrodur) 0.2 mg/hr patch Place 1 patch over 12 hours on the skin once daily. 30 patch 0     No current facility-administered medications on file prior to visit.     Immunization History   Administered Date(s) Administered    Flu vaccine, quadrivalent, high-dose, preservative free, age 65y+ (FLUZONE) 10/30/2020, 10/04/2021    Influenza, High Dose Seasonal, Preservative Free 10/08/2018, 09/22/2019    Influenza, seasonal, injectable 09/28/2012    Moderna COVID-19 vaccine, Fall 2023, 12 yeasrs and older (50mcg/0.5mL) 10/25/2023    Moderna SARS-CoV-2 Vaccination 03/17/2021, 04/16/2021, 12/02/2021, 04/21/2022    Pneumococcal conjugate vaccine, 13-valent (PREVNAR 13) 03/22/2018    Pneumococcal polysaccharide vaccine, 23-valent, age 2 years and older (PNEUMOVAX 23) 07/11/2019    Tdap vaccine, age 7 year and older (BOOSTRIX, ADACEL) 03/26/2018     Patient's medical history was reviewed and updated either before or during this encounter.  ASSESSMENT / PLAN:  Diagnoses and all orders for this visit:  Essential hypertension  Mixed hyperlipidemia  History of coronary artery bypass graft  Acquired  hypothyroidism  Type 2 diabetes mellitus without complication, with long-term current use of insulin (Multi)  Urinary retention    Patient is being seen for follow-up at rehab.  Patient had right knee replacement done.  Getting physical therapy.  Patient blood sugars have been better controlled after adjustment of the insulin.  Lab work reviewed kidney functions are improving sodium levels have been better.      Arnulfo Clarke MD

## 2024-05-07 ENCOUNTER — LAB REQUISITION (OUTPATIENT)
Dept: LAB | Facility: HOSPITAL | Age: 84
End: 2024-05-07

## 2024-05-07 DIAGNOSIS — Z79.899 OTHER LONG TERM (CURRENT) DRUG THERAPY: ICD-10-CM

## 2024-05-07 LAB
ALBUMIN SERPL BCP-MCNC: 4 G/DL (ref 3.4–5)
ALBUMIN SERPL BCP-MCNC: 4 G/DL (ref 3.4–5)
ALP SERPL-CCNC: 102 U/L (ref 33–136)
ALT SERPL W P-5'-P-CCNC: 16 U/L (ref 7–45)
ANION GAP SERPL CALC-SCNC: 14 MMOL/L (ref 10–20)
ANION GAP SERPL CALC-SCNC: 14 MMOL/L (ref 10–20)
AST SERPL W P-5'-P-CCNC: 30 U/L (ref 9–39)
BASOPHILS # BLD AUTO: 0.01 X10*3/UL (ref 0–0.1)
BASOPHILS NFR BLD AUTO: 0.1 %
BILIRUB SERPL-MCNC: 0.3 MG/DL (ref 0–1.2)
BUN SERPL-MCNC: 32 MG/DL (ref 6–23)
BUN SERPL-MCNC: 32 MG/DL (ref 6–23)
CALCIUM SERPL-MCNC: 9.5 MG/DL (ref 8.6–10.3)
CALCIUM SERPL-MCNC: 9.5 MG/DL (ref 8.6–10.3)
CHLORIDE SERPL-SCNC: 94 MMOL/L (ref 98–107)
CHLORIDE SERPL-SCNC: 94 MMOL/L (ref 98–107)
CO2 SERPL-SCNC: 27 MMOL/L (ref 21–32)
CO2 SERPL-SCNC: 27 MMOL/L (ref 21–32)
CREAT SERPL-MCNC: 1.29 MG/DL (ref 0.5–1.05)
CREAT SERPL-MCNC: 1.29 MG/DL (ref 0.5–1.05)
EGFRCR SERPLBLD CKD-EPI 2021: 42 ML/MIN/1.73M*2
EGFRCR SERPLBLD CKD-EPI 2021: 42 ML/MIN/1.73M*2
EOSINOPHIL # BLD AUTO: 0.22 X10*3/UL (ref 0–0.4)
EOSINOPHIL NFR BLD AUTO: 2.8 %
ERYTHROCYTE [DISTWIDTH] IN BLOOD BY AUTOMATED COUNT: 14.8 % (ref 11.5–14.5)
GLUCOSE SERPL-MCNC: 73 MG/DL (ref 74–99)
GLUCOSE SERPL-MCNC: 73 MG/DL (ref 74–99)
HCT VFR BLD AUTO: 32.1 % (ref 36–46)
HGB BLD-MCNC: 10.3 G/DL (ref 12–16)
IMM GRANULOCYTES # BLD AUTO: 0.02 X10*3/UL (ref 0–0.5)
IMM GRANULOCYTES NFR BLD AUTO: 0.3 % (ref 0–0.9)
LYMPHOCYTES # BLD AUTO: 3.97 X10*3/UL (ref 0.8–3)
LYMPHOCYTES NFR BLD AUTO: 50 %
MCH RBC QN AUTO: 31.7 PG (ref 26–34)
MCHC RBC AUTO-ENTMCNC: 32.1 G/DL (ref 32–36)
MCV RBC AUTO: 99 FL (ref 80–100)
MONOCYTES # BLD AUTO: 0.71 X10*3/UL (ref 0.05–0.8)
MONOCYTES NFR BLD AUTO: 8.9 %
NEUTROPHILS # BLD AUTO: 3.01 X10*3/UL (ref 1.6–5.5)
NEUTROPHILS NFR BLD AUTO: 37.9 %
NRBC BLD-RTO: 0 /100 WBCS (ref 0–0)
PHOSPHATE SERPL-MCNC: 3.5 MG/DL (ref 2.5–4.9)
PLATELET # BLD AUTO: 402 X10*3/UL (ref 150–450)
POTASSIUM SERPL-SCNC: 4.2 MMOL/L (ref 3.5–5.3)
POTASSIUM SERPL-SCNC: 4.2 MMOL/L (ref 3.5–5.3)
PROT SERPL-MCNC: 6.1 G/DL (ref 6.4–8.2)
RBC # BLD AUTO: 3.25 X10*6/UL (ref 4–5.2)
SODIUM SERPL-SCNC: 131 MMOL/L (ref 136–145)
SODIUM SERPL-SCNC: 131 MMOL/L (ref 136–145)
WBC # BLD AUTO: 7.9 X10*3/UL (ref 4.4–11.3)

## 2024-05-07 PROCEDURE — 85025 COMPLETE CBC W/AUTO DIFF WBC: CPT

## 2024-05-07 PROCEDURE — 80053 COMPREHEN METABOLIC PANEL: CPT

## 2024-05-07 PROCEDURE — 80069 RENAL FUNCTION PANEL: CPT

## 2024-05-09 ENCOUNTER — PHARMACY VISIT (OUTPATIENT)
Dept: PHARMACY | Facility: CLINIC | Age: 84
End: 2024-05-09
Payer: COMMERCIAL

## 2024-05-09 PROBLEM — S02.92XA CLOSED FRACTURE OF FACIAL BONE (MULTI): Status: ACTIVE | Noted: 2024-05-09

## 2024-05-09 PROBLEM — J06.9 UPPER RESPIRATORY TRACT INFECTION: Status: ACTIVE | Noted: 2022-09-30

## 2024-05-09 PROBLEM — D64.9 ANEMIA: Status: ACTIVE | Noted: 2023-11-21

## 2024-05-09 PROBLEM — N18.30 CHRONIC KIDNEY DISEASE, STAGE 3 UNSPECIFIED (MULTI): Status: ACTIVE | Noted: 2022-10-08

## 2024-05-09 PROBLEM — S06.0X0A CONCUSSION WITHOUT LOSS OF CONSCIOUSNESS: Status: ACTIVE | Noted: 2024-05-09

## 2024-05-09 PROBLEM — E11.9 TYPE 2 DIABETES MELLITUS (MULTI): Status: ACTIVE | Noted: 2022-03-28

## 2024-05-09 PROBLEM — I99.8 VASCULAR CALCIFICATION: Status: ACTIVE | Noted: 2023-08-19

## 2024-05-09 PROBLEM — R55 SYNCOPE AND COLLAPSE: Status: ACTIVE | Noted: 2023-10-23

## 2024-05-09 PROBLEM — M81.0 SENILE OSTEOPOROSIS: Status: ACTIVE | Noted: 2022-10-08

## 2024-05-09 PROBLEM — R42 DIZZINESS: Status: ACTIVE | Noted: 2024-05-09

## 2024-05-09 PROBLEM — E87.20 ACIDOSIS, UNSPECIFIED: Status: ACTIVE | Noted: 2022-10-08

## 2024-05-09 PROBLEM — T14.8XXA EXTRAVASATION INJURY: Status: ACTIVE | Noted: 2023-08-19

## 2024-05-09 PROBLEM — S16.1XXA STRAIN OF NECK MUSCLE: Status: ACTIVE | Noted: 2024-05-09

## 2024-05-09 PROBLEM — J18.9 PNEUMONIA: Status: ACTIVE | Noted: 2024-05-09

## 2024-05-09 PROBLEM — E83.51 HYPOCALCEMIA: Status: ACTIVE | Noted: 2024-05-09

## 2024-05-09 PROBLEM — L97.519 ULCER OF RIGHT FOOT (MULTI): Status: ACTIVE | Noted: 2023-06-12

## 2024-05-09 PROBLEM — R53.1 WEAKNESS: Status: ACTIVE | Noted: 2024-05-09

## 2024-05-09 PROBLEM — E87.6 HYPOKALEMIA: Status: ACTIVE | Noted: 2022-10-08

## 2024-05-09 PROBLEM — Z20.822 CONTACT WITH AND (SUSPECTED) EXPOSURE TO COVID-19: Status: ACTIVE | Noted: 2022-10-08

## 2024-05-09 PROBLEM — M65.90 SYNOVITIS AND TENOSYNOVITIS: Status: ACTIVE | Noted: 2024-05-09

## 2024-05-09 PROBLEM — J32.9 SINUSITIS: Status: ACTIVE | Noted: 2022-09-30

## 2024-05-09 PROBLEM — R89.9 ABNORMAL FINDINGS ON EXAMINATION OF GENITOURINARY ORGANS: Status: ACTIVE | Noted: 2024-05-09

## 2024-05-09 PROBLEM — I21.4 ACUTE NON-ST ELEVATION MYOCARDIAL INFARCTION (NSTEMI) (MULTI): Status: ACTIVE | Noted: 2023-08-19

## 2024-05-09 PROBLEM — N18.9 CHRONIC RENAL IMPAIRMENT: Status: ACTIVE | Noted: 2023-08-19

## 2024-05-09 PROBLEM — I16.0 HYPERTENSIVE URGENCY: Status: ACTIVE | Noted: 2022-10-08

## 2024-05-09 PROBLEM — E66.3 OVERWEIGHT WITH BODY MASS INDEX (BMI) 25.0-29.9: Status: ACTIVE | Noted: 2024-05-09

## 2024-05-09 PROBLEM — J01.90 ACUTE SINUSITIS, UNSPECIFIED: Status: ACTIVE | Noted: 2022-09-30

## 2024-05-09 PROBLEM — M65.9 SYNOVITIS AND TENOSYNOVITIS: Status: ACTIVE | Noted: 2024-05-09

## 2024-05-09 PROBLEM — S22.39XA FRACTURE OF RIB: Status: ACTIVE | Noted: 2024-05-09

## 2024-05-09 PROBLEM — C91.10 CHRONIC LYMPHOCYTIC LEUKEMIA OF B-CELL TYPE NOT HAVING ACHIEVED REMISSION (MULTI): Status: ACTIVE | Noted: 2022-10-08

## 2024-05-09 PROCEDURE — RXMED WILLOW AMBULATORY MEDICATION CHARGE

## 2024-05-09 RX ORDER — ONDANSETRON 4 MG/1
4 TABLET, FILM COATED ORAL EVERY 8 HOURS PRN
Qty: 21 TABLET | Refills: 0 | OUTPATIENT
Start: 2024-05-09 | End: 2024-06-09 | Stop reason: ENTERED-IN-ERROR

## 2024-05-10 ENCOUNTER — OFFICE VISIT (OUTPATIENT)
Dept: ORTHOPEDIC SURGERY | Facility: CLINIC | Age: 84
End: 2024-05-10
Payer: MEDICARE

## 2024-05-10 ENCOUNTER — HOSPITAL ENCOUNTER (OUTPATIENT)
Dept: RADIOLOGY | Facility: CLINIC | Age: 84
Discharge: HOME | End: 2024-05-10
Payer: MEDICARE

## 2024-05-10 VITALS — BODY MASS INDEX: 25.67 KG/M2 | WEIGHT: 125.66 LBS

## 2024-05-10 DIAGNOSIS — Z96.659 TOTAL KNEE REPLACEMENT STATUS: ICD-10-CM

## 2024-05-10 DIAGNOSIS — M25.561 RIGHT KNEE PAIN, UNSPECIFIED CHRONICITY: Primary | ICD-10-CM

## 2024-05-10 DIAGNOSIS — Z96.651 HISTORY OF TOTAL RIGHT KNEE REPLACEMENT: ICD-10-CM

## 2024-05-10 PROCEDURE — 73560 X-RAY EXAM OF KNEE 1 OR 2: CPT | Mod: RT

## 2024-05-10 PROCEDURE — 99024 POSTOP FOLLOW-UP VISIT: CPT | Performed by: PHYSICIAN ASSISTANT

## 2024-05-10 PROCEDURE — 1036F TOBACCO NON-USER: CPT | Performed by: PHYSICIAN ASSISTANT

## 2024-05-10 PROCEDURE — 73560 X-RAY EXAM OF KNEE 1 OR 2: CPT | Mod: RIGHT SIDE | Performed by: ORTHOPAEDIC SURGERY

## 2024-05-10 PROCEDURE — 1111F DSCHRG MED/CURRENT MED MERGE: CPT | Performed by: PHYSICIAN ASSISTANT

## 2024-05-10 PROCEDURE — 1159F MED LIST DOCD IN RCRD: CPT | Performed by: PHYSICIAN ASSISTANT

## 2024-05-10 PROCEDURE — 1160F RVW MEDS BY RX/DR IN RCRD: CPT | Performed by: PHYSICIAN ASSISTANT

## 2024-05-10 ASSESSMENT — PAIN - FUNCTIONAL ASSESSMENT: PAIN_FUNCTIONAL_ASSESSMENT: 0-10

## 2024-05-10 ASSESSMENT — PAIN SCALES - GENERAL: PAINLEVEL_OUTOF10: 5 - MODERATE PAIN

## 2024-05-10 ASSESSMENT — ENCOUNTER SYMPTOMS
DEPRESSION: 0
LOSS OF SENSATION IN FEET: 0
OCCASIONAL FEELINGS OF UNSTEADINESS: 0

## 2024-05-10 ASSESSMENT — LIFESTYLE VARIABLES: TOTAL SCORE: 0

## 2024-05-10 ASSESSMENT — PAIN DESCRIPTION - DESCRIPTORS: DESCRIPTORS: THROBBING;ACHING

## 2024-05-10 NOTE — PATIENT INSTRUCTIONS
Continue to rest, ice and elevate your knee.  Continue your pain regimen (tylenol).   Continue home Physical therapy.   Remember to call our office for antibiotics before dental work.   Use a cane or walker for support.   Do not kneel, twist, or squat and avoid heavy lifting.   Return in 4 weeks or sooner as needed.

## 2024-05-10 NOTE — PROGRESS NOTES
Subjective      Past Surgical History:   Procedure Laterality Date    CARPAL TUNNEL RELEASE Bilateral     CATARACT EXTRACTION W/ INTRAOCULAR LENS  IMPLANT, BILATERAL      Left 8/10/2017, right 8/31/2017    CHOLECYSTECTOMY      COLONOSCOPY      CORONARY ARTERY BYPASS GRAFT  2005    X 3    HYSTERECTOMY      KNEE SURGERY Right 04/17/2024    total    OTHER SURGICAL HISTORY      Bilateral heel spurs    OTHER SURGICAL HISTORY Right 01/2016    Femoral endarterectomy    TOTAL HIP ARTHROPLASTY Left 2012        Patient History      This patient is s/p right total knee replacement done by Dr. Liriano on 4-. They present today and state that their right knee pain is 4/10. She has returned home from [facility]. She is awaiting to start home physical therapy. She has a evangelista placed and is following up with Dr. Rolle for further evaluation. They are slowly resuming their normal activities of daily living. She is seen today using a walker for support. They deny chest pain, shortness of breath.     There were no vitals taken for this visit.     ROS  CARDIOLOGY:   Negative for chest pain, shortness of breath.   RESPIRATORY:   Negative for chest pain, shortness of breath.   MUSCULOSKELETAL:   See HPI for details.   NEUROLOGY:   Negative for tingling, numbness, weakness.      Physical exam: Right knee: The incision is clean dry and well-healing.  No evidence of infection.  The patient has painless range of motion from 7 to 102 degrees.  Nontender in the right calf.  Neurovascular is intact.  Compartments are soft.  The patient is seen today walking with use of a walker for support.  She has a Evangelista in place.    NM Lung Perfusion    Result Date: 4/19/2024  Interpreted By:  Demarcus Beckham, STUDY: NM LUNG PERFUSION;  4/19/2024 3:43 pm   INDICATION: Signs/Symptoms:rule out PE.   COMPARISON: Chest radiograph on 04/19/2024   ACCESSION NUMBER(S): [id number]   ORDERING CLINICIAN: KLAUS HANCOCK   TECHNIQUE: DIVISION OF NUCLEAR MEDICINE  PERFUSION LUNG SCAN   Multiple perfusion images of the lungs were obtained after the intravenous administration of 4.2 mCi of Tc-99m MAA. SPECT/CT images of the lungs were also obtained.     FINDINGS: Perfusion images demonstrate mild heterogeneity within both lungs. No distinct wedge-shaped subsegmental or segmental perfusion defect seen on SPECT CT to suggest acute pulmonary embolism (low probability).   Low-dose CT demonstrates small bilateral pleural effusions along with bibasilar atelectasis       1.  No distinct wedge-shaped subsegmental or segmental perfusion defect seen on SPECT CT to suggest acute pulmonary embolism (low probability). 2. Low-dose CT demonstrates small bilateral pleural effusions along with bibasilar atelectasis.   I personally reviewed the images/study. This study was interpreted at [place], [place].   MACRO: None   Signed by: Demarcus Beckham 4/19/2024 4:03 PM Dictation workstation:   [id number]    XR chest 1 view    Result Date: 4/19/2024  Interpreted By:  Marco Hollis, STUDY: XR CHEST 1 VIEW; 4/19/2024 11:53 am   INDICATION: Signs/Symptoms:dyspnea.   COMPARISON: 04/18/2024   ACCESSION NUMBER(S): [id number]   ORDERING CLINICIAN: THEODORE NAGY   TECHNIQUE: 1 view of the chest was performed.   FINDINGS: The lungs are adequately inflated. No acute consolidation. No significant pleural effusion. No pneumothorax.  The cardiomediastinal silhouette is within normal limits. Median sternotomy wires are intact. Old posterior right rib fractures are again noted. Small, less than 2 cm metallic wire is also again seen above the proximal aspect of the left clavicle.       Stable examination. No acute cardiopulmonary disease.   Signed by: Marco Hollis 4/19/2024 1:19 PM Dictation workstation:   [id number]    XR knee right 1-2 views    Result Date: 4/17/2024  Interpreted By:  Avni Correa, STUDY: XR KNEE RIGHT 1-2 VIEWS; ;  4/17/2024 12:13 pm    INDICATION: Signs/Symptoms:Post op knee.   COMPARISON: None.   ACCESSION NUMBER(S): [id number]   ORDERING CLINICIAN: KLAUS HANCOCK   FINDINGS: Right knee, two views   Interval right total knee arthroplasty in place. No periprosthetic fracture lucency seen. There is no dislocation. Postsurgical change the soft tissue. There is a moderate-sized effusion       No immediate complication after right total knee arthroplasty     MACRO: None   Signed by: Avni Correa 4/17/2024 12:33 PM Dictation workstation:   [id number]       Bonnie was seen today for follow-up.  Diagnoses and all orders for this visit:  Right knee pain, unspecified chronicity (Primary)  History of total right knee replacement  Options are discussed with the patient in detail. The patient is instructed to continue with home physical therapy.  She is instructed regarding total knee infection precautions and fall precautions and to use a walker for support.  The patient is instructed regarding activity modification and risk for further injury with falling or trauma, ice, provider directed at home gentle strengthening and ROM exercises, and the appropriate use of Tylenol as needed for pain with its potential adverse reactions and side effects. The patient understands. Return in 4 weeks for re-evaluation or sooner as needed. Please note that this report has been produced using speech recognition software.  It may contain errors related to grammar, punctuation or spelling.  Electronically signed, but not reviewed.

## 2024-05-13 ENCOUNTER — LAB (OUTPATIENT)
Dept: LAB | Facility: LAB | Age: 84
End: 2024-05-13
Payer: MEDICARE

## 2024-05-13 DIAGNOSIS — N18.2 CHRONIC KIDNEY DISEASE, STAGE 2 (MILD): Primary | ICD-10-CM

## 2024-05-13 DIAGNOSIS — N18.2 CHRONIC KIDNEY DISEASE, STAGE 2 (MILD): ICD-10-CM

## 2024-05-13 LAB
ALBUMIN SERPL-MCNC: 3.8 G/DL (ref 3.5–5)
ANION GAP SERPL CALC-SCNC: 15 MMOL/L
BUN SERPL-MCNC: 35 MG/DL (ref 8–25)
CALCIUM SERPL-MCNC: 9.1 MG/DL (ref 8.5–10.4)
CHLORIDE SERPL-SCNC: 95 MMOL/L (ref 97–107)
CO2 SERPL-SCNC: 23 MMOL/L (ref 24–31)
CREAT SERPL-MCNC: 1.3 MG/DL (ref 0.4–1.6)
EGFRCR SERPLBLD CKD-EPI 2021: 41 ML/MIN/1.73M*2
ERYTHROCYTE [DISTWIDTH] IN BLOOD BY AUTOMATED COUNT: 14.9 % (ref 11.5–14.5)
GLUCOSE SERPL-MCNC: 173 MG/DL (ref 65–99)
HCT VFR BLD AUTO: 28.6 % (ref 36–46)
HGB BLD-MCNC: 9.3 G/DL (ref 12–16)
MCH RBC QN AUTO: 32.4 PG (ref 26–34)
MCHC RBC AUTO-ENTMCNC: 32.5 G/DL (ref 32–36)
MCV RBC AUTO: 100 FL (ref 80–100)
NRBC BLD-RTO: 0 /100 WBCS (ref 0–0)
PHOSPHATE SERPL-MCNC: 4.3 MG/DL (ref 2.5–4.5)
PLATELET # BLD AUTO: 269 X10*3/UL (ref 150–450)
POTASSIUM SERPL-SCNC: 4.8 MMOL/L (ref 3.4–5.1)
PTH-INTACT SERPL-MCNC: 107.1 PG/ML (ref 18.5–88)
RBC # BLD AUTO: 2.87 X10*6/UL (ref 4–5.2)
SODIUM SERPL-SCNC: 133 MMOL/L (ref 133–145)
WBC # BLD AUTO: 7.3 X10*3/UL (ref 4.4–11.3)

## 2024-05-13 PROCEDURE — 80069 RENAL FUNCTION PANEL: CPT

## 2024-05-13 PROCEDURE — 85027 COMPLETE CBC AUTOMATED: CPT

## 2024-05-13 PROCEDURE — 83970 ASSAY OF PARATHORMONE: CPT

## 2024-05-13 PROCEDURE — 36415 COLL VENOUS BLD VENIPUNCTURE: CPT

## 2024-05-14 DIAGNOSIS — N18.2 CHRONIC KIDNEY DISEASE, STAGE 2 (MILD): Primary | ICD-10-CM

## 2024-05-16 ENCOUNTER — LAB (OUTPATIENT)
Dept: LAB | Facility: LAB | Age: 84
End: 2024-05-16
Payer: MEDICARE

## 2024-05-16 DIAGNOSIS — N18.2 CHRONIC KIDNEY DISEASE, STAGE 2 (MILD): ICD-10-CM

## 2024-05-16 DIAGNOSIS — E11.9 ABNORMAL METABOLIC STATE DUE TO DIABETES MELLITUS (MULTI): ICD-10-CM

## 2024-05-16 LAB
ALBUMIN SERPL-MCNC: 4 G/DL (ref 3.5–5)
ALP BLD-CCNC: 96 U/L (ref 35–125)
ALT SERPL-CCNC: 15 U/L (ref 5–40)
ANION GAP SERPL CALC-SCNC: 14 MMOL/L
AST SERPL-CCNC: 25 U/L (ref 5–40)
BILIRUB SERPL-MCNC: 0.2 MG/DL (ref 0.1–1.2)
BUN SERPL-MCNC: 36 MG/DL (ref 8–25)
CALCIUM SERPL-MCNC: 9 MG/DL (ref 8.5–10.4)
CHLORIDE SERPL-SCNC: 97 MMOL/L (ref 97–107)
CO2 SERPL-SCNC: 24 MMOL/L (ref 24–31)
CREAT SERPL-MCNC: 1.4 MG/DL (ref 0.4–1.6)
EGFRCR SERPLBLD CKD-EPI 2021: 38 ML/MIN/1.73M*2
ERYTHROCYTE [DISTWIDTH] IN BLOOD BY AUTOMATED COUNT: 14.6 % (ref 11.5–14.5)
GLUCOSE SERPL-MCNC: 137 MG/DL (ref 65–99)
HCT VFR BLD AUTO: 30.1 % (ref 36–46)
HGB BLD-MCNC: 9.6 G/DL (ref 12–16)
MCH RBC QN AUTO: 31.7 PG (ref 26–34)
MCHC RBC AUTO-ENTMCNC: 31.9 G/DL (ref 32–36)
MCV RBC AUTO: 99 FL (ref 80–100)
NRBC BLD-RTO: 0 /100 WBCS (ref 0–0)
PHOSPHATE SERPL-MCNC: 3.9 MG/DL (ref 2.5–4.5)
PLATELET # BLD AUTO: 261 X10*3/UL (ref 150–450)
POTASSIUM SERPL-SCNC: 4.8 MMOL/L (ref 3.4–5.1)
PROT SERPL-MCNC: 6 G/DL (ref 5.9–7.9)
PTH-INTACT SERPL-MCNC: 73.4 PG/ML (ref 18.5–88)
RBC # BLD AUTO: 3.03 X10*6/UL (ref 4–5.2)
SODIUM SERPL-SCNC: 135 MMOL/L (ref 133–145)
WBC # BLD AUTO: 6.3 X10*3/UL (ref 4.4–11.3)

## 2024-05-16 PROCEDURE — 80053 COMPREHEN METABOLIC PANEL: CPT

## 2024-05-16 PROCEDURE — 84100 ASSAY OF PHOSPHORUS: CPT

## 2024-05-16 PROCEDURE — 85027 COMPLETE CBC AUTOMATED: CPT

## 2024-05-16 PROCEDURE — 36415 COLL VENOUS BLD VENIPUNCTURE: CPT

## 2024-05-16 PROCEDURE — 83970 ASSAY OF PARATHORMONE: CPT

## 2024-05-19 NOTE — PROGRESS NOTES
This is an 83 year old female for FU TRIPOINT ADMISSION in late April 2024 for extensive issues post operatively for KNEE REPLACEMENT:    SHE WAS SENT TO numerous specialties for PRE OP clearances prior to surgery at my urging.      URINARY RETENTION  RENAL FAILURE  HYPOXIA  NSTEMI    As below now in care of various specialists and RENAL FUNCTION returned to baseline and in care of Dr Tonia CARVAJAL      Also went to REHAB for [facility]ITOR[facility]    Upon release after two weeks no contact by HOME HEALTH CARE and we will refer to CASE MGT TEAM to organize but family has instead decided to go to OUT PATIENT PT now but apparently VERONICA SOUSA needs to refer so CASE MGT         Signed         Seen for JOSÉ on CKD stage III creatinine back to baseline at 1.1 mg/dL K to discharge from renal point review discussed with orthopedic surgery follow-up with us in 2 weeks            Electronically signed by Maximus Rolle MD at 4/24/2024 11:30 AM    Hospital Course   Right total knee replacement with subsequent rehabilitation.  This patient did develop urinary retention which resulted in kidney failure.  She has developed hypoxia and is being treated with supplemental oxygen.  She was diagnosed with a non-STEMI by the cardiologist and has been evaluated by the cardiologist.      ASSESSMENT:     Postop hypoxia  Chronic kidney disease  Postop TKR     PLAN     Home oxygen evaluation.  Continue versus bronchodilators and aerosolized steroids  Diuresis  Follow renal function  Okay to discharge from pulmonary standpoint.  Will see him in 3 weeks as outpatient with follow-up chest x-ray.           ROS is NEG for HEADACHE, NAUSEA, VOMITING, DIARRHEA, CHEST PAIN, SOB, and BLEEDING and as further REVIEWED BELOW.    Subjective   Bonnie Jules is a 83 y.o. female who presents for Follow-up (RIGHT KNEE replacement completed at [facility], [place], operated by [facility]. Healing well. No s/s of infection.).    HPI:    Per nursing intake, pt here for Follow-up (RIGHT KNEE  replacement completed at [facility]. Healing well. No s/s of infection.)       Review of systems is essentially negative for all systems except for any identified issues in HPI above.    Objective     /70   Pulse 67   Temp 35.9 °C (96.7 °F)   Wt 66.2 kg (146 lb)   LMP  (LMP Unknown)   SpO2 98%   BMI 29.83 kg/m²      Physical Examination:       GENERAL           General Appearance: well-appearing, well-developed, well-hydrated, well-nourished, no acute distress.        HEENT           NECK supple, no masses or thyromegaly, no carotid bruit.        EYES           Extraocular Movements: normal, bilateral eyes ZORAIDA, no conjunctival injection.        HEART           Rate and Rhythm regular rate and rhythm. Heart sounds: normal S1S2, no S3 or S4. Murmurs: none.        CHEST           Breath sounds: Clear to IPPA, RR<16 no use of accessory muscles.        ABDOMEN           General: Neg for LKKS or masses, no scleral icterus or jaundice.        MUSCULOSKELETAL           Joints Demonstration: Neg for erythema, swelling or joint deformities. gross abnormalities no gross abnormalities.        EXTREMITIES           Lower Extremities: Neg for cyanosis, clubbing or edema.       Assessment/Plan   RECOMMEND she make and keep all SPECIALTY FOLLOW UP visits with PULM, CARDIOLOGY and NEPHROLOGY  Problem List Items Addressed This Visit       Acute non-ST elevation myocardial infarction (NSTEMI) (Multi)    Urinary retention    Relevant Orders    Referral to Nephrology     Other Visit Diagnoses       Hospital discharge follow-up    -  Primary    Health counseling        Immunization counseling        Hypoxia        Renal failure, unspecified chronicity        Relevant Orders    Referral to Nephrology            FOLLOW UP:  PRN and as specified above         Ebonie Curtis M.D.

## 2024-05-20 ENCOUNTER — OFFICE VISIT (OUTPATIENT)
Dept: PRIMARY CARE | Facility: CLINIC | Age: 84
End: 2024-05-20
Payer: MEDICARE

## 2024-05-20 VITALS
BODY MASS INDEX: 29.83 KG/M2 | HEART RATE: 67 BPM | WEIGHT: 146 LBS | TEMPERATURE: 96.7 F | DIASTOLIC BLOOD PRESSURE: 70 MMHG | SYSTOLIC BLOOD PRESSURE: 130 MMHG | OXYGEN SATURATION: 98 %

## 2024-05-20 DIAGNOSIS — I21.4 ACUTE NON-ST ELEVATION MYOCARDIAL INFARCTION (NSTEMI) (MULTI): ICD-10-CM

## 2024-05-20 DIAGNOSIS — Z09 HOSPITAL DISCHARGE FOLLOW-UP: Primary | ICD-10-CM

## 2024-05-20 DIAGNOSIS — N19 RENAL FAILURE, UNSPECIFIED CHRONICITY: ICD-10-CM

## 2024-05-20 DIAGNOSIS — R33.9 URINARY RETENTION: ICD-10-CM

## 2024-05-20 DIAGNOSIS — M54.9 DORSALGIA, UNSPECIFIED: Primary | ICD-10-CM

## 2024-05-20 DIAGNOSIS — Z71.9 HEALTH COUNSELING: ICD-10-CM

## 2024-05-20 DIAGNOSIS — Z71.85 IMMUNIZATION COUNSELING: ICD-10-CM

## 2024-05-20 DIAGNOSIS — R09.02 HYPOXIA: ICD-10-CM

## 2024-05-20 PROCEDURE — 99214 OFFICE O/P EST MOD 30 MIN: CPT | Performed by: FAMILY MEDICINE

## 2024-05-20 PROCEDURE — 1111F DSCHRG MED/CURRENT MED MERGE: CPT | Performed by: FAMILY MEDICINE

## 2024-05-20 PROCEDURE — 1160F RVW MEDS BY RX/DR IN RCRD: CPT | Performed by: FAMILY MEDICINE

## 2024-05-20 PROCEDURE — 1159F MED LIST DOCD IN RCRD: CPT | Performed by: FAMILY MEDICINE

## 2024-05-20 PROCEDURE — 1036F TOBACCO NON-USER: CPT | Performed by: FAMILY MEDICINE

## 2024-05-20 PROCEDURE — 1125F AMNT PAIN NOTED PAIN PRSNT: CPT | Performed by: FAMILY MEDICINE

## 2024-05-20 PROCEDURE — 3075F SYST BP GE 130 - 139MM HG: CPT | Performed by: FAMILY MEDICINE

## 2024-05-20 PROCEDURE — 3078F DIAST BP <80 MM HG: CPT | Performed by: FAMILY MEDICINE

## 2024-05-20 ASSESSMENT — COLUMBIA-SUICIDE SEVERITY RATING SCALE - C-SSRS
6. HAVE YOU EVER DONE ANYTHING, STARTED TO DO ANYTHING, OR PREPARED TO DO ANYTHING TO END YOUR LIFE?: NO
1. IN THE PAST MONTH, HAVE YOU WISHED YOU WERE DEAD OR WISHED YOU COULD GO TO SLEEP AND NOT WAKE UP?: NO
2. HAVE YOU ACTUALLY HAD ANY THOUGHTS OF KILLING YOURSELF?: NO

## 2024-05-20 ASSESSMENT — PAIN SCALES - GENERAL: PAINLEVEL: 1

## 2024-05-21 ENCOUNTER — PATIENT OUTREACH (OUTPATIENT)
Dept: CARE COORDINATION | Facility: CLINIC | Age: 84
End: 2024-05-21

## 2024-05-21 ENCOUNTER — TELEPHONE (OUTPATIENT)
Dept: PRIMARY CARE | Facility: CLINIC | Age: 84
End: 2024-05-21

## 2024-05-21 ENCOUNTER — DOCUMENTATION (OUTPATIENT)
Dept: CARE COORDINATION | Facility: CLINIC | Age: 84
End: 2024-05-21

## 2024-05-21 ENCOUNTER — DOCUMENTATION (OUTPATIENT)
Dept: PRIMARY CARE | Facility: CLINIC | Age: 84
End: 2024-05-21

## 2024-05-21 ENCOUNTER — OFFICE VISIT (OUTPATIENT)
Dept: OBSTETRICS AND GYNECOLOGY | Facility: CLINIC | Age: 84
End: 2024-05-21
Payer: MEDICARE

## 2024-05-21 VITALS
SYSTOLIC BLOOD PRESSURE: 98 MMHG | HEIGHT: 59 IN | DIASTOLIC BLOOD PRESSURE: 44 MMHG | WEIGHT: 131 LBS | BODY MASS INDEX: 26.41 KG/M2

## 2024-05-21 DIAGNOSIS — K59.00 CONSTIPATION, UNSPECIFIED CONSTIPATION TYPE: Primary | ICD-10-CM

## 2024-05-21 DIAGNOSIS — Z09 HOSPITAL DISCHARGE FOLLOW-UP: Primary | ICD-10-CM

## 2024-05-21 DIAGNOSIS — R33.9 URINARY RETENTION: ICD-10-CM

## 2024-05-21 LAB
POC APPEARANCE, URINE: ABNORMAL
POC BILIRUBIN, URINE: NEGATIVE
POC BLOOD, URINE: NEGATIVE
POC COLOR, URINE: YELLOW
POC GLUCOSE, URINE: NEGATIVE MG/DL
POC KETONES, URINE: NEGATIVE MG/DL
POC LEUKOCYTES, URINE: ABNORMAL
POC NITRITE,URINE: NEGATIVE
POC PH, URINE: 6 PH
POC PROTEIN, URINE: ABNORMAL MG/DL
POC SPECIFIC GRAVITY, URINE: <=1.005
POC UROBILINOGEN, URINE: 0.2 EU/DL

## 2024-05-21 PROCEDURE — 81003 URINALYSIS AUTO W/O SCOPE: CPT | Performed by: OBSTETRICS & GYNECOLOGY

## 2024-05-21 PROCEDURE — 51701 INSERT BLADDER CATHETER: CPT | Performed by: OBSTETRICS & GYNECOLOGY

## 2024-05-21 PROCEDURE — 1160F RVW MEDS BY RX/DR IN RCRD: CPT | Performed by: OBSTETRICS & GYNECOLOGY

## 2024-05-21 PROCEDURE — 3078F DIAST BP <80 MM HG: CPT | Performed by: OBSTETRICS & GYNECOLOGY

## 2024-05-21 PROCEDURE — 99205 OFFICE O/P NEW HI 60 MIN: CPT | Performed by: OBSTETRICS & GYNECOLOGY

## 2024-05-21 PROCEDURE — 1159F MED LIST DOCD IN RCRD: CPT | Performed by: OBSTETRICS & GYNECOLOGY

## 2024-05-21 PROCEDURE — 3074F SYST BP LT 130 MM HG: CPT | Performed by: OBSTETRICS & GYNECOLOGY

## 2024-05-21 PROCEDURE — 1111F DSCHRG MED/CURRENT MED MERGE: CPT | Performed by: OBSTETRICS & GYNECOLOGY

## 2024-05-21 SDOH — ECONOMIC STABILITY: FOOD INSECURITY
ARE ANY OF YOUR NEEDS URGENT? FOR EXAMPLE, UNCERTAINTY OF WHERE YOU WILL GET YOUR NEXT MEAL OR NOT HAVING THE MEDICATIONS YOU NEED TO TAKE TOMORROW.: NO

## 2024-05-21 SDOH — ECONOMIC STABILITY: GENERAL: WOULD YOU LIKE HELP WITH ANY OF THE FOLLOWING NEEDS?: OTHER

## 2024-05-21 ASSESSMENT — ENCOUNTER SYMPTOMS
PSYCHIATRIC NEGATIVE: 1
GASTROINTESTINAL NEGATIVE: 1
CONSTITUTIONAL NEGATIVE: 1
EYES NEGATIVE: 1
CARDIOVASCULAR NEGATIVE: 1
FREQUENCY: 1
MUSCULOSKELETAL NEGATIVE: 1
RESPIRATORY NEGATIVE: 1
DIFFICULTY URINATING: 1
NEUROLOGICAL NEGATIVE: 1
ENDOCRINE NEGATIVE: 1

## 2024-05-21 NOTE — PROGRESS NOTES
Subjective      Chief Complaint   Patient presents with    <9>patient had knee replacement had a heart attack after /fol          She does have a history of coronary disease and had open heart surgery in 2005 she also has hypertension as well as CLL.  Her last echocardiogram was in 2021 showing normal left ventricular function and aortic sclerosis.  Her last stress test was in 2013 which showed inferior scar but no ischemia.  She was seen recently to clear for total knee replacement.  She did have elevated blood pressure and comes into reevaluate.  She had the TKR and did well. The leg is still swellling  The BP is doing well at home  She is not complaining of chest discomfort.  No complaints of PND or orthopnea.  The right leg is swelling on her.  NO palpitaions.  She did go to rehab and home is not doing the rehab.           ROS     Past Surgical History:   Procedure Laterality Date    CARPAL TUNNEL RELEASE Bilateral     CATARACT EXTRACTION W/ INTRAOCULAR LENS  IMPLANT, BILATERAL      Left 8/10/2017, right 8/31/2017    CHOLECYSTECTOMY      COLONOSCOPY      CORONARY ARTERY BYPASS GRAFT  2005    X 3    HYSTERECTOMY      KNEE SURGERY Right 04/17/2024    total    OTHER SURGICAL HISTORY      Bilateral heel spurs    OTHER SURGICAL HISTORY Right 01/2016    Femoral endarterectomy    TOTAL HIP ARTHROPLASTY Left 2012        Active Ambulatory Problems     Diagnosis Date Noted    Senile osteoporosis 10/08/2022    Arterial disease (CMS-[facility]) 08/19/2023    Arthritis of both knees 08/19/2023    Artificial lens present 08/19/2023    Vascular calcification 08/19/2023    Background diabetic retinopathy of left eye determined by examination (Multi) 08/19/2023    Atherosclerotic heart disease of native coronary artery with unspecified angina pectoris (CMS-[facility]) 08/19/2023    Chondrocalcinosis of left knee 08/19/2023    Chronic ischemic colitis (Multi) 08/19/2023    Chronic kidney disease, stage 3 unspecified (Multi) 10/08/2022     Chronic lymphocytic leukemia of B-cell type not having achieved remission (Multi) 10/08/2022    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    Difficulty walking 08/19/2023    Dermatochalasis of right upper eyelid 08/19/2023    Dermatochalasis of left upper eyelid 08/19/2023    Erosive osteoarthritis 08/19/2023    Essential hypertension 08/19/2023    Extravasation injury 08/19/2023    Injury of head 08/19/2023    Hypoglycemia unawareness associated with type 1 diabetes mellitus (Multi) 08/19/2023    Hypothyroidism 08/19/2023    Instability of right knee joint 08/19/2023    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 08/19/2023    Ankylosis of joint of shoulder region 08/19/2023    Knee pain 08/19/2023    Macular hole of left eye 08/19/2023    Carpal tunnel syndrome 08/19/2023    Heart murmur 08/19/2023    Hyperlipidemia 08/19/2023    Myocardial infarction (Multi) 08/19/2023    Chronic pain 08/19/2023    Insomnia 08/19/2023    Peripheral vascular disease, unspecified (CMS-HCC) 08/19/2023    Osteoarthritis of right knee 08/19/2023    Presence of aortocoronary bypass graft 08/19/2023    Primary localized osteoarthritis of pelvic region and thigh 08/19/2023    Rhinitis 08/19/2023    Shoulder joint pain 08/19/2023    Spondylosis of lumbar spine 08/19/2023    Stenosis of right femoral artery (CMS-[facility]) 08/19/2023    Tear film insufficiency 08/19/2023    Type 1 diabetes mellitus with hyperglycemia (Multi) 08/19/2023    Type 2 diabetes mellitus with other skin complications (Multi) 08/19/2023    Type 1 diabetes mellitus with diabetic polyneuropathy (Multi) 08/19/2023    Type 2 diabetes mellitus without complication (Multi) 08/19/2023    Unspecified disorder of refraction 08/19/2023    Vascular disorder of intestine, unspecified (Multi) 08/19/2023    Vitamin D deficiency 08/19/2023    History of coronary artery bypass graft 10/23/2023    Hematochezia 10/23/2023    Gastrointestinal hemorrhage 10/23/2023    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    Dyslipidemia 10/23/2023    Diabetic polyneuropathy associated with type 2 diabetes mellitus (Multi) 06/12/2023    Delirium 10/23/2023    Lactic acidosis 10/23/2023    Macular edema 10/23/2023    Metatarsalgia of right foot 06/12/2023    Nausea & vomiting 10/23/2023    Neuropathy 06/12/2023    Onychomycosis 06/12/2023    Pain of great toe 06/12/2023    Callus 06/12/2023    Rectal hemorrhage 10/23/2023    Seizure (Multi) 10/23/2023    Fissure in skin 06/12/2023    Ulcer of right foot (Multi) 06/12/2023    Osteoarthritis of left knee 10/23/2023    Chronic renal impairment 08/19/2023    Arthropathy 06/12/2023    Osteoarthritis 10/23/2023    Benign essential hypertension 10/23/2023    Hypertensive disorder 10/23/2023    Low blood pressure 10/23/2023    Near syncope 10/23/2023    Peripheral vascular disease (CMS-HCC) 06/12/2023    Disorder of thyroid gland 10/23/2023    Hypoglycemia 10/23/2023    Colitis 10/23/2023    Ischemic colitis (Multi) 05/30/2017    Pain in joint involving ankle and foot 08/07/2008    Chest pain 10/23/2023    Hip pain 10/23/2023    Pain in joint, ankle and foot 08/07/2008    Sepsis (Multi) 10/23/2023    Type 2 diabetes mellitus (Multi) 03/28/2022    Type 1 diabetes mellitus (Multi) 06/12/2023    Dehydration 10/23/2023    Closed fracture of calcaneus 07/02/2008    Arthritis of hip 10/23/2023    Aortic valve disorder 10/23/2023    Anxiety 10/23/2023    Acute urinary tract infection 10/23/2023    Acute non-ST elevation myocardial infarction (NSTEMI) (Multi) 08/19/2023    Accidental fall 10/23/2023    Abnormal gait 08/07/2008    Abnormal metabolic state due to diabetes mellitus (Multi) 10/23/2023    Abnormal computed tomography scan 10/23/2023    Preop cardiovascular exam 03/07/2024    Chronic pain of both knees 04/17/2024    Primary osteoarthritis of right knee 04/17/2024    Urinary retention 04/24/2024    Sinusitis 09/30/2022    Weakness  05/09/2024    Upper respiratory tract infection 09/30/2022    Syncope and collapse 10/23/2023    Strain of neck muscle 05/09/2024    Pneumonia 05/09/2024    Hypertensive urgency 10/08/2022    Fracture of rib 05/09/2024    Dizziness 05/09/2024    Contact with and (suspected) exposure to covid-19 10/08/2022    Concussion without loss of consciousness 05/09/2024    Closed fracture of facial bone (Multi) 05/09/2024    Anemia 11/21/2023    Acidosis, unspecified 10/08/2022    Abnormal findings on examination of genitourinary organs 05/09/2024    Synovitis and tenosynovitis 05/09/2024    Overweight with body mass index (BMI) 25.0-29.9 05/09/2024    Hypokalemia 10/08/2022    Hypocalcemia 05/09/2024    History of total right knee replacement 05/10/2024     Resolved Ambulatory Problems     Diagnosis Date Noted    No Resolved Ambulatory Problems     Past Medical History:   Diagnosis Date    Benign hypertension     Chronic kidney disease, stage III (moderate) (Multi)     CLL (chronic lymphocytic leukemia) (Multi)     Coronary artery disease     CTS (carpal tunnel syndrome)     Diabetes mellitus (Multi)     Essential (primary) hypertension     GI bleed     Hyperlipidemia, unspecified     Hypertension     Hypothyroidism, unspecified type     Mixed hyperlipidemia     Osteoporosis     Right knee pain     Seizure disorder (Multi)         Visit Vitals  /62   Pulse 64   Wt 59.9 kg (132 lb)   LMP  (LMP Unknown)   SpO2 94%   BMI 26.66 kg/m²   OB Status Postmenopausal   Smoking Status Never   BSA 1.58 m²        Objective     Constitutional:       Appearance: Healthy appearance.   Neck:      Vascular: No JVR.   Pulmonary:      Effort: Pulmonary effort is normal.      Breath sounds: Normal breath sounds.   Cardiovascular:      PMI at left midclavicular line. Normal rate. Regular rhythm. Normal S1. Normal S2.       Murmurs: There is a grade 2/6 harsh systolic murmur.      No gallop.  No click. No rub.   Pulses:     Intact distal  "pulses.   Abdominal:      Palpations: Abdomen is soft.   Musculoskeletal: Normal range of motion. Skin:     General: Skin is warm and dry.   Neurological:      General: No focal deficit present.            Lab Review:         Lab Results   Component Value Date    CHOL 162 03/06/2023    CHOL 167 02/25/2022    CHOL 163 01/05/2022     Lab Results   Component Value Date    HDL 70 03/06/2023    HDL 74 02/25/2022    HDL 71 01/05/2022     Lab Results   Component Value Date    LDLCALC 67 03/06/2023    LDLCALC 75 02/25/2022    LDLCALC 73 01/05/2022     Lab Results   Component Value Date    TRIG 125 03/06/2023    TRIG 88 02/25/2022    TRIG 97 01/05/2022     No components found for: \"CHOLHDL\"     Assessment/Plan     Benign essential hypertension  The BP at home is doing well.    Aortic valve disorder  She had the knee surgery and is doing alright. The right leg is swollen and saw the surgeon earlier this week nd told was good.  The aortic valve Murmur sounds the same.       "

## 2024-05-21 NOTE — PROGRESS NOTES
Outreach to daughter Rosa who shares that they are wanting Bonnie to go to OP [facility].  Phone call to that department and order needed by Dr. Curtis to be placed in [facility].  Spoke with CCM RN Olga Her to advise.  Rosa advised to phone  Central Scheduling at [phone] later afternoon once order has to place to schedule her Mother's first PT visit.  She shares openly that her sister Yvette lives with patient and assist with all needs.  No other issues noted.  She has our # [phone].  Once OP PT is arranged, we will close patient since no other needs at this time and good supportive family in place.

## 2024-05-21 NOTE — PROGRESS NOTES
Call from RUPERT Fernández' who spoke w/patient's daughter and said no social concerns.  Only thing needed is order for therapy at [facility] and asks for assistance with this.  Order created and sent to PCP for signing.

## 2024-05-21 NOTE — PROGRESS NOTES
Orders for OP PT at [facility] to be placed by PCP in [facility] today.  We will follow and assist.

## 2024-05-22 ENCOUNTER — OFFICE VISIT (OUTPATIENT)
Dept: CARDIOLOGY | Facility: CLINIC | Age: 84
End: 2024-05-22
Payer: MEDICARE

## 2024-05-22 ENCOUNTER — PATIENT OUTREACH (OUTPATIENT)
Dept: PRIMARY CARE | Facility: CLINIC | Age: 84
End: 2024-05-22
Payer: MEDICARE

## 2024-05-22 VITALS
HEART RATE: 64 BPM | OXYGEN SATURATION: 94 % | WEIGHT: 132 LBS | BODY MASS INDEX: 26.66 KG/M2 | SYSTOLIC BLOOD PRESSURE: 142 MMHG | DIASTOLIC BLOOD PRESSURE: 62 MMHG

## 2024-05-22 DIAGNOSIS — I10 BENIGN ESSENTIAL HYPERTENSION: Primary | ICD-10-CM

## 2024-05-22 DIAGNOSIS — I35.9 AORTIC VALVE DISORDER: ICD-10-CM

## 2024-05-22 PROCEDURE — 99213 OFFICE O/P EST LOW 20 MIN: CPT | Performed by: INTERNAL MEDICINE

## 2024-05-22 PROCEDURE — 1160F RVW MEDS BY RX/DR IN RCRD: CPT | Performed by: INTERNAL MEDICINE

## 2024-05-22 PROCEDURE — 1125F AMNT PAIN NOTED PAIN PRSNT: CPT | Performed by: INTERNAL MEDICINE

## 2024-05-22 PROCEDURE — 1036F TOBACCO NON-USER: CPT | Performed by: INTERNAL MEDICINE

## 2024-05-22 PROCEDURE — 3078F DIAST BP <80 MM HG: CPT | Performed by: INTERNAL MEDICINE

## 2024-05-22 PROCEDURE — 3077F SYST BP >= 140 MM HG: CPT | Performed by: INTERNAL MEDICINE

## 2024-05-22 PROCEDURE — 1111F DSCHRG MED/CURRENT MED MERGE: CPT | Performed by: INTERNAL MEDICINE

## 2024-05-22 PROCEDURE — 1159F MED LIST DOCD IN RCRD: CPT | Performed by: INTERNAL MEDICINE

## 2024-05-22 ASSESSMENT — ENCOUNTER SYMPTOMS
LOSS OF SENSATION IN FEET: 0
DEPRESSION: 0
OCCASIONAL FEELINGS OF UNSTEADINESS: 1

## 2024-05-22 ASSESSMENT — PAIN SCALES - GENERAL: PAINLEVEL: 5

## 2024-05-22 NOTE — PROGRESS NOTES
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 Comorbidities include: HTN, PVD, hypothyroidism, type 1 diabetes and type 2 diabetes, hx of B-cell lymphocytic leukemia, stage 3 CKD h/o renal failure, hypoxia, NSTEMI, and hx of seizures.     Diagnoses:  #1 Urinary retention   #2 Constipation  #3 Kidney failure    Plan:  1. Urinary retention, hx of stretch injury to bladder, hx of kidney failure   - last Cr 1.3 on 5/13/24  - PVR = 400mL by straight catheterization and she voided right before PVR was collected.   - The patient states she was unable to urinate for a while and had to have a catheter placed when she was in rehabilitation facility. Her Layton was removed yesterday and had been in place after her knee replacement from 4/17/2024 - 5/20/2024. She reports that her PVR was 2,000ccs on POD#7 and this was managed with a Layton catheter. No prior hx of urinary retention or bladder issues up until recently after her orthopedic surgery. Denies hx of neurological issues or CVA. She is able to urinate now but is not emptying completely as her PVR is 400ccs today and has a hx of renal failure.   - We discussed that having an extremely elevated PVR causes bladder injury due to overstretching the detrusor muscle which is why she had a Layton catheter in place for x1 month.   - Given that her bladder is still healing after surgery we strongly advised for her to have a way to empty her bladder to prevent further damage to the bladder/kidneys by either learning to ISC after urinating or placing an indwelling catheter. We highly recommended ISC as this allows the bladder to contract/squeeze when urinating and ISC after to fully empty whereas with an indwelling catheter the bladder remains in a constant state of  decompression. After urinating she can measure PVR after ISC with a urine hat and when her PVR is l<200mL twice in a row she can discontinue ISC altogether.    > She is amenable to learn how to ISC   > Nurse visit within 1 week at [facility] for ISC teaching and supplies.  - Return precautions given to go to the ED or Urgent Care if she is unable to urinate for 6 hours so that they can place a catheter.   - We encouraged her to continue following up with nephrology Dr. Maximus Rolle as scheduled to follow renal failure/kidney function but that this would not change our recommendation to learn how to ISC to empty her bladder and prevent another stretch injury.     2. Constipation  - We discussed the goal of having a soft/easy to pass BM 3x/week. She may achieve this with a daily fiber supplement and using MirLAX PRN as constipation can worsen retention.     Follow up in 1 week with EMILIANA Latham to learn how to ISC.    Scribe Attestation  By signing my name below, I, January Chery, attest that this documentation has been prepared under the direction and in the presence of Jo-Ann Gonzales MD MPH on 05/21/2024 at 11:15 PM.     Problem List Items Addressed This Visit       Urinary retention    Relevant Orders    POCT UA Automated manually resulted (Completed)     Other Visit Diagnoses       Constipation, unspecified constipation type    -  Primary           I spent a total of 60 minutes in face to face and non face to face time.      Jo-Ann Gonzales MD, MPH, FACOG     I Dr. Gonzales, personally performed the services described in the documentation as scribed in my presence and confirm it is both complete and accurate.  Jo-Ann Gonzales MD, MPH, FACOG      New    HISTORY OF PRESENT ILLNESS:   83 year old female presenting as a new patient for evaluation of urinary retention.     Record Review:   - Hx of being admitted to [facility] after knee replacement.     Prolapse Symptoms:  - Denies feeling a vaginal  bulge or any other POP symptoms.      Urinary Symptoms:   - The patient states she was unable to urinate for a while and had to have a catheter placed when she was in rehabilitation facility. Her Layton was removed yesterday and had been in place after her knee replacement from 2024 - 2024. She is no longer at rehab but is now living at home.   - She reports that her PVR was 2,000ccs on POD#7 and this was managed with a Layton catheter.   - Layton was removed by urology (Dr. Curtis) @ [facility].   - No prior hx of urinary retention or bladder issues up until recently after her orthopedic surgery. Denies hx of neurological issues or CVA.   - She endorses urinary frequency and nocturia 2x/night now that her Layton was removed. However, prior to her bladder injury she would have nocturia 2x/night at baseline.   - No UUI episodes but does have urinary urgency.   - Denies burning dysuria today.   - Does feel that she empties her bladder without difficulty now.     Bowel Symptoms:   - She reports experiencing constipation with difficulty passing hard stools.   - Patient takes MiraLAX on a PRN basis.   - Has a BM once per day.     OBGYN History and Sexual Activity:   - , x3   - She is not currently sexually active as she was  in .        Past Medical History:     Past Medical History:   Diagnosis Date    Anxiety     Benign hypertension     Chronic ischemic colitis (Multi)     With history of sepsis and infectious gastroenteritis 10/7/2022-10/11/2022    Chronic kidney disease, stage III (moderate) (Multi)     CLL (chronic lymphocytic leukemia) (Multi)     Coronary artery disease     CTS (carpal tunnel syndrome)     Diabetes mellitus (Multi)     Essential (primary) hypertension     GI bleed     Hyperlipidemia, unspecified     Hypertension     Hypothyroidism     Hypothyroidism, unspecified type     Mixed hyperlipidemia     Osteoarthritis     Osteoporosis     Peripheral vascular disease (CMS-HCC)      Right knee pain     Seizure disorder (Multi)     Type 1 diabetes mellitus with hyperglycemia (Multi)           Past Surgical History:     Past Surgical History:   Procedure Laterality Date    CARPAL TUNNEL RELEASE Bilateral     CATARACT EXTRACTION W/ INTRAOCULAR LENS  IMPLANT, BILATERAL      Left 8/10/2017, right 8/31/2017    CHOLECYSTECTOMY      COLONOSCOPY      CORONARY ARTERY BYPASS GRAFT  2005    X 3    HYSTERECTOMY      KNEE SURGERY Right 04/17/2024    total    OTHER SURGICAL HISTORY      Bilateral heel spurs    OTHER SURGICAL HISTORY Right 01/2016    Femoral endarterectomy    TOTAL HIP ARTHROPLASTY Left 2012         Medications:     Prior to Admission medications    Medication Sig Start Date End Date Taking? Authorizing Provider   amLODIPine (Norvasc) 10 mg tablet Take 1 tablet (10 mg) by mouth once daily. 10/18/23 10/17/24  Noah Araujo MD   aspirin 81 mg chewable tablet Chew and swallow 1 tablet (81 mg) by mouth once daily. 4/25/24 5/25/24  Mayela Pickard PA-C   cholecalciferol (Vitamin D-3) 50 mcg (2,000 unit) capsule Take 1 capsule (50 mcg) by mouth early in the morning..    Historical Provider, MD   cyanocobalamin (Vitamin B-12) 1,000 mcg tablet Take 1 tablet (1,000 mcg) by mouth once daily.    Historical Provider, MD   folic acid (Folvite) 1 mg tablet Take 1 tablet (1 mg) by mouth once daily. 4/25/24 5/25/24  Mayela Pickard PA-C   gabapentin (Neurontin) 100 mg capsule Take 1 capsule (100 mg) by mouth in the morning and at bedtime. In the morning and before bedtime 8/7/23   Historical Provider, MD   hydrALAZINE (Apresoline) 50 mg tablet Take 1 tablet (50 mg) by mouth 2 times a day. 3/7/24 3/7/25  Noah Araujo MD   hydroCHLOROthiazide (HYDRODiuril) 25 mg tablet Take 1 tablet (25 mg) by mouth once daily.    Historical Provider, MD   insulin degludec (Tresiba FlexTouch U-100) 100 unit/mL (3 mL) injection Inject 11 Units under the skin once daily in the morning. As directed 1 box    Historical  Provider, MD   insulin lispro (HumaLOG  KwikPen U-100) 100 unit/mL injection Inject 30 Units under the skin 3 times daily (morning, midday, late afternoon). ON A SLIDING SCALE    Historical Provider, MD   levothyroxine (Synthroid, Levoxyl) 100 mcg tablet Take 1 tablet (100 mcg) by mouth once daily.    Historical Provider, MD   lisinopril 40 mg tablet TAKE 1 TABLET BY MOUTH EVERY DAY 2/6/24   Mike Sousa DO   metoprolol tartrate (Lopressor) 25 mg tablet Take 1 tablet (25 mg) by mouth 2 times a day. 4/24/24 5/24/24  Mayela Pickard PA-C   nitroglycerin (Nitrodur) 0.2 mg/hr patch Place 1 patch over 12 hours on the skin once daily. 4/25/24 5/25/24  Mayela Pickard PA-C   ondansetron (Zofran) 4 mg tablet Take 1 tablet (4 mg) by mouth every 8 hours if needed for nausea. 5/9/24   Rudolph Liriano MD   POLYETHYLENE GLYCOL 3350 ORAL Take 1 packet by mouth once daily. With 8 oz of fluid    Historical Provider, MD   pravastatin (Pravachol) 40 mg tablet TAKE 1 TABLET BY MOUTH ONCE DAILY  Patient taking differently: Take 1 tablet (40 mg) by mouth once daily at bedtime. 2/21/24   Vic Wright, APRN-CNP   sulfaSALAzine (Azulfidine) 500 mg DR tablet Take 2 tablets (1,000 mg) by mouth 2 times a day.    Historical Provider, MD   valACYclovir (Valtrex) 1 gram tablet Take 1 tablet (1,000 mg) by mouth if needed. Twice daily 11/3/23   Historical Provider, MD   aluminum-magnesium hydroxide 200-200 mg/5 mL suspension Take 10 mL by mouth every 6 hours if needed for heartburn.  5/20/24  Historical Provider, MD   apixaban (Eliquis) 2.5 mg tablet Take 1 tablet (2.5 mg) by mouth 2 times a day for 14 days. 4/19/24 5/20/24  Mayela Pickard PA-C   folic acid (Folvite) 1 mg tablet Take 1 tablet (1 mg) by mouth once daily.  5/20/24  Historical Provider, MD   furosemide (Lasix) 20 mg tablet Take 1 tablet (20 mg) by mouth once daily.  Patient not taking: Reported on 5/20/2024 4/25/24 5/20/24  Mayela Pickard PA-C   metoprolol  "tartrate (Lopressor) 25 mg tablet Take 0.5 tablets (12.5 mg) by mouth 2 times a day. 1/29/24 5/20/24  Noah Araujo MD   nitroglycerin (Nitrostat) 0.4 mg SL tablet Place 1 tablet (0.4 mg) under the tongue every 5 minutes if needed for chest pain (1 tab every 5 min as needed may repeat up to 3x before seeking medical attention). Every 5 minutes x 3 doses prn for chest pain  Patient not taking: Reported on 5/20/2024 1/28/24 5/20/24  oNah Araujo MD        ROS  Review of Systems   Constitutional: Negative.    HENT: Negative.     Eyes: Negative.    Respiratory: Negative.     Cardiovascular: Negative.    Gastrointestinal: Negative.    Endocrine: Negative.    Genitourinary:  Positive for difficulty urinating, frequency and urgency. Negative for enuresis.   Musculoskeletal: Negative.    Neurological: Negative.    Psychiatric/Behavioral: Negative.            PHYSICAL EXAM:    BP (!) 98/44   Ht 1.499 m (4' 11\")   Wt 59.4 kg (131 lb)   LMP  (LMP Unknown)   BMI 26.46 kg/m²   No LMP recorded (lmp unknown). Patient is postmenopausal.    PVR = 400mL by straight catheterization    Declines chaperone for physical exam.      Well developed, well nourished, in no apparent distress.   Neurologic/Psychiatric:  Awake, Alert and Oriented times 3.  Affect normal.     GENITAL/URINARY:     External Genitalia:  The patient has normal appearing external genitalia, normal skenes and bartholins glands, and a normal hair distribution.  Her vulva is without lesions, erythema or discharge.  It is non-tender with appropriate sensation.     Urethral Meatus:  Size normal, Location normal, Lesions absent, Prolapse absent.    Urethra:  Fullness absent, Masses absent.        Data and DIAGNOSTIC STUDIES REVIEWED   S renal complete     Result Date: 4/22/2024   FINDINGS: RIGHT KIDNEY: Right kidney measures  10.8 cm.  No shadowing calculus or right hydronephrosis is visualized.  No discrete renal lesion is visualized.   LEFT KIDNEY: Left kidney " measures  9 cm.  No shadowing calculus or left hydronephrosis is visualized.  No discrete renal lesion is visualized.   BLADDER: Urinary bladder was fully decompressed by Layton catheter and could not be evaluated.        No hydronephrosis bilaterally.   Urinary bladder fully decompressed by Layton catheter and could not be evaluated.        Lab Results   Component Value Date    URINECULTURE No significant growth 04/03/2024      Lab Results   Component Value Date    GLUCOSE 137 (H) 05/16/2024    CALCIUM 9.0 05/16/2024     05/16/2024    K 4.8 05/16/2024    CO2 24 05/16/2024    CL 97 05/16/2024    BUN 36 (H) 05/16/2024    CREATININE 1.40 05/16/2024     Lab Results   Component Value Date    WBC 6.3 05/16/2024    HGB 9.6 (L) 05/16/2024    HCT 30.1 (L) 05/16/2024    MCV 99 05/16/2024     05/16/2024

## 2024-05-22 NOTE — PROGRESS NOTES
Referral from PCP received as well as RUPERT Lee who called yesterday to discuss case.  Patient had TKR then inpatient rehab at [facility].  She was referred to [facility] for therapy at discharge but they did not take case and per family no other referrals placed.  RUPERT states patient has good family and social support in place and has no other needs than getting outpatient therapy in place.  She asks for my assistance doing so.  Order placed and sent to Dr. Curtis for signature. No chronic disease management needed at this time.

## 2024-05-22 NOTE — ASSESSMENT & PLAN NOTE
She had the knee surgery and is doing alright. The right leg is swollen and saw the surgeon earlier this week nd told was good.  The aortic valve Murmur sounds the same.

## 2024-05-23 ENCOUNTER — APPOINTMENT (OUTPATIENT)
Dept: OBSTETRICS AND GYNECOLOGY | Facility: CLINIC | Age: 84
End: 2024-05-23
Payer: MEDICARE

## 2024-05-23 ENCOUNTER — OFFICE VISIT (OUTPATIENT)
Dept: OBSTETRICS AND GYNECOLOGY | Facility: CLINIC | Age: 84
End: 2024-05-23
Payer: MEDICARE

## 2024-05-23 DIAGNOSIS — R33.9 URINARY RETENTION: ICD-10-CM

## 2024-05-23 PROCEDURE — 1111F DSCHRG MED/CURRENT MED MERGE: CPT | Performed by: OBSTETRICS & GYNECOLOGY

## 2024-05-23 PROCEDURE — 1159F MED LIST DOCD IN RCRD: CPT | Performed by: OBSTETRICS & GYNECOLOGY

## 2024-05-23 PROCEDURE — 1160F RVW MEDS BY RX/DR IN RCRD: CPT | Performed by: OBSTETRICS & GYNECOLOGY

## 2024-05-23 NOTE — PROGRESS NOTES
Reviewed with pt and daughter situation that brings her in needing to learn self catheterization. Per pt she has been unable to fully empty her bladder since her knee surgery a couple months ago. Reviewed the goal is to get her PVR under 150 after using catheter before bed and in morning. Pt is tearful but willing to learn.  Reviewed anatomy of perineum and positioning techniques to utilize. She attempted in lithotomy position, but was unable find landmark. She next attempted while sitting, stratteling toilet but her prolapse made it difficult to find her urethra. Elicited help from Tricia Pate NP who assessed her prolapse and suggested she stand over toilet if able and was able to insert catheter. She assisted pt to understand where catheter inserted as compared to vaginal opening.  Pt will practice while standing as instructed before bed tonight and in AM. Pt given a bladder diary and daughter also given instructions for diary keeping and goal of PVR <150 x2. Follow-up appt given for 6/11 at [facility].

## 2024-05-24 ENCOUNTER — TELEPHONE (OUTPATIENT)
Dept: ORTHOPEDIC SURGERY | Facility: CLINIC | Age: 84
End: 2024-05-24
Payer: MEDICARE

## 2024-05-24 NOTE — TELEPHONE ENCOUNTER
PATIENT CALLED AND STATES THAT SHE HAS A BED SORE ON HER RT HEEL THAT SHE GOT FROM HOSPITAL. SHE STATES THAT THEY DIDN'T DO ANYTHING. SHE WENT TO REHAB AND THEY WRAPPED  IT IN [place] . THEY TOOK OFF WHEN SHE LEFT.  SHE WENT TO HER PODIATRIST BECAUSE SHE WAS WORRIED BECAUSE IT WAS LEAKING. DR SIGALA ORDERED WOUND CARE AND DRESSED. TOLD PATIENT TO ELEVATE, KEEP A PILLOW UNDER CALF TO LEG FOOT HANG OFF OF END. NO PRESSURE ON THE HEEL. SHE HAS WOUND CARE ON MAY 30.

## 2024-05-24 NOTE — TELEPHONE ENCOUNTER
Thank you for update, patient to keep elevated. Follow up with woundcare. Will evaluate at next post op

## 2024-05-28 ENCOUNTER — EVALUATION (OUTPATIENT)
Dept: PHYSICAL THERAPY | Facility: CLINIC | Age: 84
End: 2024-05-28
Payer: MEDICARE

## 2024-05-28 DIAGNOSIS — Z96.651 STATUS POST RIGHT KNEE REPLACEMENT: Primary | ICD-10-CM

## 2024-05-28 DIAGNOSIS — Z96.651 HISTORY OF TOTAL KNEE REPLACEMENT, RIGHT: ICD-10-CM

## 2024-05-28 PROCEDURE — 97110 THERAPEUTIC EXERCISES: CPT | Mod: GP

## 2024-05-28 PROCEDURE — 97161 PT EVAL LOW COMPLEX 20 MIN: CPT | Mod: GP

## 2024-05-28 ASSESSMENT — PAIN SCALES - GENERAL: PAINLEVEL_OUTOF10: 5 - MODERATE PAIN

## 2024-05-28 ASSESSMENT — ENCOUNTER SYMPTOMS
LOSS OF SENSATION IN FEET: 0
OCCASIONAL FEELINGS OF UNSTEADINESS: 0
DEPRESSION: 0

## 2024-05-28 ASSESSMENT — PAIN - FUNCTIONAL ASSESSMENT: PAIN_FUNCTIONAL_ASSESSMENT: 0-10

## 2024-05-28 ASSESSMENT — PAIN DESCRIPTION - DESCRIPTORS: DESCRIPTORS: THROBBING

## 2024-05-28 NOTE — PROGRESS NOTES
Physical Therapy Evaluation and Treatment      Patient Name: Bonnie Jules  MRN: [id number]  Today's Date: 5/28/2024  Time Calculation  Start Time: 1230  Stop Time: 1315  Time Calculation (min): 45 min  PT Therapeutic Procedures Time Entry  Therapeutic Exercise Time Entry: 15    Low complexity due to patient's clinical presentation being stable and uncomplicated by any significant comorbidities that may affect rehab tolerance and progression.    Insurance:  Visit number: 1 of 13  Authorization info: [place]  Insurance Type: Payor: MEDICARE / Plan: MEDICARE PART A AND B / Product Type: *No Product type* /     Current Problem:   1. Status post right knee replacement  Follow Up In Physical Therapy      2. History of total knee replacement, right  Referral to Physical Therapy          Subjective    Pt had right TKA 4/17/2024. Reports she was hospitalized for one week due to complications not related to her knee.  She then spent 2 weeks  at SNF.  Did not have home health.     Pt reports she is still doing exercises learned in SNF. Walks longer distances with RW but uses a cane around the house.    5/24/2024 pt had debridement of ulcer on right heel. Pt was given post-op shoe to keep pressure off right heel.  However, pt reports she fell recently and thinks shoe contributed to fall so pt has not been wearing shoe since.  Pt has follow-up at wound clinic 5/30.    Pt lives with daughter in one floor home. One small step to enter.  Prior to surgery, pt ambulated without assistive device.     Pt's goal is to be able to walk her dog.     General  Reason for Referral: s/p right TKA  Referred By:   Past Medical History Relevant to Rehab: CLL, DM, HTN, neuropathy, OA, RA, CKD    Precautions  STEADI Fall Risk Score (The score of 4 or more indicates an increased risk of falling): 5  Post-Surgical Precautions: Right total knee precautions    Pain Assessment  Pain Assessment: 0-10  Pain Score: 5 - Moderate pain (at worst)  Pain  Type: Surgical pain  Pain Location: Knee  Pain Orientation: Right  Pain Descriptors: Throbbing  Pain Frequency: Intermittent    Objective   ROM     Knee     right 5-100deg       left 0-130deg    Hip grossly WFL    MMT         Right      Left    Hip flex    4+/5     4+/5    Knee flex 4+/5      5/5    Knee ext  4+/5      5/5    Hip abd   3+/5    Hip ext    3/5      Skin integrity    Scar well-healed    Pt with 2 small skin tear right calf and right lower leg just above ankle with drainage    Transfers    Independent    Gait    With RW, slow pace, slightly antalgic    Outcome Measures:  LEFS: 23/80    Treatments:  Therapeutic Exercise:   --reviewed HEP   --instructed in and performed:  heel slides, QS, SLR, iso hip add, hooklying hip abd with GTB x 10 each.  Handouts provided.      Assessment   PT Assessment Results: Decreased strength, Decreased range of motion, Impaired balance, Decreased mobility, Decreased skin integrity, Pain  Rehab Prognosis: Good  Evaluation/Treatment Tolerance: Patient tolerated treatment well  Assessment Comment: Pt is a 83 y /o female with loss of ROM and strength consistent with recent right TKA. Pt would benefit from continued skilled PT to address strength, ROM and mobility deficits to maximize functional independence  Low complexity due to patient's clinical presentation being stable and uncomplicated by any significant comorbidities that may affect rehab tolerance and progression.     Plan:   Treatment/Interventions: Cryotherapy, Education/ Instruction, Gait training, Manual therapy, Therapeutic activities, Therapeutic exercises  PT Plan: Skilled PT  PT Frequency: 2 times per week  Duration: 6 weeks or 12 more visits  Onset Date: 05/28/24  Certification Period Start Date: 05/28/24  Certification Period End Date: 08/26/24  Number of Treatments Authorized: [place]  Rehab Potential: Good      Goals:   Active       PT Problem       PT Goal 1       Start:  05/28/24    Expected End:  06/27/24        Pt independent with HEP         PT Goal 2       Start:  05/28/24    Expected End:  06/27/24       Increase ROM right knee to 0-120deg to allow for normal gait mechanics         PT Goal 3       Start:  05/28/24    Expected End:  08/26/24       Increase strength right hip to 4/5 to support knee during ambulation         PT Goal 4       Start:  05/28/24    Expected End:  08/26/24       Improve LEFS to >35/80         Patient Stated Goal 1       Start:  05/28/24    Expected End:  08/26/24       Pt able to ambulate community distances safely without assistive device in order to walk dog

## 2024-05-30 ENCOUNTER — OFFICE VISIT (OUTPATIENT)
Dept: WOUND CARE | Facility: HOSPITAL | Age: 84
End: 2024-05-30
Payer: MEDICARE

## 2024-05-30 ENCOUNTER — APPOINTMENT (OUTPATIENT)
Dept: PHYSICAL THERAPY | Facility: CLINIC | Age: 84
End: 2024-05-30
Payer: MEDICARE

## 2024-05-30 ENCOUNTER — APPOINTMENT (OUTPATIENT)
Dept: WOUND CARE | Facility: HOSPITAL | Age: 84
End: 2024-05-30
Payer: MEDICARE

## 2024-05-30 DIAGNOSIS — L03.115 CELLULITIS OF RIGHT LOWER EXTREMITY: Primary | ICD-10-CM

## 2024-05-30 DIAGNOSIS — N18.30 CHRONIC KIDNEY DISEASE, STAGE 3 UNSPECIFIED (MULTI): Primary | ICD-10-CM

## 2024-05-30 PROBLEM — M25.519 ARTHRALGIA OF SHOULDER: Status: ACTIVE | Noted: 2023-10-23

## 2024-05-30 PROCEDURE — 11042 DBRDMT SUBQ TIS 1ST 20SQCM/<: CPT

## 2024-05-30 PROCEDURE — 99213 OFFICE O/P EST LOW 20 MIN: CPT

## 2024-05-31 ENCOUNTER — LAB (OUTPATIENT)
Dept: LAB | Facility: LAB | Age: 84
End: 2024-05-31
Payer: MEDICARE

## 2024-05-31 ENCOUNTER — DOCUMENTATION (OUTPATIENT)
Dept: PHYSICAL THERAPY | Facility: CLINIC | Age: 84
End: 2024-05-31

## 2024-05-31 ENCOUNTER — TREATMENT (OUTPATIENT)
Dept: PHYSICAL THERAPY | Facility: CLINIC | Age: 84
End: 2024-05-31
Payer: MEDICARE

## 2024-05-31 DIAGNOSIS — N18.30 CHRONIC KIDNEY DISEASE, STAGE 3 UNSPECIFIED (MULTI): ICD-10-CM

## 2024-05-31 DIAGNOSIS — Z96.651 STATUS POST RIGHT KNEE REPLACEMENT: ICD-10-CM

## 2024-05-31 LAB
ANION GAP SERPL CALC-SCNC: 12 MMOL/L
BUN SERPL-MCNC: 18 MG/DL (ref 8–25)
CALCIUM SERPL-MCNC: 9.2 MG/DL (ref 8.5–10.4)
CHLORIDE SERPL-SCNC: 104 MMOL/L (ref 97–107)
CO2 SERPL-SCNC: 21 MMOL/L (ref 24–31)
CREAT SERPL-MCNC: 1 MG/DL (ref 0.4–1.6)
EGFRCR SERPLBLD CKD-EPI 2021: 57 ML/MIN/1.73M*2
GLUCOSE SERPL-MCNC: 81 MG/DL (ref 65–99)
POTASSIUM SERPL-SCNC: 4.9 MMOL/L (ref 3.4–5.1)
SODIUM SERPL-SCNC: 137 MMOL/L (ref 133–145)

## 2024-05-31 PROCEDURE — 97110 THERAPEUTIC EXERCISES: CPT | Mod: GP

## 2024-05-31 PROCEDURE — 80048 BASIC METABOLIC PNL TOTAL CA: CPT

## 2024-05-31 PROCEDURE — 36415 COLL VENOUS BLD VENIPUNCTURE: CPT

## 2024-05-31 ASSESSMENT — PAIN SCALES - GENERAL: PAINLEVEL_OUTOF10: 5 - MODERATE PAIN

## 2024-05-31 ASSESSMENT — PAIN - FUNCTIONAL ASSESSMENT: PAIN_FUNCTIONAL_ASSESSMENT: 0-10

## 2024-05-31 NOTE — PROGRESS NOTES
"Physical Therapy Treatment    Patient Name: Bonnie Jules  MRN: [id number]  Today's Date: 5/31/2024  Time Calculation  Start Time: 1400  Stop Time: 1445  Time Calculation (min): 45 min  PT Therapeutic Procedures Time Entry  Therapeutic Exercise Time Entry: 40    Insurance:  Visit number: 2 of 13  Authorization info: [place]  Insurance Type: Payor: MEDICARE / Plan: MEDICARE PART A AND B / Product Type: *No Product type* /     Current Problem   1. Status post right knee replacement  Follow Up In Physical Therapy          Subjective   Pt ambulated to dept today with quad cane.   Went to wound clinic yesterday to have right heel looked at. Wound was cleaned and redressed; pt wearing surgical shoe today.  Has been doing HEP daily.    Precautions  Post-Surgical Precautions: Right total knee precautions    Pain Assessment: 0-10  Pain Score: 5 - Moderate pain  Pain Location: Knee  Pain Orientation: Right  Post Treatment Pain Level 0    Objective   Right knee flex 102deg    Treatments:  Therapeutic Exercise:   --Nustep L5 x 5 min (right heel off pedal)   --QX x 10   --SLR with QS 10x2   --iso hip add 10x2   --hip abd witih GTB 10x2   --heel slides 10x   --seated LAQ 2# 10x2   --seated MIP 2# 10x2   --seated hams stretch 3x 20\"    Assessment   PT Assessment  Assessment Comment: Pt experience low blood sugar during treatment today, requiring short rest break so pt could eat.  Otherwise, tolerated session well. Knee flex slightly improved and ambulating safely with quad cane today.    Plan:    Cont per POC. Progress to weight-bearing exercises as heel wound heals.     OP EDUCATION:  Outpatient Education  Individual(s) Educated: Patient  Education Provided: Home Exercise Program    Goals:   Active       PT Problem       PT Goal 1 (Progressing)       Start:  05/28/24    Expected End:  06/27/24       Pt independent with HEP         PT Goal 2 (Progressing)       Start:  05/28/24    Expected End:  06/27/24       Increase ROM right knee " to 0-120deg to allow for normal gait mechanics         PT Goal 3 (Progressing)       Start:  05/28/24    Expected End:  08/26/24       Increase strength right hip to 4/5 to support knee during ambulation         PT Goal 4 (Progressing)       Start:  05/28/24    Expected End:  08/26/24       Improve LEFS to >35/80         Patient Stated Goal 1 (Progressing)       Start:  05/28/24    Expected End:  08/26/24       Pt able to ambulate community distances safely without assistive device in order to walk dog            medicare

## 2024-05-31 NOTE — PROGRESS NOTES
Physical Therapy                 Therapy Communication Note    Patient Name: Bonnie Jules  MRN: [id number]  Today's Date: 5/31/2024     Discipline: Physical Therapy    Missed Visit Reason:      Missed Time: Cancel    Comment:

## 2024-06-03 ENCOUNTER — OFFICE VISIT (OUTPATIENT)
Dept: WOUND CARE | Facility: HOSPITAL | Age: 84
End: 2024-06-03
Payer: MEDICARE

## 2024-06-03 PROCEDURE — 99213 OFFICE O/P EST LOW 20 MIN: CPT

## 2024-06-04 ENCOUNTER — HOSPITAL ENCOUNTER (EMERGENCY)
Facility: HOSPITAL | Age: 84
Discharge: HOME | End: 2024-06-04
Attending: STUDENT IN AN ORGANIZED HEALTH CARE EDUCATION/TRAINING PROGRAM
Payer: MEDICARE

## 2024-06-04 ENCOUNTER — APPOINTMENT (OUTPATIENT)
Dept: PHYSICAL THERAPY | Facility: CLINIC | Age: 84
End: 2024-06-04
Payer: MEDICARE

## 2024-06-04 ENCOUNTER — DOCUMENTATION (OUTPATIENT)
Dept: PHYSICAL THERAPY | Facility: CLINIC | Age: 84
End: 2024-06-04
Payer: MEDICARE

## 2024-06-04 ENCOUNTER — DOCUMENTATION (OUTPATIENT)
Dept: CARE COORDINATION | Facility: CLINIC | Age: 84
End: 2024-06-04
Payer: MEDICARE

## 2024-06-04 ENCOUNTER — TELEPHONE (OUTPATIENT)
Dept: PRIMARY CARE | Facility: CLINIC | Age: 84
End: 2024-06-04
Payer: MEDICARE

## 2024-06-04 VITALS
OXYGEN SATURATION: 96 % | SYSTOLIC BLOOD PRESSURE: 126 MMHG | HEART RATE: 64 BPM | TEMPERATURE: 97.3 F | RESPIRATION RATE: 18 BRPM | DIASTOLIC BLOOD PRESSURE: 60 MMHG | WEIGHT: 128 LBS | BODY MASS INDEX: 26.87 KG/M2 | HEIGHT: 58 IN

## 2024-06-04 DIAGNOSIS — K64.9 HEMORRHOIDS, UNSPECIFIED HEMORRHOID TYPE: Primary | ICD-10-CM

## 2024-06-04 PROCEDURE — 99282 EMERGENCY DEPT VISIT SF MDM: CPT

## 2024-06-04 RX ORDER — AMOXICILLIN 250 MG
1 CAPSULE ORAL DAILY
Qty: 20 TABLET | Refills: 0 | Status: SHIPPED | OUTPATIENT
Start: 2024-06-04 | End: 2024-06-24

## 2024-06-04 RX ORDER — LIDOCAINE HYDROCHLORIDE 20 MG/ML
JELLY TOPICAL 4 TIMES DAILY PRN
Qty: 30 ML | Refills: 0 | Status: SHIPPED | OUTPATIENT
Start: 2024-06-04 | End: 2024-06-09 | Stop reason: ENTERED-IN-ERROR

## 2024-06-04 RX ORDER — HYDROCORTISONE ACETATE 25 MG/1
25 SUPPOSITORY RECTAL 2 TIMES DAILY
Qty: 20 SUPPOSITORY | Refills: 0 | Status: SHIPPED | OUTPATIENT
Start: 2024-06-04 | End: 2024-06-09 | Stop reason: ENTERED-IN-ERROR

## 2024-06-04 ASSESSMENT — LIFESTYLE VARIABLES
HAVE YOU EVER FELT YOU SHOULD CUT DOWN ON YOUR DRINKING: NO
TOTAL SCORE: 0
EVER HAD A DRINK FIRST THING IN THE MORNING TO STEADY YOUR NERVES TO GET RID OF A HANGOVER: NO
HAVE PEOPLE ANNOYED YOU BY CRITICIZING YOUR DRINKING: NO
EVER FELT BAD OR GUILTY ABOUT YOUR DRINKING: NO

## 2024-06-04 ASSESSMENT — PAIN - FUNCTIONAL ASSESSMENT: PAIN_FUNCTIONAL_ASSESSMENT: 0-10

## 2024-06-04 NOTE — DISCHARGE INSTRUCTIONS
Thank you for visiting [facility], operated by [facility] emergency department.  It was a pleasure caring for you.    Be sure to take all medications, over the counter medications or prescription medications only as directed.     Be sure to follow up as directed in 1-2 days.  All of the details of your follow up instructions are detailed in the follow up section of this packet.    If you are being discharged with any pains medications or muscle relaxers (norco, Vicodin, hydrocodone products, Percocet, oxycodone products, flexeril, cyclobenzaprine, robaxin, norflex, brand or generic, or any other pain controlling medications with the exception of Ibuprofen and regular Tylenol, do not drive or operate machinery, climb ladders or participate in any activity that could potentially put yourself or others at risk should you get dizzy, or be/feel impaired at all.        It is important to remember that your care does not end here and you must continue to monitor your condition closely. Please return to the emergency department for any worsening or concerning signs or symptoms as directed by our conversations and the discharge instructions. Otherwise please follow up with your doctor in 2 days if no better or worse. If you do not have a doctor please contact the referral number on your discharge instructions. Please contact any physician specialists provided in your discharge notes as it is very important to follow up with them regarding your condition. If you are unable to reach the physicians provided, please come back to the Emergency Department at any time.     As always, please take medications as directed. If you have any questions at all regarding your medications, please contact the pharmacist, the emergency department, or your doctor. Before taking any medication prescribed in the Emergency Department, please review the medication side effects and drug interactions (<http://www.rxlist.com/script/main/hp.asp>) as they may interact with your  home medications.     Having trouble affording medications? Try [employer] <http://[employer]/>! (This is not a hospital endorsed website, merely a recommendation based on my own personal experiences with [employer])      Return to emergency room without delay for ANY new or worsening pains or for any other symptoms or concerns.      Return with worsening pains, nausea, vomiting, trouble breathing, palpitations, shortness of breath, inability to pass stool or urine, loss of control of stool or urine, any numbness or tingling (that is not normal for you), uncontrolled fevers, the passing of blood or other material in stool or urine, rashes, pains or for any other symptoms or concerns you may have.  You are always welcome to return to the ER at any time for any reason or for any other concerns you may have.

## 2024-06-04 NOTE — ED PROVIDER NOTES
HPI   Chief Complaint   Patient presents with    Cyst       Patient is 83-year-old female presenting to the emergency department for evaluation of pain in her rectum.  Patient states pain started about 1 week ago.  She states it feels like a ball is in her rectum.  She has also noticed some blood and mucus.  Nothing makes the pain better or worse.  She states she has a history of hemorrhoids however this feels different.  She denies any constipation or diarrhea.                          Gaurav Coma Scale Score: 15                     Patient History   Past Medical History:   Diagnosis Date    Anxiety     Benign hypertension     Chronic ischemic colitis (Multi)     With history of sepsis and infectious gastroenteritis 10/7/2022-10/11/2022    Chronic kidney disease, stage III (moderate) (Multi)     CLL (chronic lymphocytic leukemia) (Multi)     Coronary artery disease     CTS (carpal tunnel syndrome)     Diabetes mellitus (Multi)     Essential (primary) hypertension     GI bleed     Hyperlipidemia, unspecified     Hypertension     Hypothyroidism     Hypothyroidism, unspecified type     Mixed hyperlipidemia     Osteoarthritis     Osteoporosis     Peripheral vascular disease (CMS-HCC)     Right knee pain     Seizure disorder (Multi)     Type 1 diabetes mellitus with hyperglycemia (Multi)      Past Surgical History:   Procedure Laterality Date    CARPAL TUNNEL RELEASE Bilateral     CATARACT EXTRACTION W/ INTRAOCULAR LENS  IMPLANT, BILATERAL      Left 8/10/2017, right 8/31/2017    CHOLECYSTECTOMY      COLONOSCOPY      CORONARY ARTERY BYPASS GRAFT  2005    X 3    HYSTERECTOMY      KNEE SURGERY Right 04/17/2024    total    OTHER SURGICAL HISTORY      Bilateral heel spurs    OTHER SURGICAL HISTORY Right 01/2016    Femoral endarterectomy    TOTAL HIP ARTHROPLASTY Left 2012     Family History   Problem Relation Name Age of Onset    Diabetes Mother      Diabetes Daughter      Other (RA) Daughter       Social History      Tobacco Use    Smoking status: Never     Passive exposure: Past    Smokeless tobacco: Never   Vaping Use    Vaping status: Never Used   Substance Use Topics    Alcohol use: Not Currently    Drug use: Not Currently       Physical Exam   ED Triage Vitals   Temperature Heart Rate Respirations BP   06/04/24 1124 06/04/24 1124 06/04/24 1124 06/04/24 1125   36.3 °C (97.3 °F) 64 18 126/60      Pulse Ox Temp src Heart Rate Source Patient Position   06/04/24 1124 -- -- --   96 %         BP Location FiO2 (%)     -- --             Physical Exam  Vitals and nursing note reviewed.   Constitutional:       General: She is not in acute distress.     Appearance: Normal appearance. She is not ill-appearing or toxic-appearing.   HENT:      Head: Normocephalic and atraumatic.      Nose: Nose normal.      Mouth/Throat:      Mouth: Mucous membranes are moist.   Eyes:      Pupils: Pupils are equal, round, and reactive to light.   Cardiovascular:      Rate and Rhythm: Normal rate and regular rhythm.   Pulmonary:      Effort: Pulmonary effort is normal.      Breath sounds: Normal breath sounds.   Abdominal:      Palpations: Abdomen is soft.      Tenderness: There is no abdominal tenderness. There is no guarding or rebound.   Genitourinary:     Comments: Large hemorrhoid the size of a quarter noted to the right side of the rectum that is not actively bleeding however is tender to palpation  Musculoskeletal:         General: Normal range of motion.      Cervical back: Normal range of motion.   Skin:     General: Skin is warm and dry.   Neurological:      General: No focal deficit present.      Mental Status: She is alert and oriented to person, place, and time.      Sensory: No sensory deficit.      Motor: No weakness.   Psychiatric:         Mood and Affect: Mood normal.         Behavior: Behavior normal.         ED Course & MDM   Diagnoses as of 06/04/24 1236   Hemorrhoids, unspecified hemorrhoid type       Medical Decision  "Making  **Disclaimer parts of this chart have been completed using voice recognition software. Please excuse any errors of transcription.     Patient seen in conjunction with attending physician Dr. Sandoval.     HPI: Detailed above.    Exam: A medically appropriate exam performed, outlined above, given the known history and presentation.    History obtained from: Patient    EMERGENCY DEPARTMENT COURSE and DIFFERENTIAL DIAGNOSIS/MDM:  Patient is 83-year-old female presenting to the emergency department for evaluation of pain in her rectum.  On physical exam vital signs stable patient is in no acute distress.  She has a quarter size hemorrhoid noted to the right side of the rectum that is tender to palpation however not actively bleeding.  Hemorrhoid is not thrombosed.  Did not feel any acute treatment needs to be done at this time.  Patient was given Anusol, Alyson-Colace, and lidocaine jelly to help with discomfort.  She was advised to follow-up with gastroenterology outpatient for possible banding of the hemorrhoid if it continues to bother her.  Patient voiced understanding and was discharged in stable condition.  She will return to the emergency department with any new or worsening symptoms.    The patient presented with a chief complaint of rectal pain. The differential diagnosis associated with this patient's presentation includes perirectal abscess vs. hemorrhoid.     Vitals:    Vitals:    06/04/24 1124 06/04/24 1125 06/04/24 1129   BP:  126/60    Pulse: 64     Resp: 18     Temp: 36.3 °C (97.3 °F)     SpO2: 96%  96%   Weight: 58.1 kg (128 lb)     Height: 1.473 m (4' 10\")       History Limited by:    None    Independent history obtained from:    None    External records reviewed:    None    Diagnostics interpreted by me:    None    Discussions with other clinicians:    None    Chronic conditions impacting care:    None    Social determinants of health affecting care:    None    Diagnostic tests considered but not " performed: None    ED Medications managed:    Medications - No data to display    Prescription drugs considered:     Anusol, stool softener, lidocaine jelly    Screenings:                Procedure  Procedures     Purnima Novak PA-C  06/04/24 1111

## 2024-06-04 NOTE — ED TRIAGE NOTES
Patient presents to the emergency department with complaints of a cyst lying outside of patient rectum. Patient states she has had it for over a week and hasn't really been bothering her but now states it has grown and can feel it and states now it's uncomfortable when she sits. Patient states she feels like there is mucus coming from it and has to wear pads.

## 2024-06-04 NOTE — PROGRESS NOTES
Physical Therapy                 Therapy Communication Note    Patient Name: Bonnie Jules  MRN: [id number]  Today's Date: 6/4/2024     Discipline: Physical Therapy    Missed Visit Reason:      Missed Time: Cancel    Comment: Pt in ED this morning

## 2024-06-04 NOTE — TELEPHONE ENCOUNTER
Spoke with patients POA. Stated they spoke with Dr. Curtis this am and are currently at the ER as advised. Nothing further needed.

## 2024-06-04 NOTE — TELEPHONE ENCOUNTER
Rosa left voicemail calling on behalf of the patient. States they have questions in regards to a referral. Did not leave any other information.

## 2024-06-04 NOTE — PROGRESS NOTES
Received notice that Bonnie was in the ED for rectal pain with Dx of Hemorrhoids and to follow up with GI MD and PCP.  Noted that she is active with OP PT at [facility], [place] which was the original reason for the referral to [facility] for assistance.  We will close Bonnie at this time since goal had been met.  Please feel free to make a new referral if needed.  Family has this SW # [phone]. CASE CLOSED

## 2024-06-06 ENCOUNTER — APPOINTMENT (OUTPATIENT)
Dept: WOUND CARE | Facility: HOSPITAL | Age: 84
End: 2024-06-06
Payer: MEDICARE

## 2024-06-07 ENCOUNTER — OFFICE VISIT (OUTPATIENT)
Dept: ORTHOPEDIC SURGERY | Facility: CLINIC | Age: 84
End: 2024-06-07
Payer: MEDICARE

## 2024-06-07 ENCOUNTER — HOSPITAL ENCOUNTER (OUTPATIENT)
Dept: RADIOLOGY | Facility: CLINIC | Age: 84
Discharge: HOME | End: 2024-06-07
Payer: MEDICARE

## 2024-06-07 VITALS — BODY MASS INDEX: 26.77 KG/M2 | WEIGHT: 128.09 LBS

## 2024-06-07 DIAGNOSIS — M25.561 RIGHT KNEE PAIN, UNSPECIFIED CHRONICITY: Primary | ICD-10-CM

## 2024-06-07 DIAGNOSIS — R52 PAIN: ICD-10-CM

## 2024-06-07 DIAGNOSIS — Z96.651 HISTORY OF TOTAL RIGHT KNEE REPLACEMENT: ICD-10-CM

## 2024-06-07 PROCEDURE — 73560 X-RAY EXAM OF KNEE 1 OR 2: CPT | Mod: RT

## 2024-06-07 PROCEDURE — 1159F MED LIST DOCD IN RCRD: CPT | Performed by: PHYSICIAN ASSISTANT

## 2024-06-07 PROCEDURE — 1036F TOBACCO NON-USER: CPT | Performed by: PHYSICIAN ASSISTANT

## 2024-06-07 PROCEDURE — 99024 POSTOP FOLLOW-UP VISIT: CPT | Performed by: PHYSICIAN ASSISTANT

## 2024-06-07 PROCEDURE — 1125F AMNT PAIN NOTED PAIN PRSNT: CPT | Performed by: PHYSICIAN ASSISTANT

## 2024-06-07 PROCEDURE — 1160F RVW MEDS BY RX/DR IN RCRD: CPT | Performed by: PHYSICIAN ASSISTANT

## 2024-06-07 PROCEDURE — 73560 X-RAY EXAM OF KNEE 1 OR 2: CPT | Mod: RIGHT SIDE | Performed by: ORTHOPAEDIC SURGERY

## 2024-06-07 ASSESSMENT — PAIN SCALES - GENERAL: PAINLEVEL_OUTOF10: 4

## 2024-06-07 ASSESSMENT — ENCOUNTER SYMPTOMS
DEPRESSION: 0
OCCASIONAL FEELINGS OF UNSTEADINESS: 0
LOSS OF SENSATION IN FEET: 0

## 2024-06-07 ASSESSMENT — LIFESTYLE VARIABLES: TOTAL SCORE: 0

## 2024-06-07 ASSESSMENT — PAIN DESCRIPTION - DESCRIPTORS: DESCRIPTORS: ACHING;TINGLING

## 2024-06-07 ASSESSMENT — PAIN - FUNCTIONAL ASSESSMENT: PAIN_FUNCTIONAL_ASSESSMENT: 0-10

## 2024-06-07 ASSESSMENT — PATIENT HEALTH QUESTIONNAIRE - PHQ9
SUM OF ALL RESPONSES TO PHQ9 QUESTIONS 1 AND 2: 0
2. FEELING DOWN, DEPRESSED OR HOPELESS: NOT AT ALL
1. LITTLE INTEREST OR PLEASURE IN DOING THINGS: NOT AT ALL

## 2024-06-07 NOTE — PROGRESS NOTES
Subjective      Past Surgical History:   Procedure Laterality Date    CARPAL TUNNEL RELEASE Bilateral     CATARACT EXTRACTION W/ INTRAOCULAR LENS  IMPLANT, BILATERAL      Left 8/10/2017, right 8/31/2017    CHOLECYSTECTOMY      COLONOSCOPY      CORONARY ARTERY BYPASS GRAFT  2005    X 3    HYSTERECTOMY      KNEE SURGERY Right 04/17/2024    total    OTHER SURGICAL HISTORY      Bilateral heel spurs    OTHER SURGICAL HISTORY Right 01/2016    Femoral endarterectomy    TOTAL HIP ARTHROPLASTY Left 2012        Patient History      This patient is s/p right total knee replacement done by Dr. Liriano on 4-. They present today and state that their right knee pain is 3/10. She has returned home from [facility]. She is participating with outpatient physical therapy.  They are slowly resuming their normal activities of daily living. She is following up with Dr. ALVARADO for a right heel pressure ulcer. She is taking antibiotics.  She is seen today using a cane for support. They deny chest pain, shortness of breath.     There were no vitals taken for this visit.     ROS  CARDIOLOGY:   Negative for chest pain, shortness of breath.   RESPIRATORY:   Negative for chest pain, shortness of breath.   MUSCULOSKELETAL:   See HPI for details.   NEUROLOGY:   Negative for tingling, numbness, weakness.      Physical exam: Right knee: The incision is clean dry and well-healing.  No evidence of infection.  The patient has painless range of motion from 5 to 102 degrees.  Nontender in the right calf.  Neurovascular is intact.  Compartments are soft.  The patient is seen today walking with use of a cane for support.  She has a dressing and darco shoe in place for the right heel pressure ulcer.     NM Lung Perfusion    Result Date: 4/19/2024  Interpreted By:  Demarcus Beckham, STUDY: NM LUNG PERFUSION;  4/19/2024 3:43 pm   INDICATION: Signs/Symptoms:rule out PE.   COMPARISON: Chest radiograph on 04/19/2024   ACCESSION NUMBER(S): [id number]    ORDERING CLINICIAN: KLAUS HANCOCK   TECHNIQUE: DIVISION OF NUCLEAR MEDICINE PERFUSION LUNG SCAN   Multiple perfusion images of the lungs were obtained after the intravenous administration of 4.2 mCi of Tc-99m MAA. SPECT/CT images of the lungs were also obtained.     FINDINGS: Perfusion images demonstrate mild heterogeneity within both lungs. No distinct wedge-shaped subsegmental or segmental perfusion defect seen on SPECT CT to suggest acute pulmonary embolism (low probability).   Low-dose CT demonstrates small bilateral pleural effusions along with bibasilar atelectasis       1.  No distinct wedge-shaped subsegmental or segmental perfusion defect seen on SPECT CT to suggest acute pulmonary embolism (low probability). 2. Low-dose CT demonstrates small bilateral pleural effusions along with bibasilar atelectasis.   I personally reviewed the images/study. This study was interpreted at [place], [place].   MACRO: None   Signed by: Demarcus Beckham 4/19/2024 4:03 PM Dictation workstation:   [id number]    XR chest 1 view    Result Date: 4/19/2024  Interpreted By:  Marco Hollis, STUDY: XR CHEST 1 VIEW; 4/19/2024 11:53 am   INDICATION: Signs/Symptoms:dyspnea.   COMPARISON: 04/18/2024   ACCESSION NUMBER(S): [id number]   ORDERING CLINICIAN: THEODORE NAGY   TECHNIQUE: 1 view of the chest was performed.   FINDINGS: The lungs are adequately inflated. No acute consolidation. No significant pleural effusion. No pneumothorax.  The cardiomediastinal silhouette is within normal limits. Median sternotomy wires are intact. Old posterior right rib fractures are again noted. Small, less than 2 cm metallic wire is also again seen above the proximal aspect of the left clavicle.       Stable examination. No acute cardiopulmonary disease.   Signed by: Marco Hollis 4/19/2024 1:19 PM Dictation workstation:   [id number]    XR knee right 1-2 views    Result Date: 4/17/2024  Interpreted By:   Avni Correa, STUDY: XR KNEE RIGHT 1-2 VIEWS; ;  4/17/2024 12:13 pm   INDICATION: Signs/Symptoms:Post op knee.   COMPARISON: None.   ACCESSION NUMBER(S): [id number]   ORDERING CLINICIAN: KLAUS HANCOCK   FINDINGS: Right knee, two views   Interval right total knee arthroplasty in place. No periprosthetic fracture lucency seen. There is no dislocation. Postsurgical change the soft tissue. There is a moderate-sized effusion       No immediate complication after right total knee arthroplasty     MACRO: None   Signed by: Avni Correa 4/17/2024 12:33 PM Dictation workstation:   [id number]       Diagnoses and all orders for this visit:  Right knee pain, unspecified chronicity (Primary)  History of total right knee replacement  Options are discussed with the patient in detail. The patient is instructed to continue with physical therapy.  She is instructed regarding total knee infection precautions and fall precautions and to use a walker for support. She is instructed to follow up with infectious disease specialist as scheduled.  The patient is instructed regarding activity modification and risk for further injury with falling or trauma, ice, provider directed at home gentle strengthening and ROM exercises, and the appropriate use of Tylenol as needed for pain with its potential adverse reactions and side effects. The patient understands. Return in 6 weeks for re-evaluation or sooner as needed. Please note that this report has been produced using speech recognition software.  It may contain errors related to grammar, punctuation or spelling.  Electronically signed, but not reviewed.

## 2024-06-07 NOTE — PATIENT INSTRUCTIONS
Continue to rest, ice and elevate your knee.  Continue your pain regimen (tylenol).   Continue Physical therapy.   Remember to call our office for antibiotics before dental work.   Use a cane or walker for support.   Do not kneel, twist, or squat and avoid heavy lifting.     Return after 7- with Dr. Liriano

## 2024-06-09 ENCOUNTER — HOSPITAL ENCOUNTER (INPATIENT)
Facility: HOSPITAL | Age: 84
LOS: 2 days | Discharge: HOME | DRG: 184 | End: 2024-06-11
Attending: STUDENT IN AN ORGANIZED HEALTH CARE EDUCATION/TRAINING PROGRAM | Admitting: INTERNAL MEDICINE
Payer: MEDICARE

## 2024-06-09 ENCOUNTER — APPOINTMENT (OUTPATIENT)
Dept: RADIOLOGY | Facility: HOSPITAL | Age: 84
DRG: 184 | End: 2024-06-09
Payer: MEDICARE

## 2024-06-09 DIAGNOSIS — S22.31XA FRACTURE OF ONE RIB, RIGHT SIDE, INITIAL ENCOUNTER FOR CLOSED FRACTURE: ICD-10-CM

## 2024-06-09 DIAGNOSIS — S22.41XA CLOSED FRACTURE OF MULTIPLE RIBS OF RIGHT SIDE, INITIAL ENCOUNTER: Primary | ICD-10-CM
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
## 2024-06-09 DIAGNOSIS — I73.9 PERIPHERAL VASCULAR DISEASE (CMS-HCC): ICD-10-CM

## 2024-06-09 LAB
ALBUMIN SERPL-MCNC: 3.8 G/DL (ref 3.5–5)
ALP BLD-CCNC: 78 U/L (ref 35–125)
ALT SERPL-CCNC: 7 U/L (ref 5–40)
ANION GAP SERPL CALC-SCNC: 9 MMOL/L
AST SERPL-CCNC: 28 U/L (ref 5–40)
BASOPHILS # BLD AUTO: 0.01 X10*3/UL (ref 0–0.1)
BASOPHILS NFR BLD AUTO: 0.1 %
BILIRUB DIRECT SERPL-MCNC: <0.2 MG/DL (ref 0–0.2)
BILIRUB SERPL-MCNC: 0.2 MG/DL (ref 0.1–1.2)
BUN SERPL-MCNC: 20 MG/DL (ref 8–25)
CALCIUM SERPL-MCNC: 9.2 MG/DL (ref 8.5–10.4)
CHLORIDE SERPL-SCNC: 101 MMOL/L (ref 97–107)
CO2 SERPL-SCNC: 25 MMOL/L (ref 24–31)
CREAT SERPL-MCNC: 1.1 MG/DL (ref 0.4–1.6)
EGFRCR SERPLBLD CKD-EPI 2021: 51 ML/MIN/1.73M*2
EOSINOPHIL # BLD AUTO: 0.26 X10*3/UL (ref 0–0.4)
EOSINOPHIL NFR BLD AUTO: 3.9 %
ERYTHROCYTE [DISTWIDTH] IN BLOOD BY AUTOMATED COUNT: 13.5 % (ref 11.5–14.5)
GLUCOSE BLD MANUAL STRIP-MCNC: 146 MG/DL (ref 74–99)
GLUCOSE BLD MANUAL STRIP-MCNC: 202 MG/DL (ref 74–99)
GLUCOSE SERPL-MCNC: 121 MG/DL (ref 65–99)
HCT VFR BLD AUTO: 31.3 % (ref 36–46)
HGB BLD-MCNC: 10.1 G/DL (ref 12–16)
IMM GRANULOCYTES # BLD AUTO: 0.02 X10*3/UL (ref 0–0.5)
IMM GRANULOCYTES NFR BLD AUTO: 0.3 % (ref 0–0.9)
LYMPHOCYTES # BLD AUTO: 2.88 X10*3/UL (ref 0.8–3)
LYMPHOCYTES NFR BLD AUTO: 42.9 %
MCH RBC QN AUTO: 31.6 PG (ref 26–34)
MCHC RBC AUTO-ENTMCNC: 32.3 G/DL (ref 32–36)
MCV RBC AUTO: 98 FL (ref 80–100)
MONOCYTES # BLD AUTO: 0.53 X10*3/UL (ref 0.05–0.8)
MONOCYTES NFR BLD AUTO: 7.9 %
NEUTROPHILS # BLD AUTO: 3.02 X10*3/UL (ref 1.6–5.5)
NEUTROPHILS NFR BLD AUTO: 44.9 %
NRBC BLD-RTO: 0 /100 WBCS (ref 0–0)
PLATELET # BLD AUTO: 323 X10*3/UL (ref 150–450)
POTASSIUM SERPL-SCNC: 5.1 MMOL/L (ref 3.4–5.1)
PROT SERPL-MCNC: 6.7 G/DL (ref 5.9–7.9)
RBC # BLD AUTO: 3.2 X10*6/UL (ref 4–5.2)
SODIUM SERPL-SCNC: 135 MMOL/L (ref 133–145)
WBC # BLD AUTO: 6.7 X10*3/UL (ref 4.4–11.3)

## 2024-06-09 PROCEDURE — 71250 CT THORAX DX C-: CPT

## 2024-06-09 PROCEDURE — 12002 RPR S/N/AX/GEN/TRNK2.6-7.5CM: CPT | Performed by: STUDENT IN AN ORGANIZED HEALTH CARE EDUCATION/TRAINING PROGRAM

## 2024-06-09 PROCEDURE — 72170 X-RAY EXAM OF PELVIS: CPT | Performed by: RADIOLOGY

## 2024-06-09 PROCEDURE — 82248 BILIRUBIN DIRECT: CPT | Performed by: NURSE PRACTITIONER

## 2024-06-09 PROCEDURE — 9420000001 HC RT PATIENT EDUCATION 5 MIN

## 2024-06-09 PROCEDURE — 0HQKXZZ REPAIR RIGHT LOWER LEG SKIN, EXTERNAL APPROACH: ICD-10-PCS | Performed by: STUDENT IN AN ORGANIZED HEALTH CARE EDUCATION/TRAINING PROGRAM

## 2024-06-09 PROCEDURE — 96376 TX/PRO/DX INJ SAME DRUG ADON: CPT | Mod: 59

## 2024-06-09 PROCEDURE — 96374 THER/PROPH/DIAG INJ IV PUSH: CPT | Mod: 59

## 2024-06-09 PROCEDURE — 99222 1ST HOSP IP/OBS MODERATE 55: CPT | Performed by: SURGERY

## 2024-06-09 PROCEDURE — 36415 COLL VENOUS BLD VENIPUNCTURE: CPT | Performed by: STUDENT IN AN ORGANIZED HEALTH CARE EDUCATION/TRAINING PROGRAM

## 2024-06-09 PROCEDURE — 2500000002 HC RX 250 W HCPCS SELF ADMINISTERED DRUGS (ALT 637 FOR MEDICARE OP, ALT 636 FOR OP/ED): Performed by: NURSE PRACTITIONER

## 2024-06-09 PROCEDURE — 1200000002 HC GENERAL ROOM WITH TELEMETRY DAILY

## 2024-06-09 PROCEDURE — 70450 CT HEAD/BRAIN W/O DYE: CPT

## 2024-06-09 PROCEDURE — 73030 X-RAY EXAM OF SHOULDER: CPT | Mod: RIGHT SIDE | Performed by: RADIOLOGY

## 2024-06-09 PROCEDURE — 72125 CT NECK SPINE W/O DYE: CPT

## 2024-06-09 PROCEDURE — 72125 CT NECK SPINE W/O DYE: CPT | Performed by: RADIOLOGY

## 2024-06-09 PROCEDURE — 73564 X-RAY EXAM KNEE 4 OR MORE: CPT | Mod: RIGHT SIDE | Performed by: RADIOLOGY

## 2024-06-09 PROCEDURE — 96375 TX/PRO/DX INJ NEW DRUG ADDON: CPT | Mod: 59

## 2024-06-09 PROCEDURE — 71250 CT THORAX DX C-: CPT | Performed by: RADIOLOGY

## 2024-06-09 PROCEDURE — 2500000001 HC RX 250 WO HCPCS SELF ADMINISTERED DRUGS (ALT 637 FOR MEDICARE OP): Performed by: STUDENT IN AN ORGANIZED HEALTH CARE EDUCATION/TRAINING PROGRAM

## 2024-06-09 PROCEDURE — 96372 THER/PROPH/DIAG INJ SC/IM: CPT | Performed by: NURSE PRACTITIONER

## 2024-06-09 PROCEDURE — 2500000004 HC RX 250 GENERAL PHARMACY W/ HCPCS (ALT 636 FOR OP/ED): Performed by: NURSE PRACTITIONER

## 2024-06-09 PROCEDURE — 2500000005 HC RX 250 GENERAL PHARMACY W/O HCPCS: Performed by: STUDENT IN AN ORGANIZED HEALTH CARE EDUCATION/TRAINING PROGRAM

## 2024-06-09 PROCEDURE — G0378 HOSPITAL OBSERVATION PER HR: HCPCS

## 2024-06-09 PROCEDURE — 73030 X-RAY EXAM OF SHOULDER: CPT | Mod: RT

## 2024-06-09 PROCEDURE — 99285 EMERGENCY DEPT VISIT HI MDM: CPT

## 2024-06-09 PROCEDURE — 80053 COMPREHEN METABOLIC PANEL: CPT | Performed by: STUDENT IN AN ORGANIZED HEALTH CARE EDUCATION/TRAINING PROGRAM

## 2024-06-09 PROCEDURE — 70450 CT HEAD/BRAIN W/O DYE: CPT | Performed by: RADIOLOGY

## 2024-06-09 PROCEDURE — 85025 COMPLETE CBC W/AUTO DIFF WBC: CPT | Performed by: STUDENT IN AN ORGANIZED HEALTH CARE EDUCATION/TRAINING PROGRAM

## 2024-06-09 PROCEDURE — 82947 ASSAY GLUCOSE BLOOD QUANT: CPT | Mod: 91

## 2024-06-09 PROCEDURE — 72170 X-RAY EXAM OF PELVIS: CPT

## 2024-06-09 PROCEDURE — 80048 BASIC METABOLIC PNL TOTAL CA: CPT | Performed by: STUDENT IN AN ORGANIZED HEALTH CARE EDUCATION/TRAINING PROGRAM

## 2024-06-09 PROCEDURE — 2500000001 HC RX 250 WO HCPCS SELF ADMINISTERED DRUGS (ALT 637 FOR MEDICARE OP): Performed by: NURSE PRACTITIONER

## 2024-06-09 PROCEDURE — 73564 X-RAY EXAM KNEE 4 OR MORE: CPT | Mod: RT

## 2024-06-09 RX ORDER — FOLIC ACID 1 MG/1
1 TABLET ORAL DAILY
Status: DISCONTINUED | OUTPATIENT
Start: 2024-06-09 | End: 2024-06-11 | Stop reason: HOSPADM

## 2024-06-09 RX ORDER — DEXTROSE 50 % IN WATER (D50W) INTRAVENOUS SYRINGE
25
Status: DISCONTINUED | OUTPATIENT
Start: 2024-06-09 | End: 2024-06-11 | Stop reason: HOSPADM

## 2024-06-09 RX ORDER — LIDOCAINE HYDROCHLORIDE 10 MG/ML
5 INJECTION INFILTRATION; PERINEURAL ONCE
Status: DISCONTINUED | OUTPATIENT
Start: 2024-06-09 | End: 2024-06-11 | Stop reason: HOSPADM

## 2024-06-09 RX ORDER — OXYCODONE HYDROCHLORIDE 5 MG/1
5 TABLET ORAL EVERY 4 HOURS PRN
Status: DISCONTINUED | OUTPATIENT
Start: 2024-06-09 | End: 2024-06-11 | Stop reason: HOSPADM

## 2024-06-09 RX ORDER — POLYETHYLENE GLYCOL 3350 17 G/17G
17 POWDER, FOR SOLUTION ORAL DAILY
Status: DISCONTINUED | OUTPATIENT
Start: 2024-06-09 | End: 2024-06-11 | Stop reason: HOSPADM

## 2024-06-09 RX ORDER — ACETAMINOPHEN 160 MG/5ML
650 SOLUTION ORAL EVERY 4 HOURS PRN
Status: DISCONTINUED | OUTPATIENT
Start: 2024-06-09 | End: 2024-06-11 | Stop reason: HOSPADM

## 2024-06-09 RX ORDER — NITROGLYCERIN 0.4 MG/1
0.4 TABLET SUBLINGUAL EVERY 5 MIN PRN
COMMUNITY

## 2024-06-09 RX ORDER — CHOLECALCIFEROL (VITAMIN D3) 50 MCG
2000 TABLET ORAL DAILY
Status: DISCONTINUED | OUTPATIENT
Start: 2024-06-09 | End: 2024-06-11 | Stop reason: HOSPADM

## 2024-06-09 RX ORDER — METHOCARBAMOL 750 MG/1
750 TABLET, FILM COATED ORAL ONCE
Status: COMPLETED | OUTPATIENT
Start: 2024-06-09 | End: 2024-06-09

## 2024-06-09 RX ORDER — BISACODYL 5 MG
10 TABLET, DELAYED RELEASE (ENTERIC COATED) ORAL DAILY PRN
Status: DISCONTINUED | OUTPATIENT
Start: 2024-06-09 | End: 2024-06-11 | Stop reason: HOSPADM

## 2024-06-09 RX ORDER — OXYCODONE AND ACETAMINOPHEN 5; 325 MG/1; MG/1
1 TABLET ORAL EVERY 6 HOURS PRN
Status: ON HOLD | COMMUNITY
End: 2024-06-11

## 2024-06-09 RX ORDER — FOLIC ACID 1 MG/1
1 TABLET ORAL DAILY
COMMUNITY

## 2024-06-09 RX ORDER — SULFASALAZINE 500 MG/1
1000 TABLET, DELAYED RELEASE ORAL 2 TIMES DAILY
Status: DISCONTINUED | OUTPATIENT
Start: 2024-06-09 | End: 2024-06-11 | Stop reason: HOSPADM

## 2024-06-09 RX ORDER — DOXYCYCLINE 100 MG/1
50 TABLET ORAL 2 TIMES DAILY
COMMUNITY
Start: 2024-05-24 | End: 2024-06-11 | Stop reason: HOSPADM

## 2024-06-09 RX ORDER — LANOLIN ALCOHOL/MO/W.PET/CERES
1000 CREAM (GRAM) TOPICAL DAILY
Status: DISCONTINUED | OUTPATIENT
Start: 2024-06-09 | End: 2024-06-11 | Stop reason: HOSPADM

## 2024-06-09 RX ORDER — ACETAMINOPHEN 325 MG/1
650 TABLET ORAL EVERY 4 HOURS PRN
Status: DISCONTINUED | OUTPATIENT
Start: 2024-06-09 | End: 2024-06-11 | Stop reason: HOSPADM

## 2024-06-09 RX ORDER — ACETAMINOPHEN 650 MG/1
650 SUPPOSITORY RECTAL EVERY 4 HOURS PRN
Status: DISCONTINUED | OUTPATIENT
Start: 2024-06-09 | End: 2024-06-11 | Stop reason: HOSPADM

## 2024-06-09 RX ORDER — LEVOTHYROXINE SODIUM 100 UG/1
100 TABLET ORAL DAILY
Status: DISCONTINUED | OUTPATIENT
Start: 2024-06-09 | End: 2024-06-11 | Stop reason: HOSPADM

## 2024-06-09 RX ORDER — ENOXAPARIN SODIUM 100 MG/ML
40 INJECTION SUBCUTANEOUS EVERY 24 HOURS
Status: DISCONTINUED | OUTPATIENT
Start: 2024-06-09 | End: 2024-06-11 | Stop reason: HOSPADM

## 2024-06-09 RX ORDER — ACETAMINOPHEN 325 MG/1
975 TABLET ORAL ONCE
Status: DISCONTINUED | OUTPATIENT
Start: 2024-06-09 | End: 2024-06-11 | Stop reason: HOSPADM

## 2024-06-09 RX ORDER — LIDOCAINE 560 MG/1
1 PATCH PERCUTANEOUS; TOPICAL; TRANSDERMAL ONCE
Status: COMPLETED | OUTPATIENT
Start: 2024-06-09 | End: 2024-06-10

## 2024-06-09 RX ORDER — HYDROCORTISONE ACETATE 25 MG/1
25 SUPPOSITORY RECTAL 2 TIMES DAILY PRN
Status: DISCONTINUED | OUTPATIENT
Start: 2024-06-09 | End: 2024-06-11 | Stop reason: HOSPADM

## 2024-06-09 RX ORDER — GABAPENTIN 100 MG/1
100 CAPSULE ORAL 2 TIMES DAILY
Status: DISCONTINUED | OUTPATIENT
Start: 2024-06-09 | End: 2024-06-11 | Stop reason: HOSPADM

## 2024-06-09 RX ORDER — ONDANSETRON HYDROCHLORIDE 2 MG/ML
4 INJECTION, SOLUTION INTRAVENOUS EVERY 8 HOURS PRN
Status: DISCONTINUED | OUTPATIENT
Start: 2024-06-09 | End: 2024-06-11 | Stop reason: HOSPADM

## 2024-06-09 RX ORDER — MORPHINE SULFATE 2 MG/ML
1 INJECTION, SOLUTION INTRAMUSCULAR; INTRAVENOUS EVERY 4 HOURS PRN
Status: DISCONTINUED | OUTPATIENT
Start: 2024-06-09 | End: 2024-06-11 | Stop reason: HOSPADM

## 2024-06-09 RX ORDER — ONDANSETRON 4 MG/1
4 TABLET, FILM COATED ORAL EVERY 8 HOURS PRN
Status: DISCONTINUED | OUTPATIENT
Start: 2024-06-09 | End: 2024-06-11 | Stop reason: HOSPADM

## 2024-06-09 RX ORDER — DEXTROSE 50 % IN WATER (D50W) INTRAVENOUS SYRINGE
12.5
Status: DISCONTINUED | OUTPATIENT
Start: 2024-06-09 | End: 2024-06-11 | Stop reason: HOSPADM

## 2024-06-09 RX ORDER — OXYCODONE HYDROCHLORIDE 5 MG/1
10 TABLET ORAL EVERY 4 HOURS PRN
Status: DISCONTINUED | OUTPATIENT
Start: 2024-06-09 | End: 2024-06-11 | Stop reason: HOSPADM

## 2024-06-09 RX ORDER — ASPIRIN 81 MG/1
81 TABLET ORAL DAILY
Status: DISCONTINUED | OUTPATIENT
Start: 2024-06-09 | End: 2024-06-11 | Stop reason: HOSPADM

## 2024-06-09 RX ORDER — HYDRALAZINE HYDROCHLORIDE 50 MG/1
50 TABLET, FILM COATED ORAL 2 TIMES DAILY
Status: DISCONTINUED | OUTPATIENT
Start: 2024-06-09 | End: 2024-06-11 | Stop reason: HOSPADM

## 2024-06-09 RX ORDER — LISINOPRIL 40 MG/1
40 TABLET ORAL DAILY
Status: DISCONTINUED | OUTPATIENT
Start: 2024-06-09 | End: 2024-06-11

## 2024-06-09 RX ORDER — ACETAMINOPHEN 325 MG/1
650 TABLET ORAL EVERY 6 HOURS
Status: DISCONTINUED | OUTPATIENT
Start: 2024-06-09 | End: 2024-06-11 | Stop reason: HOSPADM

## 2024-06-09 RX ORDER — PRAVASTATIN SODIUM 40 MG/1
40 TABLET ORAL NIGHTLY
Status: DISCONTINUED | OUTPATIENT
Start: 2024-06-09 | End: 2024-06-11 | Stop reason: HOSPADM

## 2024-06-09 RX ORDER — INSULIN GLARGINE 100 [IU]/ML
10 INJECTION, SOLUTION SUBCUTANEOUS DAILY
Status: DISCONTINUED | OUTPATIENT
Start: 2024-06-10 | End: 2024-06-11 | Stop reason: HOSPADM

## 2024-06-09 RX ORDER — AMOXICILLIN 250 MG
1 CAPSULE ORAL DAILY
Status: DISCONTINUED | OUTPATIENT
Start: 2024-06-09 | End: 2024-06-11 | Stop reason: HOSPADM

## 2024-06-09 RX ORDER — INSULIN DEGLUDEC 100 U/ML
12 INJECTION, SOLUTION SUBCUTANEOUS EVERY MORNING
Status: DISCONTINUED | OUTPATIENT
Start: 2024-06-10 | End: 2024-06-09

## 2024-06-09 RX ORDER — INSULIN LISPRO 100 [IU]/ML
0-10 INJECTION, SOLUTION INTRAVENOUS; SUBCUTANEOUS
Status: DISCONTINUED | OUTPATIENT
Start: 2024-06-09 | End: 2024-06-11 | Stop reason: HOSPADM

## 2024-06-09 RX ORDER — METOPROLOL TARTRATE 25 MG/1
25 TABLET, FILM COATED ORAL 2 TIMES DAILY
Status: DISCONTINUED | OUTPATIENT
Start: 2024-06-09 | End: 2024-06-11 | Stop reason: HOSPADM

## 2024-06-09 RX ORDER — AMLODIPINE BESYLATE 10 MG/1
10 TABLET ORAL DAILY
Status: DISCONTINUED | OUTPATIENT
Start: 2024-06-09 | End: 2024-06-11 | Stop reason: HOSPADM

## 2024-06-09 RX ORDER — ASPIRIN 81 MG/1
81 TABLET ORAL DAILY
COMMUNITY

## 2024-06-09 RX ORDER — MORPHINE SULFATE 2 MG/ML
2 INJECTION, SOLUTION INTRAMUSCULAR; INTRAVENOUS EVERY 4 HOURS PRN
Status: DISCONTINUED | OUTPATIENT
Start: 2024-06-09 | End: 2024-06-11 | Stop reason: HOSPADM

## 2024-06-09 RX ADMIN — ENOXAPARIN SODIUM 40 MG: 40 INJECTION SUBCUTANEOUS at 15:44

## 2024-06-09 RX ADMIN — CYANOCOBALAMIN TAB 1000 MCG 1000 MCG: 1000 TAB at 15:44

## 2024-06-09 RX ADMIN — METHOCARBAMOL TABLETS 750 MG: 750 TABLET, COATED ORAL at 12:09

## 2024-06-09 RX ADMIN — SULFASALAZINE 1000 MG: 500 TABLET, DELAYED RELEASE ORAL at 17:35

## 2024-06-09 RX ADMIN — Medication 2000 UNITS: at 15:43

## 2024-06-09 RX ADMIN — METOPROLOL TARTRATE 25 MG: 25 TABLET, FILM COATED ORAL at 20:54

## 2024-06-09 RX ADMIN — FOLIC ACID 1 MG: 1 TABLET ORAL at 15:44

## 2024-06-09 RX ADMIN — METOPROLOL TARTRATE 25 MG: 25 TABLET, FILM COATED ORAL at 15:46

## 2024-06-09 RX ADMIN — GABAPENTIN 100 MG: 100 CAPSULE ORAL at 15:45

## 2024-06-09 RX ADMIN — ASPIRIN 81 MG: 81 TABLET, COATED ORAL at 15:43

## 2024-06-09 RX ADMIN — SULFASALAZINE 1000 MG: 500 TABLET, DELAYED RELEASE ORAL at 20:54

## 2024-06-09 RX ADMIN — INSULIN LISPRO 4 UNITS: 100 INJECTION, SOLUTION INTRAVENOUS; SUBCUTANEOUS at 18:17

## 2024-06-09 RX ADMIN — ACETAMINOPHEN 650 MG: 325 TABLET ORAL at 20:54

## 2024-06-09 RX ADMIN — MORPHINE SULFATE 2 MG: 2 INJECTION, SOLUTION INTRAMUSCULAR; INTRAVENOUS at 20:54

## 2024-06-09 RX ADMIN — MORPHINE SULFATE 2 MG: 2 INJECTION, SOLUTION INTRAMUSCULAR; INTRAVENOUS at 15:46

## 2024-06-09 RX ADMIN — PRAVASTATIN SODIUM 40 MG: 40 TABLET ORAL at 20:54

## 2024-06-09 RX ADMIN — LIDOCAINE 1 PATCH: 4 PATCH TOPICAL at 12:09

## 2024-06-09 SDOH — ECONOMIC STABILITY: FOOD INSECURITY: WITHIN THE PAST 12 MONTHS, YOU WORRIED THAT YOUR FOOD WOULD RUN OUT BEFORE YOU GOT MONEY TO BUY MORE.: NEVER TRUE

## 2024-06-09 SDOH — HEALTH STABILITY: PHYSICAL HEALTH: ON AVERAGE, HOW MANY DAYS PER WEEK DO YOU ENGAGE IN MODERATE TO STRENUOUS EXERCISE (LIKE A BRISK WALK)?: 0 DAYS

## 2024-06-09 SDOH — SOCIAL STABILITY: SOCIAL INSECURITY: WITHIN THE LAST YEAR, HAVE YOU BEEN AFRAID OF YOUR PARTNER OR EX-PARTNER?: NO

## 2024-06-09 SDOH — ECONOMIC STABILITY: HOUSING INSECURITY: IN THE LAST 12 MONTHS, HOW MANY PLACES HAVE YOU LIVED?: 1

## 2024-06-09 SDOH — ECONOMIC STABILITY: INCOME INSECURITY: IN THE PAST 12 MONTHS, HAS THE ELECTRIC, GAS, OIL, OR WATER COMPANY THREATENED TO SHUT OFF SERVICE IN YOUR HOME?: NO

## 2024-06-09 SDOH — SOCIAL STABILITY: SOCIAL INSECURITY: WITHIN THE LAST YEAR, HAVE YOU BEEN HUMILIATED OR EMOTIONALLY ABUSED IN OTHER WAYS BY YOUR PARTNER OR EX-PARTNER?: NO

## 2024-06-09 SDOH — HEALTH STABILITY: MENTAL HEALTH: HOW MANY STANDARD DRINKS CONTAINING ALCOHOL DO YOU HAVE ON A TYPICAL DAY?: PATIENT DOES NOT DRINK

## 2024-06-09 SDOH — HEALTH STABILITY: MENTAL HEALTH: HOW OFTEN DO YOU HAVE A DRINK CONTAINING ALCOHOL?: NEVER

## 2024-06-09 SDOH — ECONOMIC STABILITY: FOOD INSECURITY: WITHIN THE PAST 12 MONTHS, THE FOOD YOU BOUGHT JUST DIDN'T LAST AND YOU DIDN'T HAVE MONEY TO GET MORE.: NEVER TRUE

## 2024-06-09 SDOH — ECONOMIC STABILITY: INCOME INSECURITY: HOW HARD IS IT FOR YOU TO PAY FOR THE VERY BASICS LIKE FOOD, HOUSING, MEDICAL CARE, AND HEATING?: NOT HARD AT ALL

## 2024-06-09 SDOH — SOCIAL STABILITY: SOCIAL INSECURITY: HAVE YOU HAD ANY THOUGHTS OF HARMING ANYONE ELSE?: NO

## 2024-06-09 SDOH — SOCIAL STABILITY: SOCIAL NETWORK: HOW OFTEN DO YOU ATTENT MEETINGS OF THE CLUB OR ORGANIZATION YOU BELONG TO?: NEVER

## 2024-06-09 SDOH — HEALTH STABILITY: MENTAL HEALTH
HOW OFTEN DO YOU NEED TO HAVE SOMEONE HELP YOU WHEN YOU READ INSTRUCTIONS, PAMPHLETS, OR OTHER WRITTEN MATERIAL FROM YOUR DOCTOR OR PHARMACY?: SOMETIMES

## 2024-06-09 SDOH — SOCIAL STABILITY: SOCIAL INSECURITY: WERE YOU ABLE TO COMPLETE ALL THE BEHAVIORAL HEALTH SCREENINGS?: YES

## 2024-06-09 SDOH — HEALTH STABILITY: MENTAL HEALTH: HOW OFTEN DO YOU HAVE 6 OR MORE DRINKS ON ONE OCCASION?: NEVER

## 2024-06-09 SDOH — SOCIAL STABILITY: SOCIAL INSECURITY: HAVE YOU HAD THOUGHTS OF HARMING ANYONE ELSE?: NO

## 2024-06-09 SDOH — SOCIAL STABILITY: SOCIAL INSECURITY: DOES ANYONE TRY TO KEEP YOU FROM HAVING/CONTACTING OTHER FRIENDS OR DOING THINGS OUTSIDE YOUR HOME?: NO

## 2024-06-09 SDOH — SOCIAL STABILITY: SOCIAL NETWORK: HOW OFTEN DO YOU ATTEND CHURCH OR RELIGIOUS SERVICES?: 1 TO 4 TIMES PER YEAR

## 2024-06-09 SDOH — SOCIAL STABILITY: SOCIAL NETWORK: HOW OFTEN DO YOU GET TOGETHER WITH FRIENDS OR RELATIVES?: MORE THAN THREE TIMES A WEEK

## 2024-06-09 SDOH — SOCIAL STABILITY: SOCIAL INSECURITY: ABUSE: ADULT

## 2024-06-09 SDOH — HEALTH STABILITY: PHYSICAL HEALTH: ON AVERAGE, HOW MANY MINUTES DO YOU ENGAGE IN EXERCISE AT THIS LEVEL?: 0 MIN

## 2024-06-09 SDOH — SOCIAL STABILITY: SOCIAL INSECURITY: DO YOU FEEL ANYONE HAS EXPLOITED OR TAKEN ADVANTAGE OF YOU FINANCIALLY OR OF YOUR PERSONAL PROPERTY?: NO

## 2024-06-09 SDOH — SOCIAL STABILITY: SOCIAL NETWORK: ARE YOU MARRIED, WIDOWED, DIVORCED, SEPARATED, NEVER MARRIED, OR LIVING WITH A PARTNER?: WIDOWED

## 2024-06-09 SDOH — ECONOMIC STABILITY: INCOME INSECURITY: IN THE LAST 12 MONTHS, WAS THERE A TIME WHEN YOU WERE NOT ABLE TO PAY THE MORTGAGE OR RENT ON TIME?: NO

## 2024-06-09 SDOH — SOCIAL STABILITY: SOCIAL INSECURITY: ARE THERE ANY APPARENT SIGNS OF INJURIES/BEHAVIORS THAT COULD BE RELATED TO ABUSE/NEGLECT?: NO

## 2024-06-09 SDOH — SOCIAL STABILITY: SOCIAL INSECURITY
WITHIN THE LAST YEAR, HAVE TO BEEN RAPED OR FORCED TO HAVE ANY KIND OF SEXUAL ACTIVITY BY YOUR PARTNER OR EX-PARTNER?: NO

## 2024-06-09 SDOH — SOCIAL STABILITY: SOCIAL INSECURITY: HAS ANYONE EVER THREATENED TO HURT YOUR FAMILY OR YOUR PETS?: NO

## 2024-06-09 SDOH — SOCIAL STABILITY: SOCIAL INSECURITY: ARE YOU OR HAVE YOU BEEN THREATENED OR ABUSED PHYSICALLY, EMOTIONALLY, OR SEXUALLY BY ANYONE?: NO

## 2024-06-09 SDOH — SOCIAL STABILITY: SOCIAL INSECURITY: DO YOU FEEL UNSAFE GOING BACK TO THE PLACE WHERE YOU ARE LIVING?: NO

## 2024-06-09 ASSESSMENT — ENCOUNTER SYMPTOMS
NUMBNESS: 0
CHEST TIGHTNESS: 1
COUGH: 0
CONFUSION: 0
PALPITATIONS: 0
LIGHT-HEADEDNESS: 0
DYSURIA: 0
APPETITE CHANGE: 0
ABDOMINAL PAIN: 0
ACTIVITY CHANGE: 0
DIAPHORESIS: 0
AGITATION: 0
FATIGUE: 0
RHINORRHEA: 0
CHEST TIGHTNESS: 0
FEVER: 0
ABDOMINAL DISTENTION: 0
DIARRHEA: 0
CHILLS: 0
NAUSEA: 0
DIZZINESS: 0
CONSTIPATION: 0
SHORTNESS OF BREATH: 0
VOMITING: 0

## 2024-06-09 ASSESSMENT — COGNITIVE AND FUNCTIONAL STATUS - GENERAL
TURNING FROM BACK TO SIDE WHILE IN FLAT BAD: A LITTLE
HELP NEEDED FOR BATHING: A LITTLE
PATIENT BASELINE BEDBOUND: NO
WALKING IN HOSPITAL ROOM: A LOT
CLIMB 3 TO 5 STEPS WITH RAILING: A LOT
DRESSING REGULAR LOWER BODY CLOTHING: A LITTLE
MOVING FROM LYING ON BACK TO SITTING ON SIDE OF FLAT BED WITH BEDRAILS: A LITTLE
MOBILITY SCORE: 16
TOILETING: A LOT
MOVING TO AND FROM BED TO CHAIR: A LITTLE
DAILY ACTIVITIY SCORE: 17
STANDING UP FROM CHAIR USING ARMS: A LITTLE
PERSONAL GROOMING: A LITTLE
DRESSING REGULAR UPPER BODY CLOTHING: A LOT

## 2024-06-09 ASSESSMENT — ACTIVITIES OF DAILY LIVING (ADL)
HEARING - RIGHT EAR: FUNCTIONAL
JUDGMENT_ADEQUATE_SAFELY_COMPLETE_DAILY_ACTIVITIES: YES
ADEQUATE_TO_COMPLETE_ADL: YES
BATHING: INDEPENDENT
TOILETING: NEEDS ASSISTANCE
GROOMING: NEEDS ASSISTANCE
PATIENT'S MEMORY ADEQUATE TO SAFELY COMPLETE DAILY ACTIVITIES?: YES
WALKS IN HOME: INDEPENDENT
DRESSING YOURSELF: NEEDS ASSISTANCE
FEEDING YOURSELF: INDEPENDENT
HEARING - LEFT EAR: FUNCTIONAL

## 2024-06-09 ASSESSMENT — PAIN DESCRIPTION - LOCATION: LOCATION: SHOULDER

## 2024-06-09 ASSESSMENT — LIFESTYLE VARIABLES
SKIP TO QUESTIONS 9-10: 1
HOW OFTEN DO YOU HAVE A DRINK CONTAINING ALCOHOL: NEVER
AUDIT-C TOTAL SCORE: 0
HOW OFTEN DO YOU HAVE 6 OR MORE DRINKS ON ONE OCCASION: NEVER
AUDIT-C TOTAL SCORE: 0
HOW MANY STANDARD DRINKS CONTAINING ALCOHOL DO YOU HAVE ON A TYPICAL DAY: PATIENT DOES NOT DRINK
AUDIT-C TOTAL SCORE: 0
SKIP TO QUESTIONS 9-10: 1

## 2024-06-09 ASSESSMENT — PATIENT HEALTH QUESTIONNAIRE - PHQ9
SUM OF ALL RESPONSES TO PHQ9 QUESTIONS 1 & 2: 0
1. LITTLE INTEREST OR PLEASURE IN DOING THINGS: NOT AT ALL
2. FEELING DOWN, DEPRESSED OR HOPELESS: NOT AT ALL

## 2024-06-09 ASSESSMENT — PAIN DESCRIPTION - ORIENTATION: ORIENTATION: RIGHT

## 2024-06-09 ASSESSMENT — PAIN SCALES - GENERAL: PAINLEVEL_OUTOF10: 7

## 2024-06-09 ASSESSMENT — PAIN SCALES - WONG BAKER: WONGBAKER_NUMERICALRESPONSE: HURTS WHOLE LOT

## 2024-06-09 NOTE — PROGRESS NOTES
Bonnie Jules is a 83 y.o. female on day 0 of admission presenting with Fracture of one rib, right side, initial encounter for closed fracture.       06/09/24 1429   Discharge Planning   Living Arrangements Children   Support Systems Children   Assistance Needed PT/OT   Type of Residence Private residence   Number of Stairs to Enter Residence 0   Number of Stairs Within Residence 0   Do you have animals or pets at home? No   Who is requesting discharge planning? Provider   Home or Post Acute Services None   Patient expects to be discharged to: home - continue current outpatient therapy   Does the patient need discharge transport arranged? No   Patient Choice   Provider Choice list and CMS website (https://medicare.gov/care-compare#search) for post-acute Quality and Resource Measure Data were provided and reviewed with: Other (Comment)  (will continue outpatient services)   Patient / Family choosing to utilize agency / facility established prior to hospitalization Yes         Tiffanie Vasquez RN

## 2024-06-09 NOTE — ED TRIAGE NOTES
"Pt was outside, walking with cane, fell on the ground, c.o right chest pain, right leg pain, Pt has a 3 \" laceration to right knee, where incision is from right knee replacement. Denies hitting head.   "

## 2024-06-09 NOTE — CONSULTS
Trauma Surgery Service    Trauma Documentation Timeline    No data selected in time range          History Of Present Illness  Time of activation: Trauma Consult 1133  Time of evaluation: 1133  Bonnie Jules is a 83 y.o. female presenting with ground level fall.  Patient was walking outside with her cane and fell on her right side, striking her shoulder and right knee. She resently had Right knee replacement and her incision dehisced.  Imaging shows right sided non-displaced fractures of the 3-5th ribs.  She denies striking her head or any LOC.       Past Medical History  She has a past medical history of Anxiety, Benign hypertension, Chronic ischemic colitis (Multi), Chronic kidney disease, stage III (moderate) (Multi), CLL (chronic lymphocytic leukemia) (Multi), Coronary artery disease, CTS (carpal tunnel syndrome), Diabetes mellitus (Multi), Essential (primary) hypertension, GI bleed, Hyperlipidemia, unspecified, Hypertension, Hypothyroidism, Hypothyroidism, unspecified type, Mixed hyperlipidemia, Osteoarthritis, Osteoporosis, Peripheral vascular disease ([facility]), Right knee pain, Seizure disorder (Multi), and Type 1 diabetes mellitus with hyperglycemia (Multi).    Scheduled medications  acetaminophen, 975 mg, oral, Once  lidocaine, 5 mL, subcutaneous, Once  lidocaine, 1 patch, transdermal, Once      Continuous medications     PRN medications       Surgical History  She has a past surgical history that includes Carpal tunnel release (Bilateral); Total hip arthroplasty (Left, 2012); Coronary artery bypass graft (2005); Cholecystectomy; Cataract extraction w/ intraocular lens  implant, bilateral; Hysterectomy; Colonoscopy; Other surgical history; Other surgical history (Right, 01/2016); and Knee surgery (Right, 04/17/2024).     Social History  She reports that she has never smoked. She has been exposed to tobacco smoke. She has never used smokeless tobacco. She reports that she does not currently use alcohol.  She reports that she does not currently use drugs.    Family History  Family History   Problem Relation Name Age of Onset    Diabetes Mother      Diabetes Daughter      Other (RA) Daughter          Allergies  Amoxicillin, Amoxicillin-pot clavulanate, Ciprofloxacin, and Levofloxacin    Review of Systems   Constitutional:  Negative for activity change, appetite change, chills, diaphoresis and fever.   Respiratory:  Positive for chest tightness. Negative for shortness of breath.         Right sided chest pain   Cardiovascular:  Negative for chest pain.   Neurological:  Negative for dizziness.   Psychiatric/Behavioral:  Negative for agitation and confusion.    All other systems reviewed and are negative.       Physical Exam  HENT:      Head: Atraumatic.   Cardiovascular:      Rate and Rhythm: Normal rate.   Pulmonary:      Comments: Shallow breaths.    Chest:      Chest wall: Tenderness present.   Musculoskeletal:      Comments: 7 cm wound dehiscence of RLE knee arthroplasty incision.  No exposed subQ tissues   Neurological:      Mental Status: She is alert.          Last Recorded Vitals  /72   Pulse 80   Temp 36.3 °C (97.3 °F) (Temporal)   Resp 16   Wt 58.1 kg (128 lb)   SpO2 98%   Gaurav Coma Scale Score: 15       Relevant Results      Results for orders placed or performed during the hospital encounter of 06/09/24 (from the past 24 hour(s))   CBC and Auto Differential   Result Value Ref Range    WBC 6.7 4.4 - 11.3 x10*3/uL    nRBC 0.0 0.0 - 0.0 /100 WBCs    RBC 3.20 (L) 4.00 - 5.20 x10*6/uL    Hemoglobin 10.1 (L) 12.0 - 16.0 g/dL    Hematocrit 31.3 (L) 36.0 - 46.0 %    MCV 98 80 - 100 fL    MCH 31.6 26.0 - 34.0 pg    MCHC 32.3 32.0 - 36.0 g/dL    RDW 13.5 11.5 - 14.5 %    Platelets 323 150 - 450 x10*3/uL    Neutrophils % 44.9 40.0 - 80.0 %    Immature Granulocytes %, Automated 0.3 0.0 - 0.9 %    Lymphocytes % 42.9 13.0 - 44.0 %    Monocytes % 7.9 2.0 - 10.0 %    Eosinophils % 3.9 0.0 - 6.0 %    Basophils  % 0.1 0.0 - 2.0 %    Neutrophils Absolute 3.02 1.60 - 5.50 x10*3/uL    Immature Granulocytes Absolute, Automated 0.02 0.00 - 0.50 x10*3/uL    Lymphocytes Absolute 2.88 0.80 - 3.00 x10*3/uL    Monocytes Absolute 0.53 0.05 - 0.80 x10*3/uL    Eosinophils Absolute 0.26 0.00 - 0.40 x10*3/uL    Basophils Absolute 0.01 0.00 - 0.10 x10*3/uL   Basic metabolic panel   Result Value Ref Range    Glucose 121 (H) 65 - 99 mg/dL    Sodium 135 133 - 145 mmol/L    Potassium 5.1 3.4 - 5.1 mmol/L    Chloride 101 97 - 107 mmol/L    Bicarbonate 25 24 - 31 mmol/L    Urea Nitrogen 20 8 - 25 mg/dL    Creatinine 1.10 0.40 - 1.60 mg/dL    eGFR 51 (L) >60 mL/min/1.73m*2    Calcium 9.2 8.5 - 10.4 mg/dL    Anion Gap 9 <=19 mmol/L      Lab Results   Component Value Date    HGBA1C 6.7 (H) 04/03/2024      XR knee right 4+ views    Result Date: 6/9/2024  Interpreted By:  Maricarmen Blunt, STUDY: XR KNEE RIGHT 4+ VIEWS;  6/9/2024 11:05 am   INDICATION: Signs/Symptoms:fall.   COMPARISON: 06/07/2024   ACCESSION NUMBER(S): [id number]   ORDERING CLINICIAN: KHOA ATKINS   FINDINGS: Four views of the right knee obtained.   Arthroplasty components in grossly stable anatomic alignment. No definite acute fracture or abnormal lucency around the hardware. Stable smooth subcentimeter density adjacent to the fibular head. No significant joint effusion. No focal soft tissue swelling is apparent. Prominent presumed vascular calcifications in the posterior soft tissues.       No evidence of acute fracture. Status post TKA with intact hardware.   MACRO: None.   Signed by: Maricarmen Blunt 6/9/2024 11:33 AM Dictation workstation:   [id number]    XR pelvis 1-2 views    Result Date: 6/9/2024  Interpreted By:  Maricarmen Blunt, STUDY: XR PELVIS 1-2 VIEWS;  6/9/2024 11:05 am   INDICATION: Signs/Symptoms:fall.   COMPARISON: 07/14/2023   ACCESSION NUMBER(S): [id number]   ORDERING CLINICIAN: KHOA ATIKNS   FINDINGS: A single frontal view of the pelvis obtained.   Osteopenia, overlying  bowel content and soft tissue folds limit evaluation. No definite focal disruption of the pelvic osseous contours to suggest acute displaced fracture. Left hip arthroplasty in satisfactory alignment. Degenerative changes of the right hip and visualized lower lumbar spine. Multifocal soft tissue presumed vascular calcifications.       No evidence of acute displaced osseous pelvic fracture.   MACRO: None.   Signed by: Maricarmen Blunt 6/9/2024 11:32 AM Dictation workstation:   [id number]    CT chest wo IV contrast    Addendum Date: 6/9/2024    Interpreted By:  Maricarmen Blunt, ADDENDUM: Subtle smooth expansile likely remote fracture deformity of several right lateral ribs noted as well.   Signed by: Maricarmen Blunt 6/9/2024 11:31 AM   -------- ORIGINAL REPORT -------- Dictation workstation:   [id number]    Result Date: 6/9/2024  Interpreted By:  Maricarmen Blunt, STUDY: CT CHEST WO IV CONTRAST;  6/9/2024 10:48 am   INDICATION: Signs/Symptoms:R rib pain. Status post fall   COMPARISON: 10/08/2022   ACCESSION NUMBER(S): [id number]   ORDERING CLINICIAN: KHOA ATKINS   TECHNIQUE: CT of the chest was performed. Sagittal and coronal reconstructions were generated. No intravenous contrast given for the examination.   FINDINGS: Hilar, vessel, and solid organ evaluation limited without IV contrast.   CHEST WALL AND LOWER NECK: Stable short linear hyperdense probable surgical material in the anterior left neck base. Portions of the chest wall excluded from field of imaging. No significant axillary lymphadenopathy as visualized. Status post sternotomy.   MEDIASTINUM AND HERMELINDO:  Limited hilar assessment on unenhanced images. No gross mediastinal or hilar adenopathy within limits of unenhanced exam. Moderate-sized hiatal hernia. Dense surgical material in the anterior mediastinum.   HEART AND VESSELS:  Lack of IV contrast precludes vascular luminal assessment. The heart is normal in size. No significant pericardial effusion. Multifocal  atherosclerotic calcifications including the coronary arteries and mitral valve region.   LUNGS, PLEURA, LARGE AIRWAYS:  Subpleural reticular and coarse linear densities scattered in both lung fields. No sizable airspace consolidation, pleural effusion or pneumothorax identified. The central airways are patent.   UPPER ABDOMEN:  Cholecystectomy clips at the liver hilum. Fullness of the visualized central biliary tree. Moderate fecal residue in the visualized colon.   BONES:  Multiple median sternotomy wires. Smooth irregularity of the sternum with probable ununited sternotomy defect and adjacent ossifications similar to the previous exam. Nondisplaced fracture of the anterior right 2nd rib. Mildly displaced fractures of the anterior right 3rd, 4th and 5th ribs. Possible nondisplaced fracture of the anterior right 6th rib. Smooth probable remote fracture deformity of the posterior right 6th and 7th ribs. Kyphosis and advanced degenerative changes of the thoracic spine with mild multilevel thoracic vertebral compression deformities without obvious interval change.       Multiple acute nondisplaced or minimally displaced right anterior rib fractures. No pleural effusion or pneumothorax.   Postsurgical changes in the anterior chest wall.   Scattered areas of probable scarring or atelectasis in both lung fields.   Moderate-sized hiatal hernia.   Fullness of the central biliary tree status post cholecystectomy. Correlation with LFTs may be helpful.   Additional findings as described above.   MACRO: None.   Signed by: Maricarmen Blunt 6/9/2024 11:28 AM Dictation workstation:   [id number]    XR shoulder right 2+ views    Result Date: 6/9/2024  Interpreted By:  Maricarmen Blunt, STUDY: XR SHOULDER RIGHT 2+ VIEWS;  6/9/2024 11:03 am   INDICATION: Signs/Symptoms:fall.   COMPARISON: None.   ACCESSION NUMBER(S): [id number]   ORDERING CLINICIAN: KHOA ATKINS   FINDINGS: Three views of the right shoulder obtained.   No acute fracture or  dislocation. Glenohumeral joint space narrowing and spurring. Acromiohumeral space is preserved. Small subacromial spur. AC joint narrowing and spurring.   Smooth probable remote fracture deformity of posterolateral aspect of several contiguous right mid ribs. Sternotomy wires, least 2 of which are discontinuous, partially included over the central chest.       No evidence of acute fracture or dislocation. Degenerative changes of the right shoulder.   MACRO: None.   Signed by: Maricarmen Blunt 6/9/2024 11:30 AM Dictation workstation:   [id number]    CT cervical spine wo IV contrast    Result Date: 6/9/2024  Interpreted By:  Maricarmen Blunt, STUDY: CT CERVICAL SPINE WO IV CONTRAST;  6/9/2024 10:48 am   INDICATION: Signs/Symptoms:fall.   COMPARISON: 03/28/2022   ACCESSION NUMBER(S): [id number]   ORDERING CLINICIAN: KHOA ATKINS   TECHNIQUE: CT images were obtained through the cervical spine. Sagittal and coronal reconstructions were generated.   FINDINGS:     ALIGNMENT: Mild rightward tilt in the upper cervical spine. Mild anterolisthesis at C6-7 similar to the prior exam.   VERTEBRAE/DISC SPACES: No compression deformity or acute displaced fracture.  Multilevel degenerative changes including atlantoaxial joint space narrowing with surrounding calcifications, spurring and cystic changes in the dens, multilevel intervertebral disc space narrowing with endplate sclerosis and smooth endplate irregularities, probable partial fusion across the C3-4 vertebra, and multilevel bilateral facet joint narrowing and spurring, similar to the previous exam.   ADDITIONAL FINDINGS: No abnormal thickening of the prevertebral soft tissues.  Linear presumed scarring in the visualized bilateral lung apices. Prominent carotid bulb atherosclerotic calcifications. Linear dense possible surgical device in the anterior left neck base.       No acute cervical vertebral displacement or acute displaced fracture. Multilevel degenerative changes with  mild spondylolisthesis similar to 03/20/2022.   Paraspinal/soft tissue findings as above.   MACRO: None.   Signed by: Maricarmen Blunt 6/9/2024 11:19 AM Dictation workstation:   [id number]    CT head wo IV contrast    Result Date: 6/9/2024  Interpreted By:  Maricarmen Blunt, STUDY: CT HEAD WO IV CONTRAST;  6/9/2024 10:48 am   INDICATION: Signs/Symptoms:fall.   COMPARISON: 03/28/2022   ACCESSION NUMBER(S): [id number]   ORDERING CLINICIAN: KHOA ATKINS   TECHNIQUE: Unenhanced CT images of the head were obtained.   FINDINGS: The ventricles, cisterns and sulci are enlarged, consistent with diffuse volume loss. There are areas of nonspecific white matter hypodensity, which are probably age related or microvascular in nature. These findings are similar to the previous exam. There is no acute intracranial hemorrhage, mass effect or midline shift. No extraaxial fluid collection.   No acute displaced calvarial fracture.   Visualized paranasal sinuses are clear. Punctate dense probable surgical material along the anterior margin of each globe.       No acute intracranial hemorrhage or mass-effect.   MACRO: None.   Signed by: Maricarmen Blunt 6/9/2024 11:16 AM Dictation workstation:   [id number]    XR knee right 1-2 views    Result Date: 6/7/2024  Interpreted By:  Rudolph Liriano, STUDY: XR KNEE RIGHT 1-2 VIEWS; ;  6/7/2024 11:06 am   INDICATION: Signs/Symptoms:PAIN.   COMPARISON: None.   ACCESSION NUMBER(S): [id number]   ORDERING CLINICIAN: RUDOLPH LIRIANO   FINDINGS: X-rays of the right knee done and read in the office today show that there is a right total knee replacement in overall satisfactory position and alignment.       See findings above.     MACRO: None   Signed by: Rudolph Liriano 6/7/2024 1:22 PM Dictation workstation:   [id number]          Active Problems:  There are no active Hospital Problems.     Assessment/Plan   Active Problems:  There are no active Hospital Problems.      84 yo woman presenting with ground  level fall.  Injuries include right 3-5th non-displaced rib fractures.  Had recent right knee replacement in April.  Wound had partial dehiscence when she fell.  No evidence of fractures.    - Multimodal pain management with Robaxin, lidocaine patches, limit narcotics  - Good pulmonary toilet with incentive spirometer usage  - Orthopedic consult to evaluate her RLE surgical wound dehiscence.  There is no underlying fracture or exposed prosthesis.         Rodríguez Smith MD

## 2024-06-09 NOTE — PROGRESS NOTES
Bonnie Jules is a 83 y.o. female on day 0 of admission presenting with Fracture of one rib, right side, initial encounter for closed fracture.       06/09/24 1431   [facility] Disability Status   Are you deaf or do you have serious difficulty hearing? N   Are you blind or do you have serious difficulty seeing, even when wearing glasses? N   Because of a physical, mental, or emotional condition, do you have serious difficulty concentrating, remembering, or making decisions? (5 years old or older) N   Do you have serious difficulty walking or climbing stairs? Y  (since TKR in April)   Do you have serious difficulty dressing or bathing? N   Because of a physical, mental, or emotional condition, do you have serious difficulty doing errands alone such as visiting the doctor? Y  (since TKR in [place])         Tiffanie Vasquez RN

## 2024-06-09 NOTE — PROGRESS NOTES
Bonnie Jules is a 83 y.o. female on day 0 of admission presenting with Fracture of one rib, right side, initial encounter for closed fracture.       06/09/24 1432   Current Planned Discharge Disposition   Current Planned Discharge Disposition Home     Patient lives with her daughter Yvette in a single level house with ramp to enter. She has been using a cane since TKR in April. Prior to this no use of assistive devices. She is independent with ADLs, daily tasks, she has not been cleared by ortho to drive yet so her daughters provide transportation. She did not have HHC set up after leaving SNF so she has been going to PT at [facility] outpatient. PCP is Ebonie Bruner.  ADOD 06/12/2024  Patient plans to resume outpatient PT upon discharge. Her surgical incision on R knee has dehisced, if HHC is needed d/t this, she is open to it. Care Transitions will follow for updates.   Current plan is to discharge home, resume current outpatient services, daughter will provide transportation.       Tiffanie Vasquez RN

## 2024-06-09 NOTE — ED PROVIDER NOTES
HPI   Chief Complaint   Patient presents with    Fall     Fell while walking outside.        HPI  See MDM                  Gaurav Coma Scale Score: 15                     Patient History   Past Medical History:   Diagnosis Date    Anxiety     Benign hypertension     Chronic ischemic colitis (Multi)     With history of sepsis and infectious gastroenteritis 10/7/2022-10/11/2022    Chronic kidney disease, stage III (moderate) (Multi)     CLL (chronic lymphocytic leukemia) (Multi)     Coronary artery disease     CTS (carpal tunnel syndrome)     Diabetes mellitus (Multi)     Essential (primary) hypertension     GI bleed     Hyperlipidemia, unspecified     Hypertension     Hypothyroidism     Hypothyroidism, unspecified type     Mixed hyperlipidemia     Osteoarthritis     Osteoporosis     Peripheral vascular disease (CMS-HCC)     Right knee pain     Seizure disorder (Multi)     Type 1 diabetes mellitus with hyperglycemia (Multi)      Past Surgical History:   Procedure Laterality Date    CARPAL TUNNEL RELEASE Bilateral     CATARACT EXTRACTION W/ INTRAOCULAR LENS  IMPLANT, BILATERAL      Left 8/10/2017, right 8/31/2017    CHOLECYSTECTOMY      COLONOSCOPY      CORONARY ARTERY BYPASS GRAFT  2005    X 3    HYSTERECTOMY      KNEE SURGERY Right 04/17/2024    total    OTHER SURGICAL HISTORY      Bilateral heel spurs    OTHER SURGICAL HISTORY Right 01/2016    Femoral endarterectomy    TOTAL HIP ARTHROPLASTY Left 2012     Family History   Problem Relation Name Age of Onset    Diabetes Mother      Diabetes Daughter      Other (RA) Daughter       Social History     Tobacco Use    Smoking status: Never     Passive exposure: Past    Smokeless tobacco: Never   Vaping Use    Vaping status: Never Used   Substance Use Topics    Alcohol use: Not Currently    Drug use: Not Currently       Physical Exam   ED Triage Vitals [06/09/24 1012]   Temperature Heart Rate Respirations BP   36.3 °C (97.3 °F) 80 16 106/72      Pulse Ox Temp Source Heart  Rate Source Patient Position   98 % Temporal Monitor --      BP Location FiO2 (%)     -- --       Physical Exam  See MDM  ED Course & MDM   Diagnoses as of 06/09/24 1339   Closed fracture of multiple ribs of right side, initial encounter   Fall, initial encounter       Medical Decision Making  83-year-old female presents emergency room after mechanical fall.  Patient slipped on a wet floor and fell onto her right side complaining of anterior right chest pain.  Patient does note some difficulty breathing at this time but otherwise denies head strike or loss of consciousness.  Patient otherwise was able to get up and call EMS.  Patient notes no recent changes in her medications, fevers, chills, nausea, vomiting, abdominal pain, diarrhea or constipation.    ED Triage Vitals [06/09/24 1012]   Temperature Heart Rate Respirations BP   36.3 °C (97.3 °F) 80 16 106/72      Pulse Ox Temp Source Heart Rate Source Patient Position   98 % Temporal Monitor --      BP Location FiO2 (%)     -- --       Vital signs reviewed: Afebrile, nontachycardic, normotensive, 98% on room air    Exam     Constitutional: No acute distress. Resting comfortably.   Head: Normocephalic, atraumatic.   Eyes: Pupils equal bilaterally, EOM grossly intact, conjunctiva normal.  Mouth/Throat: Oropharynx is clear, moist mucus membranes.   Neck: Supple. No lymphadenopathy.  Cardiovascular: Regular rate and regular rhythm. Extremities are well-perfused.   Pulmonary/Chest: No respiratory distress, breathing comfortably on room air.    Abdominal: Soft, non-tender, non-distended. No rebound or guarding.   Musculoskeletal: No lower extremity edema.       Skin: Warm, dry, and laceration noted to the anterior right knee vertically along suture line of recent knee replacement.   Neurological: Patient is oriented to person, place, time, and situation. Face symmetric, hearing intact to voice, speech normal. Moves all extremities.     Differential includes but is not  limited to:  Rib fracture versus intracranial pathology versus cervical spine injury versus knee fracture      Labs: ordered.  Labs Reviewed   CBC WITH AUTO DIFFERENTIAL - Abnormal       Result Value    WBC 6.7      nRBC 0.0      RBC 3.20 (*)     Hemoglobin 10.1 (*)     Hematocrit 31.3 (*)     MCV 98      MCH 31.6      MCHC 32.3      RDW 13.5      Platelets 323      Neutrophils % 44.9      Immature Granulocytes %, Automated 0.3      Lymphocytes % 42.9      Monocytes % 7.9      Eosinophils % 3.9      Basophils % 0.1      Neutrophils Absolute 3.02      Immature Granulocytes Absolute, Automated 0.02      Lymphocytes Absolute 2.88      Monocytes Absolute 0.53      Eosinophils Absolute 0.26      Basophils Absolute 0.01     BASIC METABOLIC PANEL - Abnormal    Glucose 121 (*)     Sodium 135      Potassium 5.1      Chloride 101      Bicarbonate 25      Urea Nitrogen 20      Creatinine 1.10      eGFR 51 (*)     Calcium 9.2      Anion Gap 9     URINALYSIS WITH REFLEX CULTURE AND MICROSCOPIC    Narrative:     The following orders were created for panel order Urinalysis with Reflex Culture and Microscopic.  Procedure                               Abnormality         Status                     ---------                               -----------         ------                     Urinalysis with Reflex C...[[id number]]                                                 Extra Urine Gray Tube[[id number]]                                                         Please view results for these tests on the individual orders.   URINALYSIS WITH REFLEX CULTURE AND MICROSCOPIC   EXTRA URINE GRAY TUBE       Radiology: ordered and independent interpretation performed.  Xray(s) chest x-rays interpreted by me shows no large pneumothorax at this time.    ECG/medicine tests: ordered and independent interpretation performed.  Diagnoses as of 06/09/24 1341   Closed fracture of multiple ribs of right side, initial encounter   Fall, initial encounter      Lab work as interpreted by me shows no significant leukocytosis or anemia on CBC and otherwise BMP without electrolyte abnormality or JOSÉ.  CT chest at this time shows multiple rib fractures anteriorly, nondisplaced.  No evidence of pneumothorax or pneumonia at this time.    Patient given pain medicine here in the emergency room with control of her pain but continues to have some pain on deep inspiration.    Discussion of management or test interpretation with external provider(s):  I personally discussed the patient's management with general surgery, who recommends admission to medicine service for pain management.    ED Medications managed:    Medications   acetaminophen (Tylenol) tablet 975 mg (975 mg oral Not Given 6/9/24 1020)   lidocaine (Xylocaine) 10 mg/mL (1 %) injection 50 mg (has no administration in time range)   lidocaine 4 % patch 1 patch (1 patch transdermal Medication Applied 6/9/24 1209)   methocarbamol (Robaxin) tablet 750 mg (750 mg oral Given 6/9/24 1209)       Jonathan Lamb MD  1:41 PM    Attending Emergency Physician  [facility] EMERGENCY MEDICINE            Procedure  Laceration Repair    Performed by: Jonathan Lamb MD  Authorized by: Jonathan Lamb MD    Consent:     Consent obtained:  Verbal    Consent given by:  Patient    Risks, benefits, and alternatives were discussed: yes      Risks discussed:  Infection and pain    Alternatives discussed:  No treatment and delayed treatment  Universal protocol:     Patient identity confirmed:  Verbally with patient and arm band  Anesthesia:     Anesthesia method:  Local infiltration    Local anesthetic:  Lidocaine 1% w/o epi  Laceration details:     Location:  Leg    Leg location:  R knee    Length (cm):  6    Depth (mm):  2  Pre-procedure details:     Preparation:  Patient was prepped and draped in usual sterile fashion and imaging obtained to evaluate for foreign bodies  Exploration:     Limited defect created (wound extended): no       Hemostasis achieved with:  Direct pressure    Contaminated: no    Treatment:     Area cleansed with:  Saline    Amount of cleaning:  Standard    Irrigation solution:  Sterile saline    Irrigation method:  Pressure wash    Visualized foreign bodies/material removed: no      Debridement:  None    Undermining:  None    Scar revision: no    Skin repair:     Repair method:  Sutures    Suture size:  4-0    Suture material:  Prolene    Suture technique:  Simple interrupted    Number of sutures:  7  Approximation:     Approximation:  Close  Repair type:     Repair type:  Simple  Post-procedure details:     Dressing:  Open (no dressing)       Jonathan Lamb MD  06/09/24 3279

## 2024-06-09 NOTE — PROGRESS NOTES
Bonnie Jules is a 83 y.o. female on day 0 of admission presenting with Fracture of one rib, right side, initial encounter for closed fracture.       06/09/24 1426   Physical Activity   On average, how many days per week do you engage in moderate to strenuous exercise (like a brisk walk)? 0 days   On average, how many minutes do you engage in exercise at this level? 0 min   Financial Resource Strain   How hard is it for you to pay for the very basics like food, housing, medical care, and heating? Not hard   Housing Stability   In the last 12 months, was there a time when you were not able to pay the mortgage or rent on time? N   In the last 12 months, how many places have you lived? 1   In the last 12 months, was there a time when you did not have a steady place to sleep or slept in a shelter (including now)? N   Transportation Needs   In the past 12 months, has lack of transportation kept you from medical appointments or from getting medications? no   In the past 12 months, has lack of transportation kept you from meetings, work, or from getting things needed for daily living? No   Food Insecurity   Within the past 12 months, you worried that your food would run out before you got the money to buy more. Never true   Within the past 12 months, the food you bought just didn't last and you didn't have money to get more. Never true   Stress   Do you feel stress - tense, restless, nervous, or anxious, or unable to sleep at night because your mind is troubled all the time - these days? Only a littl   Social Connections   In a typical week, how many times do you talk on the phone with family, friends, or neighbors? More than 3   How often do you get together with friends or relatives? More than 3   How often do you attend Rastafari or Advent services? 1 to 4   Do you belong to any clubs or organizations such as Rastafari groups, unions, fraternal or athletic groups, or school groups? No   How often do you attend meetings  of the clubs or organizations you belong to? Never   Are you , , , , never , or living with a partner?    Intimate Partner Violence   Within the last year, have you been afraid of your partner or ex-partner? No   Within the last year, have you been humiliated or emotionally abused in other ways by your partner or ex-partner? No   Within the last year, have you been kicked, hit, slapped, or otherwise physically hurt by your partner or ex-partner? No   Within the last year, have you been raped or forced to have any kind of sexual activity by your partner or ex-partner? No   Alcohol Use   Q1: How often do you have a drink containing alcohol? Never   Q2: How many drinks containing alcohol do you have on a typical day when you are drinking? None   Q3: How often do you have six or more drinks on one occasion? Never   Utilities   In the past 12 months has the electric, gas, oil, or water company threatened to shut off services in your home? No   Health Literacy   How often do you need to have someone help you when you read instructions, pamphlets, or other written material from your doctor or pharmacy? Sometimes         Tiffanie Vasquez RN

## 2024-06-09 NOTE — H&P
Chief complaint: fall    History Of Present Illness  Bonnie Jules is a 83 y.o. female with a past medical history of CAD s/p CABG, HTN, DM, hypothyroidism, HLD, OA, PVD, and recent right TKR in April who presented to the emergency department after a fall. Patient has a wound on her RLE that is being followed by [facility] outpatient. She has a surgical shoe that she wears on this foot. States that she must have tripped on her surgical shoe causing her to fall. States that she was holding her coffee in one hand when she tripped and fell onto her right side hitting her right surgical knee and shoulder. Her right surgical wound dehisced and she was found to have several fractured ribs. Her knee was sutured in the ED. She had a right total knee replacement in April by Dr. Liriano. During that hospitalization she had an NSTEMI, urinary retention resulting in acute renal failure, and hypoxia. She was stablized and discharge to SNF for two weeks. She has been home and doing well with therapy up until today. She denies any lightheadedness or dizziness. Denies shortness of breath, fevers, chills, nausea, or vomiting. No abdominal discomfort. No dysuria. She does report significant reproducible pain to her right chest.     In the ED: XR right knee showed no acute fracture, s/p TKA with intact hardware. XR pelvis and shoulder negative for fracture. CT cervical spine and head unremarkable. CT chest did show multiple acute nondisplaced or minimally displaced right rib fractures. Fullness of the central biliary tree s/p jairo, recommend correlate with LFTs. She was given robaxin in the ED. Seen by trauma surgery, no surgery recommended. Admitted to [facility] for further evaluation and treatment.        Past Medical History  Past Medical History:   Diagnosis Date    Anxiety     Benign hypertension     Chronic ischemic colitis (Multi)     With history of sepsis and infectious gastroenteritis 10/7/2022-10/11/2022     Chronic kidney disease, stage III (moderate) (Multi)     CLL (chronic lymphocytic leukemia) (Multi)     Coronary artery disease     CTS (carpal tunnel syndrome)     Diabetes mellitus (Multi)     Essential (primary) hypertension     GI bleed     Hyperlipidemia, unspecified     Hypertension     Hypothyroidism     Hypothyroidism, unspecified type     Mixed hyperlipidemia     NSTEMI (non-ST elevated myocardial infarction) (Multi)     Osteoarthritis     Osteoporosis     Peripheral vascular disease (CMS-HCC)     Right knee pain     Seizure disorder (Multi)     Type 1 diabetes mellitus with hyperglycemia (Multi)        Surgical History  Past Surgical History:   Procedure Laterality Date    CARPAL TUNNEL RELEASE Bilateral     CATARACT EXTRACTION W/ INTRAOCULAR LENS  IMPLANT, BILATERAL      Left 8/10/2017, right 8/31/2017    CHOLECYSTECTOMY      COLONOSCOPY      CORONARY ARTERY BYPASS GRAFT  2005    X 3    HYSTERECTOMY      KNEE SURGERY Right 04/17/2024    total    OTHER SURGICAL HISTORY      Bilateral heel spurs    OTHER SURGICAL HISTORY Right 01/2016    Femoral endarterectomy    TOTAL HIP ARTHROPLASTY Left 2012        Social History  She reports that she has never smoked. She has been exposed to tobacco smoke. She has never used smokeless tobacco. She reports that she does not currently use alcohol. She reports that she does not currently use drugs.    Family History  Family History   Problem Relation Name Age of Onset    Diabetes Mother      Diabetes Daughter      Other (RA) Daughter          Allergies  Amoxicillin, Amoxicillin-pot clavulanate, Ciprofloxacin, and Levofloxacin    Review of Systems   Constitutional:  Negative for appetite change, fatigue and fever.   HENT:  Negative for congestion and rhinorrhea.    Respiratory:  Negative for cough, chest tightness and shortness of breath.    Cardiovascular:  Positive for chest pain. Negative for palpitations.   Gastrointestinal:  Negative for abdominal distention,  abdominal pain, constipation, diarrhea, nausea and vomiting.   Genitourinary:  Negative for dysuria and urgency.   Neurological:  Negative for dizziness, light-headedness and numbness.   All other systems reviewed and are negative.       Physical Exam  Vitals reviewed.   Constitutional:       Appearance: Normal appearance.   HENT:      Head: Normocephalic and atraumatic.      Nose: Nose normal.      Mouth/Throat:      Mouth: Mucous membranes are moist.   Eyes:      Extraocular Movements: Extraocular movements intact.      Conjunctiva/sclera: Conjunctivae normal.   Cardiovascular:      Rate and Rhythm: Normal rate and regular rhythm.   Pulmonary:      Effort: Pulmonary effort is normal.      Breath sounds: Normal breath sounds. No wheezing, rhonchi or rales.   Abdominal:      General: Bowel sounds are normal.      Palpations: Abdomen is soft.      Tenderness: There is no abdominal tenderness.   Musculoskeletal:         General: Tenderness present. Normal range of motion.      Cervical back: Normal range of motion and neck supple.      Comments: Right chest tenderness   Skin:     General: Skin is warm and dry.      Capillary Refill: Capillary refill takes less than 2 seconds.      Comments: Ulcer to right heel and RLE   Neurological:      General: No focal deficit present.      Mental Status: She is alert and oriented to person, place, and time.   Psychiatric:         Mood and Affect: Mood normal.         Behavior: Behavior normal.          Last Recorded Vitals  Blood pressure 106/72, pulse 80, temperature 36.3 °C (97.3 °F), temperature source Temporal, resp. rate 16, height 1.524 m (5'), weight 58.1 kg (128 lb), SpO2 98%.    Relevant Results  Lab Results   Component Value Date    GLUCOSE 121 (H) 06/09/2024    CALCIUM 9.2 06/09/2024     06/09/2024    K 5.1 06/09/2024    CO2 25 06/09/2024     06/09/2024    BUN 20 06/09/2024    CREATININE 1.10 06/09/2024     Lab Results   Component Value Date    WBC 6.7  06/09/2024    HGB 10.1 (L) 06/09/2024    HCT 31.3 (L) 06/09/2024    MCV 98 06/09/2024     06/09/2024     XR knee right 4+ views  Result Date: 6/9/2024  No evidence of acute fracture. Status post TKA with intact hardware.   MACRO: None.   Signed by: Maricarmen Blunt 6/9/2024 11:33 AM Dictation workstation:   [id number]    XR pelvis 1-2 views  Result Date: 6/9/2024  No evidence of acute displaced osseous pelvic fracture.   MACRO: None.   Signed by: Maricarmen Blunt 6/9/2024 11:32 AM Dictation workstation:   [id number]    CT chest wo IV contrast  Addendum Date: 6/9/2024    Interpreted By:  Maricarmen Blunt, ADDENDUM: Subtle smooth expansile likely remote fracture deformity of several right lateral ribs noted as well.   Signed by: Maricarmen Blunt 6/9/2024 11:31 AM   -------- ORIGINAL REPORT -------- Dictation workstation:   [id number]  Result Date: 6/9/2024  Multiple acute nondisplaced or minimally displaced right anterior rib fractures. No pleural effusion or pneumothorax.   Postsurgical changes in the anterior chest wall.   Scattered areas of probable scarring or atelectasis in both lung fields.   Moderate-sized hiatal hernia.   Fullness of the central biliary tree status post cholecystectomy. Correlation with LFTs may be helpful.   Additional findings as described above.   MACRO: None.   Signed by: Maricarmen Blunt 6/9/2024 11:28 AM Dictation workstation:   [id number]    XR shoulder right 2+ views  Result Date: 6/9/2024  No evidence of acute fracture or dislocation. Degenerative changes of the right shoulder.   MACRO: None.   Signed by: Maricarmen Blunt 6/9/2024 11:30 AM Dictation workstation:   [id number]    CT cervical spine wo IV contrast  Result Date: 6/9/2024  No acute cervical vertebral displacement or acute displaced fracture. Multilevel degenerative changes with mild spondylolisthesis similar to 03/20/2022.   Paraspinal/soft tissue findings as above.   MACRO: None.   Signed by: Maricarmen Blunt 6/9/2024 11:19 AM  Dictation workstation:   [id number]    CT head wo IV contrast  Result Date: 6/9/2024  No acute intracranial hemorrhage or mass-effect.   MACRO: None.   Signed by: Maricarmen Blunt 6/9/2024 11:16 AM Dictation workstation:   [id number]      Assessment/Plan   Principal Problem:    Fracture of one rib, right side, initial encounter for closed fracture    Multiple non displaced or minimally displaced right rib fractures  Following a mechanical fall  Imaging as above, right 3-5th non-displaced rib fractures   Pain management  Incentive spirometer  PT/OT    Right surgical knee dehiscence  Sutured in the ED  Imaging as above  Consult orthopedic surgery, appreciate recommendations    Mechanical Fall  Fall precautions  PT/OT    CAD s/p CABG  Recent NSTEMI in April  Follows with Dr. Araujo outpatient  Continue ASA/statin/metoprolol  Monitor on tele    CKD  Follows with Dr. Rolle outpatient  Creatinine is at baseline  Renally dose meds, avoid nephrotoxic medications    Hypertension  Blood pressure is soft, monitor close  Continue metoprolol, hold other antihypertensives for now    Hypothyroidism  Resume home levothyroxine    Right heel/RLE ulcer  Follows with Dr. [facility] outpatient, will consult, appreciate recs  Consult wound care nurse  Continue doxycycline    Plan  Admit to tele  Pain management  Incentive spirometer  Wound care  Consult orthopedic surgery, trauma surgery, and ID, appreciate recs  CBC and BMP in AM  PT/OT  Patient would prefer to go home with HHC when medically stable however okay with SNF if recommended               Luna Smith, APRN-CNP

## 2024-06-10 ENCOUNTER — APPOINTMENT (OUTPATIENT)
Dept: RADIOLOGY | Facility: HOSPITAL | Age: 84
DRG: 184 | End: 2024-06-10
Payer: MEDICARE

## 2024-06-10 ENCOUNTER — APPOINTMENT (OUTPATIENT)
Dept: WOUND CARE | Facility: HOSPITAL | Age: 84
End: 2024-06-10
Payer: MEDICARE

## 2024-06-10 LAB
ANION GAP SERPL CALC-SCNC: 9 MMOL/L
APPEARANCE UR: CLEAR
BILIRUB UR STRIP.AUTO-MCNC: NEGATIVE MG/DL
BUN SERPL-MCNC: 18 MG/DL (ref 8–25)
CALCIUM SERPL-MCNC: 8.3 MG/DL (ref 8.5–10.4)
CHLORIDE SERPL-SCNC: 101 MMOL/L (ref 97–107)
CO2 SERPL-SCNC: 24 MMOL/L (ref 24–31)
COLOR UR: NORMAL
CREAT SERPL-MCNC: 1 MG/DL (ref 0.4–1.6)
EGFRCR SERPLBLD CKD-EPI 2021: 57 ML/MIN/1.73M*2
ERYTHROCYTE [DISTWIDTH] IN BLOOD BY AUTOMATED COUNT: 13.6 % (ref 11.5–14.5)
GLUCOSE BLD MANUAL STRIP-MCNC: 147 MG/DL (ref 74–99)
GLUCOSE BLD MANUAL STRIP-MCNC: 170 MG/DL (ref 74–99)
GLUCOSE BLD MANUAL STRIP-MCNC: 173 MG/DL (ref 74–99)
GLUCOSE SERPL-MCNC: 216 MG/DL (ref 65–99)
GLUCOSE UR STRIP.AUTO-MCNC: NORMAL MG/DL
HCT VFR BLD AUTO: 30.3 % (ref 36–46)
HGB BLD-MCNC: 9.6 G/DL (ref 12–16)
HOLD SPECIMEN: NORMAL
KETONES UR STRIP.AUTO-MCNC: NEGATIVE MG/DL
LEUKOCYTE ESTERASE UR QL STRIP.AUTO: NEGATIVE
MCH RBC QN AUTO: 31.6 PG (ref 26–34)
MCHC RBC AUTO-ENTMCNC: 31.7 G/DL (ref 32–36)
MCV RBC AUTO: 100 FL (ref 80–100)
NITRITE UR QL STRIP.AUTO: NEGATIVE
NRBC BLD-RTO: 0 /100 WBCS (ref 0–0)
PH UR STRIP.AUTO: 7 [PH]
PLATELET # BLD AUTO: 288 X10*3/UL (ref 150–450)
POTASSIUM SERPL-SCNC: 5.1 MMOL/L (ref 3.4–5.1)
PROT UR STRIP.AUTO-MCNC: NEGATIVE MG/DL
RBC # BLD AUTO: 3.04 X10*6/UL (ref 4–5.2)
RBC # UR STRIP.AUTO: NEGATIVE /UL
SODIUM SERPL-SCNC: 134 MMOL/L (ref 133–145)
SP GR UR STRIP.AUTO: 1.01
UROBILINOGEN UR STRIP.AUTO-MCNC: NORMAL MG/DL
WBC # BLD AUTO: 6 X10*3/UL (ref 4.4–11.3)

## 2024-06-10 PROCEDURE — 85027 COMPLETE CBC AUTOMATED: CPT | Performed by: NURSE PRACTITIONER

## 2024-06-10 PROCEDURE — 87070 CULTURE OTHR SPECIMN AEROBIC: CPT | Mod: WESLAB | Performed by: INTERNAL MEDICINE

## 2024-06-10 PROCEDURE — 71046 X-RAY EXAM CHEST 2 VIEWS: CPT

## 2024-06-10 PROCEDURE — 81003 URINALYSIS AUTO W/O SCOPE: CPT | Performed by: STUDENT IN AN ORGANIZED HEALTH CARE EDUCATION/TRAINING PROGRAM

## 2024-06-10 PROCEDURE — 87205 SMEAR GRAM STAIN: CPT | Mod: WESLAB | Performed by: INTERNAL MEDICINE

## 2024-06-10 PROCEDURE — 2500000004 HC RX 250 GENERAL PHARMACY W/ HCPCS (ALT 636 FOR OP/ED): Performed by: NURSE PRACTITIONER

## 2024-06-10 PROCEDURE — 71046 X-RAY EXAM CHEST 2 VIEWS: CPT | Performed by: RADIOLOGY

## 2024-06-10 PROCEDURE — 1200000002 HC GENERAL ROOM WITH TELEMETRY DAILY

## 2024-06-10 PROCEDURE — 82947 ASSAY GLUCOSE BLOOD QUANT: CPT | Mod: 91

## 2024-06-10 PROCEDURE — 2500000001 HC RX 250 WO HCPCS SELF ADMINISTERED DRUGS (ALT 637 FOR MEDICARE OP): Performed by: NURSE PRACTITIONER

## 2024-06-10 PROCEDURE — 80048 BASIC METABOLIC PNL TOTAL CA: CPT | Performed by: NURSE PRACTITIONER

## 2024-06-10 PROCEDURE — 36415 COLL VENOUS BLD VENIPUNCTURE: CPT | Performed by: NURSE PRACTITIONER

## 2024-06-10 PROCEDURE — 82947 ASSAY GLUCOSE BLOOD QUANT: CPT

## 2024-06-10 PROCEDURE — 2500000002 HC RX 250 W HCPCS SELF ADMINISTERED DRUGS (ALT 637 FOR MEDICARE OP, ALT 636 FOR OP/ED): Performed by: NURSE PRACTITIONER

## 2024-06-10 PROCEDURE — G0378 HOSPITAL OBSERVATION PER HR: HCPCS

## 2024-06-10 PROCEDURE — 96372 THER/PROPH/DIAG INJ SC/IM: CPT | Performed by: NURSE PRACTITIONER

## 2024-06-10 PROCEDURE — 2500000002 HC RX 250 W HCPCS SELF ADMINISTERED DRUGS (ALT 637 FOR MEDICARE OP, ALT 636 FOR OP/ED): Performed by: INTERNAL MEDICINE

## 2024-06-10 RX ORDER — DOXYCYCLINE 100 MG/1
100 CAPSULE ORAL EVERY 12 HOURS SCHEDULED
Status: DISCONTINUED | OUTPATIENT
Start: 2024-06-10 | End: 2024-06-11 | Stop reason: HOSPADM

## 2024-06-10 RX ORDER — DOXYCYCLINE 100 MG/1
50 TABLET ORAL 2 TIMES DAILY
Status: DISCONTINUED | OUTPATIENT
Start: 2024-06-10 | End: 2024-06-10

## 2024-06-10 RX ORDER — TAMSULOSIN HYDROCHLORIDE 0.4 MG/1
0.4 CAPSULE ORAL DAILY
Status: DISCONTINUED | OUTPATIENT
Start: 2024-06-10 | End: 2024-06-11 | Stop reason: HOSPADM

## 2024-06-10 RX ADMIN — GABAPENTIN 100 MG: 100 CAPSULE ORAL at 20:20

## 2024-06-10 RX ADMIN — ASPIRIN 81 MG: 81 TABLET, COATED ORAL at 10:40

## 2024-06-10 RX ADMIN — FOLIC ACID 1 MG: 1 TABLET ORAL at 10:42

## 2024-06-10 RX ADMIN — ACETAMINOPHEN 650 MG: 325 TABLET ORAL at 05:18

## 2024-06-10 RX ADMIN — DOXYCYCLINE HYCLATE 100 MG: 100 CAPSULE ORAL at 16:25

## 2024-06-10 RX ADMIN — SULFASALAZINE 1000 MG: 500 TABLET, DELAYED RELEASE ORAL at 10:41

## 2024-06-10 RX ADMIN — METOPROLOL TARTRATE 25 MG: 25 TABLET, FILM COATED ORAL at 10:40

## 2024-06-10 RX ADMIN — SULFASALAZINE 1000 MG: 500 TABLET, DELAYED RELEASE ORAL at 20:20

## 2024-06-10 RX ADMIN — MORPHINE SULFATE 2 MG: 2 INJECTION, SOLUTION INTRAMUSCULAR; INTRAVENOUS at 01:48

## 2024-06-10 RX ADMIN — AMLODIPINE BESYLATE 10 MG: 10 TABLET ORAL at 10:42

## 2024-06-10 RX ADMIN — ONDANSETRON 4 MG: 2 INJECTION INTRAMUSCULAR; INTRAVENOUS at 18:03

## 2024-06-10 RX ADMIN — METOPROLOL TARTRATE 25 MG: 25 TABLET, FILM COATED ORAL at 20:20

## 2024-06-10 RX ADMIN — TAMSULOSIN HYDROCHLORIDE 0.4 MG: 0.4 CAPSULE ORAL at 10:41

## 2024-06-10 RX ADMIN — INSULIN LISPRO 2 UNITS: 100 INJECTION, SOLUTION INTRAVENOUS; SUBCUTANEOUS at 13:07

## 2024-06-10 RX ADMIN — ENOXAPARIN SODIUM 40 MG: 40 INJECTION SUBCUTANEOUS at 16:25

## 2024-06-10 RX ADMIN — Medication 2000 UNITS: at 05:18

## 2024-06-10 RX ADMIN — HYDRALAZINE HYDROCHLORIDE 50 MG: 50 TABLET, FILM COATED ORAL at 20:20

## 2024-06-10 RX ADMIN — HYDRALAZINE HYDROCHLORIDE 50 MG: 50 TABLET, FILM COATED ORAL at 10:41

## 2024-06-10 RX ADMIN — GABAPENTIN 100 MG: 100 CAPSULE ORAL at 10:42

## 2024-06-10 RX ADMIN — DOCUSATE SODIUM 50 MG AND SENNOSIDES 8.6 MG 1 TABLET: 8.6; 5 TABLET, FILM COATED ORAL at 10:40

## 2024-06-10 RX ADMIN — ACETAMINOPHEN 650 MG: 325 TABLET ORAL at 16:25

## 2024-06-10 RX ADMIN — INSULIN LISPRO 4 UNITS: 100 INJECTION, SOLUTION INTRAVENOUS; SUBCUTANEOUS at 10:51

## 2024-06-10 RX ADMIN — LEVOTHYROXINE SODIUM 100 MCG: 0.1 TABLET ORAL at 10:40

## 2024-06-10 RX ADMIN — INSULIN GLARGINE 10 UNITS: 100 INJECTION, SOLUTION SUBCUTANEOUS at 10:44

## 2024-06-10 RX ADMIN — ACETAMINOPHEN 650 MG: 325 TABLET ORAL at 22:09

## 2024-06-10 RX ADMIN — PRAVASTATIN SODIUM 40 MG: 40 TABLET ORAL at 20:20

## 2024-06-10 RX ADMIN — CYANOCOBALAMIN TAB 1000 MCG 1000 MCG: 1000 TAB at 10:42

## 2024-06-10 ASSESSMENT — COGNITIVE AND FUNCTIONAL STATUS - GENERAL
STANDING UP FROM CHAIR USING ARMS: A LITTLE
STANDING UP FROM CHAIR USING ARMS: A LITTLE
HELP NEEDED FOR BATHING: A LITTLE
MOVING TO AND FROM BED TO CHAIR: A LITTLE
DAILY ACTIVITIY SCORE: 17
MOBILITY SCORE: 16
DRESSING REGULAR UPPER BODY CLOTHING: A LOT
MOBILITY SCORE: 16
TURNING FROM BACK TO SIDE WHILE IN FLAT BAD: A LITTLE
DAILY ACTIVITIY SCORE: 17
HELP NEEDED FOR BATHING: A LITTLE
DRESSING REGULAR LOWER BODY CLOTHING: A LITTLE
PERSONAL GROOMING: A LITTLE
WALKING IN HOSPITAL ROOM: A LOT
TURNING FROM BACK TO SIDE WHILE IN FLAT BAD: A LITTLE
TOILETING: A LOT
DRESSING REGULAR UPPER BODY CLOTHING: A LOT
MOVING FROM LYING ON BACK TO SITTING ON SIDE OF FLAT BED WITH BEDRAILS: A LITTLE
CLIMB 3 TO 5 STEPS WITH RAILING: A LOT
DRESSING REGULAR LOWER BODY CLOTHING: A LITTLE
MOVING TO AND FROM BED TO CHAIR: A LITTLE
MOVING FROM LYING ON BACK TO SITTING ON SIDE OF FLAT BED WITH BEDRAILS: A LITTLE
TOILETING: A LOT
WALKING IN HOSPITAL ROOM: A LOT
CLIMB 3 TO 5 STEPS WITH RAILING: A LOT
PERSONAL GROOMING: A LITTLE

## 2024-06-10 ASSESSMENT — PAIN - FUNCTIONAL ASSESSMENT
PAIN_FUNCTIONAL_ASSESSMENT: 0-10

## 2024-06-10 ASSESSMENT — ENCOUNTER SYMPTOMS
WOUND: 1
FEVER: 0
CHILLS: 0

## 2024-06-10 ASSESSMENT — PAIN SCALES - WONG BAKER
WONGBAKER_NUMERICALRESPONSE: HURTS LITTLE BIT
WONGBAKER_NUMERICALRESPONSE: HURTS LITTLE BIT
WONGBAKER_NUMERICALRESPONSE: HURTS WHOLE LOT

## 2024-06-10 ASSESSMENT — PAIN SCALES - GENERAL
PAINLEVEL_OUTOF10: 3
PAINLEVEL_OUTOF10: 3
PAINLEVEL_OUTOF10: 1
PAINLEVEL_OUTOF10: 7
PAINLEVEL_OUTOF10: 3
PAINLEVEL_OUTOF10: 0 - NO PAIN

## 2024-06-10 ASSESSMENT — PAIN DESCRIPTION - LOCATION
LOCATION: SHOULDER
LOCATION: SHOULDER

## 2024-06-10 ASSESSMENT — PAIN DESCRIPTION - DESCRIPTORS: DESCRIPTORS: ACHING;SORE

## 2024-06-10 NOTE — CARE PLAN
The patient's goals for the shift include      The clinical goals for the shift include adeqaute pain management      Problem: Pain - Adult  Goal: Verbalizes/displays adequate comfort level or baseline comfort level  Outcome: Progressing     Problem: Safety - Adult  Goal: Free from fall injury  Outcome: Progressing     Problem: Discharge Planning  Goal: Discharge to home or other facility with appropriate resources  Outcome: Progressing     Problem: Chronic Conditions and Co-morbidities  Goal: Patient's chronic conditions and co-morbidity symptoms are monitored and maintained or improved  Outcome: Progressing     Problem: Diabetes  Goal: Achieve decreasing blood glucose levels by end of shift  Outcome: Progressing  Goal: Increase stability of blood glucose readings by end of shift  Outcome: Progressing  Goal: Decrease in ketones present in urine by end of shift  Outcome: Progressing  Goal: Maintain electrolyte levels within acceptable range throughout shift  Outcome: Progressing  Goal: Maintain glucose levels >70mg/dl to <250mg/dl throughout shift  Outcome: Progressing  Goal: No changes in neurological exam by end of shift  Outcome: Progressing  Goal: Learn about and adhere to nutrition recommendations by end of shift  Outcome: Progressing  Goal: Vital signs within normal range for age by end of shift  Outcome: Progressing  Goal: Increase self care and/or family involovement by end of shift  Outcome: Progressing  Goal: Receive DSME education by end of shift  Outcome: Progressing     Problem: Pain  Goal: Takes deep breaths with improved pain control throughout the shift  Outcome: Progressing  Goal: Turns in bed with improved pain control throughout the shift  Outcome: Progressing  Goal: Walks with improved pain control throughout the shift  Outcome: Progressing  Goal: Performs ADL's with improved pain control throughout shift  Outcome: Progressing  Goal: Participates in PT with improved pain control throughout the  shift  Outcome: Progressing  Goal: Free from opioid side effects throughout the shift  Outcome: Progressing  Goal: Free from acute confusion related to pain meds throughout the shift  Outcome: Progressing

## 2024-06-10 NOTE — CONSULTS
Reason For Consult  Urinary retention    History Of Present Illness  Bonnie Jules is a 83 y.o. female presenting with a history that she fell.  She was walking and fell on her right side injuring her shoulder and right knee.  She recently had a right knee replacement.  After this fall she had some difficulty breathing but she had no loss of consciousness.  She was found to have fractures of her right third fourth and fifth ribs.  She was found to be in urinary retention and had a Layton catheter placed.    After her knee replacement surgery in April 2024 she went into urinary retention and had a distention injury with greater than 2 L in her bladder.  She is seen by Dr. Gonzales of urogynecology and has been placed on intermittent catheterization which she was doing twice a day at home.     Past Medical History  She has a past medical history of Anxiety, Benign hypertension, Chronic ischemic colitis (Multi), Chronic kidney disease, stage III (moderate) (Multi), CLL (chronic lymphocytic leukemia) (Multi), Coronary artery disease, CTS (carpal tunnel syndrome), Diabetes mellitus (Multi), Essential (primary) hypertension, GI bleed, Hyperlipidemia, unspecified, Hypertension, Hypothyroidism, Hypothyroidism, unspecified type, Mixed hyperlipidemia, NSTEMI (non-ST elevated myocardial infarction) (Multi), Osteoarthritis, Osteoporosis, Peripheral vascular disease (CMS-HCC), Right knee pain, Seizure disorder (Multi), and Type 1 diabetes mellitus with hyperglycemia (Multi).    Surgical History  She has a past surgical history that includes Carpal tunnel release (Bilateral); Total hip arthroplasty (Left, 2012); Coronary artery bypass graft (2005); Cholecystectomy; Cataract extraction w/ intraocular lens  implant, bilateral; Hysterectomy; Colonoscopy; Other surgical history; Other surgical history (Right, 01/2016); and Knee surgery (Right, 04/17/2024).     Social History  She reports that she has never smoked. She has been  exposed to tobacco smoke. She has never used smokeless tobacco. She reports that she does not currently use alcohol. She reports that she does not currently use drugs.    Family History  Family History   Problem Relation Name Age of Onset    Diabetes Mother      Diabetes Daughter      Other (RA) Daughter          Allergies  Amoxicillin, Amoxicillin-pot clavulanate, Ciprofloxacin, and Levofloxacin    Review of Systems  As per admission HPI     Physical Exam  Awake, alert, oriented and somewhat depressed  HEENT: Normal extraocular movements  Neck: Supple  Back some right-sided tenderness  Abdomen soft and nontender  : Layton catheter in place     Last Recorded Vitals  Blood pressure 135/53, pulse 67, temperature 37 °C (98.6 °F), temperature source Oral, resp. rate 18, height 1.524 m (5'), weight 58.1 kg (128 lb), SpO2 90%.    Relevant Results      Results for orders placed or performed during the hospital encounter of 06/09/24 (from the past 24 hour(s))   POCT GLUCOSE   Result Value Ref Range    POCT Glucose 146 (H) 74 - 99 mg/dL   Urinalysis with Reflex Culture and Microscopic   Result Value Ref Range    Color, Urine Light-Yellow Light-Yellow, Yellow, Dark-Yellow    Appearance, Urine Clear Clear    Specific Gravity, Urine 1.013 1.005 - 1.035    pH, Urine 7.0 5.0, 5.5, 6.0, 6.5, 7.0, 7.5, 8.0    Protein, Urine NEGATIVE NEGATIVE, 10 (TRACE), 20 (TRACE) mg/dL    Glucose, Urine Normal Normal mg/dL    Blood, Urine NEGATIVE NEGATIVE    Ketones, Urine NEGATIVE NEGATIVE mg/dL    Bilirubin, Urine NEGATIVE NEGATIVE    Urobilinogen, Urine Normal Normal mg/dL    Nitrite, Urine NEGATIVE NEGATIVE    Leukocyte Esterase, Urine NEGATIVE NEGATIVE   Extra Urine Gray Tube   Result Value Ref Range    Extra Tube Hold for add-ons.    CBC   Result Value Ref Range    WBC 6.0 4.4 - 11.3 x10*3/uL    nRBC 0.0 0.0 - 0.0 /100 WBCs    RBC 3.04 (L) 4.00 - 5.20 x10*6/uL    Hemoglobin 9.6 (L) 12.0 - 16.0 g/dL    Hematocrit 30.3 (L) 36.0 - 46.0 %      80 - 100 fL    MCH 31.6 26.0 - 34.0 pg    MCHC 31.7 (L) 32.0 - 36.0 g/dL    RDW 13.6 11.5 - 14.5 %    Platelets 288 150 - 450 x10*3/uL   Basic metabolic panel   Result Value Ref Range    Glucose 216 (H) 65 - 99 mg/dL    Sodium 134 133 - 145 mmol/L    Potassium 5.1 3.4 - 5.1 mmol/L    Chloride 101 97 - 107 mmol/L    Bicarbonate 24 24 - 31 mmol/L    Urea Nitrogen 18 8 - 25 mg/dL    Creatinine 1.00 0.40 - 1.60 mg/dL    eGFR 57 (L) >60 mL/min/1.73m*2    Calcium 8.3 (L) 8.5 - 10.4 mg/dL    Anion Gap 9 <=19 mmol/L   POCT GLUCOSE   Result Value Ref Range    POCT Glucose 170 (H) 74 - 99 mg/dL   POCT GLUCOSE   Result Value Ref Range    POCT Glucose 147 (H) 74 - 99 mg/dL    XR chest 2 views    Result Date: 6/10/2024  Interpreted By:  Shraddha Coleman, STUDY: XR CHEST 2 VIEWS 6/10/2024 9:09 am   INDICATION: Fall with rib fractures   COMPARISON: 04/22/2024   ACCESSION NUMBER(S): [id number]   ORDERING CLINICIAN: THEODORE NAGY   TECHNIQUE: PA and lateral views of the chest were acquired.   FINDINGS: The cardiac size is within normal limits with postoperative change from median sternotomy and coronary revascularization procedure.   The acute anterior right rib fractures seen on CT study done yesterday are difficult to visualize on chest x-ray. There is an old healed fracture of the anterior left 2nd rib with old healed fractures of the posterior right 6th, 7th, and 8th ribs noted.   No pneumothorax is present. There is some subsegmental atelectasis at the left lung base with mild right basilar discoid atelectasis seen as well.   There are degenerative changes of the thoracic spine. There is osteoarthritis of the shoulders bilaterally. Gallbladder is surgically absent.       The known acute anterior right rib fractures are difficult to visualize on chest x-ray.   No pneumothorax or hemothorax.   Right basilar discoid atelectasis with subsegmental atelectasis retrocardiac left lower lobe.   Status post CABG and  cholecystectomy.   Old healed bilateral rib fractures are seen.   Signed by: Shraddha Coleman 6/10/2024 9:24 AM Dictation workstation:   [id number]    XR knee right 4+ views    Result Date: 6/9/2024  Interpreted By:  Maricarmen Blunt, STUDY: XR KNEE RIGHT 4+ VIEWS;  6/9/2024 11:05 am   INDICATION: Signs/Symptoms:fall.   COMPARISON: 06/07/2024   ACCESSION NUMBER(S): [id number]   ORDERING CLINICIAN: KHOA ATKINS   FINDINGS: Four views of the right knee obtained.   Arthroplasty components in grossly stable anatomic alignment. No definite acute fracture or abnormal lucency around the hardware. Stable smooth subcentimeter density adjacent to the fibular head. No significant joint effusion. No focal soft tissue swelling is apparent. Prominent presumed vascular calcifications in the posterior soft tissues.       No evidence of acute fracture. Status post TKA with intact hardware.   MACRO: None.   Signed by: Maricarmen Blunt 6/9/2024 11:33 AM Dictation workstation:   [id number]    XR pelvis 1-2 views    Result Date: 6/9/2024  Interpreted By:  Maricarmen Blunt, STUDY: XR PELVIS 1-2 VIEWS;  6/9/2024 11:05 am   INDICATION: Signs/Symptoms:fall.   COMPARISON: 07/14/2023   ACCESSION NUMBER(S): [id number]   ORDERING CLINICIAN: KHOA ATKINS   FINDINGS: A single frontal view of the pelvis obtained.   Osteopenia, overlying bowel content and soft tissue folds limit evaluation. No definite focal disruption of the pelvic osseous contours to suggest acute displaced fracture. Left hip arthroplasty in satisfactory alignment. Degenerative changes of the right hip and visualized lower lumbar spine. Multifocal soft tissue presumed vascular calcifications.       No evidence of acute displaced osseous pelvic fracture.   MACRO: None.   Signed by: Maricarmen Blunt 6/9/2024 11:32 AM Dictation workstation:   [id number]    CT chest wo IV contrast    Addendum Date: 6/9/2024    Interpreted By:  Maricarmen Blunt, ADDENDUM: Subtle smooth expansile likely remote fracture  deformity of several right lateral ribs noted as well.   Signed by: Maricarmen Blunt 6/9/2024 11:31 AM   -------- ORIGINAL REPORT -------- Dictation workstation:   [id number]    Result Date: 6/9/2024  Interpreted By:  Maricarmen Blunt, STUDY: CT CHEST WO IV CONTRAST;  6/9/2024 10:48 am   INDICATION: Signs/Symptoms:R rib pain. Status post fall   COMPARISON: 10/08/2022   ACCESSION NUMBER(S): [id number]   ORDERING CLINICIAN: KHOA ATKINS   TECHNIQUE: CT of the chest was performed. Sagittal and coronal reconstructions were generated. No intravenous contrast given for the examination.   FINDINGS: Hilar, vessel, and solid organ evaluation limited without IV contrast.   CHEST WALL AND LOWER NECK: Stable short linear hyperdense probable surgical material in the anterior left neck base. Portions of the chest wall excluded from field of imaging. No significant axillary lymphadenopathy as visualized. Status post sternotomy.   MEDIASTINUM AND HERMELINDO:  Limited hilar assessment on unenhanced images. No gross mediastinal or hilar adenopathy within limits of unenhanced exam. Moderate-sized hiatal hernia. Dense surgical material in the anterior mediastinum.   HEART AND VESSELS:  Lack of IV contrast precludes vascular luminal assessment. The heart is normal in size. No significant pericardial effusion. Multifocal atherosclerotic calcifications including the coronary arteries and mitral valve region.   LUNGS, PLEURA, LARGE AIRWAYS:  Subpleural reticular and coarse linear densities scattered in both lung fields. No sizable airspace consolidation, pleural effusion or pneumothorax identified. The central airways are patent.   UPPER ABDOMEN:  Cholecystectomy clips at the liver hilum. Fullness of the visualized central biliary tree. Moderate fecal residue in the visualized colon.   BONES:  Multiple median sternotomy wires. Smooth irregularity of the sternum with probable ununited sternotomy defect and adjacent ossifications similar to the previous  exam. Nondisplaced fracture of the anterior right 2nd rib. Mildly displaced fractures of the anterior right 3rd, 4th and 5th ribs. Possible nondisplaced fracture of the anterior right 6th rib. Smooth probable remote fracture deformity of the posterior right 6th and 7th ribs. Kyphosis and advanced degenerative changes of the thoracic spine with mild multilevel thoracic vertebral compression deformities without obvious interval change.       Multiple acute nondisplaced or minimally displaced right anterior rib fractures. No pleural effusion or pneumothorax.   Postsurgical changes in the anterior chest wall.   Scattered areas of probable scarring or atelectasis in both lung fields.   Moderate-sized hiatal hernia.   Fullness of the central biliary tree status post cholecystectomy. Correlation with LFTs may be helpful.   Additional findings as described above.   MACRO: None.   Signed by: Maricarmen Blunt 6/9/2024 11:28 AM Dictation workstation:   [id number]    XR shoulder right 2+ views    Result Date: 6/9/2024  Interpreted By:  Maricarmen Blunt, STUDY: XR SHOULDER RIGHT 2+ VIEWS;  6/9/2024 11:03 am   INDICATION: Signs/Symptoms:fall.   COMPARISON: None.   ACCESSION NUMBER(S): [id number]   ORDERING CLINICIAN: KHOA ATKINS   FINDINGS: Three views of the right shoulder obtained.   No acute fracture or dislocation. Glenohumeral joint space narrowing and spurring. Acromiohumeral space is preserved. Small subacromial spur. AC joint narrowing and spurring.   Smooth probable remote fracture deformity of posterolateral aspect of several contiguous right mid ribs. Sternotomy wires, least 2 of which are discontinuous, partially included over the central chest.       No evidence of acute fracture or dislocation. Degenerative changes of the right shoulder.   MACRO: None.   Signed by: Maricarmen Blunt 6/9/2024 11:30 AM Dictation workstation:   [id number]    CT cervical spine wo IV contrast    Result Date: 6/9/2024  Interpreted By:  Jose Raul  Maricarmen, STUDY: CT CERVICAL SPINE WO IV CONTRAST;  6/9/2024 10:48 am   INDICATION: Signs/Symptoms:fall.   COMPARISON: 03/28/2022   ACCESSION NUMBER(S): [id number]   ORDERING CLINICIAN: KHOA ATKINS   TECHNIQUE: CT images were obtained through the cervical spine. Sagittal and coronal reconstructions were generated.   FINDINGS:     ALIGNMENT: Mild rightward tilt in the upper cervical spine. Mild anterolisthesis at C6-7 similar to the prior exam.   VERTEBRAE/DISC SPACES: No compression deformity or acute displaced fracture.  Multilevel degenerative changes including atlantoaxial joint space narrowing with surrounding calcifications, spurring and cystic changes in the dens, multilevel intervertebral disc space narrowing with endplate sclerosis and smooth endplate irregularities, probable partial fusion across the C3-4 vertebra, and multilevel bilateral facet joint narrowing and spurring, similar to the previous exam.   ADDITIONAL FINDINGS: No abnormal thickening of the prevertebral soft tissues.  Linear presumed scarring in the visualized bilateral lung apices. Prominent carotid bulb atherosclerotic calcifications. Linear dense possible surgical device in the anterior left neck base.       No acute cervical vertebral displacement or acute displaced fracture. Multilevel degenerative changes with mild spondylolisthesis similar to 03/20/2022.   Paraspinal/soft tissue findings as above.   MACRO: None.   Signed by: Maricarmen Blunt 6/9/2024 11:19 AM Dictation workstation:   [id number]    CT head wo IV contrast    Result Date: 6/9/2024  Interpreted By:  Maricarmen Blunt, STUDY: CT HEAD WO IV CONTRAST;  6/9/2024 10:48 am   INDICATION: Signs/Symptoms:fall.   COMPARISON: 03/28/2022   ACCESSION NUMBER(S): [id number]   ORDERING CLINICIAN: KHOA ATKINS   TECHNIQUE: Unenhanced CT images of the head were obtained.   FINDINGS: The ventricles, cisterns and sulci are enlarged, consistent with diffuse volume loss. There are areas of nonspecific  white matter hypodensity, which are probably age related or microvascular in nature. These findings are similar to the previous exam. There is no acute intracranial hemorrhage, mass effect or midline shift. No extraaxial fluid collection.   No acute displaced calvarial fracture.   Visualized paranasal sinuses are clear. Punctate dense probable surgical material along the anterior margin of each globe.       No acute intracranial hemorrhage or mass-effect.   MACRO: None.   Signed by: Maricarmen Blunt 6/9/2024 11:16 AM Dictation workstation:   [id number]       Assessment/Plan     Urinary retention: This is not a new issue.  She had had retention after her right knee operation and was on intermittent catheterization at home.  At this point, would leave the Layton catheter in until discharge and then she can resume intermittent catheterization when she returns home.          Eric Duncan MD

## 2024-06-10 NOTE — PROGRESS NOTES
Occupational Therapy                 Therapy Communication Note    Patient Name: Bonnie Jules  MRN: [id number]  Today's Date: 6/10/2024     Discipline: Occupational Therapy    Missed Visit Reason: Missed Visit Reason: Other (Comment) (awaiting ortho consult for TKR dehiscence.)    Missed Time: Attempt    Comment:

## 2024-06-10 NOTE — PROGRESS NOTES
Bonnie Jules is a 83 y.o. female on day 0 of admission presenting with Fracture of one rib, right side, initial encounter for closed fracture.    RNCC met with patient to discuss discharge plan. Offered to set up C, patient continues to decline. She wants to go home and resume her outpatient services.   Care Transitions will follow for updates.       Tiffanie Vasquez RN

## 2024-06-10 NOTE — CONSULTS
Inpatient consult to Infectious Diseases  Consult performed by: Robert Pickering MD  Consult ordered by: CARMEN Barillas-CNP          Reason For Consult  Right lower extremity and right heel wound    History Of Present Illness  Bonnie Jules is a 83 y.o. female presenting with fall.  Has a background history of coronary artery disease, type 2 diabetes.  She was seen by me at the wound healing center at [facility] for right heel wound.  She had accidental fall was brought to the hospital for further evaluation of [facility].  She had right total knee replacement done in April and developed dehiscence of her right anterior knee wound.  Her wound has since been sutured.  She denies any right knee or heel pain.  She denies any fever or chills.  She denies any chest pain or shortness of breath.  She denies any nausea vomiting or diarrhea.  She is on oral doxycycline.     Past Medical History  She has a past medical history of Anxiety, Benign hypertension, Chronic ischemic colitis (Multi), Chronic kidney disease, stage III (moderate) (Multi), CLL (chronic lymphocytic leukemia) (Multi), Coronary artery disease, CTS (carpal tunnel syndrome), Diabetes mellitus (Multi), Essential (primary) hypertension, GI bleed, Hyperlipidemia, unspecified, Hypertension, Hypothyroidism, Hypothyroidism, unspecified type, Mixed hyperlipidemia, NSTEMI (non-ST elevated myocardial infarction) (Multi), Osteoarthritis, Osteoporosis, Peripheral vascular disease (CMS-HCC), Right knee pain, Seizure disorder (Multi), and Type 1 diabetes mellitus with hyperglycemia (Multi).    Surgical History  She has a past surgical history that includes Carpal tunnel release (Bilateral); Total hip arthroplasty (Left, 2012); Coronary artery bypass graft (2005); Cholecystectomy; Cataract extraction w/ intraocular lens  implant, bilateral; Hysterectomy; Colonoscopy; Other surgical history; Other surgical history (Right, 01/2016); and Knee surgery (Right,  04/17/2024).     Social History     Occupational History    Not on file   Tobacco Use    Smoking status: Never     Passive exposure: Past    Smokeless tobacco: Never   Vaping Use    Vaping status: Never Used   Substance and Sexual Activity    Alcohol use: Not Currently    Drug use: Not Currently    Sexual activity: Defer     Travel History   Travel since 05/10/24    No documented travel since 05/10/24           Family History  Family History   Problem Relation Name Age of Onset    Diabetes Mother      Diabetes Daughter      Other (RA) Daughter       Allergies  Amoxicillin, Amoxicillin-pot clavulanate, Ciprofloxacin, and Levofloxacin     Immunization History   Administered Date(s) Administered    Flu vaccine, quadrivalent, high-dose, preservative free, age 65y+ (FLUZONE) 10/30/2020, 10/04/2021    Influenza, High Dose Seasonal, Preservative Free 10/08/2018, 09/22/2019    Influenza, seasonal, injectable 09/28/2012    Moderna COVID-19 vaccine, Fall 2023, 12 yeasrs and older (50mcg/0.5mL) 10/25/2023    Moderna SARS-CoV-2 Vaccination 03/17/2021, 04/16/2021, 12/02/2021, 04/21/2022    Pneumococcal conjugate vaccine, 13-valent (PREVNAR 13) 03/22/2018    Pneumococcal polysaccharide vaccine, 23-valent, age 2 years and older (PNEUMOVAX 23) 07/11/2019    Tdap vaccine, age 7 year and older (BOOSTRIX, ADACEL) 03/26/2018     Medications  Home medications:  Medications Prior to Admission   Medication Sig Dispense Refill Last Dose    amLODIPine (Norvasc) 10 mg tablet Take 1 tablet (10 mg) by mouth once daily. 90 tablet 3 6/8/2024    aspirin 81 mg EC tablet Take 1 tablet (81 mg) by mouth once daily.   6/8/2024    cholecalciferol (Vitamin D-3) 50 mcg (2,000 unit) capsule Take 1 capsule (50 mcg) by mouth early in the morning..   6/8/2024    cyanocobalamin (Vitamin B-12) 1,000 mcg tablet Take 1 tablet (1,000 mcg) by mouth once daily.   6/8/2024    doxycycline (Adoxa) 100 mg tablet Take 0.5 tablets (50 mg) by mouth 2 times a day.    6/9/2024    folic acid (Folvite) 1 mg tablet Take 1 tablet (1 mg) by mouth once daily.   6/8/2024    gabapentin (Neurontin) 100 mg capsule Take 1 capsule (100 mg) by mouth 2 times a day. In the morning and before bedtime   6/9/2024    hydrALAZINE (Apresoline) 50 mg tablet Take 1 tablet (50 mg) by mouth 2 times a day. 180 tablet 3 6/9/2024    insulin degludec (Tresiba FlexTouch U-100) 100 unit/mL (3 mL) injection Inject 12 Units under the skin once daily in the morning. As directed 1 box   6/9/2024    insulin lispro (HumaLOG  KwikPen U-100) 100 unit/mL injection Inject 1 Units under the skin 3 times daily (morning, midday, late afternoon). ON A SLIDING SCALE   6/9/2024    levothyroxine (Synthroid, Levoxyl) 100 mcg tablet Take 1 tablet (100 mcg) by mouth once daily.   6/9/2024    lisinopril 40 mg tablet TAKE 1 TABLET BY MOUTH EVERY DAY 90 tablet 3 6/9/2024    oxyCODONE-acetaminophen (Percocet) 5-325 mg tablet Take 1 tablet by mouth every 6 hours if needed for severe pain (7 - 10).   Past Week    POLYETHYLENE GLYCOL 3350 ORAL Take 1 packet by mouth once daily as needed (constipation). With 8 oz of fluid as needed   Past Week    pravastatin (Pravachol) 40 mg tablet TAKE 1 TABLET BY MOUTH ONCE DAILY (Patient taking differently: Take 1 tablet (40 mg) by mouth once daily at bedtime.) 90 tablet 1 6/8/2024    sennosides-docusate sodium (Alyson-Colace) 8.6-50 mg tablet Take 1 tablet by mouth once daily for 20 days. 20 tablet 0 6/9/2024    sulfaSALAzine (Azulfidine) 500 mg DR tablet Take 2 tablets (1,000 mg) by mouth 2 times a day.   6/9/2024    valACYclovir (Valtrex) 1 gram tablet Take 1 tablet (1,000 mg) by mouth once daily as needed (cold sore). Twice daily   Unknown    metoprolol tartrate (Lopressor) 25 mg tablet Take 1 tablet (25 mg) by mouth 2 times a day. 60 tablet 0     nitroglycerin (Nitrostat) 0.4 mg SL tablet Place 1 tablet (0.4 mg) under the tongue every 5 minutes if needed for chest pain.   Unknown     Current  medications:  Scheduled medications  acetaminophen, 650 mg, oral, q6h  acetaminophen, 975 mg, oral, Once  amLODIPine, 10 mg, oral, Daily  aspirin, 81 mg, oral, Daily  cholecalciferol, 2,000 Units, oral, Daily  cyanocobalamin, 1,000 mcg, oral, Daily  doxycycline, 50 mg, oral, BID  enoxaparin, 40 mg, subcutaneous, q24h  folic acid, 1 mg, oral, Daily  gabapentin, 100 mg, oral, BID  hydrALAZINE, 50 mg, oral, BID  insulin glargine, 10 Units, subcutaneous, Daily  insulin lispro, 0-10 Units, subcutaneous, TID  levothyroxine, 100 mcg, oral, Daily  lidocaine, 5 mL, subcutaneous, Once  [Held by provider] lisinopril, 40 mg, oral, Daily  metoprolol tartrate, 25 mg, oral, BID  polyethylene glycol, 17 g, oral, Daily  pravastatin, 40 mg, oral, Nightly  sennosides-docusate sodium, 1 tablet, oral, Daily  sulfaSALAzine, 1,000 mg, oral, BID  tamsulosin, 0.4 mg, oral, Daily      Continuous medications     PRN medications  PRN medications: acetaminophen **OR** acetaminophen **OR** acetaminophen, acetaminophen **OR** acetaminophen **OR** acetaminophen, bisacodyl, dextrose, dextrose, glucagon, glucagon, hydrocortisone, morphine, morphine, ondansetron **OR** ondansetron, oxyCODONE, oxyCODONE    Review of Systems   Constitutional:  Negative for chills and fever.   Skin:  Positive for wound.   All other systems reviewed and are negative.       Objective  Range of Vitals (last 24 hours)  Heart Rate:  [58-59]   Temp:  [36.3 °C (97.3 °F)-36.7 °C (98.1 °F)]   Resp:  [17-18]   BP: (112-157)/(52-75)   SpO2:  [92 %-98 %]   Daily Weight  06/09/24 : 58.1 kg (128 lb)    Body mass index is 25 kg/m².     Physical Exam  Constitutional:       Appearance: Normal appearance.   HENT:      Head: Normocephalic and atraumatic.      Nose: Nose normal.   Eyes:      Extraocular Movements: Extraocular movements intact.      Conjunctiva/sclera: Conjunctivae normal.   Cardiovascular:      Rate and Rhythm: Normal rate and regular rhythm.      Pulses: Normal pulses.       Heart sounds: Normal heart sounds.   Pulmonary:      Effort: Pulmonary effort is normal.      Breath sounds: Normal breath sounds.   Abdominal:      General: Bowel sounds are normal.      Palpations: Abdomen is soft.   Musculoskeletal:      Cervical back: Normal range of motion and neck supple.      Right lower leg: No edema.      Left lower leg: No edema.   Skin:     Comments: Right heel ulcer with moderate amount of fat necrosis  Right anterior knee wound, sutured in place   Neurological:      Mental Status: She is alert.   Psychiatric:         Behavior: Behavior normal. Behavior is cooperative.          Relevant Results  Outside Hospital Results    Labs  Results from last 72 hours   Lab Units 06/10/24  0541 06/09/24  1149   WBC AUTO x10*3/uL 6.0 6.7   HEMOGLOBIN g/dL 9.6* 10.1*   HEMATOCRIT % 30.3* 31.3*   PLATELETS AUTO x10*3/uL 288 323   NEUTROS PCT AUTO %  --  44.9   LYMPHS PCT AUTO %  --  42.9   MONOS PCT AUTO %  --  7.9   EOS PCT AUTO %  --  3.9     Results from last 72 hours   Lab Units 06/10/24  0542 06/09/24  1149   SODIUM mmol/L 134 135   POTASSIUM mmol/L 5.1 5.1   CHLORIDE mmol/L 101 101   CO2 mmol/L 24 25   BUN mg/dL 18 20   CREATININE mg/dL 1.00 1.10   GLUCOSE mg/dL 216* 121*   CALCIUM mg/dL 8.3* 9.2   ANION GAP mmol/L 9 9   EGFR mL/min/1.73m*2 57* 51*     Results from last 72 hours   Lab Units 06/09/24  1149   ALK PHOS U/L 78   BILIRUBIN TOTAL mg/dL 0.2   BILIRUBIN DIRECT mg/dL <0.2   PROTEIN TOTAL g/dL 6.7   ALT U/L 7   AST U/L 28   ALBUMIN g/dL 3.8     Estimated Creatinine Clearance: 35.2 mL/min (by C-G formula based on SCr of 1 mg/dL).  CRP   Date Value Ref Range Status   12/08/2021 0.3 0 - 2.0 MG/DL Final     Comment:     LESS THAN   RESULT CHECKED  Performed at [address] [address]     09/24/2019 0.9 0 - 2.0 MG/DL Final     Comment:     Performed at [address] [address]   04/04/2019 0.3 0 - 2.0 MG/DL Final     Comment:     LESS  "THAN  Performed at [address] [address]       Sedimentation Rate   Date Value Ref Range Status   12/08/2021 1 0 - 20 MM/HR Final     Comment:     Performed at [address] [address]   08/18/2019 28 (H) 0 - 20 MM/HR Final     Comment:     Performed at [address] [address]   04/04/2019 6 0 - 20 MM/HR Final     Comment:     Performed at [address] [address]     No results found for: \"HIV1X2\", \"HIVCONF\", \"RBNQWI8ZY\"  No results found for: \"HEPCABINIT\", \"HEPCAB\", \"HCVPCRQUANT\"  Microbiology  Reviewed  Imaging  XR chest 2 views    Result Date: 6/10/2024  Interpreted By:  Shraddha Coleman, STUDY: XR CHEST 2 VIEWS 6/10/2024 9:09 am   INDICATION: Fall with rib fractures   COMPARISON: 04/22/2024   ACCESSION NUMBER(S): [id number]   ORDERING CLINICIAN: THEODORE NAGY   TECHNIQUE: PA and lateral views of the chest were acquired.   FINDINGS: The cardiac size is within normal limits with postoperative change from median sternotomy and coronary revascularization procedure.   The acute anterior right rib fractures seen on CT study done yesterday are difficult to visualize on chest x-ray. There is an old healed fracture of the anterior left 2nd rib with old healed fractures of the posterior right 6th, 7th, and 8th ribs noted.   No pneumothorax is present. There is some subsegmental atelectasis at the left lung base with mild right basilar discoid atelectasis seen as well.   There are degenerative changes of the thoracic spine. There is osteoarthritis of the shoulders bilaterally. Gallbladder is surgically absent.       The known acute anterior right rib fractures are difficult to visualize on chest x-ray.   No pneumothorax or hemothorax.   Right basilar discoid atelectasis with subsegmental atelectasis retrocardiac left lower lobe.   Status post CABG and cholecystectomy.   Old healed bilateral rib fractures are seen.   Signed by: Shraddha " Jared 6/10/2024 9:24 AM Dictation workstation:   [id number]    XR knee right 4+ views    Result Date: 6/9/2024  Interpreted By:  Maricarmen Blunt, STUDY: XR KNEE RIGHT 4+ VIEWS;  6/9/2024 11:05 am   INDICATION: Signs/Symptoms:fall.   COMPARISON: 06/07/2024   ACCESSION NUMBER(S): [id number]   ORDERING CLINICIAN: KHOA ATKINS   FINDINGS: Four views of the right knee obtained.   Arthroplasty components in grossly stable anatomic alignment. No definite acute fracture or abnormal lucency around the hardware. Stable smooth subcentimeter density adjacent to the fibular head. No significant joint effusion. No focal soft tissue swelling is apparent. Prominent presumed vascular calcifications in the posterior soft tissues.       No evidence of acute fracture. Status post TKA with intact hardware.   MACRO: None.   Signed by: Maricarmen Blunt 6/9/2024 11:33 AM Dictation workstation:   [id number]    XR pelvis 1-2 views    Result Date: 6/9/2024  Interpreted By:  Maricarmen Blunt, STUDY: XR PELVIS 1-2 VIEWS;  6/9/2024 11:05 am   INDICATION: Signs/Symptoms:fall.   COMPARISON: 07/14/2023   ACCESSION NUMBER(S): [id number]   ORDERING CLINICIAN: KHOA ATKINS   FINDINGS: A single frontal view of the pelvis obtained.   Osteopenia, overlying bowel content and soft tissue folds limit evaluation. No definite focal disruption of the pelvic osseous contours to suggest acute displaced fracture. Left hip arthroplasty in satisfactory alignment. Degenerative changes of the right hip and visualized lower lumbar spine. Multifocal soft tissue presumed vascular calcifications.       No evidence of acute displaced osseous pelvic fracture.   MACRO: None.   Signed by: Maricarmen Blunt 6/9/2024 11:32 AM Dictation workstation:   [id number]    CT chest wo IV contrast    Addendum Date: 6/9/2024    Interpreted By:  Maricarmen Blunt, ADDENDUM: Subtle smooth expansile likely remote fracture deformity of several right lateral ribs noted as well.   Signed by: Maricarmen Blunt  6/9/2024 11:31 AM   -------- ORIGINAL REPORT -------- Dictation workstation:   [id number]    Result Date: 6/9/2024  Interpreted By:  Maricarmen Blunt, STUDY: CT CHEST WO IV CONTRAST;  6/9/2024 10:48 am   INDICATION: Signs/Symptoms:R rib pain. Status post fall   COMPARISON: 10/08/2022   ACCESSION NUMBER(S): [id number]   ORDERING CLINICIAN: KHOA ATKINS   TECHNIQUE: CT of the chest was performed. Sagittal and coronal reconstructions were generated. No intravenous contrast given for the examination.   FINDINGS: Hilar, vessel, and solid organ evaluation limited without IV contrast.   CHEST WALL AND LOWER NECK: Stable short linear hyperdense probable surgical material in the anterior left neck base. Portions of the chest wall excluded from field of imaging. No significant axillary lymphadenopathy as visualized. Status post sternotomy.   MEDIASTINUM AND HERMELINDO:  Limited hilar assessment on unenhanced images. No gross mediastinal or hilar adenopathy within limits of unenhanced exam. Moderate-sized hiatal hernia. Dense surgical material in the anterior mediastinum.   HEART AND VESSELS:  Lack of IV contrast precludes vascular luminal assessment. The heart is normal in size. No significant pericardial effusion. Multifocal atherosclerotic calcifications including the coronary arteries and mitral valve region.   LUNGS, PLEURA, LARGE AIRWAYS:  Subpleural reticular and coarse linear densities scattered in both lung fields. No sizable airspace consolidation, pleural effusion or pneumothorax identified. The central airways are patent.   UPPER ABDOMEN:  Cholecystectomy clips at the liver hilum. Fullness of the visualized central biliary tree. Moderate fecal residue in the visualized colon.   BONES:  Multiple median sternotomy wires. Smooth irregularity of the sternum with probable ununited sternotomy defect and adjacent ossifications similar to the previous exam. Nondisplaced fracture of the anterior right 2nd rib. Mildly displaced  fractures of the anterior right 3rd, 4th and 5th ribs. Possible nondisplaced fracture of the anterior right 6th rib. Smooth probable remote fracture deformity of the posterior right 6th and 7th ribs. Kyphosis and advanced degenerative changes of the thoracic spine with mild multilevel thoracic vertebral compression deformities without obvious interval change.       Multiple acute nondisplaced or minimally displaced right anterior rib fractures. No pleural effusion or pneumothorax.   Postsurgical changes in the anterior chest wall.   Scattered areas of probable scarring or atelectasis in both lung fields.   Moderate-sized hiatal hernia.   Fullness of the central biliary tree status post cholecystectomy. Correlation with LFTs may be helpful.   Additional findings as described above.   MACRO: None.   Signed by: Maricarmen Blunt 6/9/2024 11:28 AM Dictation workstation:   [id number]    XR shoulder right 2+ views    Result Date: 6/9/2024  Interpreted By:  Maricarmen Blunt, STUDY: XR SHOULDER RIGHT 2+ VIEWS;  6/9/2024 11:03 am   INDICATION: Signs/Symptoms:fall.   COMPARISON: None.   ACCESSION NUMBER(S): [id number]   ORDERING CLINICIAN: KHOA ATKINS   FINDINGS: Three views of the right shoulder obtained.   No acute fracture or dislocation. Glenohumeral joint space narrowing and spurring. Acromiohumeral space is preserved. Small subacromial spur. AC joint narrowing and spurring.   Smooth probable remote fracture deformity of posterolateral aspect of several contiguous right mid ribs. Sternotomy wires, least 2 of which are discontinuous, partially included over the central chest.       No evidence of acute fracture or dislocation. Degenerative changes of the right shoulder.   MACRO: None.   Signed by: Maricarmen Blunt 6/9/2024 11:30 AM Dictation workstation:   [id number]    CT cervical spine wo IV contrast    Result Date: 6/9/2024  Interpreted By:  Maricarmen Blunt, STUDY: CT CERVICAL SPINE WO IV CONTRAST;  6/9/2024 10:48 am    INDICATION: Signs/Symptoms:fall.   COMPARISON: 03/28/2022   ACCESSION NUMBER(S): [id number]   ORDERING CLINICIAN: KHOA ATKINS   TECHNIQUE: CT images were obtained through the cervical spine. Sagittal and coronal reconstructions were generated.   FINDINGS:     ALIGNMENT: Mild rightward tilt in the upper cervical spine. Mild anterolisthesis at C6-7 similar to the prior exam.   VERTEBRAE/DISC SPACES: No compression deformity or acute displaced fracture.  Multilevel degenerative changes including atlantoaxial joint space narrowing with surrounding calcifications, spurring and cystic changes in the dens, multilevel intervertebral disc space narrowing with endplate sclerosis and smooth endplate irregularities, probable partial fusion across the C3-4 vertebra, and multilevel bilateral facet joint narrowing and spurring, similar to the previous exam.   ADDITIONAL FINDINGS: No abnormal thickening of the prevertebral soft tissues.  Linear presumed scarring in the visualized bilateral lung apices. Prominent carotid bulb atherosclerotic calcifications. Linear dense possible surgical device in the anterior left neck base.       No acute cervical vertebral displacement or acute displaced fracture. Multilevel degenerative changes with mild spondylolisthesis similar to 03/20/2022.   Paraspinal/soft tissue findings as above.   MACRO: None.   Signed by: Maricarmen Blunt 6/9/2024 11:19 AM Dictation workstation:   [id number]    CT head wo IV contrast    Result Date: 6/9/2024  Interpreted By:  Maricarmen Blunt, STUDY: CT HEAD WO IV CONTRAST;  6/9/2024 10:48 am   INDICATION: Signs/Symptoms:fall.   COMPARISON: 03/28/2022   ACCESSION NUMBER(S): [id number]   ORDERING CLINICIAN: KHOA ATKINS   TECHNIQUE: Unenhanced CT images of the head were obtained.   FINDINGS: The ventricles, cisterns and sulci are enlarged, consistent with diffuse volume loss. There are areas of nonspecific white matter hypodensity, which are probably age related or  microvascular in nature. These findings are similar to the previous exam. There is no acute intracranial hemorrhage, mass effect or midline shift. No extraaxial fluid collection.   No acute displaced calvarial fracture.   Visualized paranasal sinuses are clear. Punctate dense probable surgical material along the anterior margin of each globe.       No acute intracranial hemorrhage or mass-effect.   MACRO: None.   Signed by: Maricarmen Blunt 6/9/2024 11:16 AM Dictation workstation:   [id number]    XR knee right 1-2 views    Result Date: 6/7/2024  Interpreted By:  Rudolph Liriano, STUDY: XR KNEE RIGHT 1-2 VIEWS; ;  6/7/2024 11:06 am   INDICATION: Signs/Symptoms:PAIN.   COMPARISON: None.   ACCESSION NUMBER(S): [id number]   ORDERING CLINICIAN: RUDOLPH LIRIANO   FINDINGS: X-rays of the right knee done and read in the office today show that there is a right total knee replacement in overall satisfactory position and alignment.       See findings above.     MACRO: None   Signed by: Rudolph Liriano 6/7/2024 1:22 PM Dictation workstation:   [id number]    Assessment/Plan   Type 2 diabetes with peripheral angiopathy without gangrene  Stage III right heel ulcer  Right-sided rib fractures  Right anterior knee wound dehiscence, status post suture for laceration    Continue doxycycline for now  Wound culture-right heel  Local care  Offloading  Monitor temperature and WBC      Robert Pickering MD

## 2024-06-10 NOTE — PROGRESS NOTES
Bonnie Jules is a 83 y.o. female on day 0 of admission presenting with Fracture of one rib, right side, initial encounter for closed fracture.      Subjective   Patient seen and examined. Awake/alert/orietned. Denies shortness of breath, fevers, chills, nausea, or vomiting. No abdominal discomfort. Reports improvement in right sided rib pain with current pain regimen.          Objective     Last Recorded Vitals  /62 (BP Location: Right arm, Patient Position: Lying)   Pulse 58   Temp 36.3 °C (97.3 °F) (Oral)   Resp 18   Wt 58.1 kg (128 lb)   SpO2 92%   Intake/Output last 3 Shifts:    Intake/Output Summary (Last 24 hours) at 6/10/2024 1056  Last data filed at 6/10/2024 0300  Gross per 24 hour   Intake 0 ml   Output 1300 ml   Net -1300 ml       Admission Weight  Weight: 58.1 kg (128 lb) (06/09/24 1012)    Daily Weight  06/09/24 : 58.1 kg (128 lb)    Image Results  XR chest 2 views  Narrative: Interpreted By:  Shraddha Coleman,   STUDY:  XR CHEST 2 VIEWS 6/10/2024 9:09 am      INDICATION:  Fall with rib fractures      COMPARISON:  04/22/2024      ACCESSION NUMBER(S):  [id number]      ORDERING CLINICIAN:  THEODORE NAGY      TECHNIQUE:  PA and lateral views of the chest were acquired.      FINDINGS:  The cardiac size is within normal limits with postoperative change  from median sternotomy and coronary revascularization procedure.      The acute anterior right rib fractures seen on CT study done  yesterday are difficult to visualize on chest x-ray. There is an old  healed fracture of the anterior left 2nd rib with old healed  fractures of the posterior right 6th, 7th, and 8th ribs noted.      No pneumothorax is present. There is some subsegmental atelectasis at  the left lung base with mild right basilar discoid atelectasis seen  as well.      There are degenerative changes of the thoracic spine. There is  osteoarthritis of the shoulders bilaterally. Gallbladder is  surgically absent.      Impression: The  known acute anterior right rib fractures are difficult to  visualize on chest x-ray.      No pneumothorax or hemothorax.      Right basilar discoid atelectasis with subsegmental atelectasis  retrocardiac left lower lobe.      Status post CABG and cholecystectomy.      Old healed bilateral rib fractures are seen.      Signed by: Shraddha Coleman 6/10/2024 9:24 AM  Dictation workstation:   [id number]      Physical Exam  Vitals reviewed.   Constitutional:       Appearance: Normal appearance.   HENT:      Head: Normocephalic and atraumatic.   Eyes:      Extraocular Movements: Extraocular movements intact.      Conjunctiva/sclera: Conjunctivae normal.   Cardiovascular:      Rate and Rhythm: Normal rate and regular rhythm.   Pulmonary:      Effort: Pulmonary effort is normal.      Breath sounds: Normal breath sounds. No wheezing, rhonchi or rales.   Abdominal:      General: Bowel sounds are normal.      Palpations: Abdomen is soft.      Tenderness: There is no abdominal tenderness.   Skin:     General: Skin is warm and dry.      Comments: Ulcer to right heel/RLE. Dressing to right knee intact.    Neurological:      General: No focal deficit present.      Mental Status: She is alert and oriented to person, place, and time.         Relevant Results  Lab Results   Component Value Date    GLUCOSE 216 (H) 06/10/2024    CALCIUM 8.3 (L) 06/10/2024     06/10/2024    K 5.1 06/10/2024    CO2 24 06/10/2024     06/10/2024    BUN 18 06/10/2024    CREATININE 1.00 06/10/2024     Lab Results   Component Value Date    WBC 6.0 06/10/2024    HGB 9.6 (L) 06/10/2024    HCT 30.3 (L) 06/10/2024     06/10/2024     06/10/2024     XR chest 2 views  Result Date: 6/10/2024  The known acute anterior right rib fractures are difficult to visualize on chest x-ray.   No pneumothorax or hemothorax.   Right basilar discoid atelectasis with subsegmental atelectasis retrocardiac left lower lobe.   Status post CABG and cholecystectomy.    Old healed bilateral rib fractures are seen.   Signed by: Shraddha Coleman 6/10/2024 9:24 AM Dictation workstation:   [id number]    XR knee right 4+ views  Result Date: 6/9/2024  No evidence of acute fracture. Status post TKA with intact hardware.   MACRO: None.   Signed by: Maricarmen Blunt 6/9/2024 11:33 AM Dictation workstation:   [id number]    XR pelvis 1-2 views  Result Date: 6/9/2024  No evidence of acute displaced osseous pelvic fracture.   MACRO: None.   Signed by: Maricarmen Blunt 6/9/2024 11:32 AM Dictation workstation:   [id number]    CT chest wo IV contrast  Addendum Date: 6/9/2024    Interpreted By:  Maricarmen Blunt, ADDENDUM: Subtle smooth expansile likely remote fracture deformity of several right lateral ribs noted as well.   Signed by: Maricarmen Blunt 6/9/2024 11:31 AM   -------- ORIGINAL REPORT -------- Dictation workstation:   [id number]  Result Date: 6/9/2024  Multiple acute nondisplaced or minimally displaced right anterior rib fractures. No pleural effusion or pneumothorax.   Postsurgical changes in the anterior chest wall.   Scattered areas of probable scarring or atelectasis in both lung fields.   Moderate-sized hiatal hernia.   Fullness of the central biliary tree status post cholecystectomy. Correlation with LFTs may be helpful.   Additional findings as described above.   MACRO: None.   Signed by: Maricarmen Blunt 6/9/2024 11:28 AM Dictation workstation:   [id number]    XR shoulder right 2+ views  Result Date: 6/9/2024  No evidence of acute fracture or dislocation. Degenerative changes of the right shoulder.   MACRO: None.   Signed by: Maricarmen Blunt 6/9/2024 11:30 AM Dictation workstation:   [id number]    CT cervical spine wo IV contrast  Result Date: 6/9/2024  No acute cervical vertebral displacement or acute displaced fracture. Multilevel degenerative changes with mild spondylolisthesis similar to 03/20/2022.   Paraspinal/soft tissue findings as above.   MACRO: None.   Signed by: Maricarmen Blunt  6/9/2024 11:19 AM Dictation workstation:   [id number]    CT head wo IV contrast  Result Date: 6/9/2024  No acute intracranial hemorrhage or mass-effect.   MACRO: None.   Signed by: Maricarmen Blunt 6/9/2024 11:16 AM Dictation workstation:   [id number]          Assessment/Plan      Principal Problem:    Fracture of one rib, right side, initial encounter for closed fracture    Multiple non displaced or minimally displaced right rib fractures  Following a mechanical fall  Imaging as above, right 3-5th non-displaced rib fractures   Pain management  Incentive spirometer  Repeat CXR as above  PT/OT     Right surgical knee dehiscence  Sutured in the ED  Imaging as above  Consult orthopedic surgery, appreciate recommendations     Mechanical Fall  Fall precautions  PT/OT     CAD s/p CABG  Recent NSTEMI in April  Follows with Dr. Araujo outpatient  Continue ASA/statin/metoprolol  Monitor on tele     CKD  Follows with Dr. Rolle outpatient  Creatinine is at baseline  Renally dose meds, avoid nephrotoxic medications     Hypertension  Resume antihypertensives, holding lisinopril due to mild hyperkalemia     Hypothyroidism  Resume home levothyroxine     Right heel/RLE ulcer  Follows with Dr. Pickering outpatient, will consult, appreciate recs  Consult wound care nurse  Continue doxycycline     Plan  Admit to tele  Pain management  Incentive spirometer  Wound care  Consult orthopedic surgery, trauma surgery, and ID, appreciate recs  CBC and BMP in AM  PT/OT  Patient would prefer to go home with C when medically stable however okay with SNF if recommended              Luna Smith, APRN-CNP

## 2024-06-10 NOTE — PROGRESS NOTES
Physical Therapy                 Therapy Communication Note    Patient Name: Bonnie Jules  MRN: [id number]  Today's Date: 6/10/2024     Discipline: Physical Therapy    Missed Visit Reason: Missed Visit Reason: Other (Comment) (awaiting ortho consult for TKR dehiscence)    Missed Time: Cancel

## 2024-06-11 ENCOUNTER — APPOINTMENT (OUTPATIENT)
Dept: CARDIOLOGY | Facility: HOSPITAL | Age: 84
DRG: 184 | End: 2024-06-11
Payer: MEDICARE

## 2024-06-11 ENCOUNTER — TELEMEDICINE (OUTPATIENT)
Dept: OBSTETRICS AND GYNECOLOGY | Facility: CLINIC | Age: 84
End: 2024-06-11
Payer: MEDICARE

## 2024-06-11 VITALS
OXYGEN SATURATION: 94 % | WEIGHT: 128 LBS | HEIGHT: 60 IN | SYSTOLIC BLOOD PRESSURE: 139 MMHG | RESPIRATION RATE: 17 BRPM | HEART RATE: 61 BPM | BODY MASS INDEX: 25.13 KG/M2 | TEMPERATURE: 98.1 F | DIASTOLIC BLOOD PRESSURE: 57 MMHG

## 2024-06-11 DIAGNOSIS — R33.9 URINARY RETENTION: Primary | ICD-10-CM

## 2024-06-11 PROBLEM — S22.41XA CLOSED FRACTURE OF MULTIPLE RIBS OF RIGHT SIDE, INITIAL ENCOUNTER: Status: ACTIVE | Noted: 2024-06-11

## 2024-06-11 LAB
ANION GAP SERPL CALC-SCNC: 9 MMOL/L
BUN SERPL-MCNC: 21 MG/DL (ref 8–25)
CALCIUM SERPL-MCNC: 8.5 MG/DL (ref 8.5–10.4)
CHLORIDE SERPL-SCNC: 103 MMOL/L (ref 97–107)
CO2 SERPL-SCNC: 25 MMOL/L (ref 24–31)
CREAT SERPL-MCNC: 1.1 MG/DL (ref 0.4–1.6)
EGFRCR SERPLBLD CKD-EPI 2021: 51 ML/MIN/1.73M*2
GLUCOSE BLD MANUAL STRIP-MCNC: 137 MG/DL (ref 74–99)
GLUCOSE BLD MANUAL STRIP-MCNC: 281 MG/DL (ref 74–99)
GLUCOSE BLD MANUAL STRIP-MCNC: 285 MG/DL (ref 74–99)
GLUCOSE SERPL-MCNC: 132 MG/DL (ref 65–99)
HOLD SPECIMEN: NORMAL
POTASSIUM SERPL-SCNC: 4.4 MMOL/L (ref 3.4–5.1)
SODIUM SERPL-SCNC: 137 MMOL/L (ref 133–145)

## 2024-06-11 PROCEDURE — 99024 POSTOP FOLLOW-UP VISIT: CPT | Performed by: PHYSICIAN ASSISTANT

## 2024-06-11 PROCEDURE — 82947 ASSAY GLUCOSE BLOOD QUANT: CPT

## 2024-06-11 PROCEDURE — 99442 PR PHYS/QHP TELEPHONE EVALUATION 11-20 MIN: CPT | Performed by: OBSTETRICS & GYNECOLOGY

## 2024-06-11 PROCEDURE — 2500000002 HC RX 250 W HCPCS SELF ADMINISTERED DRUGS (ALT 637 FOR MEDICARE OP, ALT 636 FOR OP/ED): Performed by: INTERNAL MEDICINE

## 2024-06-11 PROCEDURE — 2500000004 HC RX 250 GENERAL PHARMACY W/ HCPCS (ALT 636 FOR OP/ED): Performed by: NURSE PRACTITIONER

## 2024-06-11 PROCEDURE — 2500000001 HC RX 250 WO HCPCS SELF ADMINISTERED DRUGS (ALT 637 FOR MEDICARE OP): Performed by: NURSE PRACTITIONER

## 2024-06-11 PROCEDURE — 97165 OT EVAL LOW COMPLEX 30 MIN: CPT | Mod: GO

## 2024-06-11 PROCEDURE — 97116 GAIT TRAINING THERAPY: CPT | Mod: GP

## 2024-06-11 PROCEDURE — 80048 BASIC METABOLIC PNL TOTAL CA: CPT | Performed by: NURSE PRACTITIONER

## 2024-06-11 PROCEDURE — 36415 COLL VENOUS BLD VENIPUNCTURE: CPT | Performed by: NURSE PRACTITIONER

## 2024-06-11 PROCEDURE — 2500000002 HC RX 250 W HCPCS SELF ADMINISTERED DRUGS (ALT 637 FOR MEDICARE OP, ALT 636 FOR OP/ED): Performed by: NURSE PRACTITIONER

## 2024-06-11 PROCEDURE — 97535 SELF CARE MNGMENT TRAINING: CPT | Mod: GO

## 2024-06-11 PROCEDURE — 97161 PT EVAL LOW COMPLEX 20 MIN: CPT | Mod: GP

## 2024-06-11 PROCEDURE — 1111F DSCHRG MED/CURRENT MED MERGE: CPT | Performed by: OBSTETRICS & GYNECOLOGY

## 2024-06-11 RX ORDER — DOXYCYCLINE 100 MG/1
100 CAPSULE ORAL EVERY 12 HOURS SCHEDULED
Refills: 0
Start: 2024-06-11 | End: 2024-06-18 | Stop reason: WASHOUT

## 2024-06-11 RX ORDER — TAMSULOSIN HYDROCHLORIDE 0.4 MG/1
0.4 CAPSULE ORAL DAILY
Qty: 30 CAPSULE | Refills: 0 | Status: SHIPPED | OUTPATIENT
Start: 2024-06-12 | End: 2024-06-18 | Stop reason: WASHOUT

## 2024-06-11 RX ORDER — LISINOPRIL 20 MG/1
20 TABLET ORAL DAILY
Status: DISCONTINUED | OUTPATIENT
Start: 2024-06-11 | End: 2024-06-11 | Stop reason: HOSPADM

## 2024-06-11 RX ORDER — ACETAMINOPHEN 325 MG/1
650 TABLET ORAL EVERY 4 HOURS PRN
Start: 2024-06-11

## 2024-06-11 RX ORDER — OXYCODONE AND ACETAMINOPHEN 5; 325 MG/1; MG/1
1 TABLET ORAL EVERY 6 HOURS PRN
Qty: 8 TABLET | Refills: 0 | Status: SHIPPED | OUTPATIENT
Start: 2024-06-11 | End: 2024-06-18 | Stop reason: WASHOUT

## 2024-06-11 RX ORDER — LISINOPRIL 20 MG/1
20 TABLET ORAL DAILY
Qty: 30 TABLET | Refills: 0
Start: 2024-06-12

## 2024-06-11 RX ORDER — ONDANSETRON 4 MG/1
4 TABLET, ORALLY DISINTEGRATING ORAL EVERY 8 HOURS PRN
Qty: 20 TABLET | Refills: 0 | Status: SHIPPED | OUTPATIENT
Start: 2024-06-11

## 2024-06-11 RX ADMIN — ACETAMINOPHEN 650 MG: 325 TABLET ORAL at 06:13

## 2024-06-11 RX ADMIN — SULFASALAZINE 1000 MG: 500 TABLET, DELAYED RELEASE ORAL at 08:51

## 2024-06-11 RX ADMIN — ACETAMINOPHEN 650 MG: 325 TABLET ORAL at 16:57

## 2024-06-11 RX ADMIN — ACETAMINOPHEN 650 MG: 325 TABLET ORAL at 08:52

## 2024-06-11 RX ADMIN — HYDRALAZINE HYDROCHLORIDE 50 MG: 50 TABLET, FILM COATED ORAL at 08:50

## 2024-06-11 RX ADMIN — LEVOTHYROXINE SODIUM 100 MCG: 0.1 TABLET ORAL at 08:51

## 2024-06-11 RX ADMIN — POLYETHYLENE GLYCOL 3350 17 G: 17 POWDER, FOR SOLUTION ORAL at 08:51

## 2024-06-11 RX ADMIN — TAMSULOSIN HYDROCHLORIDE 0.4 MG: 0.4 CAPSULE ORAL at 08:51

## 2024-06-11 RX ADMIN — CYANOCOBALAMIN TAB 1000 MCG 1000 MCG: 1000 TAB at 08:51

## 2024-06-11 RX ADMIN — INSULIN GLARGINE 10 UNITS: 100 INJECTION, SOLUTION SUBCUTANEOUS at 08:50

## 2024-06-11 RX ADMIN — DOCUSATE SODIUM 50 MG AND SENNOSIDES 8.6 MG 1 TABLET: 8.6; 5 TABLET, FILM COATED ORAL at 08:50

## 2024-06-11 RX ADMIN — Medication 2000 UNITS: at 06:13

## 2024-06-11 RX ADMIN — GABAPENTIN 100 MG: 100 CAPSULE ORAL at 08:51

## 2024-06-11 RX ADMIN — AMLODIPINE BESYLATE 10 MG: 10 TABLET ORAL at 08:51

## 2024-06-11 RX ADMIN — LISINOPRIL 20 MG: 20 TABLET ORAL at 08:51

## 2024-06-11 RX ADMIN — INSULIN LISPRO 6 UNITS: 100 INJECTION, SOLUTION INTRAVENOUS; SUBCUTANEOUS at 12:34

## 2024-06-11 RX ADMIN — ASPIRIN 81 MG: 81 TABLET, COATED ORAL at 08:51

## 2024-06-11 RX ADMIN — DOXYCYCLINE HYCLATE 100 MG: 100 CAPSULE ORAL at 08:50

## 2024-06-11 RX ADMIN — FOLIC ACID 1 MG: 1 TABLET ORAL at 08:51

## 2024-06-11 RX ADMIN — METOPROLOL TARTRATE 25 MG: 25 TABLET, FILM COATED ORAL at 08:51

## 2024-06-11 ASSESSMENT — ACTIVITIES OF DAILY LIVING (ADL)
HOME_MANAGEMENT_TIME_ENTRY: 10
ADLS_ADDRESSED: NO
ADL_ASSISTANCE: INDEPENDENT
BATHING_ASSISTANCE: MODERATE
ADL_ASSISTANCE: INDEPENDENT

## 2024-06-11 ASSESSMENT — COGNITIVE AND FUNCTIONAL STATUS - GENERAL
DRESSING REGULAR UPPER BODY CLOTHING: A LITTLE
MOVING TO AND FROM BED TO CHAIR: A LITTLE
HELP NEEDED FOR BATHING: A LITTLE
MOVING TO AND FROM BED TO CHAIR: A LITTLE
HELP NEEDED FOR BATHING: A LOT
STANDING UP FROM CHAIR USING ARMS: A LITTLE
DAILY ACTIVITIY SCORE: 17
DRESSING REGULAR LOWER BODY CLOTHING: A LITTLE
MOVING FROM LYING ON BACK TO SITTING ON SIDE OF FLAT BED WITH BEDRAILS: A LITTLE
TURNING FROM BACK TO SIDE WHILE IN FLAT BAD: A LITTLE
MOBILITY SCORE: 18
PERSONAL GROOMING: A LITTLE
DRESSING REGULAR UPPER BODY CLOTHING: A LOT
CLIMB 3 TO 5 STEPS WITH RAILING: A LOT
WALKING IN HOSPITAL ROOM: A LOT
DRESSING REGULAR LOWER BODY CLOTHING: A LOT
TOILETING: A LOT
TOILETING: A LOT
MOBILITY SCORE: 16
TURNING FROM BACK TO SIDE WHILE IN FLAT BAD: A LITTLE
MOVING FROM LYING ON BACK TO SITTING ON SIDE OF FLAT BED WITH BEDRAILS: A LITTLE
CLIMB 3 TO 5 STEPS WITH RAILING: A LITTLE
DAILY ACTIVITIY SCORE: 17
STANDING UP FROM CHAIR USING ARMS: A LITTLE
WALKING IN HOSPITAL ROOM: A LITTLE

## 2024-06-11 ASSESSMENT — PAIN SCALES - GENERAL
PAINLEVEL_OUTOF10: 3

## 2024-06-11 ASSESSMENT — PAIN SCALES - PAIN ASSESSMENT IN ADVANCED DEMENTIA (PAINAD): TOTALSCORE: MEDICATION (SEE MAR)

## 2024-06-11 ASSESSMENT — PAIN DESCRIPTION - LOCATION: LOCATION: SHOULDER

## 2024-06-11 ASSESSMENT — PAIN - FUNCTIONAL ASSESSMENT
PAIN_FUNCTIONAL_ASSESSMENT: 0-10

## 2024-06-11 ASSESSMENT — PAIN SCALES - WONG BAKER: WONGBAKER_NUMERICALRESPONSE: HURTS LITTLE MORE

## 2024-06-11 NOTE — PROGRESS NOTES
Physical Therapy    Physical Therapy Evaluation & Treatment    Patient Name: Bonnie Jules  MRN: [id number]  Today's Date: 6/11/2024   Time Calculation  Start Time: 1047  Stop Time: 1108  Time Calculation (min): 21 min    Assessment/Plan   PT Assessment  PT Assessment Results: Decreased strength, Decreased range of motion, Decreased endurance, Impaired balance, Decreased mobility, Decreased coordination, Impaired judgement, Decreased safety awareness, Impaired vision, Impaired hearing, Impaired sensation, Decreased skin integrity, Orthopedic restrictions  Rehab Prognosis: Good  Evaluation/Treatment Tolerance: Patient tolerated treatment well  Medical Staff Made Aware: Yes  Strengths: Premorbid level of function  Barriers to Participation: Comorbidities  End of Session Communication: Bedside nurse  Assessment Comment: pt is an 83 y.o. female admitted after a fall home that resulted in right rib fractures and R TKR wound dehiscence. pt received clearance from the orthopedic surgeon however pt is refusing to wear a surgical shoe on her right foot (heel wound). pt is demonstrating impaired judgement, B LE weakness, and decreased tolerance to activity. pt will benefit form continued skilled therapy services to improve functional performance and maximize safety prior to discharge home.  End of Session Patient Position: Up in chair, Alarm on (needs in reach)   IP OR SWING BED PT PLAN  Inpatient or Swing Bed: Inpatient  PT Plan  Treatment/Interventions: Bed mobility, Transfer training, Gait training, Balance training, Strengthening, Endurance training, Range of motion, Therapeutic exercise, Therapeutic activity  PT Plan: Skilled PT  PT Frequency: 4 times per week  PT Discharge Recommendations: Low intensity level of continued care  Equipment Recommended upon Discharge: Wheeled walker  PT Recommended Transfer Status: Contact guard, Assistive device (rolling walker)  PT - OK to Discharge: Yes    Subjective     General Visit  Information:  General  Reason for Referral: Impaired Mobility  Referred By: EMILIANA Barillas  Past Medical History Relevant to Rehab: Anxiety, Benign hypertension, Chronic ischemic colitis,sepsis and infectious gastroenteritis, Chronic kidney disease, chronic lymphocytic leukemia, Coronary artery disease  , carpal tunnel syndrome,  Diabetes mellitus, hypertension, GI bleed  Hyperlipidemia, Hypertension  Hypothyroidism,  NSTEMI, Osteoarthritis     Osteoporosis, Peripheral vascular disease, Seizure disorder  Missed Visit: No  Missed Visit Reason: Other (Comment)  Family/Caregiver Present: No  Co-Treatment: OT  Co-Treatment Reason: optimization of patient outcomes  Prior to Session Communication: Bedside nurse  Patient Position Received: Bed, 3 rail up, Alarm off, not on at start of session  Preferred Learning Style: verbal  General Comment: 83 y.o. female admitted after sustaining a fall with R rib fractures and R knee wound dehiscience.  Home Living:  Home Living  Type of Home: House  Lives With: Adult children (daughter)  Home Adaptive Equipment: Walker rolling or standard  Home Layout: Multi-level, Able to live on main level with bedroom/bathroom  Home Access: Ramped entrance  Bathroom Shower/Tub: Walk-in shower  Bathroom Equipment: Grab bars in shower, Shower chair with back  Home Living Comments: no stairs, can stay on main level  Prior Level of Function:  Prior Function Per Pt/Caregiver Report  Level of [place]: Independent with ADLs and functional transfers, Needs assistance with homemaking  Receives Help From: Family  ADL Assistance: Independent  Homemaking Assistance:  (share with daughter)  Ambulatory Assistance: Independent (with cane as needed)  Prior Function Comments: denies other falls, reports she tripped on her surgical shoe  Precautions:  Precautions  Hearing/Visual Limitations: Glasses  LE Weight Bearing Status: Weight Bearing as Tolerated (has a surgical shoe for R heel wound at  home)  Medical Precautions: Fall precautions  Post-Surgical Precautions: Right total knee precautions    Objective   Pain:  Pain Assessment  Pain Assessment: 0-10  Pain Score: 3  Pain Type: Acute pain  Pain Location: Chest  Pain Orientation: Right (at rib fracture site)  Pain Interventions: Repositioned  Response to Interventions: decreased pain, more comfortable sitting in chair  Cognition:  Cognition  Overall Cognitive Status: Within Functional Limits  Orientation Level: Oriented X4  Safety/Judgement:  (mild)  Insight: Mild  Impulsive: Mildly    General Assessments:  General Observation  General Observation: R knee wound approximated with stitches, right heel covered with bandage. Layton Catheter in place.    Activity Tolerance  Endurance: Tolerates 10 - 20 min exercise with multiple rests  Activity Tolerance Comments: fair+  Rate of Perceived Exertion (RPE): 2    Sensation  Light Touch: Partial deficits in the RLE, Partial deficits in the LLE  Sensation Comment: B LE neuropathy    Strength  Strength Comments: R LE >3/5, L LE >3+/5  Coordination  Coordination Comment: decreased rate and accuracy of movement    Postural Control  Posture Comment: rounded shoulders    Static Sitting Balance  Static Sitting-Balance Support: Feet unsupported, Bilateral upper extremity supported  Static Sitting-Level of Assistance: Close supervision  Static Sitting-Comment/Number of Minutes: good seated balance    Static Standing Balance  Static Standing-Balance Support: Bilateral upper extremity supported (rolling walker)  Static Standing-Level of Assistance: Close supervision  Static Standing-Comment/Number of Minutes: good static standing balance at the walker  Functional Assessments:  ADL  ADL's Addressed: No    Bed Mobility  Bed Mobility:  (supine to sit with close supervision, HOB slightly elevated.)    Transfers  Transfer:  (close supervision for sit<->stand transfers, pt verbalizing safe sequencing and hand placement prior to  performance of each transfer.)    Ambulation/Gait Training  Ambulation/Gait Training Performed:  (18' with rolling walker and CGA, slow genny, began with a step-to gait pattern, improved to reciprocal stepping. ambulates with decreased step length and decreased heel strike/toe off R>L. pt refused surgical shoe.)    Stairs  Stairs: No  Extremity/Trunk Assessments:  RLE   RLE :  (limited knee flexion, stitches in place)  LLE   LLE : Within Functional Limits  Treatments:  Balance/Neuromuscular Re-Education  Balance/Neuromuscular Re-Education Activity Performed:  (standing in place at the walker x 4 minutes with close superivison for balance.)    Other Activity  Other Activity Performed:  (pt educated on safety at home, continuously using the rolling walker, and performing ther ex.)  Outcome Measures:  [facility] Basic Mobility  Turning from your back to your side while in a flat bed without using bedrails: A little  Moving from lying on your back to sitting on the side of a flat bed without using bedrails: A little  Moving to and from bed to chair (including a wheelchair): A little  Standing up from a chair using your arms (e.g. wheelchair or bedside chair): A little  To walk in hospital room: A little  Climbing 3-5 steps with railing: A little  Basic Mobility - Total Score: 18    Encounter Problems       Encounter Problems (Active)       Mobility       STG - Patient will ambulate 100' with rolling walker MOD I (Progressing)       Start:  06/11/24    Expected End:  07/07/24               PT Transfers       STG - Transfer from bed to chair IND (Progressing)       Start:  06/11/24    Expected End:  07/07/24            STG - Patient to transfer to and from sit to supine IND (Progressing)       Start:  06/11/24    Expected End:  07/07/24            STG - Patient will transfer sit to and from stand MOD I (Progressing)       Start:  06/11/24    Expected End:  07/07/24               Pain - Adult          Safety       LTG -  Patient will demonstrate safety requirements appropriate to situation/environment (Progressing)       Start:  06/11/24    Expected End:  07/07/24                   Education Documentation  Precautions, taught by Cha Lockhart, PT at 6/11/2024 12:59 PM.  Learner: Patient  Readiness: Acceptance  Method: Explanation  Response: Verbalizes Understanding    Mobility Training, taught by Cha Lockahrt, PT at 6/11/2024 12:59 PM.  Learner: Patient  Readiness: Acceptance  Method: Explanation  Response: Verbalizes Understanding    Education Comments  No comments found.

## 2024-06-11 NOTE — CONSULTS
Reason For Consult  Fall, RTK    History Of Present Illness  Bonnie Jules is a 83 y.o. female presenting after a fall onto her right knee on 6/9/2024.  She is status post right total knee replacement done by Dr. Liriano on 4/17/2024.  She was participating with outpatient physical therapy and resuming her normal activities.  She was in her usual state of health until she suffered a fall on 6/9/2024.  She came to the emergency room with a right knee laceration.  Sutures were placed along the midline incision of the right knee.  She was admitted for further evaluation and treatment after CT Chest showed a right rib fracture.  She is evaluated today and states that she is feeling better. She denies chest pain and shortness of breath.      Past Medical History  She has a past medical history of Anxiety, Benign hypertension, Chronic ischemic colitis (Multi), Chronic kidney disease, stage III (moderate) (Multi), CLL (chronic lymphocytic leukemia) (Multi), Coronary artery disease, CTS (carpal tunnel syndrome), Diabetes mellitus (Multi), Essential (primary) hypertension, GI bleed, Hyperlipidemia, unspecified, Hypertension, Hypothyroidism, Hypothyroidism, unspecified type, Mixed hyperlipidemia, NSTEMI (non-ST elevated myocardial infarction) (Multi), Osteoarthritis, Osteoporosis, Peripheral vascular disease (CMS-[facility]), Right knee pain, Seizure disorder (Multi), and Type 1 diabetes mellitus with hyperglycemia (Multi).    Surgical History  She has a past surgical history that includes Carpal tunnel release (Bilateral); Total hip arthroplasty (Left, 2012); Coronary artery bypass graft (2005); Cholecystectomy; Cataract extraction w/ intraocular lens  implant, bilateral; Hysterectomy; Colonoscopy; Other surgical history; Other surgical history (Right, 01/2016); and Knee surgery (Right, 04/17/2024).     Social History  She reports that she has never smoked. She has been exposed to tobacco smoke. She has never used smokeless  tobacco. She reports that she does not currently use alcohol. She reports that she does not currently use drugs.    Family History  Family History   Problem Relation Name Age of Onset    Diabetes Mother      Diabetes Daughter      Other (RA) Daughter          Allergies  Amoxicillin, Amoxicillin-pot clavulanate, Ciprofloxacin, and Levofloxacin    Review of Systems  CARDIOLOGY:           Negative for chest pain, shortness of breath.          RESPIRATORY:           Negative for chest pain, shortness of breath.          MUSCULOSKELETAL:           See HPI for details.          NEUROLOGY:           Negative for tingling, numbness, weakness     Physical Exam  GENERAL:          General Appearance:  This is a pleasant patient with appropriate affect, in no acute distress.   DERMATOLOGY:          Skin: See Musculoskeletal below. Otherwise skin at the neck, upper and lower back, and trunk is intact. There is no evidence of skin rash, skin breakdown or ulceration, or atrophic skin change.   EXTREMITIES:          Vascular:  Right, left hands and feet are warm with good color and pulses. Right and left calf and thigh are nontender and nonswollen.   NEUROLOGICAL:          Orientation:  Patient is alert and oriented to person, place, time and situation. Right and left upper and lower extremity motor and sensory examinations are intact.      MUSCULOSKELETAL: Neck: Nontender. No pain with range of motion. Right knee: There is a clean dry laceration at the right knee along the midline of the incision.  It is clean and dry with no evidence of drainage.  Patient is able to extend the right knee versus gravity.  She is able to complete gentle passive range of motion in flexion extension while in bed.  Nontender in the right calf.  Neurovascular is intact.  She has a dressing on her right heel wound.     Last Recorded Vitals  Blood pressure 148/66, pulse 58, temperature 36.7 °C (98.1 °F), temperature source Oral, resp. rate 17, height  1.524 m (5'), weight 58.1 kg (128 lb), SpO2 90%.    Relevant Results  XR chest 2 views    Result Date: 6/10/2024  Interpreted By:  Shraddha Coleman, STUDY: XR CHEST 2 VIEWS 6/10/2024 9:09 am   INDICATION: Fall with rib fractures   COMPARISON: 04/22/2024   ACCESSION NUMBER(S): [id number]   ORDERING CLINICIAN: THEODORE NAGY   TECHNIQUE: PA and lateral views of the chest were acquired.   FINDINGS: The cardiac size is within normal limits with postoperative change from median sternotomy and coronary revascularization procedure.   The acute anterior right rib fractures seen on CT study done yesterday are difficult to visualize on chest x-ray. There is an old healed fracture of the anterior left 2nd rib with old healed fractures of the posterior right 6th, 7th, and 8th ribs noted.   No pneumothorax is present. There is some subsegmental atelectasis at the left lung base with mild right basilar discoid atelectasis seen as well.   There are degenerative changes of the thoracic spine. There is osteoarthritis of the shoulders bilaterally. Gallbladder is surgically absent.       The known acute anterior right rib fractures are difficult to visualize on chest x-ray.   No pneumothorax or hemothorax.   Right basilar discoid atelectasis with subsegmental atelectasis retrocardiac left lower lobe.   Status post CABG and cholecystectomy.   Old healed bilateral rib fractures are seen.   Signed by: Shraddha Coleman 6/10/2024 9:24 AM Dictation workstation:   [id number]    XR knee right 4+ views    Result Date: 6/9/2024  Interpreted By:  Maricarmen Blunt, STUDY: XR KNEE RIGHT 4+ VIEWS;  6/9/2024 11:05 am   INDICATION: Signs/Symptoms:fall.   COMPARISON: 06/07/2024   ACCESSION NUMBER(S): [id number]   ORDERING CLINICIAN: KHOA ATKINS   FINDINGS: Four views of the right knee obtained.   Arthroplasty components in grossly stable anatomic alignment. No definite acute fracture or abnormal lucency around the hardware. Stable smooth subcentimeter density  adjacent to the fibular head. No significant joint effusion. No focal soft tissue swelling is apparent. Prominent presumed vascular calcifications in the posterior soft tissues.       No evidence of acute fracture. Status post TKA with intact hardware.   MACRO: None.   Signed by: Maricarmen Blunt 6/9/2024 11:33 AM Dictation workstation:   [id number]    XR pelvis 1-2 views    Result Date: 6/9/2024  Interpreted By:  Maricarmen Blunt, STUDY: XR PELVIS 1-2 VIEWS;  6/9/2024 11:05 am   INDICATION: Signs/Symptoms:fall.   COMPARISON: 07/14/2023   ACCESSION NUMBER(S): [id number]   ORDERING CLINICIAN: KHOA ATKINS   FINDINGS: A single frontal view of the pelvis obtained.   Osteopenia, overlying bowel content and soft tissue folds limit evaluation. No definite focal disruption of the pelvic osseous contours to suggest acute displaced fracture. Left hip arthroplasty in satisfactory alignment. Degenerative changes of the right hip and visualized lower lumbar spine. Multifocal soft tissue presumed vascular calcifications.       No evidence of acute displaced osseous pelvic fracture.   MACRO: None.   Signed by: Maricarmen Blunt 6/9/2024 11:32 AM Dictation workstation:   [id number]    CT chest wo IV contrast    Addendum Date: 6/9/2024    Interpreted By:  Maricarmen Blunt, ADDENDUM: Subtle smooth expansile likely remote fracture deformity of several right lateral ribs noted as well.   Signed by: Maricarmen Blunt 6/9/2024 11:31 AM   -------- ORIGINAL REPORT -------- Dictation workstation:   [id number]    Result Date: 6/9/2024  Interpreted By:  Maricarmen Blunt, STUDY: CT CHEST WO IV CONTRAST;  6/9/2024 10:48 am   INDICATION: Signs/Symptoms:R rib pain. Status post fall   COMPARISON: 10/08/2022   ACCESSION NUMBER(S): [id number]   ORDERING CLINICIAN: KHOA ATKINS   TECHNIQUE: CT of the chest was performed. Sagittal and coronal reconstructions were generated. No intravenous contrast given for the examination.   FINDINGS: Hilar, vessel, and solid organ  evaluation limited without IV contrast.   CHEST WALL AND LOWER NECK: Stable short linear hyperdense probable surgical material in the anterior left neck base. Portions of the chest wall excluded from field of imaging. No significant axillary lymphadenopathy as visualized. Status post sternotomy.   MEDIASTINUM AND HERMELINDO:  Limited hilar assessment on unenhanced images. No gross mediastinal or hilar adenopathy within limits of unenhanced exam. Moderate-sized hiatal hernia. Dense surgical material in the anterior mediastinum.   HEART AND VESSELS:  Lack of IV contrast precludes vascular luminal assessment. The heart is normal in size. No significant pericardial effusion. Multifocal atherosclerotic calcifications including the coronary arteries and mitral valve region.   LUNGS, PLEURA, LARGE AIRWAYS:  Subpleural reticular and coarse linear densities scattered in both lung fields. No sizable airspace consolidation, pleural effusion or pneumothorax identified. The central airways are patent.   UPPER ABDOMEN:  Cholecystectomy clips at the liver hilum. Fullness of the visualized central biliary tree. Moderate fecal residue in the visualized colon.   BONES:  Multiple median sternotomy wires. Smooth irregularity of the sternum with probable ununited sternotomy defect and adjacent ossifications similar to the previous exam. Nondisplaced fracture of the anterior right 2nd rib. Mildly displaced fractures of the anterior right 3rd, 4th and 5th ribs. Possible nondisplaced fracture of the anterior right 6th rib. Smooth probable remote fracture deformity of the posterior right 6th and 7th ribs. Kyphosis and advanced degenerative changes of the thoracic spine with mild multilevel thoracic vertebral compression deformities without obvious interval change.       Multiple acute nondisplaced or minimally displaced right anterior rib fractures. No pleural effusion or pneumothorax.   Postsurgical changes in the anterior chest wall.    Scattered areas of probable scarring or atelectasis in both lung fields.   Moderate-sized hiatal hernia.   Fullness of the central biliary tree status post cholecystectomy. Correlation with LFTs may be helpful.   Additional findings as described above.   MACRO: None.   Signed by: Maricarmen Blunt 6/9/2024 11:28 AM Dictation workstation:   [id number]    XR shoulder right 2+ views    Result Date: 6/9/2024  Interpreted By:  Maricarmen Blunt, STUDY: XR SHOULDER RIGHT 2+ VIEWS;  6/9/2024 11:03 am   INDICATION: Signs/Symptoms:fall.   COMPARISON: None.   ACCESSION NUMBER(S): [id number]   ORDERING CLINICIAN: KHOA ATKINS   FINDINGS: Three views of the right shoulder obtained.   No acute fracture or dislocation. Glenohumeral joint space narrowing and spurring. Acromiohumeral space is preserved. Small subacromial spur. AC joint narrowing and spurring.   Smooth probable remote fracture deformity of posterolateral aspect of several contiguous right mid ribs. Sternotomy wires, least 2 of which are discontinuous, partially included over the central chest.       No evidence of acute fracture or dislocation. Degenerative changes of the right shoulder.   MACRO: None.   Signed by: Maricarmen Blunt 6/9/2024 11:30 AM Dictation workstation:   [id number]    CT cervical spine wo IV contrast    Result Date: 6/9/2024  Interpreted By:  Maricarmen Blunt, STUDY: CT CERVICAL SPINE WO IV CONTRAST;  6/9/2024 10:48 am   INDICATION: Signs/Symptoms:fall.   COMPARISON: 03/28/2022   ACCESSION NUMBER(S): [id number]   ORDERING CLINICIAN: KHOA ATKINS   TECHNIQUE: CT images were obtained through the cervical spine. Sagittal and coronal reconstructions were generated.   FINDINGS:     ALIGNMENT: Mild rightward tilt in the upper cervical spine. Mild anterolisthesis at C6-7 similar to the prior exam.   VERTEBRAE/DISC SPACES: No compression deformity or acute displaced fracture.  Multilevel degenerative changes including atlantoaxial joint space narrowing with  surrounding calcifications, spurring and cystic changes in the dens, multilevel intervertebral disc space narrowing with endplate sclerosis and smooth endplate irregularities, probable partial fusion across the C3-4 vertebra, and multilevel bilateral facet joint narrowing and spurring, similar to the previous exam.   ADDITIONAL FINDINGS: No abnormal thickening of the prevertebral soft tissues.  Linear presumed scarring in the visualized bilateral lung apices. Prominent carotid bulb atherosclerotic calcifications. Linear dense possible surgical device in the anterior left neck base.       No acute cervical vertebral displacement or acute displaced fracture. Multilevel degenerative changes with mild spondylolisthesis similar to 03/20/2022.   Paraspinal/soft tissue findings as above.   MACRO: None.   Signed by: Maricarmen Blunt 6/9/2024 11:19 AM Dictation workstation:   [id number]    CT head wo IV contrast    Result Date: 6/9/2024  Interpreted By:  Maricarmen Blunt, STUDY: CT HEAD WO IV CONTRAST;  6/9/2024 10:48 am   INDICATION: Signs/Symptoms:fall.   COMPARISON: 03/28/2022   ACCESSION NUMBER(S): [id number]   ORDERING CLINICIAN: KHOA ATKINS   TECHNIQUE: Unenhanced CT images of the head were obtained.   FINDINGS: The ventricles, cisterns and sulci are enlarged, consistent with diffuse volume loss. There are areas of nonspecific white matter hypodensity, which are probably age related or microvascular in nature. These findings are similar to the previous exam. There is no acute intracranial hemorrhage, mass effect or midline shift. No extraaxial fluid collection.   No acute displaced calvarial fracture.   Visualized paranasal sinuses are clear. Punctate dense probable surgical material along the anterior margin of each globe.       No acute intracranial hemorrhage or mass-effect.   MACRO: None.   Signed by: Maricarmen Blunt 6/9/2024 11:16 AM Dictation workstation:   [id number]    XR knee right 1-2 views    Result Date:  6/7/2024  Interpreted By:  Rudolph Peñaloza, STUDY: XR KNEE RIGHT 1-2 VIEWS; ;  6/7/2024 11:06 am   INDICATION: Signs/Symptoms:PAIN.   COMPARISON: None.   ACCESSION NUMBER(S): [id number]   ORDERING CLINICIAN: RUDOLPH PEÑALOZA   FINDINGS: X-rays of the right knee done and read in the office today show that there is a right total knee replacement in overall satisfactory position and alignment.       See findings above.     MACRO: None   Signed by: Rudolph Peñaloza 6/7/2024 1:22 PM Dictation workstation:   [id number]      Scheduled medications  acetaminophen, 650 mg, oral, q6h  acetaminophen, 975 mg, oral, Once  amLODIPine, 10 mg, oral, Daily  aspirin, 81 mg, oral, Daily  cholecalciferol, 2,000 Units, oral, Daily  cyanocobalamin, 1,000 mcg, oral, Daily  doxycylcine, 100 mg, oral, q12h MARI  enoxaparin, 40 mg, subcutaneous, q24h  folic acid, 1 mg, oral, Daily  gabapentin, 100 mg, oral, BID  hydrALAZINE, 50 mg, oral, BID  insulin glargine, 10 Units, subcutaneous, Daily  insulin lispro, 0-10 Units, subcutaneous, TID  levothyroxine, 100 mcg, oral, Daily  lidocaine, 5 mL, subcutaneous, Once  lisinopril, 20 mg, oral, Daily  metoprolol tartrate, 25 mg, oral, BID  polyethylene glycol, 17 g, oral, Daily  pravastatin, 40 mg, oral, Nightly  sennosides-docusate sodium, 1 tablet, oral, Daily  sulfaSALAzine, 1,000 mg, oral, BID  tamsulosin, 0.4 mg, oral, Daily      Continuous medications     PRN medications  PRN medications: acetaminophen **OR** acetaminophen **OR** acetaminophen, acetaminophen **OR** acetaminophen **OR** acetaminophen, bisacodyl, dextrose, dextrose, glucagon, glucagon, hydrocortisone, morphine, morphine, ondansetron **OR** ondansetron, oxyCODONE, oxyCODONE  Results for orders placed or performed during the hospital encounter of 06/09/24 (from the past 24 hour(s))   POCT GLUCOSE   Result Value Ref Range    POCT Glucose 170 (H) 74 - 99 mg/dL   POCT GLUCOSE   Result Value Ref Range    POCT Glucose 147 (H) 74 - 99  mg/dL   POCT GLUCOSE   Result Value Ref Range    POCT Glucose 173 (H) 74 - 99 mg/dL   Basic Metabolic Panel   Result Value Ref Range    Glucose 132 (H) 65 - 99 mg/dL    Sodium 137 133 - 145 mmol/L    Potassium 4.4 3.4 - 5.1 mmol/L    Chloride 103 97 - 107 mmol/L    Bicarbonate 25 24 - 31 mmol/L    Urea Nitrogen 21 8 - 25 mg/dL    Creatinine 1.10 0.40 - 1.60 mg/dL    eGFR 51 (L) >60 mL/min/1.73m*2    Calcium 8.5 8.5 - 10.4 mg/dL    Anion Gap 9 <=19 mmol/L   Lavender Top   Result Value Ref Range    Extra Tube Hold for add-ons.    POCT GLUCOSE   Result Value Ref Range    POCT Glucose 137 (H) 74 - 99 mg/dL        Assessment/Plan     X-rays of the right knee reviewed and show no evidence of acute fracture.  The hardware is intact.  The patient is doing well.  She may resume physical therapy and Occupational Therapy as her pain allows weightbearing as tolerated with the use of a walker and fall precautions.  The patient is educated about the importance of using a cane or walker to prevent further falls and injury.  She is stable for discharge from orthopedic standpoint.  We will follow-up with her in 12 days in our outpatient office for suture removal.  The patient is instructed to keep her laceration clean dry and covered with a Band-Aid.  Thank you for this consultation please reconsult us as needed.    Mayela Pickard PA-C

## 2024-06-11 NOTE — CARE PLAN
The patient's goals for the shift include      The clinical goals for the shift include pain management      Problem: Diabetes  Goal: Achieve decreasing blood glucose levels by end of shift  Outcome: Progressing  Goal: Increase stability of blood glucose readings by end of shift  Outcome: Progressing  Goal: Decrease in ketones present in urine by end of shift  Outcome: Progressing  Goal: Maintain electrolyte levels within acceptable range throughout shift  Outcome: Progressing  Goal: Maintain glucose levels >70mg/dl to <250mg/dl throughout shift  Outcome: Progressing  Goal: No changes in neurological exam by end of shift  Outcome: Progressing  Goal: Learn about and adhere to nutrition recommendations by end of shift  Outcome: Progressing  Goal: Vital signs within normal range for age by end of shift  Outcome: Progressing  Goal: Increase self care and/or family involovement by end of shift  Outcome: Progressing  Goal: Receive DSME education by end of shift  Outcome: Progressing     Problem: Chronic Conditions and Co-morbidities  Goal: Patient's chronic conditions and co-morbidity symptoms are monitored and maintained or improved  Outcome: Progressing     Problem: Discharge Planning  Goal: Discharge to home or other facility with appropriate resources  Outcome: Progressing     Problem: Safety - Adult  Goal: Free from fall injury  Outcome: Progressing     Problem: Pain - Adult  Goal: Verbalizes/displays adequate comfort level or baseline comfort level  Outcome: Progressing

## 2024-06-11 NOTE — PROGRESS NOTES
Occupational Therapy    Evaluation/Treatment    Patient Name: Bonnie Jules  MRN: [id number]  : 1940  Today's Date: 24  Time Calculation  Start Time: 1045  Stop Time: 1110  Time Calculation (min): 25 min       Assessment:  OT Assessment: OT order received, chart reviewed, evaluation completed. Pt demonstrated impaired ADLs and functional mobility and would benefit from acute OT services.  Prognosis: Good  Barriers to Discharge: None  Evaluation/Treatment Tolerance: Patient tolerated treatment well  Medical Staff Made Aware: Yes  End of Session Communication: Bedside nurse  End of Session Patient Position: Up in chair, Alarm on  OT Assessment Results: Decreased ADL status, Decreased upper extremity strength, Decreased endurance, Decreased safe judgment during ADL, Decreased functional mobility, Decreased IADLs  Prognosis: Good  Barriers to Discharge: None  Evaluation/Treatment Tolerance: Patient tolerated treatment well  Medical Staff Made Aware: Yes  Strengths: Premorbid level of function  Barriers to Participation: Comorbidities  Plan:  Treatment Interventions: ADL retraining, Functional transfer training, Endurance training, Patient/family training, Equipment evaluation/education, Compensatory technique education  OT Frequency: 3 times per week  OT Discharge Recommendations: Low intensity level of continued care  Equipment Recommended upon Discharge: Wheeled walker  OT Recommended Transfer Status: Minimal assist  OT - OK to Discharge: Yes  Treatment Interventions: ADL retraining, Functional transfer training, Endurance training, Patient/family training, Equipment evaluation/education, Compensatory technique education    Subjective     General:   OT Received On: 24  General  Reason for Referral: activities of daily living, fall with rib fractures and R knee wound dehiscience  Referred By: Luna Smith, CARMEN-CNP  Past Medical History Relevant to Rehab: Anxiety, Benign hypertension, Chronic  ischemic colitis,sepsis and infectious gastroenteritis, Chronic kidney disease, chronic lymphocytic leukemia, Coronary artery disease  , carpal tunnel syndrome,  Diabetes mellitus, hypertension, GI bleed  Hyperlipidemia, Hypertension  Hypothyroidism,  NSTEMI, Osteoarthritis     Osteoporosis, Peripheral vascular disease, Seizure disorder  Missed Visit: No  Missed Visit Reason: Other (Comment)  Co-Treatment: PT  Co-Treatment Reason: Maximization of pt outcomes  Prior to Session Communication: Bedside nurse  Patient Position Received: Bed, 3 rail up, Alarm off, not on at start of session  Preferred Learning Style: verbal  General Comment: Cleared by nursing, pt agreeable to OT evaluation.  Precautions:  Hearing/Visual Limitations: Glasses  LE Weight Bearing Status: Weight Bearing as Tolerated (has a surgical shoe for R heel wound at home)  Medical Precautions: Fall precautions  Post-Surgical Precautions: Right total knee precautions  Vital Signs:     Pain:  Pain Assessment  Pain Assessment: 0-10  Pain Score: 3  Pain Type: Acute pain  Pain Location: Chest  Pain Orientation: Right (at rib fracture site)  Pain Interventions: Repositioned  Response to Interventions: improved    Objective   Cognition:  Overall Cognitive Status: Within Functional Limits  Orientation Level: Oriented X4  Safety/Judgement:  (mild)  Insight: Mild  Impulsive: Mildly           Home Living:  Type of Home: House  Lives With: Adult children (Dtr)  Home Adaptive Equipment: Walker rolling or standard  Home Layout: Multi-level, Able to live on main level with bedroom/bathroom  Home Access: Ramped entrance  Bathroom Shower/Tub: Walk-in shower  Bathroom Equipment: Grab bars in shower, Shower chair with back  Home Living Comments: no stairs, can stay on main level  Prior Function:  Level of [place]: Independent with ADLs and functional transfers, Needs assistance with homemaking  Receives Help From: Family  ADL Assistance: Independent  Homemaking  Assistance:  (shared with Dtr)  Ambulatory Assistance: Independent (with cane as needed)  Prior Function Comments: denies other falls, reports she tripped on her surgical shoe  IADL History:     ADL:  Eating Assistance: Independent  Grooming Assistance: Stand by  Grooming Deficit: Supervision/safety  Bathing Assistance: Moderate  Bathing Deficit:  (due to knee precautions)  UE Dressing Assistance: Minimal  UE Dressing Deficit: Thread RUE, Thread LUE, Pull around back (for gown)  LE Dressing Assistance: Maximal  LE Dressing Deficit: Don/doff R sock, Don/doff L sock  Toileting Assistance with Device: Maximal  Toileting Deficit: Incontinent, Clothing management down, Clothing management up, Perineal hygiene (Pt incontinent of BM, dep for hygiene and clothing maangement, pt unaware of incontinence)    Activity Tolerance:  Endurance: Tolerates 10 - 20 min exercise with multiple rests  Rate of Perceived Exertion (RPE): 2/10  Functional Standing Tolerance:     Bed Mobility/Transfers: Bed Mobility  Bed Mobility: Yes  Bed Mobility 1  Bed Mobility 1: Supine to sitting  Level of Assistance 1: Close supervision  Bed Mobility Comments 1: cues for safety    Transfers  Transfer: Yes  Transfer 1  Transfer From 1: Bed to  Transfer to 1: Stand  Technique 1: Sit to stand  Transfer Device 1: Walker  Transfer Level of Assistance 1: Minimum assistance  Trials/Comments 1: cues for safe hand placement, min A for balance  Transfers 2  Transfer From 2: Stand to  Transfer to 2: Chair with arms  Technique 2: Stand to sit  Transfer Level of Assistance 2: Contact guard  Trials/Comments 2: cues for safe hand placement and transfer technique up with needs in reach.      Functional Mobility:  Functional Mobility  Functional Mobility Performed: Yes  Functional Mobility 1  Surface 1: Level tile  Device 1: Rolling walker  Assistance 1: Contact guard  Comments 1: Pt performed functional mobility household distance from bed towards bathroom and then to  bedside chair. Pt required cues for RW management.    Sensation:  Light Touch: Partial deficits in the RLE, Partial deficits in the LLE (reports neuropathy)  Strength:  Strength Comments: R Ue limited due to rib fractures to 90 degrees, L UE WFL    Coordination:  Movements are Fluid and Coordinated: Yes (for upper body)   Hand Function:  Hand Function  Gross Grasp: Functional  Coordination: Functional  Extremities: RUE   RUE :  (impaired due to rib fractures) and LUE   LUE: Within Functional Limits    Outcome Measures: [facility] Daily Activity  Putting on and taking off regular lower body clothing: A lot  Bathing (including washing, rinsing, drying): A lot  Putting on and taking off regular upper body clothing: A little  Toileting, which includes using toilet, bedpan or urinal: A lot  Taking care of personal grooming such as brushing teeth: None  Eating Meals: None  Daily Activity - Total Score: 17        Education Documentation  Handouts, taught by Linda Donato OT at 6/11/2024 11:41 AM.  Learner: Patient  Readiness: Acceptance  Method: Explanation  Response: Verbalizes Understanding  Comment: Pt edu on OT POC    Body Mechanics, taught by Linda Donato OT at 6/11/2024 11:41 AM.  Learner: Patient  Readiness: Acceptance  Method: Explanation  Response: Verbalizes Understanding  Comment: Pt edu on OT POC    Precautions, taught by Linda Donato OT at 6/11/2024 11:41 AM.  Learner: Patient  Readiness: Acceptance  Method: Explanation  Response: Verbalizes Understanding  Comment: Pt edu on OT POC    ADL Training, taught by Linda Donato OT at 6/11/2024 11:41 AM.  Learner: Patient  Readiness: Acceptance  Method: Explanation  Response: Verbalizes Understanding  Comment: Pt edu on OT POC    Goals:  Encounter Problems       Encounter Problems (Active)       OT Goals       ADLs       Start:  06/11/24    Expected End:  06/28/24       Pt will complete ADL tasks with Mod I, using AE as needed, in order to complete self-care  tasks.           Functional mobility       Start:  06/11/24    Expected End:  06/28/24       Pt will perform functional transfers at mod ind level with RW           Functional transfers       Start:  06/11/24    Expected End:  06/28/24       Pt will perform functional transfers at mod ind level with RW

## 2024-06-11 NOTE — PROGRESS NOTES
[facility] Department of Urogynecology   Jo-Ann Gonzales MD, MPH   [phone]    ASSESSMENT AND PLAN:   83 y.o. 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 Comorbidities include: HTN, PVD, hypothyroidism, T1 & T2 diabetes, hx of B-cell lymphocytic leukemia, stage 3 CKD h/o renal failure, hypoxia, NSTEMI, hx of seizures.            Diagnoses:   #1 Urinary retention         Plan:   1. Urinary retention, hx of stretch injury to bladder and kidney failure    - admitted on 6/9/24 after a fall. She has a Layton in place now. The Layton is going to be removed at discharge.  We reviewed the fall could have caused pain/muscle tension making it harder to empty the bladder and also narcotics can cause retention as well.   - PVR diary prior to her fall --> 150-200ml each time she cath'd  - continue to ISC BID at home. If she has 2 PVRs in a row that are 150 or less, she can stop using the s/c at home.   - If patient feels she is incompletely emptying, she can always ISC when needed.   - Advised her to s/c before bedtime to empty the bladder since she is having nocturia.   - She can wash and re use her catheters. Otherwise, we can call them into a medical supply store or she can stop by [facility].      Follow-up in 2 weeks with Dr. Gonzales.     Phone call visit today: The patient's identity was confirmed and that she is located in [place]. Dr. Gonzales identified herself and the patient consented to a telehealth visit today. Phone call time: 6 minutes     Scribe Attestation:   Alvina VALDEZ am scribing for virtually, and in the presence of Jo-Ann Gonzales MD MPH on 6/11/24 at 8:28 PM.     Problem List Items Addressed This Visit       Urinary retention - Primary      I spent a total of 15 minutes in face to face and non face to face time.      Jo-Ann Gonzales MD, MPH, FACOG     Established    HISTORY OF PRESENT ILLNESS:      Bonnie Jules is a 83 y.o. female who presents for follow up on retention.     Record Review:   - Went to the ED on 6/9/24. Presented after a fall onto her R knee. Ortho saw her on 6/11/24. S/p right total knee replacement done by Dr. Liriano on 4/17/2024. She came to ED with right knee laceration. Sutures were placed along the midline incision of the right knee. She was admitted after CT Chest showed a right rib fracture. X-rays of the right knee show no evidence of acute fracture. The hardware is intact. Ortho reported she was able to resume PT and OT. Use cane or walker to prevent falls. Stable for discharge. Urology saw her on 6/10/24 - she was in urinary retention and had a Latyon catheter. Urology planned to leave the Layton catheter in until discharge and then she can resume ISC.    - On 5/23/24, Nadia Champagne RN taught pt how to s/c and told her to keep a diary.   - 5/21/24 note: Urinary retention, hx of stretch injury to bladder - planned to learn how to ISC and encouraged her to continue following up with nephrology Dr. Maximus Rolle as scheduled to follow renal failure.      Urinary Symptoms:   - Currently has a Layton in place that will be removed prior to discharge.   - PVR diary --> 8 am PVR = 200 ml and then at 7:45 pm PVR = 200 ml                        7:45 am PVR = 150 ml and then at 7:45 pm PVR = 150 ml  - Sometimes she won't void for most of the morning and then has nocturia at night. She has nocturia even if she voids in the middle of the day.        Past Medical History:     Past Medical History:   Diagnosis Date    Anxiety     Benign hypertension     Chronic ischemic colitis (Multi)     With history of sepsis and infectious gastroenteritis 10/7/2022-10/11/2022    Chronic kidney disease, stage III (moderate) (Multi)     CLL (chronic lymphocytic leukemia) (Multi)     Coronary artery disease     CTS (carpal tunnel syndrome)     Diabetes mellitus (Multi)     Essential (primary) hypertension      GI bleed     Hyperlipidemia, unspecified     Hypertension     Hypothyroidism     Hypothyroidism, unspecified type     Mixed hyperlipidemia     NSTEMI (non-ST elevated myocardial infarction) (Multi)     Osteoarthritis     Osteoporosis     Peripheral vascular disease (CMS-HCC)     Right knee pain     Seizure disorder (Multi)     Type 1 diabetes mellitus with hyperglycemia (Multi)           Past Surgical History:     Past Surgical History:   Procedure Laterality Date    CARPAL TUNNEL RELEASE Bilateral     CATARACT EXTRACTION W/ INTRAOCULAR LENS  IMPLANT, BILATERAL      Left 8/10/2017, right 8/31/2017    CHOLECYSTECTOMY      COLONOSCOPY      CORONARY ARTERY BYPASS GRAFT  2005    X 3    HYSTERECTOMY      KNEE SURGERY Right 04/17/2024    total    OTHER SURGICAL HISTORY      Bilateral heel spurs    OTHER SURGICAL HISTORY Right 01/2016    Femoral endarterectomy    TOTAL HIP ARTHROPLASTY Left 2012         Medications:     Prior to Admission medications    Medication Sig Start Date End Date Taking? Authorizing Provider   acetaminophen (Tylenol) 325 mg tablet Take 2 tablets (650 mg) by mouth every 4 hours if needed for fever (temp greater than 38.0 C) (greater than or equal to 38 C). 6/11/24   CARMEN Barillas-CNP   amLODIPine (Norvasc) 10 mg tablet Take 1 tablet (10 mg) by mouth once daily. 10/18/23 10/17/24  Noah Araujo MD   aspirin 81 mg EC tablet Take 1 tablet (81 mg) by mouth once daily.    Historical Provider, MD   cholecalciferol (Vitamin D-3) 50 mcg (2,000 unit) capsule Take 1 capsule (50 mcg) by mouth early in the morning..    Historical Provider, MD   cyanocobalamin (Vitamin B-12) 1,000 mcg tablet Take 1 tablet (1,000 mcg) by mouth once daily.    Historical Provider, MD   doxycycline (Vibramycin) 100 mg capsule Take 1 capsule (100 mg) by mouth every 12 hours for 7 days. Take with at least 8 ounces (large glass) of water, do not lie down for 30 minutes after 6/11/24 6/18/24  CARMEN Barillas-CNP    folic acid (Folvite) 1 mg tablet Take 1 tablet (1 mg) by mouth once daily.    Historical Provider, MD   gabapentin (Neurontin) 100 mg capsule Take 1 capsule (100 mg) by mouth 2 times a day. In the morning and before bedtime 8/7/23   Historical Provider, MD   hydrALAZINE (Apresoline) 50 mg tablet Take 1 tablet (50 mg) by mouth 2 times a day. 3/7/24 3/7/25  Noah Araujo MD   insulin degludec (Tresiba FlexTouch U-100) 100 unit/mL (3 mL) injection Inject 12 Units under the skin once daily in the morning. As directed 1 box    Historical Provider, MD   insulin lispro (HumaLOG  KwikPen U-100) 100 unit/mL injection Inject 1 Units under the skin 3 times daily (morning, midday, late afternoon). ON A SLIDING SCALE    Historical Provider, MD   levothyroxine (Synthroid, Levoxyl) 100 mcg tablet Take 1 tablet (100 mcg) by mouth once daily.    Historical Provider, MD   lisinopril 20 mg tablet Take 1 tablet (20 mg) by mouth once daily. 6/12/24   EMILIANA Barillas   metoprolol tartrate (Lopressor) 25 mg tablet Take 1 tablet (25 mg) by mouth 2 times a day. 4/24/24 5/24/24  Mayela Pickard PA-C   nitroglycerin (Nitrostat) 0.4 mg SL tablet Place 1 tablet (0.4 mg) under the tongue every 5 minutes if needed for chest pain.    Historical Provider, MD   ondansetron ODT (Zofran-ODT) 4 mg disintegrating tablet Take 1 tablet (4 mg) by mouth every 8 hours if needed for nausea or vomiting. 6/11/24   EMILIANA Barillas   oxyCODONE-acetaminophen (Percocet) 5-325 mg tablet Take 1 tablet by mouth every 6 hours if needed for severe pain (7 - 10). 6/11/24   EMILIANA Barillas   POLYETHYLENE GLYCOL 3350 ORAL Take 1 packet by mouth once daily as needed (constipation). With 8 oz of fluid as needed    Historical Provider, MD   pravastatin (Pravachol) 40 mg tablet TAKE 1 TABLET BY MOUTH ONCE DAILY  Patient taking differently: Take 1 tablet (40 mg) by mouth once daily at bedtime. 2/21/24   Vic Wright,  APRN-CNP   sennosides-docusate sodium (Alyson-Colace) 8.6-50 mg tablet Take 1 tablet by mouth once daily for 20 days. 6/4/24 6/24/24  Purnima Novak PA-C   sulfaSALAzine (Azulfidine) 500 mg DR tablet Take 2 tablets (1,000 mg) by mouth 2 times a day.    Historical Provider, MD   tamsulosin (Flomax) 0.4 mg 24 hr capsule Take 1 capsule (0.4 mg) by mouth once daily. 6/12/24   Theodore Nagy APRN-CNP   valACYclovir (Valtrex) 1 gram tablet Take 1 tablet (1,000 mg) by mouth once daily as needed (cold sore). Twice daily 11/3/23   Historical Provider, MD   doxycycline (Adoxa) 100 mg tablet Take 0.5 tablets (50 mg) by mouth 2 times a day. 5/24/24 6/11/24  Historical Provider, MD   hydroCHLOROthiazide (HYDRODiuril) 25 mg tablet Take 1 tablet (25 mg) by mouth once daily.  6/9/24  Historical Provider, MD   hydrocortisone (Anusol-HC) 25 mg suppository Insert 1 suppository (25 mg) into the rectum 2 times a day for 10 days. 6/4/24 6/9/24  Purnima Novak PA-C   lidocaine (Xylocaine) 2 % jelly Apply topically 4 times a day as needed for mild pain (1 - 3). 6/4/24 6/9/24  Purnima Novak PA-C   lisinopril 40 mg tablet TAKE 1 TABLET BY MOUTH EVERY DAY 2/6/24 6/11/24  Mike Sousa DO   ondansetron (Zofran) 4 mg tablet Take 1 tablet (4 mg) by mouth every 8 hours if needed for nausea. 5/9/24 6/9/24  Rudolph Liriano MD   oxyCODONE-acetaminophen (Percocet) 5-325 mg tablet Take 1 tablet by mouth every 6 hours if needed for severe pain (7 - 10).  6/11/24  Historical Provider, MD        Data and DIAGNOSTIC STUDIES REVIEWED   XR chest 2 views    Result Date: 6/10/2024  Interpreted By:  Shraddha Coleman, STUDY: XR CHEST 2 VIEWS 6/10/2024 9:09 am   INDICATION: Fall with rib fractures   COMPARISON: 04/22/2024   ACCESSION NUMBER(S): [id number]   ORDERING CLINICIAN: THEODORE NAGY   TECHNIQUE: PA and lateral views of the chest were acquired.   FINDINGS: The cardiac size is within normal limits with postoperative change from median sternotomy and  coronary revascularization procedure.   The acute anterior right rib fractures seen on CT study done yesterday are difficult to visualize on chest x-ray. There is an old healed fracture of the anterior left 2nd rib with old healed fractures of the posterior right 6th, 7th, and 8th ribs noted.   No pneumothorax is present. There is some subsegmental atelectasis at the left lung base with mild right basilar discoid atelectasis seen as well.   There are degenerative changes of the thoracic spine. There is osteoarthritis of the shoulders bilaterally. Gallbladder is surgically absent.       The known acute anterior right rib fractures are difficult to visualize on chest x-ray.   No pneumothorax or hemothorax.   Right basilar discoid atelectasis with subsegmental atelectasis retrocardiac left lower lobe.   Status post CABG and cholecystectomy.   Old healed bilateral rib fractures are seen.   Signed by: Shraddha Coleman 6/10/2024 9:24 AM Dictation workstation:   [id number]    XR knee right 4+ views    Result Date: 6/9/2024  Interpreted By:  Maricarmen Blunt, STUDY: XR KNEE RIGHT 4+ VIEWS;  6/9/2024 11:05 am   INDICATION: Signs/Symptoms:fall.   COMPARISON: 06/07/2024   ACCESSION NUMBER(S): [id number]   ORDERING CLINICIAN: KHOA ATKNIS   FINDINGS: Four views of the right knee obtained.   Arthroplasty components in grossly stable anatomic alignment. No definite acute fracture or abnormal lucency around the hardware. Stable smooth subcentimeter density adjacent to the fibular head. No significant joint effusion. No focal soft tissue swelling is apparent. Prominent presumed vascular calcifications in the posterior soft tissues.       No evidence of acute fracture. Status post TKA with intact hardware.   MACRO: None.   Signed by: Maricarmen Blunt 6/9/2024 11:33 AM Dictation workstation:   [id number]    XR pelvis 1-2 views    Result Date: 6/9/2024  Interpreted By:  Maricarmen Blunt, STUDY: XR PELVIS 1-2 VIEWS;  6/9/2024 11:05 am   INDICATION:  Signs/Symptoms:fall.   COMPARISON: 07/14/2023   ACCESSION NUMBER(S): [id number]   ORDERING CLINICIAN: KHOA ATKINS   FINDINGS: A single frontal view of the pelvis obtained.   Osteopenia, overlying bowel content and soft tissue folds limit evaluation. No definite focal disruption of the pelvic osseous contours to suggest acute displaced fracture. Left hip arthroplasty in satisfactory alignment. Degenerative changes of the right hip and visualized lower lumbar spine. Multifocal soft tissue presumed vascular calcifications.       No evidence of acute displaced osseous pelvic fracture.   MACRO: None.   Signed by: Maricarmen Blunt 6/9/2024 11:32 AM Dictation workstation:   [id number]    CT chest wo IV contrast    Addendum Date: 6/9/2024    Interpreted By:  Maricarmen Blunt, ADDENDUM: Subtle smooth expansile likely remote fracture deformity of several right lateral ribs noted as well.   Signed by: Maricarmen Blunt 6/9/2024 11:31 AM   -------- ORIGINAL REPORT -------- Dictation workstation:   [id number]    Result Date: 6/9/2024  Interpreted By:  Maricarmen Blunt, STUDY: CT CHEST WO IV CONTRAST;  6/9/2024 10:48 am   INDICATION: Signs/Symptoms:R rib pain. Status post fall   COMPARISON: 10/08/2022   ACCESSION NUMBER(S): [id number]   ORDERING CLINICIAN: KHOA ATKINS   TECHNIQUE: CT of the chest was performed. Sagittal and coronal reconstructions were generated. No intravenous contrast given for the examination.   FINDINGS: Hilar, vessel, and solid organ evaluation limited without IV contrast.   CHEST WALL AND LOWER NECK: Stable short linear hyperdense probable surgical material in the anterior left neck base. Portions of the chest wall excluded from field of imaging. No significant axillary lymphadenopathy as visualized. Status post sternotomy.   MEDIASTINUM AND HERMELINDO:  Limited hilar assessment on unenhanced images. No gross mediastinal or hilar adenopathy within limits of unenhanced exam. Moderate-sized hiatal hernia. Dense surgical  material in the anterior mediastinum.   HEART AND VESSELS:  Lack of IV contrast precludes vascular luminal assessment. The heart is normal in size. No significant pericardial effusion. Multifocal atherosclerotic calcifications including the coronary arteries and mitral valve region.   LUNGS, PLEURA, LARGE AIRWAYS:  Subpleural reticular and coarse linear densities scattered in both lung fields. No sizable airspace consolidation, pleural effusion or pneumothorax identified. The central airways are patent.   UPPER ABDOMEN:  Cholecystectomy clips at the liver hilum. Fullness of the visualized central biliary tree. Moderate fecal residue in the visualized colon.   BONES:  Multiple median sternotomy wires. Smooth irregularity of the sternum with probable ununited sternotomy defect and adjacent ossifications similar to the previous exam. Nondisplaced fracture of the anterior right 2nd rib. Mildly displaced fractures of the anterior right 3rd, 4th and 5th ribs. Possible nondisplaced fracture of the anterior right 6th rib. Smooth probable remote fracture deformity of the posterior right 6th and 7th ribs. Kyphosis and advanced degenerative changes of the thoracic spine with mild multilevel thoracic vertebral compression deformities without obvious interval change.       Multiple acute nondisplaced or minimally displaced right anterior rib fractures. No pleural effusion or pneumothorax.   Postsurgical changes in the anterior chest wall.   Scattered areas of probable scarring or atelectasis in both lung fields.   Moderate-sized hiatal hernia.   Fullness of the central biliary tree status post cholecystectomy. Correlation with LFTs may be helpful.   Additional findings as described above.   MACRO: None.   Signed by: Maricarmen Blunt 6/9/2024 11:28 AM Dictation workstation:   [id number]    XR shoulder right 2+ views    Result Date: 6/9/2024  Interpreted By:  Maricarmen Blunt, STUDY: XR SHOULDER RIGHT 2+ VIEWS;  6/9/2024 11:03 am    INDICATION: Signs/Symptoms:fall.   COMPARISON: None.   ACCESSION NUMBER(S): [id number]   ORDERING CLINICIAN: KHOA ATKINS   FINDINGS: Three views of the right shoulder obtained.   No acute fracture or dislocation. Glenohumeral joint space narrowing and spurring. Acromiohumeral space is preserved. Small subacromial spur. AC joint narrowing and spurring.   Smooth probable remote fracture deformity of posterolateral aspect of several contiguous right mid ribs. Sternotomy wires, least 2 of which are discontinuous, partially included over the central chest.       No evidence of acute fracture or dislocation. Degenerative changes of the right shoulder.   MACRO: None.   Signed by: Maricarmen Blunt 6/9/2024 11:30 AM Dictation workstation:   [id number]    CT cervical spine wo IV contrast    Result Date: 6/9/2024  Interpreted By:  Maricarmen Blunt, STUDY: CT CERVICAL SPINE WO IV CONTRAST;  6/9/2024 10:48 am   INDICATION: Signs/Symptoms:fall.   COMPARISON: 03/28/2022   ACCESSION NUMBER(S): [id number]   ORDERING CLINICIAN: KHOA ATKINS   TECHNIQUE: CT images were obtained through the cervical spine. Sagittal and coronal reconstructions were generated.   FINDINGS:     ALIGNMENT: Mild rightward tilt in the upper cervical spine. Mild anterolisthesis at C6-7 similar to the prior exam.   VERTEBRAE/DISC SPACES: No compression deformity or acute displaced fracture.  Multilevel degenerative changes including atlantoaxial joint space narrowing with surrounding calcifications, spurring and cystic changes in the dens, multilevel intervertebral disc space narrowing with endplate sclerosis and smooth endplate irregularities, probable partial fusion across the C3-4 vertebra, and multilevel bilateral facet joint narrowing and spurring, similar to the previous exam.   ADDITIONAL FINDINGS: No abnormal thickening of the prevertebral soft tissues.  Linear presumed scarring in the visualized bilateral lung apices. Prominent carotid bulb atherosclerotic  calcifications. Linear dense possible surgical device in the anterior left neck base.       No acute cervical vertebral displacement or acute displaced fracture. Multilevel degenerative changes with mild spondylolisthesis similar to 03/20/2022.   Paraspinal/soft tissue findings as above.   MACRO: None.   Signed by: Maricarmen Blunt 6/9/2024 11:19 AM Dictation workstation:   [id number]    CT head wo IV contrast    Result Date: 6/9/2024  Interpreted By:  Maricarmen Blunt, STUDY: CT HEAD WO IV CONTRAST;  6/9/2024 10:48 am   INDICATION: Signs/Symptoms:fall.   COMPARISON: 03/28/2022   ACCESSION NUMBER(S): [id number]   ORDERING CLINICIAN: KHOA ATKINS   TECHNIQUE: Unenhanced CT images of the head were obtained.   FINDINGS: The ventricles, cisterns and sulci are enlarged, consistent with diffuse volume loss. There are areas of nonspecific white matter hypodensity, which are probably age related or microvascular in nature. These findings are similar to the previous exam. There is no acute intracranial hemorrhage, mass effect or midline shift. No extraaxial fluid collection.   No acute displaced calvarial fracture.   Visualized paranasal sinuses are clear. Punctate dense probable surgical material along the anterior margin of each globe.       No acute intracranial hemorrhage or mass-effect.   MACRO: None.   Signed by: Maricarmen Blunt 6/9/2024 11:16 AM Dictation workstation:   [id number]    XR knee right 1-2 views    Result Date: 6/7/2024  Interpreted By:  Rudolph Liriano, STUDY: XR KNEE RIGHT 1-2 VIEWS; ;  6/7/2024 11:06 am   INDICATION: Signs/Symptoms:PAIN.   COMPARISON: None.   ACCESSION NUMBER(S): [id number]   ORDERING CLINICIAN: RUDOLPH LIRIANO   FINDINGS: X-rays of the right knee done and read in the office today show that there is a right total knee replacement in overall satisfactory position and alignment.       See findings above.     MACRO: None   Signed by: Rudolph Liriano 6/7/2024 1:22 PM Dictation workstation:    [id number]     Lab Results   Component Value Date    URINECULTURE No significant growth 04/03/2024      Lab Results   Component Value Date    GLUCOSE 132 (H) 06/11/2024    CALCIUM 8.5 06/11/2024     06/11/2024    K 4.4 06/11/2024    CO2 25 06/11/2024     06/11/2024    BUN 21 06/11/2024    CREATININE 1.10 06/11/2024     Lab Results   Component Value Date    WBC 6.0 06/10/2024    HGB 9.6 (L) 06/10/2024    HCT 30.3 (L) 06/10/2024     06/10/2024     06/10/2024

## 2024-06-11 NOTE — PROGRESS NOTES
Bonnie Jules is a 83 y.o. female on day 0 of admission presenting with Fracture of one rib, right side, initial encounter for closed fracture.    Subjective   Interval History:   Afebrile, no chills  No knee or foot.  No chest pain or shortness of breath  No nausea vomiting or diarrhea        Review of Systems   All other systems reviewed and are negative.      Objective   Range of Vitals (last 24 hours)  Heart Rate:  [60-71]   Temp:  [36.2 °C (97.2 °F)-37 °C (98.6 °F)]   Resp:  [17-18]   BP: (120-135)/(53-68)   SpO2:  [90 %-95 %]   Daily Weight  06/09/24 : 58.1 kg (128 lb)    Body mass index is 25 kg/m².    Physical Exam  Constitutional:       Appearance: Normal appearance.   HENT:      Head: Normocephalic and atraumatic.      Nose: Nose normal.   Eyes:      Extraocular Movements: Extraocular movements intact.      Conjunctiva/sclera: Conjunctivae normal.   Cardiovascular:      Rate and Rhythm: Normal rate and regular rhythm.      Pulses: Normal pulses.      Heart sounds: Normal heart sounds.   Pulmonary:      Effort: Pulmonary effort is normal.      Breath sounds: Normal breath sounds.   Abdominal:      General: Bowel sounds are normal.      Palpations: Abdomen is soft.   Musculoskeletal:      Cervical back: Normal range of motion and neck supple.      Right lower leg: No edema.      Left lower leg: No edema.   Skin:     Comments: Right heel ulcer with moderate amount of fat necrosis  Right anterior knee wound, sutured in place   Neurological:      Mental Status: She is alert.   Psychiatric:         Behavior: Behavior normal. Behavior is cooperative.        Antibiotics  acetaminophen (Tylenol) tablet 975 mg  lidocaine (Xylocaine) 10 mg/mL (1 %) injection 50 mg  methocarbamol (Robaxin) tablet 750 mg  lidocaine 4 % patch 1 patch  morphine injection 2 mg  morphine injection 1 mg    aspirin EC tablet  folic acid (Folvite) tablet  nitroglycerin (Nitrostat) SL tablet  amLODIPine (Norvasc) tablet 10 mg  aspirin EC  tablet 81 mg  cholecalciferol (Vitamin D-3) tablet 2,000 Units  cyanocobalamin (Vitamin B-12) tablet 1,000 mcg  folic acid (Folvite) tablet 1 mg  gabapentin (Neurontin) capsule 100 mg  hydrALAZINE (Apresoline) tablet 50 mg  insulin degludec (Tresiba FlexTouch) injection 12 Units  levothyroxine (Synthroid, Levoxyl) tablet 100 mcg  lisinopril tablet 40 mg  metoprolol tartrate (Lopressor) tablet 25 mg  pravastatin (Pravachol) tablet 40 mg  sennosides-docusate sodium (Alyson-Colace) 8.6-50 mg per tablet 1 tablet  sulfaSALAzine (Azulfidine) DR tablet 1,000 mg  acetaminophen (Tylenol) tablet 650 mg  acetaminophen (Tylenol) oral liquid 650 mg  acetaminophen (Tylenol) suppository 650 mg  acetaminophen (Tylenol) tablet 650 mg  acetaminophen (Tylenol) oral liquid 650 mg  acetaminophen (Tylenol) suppository 650 mg  enoxaparin (Lovenox) syringe 40 mg  ondansetron (Zofran) tablet 4 mg  ondansetron (Zofran) injection 4 mg  polyethylene glycol (Glycolax, Miralax) packet 17 g  bisacodyl (Dulcolax) EC tablet 10 mg  doxycycline (Adoxa) tablet  doxycycline (Adoxa) tablet 50 mg  glucagon (Glucagen) injection 1 mg  dextrose 50 % injection 25 g  glucagon (Glucagen) injection 1 mg  dextrose 50 % injection 12.5 g  insulin lispro (HumaLOG) injection 0-10 Units  oxyCODONE (Roxicodone) immediate release tablet 5 mg  insulin glargine (Lantus) injection 10 Units  hydrocortisone (Anusol-HC) suppository 25 mg  oxyCODONE (Roxicodone) immediate release tablet 10 mg  acetaminophen (Tylenol) tablet 650 mg  tamsulosin (Flomax) 24 hr capsule 0.4 mg  doxycycline (Vibramycin) capsule 100 mg  lisinopril tablet 20 mg      Relevant Results  Labs  Results from last 72 hours   Lab Units 06/10/24  0541 06/09/24  1149   WBC AUTO x10*3/uL 6.0 6.7   HEMOGLOBIN g/dL 9.6* 10.1*   HEMATOCRIT % 30.3* 31.3*   PLATELETS AUTO x10*3/uL 288 323   NEUTROS PCT AUTO %  --  44.9   LYMPHS PCT AUTO %  --  42.9   MONOS PCT AUTO %  --  7.9   EOS PCT AUTO %  --  3.9     Results from  last 72 hours   Lab Units 06/11/24  0548 06/10/24  0542 06/09/24  1149   SODIUM mmol/L 137 134 135   POTASSIUM mmol/L 4.4 5.1 5.1   CHLORIDE mmol/L 103 101 101   CO2 mmol/L 25 24 25   BUN mg/dL 21 18 20   CREATININE mg/dL 1.10 1.00 1.10   GLUCOSE mg/dL 132* 216* 121*   CALCIUM mg/dL 8.5 8.3* 9.2   ANION GAP mmol/L 9 9 9   EGFR mL/min/1.73m*2 51* 57* 51*     Results from last 72 hours   Lab Units 06/09/24  1149   ALK PHOS U/L 78   BILIRUBIN TOTAL mg/dL 0.2   BILIRUBIN DIRECT mg/dL <0.2   PROTEIN TOTAL g/dL 6.7   ALT U/L 7   AST U/L 28   ALBUMIN g/dL 3.8     Estimated Creatinine Clearance: 32 mL/min (by C-G formula based on SCr of 1.1 mg/dL).  CRP   Date Value Ref Range Status   12/08/2021 0.3 0 - 2.0 MG/DL Final     Comment:     LESS THAN   RESULT CHECKED  Performed at [address] [address]     09/24/2019 0.9 0 - 2.0 MG/DL Final     Comment:     Performed at [address] [address]   04/04/2019 0.3 0 - 2.0 MG/DL Final     Comment:     LESS THAN  Performed at [address] [address]       Microbiology  Reviewed-wound culture pending  Imaging  XR chest 2 views    Result Date: 6/10/2024  Interpreted By:  Shraddha Coleman, STUDY: XR CHEST 2 VIEWS 6/10/2024 9:09 am   INDICATION: Fall with rib fractures   COMPARISON: 04/22/2024   ACCESSION NUMBER(S): [id number]   ORDERING CLINICIAN: THEODORE NAGY   TECHNIQUE: PA and lateral views of the chest were acquired.   FINDINGS: The cardiac size is within normal limits with postoperative change from median sternotomy and coronary revascularization procedure.   The acute anterior right rib fractures seen on CT study done yesterday are difficult to visualize on chest x-ray. There is an old healed fracture of the anterior left 2nd rib with old healed fractures of the posterior right 6th, 7th, and 8th ribs noted.   No pneumothorax is present. There is some subsegmental atelectasis at the left lung base with mild right  basilar discoid atelectasis seen as well.   There are degenerative changes of the thoracic spine. There is osteoarthritis of the shoulders bilaterally. Gallbladder is surgically absent.       The known acute anterior right rib fractures are difficult to visualize on chest x-ray.   No pneumothorax or hemothorax.   Right basilar discoid atelectasis with subsegmental atelectasis retrocardiac left lower lobe.   Status post CABG and cholecystectomy.   Old healed bilateral rib fractures are seen.   Signed by: Shraddha Coleman 6/10/2024 9:24 AM Dictation workstation:   [id number]    XR knee right 4+ views    Result Date: 6/9/2024  Interpreted By:  Maricarmen Blunt, STUDY: XR KNEE RIGHT 4+ VIEWS;  6/9/2024 11:05 am   INDICATION: Signs/Symptoms:fall.   COMPARISON: 06/07/2024   ACCESSION NUMBER(S): [id number]   ORDERING CLINICIAN: KHOA ATKINS   FINDINGS: Four views of the right knee obtained.   Arthroplasty components in grossly stable anatomic alignment. No definite acute fracture or abnormal lucency around the hardware. Stable smooth subcentimeter density adjacent to the fibular head. No significant joint effusion. No focal soft tissue swelling is apparent. Prominent presumed vascular calcifications in the posterior soft tissues.       No evidence of acute fracture. Status post TKA with intact hardware.   MACRO: None.   Signed by: Maricarmen Blunt 6/9/2024 11:33 AM Dictation workstation:   [id number]    XR pelvis 1-2 views    Result Date: 6/9/2024  Interpreted By:  Maricarmen Blunt, STUDY: XR PELVIS 1-2 VIEWS;  6/9/2024 11:05 am   INDICATION: Signs/Symptoms:fall.   COMPARISON: 07/14/2023   ACCESSION NUMBER(S): [id number]   ORDERING CLINICIAN: KHOA ATKINS   FINDINGS: A single frontal view of the pelvis obtained.   Osteopenia, overlying bowel content and soft tissue folds limit evaluation. No definite focal disruption of the pelvic osseous contours to suggest acute displaced fracture. Left hip arthroplasty in satisfactory alignment.  Degenerative changes of the right hip and visualized lower lumbar spine. Multifocal soft tissue presumed vascular calcifications.       No evidence of acute displaced osseous pelvic fracture.   MACRO: None.   Signed by: Maricarmen Blunt 6/9/2024 11:32 AM Dictation workstation:   [id number]    CT chest wo IV contrast    Addendum Date: 6/9/2024    Interpreted By:  Maricarmen Blunt, ADDENDUM: Subtle smooth expansile likely remote fracture deformity of several right lateral ribs noted as well.   Signed by: Maricarmen Blunt 6/9/2024 11:31 AM   -------- ORIGINAL REPORT -------- Dictation workstation:   [id number]    Result Date: 6/9/2024  Interpreted By:  Maricarmen Blunt, STUDY: CT CHEST WO IV CONTRAST;  6/9/2024 10:48 am   INDICATION: Signs/Symptoms:R rib pain. Status post fall   COMPARISON: 10/08/2022   ACCESSION NUMBER(S): [id number]   ORDERING CLINICIAN: KHOA ATKINS   TECHNIQUE: CT of the chest was performed. Sagittal and coronal reconstructions were generated. No intravenous contrast given for the examination.   FINDINGS: Hilar, vessel, and solid organ evaluation limited without IV contrast.   CHEST WALL AND LOWER NECK: Stable short linear hyperdense probable surgical material in the anterior left neck base. Portions of the chest wall excluded from field of imaging. No significant axillary lymphadenopathy as visualized. Status post sternotomy.   MEDIASTINUM AND HERMELINDO:  Limited hilar assessment on unenhanced images. No gross mediastinal or hilar adenopathy within limits of unenhanced exam. Moderate-sized hiatal hernia. Dense surgical material in the anterior mediastinum.   HEART AND VESSELS:  Lack of IV contrast precludes vascular luminal assessment. The heart is normal in size. No significant pericardial effusion. Multifocal atherosclerotic calcifications including the coronary arteries and mitral valve region.   LUNGS, PLEURA, LARGE AIRWAYS:  Subpleural reticular and coarse linear densities scattered in both lung fields. No  sizable airspace consolidation, pleural effusion or pneumothorax identified. The central airways are patent.   UPPER ABDOMEN:  Cholecystectomy clips at the liver hilum. Fullness of the visualized central biliary tree. Moderate fecal residue in the visualized colon.   BONES:  Multiple median sternotomy wires. Smooth irregularity of the sternum with probable ununited sternotomy defect and adjacent ossifications similar to the previous exam. Nondisplaced fracture of the anterior right 2nd rib. Mildly displaced fractures of the anterior right 3rd, 4th and 5th ribs. Possible nondisplaced fracture of the anterior right 6th rib. Smooth probable remote fracture deformity of the posterior right 6th and 7th ribs. Kyphosis and advanced degenerative changes of the thoracic spine with mild multilevel thoracic vertebral compression deformities without obvious interval change.       Multiple acute nondisplaced or minimally displaced right anterior rib fractures. No pleural effusion or pneumothorax.   Postsurgical changes in the anterior chest wall.   Scattered areas of probable scarring or atelectasis in both lung fields.   Moderate-sized hiatal hernia.   Fullness of the central biliary tree status post cholecystectomy. Correlation with LFTs may be helpful.   Additional findings as described above.   MACRO: None.   Signed by: Maricarmen Blunt 6/9/2024 11:28 AM Dictation workstation:   [id number]    XR shoulder right 2+ views    Result Date: 6/9/2024  Interpreted By:  Maricarmen Blunt, STUDY: XR SHOULDER RIGHT 2+ VIEWS;  6/9/2024 11:03 am   INDICATION: Signs/Symptoms:fall.   COMPARISON: None.   ACCESSION NUMBER(S): [id number]   ORDERING CLINICIAN: KHOA ATKINS   FINDINGS: Three views of the right shoulder obtained.   No acute fracture or dislocation. Glenohumeral joint space narrowing and spurring. Acromiohumeral space is preserved. Small subacromial spur. AC joint narrowing and spurring.   Smooth probable remote fracture deformity of  posterolateral aspect of several contiguous right mid ribs. Sternotomy wires, least 2 of which are discontinuous, partially included over the central chest.       No evidence of acute fracture or dislocation. Degenerative changes of the right shoulder.   MACRO: None.   Signed by: Maricarmen Blunt 6/9/2024 11:30 AM Dictation workstation:   [id number]    CT cervical spine wo IV contrast    Result Date: 6/9/2024  Interpreted By:  Maricarmen Blunt, STUDY: CT CERVICAL SPINE WO IV CONTRAST;  6/9/2024 10:48 am   INDICATION: Signs/Symptoms:fall.   COMPARISON: 03/28/2022   ACCESSION NUMBER(S): [id number]   ORDERING CLINICIAN: KHOA ATKINS   TECHNIQUE: CT images were obtained through the cervical spine. Sagittal and coronal reconstructions were generated.   FINDINGS:     ALIGNMENT: Mild rightward tilt in the upper cervical spine. Mild anterolisthesis at C6-7 similar to the prior exam.   VERTEBRAE/DISC SPACES: No compression deformity or acute displaced fracture.  Multilevel degenerative changes including atlantoaxial joint space narrowing with surrounding calcifications, spurring and cystic changes in the dens, multilevel intervertebral disc space narrowing with endplate sclerosis and smooth endplate irregularities, probable partial fusion across the C3-4 vertebra, and multilevel bilateral facet joint narrowing and spurring, similar to the previous exam.   ADDITIONAL FINDINGS: No abnormal thickening of the prevertebral soft tissues.  Linear presumed scarring in the visualized bilateral lung apices. Prominent carotid bulb atherosclerotic calcifications. Linear dense possible surgical device in the anterior left neck base.       No acute cervical vertebral displacement or acute displaced fracture. Multilevel degenerative changes with mild spondylolisthesis similar to 03/20/2022.   Paraspinal/soft tissue findings as above.   MACRO: None.   Signed by: Maricarmen Blunt 6/9/2024 11:19 AM Dictation workstation:   [id number]    CT head wo IV  contrast    Result Date: 6/9/2024  Interpreted By:  Maricarmen Blunt, STUDY: CT HEAD WO IV CONTRAST;  6/9/2024 10:48 am   INDICATION: Signs/Symptoms:fall.   COMPARISON: 03/28/2022   ACCESSION NUMBER(S): [id number]   ORDERING CLINICIAN: KHOA ATKINS   TECHNIQUE: Unenhanced CT images of the head were obtained.   FINDINGS: The ventricles, cisterns and sulci are enlarged, consistent with diffuse volume loss. There are areas of nonspecific white matter hypodensity, which are probably age related or microvascular in nature. These findings are similar to the previous exam. There is no acute intracranial hemorrhage, mass effect or midline shift. No extraaxial fluid collection.   No acute displaced calvarial fracture.   Visualized paranasal sinuses are clear. Punctate dense probable surgical material along the anterior margin of each globe.       No acute intracranial hemorrhage or mass-effect.   MACRO: None.   Signed by: Maricarmen Blunt 6/9/2024 11:16 AM Dictation workstation:   [id number]    XR knee right 1-2 views    Result Date: 6/7/2024  Interpreted By:  Rudolph Liriano, STUDY: XR KNEE RIGHT 1-2 VIEWS; ;  6/7/2024 11:06 am   INDICATION: Signs/Symptoms:PAIN.   COMPARISON: None.   ACCESSION NUMBER(S): [id number]   ORDERING CLINICIAN: RUDOLPH LIRAINO   FINDINGS: X-rays of the right knee done and read in the office today show that there is a right total knee replacement in overall satisfactory position and alignment.       See findings above.     MACRO: None   Signed by: Rudolph Liriano 6/7/2024 1:22 PM Dictation workstation:   [id number]     Assessment/Plan   Type 2 diabetes with peripheral angiopathy without gangrene  Stage III right heel ulcer  Right-sided rib fractures  Right anterior knee wound dehiscence, status post suture for laceration     Continue doxycycline for now  Wound culture-right heel  Local care  Offloading  Follow-up at the wound healing center  Monitor temperature and WBC    Robert Pickering MD

## 2024-06-11 NOTE — CONSULTS
Wound Care Consult     Visit Date: 6/11/2024      Patient Name: Bonnie Jules         MRN: [id number]           YOB: 1940     Reason for Consult: Right heel, Right lower leg and right knee wounds        Wound History: Present on admission. Patient with a chronic diabetic ulcer to Right heel, skin tear to right lower shin and lacerated right knee incision. Patient had fallen, landing on her knee opening her healing right knee incision. Was seeing outpatient woundcare for Right heel ulcer.     A 83 y.o. year old female admitted for Principal Problem:    Fracture of one rib, right side, initial encounter for closed fracture  Active Problems:    Closed fracture of multiple ribs of right side, initial encounter      Past Medical History:   Diagnosis Date    Anxiety     Benign hypertension     Chronic ischemic colitis (Multi)     With history of sepsis and infectious gastroenteritis 10/7/2022-10/11/2022    Chronic kidney disease, stage III (moderate) (Multi)     CLL (chronic lymphocytic leukemia) (Multi)     Coronary artery disease     CTS (carpal tunnel syndrome)     Diabetes mellitus (Multi)     Essential (primary) hypertension     GI bleed     Hyperlipidemia, unspecified     Hypertension     Hypothyroidism     Hypothyroidism, unspecified type     Mixed hyperlipidemia     NSTEMI (non-ST elevated myocardial infarction) (Multi)     Osteoarthritis     Osteoporosis     Peripheral vascular disease (CMS-[facility])     Right knee pain     Seizure disorder (Multi)     Type 1 diabetes mellitus with hyperglycemia (Multi)       Past Surgical History:   Procedure Laterality Date    CARPAL TUNNEL RELEASE Bilateral     CATARACT EXTRACTION W/ INTRAOCULAR LENS  IMPLANT, BILATERAL      Left 8/10/2017, right 8/31/2017    CHOLECYSTECTOMY      COLONOSCOPY      CORONARY ARTERY BYPASS GRAFT  2005    X 3    HYSTERECTOMY      KNEE SURGERY Right 04/17/2024    total    OTHER SURGICAL HISTORY      Bilateral heel spurs    OTHER SURGICAL  HISTORY Right 01/2016    Femoral endarterectomy    TOTAL HIP ARTHROPLASTY Left 2012       Scheduled medications  acetaminophen, 650 mg, oral, q6h  acetaminophen, 975 mg, oral, Once  amLODIPine, 10 mg, oral, Daily  aspirin, 81 mg, oral, Daily  cholecalciferol, 2,000 Units, oral, Daily  cyanocobalamin, 1,000 mcg, oral, Daily  doxycylcine, 100 mg, oral, q12h MARI  enoxaparin, 40 mg, subcutaneous, q24h  folic acid, 1 mg, oral, Daily  gabapentin, 100 mg, oral, BID  hydrALAZINE, 50 mg, oral, BID  insulin glargine, 10 Units, subcutaneous, Daily  insulin lispro, 0-10 Units, subcutaneous, TID  levothyroxine, 100 mcg, oral, Daily  lidocaine, 5 mL, subcutaneous, Once  lisinopril, 20 mg, oral, Daily  metoprolol tartrate, 25 mg, oral, BID  polyethylene glycol, 17 g, oral, Daily  pravastatin, 40 mg, oral, Nightly  sennosides-docusate sodium, 1 tablet, oral, Daily  sulfaSALAzine, 1,000 mg, oral, BID  tamsulosin, 0.4 mg, oral, Daily      Continuous medications     PRN medications  PRN medications: acetaminophen **OR** acetaminophen **OR** acetaminophen, acetaminophen **OR** acetaminophen **OR** acetaminophen, bisacodyl, dextrose, dextrose, glucagon, glucagon, hydrocortisone, morphine, morphine, ondansetron **OR** ondansetron, oxyCODONE, oxyCODONE    Allergies   Allergen Reactions    Amoxicillin Hives    Amoxicillin-Pot Clavulanate Rash    Ciprofloxacin Rash    Levofloxacin Rash        Pertinent Labs:   [place], Urine   Date Value Ref Range Status   01/05/2022 51 (H) 0 - 23 MG/L Final     Albumin   Date Value Ref Range Status   06/09/2024 3.8 3.5 - 5.0 g/dL Final       Wound Assessment:  Wound 04/17/24 Incision Knee Right (Active)   Wound Image   06/11/24 1434   Closure Sutures 06/11/24 1434   Sutures/Staple Line Approximated 06/11/24 1434   Drainage Description None 06/11/24 1434   Drainage Amount None 06/11/24 1434   Dressing Open to air 06/11/24 1434   Dressing Status Clean;Dry 06/10/24 1313       Wound 06/10/24 Diabetic Ulcer  Heel Right (Active)   Wound Image   06/11/24 1431   Site Assessment Schuster;Dry 06/11/24 1431   Alyson-Wound Assessment Boggy;Clean;Dry;Intact 06/11/24 1431   Non-staged Wound Description Partial thickness 06/11/24 1431   Margins Well-defined edges 06/11/24 1431   Drainage Description None 06/11/24 1431   Drainage Amount None 06/11/24 1431   Dressing Gauze;Kerlix/rolled gauze 06/11/24 1431   Dressing Changed Changed 06/11/24 1431   Dressing Status Clean;Dry 06/11/24 1431       Wound 06/10/24 Skin Tear Pretibial Distal;Right (Active)   Site Assessment Dry;Red 06/11/24 1431   Alyson-Wound Assessment Clean;Dry;Intact 06/11/24 1431   Non-staged Wound Description Partial thickness 06/11/24 1431   Margins Poorly defined 06/11/24 1431   Drainage Description None 06/11/24 1431   Drainage Amount None 06/11/24 1431   Dressing Foam;Silicone border dressing 06/11/24 1431   Dressing Changed Reinforced 06/11/24 1431   Dressing Status Clean;Dry 06/11/24 1431       Wound Team Summary Assessment:     Exam conducted on day 2 of stay with knowledge of Floor Nurse. Introductions made to patient and family, alert and oriented. On exam patient sitting up in bed eating. With Nurse patient assessed. Border dressings to right heel and lower shin. Right heel soft, boggy, dry ulcer bed, grey in color. Instructed on not using border dressings for open ulcers and switching to betadine gauze and kerlix to dry and pad the area until it heals as it is high risk of reopening, nursing, patient and patient family verbalize understanding. Skin tear to lower shin almost healed, old dry blood on dressing, no active draining, continue to keep clean and covered until healed. Right knee incision with scabbing. Looking good. No other skin issues or concerns. Patient is planned to discharge today. Patient given extra dressing supplies for right heel. Patient on a [facility] besdystem with Waffle mattress overlay. Laina Ghosh RN updated, to continue pressure  injury prevention interventions, Woundcare, and nursing to continue to follow providers orders. Reconsult Wound RN PRN. Lazara MAIERN RN        Wound Team Plan: Continue to follow Provider orders. Betadine gauze daily to right heel.      Lazara Thayer RN  6/11/2024  4:19 PM

## 2024-06-11 NOTE — DISCHARGE SUMMARY
Discharge Diagnosis  Fracture of one rib, right side, initial encounter for closed fracture    Issues Requiring Follow-Up  Follow-up with orthopedic surgery, ID, wound care center, PCP, and urology outpatient    Test Results Pending At Discharge  Pending Labs       Order Current Status    Tissue/Wound Culture/Smear Preliminary result            Hospital Course   Bonnie Jules is a 83 y.o. female with a past medical history of CAD s/p CABG, HTN, DM, hypothyroidism, HLD, OA, PVD, and recent right TKR in April who presented to the emergency department after a fall. Patient has a wound on her RLE that is being followed by ID Dr. Pickering outpatient. She has a surgical shoe that she wears on this foot. States that she must have tripped on her surgical shoe causing her to fall. States that she was holding her coffee in one hand when she tripped and fell onto her right side hitting her right surgical knee and shoulder. Her right surgical wound dehisced and she was found to have several fractured ribs. Her knee was sutured in the ED. She had a right total knee replacement in April by Dr. Liriano. During that hospitalization she had an NSTEMI, urinary retention resulting in acute renal failure, and hypoxia. She was stablized and discharge to SNF for two weeks. She has been home and doing well with therapy up until today. She does report significant reproducible pain to her right chest. In the ED: XR right knee showed no acute fracture, s/p TKA with intact hardware. XR pelvis and shoulder negative for fracture. CT cervical spine and head unremarkable. CT chest did show multiple acute nondisplaced or minimally displaced right rib fractures. Fullness of the central biliary tree s/p jairo, recommend correlate with LFTs. She was given robaxin in the ED. Seen by trauma surgery, no surgery recommended. Admitted to [facility] for further evaluation and treatment.     Patient was treated for pain.  Seen by PT/OT who recommended  low intensity rehab.  Infectious disease has been following for her lower extremity wounds.  Home oral antibiotics were continued and will continue on discharge.  Cleared by infectious disease for discharge with follow-up at the wound care center next week.  She did have urinary retention which is chronic in nature.  A Layton was placed.  She was seen by urology who recommends removing Layton prior to discharge and patient can resume intermittent cath at home.  Dr. Liriano was consulted.  Imaging reviewed.  Cleared to resume PT/OT.  Patient was mildly hyperkalemic, lisinopril dose decreased.  Cleared for discharge by orthopedic surgery with outpatient follow-up.  Patient is stable for discharge home today.  Patient request outpatient PT/OT, referral sent.    Pertinent Physical Exam At Time of Discharge  Physical Exam  Vitals reviewed.   Constitutional:       Appearance: Normal appearance.   HENT:      Head: Normocephalic and atraumatic.   Eyes:      Extraocular Movements: Extraocular movements intact.      Conjunctiva/sclera: Conjunctivae normal.   Cardiovascular:      Rate and Rhythm: Normal rate and regular rhythm.   Pulmonary:      Effort: Pulmonary effort is normal.      Breath sounds: Normal breath sounds. No wheezing, rhonchi or rales.   Abdominal:      General: Bowel sounds are normal.      Palpations: Abdomen is soft.      Tenderness: There is no abdominal tenderness.   Skin:     General: Skin is warm and dry.      Comments: Ulcer to right heel/RLE. Dressing to right knee intact.    Neurological:      General: No focal deficit present.      Mental Status: She is alert and oriented to person, place, and time.     Home Medications     Medication List      START taking these medications     acetaminophen 325 mg tablet; Commonly known as: Tylenol; Take 2 tablets   (650 mg) by mouth every 4 hours if needed for fever (temp greater than   38.0 C) (greater than or equal to 38 C).   doxycycline 100 mg capsule; Commonly  known as: Vibramycin; Take 1   capsule (100 mg) by mouth every 12 hours for 7 days. Take with at least 8   ounces (large glass) of water, do not lie down for 30 minutes after   ondansetron ODT 4 mg disintegrating tablet; Commonly known as:   Zofran-ODT; Take 1 tablet (4 mg) by mouth every 8 hours if needed for   nausea or vomiting.   tamsulosin 0.4 mg 24 hr capsule; Commonly known as: Flomax; Take 1   capsule (0.4 mg) by mouth once daily.; Start taking on: June 12, 2024     CHANGE how you take these medications     lisinopril 20 mg tablet; Take 1 tablet (20 mg) by mouth once daily.;   Start taking on: June 12, 2024; What changed: medication strength, how   much to take   pravastatin 40 mg tablet; Commonly known as: Pravachol; TAKE 1 TABLET BY   MOUTH ONCE DAILY; What changed: when to take this     CONTINUE taking these medications     amLODIPine 10 mg tablet; Commonly known as: Norvasc; Take 1 tablet (10   mg) by mouth once daily.   aspirin 81 mg EC tablet   cholecalciferol 50 mcg (2,000 unit) capsule; Commonly known as: Vitamin   D-3   cyanocobalamin 1,000 mcg tablet; Commonly known as: Vitamin B-12   folic acid 1 mg tablet; Commonly known as: Folvite   gabapentin 100 mg capsule; Commonly known as: Neurontin   HumaLOG  KwikPen U-100 100 unit/mL injection; Generic drug:   insulin lispro   hydrALAZINE 50 mg tablet; Commonly known as: Apresoline; Take 1 tablet   (50 mg) by mouth 2 times a day.   levothyroxine 100 mcg tablet; Commonly known as: Synthroid, Levoxyl   metoprolol tartrate 25 mg tablet; Commonly known as: Lopressor; Take 1   tablet (25 mg) by mouth 2 times a day.   nitroglycerin 0.4 mg SL tablet; Commonly known as: Nitrostat   oxyCODONE-acetaminophen 5-325 mg tablet; Commonly known as: Percocet;   Take 1 tablet by mouth every 6 hours if needed for severe pain (7 - 10).   POLYETHYLENE GLYCOL 3350 ORAL   sennosides-docusate sodium 8.6-50 mg tablet; Commonly known as:   Alyson-Colace; Take 1 tablet by  mouth once daily for 20 days.   sulfaSALAzine 500 mg DR tablet; Commonly known as: Azulfidine   Tresiba FlexTouch U-100 100 unit/mL (3 mL) injection; Generic drug:   insulin degludec   valACYclovir 1 gram tablet; Commonly known as: Valtrex     STOP taking these medications     doxycycline 100 mg tablet; Commonly known as: Adoxa       Outpatient Follow-Up  Future Appointments   Date Time Provider Department [place]   6/17/2024  2:45 PM Bryce Oliveira, PTA [id number] [facility]   6/18/2024  9:00 AM [id number] [facility]   6/20/2024  1:30 PM Bryce MONTES Oliveira, PTA [id number] [facility]   6/25/2024 12:30 PM Noris Berkowitz, PT [id number] [facility]   6/27/2024  2:45 PM Nicolette Tirado, PTA [id number] [facility]   7/2/2024  1:30 PM Bryce JYOTI Oliveira, PTA [id number] [facility]   7/5/2024 11:15 AM Bryce JYOTI Oliveira, PTA [id number] [facility]   7/9/2024  1:30 PM Bryce JYOTI Oliveira, PTA [id number] [facility]   7/11/2024  1:30 PM Noris Berkowitz, PT [id number] [facility]   7/16/2024  1:30 PM Bryce JYOTI Oliveira, PTA [id number] [facility]   7/18/2024  1:30 PM Wanda Oliveira, PT [id number] [facility]   7/22/2024 10:30 AM Rudolph Liriano MD [id number] [facility]   8/23/2024 10:15 AM REFRACTION MACARENA ONOR652 OPHTH1 [id number] [facility]   8/23/2024 10:30 AM Stephon Hayward MD [id number] [facility]   10/8/2024 11:00 AM Bryce Ramirez MD [id number] [facility]   11/14/2024  1:30 PM Noah Araujo MD [id number] [facility]       Luna Smith, APRNSol[facility]

## 2024-06-11 NOTE — PROGRESS NOTES
Dr. Liriano from ortho saw patient at the bedside. No intervention, follow up in the office in 12 days and leave sutures covered with bandaid. PT/OT skilled patient low intensity AMPAC 18. Pt is active with outpatient therapy services and declines [facility] at this time.     PATIENT HAS A SECURE DISCHARGE PLAN TO RETURN HOME WITH OUTPATIENT PT/OT SERVICES.        06/11/24 [id number]   Current Planned Discharge Disposition   Current Planned Discharge Disposition Home

## 2024-06-11 NOTE — CARE PLAN
The patient's goals for the shift include      The clinical goals for the shift include patient will have adequate pain managment      Problem: Pain - Adult  Goal: Verbalizes/displays adequate comfort level or baseline comfort level  Outcome: Progressing     Problem: Safety - Adult  Goal: Free from fall injury  Outcome: Progressing     Problem: Discharge Planning  Goal: Discharge to home or other facility with appropriate resources  Outcome: Progressing     Problem: Chronic Conditions and Co-morbidities  Goal: Patient's chronic conditions and co-morbidity symptoms are monitored and maintained or improved  Outcome: Progressing     Problem: Diabetes  Goal: Achieve decreasing blood glucose levels by end of shift  Outcome: Progressing  Goal: Increase stability of blood glucose readings by end of shift  Outcome: Progressing  Goal: Decrease in ketones present in urine by end of shift  Outcome: Progressing  Goal: Maintain electrolyte levels within acceptable range throughout shift  Outcome: Progressing  Goal: Maintain glucose levels >70mg/dl to <250mg/dl throughout shift  Outcome: Progressing  Goal: No changes in neurological exam by end of shift  Outcome: Progressing  Goal: Learn about and adhere to nutrition recommendations by end of shift  Outcome: Progressing  Goal: Vital signs within normal range for age by end of shift  Outcome: Progressing  Goal: Increase self care and/or family involovement by end of shift  Outcome: Progressing  Goal: Receive DSME education by end of shift  Outcome: Progressing     Problem: Pain  Goal: Takes deep breaths with improved pain control throughout the shift  Outcome: Progressing  Goal: Turns in bed with improved pain control throughout the shift  Outcome: Progressing  Goal: Walks with improved pain control throughout the shift  Outcome: Progressing  Goal: Performs ADL's with improved pain control throughout shift  Outcome: Progressing  Goal: Participates in PT with improved pain control  throughout the shift  Outcome: Progressing  Goal: Free from opioid side effects throughout the shift  Outcome: Progressing  Goal: Free from acute confusion related to pain meds throughout the shift  Outcome: Progressing

## 2024-06-12 ENCOUNTER — PATIENT OUTREACH (OUTPATIENT)
Dept: PRIMARY CARE | Facility: CLINIC | Age: 84
End: 2024-06-12
Payer: MEDICARE

## 2024-06-12 ENCOUNTER — APPOINTMENT (OUTPATIENT)
Dept: PHYSICAL THERAPY | Facility: CLINIC | Age: 84
End: 2024-06-12
Payer: MEDICARE

## 2024-06-12 ENCOUNTER — TELEPHONE (OUTPATIENT)
Dept: PRIMARY CARE | Facility: CLINIC | Age: 84
End: 2024-06-12
Payer: MEDICARE

## 2024-06-12 NOTE — TELEPHONE ENCOUNTER
Discharge Facility: [facility]   Discharge Diagnosis: Fracture of one rib, right side, initial encounter for closed fracture   Admission Date: 6/9/24   Discharge Date: 6/11/24     Unsuccessful attempts x2 to reach patient for PCP Follow-up   Please have office staff reach out to patient and schedule an appointment within 14 days from discharge date.     Please schedule patient

## 2024-06-12 NOTE — PROGRESS NOTES
Discharge Facility: [facility]  Discharge Diagnosis: Fracture of one rib, right side, initial encounter for closed fracture  Admission Date: 6/9/24  Discharge Date: 6/11/24    PCP Appointment Date: Unable to reach patient x2 attempts. Voicemail left with call back number.  Specialist Appointment Date: Unknown  Hospital Encounter and Summary: Linked     Two attempts were made to reach patient within two business days after discharge. Voicemail left with contact information for patient to call back with any non-emergent questions or concerns.    Tino Henry LPN

## 2024-06-13 ENCOUNTER — LAB (OUTPATIENT)
Dept: LAB | Facility: LAB | Age: 84
End: 2024-06-13
Payer: MEDICARE

## 2024-06-13 DIAGNOSIS — N18.30 CHRONIC KIDNEY DISEASE, STAGE 3 UNSPECIFIED (MULTI): Primary | ICD-10-CM

## 2024-06-13 DIAGNOSIS — D50.1 IRON DEFICIENCY ANEMIA DUE TO SIDEROPENIC DYSPHAGIA: ICD-10-CM

## 2024-06-13 LAB
ALBUMIN SERPL-MCNC: 3.8 G/DL (ref 3.5–5)
ALP BLD-CCNC: 97 U/L (ref 35–125)
ALT SERPL-CCNC: 21 U/L (ref 5–40)
ANION GAP SERPL CALC-SCNC: 12 MMOL/L
AST SERPL-CCNC: 41 U/L (ref 5–40)
BACTERIA SPEC CULT: NORMAL
BASOPHILS # BLD AUTO: 0.01 X10*3/UL (ref 0–0.1)
BASOPHILS NFR BLD AUTO: 0.1 %
BILIRUB SERPL-MCNC: 0.2 MG/DL (ref 0.1–1.2)
BUN SERPL-MCNC: 22 MG/DL (ref 8–25)
CALCIUM SERPL-MCNC: 9.1 MG/DL (ref 8.5–10.4)
CHLORIDE SERPL-SCNC: 100 MMOL/L (ref 97–107)
CO2 SERPL-SCNC: 22 MMOL/L (ref 24–31)
CREAT SERPL-MCNC: 1.1 MG/DL (ref 0.4–1.6)
EGFRCR SERPLBLD CKD-EPI 2021: 51 ML/MIN/1.73M*2
EOSINOPHIL # BLD AUTO: 0.41 X10*3/UL (ref 0–0.4)
EOSINOPHIL NFR BLD AUTO: 5.3 %
ERYTHROCYTE [DISTWIDTH] IN BLOOD BY AUTOMATED COUNT: 13.4 % (ref 11.5–14.5)
FERRITIN SERPL-MCNC: 402 NG/ML (ref 13–150)
GLUCOSE SERPL-MCNC: 138 MG/DL (ref 65–99)
GRAM STN SPEC: NORMAL
GRAM STN SPEC: NORMAL
HCT VFR BLD AUTO: 31.1 % (ref 36–46)
HGB BLD-MCNC: 9.9 G/DL (ref 12–16)
IMM GRANULOCYTES # BLD AUTO: 0.03 X10*3/UL (ref 0–0.5)
IMM GRANULOCYTES NFR BLD AUTO: 0.4 % (ref 0–0.9)
IRON SATN MFR SERPL: 21 % (ref 12–50)
IRON SERPL-MCNC: 39 UG/DL (ref 30–160)
LDH SERPL-CCNC: 268 U/L (ref 91–227)
LYMPHOCYTES # BLD AUTO: 4.33 X10*3/UL (ref 0.8–3)
LYMPHOCYTES NFR BLD AUTO: 55.5 %
MCH RBC QN AUTO: 31.3 PG (ref 26–34)
MCHC RBC AUTO-ENTMCNC: 31.8 G/DL (ref 32–36)
MCV RBC AUTO: 98 FL (ref 80–100)
MONOCYTES # BLD AUTO: 0.67 X10*3/UL (ref 0.05–0.8)
MONOCYTES NFR BLD AUTO: 8.6 %
NEUTROPHILS # BLD AUTO: 2.35 X10*3/UL (ref 1.6–5.5)
NEUTROPHILS NFR BLD AUTO: 30.1 %
NRBC BLD-RTO: 0 /100 WBCS (ref 0–0)
PHOSPHATE SERPL-MCNC: 3.3 MG/DL (ref 2.5–4.5)
PLATELET # BLD AUTO: 295 X10*3/UL (ref 150–450)
POTASSIUM SERPL-SCNC: 4.7 MMOL/L (ref 3.4–5.1)
PROT SERPL-MCNC: 5.9 G/DL (ref 5.9–7.9)
PTH-INTACT SERPL-MCNC: 59.4 PG/ML (ref 18.5–88)
RBC # BLD AUTO: 3.16 X10*6/UL (ref 4–5.2)
SODIUM SERPL-SCNC: 134 MMOL/L (ref 133–145)
TIBC SERPL-MCNC: 183 UG/DL (ref 228–428)
UIBC SERPL-MCNC: 144 UG/DL (ref 110–370)
WBC # BLD AUTO: 7.8 X10*3/UL (ref 4.4–11.3)

## 2024-06-13 PROCEDURE — 83540 ASSAY OF IRON: CPT

## 2024-06-13 PROCEDURE — 85025 COMPLETE CBC W/AUTO DIFF WBC: CPT | Performed by: NURSE PRACTITIONER

## 2024-06-13 PROCEDURE — 82728 ASSAY OF FERRITIN: CPT

## 2024-06-13 PROCEDURE — 84100 ASSAY OF PHOSPHORUS: CPT

## 2024-06-13 PROCEDURE — 36415 COLL VENOUS BLD VENIPUNCTURE: CPT

## 2024-06-13 PROCEDURE — 80053 COMPREHEN METABOLIC PANEL: CPT

## 2024-06-13 PROCEDURE — 83615 LACTATE (LD) (LDH) ENZYME: CPT

## 2024-06-13 PROCEDURE — 83550 IRON BINDING TEST: CPT

## 2024-06-13 PROCEDURE — 83970 ASSAY OF PARATHORMONE: CPT

## 2024-06-14 ENCOUNTER — APPOINTMENT (OUTPATIENT)
Dept: PHYSICAL THERAPY | Facility: CLINIC | Age: 84
End: 2024-06-14
Payer: MEDICARE

## 2024-06-14 NOTE — PROGRESS NOTES
This is an 83 year old female for HOSP FU visit  MAY CODE 14 day TCM and done.    This 84 year old female with COMPLEX PmHx is here for HOSP FU visit for multiple issues including:      Hx KNEE REPLACEMENT April 2024  KNEE SURGERY COMPLICATED by URINARY RETENTION and RENAL FAILURE  SUPPLEMENTAL OXYGEN required  Hx FALL--wound dehiscience and Rib Fxs and NSTEMI  HYPOXIC  RIGHT RIB Fxs June 9th 2024   Hx CLL    DM in care of Dr Ramirez  Lab Results   Component Value Date    HGBA1C 6.7 (H) 04/03/2024         HOSP NOTES:    CARMEN Barillas-CNP   Nurse Practitioner  Internal Medicine     Discharge Summary     Signed     Date of Service: 6/11/2024  5:12 PM     Signed         Discharge Diagnosis  Fracture of one rib, right side, initial encounter for closed fracture     Issues Requiring Follow-Up  Follow-up with orthopedic surgery, ID, wound care center, PCP, and urology outpatient                                Hospital Course   Bonnie Jules is a 83 y.o. female with a past medical history of CAD s/p CABG, HTN, DM, hypothyroidism, HLD, OA, PVD, and recent right TKR in April who presented to the emergency department after a fall. Patient has a wound on her RLE that is being followed by ID Dr. Pickering outpatient. She has a surgical shoe that she wears on this foot. States that she must have tripped on her surgical shoe causing her to fall. States that she was holding her coffee in one hand when she tripped and fell onto her right side hitting her right surgical knee and shoulder. Her right surgical wound dehisced and she was found to have several fractured ribs. Her knee was sutured in the ED. She had a right total knee replacement in April by Dr. Liriano. During that hospitalization she had an NSTEMI, urinary retention resulting in acute renal failure, and hypoxia. She was stablized and discharge to SNF for two weeks. She has been home and doing well with therapy up until today. She does report significant  reproducible pain to her right chest. In the ED: XR right knee showed no acute fracture, s/p TKA with intact hardware. XR pelvis and shoulder negative for fracture. CT cervical spine and head unremarkable. CT chest did show multiple acute nondisplaced or minimally displaced right rib fractures. Fullness of the central biliary tree s/p jairo, recommend correlate with LFTs. She was given robaxin in the ED. Seen by trauma surgery, no surgery recommended. Admitted to [facility] for further evaluation and treatment.      Patient was treated for pain.  Seen by PT/OT who recommended low intensity rehab.  Infectious disease has been following for her lower extremity wounds.  Home oral antibiotics were continued and will continue on discharge.  Cleared by infectious disease for discharge with follow-up at the wound care center next week.  She did have urinary retention which is chronic in nature.  A Layton was placed.  She was seen by urology who recommends removing Layton prior to discharge and patient can resume intermittent cath at home.  Dr. Liriano was consulted.  Imaging reviewed.  Cleared to resume PT/OT.  Patient was mildly hyperkalemic, lisinopril dose decreased.  Cleared for discharge by orthopedic surgery with outpatient follow-up.  Patient is stable for discharge home today.  Patient request outpatient PT/OT, referral sent.    SPECIALTY CONSULT:    CARMEN Braswell-CNP   Nurse Practitioner  Oncology     Progress Notes     Signed     Encounter Date: 6/18/2024     Signed         Patient ID: Bonnie Jules is a 83 y.o. female.     Treatment Synopsis:  1. 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  2. 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  3. Status post admission to [facility] for syncope and anemia in December of 2012.  4. Status post admission to [facility] for  lightheadedness and anemia in June of 2013.  5. 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  6. Started IVIg 0.4 mg/kg monthly on 02/07/2014.  7. Bendamustine started on 02/12/2014, stopped in April of 2014, secondary to side effects.  8. Started rituximab on 02/19/2014.     Interval History:  Patient returns today for follow-up evaluation for history of lymphocytosis secondary to combination of CLL and/or second population of CD5 positive lymphocytes of uncertain significance.  Most recently having received treatment with rituximab in February of 2014.       June 18, 2024  On presentation today she appears fatigued. She is accompanied by her daughter. She denies any fever, chills or night sweats.  No cough, chest pain or shortness of breath.  No nausea or vomiting.  No constipation or diarrhea.  No b-symptoms. No current sign or symptoms of active bleeding or unusual bruising, chronic anemia.       Since our last visit she underwent right total knee replacement on April 17,2024). During hospitalization she developed urinary retention which resulted in kidney failure. She then developed hypoxia and required supplemental oxygen (resolved). She was diagnosed with a non-STEMI by the cardiology.   Unfortunately she then fell walking outside on June 9, 2024 resulting in closed fracture of multiple ribs on right side. Surgical wound dehisced. She is also being followed by Dr. Pickering for a wound on her right foot which developed around time of knee replacement.                     ROS is NEG for HEADACHE, NAUSEA, VOMITING, DIARRHEA, CHEST PAIN, SOB, and BLEEDING and as further REVIEWED BELOW.      Subjective   Bonnie Jules is a 84 y.o. female who presents for Hospital Discharge  (Admitted to [facility] 6/9/2024 Fell over shoes she was wearing that were too big. Closed fx multiple ribs R side Discharged 6/11/24 Pt doing PT and it is going well therapist working on gait  balance.) and FYI ( slowly weaning pt off of gabapentin. ).    HPI:    Per nursing intake, pt here for Hospital Discharge  (Admitted to [facility] 6/9/2024 Fell over shoes she was wearing that were too big. Closed fx multiple ribs R side Discharged 6/11/24 Pt doing PT and it is going well therapist working on gait balance.) and FYI ( slowly weaning pt off of gabapentin. )       Review of systems is essentially negative for all systems except for any identified issues in HPI above.    Objective     /62   Pulse 83   Temp 36.7 °C (98 °F)   Wt 58.1 kg (128 lb)   LMP  (LMP Unknown)   SpO2 97%   BMI 25.00 kg/m²      Subjective   Bonnie Jules is a 84 y.o. female who presents for Hospital Discharge  (Admitted to [facility] 6/9/2024 Fell over shoes she was wearing that were too big. Closed fx multiple ribs R side Discharged 6/11/24 Pt doing PT and it is going well therapist working on gait balance.) and FYI ( slowly weaning pt off of gabapentin. ).    HPI:    Per nursing intake, pt here for Hospital Discharge  (Admitted to [facility] 6/9/2024 Fell over shoes she was wearing that were too big. Closed fx multiple ribs R side Discharged 6/11/24 Pt doing PT and it is going well therapist working on gait balance.) and FYI ( slowly weaning pt off of gabapentin. )       Review of systems is essentially negative for all systems except for any identified issues in HPI above.    Objective     /62   Pulse 83   Temp 36.7 °C (98 °F)   Wt 58.1 kg (128 lb)   LMP  (LMP Unknown)   SpO2 97%   BMI 25.00 kg/m²      Physical Examination:       GENERAL           General Appearance: well-appearing, well-developed, well-hydrated, well-nourished, no acute distress.        HEENT           NECK supple, no masses or thyromegaly, no carotid bruit.        EYES           Extraocular Movements: normal, bilateral eyes ZORAIDA, no conjunctival injection.        HEART           Rate and Rhythm regular  rate and rhythm. Heart sounds: normal S1S2, no S3 or S4. Murmurs: none.        CHEST           Breath sounds: Clear to IPPA, RR<16 no use of accessory muscles.        ABDOMEN           General: Neg for LKKS or masses, no scleral icterus or jaundice.        MUSCULOSKELETAL           Joints Demonstration: Neg for erythema, swelling or joint deformities. gross abnormalities no gross abnormalities.        EXTREMITIES           Lower Extremities: Neg for cyanosis, clubbing or edema.       Assessment/Plan   Has ONGOING FU with WOUND CARE Dr Pickering and Dr Liriano  Had already been seen by CARDIOLOGY Dr Araujo for NSTEMI  HAS 3 month FU with NEPRHOLOGY  Has regular FU with HEMATOLOGY for CLL  URINARY RETENTION has VIRTUAL visit later today    Problem List Items Addressed This Visit       Acute non-ST elevation myocardial infarction (NSTEMI) (Multi)    Urinary retention    Fracture of rib     Other Visit Diagnoses       Hospital discharge follow-up    -  Primary    Fall, subsequent encounter        Hypoxia        Renal failure, unspecified chronicity        NSTEMI (non-ST elevated myocardial infarction) (Multi)                FOLLOW UP:  PRN and as specified above         Ebonie Curtis M.D.   Assessment/Plan   Problem List Items Addressed This Visit       Acute non-ST elevation myocardial infarction (NSTEMI) (Multi)    Urinary retention    Fracture of rib     Other Visit Diagnoses       Hospital discharge follow-up    -  Primary    Fall, subsequent encounter        Hypoxia        Renal failure, unspecified chronicity        NSTEMI (non-ST elevated myocardial infarction) (Multi)                FOLLOW UP:   YEARLY for ROUTINE MEDICAL and PRN for other issues as discussed in visit         Ebonie Curtis M.D.

## 2024-06-17 ENCOUNTER — OFFICE VISIT (OUTPATIENT)
Dept: ORTHOPEDIC SURGERY | Facility: CLINIC | Age: 84
End: 2024-06-17
Payer: MEDICARE

## 2024-06-17 ENCOUNTER — TREATMENT (OUTPATIENT)
Dept: PHYSICAL THERAPY | Facility: CLINIC | Age: 84
End: 2024-06-17
Payer: MEDICARE

## 2024-06-17 VITALS — WEIGHT: 128 LBS | BODY MASS INDEX: 25.13 KG/M2 | HEIGHT: 60 IN

## 2024-06-17 DIAGNOSIS — M25.561 RIGHT KNEE PAIN, UNSPECIFIED CHRONICITY: Primary | ICD-10-CM

## 2024-06-17 DIAGNOSIS — Z96.651 STATUS POST RIGHT KNEE REPLACEMENT: ICD-10-CM

## 2024-06-17 DIAGNOSIS — Z96.651 HISTORY OF TOTAL RIGHT KNEE REPLACEMENT: ICD-10-CM

## 2024-06-17 PROCEDURE — 1159F MED LIST DOCD IN RCRD: CPT | Performed by: ORTHOPAEDIC SURGERY

## 2024-06-17 PROCEDURE — 1036F TOBACCO NON-USER: CPT | Performed by: ORTHOPAEDIC SURGERY

## 2024-06-17 PROCEDURE — 1125F AMNT PAIN NOTED PAIN PRSNT: CPT | Performed by: ORTHOPAEDIC SURGERY

## 2024-06-17 PROCEDURE — 99024 POSTOP FOLLOW-UP VISIT: CPT | Performed by: ORTHOPAEDIC SURGERY

## 2024-06-17 PROCEDURE — 1111F DSCHRG MED/CURRENT MED MERGE: CPT | Performed by: ORTHOPAEDIC SURGERY

## 2024-06-17 ASSESSMENT — LIFESTYLE VARIABLES
HOW OFTEN DO YOU HAVE A DRINK CONTAINING ALCOHOL: NEVER
HOW OFTEN DO YOU HAVE SIX OR MORE DRINKS ON ONE OCCASION: NEVER
AUDIT-C TOTAL SCORE: 0
HAVE YOU OR SOMEONE ELSE BEEN INJURED AS A RESULT OF YOUR DRINKING: NO
AUDIT TOTAL SCORE: 0
HOW OFTEN DO YOU HAVE SIX OR MORE DRINKS ON ONE OCCASION: NEVER
AUDIT-C TOTAL SCORE: 0
HOW MANY STANDARD DRINKS CONTAINING ALCOHOL DO YOU HAVE ON A TYPICAL DAY: PATIENT DOES NOT DRINK
HOW MANY STANDARD DRINKS CONTAINING ALCOHOL DO YOU HAVE ON A TYPICAL DAY: PATIENT DOES NOT DRINK
SKIP TO QUESTIONS 9-10: 1
HAS A RELATIVE, FRIEND, DOCTOR, OR ANOTHER HEALTH PROFESSIONAL EXPRESSED CONCERN ABOUT YOUR DRINKING OR SUGGESTED YOU CUT DOWN: NO
HOW OFTEN DO YOU HAVE A DRINK CONTAINING ALCOHOL: NEVER
SKIP TO QUESTIONS 9-10: 1

## 2024-06-17 ASSESSMENT — PAIN - FUNCTIONAL ASSESSMENT: PAIN_FUNCTIONAL_ASSESSMENT: 0-10

## 2024-06-17 ASSESSMENT — PAIN DESCRIPTION - DESCRIPTORS: DESCRIPTORS: ACHING;DULL

## 2024-06-17 ASSESSMENT — PAIN SCALES - GENERAL
PAINLEVEL: 2
PAINLEVEL_OUTOF10: 2

## 2024-06-17 ASSESSMENT — ENCOUNTER SYMPTOMS
DEPRESSION: 0
LOSS OF SENSATION IN FEET: 0
OCCASIONAL FEELINGS OF UNSTEADINESS: 1

## 2024-06-17 NOTE — PROGRESS NOTES
Pt arrived for PT with reports of a fall about a week ago and opened her R knee incision that required 7 stiches and broke some ribs along her R side. Advised pt to see MD before continuing PT based on the appearance of the incision. Pt will F/U with surgeon before her next PT session.

## 2024-06-17 NOTE — PROGRESS NOTES
Subjective      Past Surgical History:   Procedure Laterality Date    CARPAL TUNNEL RELEASE Bilateral     CATARACT EXTRACTION W/ INTRAOCULAR LENS  IMPLANT, BILATERAL      Left 8/10/2017, right 8/31/2017    CHOLECYSTECTOMY      COLONOSCOPY      CORONARY ARTERY BYPASS GRAFT  2005    X 3    HYSTERECTOMY      KNEE SURGERY Right 04/17/2024    total    OTHER SURGICAL HISTORY      Bilateral heel spurs    OTHER SURGICAL HISTORY Right 01/2016    Femoral endarterectomy    TOTAL HIP ARTHROPLASTY Left 2012        Patient History      This patient is s/p right total knee replacement done by myself on 4-. They present today and state that their right knee pain is 3/10. She has returned home from [facility]. She was recently hospitalized after a fall onto her right knee. She suffered a laceration to her right knee. She is participating with outpatient physical therapy.  They are slowly resuming their normal activities of daily living. She is following up with Dr. ALVARADO for a right heel pressure ulcer. She is taking antibiotics.  She is seen today using a cane for support. They deny chest pain, shortness of breath.     There were no vitals taken for this visit.     ROS  CARDIOLOGY:   Negative for chest pain, shortness of breath.   RESPIRATORY:   Negative for chest pain, shortness of breath.   MUSCULOSKELETAL:   See HPI for details.   NEUROLOGY:   Negative for tingling, numbness, weakness.      Physical exam: Right knee: The incision is clean dry and well-healing.  No evidence of infection.  The patient has painless range of motion from 5 to 102 degrees.  Nontender in the right calf.  Neurovascular is intact.  Compartments are soft.  The patient is seen today walking with use of a cane for support.  She has a dressing and darco shoe in place for the right heel pressure ulcer.     NM Lung Perfusion    Result Date: 4/19/2024  Interpreted By:  Demarcus Beckham, STUDY: NM LUNG PERFUSION;  4/19/2024 3:43 pm   INDICATION:  Signs/Symptoms:rule out PE.   COMPARISON: Chest radiograph on 04/19/2024   ACCESSION NUMBER(S): [id number]   ORDERING CLINICIAN: KLAUS HANCOCK   TECHNIQUE: DIVISION OF NUCLEAR MEDICINE PERFUSION LUNG SCAN   Multiple perfusion images of the lungs were obtained after the intravenous administration of 4.2 mCi of Tc-99m MAA. SPECT/CT images of the lungs were also obtained.     FINDINGS: Perfusion images demonstrate mild heterogeneity within both lungs. No distinct wedge-shaped subsegmental or segmental perfusion defect seen on SPECT CT to suggest acute pulmonary embolism (low probability).   Low-dose CT demonstrates small bilateral pleural effusions along with bibasilar atelectasis       1.  No distinct wedge-shaped subsegmental or segmental perfusion defect seen on SPECT CT to suggest acute pulmonary embolism (low probability). 2. Low-dose CT demonstrates small bilateral pleural effusions along with bibasilar atelectasis.   I personally reviewed the images/study. This study was interpreted at [place], [place].   MACRO: None   Signed by: Demarcus Beckham 4/19/2024 4:03 PM Dictation workstation:   [id number]    XR chest 1 view    Result Date: 4/19/2024  Interpreted By:  Marco Hollis, STUDY: XR CHEST 1 VIEW; 4/19/2024 11:53 am   INDICATION: Signs/Symptoms:dyspnea.   COMPARISON: 04/18/2024   ACCESSION NUMBER(S): [id number]   ORDERING CLINICIAN: THEODORE NAGY   TECHNIQUE: 1 view of the chest was performed.   FINDINGS: The lungs are adequately inflated. No acute consolidation. No significant pleural effusion. No pneumothorax.  The cardiomediastinal silhouette is within normal limits. Median sternotomy wires are intact. Old posterior right rib fractures are again noted. Small, less than 2 cm metallic wire is also again seen above the proximal aspect of the left clavicle.       Stable examination. No acute cardiopulmonary disease.   Signed by: Marco Hollis 4/19/2024 1:19  PM Dictation workstation:   [id number]    XR knee right 1-2 views    Result Date: 4/17/2024  Interpreted By:  Avni Correa, STUDY: XR KNEE RIGHT 1-2 VIEWS; ;  4/17/2024 12:13 pm   INDICATION: Signs/Symptoms:Post op knee.   COMPARISON: None.   ACCESSION NUMBER(S): [id number]   ORDERING CLINICIAN: KLAUS HANCOCK   FINDINGS: Right knee, two views   Interval right total knee arthroplasty in place. No periprosthetic fracture lucency seen. There is no dislocation. Postsurgical change the soft tissue. There is a moderate-sized effusion       No immediate complication after right total knee arthroplasty     MACRO: None   Signed by: Avni Correa 4/17/2024 12:33 PM Dictation workstation:   [id number]  I reviewed the x-rays and imaging studies listed above with the patient and her daughter in the office today    oBnnie was seen today for post-op.  Diagnoses and all orders for this visit:  Right knee pain, unspecified chronicity (Primary)  History of total right knee replacement  Options are discussed with the patient in detail. The patient is instructed to continue with physical therapy.  She is instructed regarding total knee infection precautions and fall precautions and to use a walker for support. She is instructed to follow up with infectious disease specialist as scheduled.  The patient is instructed regarding activity modification and risk for further injury with falling or trauma, ice, provider directed at home gentle strengthening and ROM exercises, and the appropriate use of Tylenol as needed for pain with its potential adverse reactions and side effects. The patient understands.  Today the knee laceration is cleansed and a sterile dressing is applied.  The patient and her daughter are instructed regarding wound care.  Return in 1 week for reevaluation and likely suture removal or sooner as needed.  Please note that this report has been produced using speech recognition software.  It may contain errors  related to grammar, punctuation or spelling.  Electronically signed, but not reviewed.

## 2024-06-18 ENCOUNTER — OFFICE VISIT (OUTPATIENT)
Dept: HEMATOLOGY/ONCOLOGY | Facility: CLINIC | Age: 84
End: 2024-06-18
Payer: MEDICARE

## 2024-06-18 ENCOUNTER — LAB (OUTPATIENT)
Dept: LAB | Facility: CLINIC | Age: 84
End: 2024-06-18
Payer: MEDICARE

## 2024-06-18 ENCOUNTER — APPOINTMENT (OUTPATIENT)
Dept: OBSTETRICS AND GYNECOLOGY | Facility: CLINIC | Age: 84
End: 2024-06-18
Payer: MEDICARE

## 2024-06-18 VITALS
BODY MASS INDEX: 25.08 KG/M2 | RESPIRATION RATE: 18 BRPM | HEART RATE: 86 BPM | TEMPERATURE: 97.8 F | SYSTOLIC BLOOD PRESSURE: 160 MMHG | OXYGEN SATURATION: 96 % | DIASTOLIC BLOOD PRESSURE: 83 MMHG | WEIGHT: 128.42 LBS

## 2024-06-18 DIAGNOSIS — D64.9 ANEMIA, UNSPECIFIED TYPE: ICD-10-CM

## 2024-06-18 DIAGNOSIS — C91.10 CLL (CHRONIC LYMPHOCYTIC LEUKEMIA) (MULTI): Primary | ICD-10-CM

## 2024-06-18 PROCEDURE — 1111F DSCHRG MED/CURRENT MED MERGE: CPT | Performed by: NURSE PRACTITIONER

## 2024-06-18 PROCEDURE — 99214 OFFICE O/P EST MOD 30 MIN: CPT | Performed by: NURSE PRACTITIONER

## 2024-06-18 PROCEDURE — 3079F DIAST BP 80-89 MM HG: CPT | Performed by: NURSE PRACTITIONER

## 2024-06-18 PROCEDURE — 3077F SYST BP >= 140 MM HG: CPT | Performed by: NURSE PRACTITIONER

## 2024-06-18 PROCEDURE — 1160F RVW MEDS BY RX/DR IN RCRD: CPT | Performed by: NURSE PRACTITIONER

## 2024-06-18 PROCEDURE — 1159F MED LIST DOCD IN RCRD: CPT | Performed by: NURSE PRACTITIONER

## 2024-06-18 PROCEDURE — 1125F AMNT PAIN NOTED PAIN PRSNT: CPT | Performed by: NURSE PRACTITIONER

## 2024-06-18 ASSESSMENT — PAIN SCALES - GENERAL: PAINLEVEL: 3

## 2024-06-18 NOTE — PROGRESS NOTES
Patient ID: Bonnie Jules is a 83 y.o. female.    Treatment Synopsis:  1. 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  2. 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  3. Status post admission to [facility] for syncope and anemia in December of 2012.  4. Status post admission to [facility] for lightheadedness and anemia in June of 2013.  5. 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  6. Started IVIg 0.4 mg/kg monthly on 02/07/2014.  7. Bendamustine started on 02/12/2014, stopped in April of 2014, secondary to side effects.  8. Started rituximab on 02/19/2014.    Interval History:  Patient returns today for follow-up evaluation for history of lymphocytosis secondary to combination of CLL and/or second population of CD5 positive lymphocytes of uncertain significance.  Most recently having received treatment with rituximab in February of 2014.      June 18, 2024  On presentation today she appears fatigued. She is accompanied by her daughter. She denies any fever, chills or night sweats.  No cough, chest pain or shortness of breath.  No nausea or vomiting.  No constipation or diarrhea.  No b-symptoms. No current sign or symptoms of active bleeding or unusual bruising, chronic anemia.      Since our last visit she underwent right total knee replacement on April 17,2024). During hospitalization she developed urinary retention which resulted in kidney failure. She then developed hypoxia and required supplemental oxygen (resolved). She was diagnosed with a non-STEMI by the cardiology.   Unfortunately she then fell walking outside on June 9, 2024 resulting in closed fracture of multiple ribs on right side. Surgical wound dehisced. She is also being followed by Dr. Pickering for a wound on her right foot which developed around time of knee  replacement.       Review of Systems:  A review of systems has been completed and are negative for complaints except what is stated in the HPI and/or past medical history    Allergies:  Reviewed in EMR    Medications:  Medication list reviewed with patient and updated in EMR    Past Medical History:  Chronic back pain, osteoarthritis, hypertension, coronary artery disease, diabetes mellitus, history of herpes zoster, CLL (chronic lymphocytic leukemia)    Past Surgical History:  total hip replacement, hysterectomy, cholecystectomy, coronary artery bypass surgery, right total knee replacement    Vital Signs:  /83 (BP Location: Left arm, Patient Position: Sitting, BP Cuff Size: Small adult)   Pulse 86   Temp 36.6 °C (97.8 °F) (Temporal)   Resp 18   Wt 58.3 kg (128 lb 6.7 oz)   LMP  (LMP Unknown)   SpO2 96%   BMI 25.08 kg/m²     Physical Exam:  ECO-1  Pain: chronic knee pain, right  Constitutional: Well developed, awake/alert/oriented x3, no distress, alert and cooperative  Eyes: PER. sclera anicteric  ENMT: Oral mucosa moist  Respiratory/Thorax: Breathing is non-labored. Lungs are clear to auscultation bilaterally. No adventitious breath sounds  Cardiovascular: S1-S2. Regular rate and rhythm. No murmurs, rubs, or gallops appreciated  Gastrointestinal: Abdomen soft nontender, nondistended, normal active bowel sounds.  Musculoskeletal: ROM intact, no joint swelling, normal strength  Extremities: normal extremities, no cyanosis, no edema, no clubbing  Neurologic: alert and oriented x3. Nonfocal exam. No myoclonus  Psychological: Pleasant, appropriate and easily engaged     Labs:  CBC date/time       WBC     HGB     HCT     PLT     Neut      2023 13:13   10.1    12.7    38.7    276     4.14  14-Mar-2023 12:41   6.5     12.2    37.9    269     2.01  2022 12:50   5.6     10.9(L) 33.9(L) 325     1.49(L)    2023  WBC 6.7, hemoglobin 11.3, hematocrit 34.4, platelets  247,000    November 15, 2023  WBC 6.6, hemoglobin 10.9, hematocrit 33.6, platelets 236,000    June 13, 2024  WBC 7.8, hemoglobin 9.9, hematocrit 31.1, platelets 295,000, absolute lymphocytes 4.33  Creat 1.10, eGFR 51  Iron 39, TIBC 183, % sat 21, Ferritin 402      Assessment:  CLL:  The patient will continue with observation alone at this time. No further treatment is planned at this time.  If she has a relapse, we could consider using venetoclax plus Gazyva.    Anemia:  Chronic in nature. Waxes and wanes.   Has required IV iron in the past.     Immunodeficiency:  No recent infections.   The patient will continue with observation alone without IVIg.    Plan:  - 3 month follow-up  - labs on return  - - - CBCd, CMP, LDH      Miquel Hull, CARMEN-CNP

## 2024-06-20 ENCOUNTER — APPOINTMENT (OUTPATIENT)
Dept: PHYSICAL THERAPY | Facility: CLINIC | Age: 84
End: 2024-06-20
Payer: MEDICARE

## 2024-06-24 ENCOUNTER — OFFICE VISIT (OUTPATIENT)
Dept: ORTHOPEDIC SURGERY | Facility: CLINIC | Age: 84
End: 2024-06-24
Payer: MEDICARE

## 2024-06-24 ENCOUNTER — OFFICE VISIT (OUTPATIENT)
Dept: WOUND CARE | Facility: HOSPITAL | Age: 84
End: 2024-06-24
Payer: MEDICARE

## 2024-06-24 VITALS — WEIGHT: 128 LBS | BODY MASS INDEX: 25.13 KG/M2 | HEIGHT: 60 IN

## 2024-06-24 DIAGNOSIS — Z96.651 HISTORY OF TOTAL RIGHT KNEE REPLACEMENT: ICD-10-CM

## 2024-06-24 DIAGNOSIS — M25.561 RIGHT KNEE PAIN, UNSPECIFIED CHRONICITY: Primary | ICD-10-CM

## 2024-06-24 PROCEDURE — 11042 DBRDMT SUBQ TIS 1ST 20SQCM/<: CPT

## 2024-06-24 PROCEDURE — 99024 POSTOP FOLLOW-UP VISIT: CPT | Performed by: ORTHOPAEDIC SURGERY

## 2024-06-24 PROCEDURE — 1111F DSCHRG MED/CURRENT MED MERGE: CPT | Performed by: ORTHOPAEDIC SURGERY

## 2024-06-24 PROCEDURE — 1036F TOBACCO NON-USER: CPT | Performed by: ORTHOPAEDIC SURGERY

## 2024-06-24 PROCEDURE — 1125F AMNT PAIN NOTED PAIN PRSNT: CPT | Performed by: ORTHOPAEDIC SURGERY

## 2024-06-24 PROCEDURE — 1159F MED LIST DOCD IN RCRD: CPT | Performed by: ORTHOPAEDIC SURGERY

## 2024-06-24 ASSESSMENT — LIFESTYLE VARIABLES
SKIP TO QUESTIONS 9-10: 1
AUDIT TOTAL SCORE: 0
HOW OFTEN DO YOU HAVE A DRINK CONTAINING ALCOHOL: NEVER
AUDIT-C TOTAL SCORE: 0
HOW OFTEN DO YOU HAVE SIX OR MORE DRINKS ON ONE OCCASION: NEVER
HAVE YOU OR SOMEONE ELSE BEEN INJURED AS A RESULT OF YOUR DRINKING: NO
HOW MANY STANDARD DRINKS CONTAINING ALCOHOL DO YOU HAVE ON A TYPICAL DAY: PATIENT DOES NOT DRINK
HAS A RELATIVE, FRIEND, DOCTOR, OR ANOTHER HEALTH PROFESSIONAL EXPRESSED CONCERN ABOUT YOUR DRINKING OR SUGGESTED YOU CUT DOWN: NO

## 2024-06-24 ASSESSMENT — ENCOUNTER SYMPTOMS
DEPRESSION: 0
OCCASIONAL FEELINGS OF UNSTEADINESS: 1
LOSS OF SENSATION IN FEET: 0

## 2024-06-24 ASSESSMENT — PAIN SCALES - GENERAL
PAINLEVEL: 3
PAINLEVEL_OUTOF10: 3

## 2024-06-24 ASSESSMENT — PAIN DESCRIPTION - DESCRIPTORS: DESCRIPTORS: DISCOMFORT;SQUEEZING

## 2024-06-24 ASSESSMENT — PAIN - FUNCTIONAL ASSESSMENT: PAIN_FUNCTIONAL_ASSESSMENT: 0-10

## 2024-06-24 NOTE — PROGRESS NOTES
Subjective      Past Surgical History:   Procedure Laterality Date    CARPAL TUNNEL RELEASE Bilateral     CATARACT EXTRACTION W/ INTRAOCULAR LENS  IMPLANT, BILATERAL      Left 8/10/2017, right 8/31/2017    CHOLECYSTECTOMY      COLONOSCOPY      CORONARY ARTERY BYPASS GRAFT  2005    X 3    HYSTERECTOMY      KNEE SURGERY Right 04/17/2024    total    OTHER SURGICAL HISTORY      Bilateral heel spurs    OTHER SURGICAL HISTORY Right 01/2016    Femoral endarterectomy    TOTAL HIP ARTHROPLASTY Left 2012        Patient History      This patient is s/p right total knee replacement done by myself on 4-. They present today and state that their right knee pain is 3/10. She has returned home from [facility]. She was recently hospitalized after a fall onto her right knee. She suffered a laceration to her right knee. She is participating with outpatient physical therapy.  They are slowly resuming their normal activities of daily living. She is following up with Dr. ALVARADO for a right heel pressure ulcer. She is taking antibiotics.  She is seen today using a cane for support. They deny chest pain, shortness of breath.     There were no vitals taken for this visit.     ROS  CARDIOLOGY:   Negative for chest pain, shortness of breath.   RESPIRATORY:   Negative for chest pain, shortness of breath.   MUSCULOSKELETAL:   See HPI for details.   NEUROLOGY:   Negative for tingling, numbness, weakness.      Physical exam: Right knee: The incision is clean dry and well-healing. The sutures are removed in office today.  No evidence of infection.  The patient has painless range of motion from 5 to 102 degrees.  Nontender in the right calf.  Neurovascular is intact.  Compartments are soft.  The patient is seen today walking with use of a cane for support.  She has a dressing and darco shoe in place for the right heel pressure ulcer.     NM Lung Perfusion    Result Date: 4/19/2024  Interpreted By:  Demarcus Beckham, STUDY: NM LUNG PERFUSION;   4/19/2024 3:43 pm   INDICATION: Signs/Symptoms:rule out PE.   COMPARISON: Chest radiograph on 04/19/2024   ACCESSION NUMBER(S): [id number]   ORDERING CLINICIAN: KLAUS HANCOCK   TECHNIQUE: DIVISION OF NUCLEAR MEDICINE PERFUSION LUNG SCAN   Multiple perfusion images of the lungs were obtained after the intravenous administration of 4.2 mCi of Tc-99m MAA. SPECT/CT images of the lungs were also obtained.     FINDINGS: Perfusion images demonstrate mild heterogeneity within both lungs. No distinct wedge-shaped subsegmental or segmental perfusion defect seen on SPECT CT to suggest acute pulmonary embolism (low probability).   Low-dose CT demonstrates small bilateral pleural effusions along with bibasilar atelectasis       1.  No distinct wedge-shaped subsegmental or segmental perfusion defect seen on SPECT CT to suggest acute pulmonary embolism (low probability). 2. Low-dose CT demonstrates small bilateral pleural effusions along with bibasilar atelectasis.   I personally reviewed the images/study. This study was interpreted at [place], [place].   MACRO: None   Signed by: Demarcus Beckham 4/19/2024 4:03 PM Dictation workstation:   [id number]    XR chest 1 view    Result Date: 4/19/2024  Interpreted By:  Marco Hollis, STUDY: XR CHEST 1 VIEW; 4/19/2024 11:53 am   INDICATION: Signs/Symptoms:dyspnea.   COMPARISON: 04/18/2024   ACCESSION NUMBER(S): [id number]   ORDERING CLINICIAN: THEODORE NAGY   TECHNIQUE: 1 view of the chest was performed.   FINDINGS: The lungs are adequately inflated. No acute consolidation. No significant pleural effusion. No pneumothorax.  The cardiomediastinal silhouette is within normal limits. Median sternotomy wires are intact. Old posterior right rib fractures are again noted. Small, less than 2 cm metallic wire is also again seen above the proximal aspect of the left clavicle.       Stable examination. No acute cardiopulmonary disease.   Signed by:  Marco Hollis 4/19/2024 1:19 PM Dictation workstation:   [id number]    XR knee right 1-2 views    Result Date: 4/17/2024  Interpreted By:  Avni Correa, STUDY: XR KNEE RIGHT 1-2 VIEWS; ;  4/17/2024 12:13 pm   INDICATION: Signs/Symptoms:Post op knee.   COMPARISON: None.   ACCESSION NUMBER(S): [id number]   ORDERING CLINICIAN: KLAUS HANCOCK   FINDINGS: Right knee, two views   Interval right total knee arthroplasty in place. No periprosthetic fracture lucency seen. There is no dislocation. Postsurgical change the soft tissue. There is a moderate-sized effusion       No immediate complication after right total knee arthroplasty     MACRO: None   Signed by: Avni Correa 4/17/2024 12:33 PM Dictation workstation:   [id number]  I reviewed the x-rays and imaging studies listed above with the patient and her daughter in the office today    Bonnie was seen today for post-op.  Diagnoses and all orders for this visit:  Right knee pain, unspecified chronicity (Primary)  History of total right knee replacement  Options are discussed with the patient in detail. The patient is instructed to continue with physical therapy.  She is instructed regarding total knee infection precautions and fall precautions and to use a walker for support. She is instructed to follow up with infectious disease specialist as scheduled.  The patient is instructed regarding activity modification and risk for further injury with falling or trauma, ice, provider directed at home gentle strengthening and ROM exercises, and the appropriate use of Tylenol as needed for pain with its potential adverse reactions and side effects. The patient understands.  Today the knee laceration is cleansed and a sterile dressing is applied.  The patient and her daughter are instructed regarding wound care.  Return in 6 weeks or sooner as needed.  Please note that this report has been produced using speech recognition software.  It may contain errors related to  grammar, punctuation or spelling.  Electronically signed, but not reviewed.

## 2024-06-24 NOTE — PATIENT INSTRUCTIONS
Continue to rest, ice and elevate your knee.  Continue your pain regimen.   We will give a a referral to Physical therapy.   Remember to call our office for antibiotics before dental work.   Use a cane or walker for support.   Do not kneel, twist, or squat and avoid heavy lifting.   Return  sooner as needed.   Return PC after 7/16

## 2024-06-24 NOTE — DOCUMENTATION CLARIFICATION NOTE
"    PATIENT:               REBECCA SCOTT  ACCT #:                  [id number]  MRN:                       [id number]  :                       1940  ADMIT DATE:       2024 10:03 AM  DISCH DATE:        2024 5:50 PM  RESPONDING PROVIDER #:        [id number]          PROVIDER RESPONSE TEXT:    Stage 3 Pressure Injury to Right Heel    CDI QUERY TEXT:    Clarification        Instruction:    Based on your assessment of the patient and the clinical information, please provide the requested documentation by clicking on the appropriate radio button and enter any additional information if prompted.    Question: Based on your medical judgment, can you please clarify which of these conditions is the most clinically supported    When answering this query, please exercise your independent professional judgment. The fact that a question is being asked, does not imply that any particular answer is desired or expected.    The patient's clinical indicators include:  Clinical Information: There is conflicting documentation in the medical record which requires clarification.    -The diagnosis of Stage III right heel ulcer was documented on 6/10 by  Robert Pickering MD    -The diagnosis of Diabetic Foot Ulcer  was documented on  by  Wound Care Nurse Lazara Thayer RN    Clinical Indicators:   Wound Care Consult:  \"Wound History: Present on admission. Patient with a chronic diabetic ulcer to Right heel, skin tear to right lower shin and lacerated right knee incision'    Treatment: Doxycycline PO; Insulin; Wound Culture; Offloading; WBC; Accucheck; PT; Dressing Change    Risk Factors: DM 2; CKD; HTN; Elderly; HLD; PVD  Options provided:  -- Stage 3 Pressure Injury to Right Heel  -- Chronic Diabetic Ulcer to Right heel  -- Other - I will add my own diagnosis  -- Refer to Clinical Documentation Reviewer    Query created by: Ariana Vieyra on 2024 2:45 PM      Electronically signed by:  BILLY ALVARADO" AAKASH PERRY 6/24/2024 3:47 PM

## 2024-06-25 ENCOUNTER — APPOINTMENT (OUTPATIENT)
Dept: PRIMARY CARE | Facility: CLINIC | Age: 84
End: 2024-06-25
Payer: MEDICARE

## 2024-06-25 ENCOUNTER — OFFICE VISIT (OUTPATIENT)
Dept: PRIMARY CARE | Facility: CLINIC | Age: 84
End: 2024-06-25
Payer: MEDICARE

## 2024-06-25 ENCOUNTER — TREATMENT (OUTPATIENT)
Dept: PHYSICAL THERAPY | Facility: CLINIC | Age: 84
End: 2024-06-25
Payer: MEDICARE

## 2024-06-25 ENCOUNTER — APPOINTMENT (OUTPATIENT)
Dept: OBSTETRICS AND GYNECOLOGY | Facility: CLINIC | Age: 84
End: 2024-06-25
Payer: MEDICARE

## 2024-06-25 VITALS
BODY MASS INDEX: 25 KG/M2 | OXYGEN SATURATION: 97 % | DIASTOLIC BLOOD PRESSURE: 62 MMHG | WEIGHT: 128 LBS | TEMPERATURE: 98 F | SYSTOLIC BLOOD PRESSURE: 124 MMHG | HEART RATE: 83 BPM

## 2024-06-25 DIAGNOSIS — N19 RENAL FAILURE, UNSPECIFIED CHRONICITY: ICD-10-CM

## 2024-06-25 DIAGNOSIS — S22.41XD CLOSED FRACTURE OF MULTIPLE RIBS OF RIGHT SIDE WITH ROUTINE HEALING, SUBSEQUENT ENCOUNTER: ICD-10-CM

## 2024-06-25 DIAGNOSIS — M17.11 PRIMARY OSTEOARTHRITIS OF RIGHT KNEE: Primary | ICD-10-CM

## 2024-06-25 DIAGNOSIS — I21.4 NSTEMI (NON-ST ELEVATED MYOCARDIAL INFARCTION) (MULTI): ICD-10-CM

## 2024-06-25 DIAGNOSIS — Z09 HOSPITAL DISCHARGE FOLLOW-UP: Primary | ICD-10-CM
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
## 2024-06-25 DIAGNOSIS — Z96.651 STATUS POST RIGHT KNEE REPLACEMENT: ICD-10-CM

## 2024-06-25 DIAGNOSIS — R33.9 URINARY RETENTION: ICD-10-CM

## 2024-06-25 DIAGNOSIS — Z87.898 HISTORY OF URINARY RETENTION: Primary | ICD-10-CM

## 2024-06-25 DIAGNOSIS — R09.02 HYPOXIA: ICD-10-CM

## 2024-06-25 DIAGNOSIS — I21.4 ACUTE NON-ST ELEVATION MYOCARDIAL INFARCTION (NSTEMI) (MULTI): ICD-10-CM

## 2024-06-25 PROCEDURE — 99495 TRANSJ CARE MGMT MOD F2F 14D: CPT | Performed by: FAMILY MEDICINE

## 2024-06-25 PROCEDURE — 1111F DSCHRG MED/CURRENT MED MERGE: CPT | Performed by: FAMILY MEDICINE

## 2024-06-25 PROCEDURE — 3074F SYST BP LT 130 MM HG: CPT | Performed by: FAMILY MEDICINE

## 2024-06-25 PROCEDURE — 97112 NEUROMUSCULAR REEDUCATION: CPT | Mod: GP

## 2024-06-25 PROCEDURE — 3078F DIAST BP <80 MM HG: CPT | Performed by: FAMILY MEDICINE

## 2024-06-25 PROCEDURE — 99213 OFFICE O/P EST LOW 20 MIN: CPT | Performed by: OBSTETRICS & GYNECOLOGY

## 2024-06-25 PROCEDURE — 1159F MED LIST DOCD IN RCRD: CPT | Performed by: FAMILY MEDICINE

## 2024-06-25 PROCEDURE — 1036F TOBACCO NON-USER: CPT | Performed by: FAMILY MEDICINE

## 2024-06-25 PROCEDURE — 97110 THERAPEUTIC EXERCISES: CPT | Mod: GP

## 2024-06-25 PROCEDURE — 1126F AMNT PAIN NOTED NONE PRSNT: CPT | Performed by: FAMILY MEDICINE

## 2024-06-25 PROCEDURE — 1111F DSCHRG MED/CURRENT MED MERGE: CPT | Performed by: OBSTETRICS & GYNECOLOGY

## 2024-06-25 ASSESSMENT — ENCOUNTER SYMPTOMS
DEPRESSION: 0
CONSTITUTIONAL NEGATIVE: 1
ENDOCRINE NEGATIVE: 1
PSYCHIATRIC NEGATIVE: 1
EYES NEGATIVE: 1
GASTROINTESTINAL NEGATIVE: 1
RESPIRATORY NEGATIVE: 1
LOSS OF SENSATION IN FEET: 1
NEUROLOGICAL NEGATIVE: 1
OCCASIONAL FEELINGS OF UNSTEADINESS: 1
MUSCULOSKELETAL NEGATIVE: 1
CARDIOVASCULAR NEGATIVE: 1

## 2024-06-25 ASSESSMENT — PAIN SCALES - GENERAL
PAINLEVEL_OUTOF10: 3
PAINLEVEL: 0-NO PAIN

## 2024-06-25 ASSESSMENT — PAIN - FUNCTIONAL ASSESSMENT: PAIN_FUNCTIONAL_ASSESSMENT: 0-10

## 2024-06-25 NOTE — PROGRESS NOTES
Physical Therapy Treatment/Progress Note    Patient Name: Bonnie Jules  MRN: [id number]  Today's Date: 6/25/2024  Time Calculation  Start Time: 1235  Stop Time: 1315  Time Calculation (min): 40 min  PT Therapeutic Procedures Time Entry  Neuromuscular Re-Education Time Entry: 15  Therapeutic Exercise Time Entry: 25    Insurance:  Visit number: 3 of 13  Authorization info: [place]  [place] Type: Payor: MEDICARE / Plan: MEDICARE PART A AND B / Product Type: *No Product type* /     Current Problem   1. Primary osteoarthritis of right knee        2. Status post right knee replacement  Follow Up In Physical Therapy          Subjective   Pt fell about 2 weeks ago and opened her right knee incision requiring 7 stitches to close. She also broke 4 ribs on right side.  Pt saw Dr. Liriano yesterday; stitches were removed and steri-strips put on.  New script from MD states to limit ROM activities and focus on balance.     Precautions  Post-Surgical Precautions: Right total knee precautions     Pain Assessment: 0-10  0-10 (Numeric) Pain Score: 3  Pain Location: Knee  Pain Orientation: Right  Post Treatment Pain Level 3    Objective   ROM right knee 4-96deg  Incision covered by steri strips; no signs of infection    Treatments:  Therapeutic Exercise:   Nustep L4 x 5 min  Mini squats 10x2  Standing hip abd 10x2 r/l  Standing hip ext 10x2 r/l  Side stepping in // bars without UE support  MIP with one hand support  Stepback without UE support 10x r/l  QS 10x2  Abdom brace 10x2  Hooklying hip abd with GTB 10x2  Iso hip add 10x2  SLR 10x2 r    Neuro Re-ed:    Side stepping in // bars without UE support  MIP with one hand support  Stepback without UE support 10x r/l    Assessment   PT Assessment  Assessment Comment: Focused more on standing/balance activities today and limited knee ROM.  Pt demonstrates significant balance impairments and is fall risk.    Plan:    Cont to focus on strengthening and balance to reduce risk of falls.    OP  EDUCATION:  Outpatient Education  Individual(s) Educated: Patient  Education Provided: POC    Goals:   Active       PT Problem       PT Goal 1 (Progressing)       Start:  05/28/24    Expected End:  06/27/24       Pt independent with HEP         PT Goal 2 (Progressing)       Start:  05/28/24    Expected End:  06/27/24       Increase ROM right knee to 0-120deg to allow for normal gait mechanics         PT Goal 3 (Progressing)       Start:  05/28/24    Expected End:  08/26/24       Increase strength right hip to 4/5 to support knee during ambulation         PT Goal 4 (Progressing)       Start:  05/28/24    Expected End:  08/26/24       Improve LEFS to >35/80         Patient Stated Goal 1 (Progressing)       Start:  05/28/24    Expected End:  08/26/24       Pt able to ambulate community distances safely without assistive device in order to walk dog

## 2024-06-26 ENCOUNTER — TELEPHONE (OUTPATIENT)
Dept: WOUND CARE | Facility: HOSPITAL | Age: 84
End: 2024-06-26

## 2024-06-26 NOTE — PROGRESS NOTES
[facility] Department of Urogynecology   Jo-Ann Gonzales MD, MPH   [phone]    ASSESSMENT AND PLAN:   84 year old female with urinary retention due to bladder stretch injury with a PVR of 2,000mL after knee replacement surgery in April 2024 @ [facility]. Comorbidities include: HTN, PVD, hypothyroidism, type 1 diabetes and type 2 diabetes, hx of B-cell lymphocytic leukemia, stage 3 CKD h/o renal failure, hypoxia, NSTEMI, and hx of seizures.      Diagnoses:  #1 Urinary retention (resolved)  #2 Kidney failure  #3 History of bladder stretch injury     Plan:  1. Urinary retention, hx of stretch injury to bladder, kidney failure   - The patient was discharged home from the hospital on 6/11/2024 after falling and in inpatient setting Layton catheter was placed. She reports that she overall feels that she is emptying her bladder better now and rarely has to ISC. Of note, when she ISC her PVR is 100ccs or less confirming that her urinary retention has resolved.   - No longer requiring ISC daily and we reassured her that we do not think she will have retention issues recur in the future unless there is another bladder/stretch injury.   - no need to ISC unless she feels she is in retention again.    Follow up as needed with Dr. Jo-Ann Gonzales.       Virtual visit today: The patient's identity was confirmed and that she is located in [place].   Jo-Ann Gonzales MD MPH identified herself and the patient consented to a telehealth visit today. Telehealth visit time: 8 minutes     Scribe Attestation  By signing my name below, IAsad Scribe, attest that this documentation has been prepared under the direction and in the presence of Jo-Ann Gonzales MD MPH on 06/25/2024 at 10:39 PM.     Problem List Items Addressed This Visit    None  Visit Diagnoses       History of urinary retention    -  Primary           I spent a total of 20 minutes in face to face and non face to face time.     I Dr. Gonzales, personally  performed the services described in the documentation as scribed in my presence and confirm it is both complete and accurate.  Jo-Ann Gonzales MD, MPH, FACOG       Jo-Ann Gonzales MD, MPH, FACOG     Established    HISTORY OF PRESENT ILLNESS:   84 year old female presenting in virtual follow up for urinary retention.     Records Review:  - 6/11/2024 Dr. Gonzales note RE: 83 year old female with urinary retention due to bladder stretch injury with a PVR of 2,000mL after knee replacement surgery in April 2024 and had setback when she fell that required hospitalization. While she was hospitalized they reinserted her Layton catheter but she had been ISC at home and prior to her fall her PVR ranged between 150-200mL each time she catheterized. We informed her that if she had two PVRs that were less than 150mL or less that she could discontinue ISC at home.     Urinary Symptoms:   - The patient was discharged home from the hospital on 6/11/2024 after falling and in inpatient setting Layton catheter was placed. She reports that she overall feels that she is emptying her bladder better now and rarely has to ISC. Of note, when she ISC her PVR is 100ccs or less confirming that her urinary retention has resolved.   - Denies any recurrent or recent UTI.  - No UUI or KYLEE leakage at baseline.       Past Medical History:     Past Medical History:   Diagnosis Date    Anxiety     Benign hypertension     Chronic ischemic colitis (Multi)     With history of sepsis and infectious gastroenteritis 10/7/2022-10/11/2022    Chronic kidney disease, stage III (moderate) (Multi)     CLL (chronic lymphocytic leukemia) (Multi)     Coronary artery disease     CTS (carpal tunnel syndrome)     Diabetes mellitus (Multi)     Essential (primary) hypertension     GI bleed     Hyperlipidemia, unspecified     Hypertension     Hypothyroidism     Hypothyroidism, unspecified type     Mixed hyperlipidemia     NSTEMI (non-ST elevated myocardial infarction) (Multi)      Osteoarthritis     Osteoporosis     Peripheral vascular disease (CMS-HCC)     Right knee pain     Seizure disorder (Multi)     Type 1 diabetes mellitus with hyperglycemia (Multi)        Past Surgical History:     Past Surgical History:   Procedure Laterality Date    CARPAL TUNNEL RELEASE Bilateral     CATARACT EXTRACTION W/ INTRAOCULAR LENS  IMPLANT, BILATERAL      Left 8/10/2017, right 8/31/2017    CHOLECYSTECTOMY      COLONOSCOPY      CORONARY ARTERY BYPASS GRAFT  2005    X 3    HYSTERECTOMY      KNEE SURGERY Right 04/17/2024    total    OTHER SURGICAL HISTORY      Bilateral heel spurs    OTHER SURGICAL HISTORY Right 01/2016    Femoral endarterectomy    TOTAL HIP ARTHROPLASTY Left 2012       Medications:     Prior to Admission medications    Medication Sig Start Date End Date Taking? Authorizing Provider   acetaminophen (Tylenol) 325 mg tablet Take 2 tablets (650 mg) by mouth every 4 hours if needed for fever (temp greater than 38.0 C) (greater than or equal to 38 C). 6/11/24   CARMEN Barillas-CNP   amLODIPine (Norvasc) 10 mg tablet Take 1 tablet (10 mg) by mouth once daily. 10/18/23 10/17/24  Noah Araujo MD   aspirin 81 mg EC tablet Take 1 tablet (81 mg) by mouth once daily.    Historical Provider, MD   cholecalciferol (Vitamin D-3) 50 mcg (2,000 unit) capsule Take 1 capsule (50 mcg) by mouth early in the morning..    Historical Provider, MD   cyanocobalamin (Vitamin B-12) 1,000 mcg tablet Take 1 tablet (1,000 mcg) by mouth once daily.    Historical Provider, MD   folic acid (Folvite) 1 mg tablet Take 1 tablet (1 mg) by mouth once daily.    Historical Provider, MD   gabapentin (Neurontin) 100 mg capsule Take 1 capsule (100 mg) by mouth 2 times a day. In the morning and before bedtime 8/7/23   Historical Provider, MD   hydrALAZINE (Apresoline) 50 mg tablet Take 1 tablet (50 mg) by mouth 2 times a day. 3/7/24 3/7/25  Noah Araujo MD   insulin degludec (Tresiba FlexTouch U-100) 100 unit/mL (3 mL)  injection Inject 12 Units under the skin once daily in the morning. As directed 1 box    Historical Provider, MD   insulin lispro (HumaLOG  KwikPen U-100) 100 unit/mL injection Inject 1 Units under the skin 3 times daily (morning, midday, late afternoon). ON A SLIDING SCALE    Historical Provider, MD   levothyroxine (Synthroid, Levoxyl) 100 mcg tablet Take 1 tablet (100 mcg) by mouth once daily.    Historical Provider, MD   lisinopril 20 mg tablet Take 1 tablet (20 mg) by mouth once daily. 6/12/24   EMILIANA Barillas   metoprolol tartrate (Lopressor) 25 mg tablet Take 1 tablet (25 mg) by mouth 2 times a day. 4/24/24 6/25/24  Mayela Pickard PA-C   nitroglycerin (Nitrostat) 0.4 mg SL tablet Place 1 tablet (0.4 mg) under the tongue every 5 minutes if needed for chest pain.    Historical Provider, MD   ondansetron ODT (Zofran-ODT) 4 mg disintegrating tablet Take 1 tablet (4 mg) by mouth every 8 hours if needed for nausea or vomiting. 6/11/24   EMILIANA Barillas   POLYETHYLENE GLYCOL 3350 ORAL Take 1 packet by mouth once daily as needed (constipation). With 8 oz of fluid as needed    Historical Provider, MD   sennosides-docusate sodium (Alyson-Colace) 8.6-50 mg tablet Take 1 tablet by mouth once daily for 20 days. 6/4/24 6/25/24  Purnima Novak PA-C   sulfaSALAzine (Azulfidine) 500 mg DR tablet Take 2 tablets (1,000 mg) by mouth 2 times a day.    Historical Provider, MD   valACYclovir (Valtrex) 1 gram tablet Take 1 tablet (1,000 mg) by mouth once daily as needed (cold sore). Twice daily 11/3/23   Historical Provider, MD SARAH  Review of Systems   Constitutional: Negative.    HENT: Negative.     Eyes: Negative.    Respiratory: Negative.     Cardiovascular: Negative.    Gastrointestinal: Negative.    Endocrine: Negative.    Genitourinary: Negative.    Musculoskeletal: Negative.    Neurological: Negative.    Psychiatric/Behavioral: Negative.              Data and DIAGNOSTIC STUDIES REVIEWED    XR chest 2 views    Result Date: 6/10/2024  Interpreted By:  Shraddha Coleman, STUDY: XR CHEST 2 VIEWS 6/10/2024 9:09 am   INDICATION: Fall with rib fractures   COMPARISON: 04/22/2024   ACCESSION NUMBER(S): [id number]   ORDERING CLINICIAN: THEODORE NAGY   TECHNIQUE: PA and lateral views of the chest were acquired.   FINDINGS: The cardiac size is within normal limits with postoperative change from median sternotomy and coronary revascularization procedure.   The acute anterior right rib fractures seen on CT study done yesterday are difficult to visualize on chest x-ray. There is an old healed fracture of the anterior left 2nd rib with old healed fractures of the posterior right 6th, 7th, and 8th ribs noted.   No pneumothorax is present. There is some subsegmental atelectasis at the left lung base with mild right basilar discoid atelectasis seen as well.   There are degenerative changes of the thoracic spine. There is osteoarthritis of the shoulders bilaterally. Gallbladder is surgically absent.       The known acute anterior right rib fractures are difficult to visualize on chest x-ray.   No pneumothorax or hemothorax.   Right basilar discoid atelectasis with subsegmental atelectasis retrocardiac left lower lobe.   Status post CABG and cholecystectomy.   Old healed bilateral rib fractures are seen.   Signed by: Shraddha Coleman 6/10/2024 9:24 AM Dictation workstation:   [id number]    XR knee right 4+ views    Result Date: 6/9/2024  Interpreted By:  Maricarmen Blunt, STUDY: XR KNEE RIGHT 4+ VIEWS;  6/9/2024 11:05 am   INDICATION: Signs/Symptoms:fall.   COMPARISON: 06/07/2024   ACCESSION NUMBER(S): [id number]   ORDERING CLINICIAN: KHOA ATKINS   FINDINGS: Four views of the right knee obtained.   Arthroplasty components in grossly stable anatomic alignment. No definite acute fracture or abnormal lucency around the hardware. Stable smooth subcentimeter density adjacent to the fibular head. No significant joint effusion. No focal  soft tissue swelling is apparent. Prominent presumed vascular calcifications in the posterior soft tissues.       No evidence of acute fracture. Status post TKA with intact hardware.   MACRO: None.   Signed by: Maricarmen Blunt 6/9/2024 11:33 AM Dictation workstation:   [id number]    XR pelvis 1-2 views    Result Date: 6/9/2024  Interpreted By:  Maricarmen Blunt, STUDY: XR PELVIS 1-2 VIEWS;  6/9/2024 11:05 am   INDICATION: Signs/Symptoms:fall.   COMPARISON: 07/14/2023   ACCESSION NUMBER(S): [id number]   ORDERING CLINICIAN: KHOA ATKINS   FINDINGS: A single frontal view of the pelvis obtained.   Osteopenia, overlying bowel content and soft tissue folds limit evaluation. No definite focal disruption of the pelvic osseous contours to suggest acute displaced fracture. Left hip arthroplasty in satisfactory alignment. Degenerative changes of the right hip and visualized lower lumbar spine. Multifocal soft tissue presumed vascular calcifications.       No evidence of acute displaced osseous pelvic fracture.   MACRO: None.   Signed by: Maricarmen Blunt 6/9/2024 11:32 AM Dictation workstation:   [id number]    CT chest wo IV contrast    Addendum Date: 6/9/2024    Interpreted By:  Maricarmen Blunt, ADDENDUM: Subtle smooth expansile likely remote fracture deformity of several right lateral ribs noted as well.   Signed by: Maricarmen Blunt 6/9/2024 11:31 AM   -------- ORIGINAL REPORT -------- Dictation workstation:   [id number]    Result Date: 6/9/2024  Interpreted By:  Maricarmen Blunt, STUDY: CT CHEST WO IV CONTRAST;  6/9/2024 10:48 am   INDICATION: Signs/Symptoms:R rib pain. Status post fall   COMPARISON: 10/08/2022   ACCESSION NUMBER(S): [id number]   ORDERING CLINICIAN: KHOA ATKINS   TECHNIQUE: CT of the chest was performed. Sagittal and coronal reconstructions were generated. No intravenous contrast given for the examination.   FINDINGS: Hilar, vessel, and solid organ evaluation limited without IV contrast.   CHEST WALL AND LOWER NECK:  Stable short linear hyperdense probable surgical material in the anterior left neck base. Portions of the chest wall excluded from field of imaging. No significant axillary lymphadenopathy as visualized. Status post sternotomy.   MEDIASTINUM AND HERMELINDO:  Limited hilar assessment on unenhanced images. No gross mediastinal or hilar adenopathy within limits of unenhanced exam. Moderate-sized hiatal hernia. Dense surgical material in the anterior mediastinum.   HEART AND VESSELS:  Lack of IV contrast precludes vascular luminal assessment. The heart is normal in size. No significant pericardial effusion. Multifocal atherosclerotic calcifications including the coronary arteries and mitral valve region.   LUNGS, PLEURA, LARGE AIRWAYS:  Subpleural reticular and coarse linear densities scattered in both lung fields. No sizable airspace consolidation, pleural effusion or pneumothorax identified. The central airways are patent.   UPPER ABDOMEN:  Cholecystectomy clips at the liver hilum. Fullness of the visualized central biliary tree. Moderate fecal residue in the visualized colon.   BONES:  Multiple median sternotomy wires. Smooth irregularity of the sternum with probable ununited sternotomy defect and adjacent ossifications similar to the previous exam. Nondisplaced fracture of the anterior right 2nd rib. Mildly displaced fractures of the anterior right 3rd, 4th and 5th ribs. Possible nondisplaced fracture of the anterior right 6th rib. Smooth probable remote fracture deformity of the posterior right 6th and 7th ribs. Kyphosis and advanced degenerative changes of the thoracic spine with mild multilevel thoracic vertebral compression deformities without obvious interval change.       Multiple acute nondisplaced or minimally displaced right anterior rib fractures. No pleural effusion or pneumothorax.   Postsurgical changes in the anterior chest wall.   Scattered areas of probable scarring or atelectasis in both lung fields.    Moderate-sized hiatal hernia.   Fullness of the central biliary tree status post cholecystectomy. Correlation with LFTs may be helpful.   Additional findings as described above.   MACRO: None.   Signed by: Maricarmen Blunt 6/9/2024 11:28 AM Dictation workstation:   [id number]    XR shoulder right 2+ views    Result Date: 6/9/2024  Interpreted By:  Maricarmen Blunt, STUDY: XR SHOULDER RIGHT 2+ VIEWS;  6/9/2024 11:03 am   INDICATION: Signs/Symptoms:fall.   COMPARISON: None.   ACCESSION NUMBER(S): [id number]   ORDERING CLINICIAN: KHOA ATKINS   FINDINGS: Three views of the right shoulder obtained.   No acute fracture or dislocation. Glenohumeral joint space narrowing and spurring. Acromiohumeral space is preserved. Small subacromial spur. AC joint narrowing and spurring.   Smooth probable remote fracture deformity of posterolateral aspect of several contiguous right mid ribs. Sternotomy wires, least 2 of which are discontinuous, partially included over the central chest.       No evidence of acute fracture or dislocation. Degenerative changes of the right shoulder.   MACRO: None.   Signed by: Maricarmen Blunt 6/9/2024 11:30 AM Dictation workstation:   [id number]    CT cervical spine wo IV contrast    Result Date: 6/9/2024  Interpreted By:  Maricarmen Blunt, STUDY: CT CERVICAL SPINE WO IV CONTRAST;  6/9/2024 10:48 am   INDICATION: Signs/Symptoms:fall.   COMPARISON: 03/28/2022   ACCESSION NUMBER(S): [id number]   ORDERING CLINICIAN: KHOA ATKINS   TECHNIQUE: CT images were obtained through the cervical spine. Sagittal and coronal reconstructions were generated.   FINDINGS:     ALIGNMENT: Mild rightward tilt in the upper cervical spine. Mild anterolisthesis at C6-7 similar to the prior exam.   VERTEBRAE/DISC SPACES: No compression deformity or acute displaced fracture.  Multilevel degenerative changes including atlantoaxial joint space narrowing with surrounding calcifications, spurring and cystic changes in the dens, multilevel  intervertebral disc space narrowing with endplate sclerosis and smooth endplate irregularities, probable partial fusion across the C3-4 vertebra, and multilevel bilateral facet joint narrowing and spurring, similar to the previous exam.   ADDITIONAL FINDINGS: No abnormal thickening of the prevertebral soft tissues.  Linear presumed scarring in the visualized bilateral lung apices. Prominent carotid bulb atherosclerotic calcifications. Linear dense possible surgical device in the anterior left neck base.       No acute cervical vertebral displacement or acute displaced fracture. Multilevel degenerative changes with mild spondylolisthesis similar to 03/20/2022.   Paraspinal/soft tissue findings as above.   MACRO: None.   Signed by: Maricarmen Blunt 6/9/2024 11:19 AM Dictation workstation:   [id number]    CT head wo IV contrast    Result Date: 6/9/2024  Interpreted By:  Maricarmen Blunt, STUDY: CT HEAD WO IV CONTRAST;  6/9/2024 10:48 am   INDICATION: Signs/Symptoms:fall.   COMPARISON: 03/28/2022   ACCESSION NUMBER(S): [id number]   ORDERING CLINICIAN: KHOA ATKINS   TECHNIQUE: Unenhanced CT images of the head were obtained.   FINDINGS: The ventricles, cisterns and sulci are enlarged, consistent with diffuse volume loss. There are areas of nonspecific white matter hypodensity, which are probably age related or microvascular in nature. These findings are similar to the previous exam. There is no acute intracranial hemorrhage, mass effect or midline shift. No extraaxial fluid collection.   No acute displaced calvarial fracture.   Visualized paranasal sinuses are clear. Punctate dense probable surgical material along the anterior margin of each globe.       No acute intracranial hemorrhage or mass-effect.   MACRO: None.   Signed by: Maricarmen Blunt 6/9/2024 11:16 AM Dictation workstation:   [id number]    XR knee right 1-2 views    Result Date: 6/7/2024  Interpreted By:  Rudolph Liriano, STUDY: XR KNEE RIGHT 1-2 VIEWS; ;  6/7/2024  11:06 am   INDICATION: Signs/Symptoms:PAIN.   COMPARISON: None.   ACCESSION NUMBER(S): [id number]   ORDERING CLINICIAN: RUDOLPH LIRIANO   FINDINGS: X-rays of the right knee done and read in the office today show that there is a right total knee replacement in overall satisfactory position and alignment.       See findings above.     MACRO: None   Signed by: Rudolph Liriano 6/7/2024 1:22 PM Dictation workstation:   [id number]     Lab Results   Component Value Date    URINECULTURE No significant growth 04/03/2024      Lab Results   Component Value Date    GLUCOSE 138 (H) 06/13/2024    CALCIUM 9.1 06/13/2024     06/13/2024    K 4.7 06/13/2024    CO2 22 (L) 06/13/2024     06/13/2024    BUN 22 06/13/2024    CREATININE 1.10 06/13/2024     Lab Results   Component Value Date    WBC 7.8 06/13/2024    HGB 9.9 (L) 06/13/2024    HCT 31.1 (L) 06/13/2024    MCV 98 06/13/2024     06/13/2024

## 2024-06-27 ENCOUNTER — TREATMENT (OUTPATIENT)
Dept: PHYSICAL THERAPY | Facility: CLINIC | Age: 84
End: 2024-06-27
Payer: MEDICARE

## 2024-06-27 ENCOUNTER — PATIENT OUTREACH (OUTPATIENT)
Dept: PRIMARY CARE | Facility: CLINIC | Age: 84
End: 2024-06-27
Payer: MEDICARE

## 2024-06-27 DIAGNOSIS — Z96.651 STATUS POST RIGHT KNEE REPLACEMENT: ICD-10-CM

## 2024-06-27 PROCEDURE — 97112 NEUROMUSCULAR REEDUCATION: CPT | Mod: GP,CQ

## 2024-06-27 PROCEDURE — 97110 THERAPEUTIC EXERCISES: CPT | Mod: GP,CQ

## 2024-06-27 ASSESSMENT — PAIN SCALES - GENERAL: PAINLEVEL_OUTOF10: 2

## 2024-06-27 ASSESSMENT — PAIN - FUNCTIONAL ASSESSMENT: PAIN_FUNCTIONAL_ASSESSMENT: 0-10

## 2024-06-27 NOTE — PROGRESS NOTES
"Physical Therapy Treatment    Patient Name: Bonnie Jules  MRN: [id number]  Today's Date: 6/27/2024  Time Calculation  Start Time: 1447  Stop Time: 1530  Time Calculation (min): 43 min  PT Therapeutic Procedures Time Entry  Neuromuscular Re-Education Time Entry: 13  Therapeutic Exercise Time Entry: 30    Insurance:  Visit number: 4 of 13  Authorization info: Medicare  Insurance Type: Payor: MEDICARE / Plan: MEDICARE PART A AND B / Product Type: *No Product type* /     Current Problem   1. Status post right knee replacement  Follow Up In Physical Therapy          Subjective   General    Patient states she has very minimal pain in knee, states she has not fallen at all. Reports she walks around the house frequently and goes to the grocery store IND. Reports she does not do any stairs, does not use cane in her home usually but has been bringing it with her for community walking.      Precautions:   R TKR precautions, per last PT note: \"New script from MD states to limit ROM activities and focus on balance.\"    Pain   Pain Assessment: 0-10  0-10 (Numeric) Pain Score: 2  Pain Location: Knee  Pain Orientation: Right    Post Treatment Pain Level   2/10    Objective   Poor dynamic standing balance    Limited activity/exercise tolerance    Treatments:  Therapeutic Exercise:  Therapeutic Exercise  Therapeutic Exercise Performed: Yes  Therapeutic Exercise Activity 1: NuStep level 5, 5' with 1 UE  Therapeutic Exercise Activity 2: Calf raises BUE assist 10x2  Therapeutic Exercise Activity 3: Mini squats anchored 10x2  Therapeutic Exercise Activity 4: hip ABD standing 10x2 B anchored  Therapeutic Exercise Activity 5: resisted side steps GTB 3x 20'  Therapeutic Exercise Activity 6: seated light HS curls GTB 10x2 RLE    Neuro Re-ed:   Balance/Neuromuscular Re-Education  Balance/Neuromuscular Re-Education Activity Performed: Yes  Balance/Neuromuscular Re-Education Activity 1: alt toe taps 6\" step RUE assist2 finger " assist  Balance/Neuromuscular Re-Education Activity 2: low dexter fwd/retro step overs RLE (1UE assist>2 finger assist)  Balance/Neuromuscular Re-Education Activity 3: low dexter lateral step over (1 UE assist)      Assessment   Assessment:    Focus on light strengthening, balance, and CKC stability tasks this date with fair tolerance. Easily fatigued with rest breaks required. Challenged by all standing balance tasks with occasional LOB but able to stabilize with with therapist assist. Continues to demonstrate signs of generalized weakness and limited balance with gait deficits, patient remains fall risk.     Plan:    Balance, gait, strength, rom as appropriate    OP EDUCATION:   Safety awareness during gait    Goals:   Active       PT Problem       PT Goal 1 (Progressing)       Start:  05/28/24    Expected End:  06/27/24       Pt independent with HEP         PT Goal 2 (Progressing)       Start:  05/28/24    Expected End:  06/27/24       Increase ROM right knee to 0-120deg to allow for normal gait mechanics         PT Goal 3 (Progressing)       Start:  05/28/24    Expected End:  08/26/24       Increase strength right hip to 4/5 to support knee during ambulation         PT Goal 4 (Progressing)       Start:  05/28/24    Expected End:  08/26/24       Improve LEFS to >35/80         Patient Stated Goal 1 (Progressing)       Start:  05/28/24    Expected End:  08/26/24       Pt able to ambulate community distances safely without assistive device in order to walk dog

## 2024-06-27 NOTE — PROGRESS NOTES
Unable to reach patient for call back after patient's follow up appointment with PCP.   LVM with call back number for patient to call if needed   If no voicemail available call attempts x 2 were made to contact the patient to assist with any questions or concerns patient may have.    Tino Henry LPN

## 2024-06-28 DIAGNOSIS — L98.499 ARTERIAL INSUFFICIENCY WITH ISCHEMIC ULCER (MULTI): Primary | ICD-10-CM

## 2024-06-28 DIAGNOSIS — I77.1 ARTERIAL INSUFFICIENCY WITH ISCHEMIC ULCER (MULTI): Primary | ICD-10-CM

## 2024-07-01 ENCOUNTER — OFFICE VISIT (OUTPATIENT)
Dept: WOUND CARE | Facility: HOSPITAL | Age: 84
End: 2024-07-01
Payer: MEDICARE

## 2024-07-01 PROCEDURE — 11042 DBRDMT SUBQ TIS 1ST 20SQCM/<: CPT

## 2024-07-02 ENCOUNTER — TELEPHONE (OUTPATIENT)
Dept: SURGERY | Facility: CLINIC | Age: 84
End: 2024-07-02

## 2024-07-02 ENCOUNTER — OFFICE VISIT (OUTPATIENT)
Dept: SURGERY | Facility: CLINIC | Age: 84
End: 2024-07-02
Payer: MEDICARE

## 2024-07-02 ENCOUNTER — APPOINTMENT (OUTPATIENT)
Dept: PHYSICAL THERAPY | Facility: CLINIC | Age: 84
End: 2024-07-02
Payer: MEDICARE

## 2024-07-02 VITALS
HEIGHT: 59 IN | HEART RATE: 72 BPM | BODY MASS INDEX: 26 KG/M2 | WEIGHT: 129 LBS | TEMPERATURE: 98.6 F | DIASTOLIC BLOOD PRESSURE: 60 MMHG | SYSTOLIC BLOOD PRESSURE: 100 MMHG | OXYGEN SATURATION: 95 %

## 2024-07-02 DIAGNOSIS — K62.3 RECTAL PROLAPSE: Primary | ICD-10-CM

## 2024-07-02 DIAGNOSIS — L98.499 ARTERIAL INSUFFICIENCY WITH ISCHEMIC ULCER (MULTI): Primary | ICD-10-CM

## 2024-07-02 DIAGNOSIS — I77.1 ARTERIAL INSUFFICIENCY WITH ISCHEMIC ULCER (MULTI): Primary | ICD-10-CM

## 2024-07-02 PROCEDURE — 99215 OFFICE O/P EST HI 40 MIN: CPT | Performed by: STUDENT IN AN ORGANIZED HEALTH CARE EDUCATION/TRAINING PROGRAM

## 2024-07-02 PROCEDURE — 1111F DSCHRG MED/CURRENT MED MERGE: CPT | Performed by: STUDENT IN AN ORGANIZED HEALTH CARE EDUCATION/TRAINING PROGRAM

## 2024-07-02 PROCEDURE — 1159F MED LIST DOCD IN RCRD: CPT | Performed by: STUDENT IN AN ORGANIZED HEALTH CARE EDUCATION/TRAINING PROGRAM

## 2024-07-02 PROCEDURE — 3078F DIAST BP <80 MM HG: CPT | Performed by: STUDENT IN AN ORGANIZED HEALTH CARE EDUCATION/TRAINING PROGRAM

## 2024-07-02 PROCEDURE — 3074F SYST BP LT 130 MM HG: CPT | Performed by: STUDENT IN AN ORGANIZED HEALTH CARE EDUCATION/TRAINING PROGRAM

## 2024-07-02 PROCEDURE — 1125F AMNT PAIN NOTED PAIN PRSNT: CPT | Performed by: STUDENT IN AN ORGANIZED HEALTH CARE EDUCATION/TRAINING PROGRAM

## 2024-07-02 PROCEDURE — 99205 OFFICE O/P NEW HI 60 MIN: CPT | Performed by: STUDENT IN AN ORGANIZED HEALTH CARE EDUCATION/TRAINING PROGRAM

## 2024-07-02 ASSESSMENT — PATIENT HEALTH QUESTIONNAIRE - PHQ9
2. FEELING DOWN, DEPRESSED OR HOPELESS: NOT AT ALL
1. LITTLE INTEREST OR PLEASURE IN DOING THINGS: NOT AT ALL
SUM OF ALL RESPONSES TO PHQ9 QUESTIONS 1 AND 2: 0

## 2024-07-02 ASSESSMENT — ENCOUNTER SYMPTOMS
DEPRESSION: 0
OCCASIONAL FEELINGS OF UNSTEADINESS: 1
LOSS OF SENSATION IN FEET: 0

## 2024-07-02 ASSESSMENT — PAIN SCALES - GENERAL: PAINLEVEL: 5

## 2024-07-02 NOTE — TELEPHONE ENCOUNTER
Patient scheduled follow up appointment with Dr. Keen on 7/15. If appointment is not needed, please let her know.

## 2024-07-03 ENCOUNTER — OFFICE VISIT (OUTPATIENT)
Dept: ORTHOPEDIC SURGERY | Facility: CLINIC | Age: 84
End: 2024-07-03
Payer: MEDICARE

## 2024-07-03 ENCOUNTER — HOSPITAL ENCOUNTER (OUTPATIENT)
Dept: RADIOLOGY | Facility: CLINIC | Age: 84
Discharge: HOME | End: 2024-07-03
Payer: MEDICARE

## 2024-07-03 VITALS — HEIGHT: 60 IN | BODY MASS INDEX: 25.13 KG/M2 | WEIGHT: 128 LBS

## 2024-07-03 DIAGNOSIS — Z96.651 HISTORY OF TOTAL RIGHT KNEE REPLACEMENT: ICD-10-CM

## 2024-07-03 DIAGNOSIS — Z96.659 STATUS POST KNEE REPLACEMENT: ICD-10-CM

## 2024-07-03 DIAGNOSIS — M25.561 RIGHT KNEE PAIN, UNSPECIFIED CHRONICITY: Primary | ICD-10-CM

## 2024-07-03 PROCEDURE — 1111F DSCHRG MED/CURRENT MED MERGE: CPT | Performed by: PHYSICIAN ASSISTANT

## 2024-07-03 PROCEDURE — 1036F TOBACCO NON-USER: CPT | Performed by: PHYSICIAN ASSISTANT

## 2024-07-03 PROCEDURE — 99211 OFF/OP EST MAY X REQ PHY/QHP: CPT | Performed by: PHYSICIAN ASSISTANT

## 2024-07-03 PROCEDURE — 1159F MED LIST DOCD IN RCRD: CPT | Performed by: PHYSICIAN ASSISTANT

## 2024-07-03 RX ORDER — MUPIROCIN 20 MG/G
OINTMENT TOPICAL
COMMUNITY
Start: 2024-05-24

## 2024-07-03 RX ORDER — LIDOCAINE HYDROCHLORIDE 20 MG/ML
JELLY TOPICAL
COMMUNITY
Start: 2024-06-05

## 2024-07-03 RX ORDER — HONEY 100 %
PASTE (ML) TOPICAL
COMMUNITY
Start: 2024-06-04

## 2024-07-03 ASSESSMENT — ENCOUNTER SYMPTOMS
DEPRESSION: 0
OCCASIONAL FEELINGS OF UNSTEADINESS: 0
LOSS OF SENSATION IN FEET: 0

## 2024-07-03 ASSESSMENT — PAIN SCALES - GENERAL
PAINLEVEL_OUTOF10: 5 - MODERATE PAIN
PAINLEVEL: 5

## 2024-07-03 ASSESSMENT — PAIN - FUNCTIONAL ASSESSMENT: PAIN_FUNCTIONAL_ASSESSMENT: 0-10

## 2024-07-03 ASSESSMENT — LIFESTYLE VARIABLES: HOW MANY STANDARD DRINKS CONTAINING ALCOHOL DO YOU HAVE ON A TYPICAL DAY: PATIENT DOES NOT DRINK

## 2024-07-03 ASSESSMENT — PAIN DESCRIPTION - DESCRIPTORS: DESCRIPTORS: ACHING;TIGHTNESS

## 2024-07-03 NOTE — PROGRESS NOTES
Subjective      Past Surgical History:   Procedure Laterality Date    CARPAL TUNNEL RELEASE Bilateral     CATARACT EXTRACTION W/ INTRAOCULAR LENS  IMPLANT, BILATERAL      Left 8/10/2017, right 8/31/2017    CHOLECYSTECTOMY      COLONOSCOPY      CORONARY ARTERY BYPASS GRAFT  2005    X 3    HYSTERECTOMY      KNEE SURGERY Right 04/17/2024    total    OTHER SURGICAL HISTORY      Bilateral heel spurs    OTHER SURGICAL HISTORY Right 01/2016    Femoral endarterectomy    TOTAL HIP ARTHROPLASTY Left 2012        Patient History      This patient is s/p right total knee replacement done by Dr. Liriano on 4-. They present today and state that their right knee pain is 3/10. She has returned home from [facility]. She was recently hospitalized after a fall onto her right knee. She suffered a laceration to her right knee. She is participating with outpatient physical therapy.  They are slowly resuming their normal activities of daily living. She is following up with Dr. ALVARADO for a right heel pressure ulcer. She is seen today using a cane for support. She presents today for incision check. They deny chest pain, shortness of breath.     There were no vitals taken for this visit.     ROS  CARDIOLOGY:   Negative for chest pain, shortness of breath.   RESPIRATORY:   Negative for chest pain, shortness of breath.   MUSCULOSKELETAL:   See HPI for details.   NEUROLOGY:   Negative for tingling, numbness, weakness.      Physical exam: Right knee: The incision is clean dry and well-healing.  No evidence of infection.  The patient has painless range of motion from 5 to 102 degrees.  Nontender in the right calf.  Neurovascular is intact.  Compartments are soft.  The patient is seen today walking with use of a cane for support.  She has a dressing and darco shoe in place for the right heel pressure ulcer.     NM Lung Perfusion    Result Date: 4/19/2024  Interpreted By:  Demarcus Beckham, STUDY: NM LUNG PERFUSION;  4/19/2024 3:43 pm    INDICATION: Signs/Symptoms:rule out PE.   COMPARISON: Chest radiograph on 04/19/2024   ACCESSION NUMBER(S): [id number]   ORDERING CLINICIAN: KLAUS HANCOCK   TECHNIQUE: DIVISION OF NUCLEAR MEDICINE PERFUSION LUNG SCAN   Multiple perfusion images of the lungs were obtained after the intravenous administration of 4.2 mCi of Tc-99m MAA. SPECT/CT images of the lungs were also obtained.     FINDINGS: Perfusion images demonstrate mild heterogeneity within both lungs. No distinct wedge-shaped subsegmental or segmental perfusion defect seen on SPECT CT to suggest acute pulmonary embolism (low probability).   Low-dose CT demonstrates small bilateral pleural effusions along with bibasilar atelectasis       1.  No distinct wedge-shaped subsegmental or segmental perfusion defect seen on SPECT CT to suggest acute pulmonary embolism (low probability). 2. Low-dose CT demonstrates small bilateral pleural effusions along with bibasilar atelectasis.   I personally reviewed the images/study. This study was interpreted at [place], [place].   MACRO: None   Signed by: Demarcus Beckham 4/19/2024 4:03 PM Dictation workstation:   [id number]    XR chest 1 view    Result Date: 4/19/2024  Interpreted By:  Marco Hollis, STUDY: XR CHEST 1 VIEW; 4/19/2024 11:53 am   INDICATION: Signs/Symptoms:dyspnea.   COMPARISON: 04/18/2024   ACCESSION NUMBER(S): [id number]   ORDERING CLINICIAN: THEODORE NAGY   TECHNIQUE: 1 view of the chest was performed.   FINDINGS: The lungs are adequately inflated. No acute consolidation. No significant pleural effusion. No pneumothorax.  The cardiomediastinal silhouette is within normal limits. Median sternotomy wires are intact. Old posterior right rib fractures are again noted. Small, less than 2 cm metallic wire is also again seen above the proximal aspect of the left clavicle.       Stable examination. No acute cardiopulmonary disease.   Signed by: Marco Hollis  4/19/2024 1:19 PM Dictation workstation:   [id number]    XR knee right 1-2 views    Result Date: 4/17/2024  Interpreted By:  Avni Correa, STUDY: XR KNEE RIGHT 1-2 VIEWS; ;  4/17/2024 12:13 pm   INDICATION: Signs/Symptoms:Post op knee.   COMPARISON: None.   ACCESSION NUMBER(S): [id number]   ORDERING CLINICIAN: KLAUS HANCOCK   FINDINGS: Right knee, two views   Interval right total knee arthroplasty in place. No periprosthetic fracture lucency seen. There is no dislocation. Postsurgical change the soft tissue. There is a moderate-sized effusion       No immediate complication after right total knee arthroplasty     MACRO: None   Signed by: Avni Correa 4/17/2024 12:33 PM Dictation workstation:   [id number]  I reviewed the x-rays and imaging studies listed above with the patient and her daughter in the office today    Diagnoses and all orders for this visit:  Right knee pain, unspecified chronicity  History of total right knee replacement  Primary osteoarthritis of right knee  Recurrent right knee instability  Chronic pain of both knees  Primary osteoarthritis of left knee    Options are discussed with the patient in detail. The patient is instructed to continue with physical therapy.  She is instructed regarding total knee infection precautions and fall precautions and to use a walker for support. She is instructed to follow up with infectious disease specialist as scheduled.  The patient is instructed regarding activity modification and risk for further injury with falling or trauma, ice, provider directed at home gentle strengthening and ROM exercises, and the appropriate use of Tylenol as needed for pain with its potential adverse reactions and side effects. The patient understands.  Return with Dr. Liriano as scheduled. Please note that this report has been produced using speech recognition software.  It may contain errors related to grammar, punctuation or spelling.  Electronically signed, but not  reviewed.

## 2024-07-05 ENCOUNTER — APPOINTMENT (OUTPATIENT)
Dept: PHYSICAL THERAPY | Facility: CLINIC | Age: 84
End: 2024-07-05
Payer: MEDICARE

## 2024-07-08 ENCOUNTER — OFFICE VISIT (OUTPATIENT)
Dept: WOUND CARE | Facility: HOSPITAL | Age: 84
End: 2024-07-08
Payer: MEDICARE

## 2024-07-08 PROCEDURE — 11042 DBRDMT SUBQ TIS 1ST 20SQCM/<: CPT

## 2024-07-09 ENCOUNTER — APPOINTMENT (OUTPATIENT)
Dept: PHYSICAL THERAPY | Facility: CLINIC | Age: 84
End: 2024-07-09
Payer: MEDICARE

## 2024-07-10 ENCOUNTER — PATIENT OUTREACH (OUTPATIENT)
Dept: PRIMARY CARE | Facility: CLINIC | Age: 84
End: 2024-07-10
Payer: MEDICARE

## 2024-07-10 ASSESSMENT — ENCOUNTER SYMPTOMS
DIARRHEA: 0
SORE THROAT: 0
SPEECH DIFFICULTY: 0
FEVER: 0
VOMITING: 0
RECTAL PAIN: 1
FATIGUE: 1
WEAKNESS: 1
CHEST TIGHTNESS: 0
ARTHRALGIAS: 0
TROUBLE SWALLOWING: 0
CHILLS: 0
BRUISES/BLEEDS EASILY: 0
SHORTNESS OF BREATH: 1
HEMATURIA: 0
ABDOMINAL PAIN: 0
DIFFICULTY URINATING: 1
ADENOPATHY: 0
PALPITATIONS: 0
HEADACHES: 0
UNEXPECTED WEIGHT CHANGE: 0
VOICE CHANGE: 0
DYSURIA: 0
FACIAL ASYMMETRY: 0
WOUND: 0
NAUSEA: 0
BLOOD IN STOOL: 0

## 2024-07-10 NOTE — PROGRESS NOTES
"History Of Present Illness  Bonnie Jules is a 84 y.o. female presenting with rectal prolapse.  Noticed prolapse of tissue for several months and it is uncomfortable when sitting.  Occasional stool incontinence or leakage.  Had a knee replacement in April which she had urinary retention and a bladder stretch injury and now does intermittent straight cath.  Also had an NSTEMI at the time of surgery which she reports was \"from the anesthesia\".  Also with many other medical comorbidities including type 2 diabetes, B-cell lymphocytic leukemia, stage III CKD, NSTEMI.     Past Medical History  She has a past medical history of Anxiety, Benign hypertension, Chronic ischemic colitis (Multi), Chronic kidney disease, stage III (moderate) (Multi), CLL (chronic lymphocytic leukemia) (Multi), Coronary artery disease, CTS (carpal tunnel syndrome), Diabetes mellitus (Multi), Essential (primary) hypertension, GI bleed, Hyperlipidemia, unspecified, Hypertension, Hypothyroidism, Hypothyroidism, unspecified type, Mixed hyperlipidemia, NSTEMI (non-ST elevated myocardial infarction) (Multi), Osteoarthritis, Osteoporosis, Peripheral vascular disease (CMS-HCC), Right knee pain, Seizure disorder (Multi), and Type 1 diabetes mellitus with hyperglycemia (Multi).    Surgical History  She has a past surgical history that includes Carpal tunnel release (Bilateral); Total hip arthroplasty (Left, 2012); Coronary artery bypass graft (2005); Cholecystectomy; Cataract extraction w/ intraocular lens  implant, bilateral; Hysterectomy; Colonoscopy; Other surgical history; Other surgical history (Right, 01/2016); and Knee surgery (Right, 04/17/2024).     Social History  She reports that she has never smoked. She has been exposed to tobacco smoke. She has never used smokeless tobacco. She reports that she does not currently use alcohol. She reports that she does not currently use drugs.    Family History  Family History   Problem Relation Name Age of " Onset    Diabetes Mother      Diabetes Daughter      Other (RA) Daughter          Allergies  Amoxicillin, Amoxicillin-pot clavulanate, Ciprofloxacin, and Levofloxacin    Review of Systems   Constitutional:  Positive for fatigue. Negative for chills, fever and unexpected weight change.   HENT:  Negative for sneezing, sore throat, trouble swallowing and voice change.    Respiratory:  Positive for shortness of breath. Negative for chest tightness.    Cardiovascular:  Negative for chest pain and palpitations.   Gastrointestinal:  Positive for rectal pain. Negative for abdominal pain, blood in stool, diarrhea, nausea and vomiting.   Endocrine: Negative for cold intolerance and heat intolerance.   Genitourinary:  Positive for difficulty urinating. Negative for decreased urine volume, dysuria and hematuria.   Musculoskeletal:  Positive for gait problem. Negative for arthralgias.   Skin:  Negative for rash and wound.   Neurological:  Positive for weakness. Negative for facial asymmetry, speech difficulty and headaches.   Hematological:  Negative for adenopathy. Does not bruise/bleed easily.   Psychiatric/Behavioral:  Negative for self-injury and suicidal ideas.         Physical Exam  Vitals and nursing note reviewed.   Constitutional:       Comments: Chronically ill appearing   HENT:      Head: Normocephalic and atraumatic.      Mouth/Throat:      Mouth: Mucous membranes are dry.      Pharynx: Oropharynx is clear.   Eyes:      Extraocular Movements: Extraocular movements intact.      Pupils: Pupils are equal, round, and reactive to light.   Cardiovascular:      Rate and Rhythm: Normal rate and regular rhythm.      Pulses: Normal pulses.   Pulmonary:      Effort: Pulmonary effort is normal.   Abdominal:      General: There is no distension.      Palpations: Abdomen is soft.      Tenderness: There is no abdominal tenderness.   Genitourinary:     Comments: Full-thickness rectal prolapse, mucosa is pink and well-perfused,  "easily reduced but quickly prolapses  Musculoskeletal:      Cervical back: Normal range of motion and neck supple.   Skin:     General: Skin is warm and dry.   Neurological:      General: No focal deficit present.      Mental Status: She is alert and oriented to person, place, and time.   Psychiatric:         Mood and Affect: Mood normal.         Behavior: Behavior normal.          Last Recorded Vitals  Blood pressure 100/60, pulse 72, temperature 37 °C (98.6 °F), height 1.499 m (4' 11\"), weight 58.5 kg (129 lb), SpO2 95%.    Relevant Results  Had a flexible sigmoidoscopy in 2017 during an episode of colitis     Assessment/Plan   Problem List Items Addressed This Visit    None  Visit Diagnoses         Codes    Rectal prolapse    -  Primary K62.3          84-year-old female with full-thickness rectal prolapse.  We talked about the etiology of rectal prolapse and different treatment options including intra-abdominal or perineal. Explained the different procedures and risks/benefits of both including increased recurrence rate with perineal approach but can be performed without general anesthesia. She has multiple medical comorbidities and recent surgery complicated by NSTEMI, so will be high risk for future surgery. Right now there is no emergency since the prolapse is reducible and well perfused. I have recommended she follow up with cardiology to optimize and determine if there is anything that be modified to improve perioperative morbidity. Once evaluated by cardiology and risk is determined, she will follow up with me for final discussion of whether or not to proceed with surgery and if so, abdominal or perineal approach. All questions were answered.       Cherelle Keen MD    "

## 2024-07-10 NOTE — PROGRESS NOTES
Subjective      Chief Complaint   Patient presents with    SC- not set date          She does have a history of coronary disease and had open heart surgery in 2005 she also has hypertension as well as CLL.  Her last echocardiogram was in 2021 showing normal left ventricular function and aortic sclerosis.  Her last stress test was in 2013 which showed inferior scar but no ischemia.   She is to have repair of a rectal prolapse.   She is not complaining of chest discomfort.  No complaints of PND or orthopnea.  The legs are not swelling on her.  NO palpitaions.  She had TKR in April and had an MI with the troponin going into the 600 ranges. The ECHO showed the EF in the 40's           Review of Systems   Constitutional: Negative. Negative for chills and fever.   HENT: Negative.     Eyes: Negative.    Cardiovascular:  Negative for dyspnea on exertion and near-syncope.   Respiratory: Negative.  Negative for cough.    Endocrine: Negative.    Skin: Negative.    Musculoskeletal: Negative.    Gastrointestinal: Negative.    Genitourinary: Negative.    Neurological: Negative.    All other systems reviewed and are negative.       Past Surgical History:   Procedure Laterality Date    CARPAL TUNNEL RELEASE Bilateral     CATARACT EXTRACTION W/ INTRAOCULAR LENS  IMPLANT, BILATERAL      Left 8/10/2017, right 8/31/2017    CHOLECYSTECTOMY      COLONOSCOPY      CORONARY ARTERY BYPASS GRAFT  2005    X 3    HYSTERECTOMY      KNEE SURGERY Right 04/17/2024    total    OTHER SURGICAL HISTORY      Bilateral heel spurs    OTHER SURGICAL HISTORY Right 01/2016    Femoral endarterectomy    TOTAL HIP ARTHROPLASTY Left 2012        Active Ambulatory Problems     Diagnosis Date Noted    Senile osteoporosis 10/08/2022    Arterial disease (CMS-[facility]) 08/19/2023    Arthritis of both knees 08/19/2023    Artificial lens present 08/19/2023    Vascular calcification 08/19/2023    Background diabetic retinopathy of left eye determined by examination (Multi)  08/19/2023    Atherosclerotic heart disease of native coronary artery with unspecified angina pectoris (CMS-[facility]) 08/19/2023    Chondrocalcinosis of left knee 08/19/2023    Chronic ischemic colitis (Multi) 08/19/2023    Chronic kidney disease, stage 3 unspecified (Multi) 10/08/2022    Chronic lymphocytic leukemia of B-cell type not having achieved remission (Multi) 10/08/2022    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    Difficulty walking 08/19/2023    Dermatochalasis of right upper eyelid 08/19/2023    Dermatochalasis of left upper eyelid 08/19/2023    Erosive osteoarthritis 08/19/2023    Essential hypertension 08/19/2023    Extravasation injury 08/19/2023    Injury of head 08/19/2023    Hypoglycemia unawareness associated with type 1 diabetes mellitus (Multi) 08/19/2023    Hypothyroidism 08/19/2023    Instability of right knee joint 08/19/2023    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 08/19/2023    Ankylosis of joint of shoulder region 08/19/2023    Knee pain 08/19/2023    Macular hole of left eye 08/19/2023    Carpal tunnel syndrome 08/19/2023    Heart murmur 08/19/2023    Hyperlipidemia 08/19/2023    Myocardial infarction (Multi) 08/19/2023    Chronic pain 08/19/2023    Insomnia 08/19/2023    Peripheral vascular disease, unspecified (CMS-HCC) 08/19/2023    Osteoarthritis of right knee 08/19/2023    Presence of aortocoronary bypass graft 08/19/2023    Primary localized osteoarthritis of pelvic region and thigh 08/19/2023    Rhinitis 08/19/2023    Shoulder joint pain 08/19/2023    Spondylosis of lumbar spine 08/19/2023    Stenosis of right femoral artery (CMS-HCC) 08/19/2023    Tear film insufficiency 08/19/2023    Type 1 diabetes mellitus with hyperglycemia (Multi) 08/19/2023    Type 2 diabetes mellitus with other skin complications (Multi) 08/19/2023    Type 1 diabetes mellitus with diabetic polyneuropathy (Multi) 08/19/2023    Type 2 diabetes mellitus without complication (Multi) 08/19/2023     Unspecified disorder of refraction 08/19/2023    Vascular disorder of intestine, unspecified (Multi) 08/19/2023    Vitamin D deficiency 08/19/2023    History of coronary artery bypass graft 10/23/2023    Hematochezia 10/23/2023    Gastrointestinal hemorrhage 10/23/2023    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    Dyslipidemia 10/23/2023    Diabetic polyneuropathy associated with type 2 diabetes mellitus (Multi) 06/12/2023    Delirium 10/23/2023    Lactic acidosis 10/23/2023    Macular edema 10/23/2023    Metatarsalgia of right foot 06/12/2023    Nausea & vomiting 10/23/2023    Neuropathy 06/12/2023    Onychomycosis 06/12/2023    Pain of great toe 06/12/2023    Callus 06/12/2023    Rectal hemorrhage 10/23/2023    Seizure (Multi) 10/23/2023    Fissure in skin 06/12/2023    Ulcer of right foot (Multi) 06/12/2023    Osteoarthritis of left knee 10/23/2023    Chronic renal impairment 08/19/2023    Arthropathy 06/12/2023    Osteoarthritis 10/23/2023    Benign essential hypertension 10/23/2023    Hypertensive disorder 10/23/2023    Low blood pressure 10/23/2023    Near syncope 10/23/2023    Peripheral vascular disease (CMS-HCC) 06/12/2023    Disorder of thyroid gland 10/23/2023    Hypoglycemia 10/23/2023    Colitis 10/23/2023    Ischemic colitis (Multi) 05/30/2017    Pain in joint involving ankle and foot 08/07/2008    Arthralgia of shoulder 10/23/2023    Hip pain 10/23/2023    Pain in joint, ankle and foot 08/07/2008    Sepsis (Multi) 10/23/2023    Type 2 diabetes mellitus (Multi) 03/28/2022    Type 1 diabetes mellitus (Multi) 06/12/2023    Dehydration 10/23/2023    Closed fracture of calcaneus 07/02/2008    Arthritis of hip 10/23/2023    Aortic valve disorder 10/23/2023    Anxiety 10/23/2023    Acute urinary tract infection 10/23/2023    Acute non-ST elevation myocardial infarction (NSTEMI) (Multi) 08/19/2023    Accidental fall 10/23/2023    Abnormal gait 08/07/2008    Abnormal metabolic state due  to diabetes mellitus (Multi) 10/23/2023    Abnormal computed tomography scan 10/23/2023    Preop cardiovascular exam 03/07/2024    Chronic pain of both knees 04/17/2024    Primary osteoarthritis of right knee 04/17/2024    Urinary retention 04/24/2024    Sinusitis 09/30/2022    Weakness 05/09/2024    Upper respiratory tract infection 09/30/2022    Syncope and collapse 10/23/2023    Strain of neck muscle 05/09/2024    Pneumonia 05/09/2024    Hypertensive urgency 10/08/2022    Fracture of rib 05/09/2024    Dizziness 05/09/2024    Contact with and (suspected) exposure to covid-19 10/08/2022    Concussion without loss of consciousness 05/09/2024    Closed fracture of facial bone (Multi) 05/09/2024    Anemia 11/21/2023    Acidosis, unspecified 10/08/2022    Abnormal findings on examination of genitourinary organs 05/09/2024    Synovitis and tenosynovitis 05/09/2024    Overweight with body mass index (BMI) 25.0-29.9 05/09/2024    Hypokalemia 10/08/2022    Hypocalcemia 05/09/2024    History of total right knee replacement 05/10/2024    Fracture of one rib, right side, initial encounter for closed fracture 06/09/2024    Closed fracture of multiple ribs of right side, initial encounter 06/11/2024     Resolved Ambulatory Problems     Diagnosis Date Noted    No Resolved Ambulatory Problems     Past Medical History:   Diagnosis Date    Benign hypertension     Chronic kidney disease, stage III (moderate) (Multi)     CLL (chronic lymphocytic leukemia) (Multi)     Coronary artery disease     CTS (carpal tunnel syndrome)     Diabetes mellitus (Multi)     Essential (primary) hypertension     GI bleed     Hyperlipidemia, unspecified     Hypertension     Hypothyroidism, unspecified type     Mixed hyperlipidemia     NSTEMI (non-ST elevated myocardial infarction) (Multi)     Osteoporosis     Right knee pain     Seizure disorder (Multi)         Visit Vitals  /88   Pulse 86   Wt 56.7 kg (125 lb)   LMP  (LMP Unknown)   SpO2 98%   BMI  "24.41 kg/m²   OB Status Postmenopausal   Smoking Status Never   BSA 1.55 m²        Objective     Cardiovascular:      PMI at left midclavicular line. Normal rate. Regular rhythm. Normal S1. Normal S2.       Murmurs: There is a grade 2/6 crescendo-decrescendo systolic murmur.      No gallop.  No click. No rub.   Pulses:     Intact distal pulses.            Lab Review:         Lab Results   Component Value Date    CHOL 162 03/06/2023    CHOL 167 02/25/2022    CHOL 163 01/05/2022     Lab Results   Component Value Date    HDL 70 03/06/2023    HDL 74 02/25/2022    HDL 71 01/05/2022     Lab Results   Component Value Date    LDLCALC 67 03/06/2023    LDLCALC 75 02/25/2022    LDLCALC 73 01/05/2022     Lab Results   Component Value Date    TRIG 125 03/06/2023    TRIG 88 02/25/2022    TRIG 97 01/05/2022     No components found for: \"CHOLHDL\"     Assessment/Plan     Preop cardiovascular exam  She had TKR in April and did have an MI afterwards.  The EF is down and the EKG today is unchanged.  Feel she is high risk for surgery now and would want to wait at least 6 months if possible from the incidence.  She may not want to wait.  The aortic valve sounds the same    Essential hypertension  The BP is up today and will watch is upset     "

## 2024-07-11 ENCOUNTER — OFFICE VISIT (OUTPATIENT)
Dept: CARDIOLOGY | Facility: CLINIC | Age: 84
End: 2024-07-11
Payer: MEDICARE

## 2024-07-11 ENCOUNTER — TREATMENT (OUTPATIENT)
Dept: PHYSICAL THERAPY | Facility: CLINIC | Age: 84
End: 2024-07-11
Payer: MEDICARE

## 2024-07-11 VITALS
SYSTOLIC BLOOD PRESSURE: 168 MMHG | HEART RATE: 86 BPM | BODY MASS INDEX: 24.41 KG/M2 | WEIGHT: 125 LBS | DIASTOLIC BLOOD PRESSURE: 88 MMHG | OXYGEN SATURATION: 98 %

## 2024-07-11 DIAGNOSIS — Z01.810 PREOP CARDIOVASCULAR EXAM: ICD-10-CM

## 2024-07-11 DIAGNOSIS — Z96.651 HISTORY OF TOTAL RIGHT KNEE REPLACEMENT: Primary | ICD-10-CM

## 2024-07-11 DIAGNOSIS — Z96.651 STATUS POST RIGHT KNEE REPLACEMENT: ICD-10-CM

## 2024-07-11 DIAGNOSIS — I10 ESSENTIAL HYPERTENSION: Primary | ICD-10-CM

## 2024-07-11 PROCEDURE — 3077F SYST BP >= 140 MM HG: CPT | Performed by: INTERNAL MEDICINE

## 2024-07-11 PROCEDURE — 97110 THERAPEUTIC EXERCISES: CPT | Mod: GP

## 2024-07-11 PROCEDURE — 99213 OFFICE O/P EST LOW 20 MIN: CPT | Performed by: INTERNAL MEDICINE

## 2024-07-11 PROCEDURE — 93005 ELECTROCARDIOGRAM TRACING: CPT | Performed by: INTERNAL MEDICINE

## 2024-07-11 PROCEDURE — 93010 ELECTROCARDIOGRAM REPORT: CPT | Performed by: INTERNAL MEDICINE

## 2024-07-11 PROCEDURE — 1036F TOBACCO NON-USER: CPT | Performed by: INTERNAL MEDICINE

## 2024-07-11 PROCEDURE — 97112 NEUROMUSCULAR REEDUCATION: CPT | Mod: GP

## 2024-07-11 PROCEDURE — 1111F DSCHRG MED/CURRENT MED MERGE: CPT | Performed by: INTERNAL MEDICINE

## 2024-07-11 PROCEDURE — 1159F MED LIST DOCD IN RCRD: CPT | Performed by: INTERNAL MEDICINE

## 2024-07-11 PROCEDURE — 3079F DIAST BP 80-89 MM HG: CPT | Performed by: INTERNAL MEDICINE

## 2024-07-11 RX ORDER — AFLIBERCEPT 40 MG/ML
2 INJECTION, SOLUTION INTRAVITREAL ONCE
COMMUNITY

## 2024-07-11 ASSESSMENT — ENCOUNTER SYMPTOMS
DEPRESSION: 0
OCCASIONAL FEELINGS OF UNSTEADINESS: 1
GASTROINTESTINAL NEGATIVE: 1
CHILLS: 0
LOSS OF SENSATION IN FEET: 1
DYSPNEA ON EXERTION: 0
COUGH: 0
ENDOCRINE NEGATIVE: 1
FEVER: 0
NEAR-SYNCOPE: 0
NEUROLOGICAL NEGATIVE: 1
EYES NEGATIVE: 1
RESPIRATORY NEGATIVE: 1
CONSTITUTIONAL NEGATIVE: 1
MUSCULOSKELETAL NEGATIVE: 1

## 2024-07-11 ASSESSMENT — PAIN - FUNCTIONAL ASSESSMENT: PAIN_FUNCTIONAL_ASSESSMENT: 0-10

## 2024-07-11 ASSESSMENT — PAIN SCALES - GENERAL: PAINLEVEL_OUTOF10: 5 - MODERATE PAIN

## 2024-07-11 NOTE — PROGRESS NOTES
"Physical Therapy Treatment    Patient Name: Bonnie Jules  MRN: [id number]  Today's Date: 7/11/2024  Time Calculation  Start Time: 1330  Stop Time: 1415  Time Calculation (min): 45 min  PT Therapeutic Procedures Time Entry  Neuromuscular Re-Education Time Entry: 20  Therapeutic Exercise Time Entry: 25    Insurance:  Visit number: 5 of 13  Authorization info: [place]  Ins[place] Type: Payor: MEDICARE / Plan: MEDICARE PART A AND B / Product Type: *No Product type* /     Current Problem   1. History of total right knee replacement        2. Status post right knee replacement  Follow Up In Physical Therapy          Subjective   Pt states that she missed last couple of PT visits due to rectal prolapse; MD recommending waiting to have surgery due to heart issues.  Pt states that she is uncomfortable sitting and lying; prefers to do standing exercises today. Has been doing HEP consistently.    Precautions  Post-Surgical Precautions: Right total knee precautions    Pain Assessment: 0-10  0-10 (Numeric) Pain Score: 5 - Moderate pain  Pain Location: Knee  Pain Orientation: Right  Post Treatment Pain Level 5    Objective   Right knee flex 90deg in sitting  Incision right knee healing well; some scabbing at distal end but no signs of infection    Treatments:  Therapeutic Exercise:   Nustep L5 x 2 min   Heel raises 15x   Calf stretch on incline board 30\" x 3   Standing: mini squats, hip abd, hip ext knee flex x 15 each r/l   Step ups 4\" step forward 10x r/l      Neuro Re-ed:     Side step with GTB without UE support    MIP without UE support    alt toe taps 6\" step RUE assist2 finger assist     low dexter fwd/retro step overs RLE (1UE assist>2 finger assist     low dexter lateral step over (1 UE assist)     Static stance on foam mat with NBOS    Static stance on foam mat with head turns    Assessment   Assessment:   PT Assessment  Assessment Comment: Pt tolerated session fairly well. Some discomfort reported due to rectal prolapse.  " Pt challenged with static balance on foam mat but improves with practice.  Knee ROM still very limited; pt advised to focus on knee flex at home.    Plan:    Continue to focus on balance as well as knee ROM    OP EDUCATION:  Outpatient Education  Individual(s) Educated: Patient  Education Provided: Home Exercise Program    Goals:   Active       PT Problem       PT Goal 1 (Met)       Start:  05/28/24    Expected End:  06/27/24    Resolved:  07/11/24    Pt independent with HEP         PT Goal 2 (Progressing)       Start:  05/28/24    Expected End:  08/26/24       Increase ROM right knee to 0-120deg to allow for normal gait mechanics         PT Goal 3 (Progressing)       Start:  05/28/24    Expected End:  08/26/24       Increase strength right hip to 4/5 to support knee during ambulation         PT Goal 4 (Progressing)       Start:  05/28/24    Expected End:  08/26/24       Improve LEFS to >35/80         Patient Stated Goal 1 (Progressing)       Start:  05/28/24    Expected End:  08/26/24       Pt able to ambulate community distances safely without assistive device in order to walk dog

## 2024-07-11 NOTE — ASSESSMENT & PLAN NOTE
She had TKR in April and did have an MI afterwards.  The EF is down and the EKG today is unchanged.  Feel she is high risk for surgery now and would want to wait at least 6 months if possible from the incidence.  She may not want to wait.  The aortic valve sounds the same

## 2024-07-12 DIAGNOSIS — E10.65 TYPE 1 DIABETES MELLITUS WITH HYPERGLYCEMIA (MULTI): Primary | ICD-10-CM

## 2024-07-12 RX ORDER — INSULIN LISPRO 100 [IU]/ML
INJECTION, SOLUTION SUBCUTANEOUS
Qty: 15 ML | Refills: 5 | Status: SHIPPED | OUTPATIENT
Start: 2024-07-12

## 2024-07-15 ENCOUNTER — OFFICE VISIT (OUTPATIENT)
Dept: WOUND CARE | Facility: HOSPITAL | Age: 84
End: 2024-07-15
Payer: MEDICARE

## 2024-07-15 ENCOUNTER — OFFICE VISIT (OUTPATIENT)
Dept: SURGERY | Facility: CLINIC | Age: 84
End: 2024-07-15
Payer: MEDICARE

## 2024-07-15 VITALS
BODY MASS INDEX: 25.2 KG/M2 | DIASTOLIC BLOOD PRESSURE: 60 MMHG | WEIGHT: 125 LBS | SYSTOLIC BLOOD PRESSURE: 120 MMHG | HEIGHT: 59 IN | HEART RATE: 78 BPM | TEMPERATURE: 98.5 F | OXYGEN SATURATION: 95 %

## 2024-07-15 DIAGNOSIS — K62.3 RECTAL PROLAPSE: Primary | ICD-10-CM

## 2024-07-15 PROCEDURE — 11042 DBRDMT SUBQ TIS 1ST 20SQCM/<: CPT

## 2024-07-15 PROCEDURE — 99214 OFFICE O/P EST MOD 30 MIN: CPT | Mod: 25 | Performed by: STUDENT IN AN ORGANIZED HEALTH CARE EDUCATION/TRAINING PROGRAM

## 2024-07-15 PROCEDURE — 1159F MED LIST DOCD IN RCRD: CPT | Performed by: STUDENT IN AN ORGANIZED HEALTH CARE EDUCATION/TRAINING PROGRAM

## 2024-07-15 PROCEDURE — 99214 OFFICE O/P EST MOD 30 MIN: CPT | Performed by: STUDENT IN AN ORGANIZED HEALTH CARE EDUCATION/TRAINING PROGRAM

## 2024-07-15 PROCEDURE — 1126F AMNT PAIN NOTED NONE PRSNT: CPT | Performed by: STUDENT IN AN ORGANIZED HEALTH CARE EDUCATION/TRAINING PROGRAM

## 2024-07-15 PROCEDURE — 3078F DIAST BP <80 MM HG: CPT | Performed by: STUDENT IN AN ORGANIZED HEALTH CARE EDUCATION/TRAINING PROGRAM

## 2024-07-15 PROCEDURE — 3074F SYST BP LT 130 MM HG: CPT | Performed by: STUDENT IN AN ORGANIZED HEALTH CARE EDUCATION/TRAINING PROGRAM

## 2024-07-15 PROCEDURE — 1036F TOBACCO NON-USER: CPT | Performed by: STUDENT IN AN ORGANIZED HEALTH CARE EDUCATION/TRAINING PROGRAM

## 2024-07-15 ASSESSMENT — ENCOUNTER SYMPTOMS
ANAL BLEEDING: 1
ABDOMINAL PAIN: 0
CHEST TIGHTNESS: 0
CHILLS: 0
BRUISES/BLEEDS EASILY: 0
FATIGUE: 1
ARTHRALGIAS: 0
OCCASIONAL FEELINGS OF UNSTEADINESS: 0
VOMITING: 0
DEPRESSION: 0
VOICE CHANGE: 0
PALPITATIONS: 0
SPEECH DIFFICULTY: 0
BLOOD IN STOOL: 0
SHORTNESS OF BREATH: 0
DIFFICULTY URINATING: 1
DYSURIA: 0
NAUSEA: 0
ADENOPATHY: 0
TROUBLE SWALLOWING: 0
LOSS OF SENSATION IN FEET: 0
RECTAL PAIN: 1
HEADACHES: 0
HEMATURIA: 0
DIARRHEA: 0
UNEXPECTED WEIGHT CHANGE: 0
SORE THROAT: 0
FEVER: 0
WOUND: 0
FACIAL ASYMMETRY: 0

## 2024-07-15 ASSESSMENT — PAIN SCALES - GENERAL: PAINLEVEL: 0-NO PAIN

## 2024-07-15 NOTE — PROGRESS NOTES
History Of Present Illness  Bonnie Jules is a 84 y.o. female  following up with full-thickness rectal prolapse.  she reports not much has changed.  She still has discomfort when the rectum is prolapsed.  Her biggest complaint is the mucus drainage that causes her to wear pads and have to change them frequently.  She has followed up with cardiology last week who recommended a 6-month wait from previous MI if possible given her high risk nature.  She does report increased difficulty urinating throughout the day.  She reports at night she does not have any problem but during the day it is hard to start a stream and she has a large volume of urine when she does void.  It has been about 1 month since her last straight cath     Past Medical History  She has a past medical history of Anxiety, Benign hypertension, Chronic ischemic colitis (Multi), Chronic kidney disease, stage III (moderate) (Multi), CLL (chronic lymphocytic leukemia) (Multi), Coronary artery disease, CTS (carpal tunnel syndrome), Diabetes mellitus (Multi), Essential (primary) hypertension, GI bleed, Hyperlipidemia, unspecified, Hypertension, Hypothyroidism, Hypothyroidism, unspecified type, Mixed hyperlipidemia, NSTEMI (non-ST elevated myocardial infarction) (Multi), Osteoarthritis, Osteoporosis, Peripheral vascular disease (CMS-HCC), Right knee pain, Seizure disorder (Multi), and Type 1 diabetes mellitus with hyperglycemia (Multi).    Surgical History  She has a past surgical history that includes Carpal tunnel release (Bilateral); Total hip arthroplasty (Left, 2012); Coronary artery bypass graft (2005); Cholecystectomy; Cataract extraction w/ intraocular lens  implant, bilateral; Hysterectomy; Colonoscopy; Other surgical history; Other surgical history (Right, 01/2016); and Knee surgery (Right, 04/17/2024).     Social History  She reports that she has never smoked. She has been exposed to tobacco smoke. She has never used smokeless tobacco. She  reports that she does not currently use alcohol. She reports that she does not currently use drugs.    Family History  Family History   Problem Relation Name Age of Onset    Diabetes Mother      Diabetes Daughter      Other (RA) Daughter          Allergies  Amoxicillin, Amoxicillin-pot clavulanate, Ciprofloxacin, and Levofloxacin    Review of Systems   Constitutional:  Positive for fatigue. Negative for chills, fever and unexpected weight change.   HENT:  Negative for sneezing, sore throat, trouble swallowing and voice change.    Respiratory:  Negative for chest tightness and shortness of breath.    Cardiovascular:  Negative for chest pain and palpitations.   Gastrointestinal:  Positive for anal bleeding and rectal pain. Negative for abdominal pain, blood in stool, diarrhea, nausea and vomiting.   Endocrine: Negative for cold intolerance and heat intolerance.   Genitourinary:  Positive for difficulty urinating. Negative for decreased urine volume, dysuria and hematuria.   Musculoskeletal:  Positive for gait problem. Negative for arthralgias.   Skin:  Negative for rash and wound.   Neurological:  Negative for facial asymmetry, speech difficulty and headaches.   Hematological:  Negative for adenopathy. Does not bruise/bleed easily.   Psychiatric/Behavioral:  Negative for self-injury and suicidal ideas.         Physical Exam  Vitals and nursing note reviewed.   Constitutional:       Appearance: Normal appearance.   HENT:      Head: Normocephalic and atraumatic.      Mouth/Throat:      Mouth: Mucous membranes are moist.      Pharynx: Oropharynx is clear.   Eyes:      Extraocular Movements: Extraocular movements intact.      Pupils: Pupils are equal, round, and reactive to light.   Cardiovascular:      Rate and Rhythm: Normal rate.      Pulses: Normal pulses.   Pulmonary:      Effort: Pulmonary effort is normal.   Abdominal:      General: There is no distension.      Palpations: Abdomen is soft.      Tenderness: There is  "no abdominal tenderness.   Musculoskeletal:      Cervical back: Normal range of motion and neck supple.   Skin:     General: Skin is warm and dry.   Neurological:      General: No focal deficit present.      Mental Status: She is alert and oriented to person, place, and time.   Psychiatric:         Mood and Affect: Mood normal.         Behavior: Behavior normal.          Last Recorded Vitals  Blood pressure 120/60, pulse 78, temperature 36.9 °C (98.5 °F), height 1.499 m (4' 11\"), weight 56.7 kg (125 lb), SpO2 95%.    Relevant Results   reviewed note from Dr. Araujo     Assessment/Plan   Problem List Items Addressed This Visit    None  Visit Diagnoses         Codes    Rectal prolapse    -  Primary K62.3           84-year-old female with full-thickness rectal prolapse.  She had an MI during a knee replacement 2 months ago and cardiology feels she continues to be high risk and would recommend waiting 6 months before another surgery.  While the rectal prolapse is uncomfortable and inconvenient with mucus and occasional bloody drainage, there is no concern for ischemia or obstruction.  I think it is best to wait as per cardiology recommendations and continue conservative measures.  I encouraged her to try and keep it reduced especially when she is not doing any activity as this will help with the discomfort and mucus drainage.  Discussed warning signs on when she should present for more urgent evaluation.  I also encouraged her to follow-up with urogynecology in the meantime since she is having more difficulty with urination.  Plan to follow-up with me in October once she sees cardiology.      Cherelle Keen MD    "

## 2024-07-16 ENCOUNTER — TREATMENT (OUTPATIENT)
Dept: PHYSICAL THERAPY | Facility: CLINIC | Age: 84
End: 2024-07-16
Payer: MEDICARE

## 2024-07-16 DIAGNOSIS — Z96.651 STATUS POST RIGHT KNEE REPLACEMENT: ICD-10-CM

## 2024-07-16 PROCEDURE — 97110 THERAPEUTIC EXERCISES: CPT | Mod: GP,CQ

## 2024-07-16 ASSESSMENT — PAIN SCALES - GENERAL: PAINLEVEL_OUTOF10: 4

## 2024-07-16 NOTE — PROGRESS NOTES
"Physical Therapy Treatment    Patient Name: Bonnie Jules  MRN: [id number]  Today's Date: 7/16/2024  Time Calculation  Start Time: 1345  Stop Time: 1428  Time Calculation (min): 43 min  PT Therapeutic Procedures Time Entry  Therapeutic Exercise Time Entry: 41    Insurance:  Visit number: 6 of 13  Authorization info: Medicare  Eval 5/28, progress note 6/25,    Insurance Type: Payor: MEDICARE / Plan: MEDICARE PART A AND B / Product Type: *No Product type* /     Current Problem   1. Status post right knee replacement  Follow Up In Physical Therapy          Subjective   Pt reports R knee soreness on arrival.    General   Reason for Referral: Status post right knee replacement  Referred By: Rudolph Liriano MD  Precautions:  Precautions  Post-Surgical Precautions: Right total knee precautions  Pain   0-10 (Numeric) Pain Score: 4  Pain Location: Knee  Pain Orientation: Right  Post Treatment Pain Level 2/10    Objective   AROM R knee after ther ex: 1-119 degrees.    Treatments:  Therapeutic Exercise:  Therapeutic Exercise  Therapeutic Exercise Performed: Yes  Therapeutic Exercise Activity 1: Nustep L4, 5'  Therapeutic Exercise Activity 2: Standing PF 2x10  Therapeutic Exercise Activity 3: Gastroc stretch 30\"x3 wedge  Therapeutic Exercise Activity 4: Sidestep with PTB above knees 20'x2 L/R each  Therapeutic Exercise Activity 5: Sit to stand from low mat  Therapeutic Exercise Activity 6: Quad sets 5\" hold 2x10  Therapeutic Exercise Activity 7: Heel slides 5\" hold 2x10  Therapeutic Exercise Activity 8: Bridges with hip adduction 5\" hold 2x10  Therapeutic Exercise Activity 9: Minisquats with UEs 2x10  Therapeutic Exercise Activity 10: Stpe ups 6\" step with B UEs 2x10       Assessment   Assessment:   PT Assessment  PT Assessment Results: Decreased strength, Decreased range of motion, Impaired balance, Decreased mobility, Decreased skin integrity, Pain  Rehab Prognosis: Good  Evaluation/Treatment Tolerance: Patient tolerated " treatment well  Assessment Comment: Pt demonstratesw good AROM R knee nearly to post surgical expectations.    Plan:   OP PT Plan  Treatment/Interventions: Cryotherapy, Education/ Instruction, Gait training, Manual therapy, Therapeutic activities, Therapeutic exercises  PT Plan: Skilled PT  PT Frequency: 2 times per week  Duration: 6 weeks or 12 more visits  Onset Date: 05/28/24  Certification Period Start Date: 05/28/24  Certification Period End Date: 08/26/24  Number of Treatments Authorized: [place]  Rehab Potential: Good    OP EDUCATION:  Outpatient Education  Individual(s) Educated: Patient  Education Provided: Body Mechanics, Home Exercise Program  Patient/Caregiver Demonstrated Understanding: yes  Patient Response to Education: Patient/Caregiver Verbalized Understanding of Information, Patient/Caregiver Asked Appropriate Questions, Patient/Caregiver Performed Return Demonstration of Exercises/Activities    Goals:   Active       PT Problem       PT Goal 1 (Met)       Start:  05/28/24    Expected End:  06/27/24    Resolved:  07/11/24    Pt independent with HEP         PT Goal 2 (Progressing)       Start:  05/28/24    Expected End:  08/26/24       Increase ROM right knee to 0-120deg to allow for normal gait mechanics         PT Goal 3 (Progressing)       Start:  05/28/24    Expected End:  08/26/24       Increase strength right hip to 4/5 to support knee during ambulation         PT Goal 4 (Progressing)       Start:  05/28/24    Expected End:  08/26/24       Improve LEFS to >35/80         Patient Stated Goal 1 (Progressing)       Start:  05/28/24    Expected End:  08/26/24       Pt able to ambulate community distances safely without assistive device in order to walk dog

## 2024-07-18 ENCOUNTER — HOSPITAL ENCOUNTER (OUTPATIENT)
Dept: RADIOLOGY | Facility: CLINIC | Age: 84
Discharge: HOME | End: 2024-07-18
Payer: MEDICARE

## 2024-07-18 ENCOUNTER — TREATMENT (OUTPATIENT)
Dept: PHYSICAL THERAPY | Facility: CLINIC | Age: 84
End: 2024-07-18
Payer: MEDICARE

## 2024-07-18 ENCOUNTER — OFFICE VISIT (OUTPATIENT)
Dept: ORTHOPEDIC SURGERY | Facility: CLINIC | Age: 84
End: 2024-07-18
Payer: MEDICARE

## 2024-07-18 VITALS — WEIGHT: 125 LBS | HEIGHT: 59 IN | BODY MASS INDEX: 25.2 KG/M2

## 2024-07-18 DIAGNOSIS — Z96.651 HISTORY OF TOTAL RIGHT KNEE REPLACEMENT: ICD-10-CM

## 2024-07-18 DIAGNOSIS — Z96.651 STATUS POST RIGHT KNEE REPLACEMENT: ICD-10-CM

## 2024-07-18 DIAGNOSIS — M25.561 RIGHT KNEE PAIN, UNSPECIFIED CHRONICITY: Primary | ICD-10-CM

## 2024-07-18 DIAGNOSIS — R52 PAIN: ICD-10-CM

## 2024-07-18 PROCEDURE — 99213 OFFICE O/P EST LOW 20 MIN: CPT | Performed by: ORTHOPAEDIC SURGERY

## 2024-07-18 PROCEDURE — 1125F AMNT PAIN NOTED PAIN PRSNT: CPT | Performed by: ORTHOPAEDIC SURGERY

## 2024-07-18 PROCEDURE — 1036F TOBACCO NON-USER: CPT | Performed by: ORTHOPAEDIC SURGERY

## 2024-07-18 PROCEDURE — 1159F MED LIST DOCD IN RCRD: CPT | Performed by: ORTHOPAEDIC SURGERY

## 2024-07-18 PROCEDURE — 73560 X-RAY EXAM OF KNEE 1 OR 2: CPT | Mod: RT

## 2024-07-18 PROCEDURE — 97530 THERAPEUTIC ACTIVITIES: CPT | Mod: GP | Performed by: PHYSICAL THERAPIST

## 2024-07-18 ASSESSMENT — LIFESTYLE VARIABLES
SKIP TO QUESTIONS 9-10: 1
HOW OFTEN DO YOU HAVE A DRINK CONTAINING ALCOHOL: NEVER
HAVE YOU OR SOMEONE ELSE BEEN INJURED AS A RESULT OF YOUR DRINKING: NO
HOW OFTEN DO YOU HAVE SIX OR MORE DRINKS ON ONE OCCASION: NEVER
AUDIT TOTAL SCORE: 0
HOW MANY STANDARD DRINKS CONTAINING ALCOHOL DO YOU HAVE ON A TYPICAL DAY: PATIENT DOES NOT DRINK
AUDIT-C TOTAL SCORE: 0
HAS A RELATIVE, FRIEND, DOCTOR, OR ANOTHER HEALTH PROFESSIONAL EXPRESSED CONCERN ABOUT YOUR DRINKING OR SUGGESTED YOU CUT DOWN: NO

## 2024-07-18 ASSESSMENT — ENCOUNTER SYMPTOMS
LOSS OF SENSATION IN FEET: 0
DEPRESSION: 0
OCCASIONAL FEELINGS OF UNSTEADINESS: 0

## 2024-07-18 ASSESSMENT — PAIN DESCRIPTION - DESCRIPTORS: DESCRIPTORS: SORE;TIGHTNESS

## 2024-07-18 ASSESSMENT — PAIN SCALES - GENERAL
PAINLEVEL_OUTOF10: 0 - NO PAIN
PAINLEVEL_OUTOF10: 2
PAINLEVEL: 2

## 2024-07-18 ASSESSMENT — PAIN - FUNCTIONAL ASSESSMENT
PAIN_FUNCTIONAL_ASSESSMENT: 0-10
PAIN_FUNCTIONAL_ASSESSMENT: 0-10

## 2024-07-18 NOTE — PROGRESS NOTES
Subjective      Past Surgical History:   Procedure Laterality Date    CARPAL TUNNEL RELEASE Bilateral     CATARACT EXTRACTION W/ INTRAOCULAR LENS  IMPLANT, BILATERAL      Left 8/10/2017, right 8/31/2017    CHOLECYSTECTOMY      COLONOSCOPY      CORONARY ARTERY BYPASS GRAFT  2005    X 3    HYSTERECTOMY      KNEE SURGERY Right 04/17/2024    total    OTHER SURGICAL HISTORY      Bilateral heel spurs    OTHER SURGICAL HISTORY Right 01/2016    Femoral endarterectomy    TOTAL HIP ARTHROPLASTY Left 2012        Patient History      This patient is s/p right total knee replacement done by  me on 4-. They present today and state that their right knee pain is  markedly improved from preoperatively and is a 1/10 at most.  She has returned home from [facility]. She was recently hospitalized after a fall onto her right knee. She suffered a laceration to her right knee. She is participating with outpatient physical therapy.    She is slowly resuming their normal activities of daily living. She is following up with Dr. ALVARADO for a right heel pressure ulcer. She is seen today using a cane for support. She presents today for incision check. They deny chest pain, shortness of breath.     There were no vitals taken for this visit.     ROS  CARDIOLOGY:   Negative for chest pain, shortness of breath.   RESPIRATORY:   Negative for chest pain, shortness of breath.   MUSCULOSKELETAL:   See HPI for details.   NEUROLOGY:   Negative for tingling, numbness, weakness.      Physical exam: Right knee: The incision is clean dry and well-healed.  No evidence of infection.  The patient has painless range of motion from 5 to 102 degrees.  Nontender in the right calf.  Neurovascular is intact.  Compartments are soft.  The patient is seen today walking  independentlywith use of a cane for support and with a stable gait.  She has a dressing and darco shoe in place for the right heel pressure ulcer.     NM Lung Perfusion    Result Date:  4/19/2024  Interpreted By:  Demarcus Beckham, STUDY: NM LUNG PERFUSION;  4/19/2024 3:43 pm   INDICATION: Signs/Symptoms:rule out PE.   COMPARISON: Chest radiograph on 04/19/2024   ACCESSION NUMBER(S): [id number]   ORDERING CLINICIAN: KLAUS HANCOCK   TECHNIQUE: DIVISION OF NUCLEAR MEDICINE PERFUSION LUNG SCAN   Multiple perfusion images of the lungs were obtained after the intravenous administration of 4.2 mCi of Tc-99m MAA. SPECT/CT images of the lungs were also obtained.     FINDINGS: Perfusion images demonstrate mild heterogeneity within both lungs. No distinct wedge-shaped subsegmental or segmental perfusion defect seen on SPECT CT to suggest acute pulmonary embolism (low probability).   Low-dose CT demonstrates small bilateral pleural effusions along with bibasilar atelectasis       1.  No distinct wedge-shaped subsegmental or segmental perfusion defect seen on SPECT CT to suggest acute pulmonary embolism (low probability). 2. Low-dose CT demonstrates small bilateral pleural effusions along with bibasilar atelectasis.   I personally reviewed the images/study. This study was interpreted at [place], [place].   MACRO: None   Signed by: Demarcus Beckham 4/19/2024 4:03 PM Dictation workstation:   [id number]    XR chest 1 view    Result Date: 4/19/2024  Interpreted By:  Marco Hollis, STUDY: XR CHEST 1 VIEW; 4/19/2024 11:53 am   INDICATION: Signs/Symptoms:dyspnea.   COMPARISON: 04/18/2024   ACCESSION NUMBER(S): [id number]   ORDERING CLINICIAN: THEODORE NAGY   TECHNIQUE: 1 view of the chest was performed.   FINDINGS: The lungs are adequately inflated. No acute consolidation. No significant pleural effusion. No pneumothorax.  The cardiomediastinal silhouette is within normal limits. Median sternotomy wires are intact. Old posterior right rib fractures are again noted. Small, less than 2 cm metallic wire is also again seen above the proximal aspect of the left clavicle.        Stable examination. No acute cardiopulmonary disease.   Signed by: Marco Hollis 4/19/2024 1:19 PM Dictation workstation:   [id number]    XR knee right 1-2 views    Result Date: 4/17/2024  Interpreted By:  Avni Correa, STUDY: XR KNEE RIGHT 1-2 VIEWS; ;  4/17/2024 12:13 pm   INDICATION: Signs/Symptoms:Post op knee.   COMPARISON: None.   ACCESSION NUMBER(S): [id number]   ORDERING CLINICIAN: KLAUS HANCOCK   FINDINGS: Right knee, two views   Interval right total knee arthroplasty in place. No periprosthetic fracture lucency seen. There is no dislocation. Postsurgical change the soft tissue. There is a moderate-sized effusion       No immediate complication after right total knee arthroplasty     MACRO: None   Signed by: Avni Correa 4/17/2024 12:33 PM Dictation workstation:   [id number]  I reviewed the x-rays and imaging studies listed above with the patient and her daughter in the office today    Bonnie was seen today for post-op.  Diagnoses and all orders for this visit:  Right knee pain, unspecified chronicity (Primary)  History of total right knee replacement      Options are discussed with the patient in  the presence of her daughter georgiana. The patient is instructed to continue with exercises as instructed in physical therapy.  She is instructed regarding total knee infection precautions and fall precautions and to use a walker for support. She is instructed to follow up with infectious disease specialist as scheduled.  The patient is instructed regarding activity modification and risk for further injury with falling or trauma, ice, provider directed at home gentle strengthening and ROM exercises,  total knee infection precautions and the appropriate use of Tylenol as needed for pain with its potential adverse reactions and side effects. The patient understands.  Return  as needed. Please note that this report has been produced using speech recognition software.  It may contain errors related  to grammar, punctuation or spelling.  Electronically signed, but not reviewed.    Rudolph Liriano MD

## 2024-07-18 NOTE — PROGRESS NOTES
Physical Therapy Progress note/Treatment    Patient Name: Bonnie Jules  MRN: [id number]  Today's Date: 7/18/2024  Time Calculation  Start Time: 1345  Stop Time: 1415  Time Calculation (min): 30 min  PT Therapeutic Procedures Time Entry  Therapeutic Activity Time Entry: 30    Insurance:  Visit number: 7 of 13  Authorization info: No Auth/[place]  Insurance Type: Payor: MEDICARE / Plan: MEDICARE PART A AND B / Product Type: *No Product type* /     Current Problem   1. Status post right knee replacement  Follow Up In Physical Therapy          Subjective   General   General Comment: Pt reports that her knee is doing well. Reports that she is back to doing her recreational activities without issues, but has yet to take her dog on a walk longer than the length of her driveway or yard.  Precautions:  Precautions  Post-Surgical Precautions: Right total knee precautions  Pain   Pain Assessment: 0-10  0-10 (Numeric) Pain Score: 0 - No pain  Post Treatment Pain Level 0    Objective   R knee ROM: 2-113    R knee MMT:   Flexion - 5  Extension - 5    R hip flexion - 5  R hip abduction - 4+    LEFS: 68    Treatments:    Therapeutic activity:  Therapeutic Activity  Therapeutic Activity Performed: Yes  Therapeutic Activity 1: re-assessment performed this date, see objective measures  Therapeutic Activity 2: Review of HEP with handouts provided      Assessment   Assessment:   PT Assessment  Assessment Comment: Pt has shown improvement since the start of therapy. R knee strength and ROM are within functional limits with no pain. Hip strength has improved to within functional limits as well. PT and pt discussed going independent with HEP exercises which were provided today and both are in agreement to hold chart until speaking with evaluating PT.    Plan:    Discharge from therapy this date, continue HEP on own    OP EDUCATION:   Access Code [email or web address]    Goals:   Active       PT Problem       PT Goal 1 (Met)       Start:  05/28/24     Expected End:  06/27/24    Resolved:  07/11/24    Pt independent with HEP         PT Goal 2 (Progressing)       Start:  05/28/24    Expected End:  08/26/24       Increase ROM right knee to 0-120deg to allow for normal gait mechanics         PT Goal 3 (Met)       Start:  05/28/24    Expected End:  08/26/24    Resolved:  07/18/24    Increase strength right hip to 4/5 to support knee during ambulation         PT Goal 4 (Met)       Start:  05/28/24    Expected End:  08/26/24    Resolved:  07/18/24    Improve LEFS to >35/80         Patient Stated Goal 1 (Progressing)       Start:  05/28/24    Expected End:  08/26/24       Pt able to ambulate community distances safely without assistive device in order to walk dog

## 2024-07-18 NOTE — PATIENT INSTRUCTIONS
Continue to rest, ice and elevate your knee.  Continue your pain regimen.   We will give a a referral to Physical therapy.   Remember to call our office for antibiotics before dental work.   Use a cane or walker for support.   Do not kneel, twist, or squat and avoid heavy lifting.   Return  sooner as needed.

## 2024-07-18 NOTE — PROGRESS NOTES
Subjective      Past Surgical History:   Procedure Laterality Date    CARPAL TUNNEL RELEASE Bilateral     CATARACT EXTRACTION W/ INTRAOCULAR LENS  IMPLANT, BILATERAL      Left 8/10/2017, right 8/31/2017    CHOLECYSTECTOMY      COLONOSCOPY      CORONARY ARTERY BYPASS GRAFT  2005    X 3    HYSTERECTOMY      KNEE SURGERY Right 04/17/2024    total    OTHER SURGICAL HISTORY      Bilateral heel spurs    OTHER SURGICAL HISTORY Right 01/2016    Femoral endarterectomy    TOTAL HIP ARTHROPLASTY Left 2012        Patient History      This patient is s/p right total knee replacement done by Dr. Liriano on 4-. They present today and state that their right knee pain is 3/10. She has returned home from [facility]. She was recently hospitalized after a fall onto her right knee. She suffered a laceration to her right knee. She is participating with outpatient physical therapy.  They are slowly resuming their normal activities of daily living. She is following up with Dr. ALVARADO for a right heel pressure ulcer. She is seen today using a cane for support. She presents today for incision check. They deny chest pain, shortness of breath.     There were no vitals taken for this visit.     ROS  CARDIOLOGY:   Negative for chest pain, shortness of breath.   RESPIRATORY:   Negative for chest pain, shortness of breath.   MUSCULOSKELETAL:   See HPI for details.   NEUROLOGY:   Negative for tingling, numbness, weakness.      Physical exam: Right knee: The incision is clean dry and well-healing.  No evidence of infection.  The patient has painless range of motion from 5 to 102 degrees.  Nontender in the right calf.  Neurovascular is intact.  Compartments are soft.  The patient is seen today walking with use of a cane for support.  She has a dressing and darco shoe in place for the right heel pressure ulcer.     NM Lung Perfusion    Result Date: 4/19/2024  Interpreted By:  Demarcus Beckham, STUDY: NM LUNG PERFUSION;  4/19/2024 3:43 pm    INDICATION: Signs/Symptoms:rule out PE.   COMPARISON: Chest radiograph on 04/19/2024   ACCESSION NUMBER(S): [id number]   ORDERING CLINICIAN: KLAUS HANCOCK   TECHNIQUE: DIVISION OF NUCLEAR MEDICINE PERFUSION LUNG SCAN   Multiple perfusion images of the lungs were obtained after the intravenous administration of 4.2 mCi of Tc-99m MAA. SPECT/CT images of the lungs were also obtained.     FINDINGS: Perfusion images demonstrate mild heterogeneity within both lungs. No distinct wedge-shaped subsegmental or segmental perfusion defect seen on SPECT CT to suggest acute pulmonary embolism (low probability).   Low-dose CT demonstrates small bilateral pleural effusions along with bibasilar atelectasis       1.  No distinct wedge-shaped subsegmental or segmental perfusion defect seen on SPECT CT to suggest acute pulmonary embolism (low probability). 2. Low-dose CT demonstrates small bilateral pleural effusions along with bibasilar atelectasis.   I personally reviewed the images/study. This study was interpreted at [place], [place].   MACRO: None   Signed by: Demarcus Beckham 4/19/2024 4:03 PM Dictation workstation:   [id number]    XR chest 1 view    Result Date: 4/19/2024  Interpreted By:  Marco Hollis, STUDY: XR CHEST 1 VIEW; 4/19/2024 11:53 am   INDICATION: Signs/Symptoms:dyspnea.   COMPARISON: 04/18/2024   ACCESSION NUMBER(S): [id number]   ORDERING CLINICIAN: THEODORE NAGY   TECHNIQUE: 1 view of the chest was performed.   FINDINGS: The lungs are adequately inflated. No acute consolidation. No significant pleural effusion. No pneumothorax.  The cardiomediastinal silhouette is within normal limits. Median sternotomy wires are intact. Old posterior right rib fractures are again noted. Small, less than 2 cm metallic wire is also again seen above the proximal aspect of the left clavicle.       Stable examination. No acute cardiopulmonary disease.   Signed by: Marco Hollis  4/19/2024 1:19 PM Dictation workstation:   [id number]    XR knee right 1-2 views    Result Date: 4/17/2024  Interpreted By:  Avni Correa, STUDY: XR KNEE RIGHT 1-2 VIEWS; ;  4/17/2024 12:13 pm   INDICATION: Signs/Symptoms:Post op knee.   COMPARISON: None.   ACCESSION NUMBER(S): [id number]   ORDERING CLINICIAN: KLAUS HANCOCK   FINDINGS: Right knee, two views   Interval right total knee arthroplasty in place. No periprosthetic fracture lucency seen. There is no dislocation. Postsurgical change the soft tissue. There is a moderate-sized effusion       No immediate complication after right total knee arthroplasty     MACRO: None   Signed by: Avni Correa 4/17/2024 12:33 PM Dictation workstation:   [id number]  I reviewed the x-rays and imaging studies listed above with the patient and her daughter in the office today    Bonnie was seen today for post-op.  Diagnoses and all orders for this visit:  Right knee pain, unspecified chronicity (Primary)  History of total right knee replacement      Options are discussed with the patient in detail. The patient is instructed to continue with physical therapy.  She is instructed regarding total knee infection precautions and fall precautions and to use a walker for support. She is instructed to follow up with infectious disease specialist as scheduled.  The patient is instructed regarding activity modification and risk for further injury with falling or trauma, ice, provider directed at home gentle strengthening and ROM exercises, and the appropriate use of Tylenol as needed for pain with its potential adverse reactions and side effects. The patient understands.  Return with Dr. Liriano as scheduled. Please note that this report has been produced using speech recognition software.  It may contain errors related to grammar, punctuation or spelling.  Electronically signed, but not reviewed.

## 2024-07-19 ENCOUNTER — APPOINTMENT (OUTPATIENT)
Dept: VASCULAR MEDICINE | Facility: CLINIC | Age: 84
End: 2024-07-19
Payer: MEDICARE

## 2024-07-22 ENCOUNTER — APPOINTMENT (OUTPATIENT)
Dept: WOUND CARE | Facility: HOSPITAL | Age: 84
End: 2024-07-22
Payer: MEDICARE

## 2024-07-22 ENCOUNTER — APPOINTMENT (OUTPATIENT)
Dept: ORTHOPEDIC SURGERY | Facility: CLINIC | Age: 84
End: 2024-07-22
Payer: MEDICARE

## 2024-07-23 ENCOUNTER — ANCILLARY PROCEDURE (OUTPATIENT)
Dept: VASCULAR MEDICINE | Facility: CLINIC | Age: 84
End: 2024-07-23
Payer: MEDICARE

## 2024-07-23 DIAGNOSIS — L98.499 ARTERIAL INSUFFICIENCY WITH ISCHEMIC ULCER (MULTI): ICD-10-CM

## 2024-07-23 DIAGNOSIS — I73.9 PERIPHERAL VASCULAR DISEASE, UNSPECIFIED (CMS-HCC): ICD-10-CM

## 2024-07-23 DIAGNOSIS — I77.1 ARTERIAL INSUFFICIENCY WITH ISCHEMIC ULCER (MULTI): ICD-10-CM

## 2024-07-23 PROCEDURE — 93923 UPR/LXTR ART STDY 3+ LVLS: CPT | Performed by: SURGERY

## 2024-07-23 PROCEDURE — 93923 UPR/LXTR ART STDY 3+ LVLS: CPT

## 2024-08-01 ENCOUNTER — TELEPHONE (OUTPATIENT)
Dept: SURGERY | Facility: CLINIC | Age: 84
End: 2024-08-01
Payer: MEDICARE

## 2024-08-01 NOTE — TELEPHONE ENCOUNTER
Cherelle Keen MD  You17 minutes ago (3:44 PM)       Bleeding can happen with rectal prolapse. If she is concerned or symptomatic, she can go to the ED. Otherwise, she can make a sooner appt with me or just reassure her that bleeding can happen. We did talk about this as I stated in my last note.

## 2024-08-01 NOTE — TELEPHONE ENCOUNTER
I spoke to pt verified by name/.  I relayed Dr. Keen's message and pt   Verbalized understanding, denies further questions.

## 2024-08-01 NOTE — TELEPHONE ENCOUNTER
Spoke to pt verified by name/.  Pt states that she has been having heavy bright red rectal bleeding and rectal soreness x1 wk. She stated that the bleeding is all the time and when wiping after BM. She states that sometimes she has mucous-like blood and small blood clots. She is c/o abd pain and her last BM was yesterday.  Pt can be reached at [phone]. Pls review/advise.

## 2024-08-05 ENCOUNTER — OFFICE VISIT (OUTPATIENT)
Dept: WOUND CARE | Facility: HOSPITAL | Age: 84
End: 2024-08-05
Payer: MEDICARE

## 2024-08-05 ENCOUNTER — APPOINTMENT (OUTPATIENT)
Dept: WOUND CARE | Facility: HOSPITAL | Age: 84
End: 2024-08-05
Payer: MEDICARE

## 2024-08-05 DIAGNOSIS — I77.1 ARTERIAL INSUFFICIENCY WITH ISCHEMIC ULCER (MULTI): Primary | ICD-10-CM

## 2024-08-05 DIAGNOSIS — L98.499 ARTERIAL INSUFFICIENCY WITH ISCHEMIC ULCER (MULTI): Primary | ICD-10-CM

## 2024-08-05 PROCEDURE — 99214 OFFICE O/P EST MOD 30 MIN: CPT

## 2024-08-23 ENCOUNTER — APPOINTMENT (OUTPATIENT)
Dept: OPHTHALMOLOGY | Facility: CLINIC | Age: 84
End: 2024-08-23
Payer: MEDICARE

## 2024-08-23 ENCOUNTER — OFFICE VISIT (OUTPATIENT)
Dept: OPHTHALMOLOGY | Facility: CLINIC | Age: 84
End: 2024-08-23
Payer: MEDICARE

## 2024-08-23 DIAGNOSIS — Z79.4 TYPE 2 DIABETES MELLITUS WITH OTHER SPECIFIED COMPLICATION, WITH LONG-TERM CURRENT USE OF INSULIN (MULTI): ICD-10-CM

## 2024-08-23 DIAGNOSIS — E11.69 TYPE 2 DIABETES MELLITUS WITH OTHER SPECIFIED COMPLICATION, WITH LONG-TERM CURRENT USE OF INSULIN (MULTI): ICD-10-CM

## 2024-08-23 DIAGNOSIS — Z96.1 PSEUDOPHAKIA: ICD-10-CM

## 2024-08-23 DIAGNOSIS — H52.7 UNSPECIFIED DISORDER OF REFRACTION: ICD-10-CM

## 2024-08-23 DIAGNOSIS — H35.3222 EXUDATIVE AGE-RELATED MACULAR DEGENERATION OF LEFT EYE WITH INACTIVE CHOROIDAL NEOVASCULARIZATION (MULTI): ICD-10-CM

## 2024-08-23 DIAGNOSIS — H35.342 FULL THICKNESS MACULAR HOLE OF LEFT EYE: Primary | ICD-10-CM

## 2024-08-23 DIAGNOSIS — H04.123 DRY EYES: ICD-10-CM

## 2024-08-23 PROBLEM — H02.834 DERMATOCHALASIS OF BOTH UPPER EYELIDS: Status: ACTIVE | Noted: 2023-08-19

## 2024-08-23 PROBLEM — E11.42 DIABETIC POLYNEUROPATHY ASSOCIATED WITH TYPE 2 DIABETES MELLITUS (MULTI): Status: RESOLVED | Noted: 2023-06-12 | Resolved: 2024-08-23

## 2024-08-23 PROBLEM — E11.628 TYPE 2 DIABETES MELLITUS WITH OTHER SKIN COMPLICATIONS (MULTI): Status: RESOLVED | Noted: 2023-08-19 | Resolved: 2024-08-23

## 2024-08-23 PROBLEM — E10.9 TYPE 1 DIABETES MELLITUS (MULTI): Status: RESOLVED | Noted: 2023-06-12 | Resolved: 2024-08-23

## 2024-08-23 PROBLEM — H02.834 DERMATOCHALASIS OF LEFT UPPER EYELID: Status: RESOLVED | Noted: 2023-08-19 | Resolved: 2024-08-23

## 2024-08-23 PROBLEM — E11.3292 BACKGROUND DIABETIC RETINOPATHY OF LEFT EYE DETERMINED BY EXAMINATION (MULTI): Status: RESOLVED | Noted: 2023-08-19 | Resolved: 2024-08-23

## 2024-08-23 PROBLEM — E11.9 TYPE 2 DIABETES MELLITUS WITHOUT COMPLICATION (MULTI): Status: RESOLVED | Noted: 2023-08-19 | Resolved: 2024-08-23

## 2024-08-23 PROBLEM — E11.9 ABNORMAL METABOLIC STATE DUE TO DIABETES MELLITUS (MULTI): Status: RESOLVED | Noted: 2023-10-23 | Resolved: 2024-08-23

## 2024-08-23 PROBLEM — E10.649 HYPOGLYCEMIA UNAWARENESS ASSOCIATED WITH TYPE 1 DIABETES MELLITUS (MULTI): Status: RESOLVED | Noted: 2023-08-19 | Resolved: 2024-08-23

## 2024-08-23 PROBLEM — E16.2 HYPOGLYCEMIA: Status: RESOLVED | Noted: 2023-10-23 | Resolved: 2024-08-23

## 2024-08-23 PROBLEM — E10.65 TYPE 1 DIABETES MELLITUS WITH HYPERGLYCEMIA (MULTI): Status: RESOLVED | Noted: 2023-08-19 | Resolved: 2024-08-23

## 2024-08-23 PROBLEM — E10.42 TYPE 1 DIABETES MELLITUS WITH DIABETIC POLYNEUROPATHY (MULTI): Status: RESOLVED | Noted: 2023-08-19 | Resolved: 2024-08-23

## 2024-08-23 PROCEDURE — 92015 DETERMINE REFRACTIVE STATE: CPT | Performed by: OPHTHALMOLOGY

## 2024-08-23 PROCEDURE — 99214 OFFICE O/P EST MOD 30 MIN: CPT | Performed by: OPHTHALMOLOGY

## 2024-08-23 PROCEDURE — 92134 CPTRZ OPH DX IMG PST SGM RTA: CPT | Performed by: OPHTHALMOLOGY

## 2024-08-23 RX ORDER — PRAVASTATIN SODIUM 40 MG/1
1 TABLET ORAL DAILY
COMMUNITY

## 2024-08-23 ASSESSMENT — CUP TO DISC RATIO
OD_RATIO: 0.2
OS_RATIO: 0.2

## 2024-08-23 ASSESSMENT — EXTERNAL EXAM - RIGHT EYE: OD_EXAM: BROW PTOSIS

## 2024-08-23 ASSESSMENT — KERATOMETRY
OD_AXISANGLE2_DEGREES: 105
OS_AXISANGLE_DEGREES: 180
OS_K1POWER_DIOPTERS: 44.00
OS_AXISANGLE2_DEGREES: 90
OD_AXISANGLE_DEGREES: 15
OD_K1POWER_DIOPTERS: 44.50
OD_K2POWER_DIOPTERS: 46.00
OS_K2POWER_DIOPTERS: 46.00
METHOD_AUTO_MANUAL: AUTOMATED

## 2024-08-23 ASSESSMENT — ENCOUNTER SYMPTOMS
ENDOCRINE NEGATIVE: 0
ALLERGIC/IMMUNOLOGIC NEGATIVE: 0
NEUROLOGICAL NEGATIVE: 0
EYES NEGATIVE: 0
PSYCHIATRIC NEGATIVE: 0
HEMATOLOGIC/LYMPHATIC NEGATIVE: 0
CONSTITUTIONAL NEGATIVE: 0
CARDIOVASCULAR NEGATIVE: 0
MUSCULOSKELETAL NEGATIVE: 0
GASTROINTESTINAL NEGATIVE: 0
RESPIRATORY NEGATIVE: 0

## 2024-08-23 ASSESSMENT — VISUAL ACUITY
OD_CC: 20/30
CORRECTION_TYPE: GLASSES
OS_CC+: +1
OS_CC: 20/60
METHOD: SNELLEN - SINGLE
OD_CC+: -1

## 2024-08-23 ASSESSMENT — PATIENT HEALTH QUESTIONNAIRE - PHQ9
SUM OF ALL RESPONSES TO PHQ9 QUESTIONS 1 AND 2: 0
1. LITTLE INTEREST OR PLEASURE IN DOING THINGS: NOT AT ALL
2. FEELING DOWN, DEPRESSED OR HOPELESS: NOT AT ALL

## 2024-08-23 ASSESSMENT — REFRACTION_MANIFEST
OS_SPHERE: +1.25
METHOD_AUTOREFRACTION: 1
OD_AXIS: 100
OD_CYLINDER: -2.25
OS_AXIS: 090
OD_SPHERE: +1.00
OS_CYLINDER: -2.25

## 2024-08-23 ASSESSMENT — TONOMETRY
OD_IOP_MMHG: 11
OS_IOP_MMHG: 11
IOP_METHOD: GOLDMANN APPLANATION

## 2024-08-23 ASSESSMENT — REFRACTION_WEARINGRX
OD_AXIS: 094
OD_SPHERE: PLANO
OD_CYLINDER: -1.75
OS_ADD: +3.00
OD_ADD: +3.00
OS_CYLINDER: -1.50
SPECS_TYPE: BIFOCAL
OS_AXIS: 084
OS_SPHERE: +0.75

## 2024-08-23 ASSESSMENT — SLIT LAMP EXAM - LIDS
COMMENTS: 1+ DERMATOCHALASIS - UPPER LID, 1+ BLEPHARITIS
COMMENTS: 1+ DERMATOCHALASIS - UPPER LID, 1+ BLEPHARITIS

## 2024-08-23 ASSESSMENT — EXTERNAL EXAM - LEFT EYE: OS_EXAM: BROW PTOSIS

## 2024-08-23 ASSESSMENT — PAIN SCALES - GENERAL: PAINLEVEL: 0-NO PAIN

## 2024-08-23 NOTE — PROGRESS NOTES
Subjective   Patient ID: Bonnie Jules is a 84 y.o. female.    Chief Complaint    Annual Exam; Dry Eye; Decreased Visual Acuity       HPI       Decreased Visual Acuity    It is worse when reading.             Comments    Next appt with [facility] in 5 days.    Complete, diabetic dilated, and macular exam.  Had knee replacement with complications.  Vision unchanged and stable.  Seeing retina for OS injections.            Last edited by Stephon Hayward MD on 8/23/2024 11:29 AM.        Current Outpatient Medications (Ophthalmology pharm classes)   Medication Sig Dispense Refill    aflibercept (Eylea) 2 mg/0.05 mL intra-ocular injection 0.05 mL (2 mg) by intravitreal route 1 time. Left eye       Current Outpatient Medications (Other)   Medication Sig Dispense Refill    acetaminophen (Tylenol) 325 mg tablet Take 2 tablets (650 mg) by mouth every 4 hours if needed for fever (temp greater than 38.0 C) (greater than or equal to 38 C).      amLODIPine (Norvasc) 10 mg tablet Take 1 tablet (10 mg) by mouth once daily. 90 tablet 3    aspirin 81 mg EC tablet Take 1 tablet (81 mg) by mouth once daily.      cholecalciferol (Vitamin D-3) 50 mcg (2,000 unit) capsule Take 1 capsule (50 mcg) by mouth early in the morning..      cyanocobalamin (Vitamin B-12) 1,000 mcg tablet Take 1 tablet (1,000 mcg) by mouth once daily.      folic acid (Folvite) 1 mg tablet Take 1 tablet (1 mg) by mouth once daily.      hydrALAZINE (Apresoline) 50 mg tablet Take 1 tablet (50 mg) by mouth 2 times a day. 180 tablet 3    insulin degludec (Tresiba FlexTouch U-100) 100 unit/mL (3 mL) injection Inject 12 Units under the skin once daily in the morning. As directed 1 box      insulin lispro (HumaLOG  KwikPen U-100) 100 unit/mL injection INJECT UP TO 30 UNITS UNDER THE SKIN DAILY AT MEALS 15 mL 5    levothyroxine (Synthroid, Levoxyl) 100 mcg tablet Take 1 tablet (100 mcg) by mouth once daily.      lidocaine 2 % mucosal jelly (Uro-Jet) APPLY TOPICALLY FOUR  TIMES DAILY AS NEEDED FOR MILD PAIN      lisinopril 20 mg tablet Take 1 tablet (20 mg) by mouth once daily. 30 tablet 0    MediHoney, honey, gel topical gel APPLY to wound DAILY.      mupirocin (Bactroban) 2 % ointment APPLY TOPICALLY TO THE AFFECTED AREA THREE TIMES DAILY FOR 10 DAYS      nitroglycerin (Nitrostat) 0.4 mg SL tablet Place 1 tablet (0.4 mg) under the tongue every 5 minutes if needed for chest pain.      ondansetron ODT (Zofran-ODT) 4 mg disintegrating tablet Take 1 tablet (4 mg) by mouth every 8 hours if needed for nausea or vomiting. 20 tablet 0    POLYETHYLENE GLYCOL 3350 ORAL Take 1 packet by mouth once daily as needed (constipation). With 8 oz of fluid as needed      pravastatin (Pravachol) 40 mg tablet Take 1 tablet (40 mg) by mouth once daily.      sulfaSALAzine (Azulfidine) 500 mg DR tablet Take 2 tablets (1,000 mg) by mouth 2 times a day.      valACYclovir (Valtrex) 1 gram tablet Take 1 tablet (1,000 mg) by mouth once daily as needed (cold sore). Twice daily      metoprolol tartrate (Lopressor) 25 mg tablet Take 1 tablet (25 mg) by mouth 2 times a day. 60 tablet 0       Objective   Base Eye Exam       Visual Acuity (Snellen - Single)         Right Left Both    Dist cc 20/30 -1 20/60 +1     Dist ph cc  NI     Near cc   J1      Correction: Glasses              Tonometry (Goldmann Applanation, 10:35 AM)         Right Left    Pressure 11 11              Pupils         Dark Shape React APD    Right 4 Round 1 None    Left 4 Round 1 None              Extraocular Movement         Right Left     Full Full              Dilation       Both eyes: 1% Tropic 2.5% Phen @ 10:35 AM                  Additional Tests       Keratometry (Automated)         K1 Axis K2 Axis    Right 44.50 105 46.00 15    Left 44.00 90 46.00 180                  Slit Lamp and Fundus Exam       External Exam         Right Left    External Brow ptosis Brow ptosis              Slit Lamp Exam         Right Left    Lids/Lashes 1+  Dermatochalasis - upper lid, 1+ Blepharitis 1+ Dermatochalasis - upper lid, 1+ Blepharitis    Conjunctiva/Sclera normal bulbar and palepbral conjunctiva normal bulbar and palepbral conjunctiva    Cornea tear break up time < 10 sec tear break up time < 10 sec    Anterior Chamber normal anterior chamber, deep and quiet normal anterior chamber, deep and quiet    Iris iris normal iris normal    Lens Centered posterior chamber intraocular lens Centered posterior chamber intraocular lens, traceSuperior Posterior capsular opacification    Anterior Vitreous vitreous syneresis vitreous syneresis              Fundus Exam         Right Left    Disc normal optic nerve normal optic nerve    C/D Ratio 0.2 0.2    Macula No background diabetic retinopathy epiretinal membrane/macular pucker    Vessels normal retinal vessels normal retinal vessels    Periphery Normal Laser scars                  Refraction       Wearing Rx         Sphere Cylinder Axis Add    Right [place] -1.75 094 +3.00    Left +0.75 -1.50 084 +3.00      Type: Bifocal              Manifest Refraction (Auto)         Sphere Cylinder Axis    Right +1.00 -2.25 100    Left +1.25 -2.25 090      Pupillary Distance: 60              Final Rx         Sphere Cylinder [place] Dist VA Add Near VA    Right +0.25 -2.00 095 20/25 +3.00 J1    Left +0.75 -1.50 085 20/60 +3.00 J1      Type: Bifocal    Expiration Date: 8/23/2026    Pupillary Distance: 60                    Assessment/Plan   Problem List Items Addressed This Visit          Eye/Vision problems    Pseudophakia     F/u one year full with oct mac.  Cont f/u with ret spec.          Full thickness macular hole of left eye - Primary    Relevant Orders    OCT, Retina - OU - Both Eyes (Completed)    Dry eyes    Unspecified disorder of refraction    Exudative age-related macular degeneration of left eye with inactive choroidal neovascularization (Multi)    Type 2 diabetes mellitus (Multi)

## 2024-09-03 DIAGNOSIS — S22.31XA FRACTURE OF ONE RIB, RIGHT SIDE, INITIAL ENCOUNTER FOR CLOSED FRACTURE: Primary | ICD-10-CM

## 2024-09-03 RX ORDER — LISINOPRIL 40 MG/1
40 TABLET ORAL DAILY
Qty: 90 TABLET | Refills: 0 | Status: SHIPPED | OUTPATIENT
Start: 2024-09-03 | End: 2024-12-02

## 2024-09-03 RX ORDER — LEVOTHYROXINE SODIUM 100 UG/1
100 TABLET ORAL DAILY
Qty: 90 TABLET | Refills: 0 | Status: SHIPPED | OUTPATIENT
Start: 2024-09-03

## 2024-09-03 RX ORDER — LISINOPRIL 40 MG/1
40 TABLET ORAL DAILY
COMMUNITY
End: 2024-09-03 | Stop reason: SDUPTHER

## 2024-09-05 ENCOUNTER — LAB (OUTPATIENT)
Dept: LAB | Facility: LAB | Age: 84
End: 2024-09-05
Payer: MEDICARE

## 2024-09-05 DIAGNOSIS — E86.0 DEHYDRATION: ICD-10-CM

## 2024-09-05 DIAGNOSIS — N18.30 CHRONIC KIDNEY DISEASE, STAGE 3 UNSPECIFIED (MULTI): Primary | ICD-10-CM

## 2024-09-05 DIAGNOSIS — C91.10 CLL (CHRONIC LYMPHOCYTIC LEUKEMIA) (MULTI): ICD-10-CM

## 2024-09-05 DIAGNOSIS — D64.9 ANEMIA, UNSPECIFIED TYPE: ICD-10-CM

## 2024-09-05 LAB
ALBUMIN SERPL-MCNC: 3.9 G/DL (ref 3.5–5)
ALP BLD-CCNC: 84 U/L (ref 35–125)
ALT SERPL-CCNC: 12 U/L (ref 5–40)
ANION GAP SERPL CALC-SCNC: 11 MMOL/L
AST SERPL-CCNC: 25 U/L (ref 5–40)
BASOPHILS # BLD AUTO: 0.01 X10*3/UL (ref 0–0.1)
BASOPHILS NFR BLD AUTO: 0.2 %
BILIRUB SERPL-MCNC: 0.3 MG/DL (ref 0.1–1.2)
BUN SERPL-MCNC: 24 MG/DL (ref 8–25)
CALCIUM SERPL-MCNC: 8.8 MG/DL (ref 8.5–10.4)
CHLORIDE SERPL-SCNC: 104 MMOL/L (ref 97–107)
CO2 SERPL-SCNC: 23 MMOL/L (ref 24–31)
CREAT SERPL-MCNC: 0.9 MG/DL (ref 0.4–1.6)
EGFRCR SERPLBLD CKD-EPI 2021: 64 ML/MIN/1.73M*2
EOSINOPHIL # BLD AUTO: 0.36 X10*3/UL (ref 0–0.4)
EOSINOPHIL NFR BLD AUTO: 5.5 %
ERYTHROCYTE [DISTWIDTH] IN BLOOD BY AUTOMATED COUNT: 14.7 % (ref 11.5–14.5)
GLUCOSE SERPL-MCNC: 85 MG/DL (ref 65–99)
HCT VFR BLD AUTO: 31.7 % (ref 36–46)
HGB BLD-MCNC: 10.4 G/DL (ref 12–16)
IMM GRANULOCYTES # BLD AUTO: 0.02 X10*3/UL (ref 0–0.5)
IMM GRANULOCYTES NFR BLD AUTO: 0.3 % (ref 0–0.9)
LDH SERPL-CCNC: 180 U/L (ref 91–227)
LYMPHOCYTES # BLD AUTO: 4.26 X10*3/UL (ref 0.8–3)
LYMPHOCYTES NFR BLD AUTO: 65.1 %
MCH RBC QN AUTO: 30.6 PG (ref 26–34)
MCHC RBC AUTO-ENTMCNC: 32.8 G/DL (ref 32–36)
MCV RBC AUTO: 93 FL (ref 80–100)
MONOCYTES # BLD AUTO: 0.5 X10*3/UL (ref 0.05–0.8)
MONOCYTES NFR BLD AUTO: 7.6 %
NEUTROPHILS # BLD AUTO: 1.39 X10*3/UL (ref 1.6–5.5)
NEUTROPHILS NFR BLD AUTO: 21.3 %
NRBC BLD-RTO: 0 /100 WBCS (ref 0–0)
PHOSPHATE SERPL-MCNC: 2.8 MG/DL (ref 2.5–4.5)
PLATELET # BLD AUTO: 276 X10*3/UL (ref 150–450)
POTASSIUM SERPL-SCNC: 4.2 MMOL/L (ref 3.4–5.1)
PROT SERPL-MCNC: 6.1 G/DL (ref 5.9–7.9)
PTH-INTACT SERPL-MCNC: 75.6 PG/ML (ref 18.5–88)
RBC # BLD AUTO: 3.4 X10*6/UL (ref 4–5.2)
SODIUM SERPL-SCNC: 138 MMOL/L (ref 133–145)
WBC # BLD AUTO: 6.5 X10*3/UL (ref 4.4–11.3)

## 2024-09-05 PROCEDURE — 80053 COMPREHEN METABOLIC PANEL: CPT

## 2024-09-05 PROCEDURE — 84100 ASSAY OF PHOSPHORUS: CPT

## 2024-09-05 PROCEDURE — 83970 ASSAY OF PARATHORMONE: CPT

## 2024-09-05 PROCEDURE — 36415 COLL VENOUS BLD VENIPUNCTURE: CPT

## 2024-09-05 PROCEDURE — 85025 COMPLETE CBC W/AUTO DIFF WBC: CPT

## 2024-09-05 PROCEDURE — 83615 LACTATE (LD) (LDH) ENZYME: CPT

## 2024-09-10 ENCOUNTER — DOCUMENTATION (OUTPATIENT)
Dept: PHYSICAL THERAPY | Facility: CLINIC | Age: 84
End: 2024-09-10
Payer: MEDICARE

## 2024-09-10 NOTE — PROGRESS NOTES
Physical Therapy    Discharge Summary    Name: Bonnie Jules  MRN: [id number]  : 1940  Date: 09/10/24    Discharge Summary: PT    Discharge Information: Date of discharge 9/10/2024, Date of last visit 2024, Date of evaluation 2024, Number of attended visits 7, Referred by Dr. Liriano, and Referred for s/p right TKA    Therapy Summary: Pt atteneded 7 of 13 scheduled PT visits.  Right knee ROM 2-113deg;   Right quad and hams strength WFL  Pain 0/10  Reports that she is back to doing her recreational activities without issues, but has yet to take her dog on a walk longer than the length of her driveway or yard.       Discharge Status: goals adequately met     Rehab Discharge Reason: Achieved all and/or the most significant goals(s)

## 2024-09-19 ENCOUNTER — APPOINTMENT (OUTPATIENT)
Dept: CARDIOLOGY | Facility: CLINIC | Age: 84
End: 2024-09-19
Payer: MEDICARE

## 2024-09-23 DIAGNOSIS — E10.65 TYPE 1 DIABETES MELLITUS WITH HYPERGLYCEMIA (MULTI): Primary | ICD-10-CM

## 2024-09-23 RX ORDER — INSULIN DEGLUDEC 100 U/ML
INJECTION, SOLUTION SUBCUTANEOUS
Qty: 15 ML | Refills: 1 | Status: SHIPPED | OUTPATIENT
Start: 2024-09-23

## 2024-09-23 RX ORDER — PRAVASTATIN SODIUM 40 MG/1
40 TABLET ORAL DAILY
Qty: 90 TABLET | Refills: 3 | Status: SHIPPED | OUTPATIENT
Start: 2024-09-23

## 2024-09-30 ENCOUNTER — TELEPHONE (OUTPATIENT)
Dept: HEMATOLOGY/ONCOLOGY | Facility: CLINIC | Age: 84
End: 2024-09-30
Payer: MEDICARE

## 2024-10-01 ENCOUNTER — LAB (OUTPATIENT)
Dept: LAB | Facility: LAB | Age: 84
End: 2024-10-01
Payer: MEDICARE

## 2024-10-01 DIAGNOSIS — C91.10 CLL (CHRONIC LYMPHOCYTIC LEUKEMIA) (MULTI): ICD-10-CM

## 2024-10-01 LAB
ALBUMIN SERPL BCP-MCNC: 3.9 G/DL (ref 3.4–5)
ALP SERPL-CCNC: 67 U/L (ref 33–136)
ALT SERPL W P-5'-P-CCNC: 10 U/L (ref 7–45)
ANION GAP SERPL CALCULATED.3IONS-SCNC: 13 MMOL/L (ref 10–20)
AST SERPL W P-5'-P-CCNC: 20 U/L (ref 9–39)
BASOPHILS # BLD AUTO: 0.01 X10*3/UL (ref 0–0.1)
BASOPHILS NFR BLD AUTO: 0.2 %
BILIRUB SERPL-MCNC: 0.5 MG/DL (ref 0–1.2)
BUN SERPL-MCNC: 23 MG/DL (ref 6–23)
CALCIUM SERPL-MCNC: 8.9 MG/DL (ref 8.6–10.3)
CHLORIDE SERPL-SCNC: 106 MMOL/L (ref 98–107)
CO2 SERPL-SCNC: 24 MMOL/L (ref 21–32)
CREAT SERPL-MCNC: 0.92 MG/DL (ref 0.5–1.05)
EGFRCR SERPLBLD CKD-EPI 2021: 62 ML/MIN/1.73M*2
EOSINOPHIL # BLD AUTO: 0.38 X10*3/UL (ref 0–0.4)
EOSINOPHIL NFR BLD AUTO: 6.8 %
ERYTHROCYTE [DISTWIDTH] IN BLOOD BY AUTOMATED COUNT: 14.1 % (ref 11.5–14.5)
GLUCOSE SERPL-MCNC: 158 MG/DL (ref 74–99)
HCT VFR BLD AUTO: 32.7 % (ref 36–46)
HGB BLD-MCNC: 10.3 G/DL (ref 12–16)
IMM GRANULOCYTES # BLD AUTO: 0.01 X10*3/UL (ref 0–0.5)
IMM GRANULOCYTES NFR BLD AUTO: 0.2 % (ref 0–0.9)
LDH SERPL L TO P-CCNC: 162 U/L (ref 84–246)
LYMPHOCYTES # BLD AUTO: 3.47 X10*3/UL (ref 0.8–3)
LYMPHOCYTES NFR BLD AUTO: 62 %
MCH RBC QN AUTO: 31.2 PG (ref 26–34)
MCHC RBC AUTO-ENTMCNC: 31.5 G/DL (ref 32–36)
MCV RBC AUTO: 99 FL (ref 80–100)
MONOCYTES # BLD AUTO: 0.44 X10*3/UL (ref 0.05–0.8)
MONOCYTES NFR BLD AUTO: 7.9 %
NEUTROPHILS # BLD AUTO: 1.29 X10*3/UL (ref 1.6–5.5)
NEUTROPHILS NFR BLD AUTO: 22.9 %
NRBC BLD-RTO: 0 /100 WBCS (ref 0–0)
PLATELET # BLD AUTO: 332 X10*3/UL (ref 150–450)
POTASSIUM SERPL-SCNC: 4.6 MMOL/L (ref 3.5–5.3)
PROT SERPL-MCNC: 6.3 G/DL (ref 6.4–8.2)
RBC # BLD AUTO: 3.3 X10*6/UL (ref 4–5.2)
SODIUM SERPL-SCNC: 138 MMOL/L (ref 136–145)
WBC # BLD AUTO: 5.6 X10*3/UL (ref 4.4–11.3)

## 2024-10-01 PROCEDURE — 80053 COMPREHEN METABOLIC PANEL: CPT

## 2024-10-01 PROCEDURE — 83615 LACTATE (LD) (LDH) ENZYME: CPT

## 2024-10-01 PROCEDURE — 85025 COMPLETE CBC W/AUTO DIFF WBC: CPT

## 2024-10-01 PROCEDURE — 36415 COLL VENOUS BLD VENIPUNCTURE: CPT

## 2024-10-07 ENCOUNTER — OFFICE VISIT (OUTPATIENT)
Dept: HEMATOLOGY/ONCOLOGY | Facility: CLINIC | Age: 84
End: 2024-10-07
Payer: MEDICARE

## 2024-10-07 ENCOUNTER — APPOINTMENT (OUTPATIENT)
Dept: HEMATOLOGY/ONCOLOGY | Facility: CLINIC | Age: 84
End: 2024-10-07
Payer: MEDICARE

## 2024-10-07 VITALS
HEART RATE: 83 BPM | DIASTOLIC BLOOD PRESSURE: 78 MMHG | WEIGHT: 114.64 LBS | OXYGEN SATURATION: 96 % | SYSTOLIC BLOOD PRESSURE: 175 MMHG | RESPIRATION RATE: 18 BRPM | BODY MASS INDEX: 23.15 KG/M2 | TEMPERATURE: 98.2 F

## 2024-10-07 DIAGNOSIS — I21.4 ACUTE NON-ST SEGMENT ELEVATION MYOCARDIAL INFARCTION (MULTI): ICD-10-CM

## 2024-10-07 DIAGNOSIS — C91.10 CLL (CHRONIC LYMPHOCYTIC LEUKEMIA) (MULTI): Primary | ICD-10-CM

## 2024-10-07 LAB
25(OH)D3 SERPL-MCNC: 35 NG/ML (ref 30–100)
ALBUMIN SERPL BCP-MCNC: 4.1 G/DL (ref 3.4–5)
ALP SERPL-CCNC: 65 U/L (ref 33–136)
ALT SERPL W P-5'-P-CCNC: 9 U/L (ref 7–45)
ANION GAP SERPL CALC-SCNC: 14 MMOL/L (ref 10–20)
AST SERPL W P-5'-P-CCNC: 20 U/L (ref 9–39)
BASOPHILS # BLD AUTO: 0.01 X10*3/UL (ref 0–0.1)
BASOPHILS NFR BLD AUTO: 0.2 %
BILIRUB SERPL-MCNC: 0.4 MG/DL (ref 0–1.2)
BUN SERPL-MCNC: 24 MG/DL (ref 6–23)
CALCIUM SERPL-MCNC: 9.3 MG/DL (ref 8.6–10.3)
CHLORIDE SERPL-SCNC: 104 MMOL/L (ref 98–107)
CO2 SERPL-SCNC: 25 MMOL/L (ref 21–32)
CREAT SERPL-MCNC: 0.89 MG/DL (ref 0.5–1.05)
EGFRCR SERPLBLD CKD-EPI 2021: 65 ML/MIN/1.73M*2
EOSINOPHIL # BLD AUTO: 0.21 X10*3/UL (ref 0–0.4)
EOSINOPHIL NFR BLD AUTO: 4.2 %
ERYTHROCYTE [DISTWIDTH] IN BLOOD BY AUTOMATED COUNT: 13.8 % (ref 11.5–14.5)
FERRITIN SERPL-MCNC: 208 NG/ML (ref 8–150)
FOLATE SERPL-MCNC: >24 NG/ML
GLUCOSE SERPL-MCNC: 160 MG/DL (ref 74–99)
HCT VFR BLD AUTO: 33.4 % (ref 36–46)
HGB BLD-MCNC: 10.8 G/DL (ref 12–16)
IMM GRANULOCYTES # BLD AUTO: 0.01 X10*3/UL (ref 0–0.5)
IMM GRANULOCYTES NFR BLD AUTO: 0.2 % (ref 0–0.9)
IRON SATN MFR SERPL: 27 % (ref 25–45)
IRON SERPL-MCNC: 69 UG/DL (ref 35–150)
LYMPHOCYTES # BLD AUTO: 3.36 X10*3/UL (ref 0.8–3)
LYMPHOCYTES NFR BLD AUTO: 66.7 %
MCH RBC QN AUTO: 31.3 PG (ref 26–34)
MCHC RBC AUTO-ENTMCNC: 32.3 G/DL (ref 32–36)
MCV RBC AUTO: 97 FL (ref 80–100)
MONOCYTES # BLD AUTO: 0.34 X10*3/UL (ref 0.05–0.8)
MONOCYTES NFR BLD AUTO: 6.7 %
NEUTROPHILS # BLD AUTO: 1.11 X10*3/UL (ref 1.6–5.5)
NEUTROPHILS NFR BLD AUTO: 22 %
NRBC BLD-RTO: 0 /100 WBCS (ref 0–0)
PLATELET # BLD AUTO: 295 X10*3/UL (ref 150–450)
POTASSIUM SERPL-SCNC: 4.1 MMOL/L (ref 3.5–5.3)
PROT SERPL-MCNC: 6.7 G/DL (ref 6.4–8.2)
RBC # BLD AUTO: 3.45 X10*6/UL (ref 4–5.2)
SODIUM SERPL-SCNC: 139 MMOL/L (ref 136–145)
TIBC SERPL-MCNC: 254 UG/DL (ref 240–445)
TSH SERPL-ACNC: 0.61 MIU/L (ref 0.44–3.98)
UIBC SERPL-MCNC: 185 UG/DL (ref 110–370)
VIT B12 SERPL-MCNC: 849 PG/ML (ref 211–911)
WBC # BLD AUTO: 5 X10*3/UL (ref 4.4–11.3)

## 2024-10-07 PROCEDURE — 1160F RVW MEDS BY RX/DR IN RCRD: CPT | Performed by: INTERNAL MEDICINE

## 2024-10-07 PROCEDURE — 1159F MED LIST DOCD IN RCRD: CPT | Performed by: INTERNAL MEDICINE

## 2024-10-07 PROCEDURE — 99215 OFFICE O/P EST HI 40 MIN: CPT | Performed by: INTERNAL MEDICINE

## 2024-10-07 PROCEDURE — 3077F SYST BP >= 140 MM HG: CPT | Performed by: INTERNAL MEDICINE

## 2024-10-07 PROCEDURE — 82728 ASSAY OF FERRITIN: CPT | Mod: WESLAB | Performed by: INTERNAL MEDICINE

## 2024-10-07 PROCEDURE — 1126F AMNT PAIN NOTED NONE PRSNT: CPT | Performed by: INTERNAL MEDICINE

## 2024-10-07 PROCEDURE — 3078F DIAST BP <80 MM HG: CPT | Performed by: INTERNAL MEDICINE

## 2024-10-07 PROCEDURE — 82607 VITAMIN B-12: CPT | Mod: WESLAB | Performed by: INTERNAL MEDICINE

## 2024-10-07 PROCEDURE — 83550 IRON BINDING TEST: CPT | Mod: WESLAB | Performed by: INTERNAL MEDICINE

## 2024-10-07 PROCEDURE — 82306 VITAMIN D 25 HYDROXY: CPT | Mod: WESLAB | Performed by: INTERNAL MEDICINE

## 2024-10-07 PROCEDURE — 85025 COMPLETE CBC W/AUTO DIFF WBC: CPT | Performed by: INTERNAL MEDICINE

## 2024-10-07 PROCEDURE — 80053 COMPREHEN METABOLIC PANEL: CPT | Performed by: INTERNAL MEDICINE

## 2024-10-07 PROCEDURE — 84443 ASSAY THYROID STIM HORMONE: CPT | Mod: WESLAB | Performed by: INTERNAL MEDICINE

## 2024-10-07 PROCEDURE — 82746 ASSAY OF FOLIC ACID SERUM: CPT | Mod: WESLAB | Performed by: INTERNAL MEDICINE

## 2024-10-07 RX ORDER — METOPROLOL TARTRATE 25 MG/1
25 TABLET, FILM COATED ORAL 2 TIMES DAILY
Qty: 60 TABLET | Refills: 0 | Status: SHIPPED | OUTPATIENT
Start: 2024-10-07 | End: 2024-11-06

## 2024-10-07 ASSESSMENT — ENCOUNTER SYMPTOMS
DEPRESSION: 0
OCCASIONAL FEELINGS OF UNSTEADINESS: 0
LOSS OF SENSATION IN FEET: 1

## 2024-10-07 ASSESSMENT — COLUMBIA-SUICIDE SEVERITY RATING SCALE - C-SSRS
1. IN THE PAST MONTH, HAVE YOU WISHED YOU WERE DEAD OR WISHED YOU COULD GO TO SLEEP AND NOT WAKE UP?: NO
2. HAVE YOU ACTUALLY HAD ANY THOUGHTS OF KILLING YOURSELF?: NO
6. HAVE YOU EVER DONE ANYTHING, STARTED TO DO ANYTHING, OR PREPARED TO DO ANYTHING TO END YOUR LIFE?: NO

## 2024-10-07 ASSESSMENT — PAIN SCALES - GENERAL: PAINLEVEL: 0-NO PAIN

## 2024-10-07 NOTE — PROGRESS NOTES
HPI   83 yo with Diabetes 1 (dx 1981, +neuropathy/nepropathy/low sugar unawareness), HTN, Dyslipidemia, hypothyroid, osteoporosis, CLL, CVD. A1c was 6.7%, today 6.3% (pt anemic since knee replacement surgery).      Pt is testing sugars 4 times per day using mp 2. Pt is having low sugars 1-2 times/week no clear pattern. Pt is following a carb controlled diet and knows reasonable carb allowances. Pt is able to afford their medications. Pt has been walking her dog, active around her house.           Taking tresiba 12  units qd, humalog (100-125) with 1/2 unit increase for every 25 increase in sugar, baseline 3-3-4-0.           Taking lisinopril 40mg, amlodipine 10mg, toprol 25mg 1/2 pill bid, hctz 25mg, hydralazine 50mg bid.           Taking pravastatin 40mg qhs for lipids and tolerating.          Taking levothyroxine 100mcg qd, euthyroid without obstructive sx.           Pt has had 3 treatments of IV reclast for osteoporosis sees rheumatology.           90 day mp 2 data: 71% in range, 3% low, pattern: low 100's overnight, after breakfast upper 100's,  low/mid 100's through the day, mid/upper 100's at bedtime.    -had R knee replacement since last visit and was successful    Current Outpatient Medications:     acetaminophen (Tylenol) 325 mg tablet, Take 2 tablets (650 mg) by mouth every 4 hours if needed for fever (temp greater than 38.0 C) (greater than or equal to 38 C)., Disp: , Rfl:     aflibercept (Eylea) 2 mg/0.05 mL intra-ocular injection, 0.05 mL (2 mg) by intravitreal route 1 time. Left eye, Disp: , Rfl:     amLODIPine (Norvasc) 10 mg tablet, Take 1 tablet (10 mg) by mouth once daily., Disp: 90 tablet, Rfl: 3    aspirin 81 mg EC tablet, Take 1 tablet (81 mg) by mouth once daily., Disp: , Rfl:     cholecalciferol (Vitamin D-3) 50 mcg (2,000 unit) capsule, Take 1 capsule (50 mcg) by mouth early in the morning.., Disp: , Rfl:     hydrALAZINE (Apresoline) 50 mg tablet, Take 1 tablet (50 mg) by mouth 2 times  a day., Disp: 180 tablet, Rfl: 3    insulin degludec (Tresiba FlexTouch U-100) 100 unit/mL (3 mL) injection, INJECT 11 UNITS UNDER THE SKIN AS DIRECTED DAILY, Disp: 15 mL, Rfl: 1    insulin lispro (HumaLOG  KwikPen U-100) 100 unit/mL injection, INJECT UP TO 30 UNITS UNDER THE SKIN DAILY AT MEALS, Disp: 15 mL, Rfl: 5    levothyroxine (Synthroid, Levoxyl) 100 mcg tablet, TAKE 1 TABLET BY MOUTH EVERY DAY, Disp: 90 tablet, Rfl: 0    lisinopril 40 mg tablet, Take 1 tablet (40 mg) by mouth once daily., Disp: 90 tablet, Rfl: 0    metoprolol tartrate (Lopressor) 25 mg tablet, Take 1 tablet (25 mg) by mouth 2 times a day., Disp: 60 tablet, Rfl: 0    mupirocin (Bactroban) 2 % ointment, APPLY TOPICALLY TO THE AFFECTED AREA THREE TIMES DAILY FOR 10 DAYS, Disp: , Rfl:     nitroglycerin (Nitrostat) 0.4 mg SL tablet, Place 1 tablet (0.4 mg) under the tongue every 5 minutes if needed for chest pain., Disp: , Rfl:     polyethylene glycol (Miralax) 17 gram/dose powder, Mix 17 g of powder and drink once daily., Disp: , Rfl:     POLYETHYLENE GLYCOL 3350 ORAL, Take 1 packet by mouth once daily as needed (constipation). With 8 oz of fluid as needed, Disp: , Rfl:     pravastatin (Pravachol) 40 mg tablet, Take 1 tablet (40 mg) by mouth once daily., Disp: 90 tablet, Rfl: 3    sulfaSALAzine (Azulfidine) 500 mg DR tablet, Take 2 tablets (1,000 mg) by mouth 2 times a day., Disp: , Rfl:     valACYclovir (Valtrex) 1 gram tablet, Take 1 tablet (1,000 mg) by mouth once daily as needed (cold sore). Twice daily, Disp: , Rfl:     lidocaine 2 % mucosal jelly (Uro-Jet), APPLY TOPICALLY FOUR TIMES DAILY AS NEEDED FOR MILD PAIN, Disp: , Rfl:     MediHoney, honey, gel topical gel, APPLY to wound DAILY., Disp: , Rfl:     ondansetron ODT (Zofran-ODT) 4 mg disintegrating tablet, Take 1 tablet (4 mg) by mouth every 8 hours if needed for nausea or vomiting. (Patient not taking: Reported on 10/8/2024), Disp: 20 tablet, Rfl: 0      Allergies as of  "10/08/2024 - Reviewed 10/08/2024   Allergen Reaction Noted    Amoxicillin Hives 08/18/2023    Amoxicillin-pot clavulanate Rash 10/03/2023    Ciprofloxacin Rash 08/18/2023    Levofloxacin Rash 08/18/2023         Review of Systems   Cardiology: Lightheadedness-denies.  Chest pain-denies.  Leg edema-denies.  Palpitations-denies.  Respiratory: Cough-denies. Shortness of breath-denies.  Wheezing-denies.  Gastroenterology: Constipation-denies.  Diarrhea-denies.  Heartburn-denies.  Endocrinology: Cold intolerance-denies.  Heat intolerance-denies.  Sweats-denies.  Neurology: Headache-denies.  Tremor-denies.  Neuropathy in extremities + feet  Psychology: Low energy-denies.  Irritability-denies.  Sleep disturbances-denies.      /63   Pulse 80   Ht 1.499 m (4' 11\")   Wt 52.5 kg (115 lb 12.8 oz)   LMP  (LMP Unknown)   BMI 23.39 kg/m²       Labs:  Lab Results   Component Value Date    WBC 5.0 10/07/2024    NRBC 0.0 10/07/2024    RBC 3.45 (L) 10/07/2024    HGB 10.8 (L) 10/07/2024    HCT 33.4 (L) 10/07/2024     10/07/2024     Lab Results   Component Value Date    CALCIUM 9.3 10/07/2024    AST 20 10/07/2024    ALKPHOS 65 10/07/2024    BILITOT 0.4 10/07/2024    PROT 6.7 10/07/2024    ALBUMIN 4.1 10/07/2024    GLOB 2.3 10/11/2022    AGR 1.3 (L) 10/11/2022     10/07/2024    K 4.1 10/07/2024     10/07/2024    CO2 25 10/07/2024    ANIONGAP 14 10/07/2024    BUN 24 (H) 10/07/2024    CREATININE 0.89 10/07/2024    UREACREAUR 18.9 10/11/2022    GLUCOSE 160 (H) 10/07/2024    ALT 9 10/07/2024    EGFR 65 10/07/2024     Lab Results   Component Value Date    CHOL 162 03/06/2023    TRIG 125 03/06/2023    HDL 70 03/06/2023    LDLCALC 67 03/06/2023     Lab Results   Component Value Date    MICROALBCREA 40.1 (H) 01/05/2022     Lab Results   Component Value Date    TSH 0.61 10/07/2024     Lab Results   Component Value Date    SROGSVSJ34 849 10/07/2024     Lab Results   Component Value Date    HGBA1C 6.3 10/08/2024 "         Physical Exam   General Appearance: pleasant, cooperative, no acute distress  HEENT: no chemosis, no proptosis, no lid lag, no lid retraction  Neck: no lymphadenopathy, no thyromegaly, no dominant thyroid nodules  Heart: 3/6 rohit, regular rate and rhythm, S1 and S2  Lungs: no wheezes, no rhonci, no rales  Extremities: no lower extremity swelling      Assessment/Plan   1. Type 1 diabetes mellitus with hyperglycemia (Multi)  -A1c ordered and reviewed  -labs/notes/mp data reviewed    -overall doing well  -pt relates some low alarms overnight, ? Of compression lows  -repeat test with fsbs, if having true lows then lower basal insulin by 2 units q 3 days for overnight/am sugars <100    2. Benign hypertension  -at target on meds, will follow, no change    3. Mixed hyperlipidemia  -on statin and tolerating, overdue for labs, labs ordered today    4. Hypothyroidism, unspecified type  -euthyroid on therapy, labs reviewed, no change         Follow Up:  Ashley 6 months    Medical Decision Making  Complexity of problem: Chronic illness of diabetes mellitus uncontrolled, progressing  Data analyzed and reviewed: Reviewed prior notes, blood glucose data, labs including HgbA1c, lipids, serum chemistries.  Ordered tests.   Risk of complications and morbidities: Is definite because of use of insulin and risk of hypoglycemia.  Prescription medications reviewed and modifications made.  Compliance assessed.  Addressed social determinants of health including food insecurity.

## 2024-10-07 NOTE — PROGRESS NOTES
Patient ID: Bonnie Jules is a 84 y.o. female.  Referring Physician: No referring provider defined for this encounter.  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 Most recently having received treatment with rituximab in February of 2014.     She denies any fever, chills or night sweats.  No cough, chest pain or shortness of breath.  No nausea or vomiting.  No constipation or diarrhea.  No b-symptoms. No current sign or symptoms of active bleeding or unusual bruising, chronic anemia.      Heme/Onc History:  1. 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  2. 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  3. Status post admission to [facility] for syncope and anemia in December of 2012.  4. Status post admission to [facility] for lightheadedness and anemia in June of 2013.  5. 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  6. Started IVIg 0.4 mg/kg monthly on 02/07/2014.  7. Bendamustine started on 02/12/2014, stopped in April of 2014, secondary to side effects.  8. Started rituximab on 02/19/2014.        Past Medical History:   Past Medical History:   Diagnosis Date    Anxiety     Benign hypertension     Chronic ischemic colitis (Multi)     With history of sepsis and infectious gastroenteritis 10/7/2022-10/11/2022    Chronic kidney disease, stage III (moderate) (Multi)     CLL (chronic lymphocytic leukemia) (Multi)     Coronary artery disease     CTS (carpal tunnel syndrome)     Diabetes mellitus (Multi)     Dry eye syndrome of bilateral lacrimal glands     Essential (primary) hypertension     Exudative  age-related macular degeneration of left eye, unspecified stage (Multi)     GI bleed     Hyperlipidemia, unspecified     Hypertension     Hypothyroidism     Hypothyroidism, unspecified type     Macular cyst, hole, or pseudohole, left eye     Miosis     Mixed hyperlipidemia     NSTEMI (non-ST elevated myocardial infarction) (Multi)     Osteoarthritis     Osteoporosis     Peripheral vascular disease (CMS-HCC)     Retinal edema     Right knee pain     Seizure disorder (Multi)     Type 1 diabetes mellitus with hyperglycemia (Multi)     Unspecified disorder of refraction      Social History:   Social History     Socioeconomic History    Marital status:      Spouse name: Not on file    Number of children: Not on file    Years of education: Not on file    Highest education level: Not on file   Occupational History    Not on file   Tobacco Use    Smoking status: Never     Passive exposure: Past    Smokeless tobacco: Never   Vaping Use    Vaping status: Never Used   Substance and Sexual Activity    Alcohol use: Not Currently    Drug use: Not Currently    Sexual activity: Defer   Other Topics Concern    Not on file   Social History Narrative    Not on file     Social Determinants of Health     Financial Resource Strain: Low Risk  (6/9/2024)    Overall Financial Resource Strain (CARDIA)     Difficulty of Paying Living Expenses: Not hard at all   Food Insecurity: No Food Insecurity (6/9/2024)    Hunger Vital Sign     Worried About Running Out of Food in the Last Year: Never true     Ran Out of Food in the Last Year: Never true   Recent Concern: Food Insecurity - Food Insecurity Present (4/18/2024)    Hunger Vital Sign     Worried About Running Out of Food in the Last Year: Often true     Ran Out of Food in the Last Year: Often true   Transportation Needs: No Transportation Needs (6/9/2024)    PRAPARE - Transportation     Lack of Transportation (Medical): No     Lack of Transportation (Non-Medical): No   Physical  Activity: Inactive (6/9/2024)    Exercise Vital Sign     Days of Exercise per Week: 0 days     Minutes of Exercise per Session: 0 min   Stress: No Stress Concern Present (6/9/2024)    [employer] [place] [employer] - Occupational Stress Questionnaire     Feeling of Stress : Only a little   Social Connections: Moderately Isolated (6/9/2024)    Social Connection and Isolation Panel [NHANES]     Frequency of Communication with Friends and Family: More than three times a week     Frequency of Social Gatherings with Friends and Family: More than three times a week     Attends Sabianist Services: 1 to 4 times per year     Active Member of Clubs or Organizations: No     Attends Club or Organization Meetings: Never     Marital Status:    Intimate Partner Violence: Not At Risk (6/9/2024)    Humiliation, Afraid, Rape, and Kick questionnaire     Fear of Current or Ex-Partner: No     Emotionally Abused: No     Physically Abused: No     Sexually Abused: No   Housing Stability: Low Risk  (6/9/2024)    Housing Stability Vital Sign     Unable to Pay for Housing in the Last Year: No     Number of Places Lived in the Last Year: 1     Unstable Housing in the Last Year: No     Surgical History:   Past Surgical History:   Procedure Laterality Date    CARPAL TUNNEL RELEASE Bilateral     CATARACT EXTRACTION W/ INTRAOCULAR LENS  IMPLANT, BILATERAL      Left 8/10/2017, right 8/31/2017    CHOLECYSTECTOMY      COLONOSCOPY      CORONARY ARTERY BYPASS GRAFT  2005    X 3    HYSTERECTOMY      KNEE SURGERY Right 04/17/2024    total    OTHER SURGICAL HISTORY      Bilateral heel spurs    OTHER SURGICAL HISTORY Right 01/2016    Femoral endarterectomy    TOTAL HIP ARTHROPLASTY Left 2012     Family History:   Family History   Problem Relation Name Age of Onset    Diabetes Mother      Diabetes Daughter      Other (RA) Daughter        reports that she has never smoked. She has been exposed to tobacco smoke. She has never used smokeless  tobacco.  Oncology Family history: Cancer-related family history is not on file.    Review Of Systems:  As stated per in HPI; otherwise all other 12 point ROS are negative    Physical Exam:  /78 (BP Location: Right arm, Patient Position: Sitting, BP Cuff Size: Adult)   Pulse 83   Temp 36.8 °C (98.2 °F) (Temporal)   Resp 18   Wt 52 kg (114 lb 10.2 oz)   LMP  (LMP Unknown)   SpO2 96%   BMI 23.15 kg/m²   BSA: 1.47 meters squared  General: awake/alert/oriented x3, no distress, alert and cooperative  Head: Short hair fully covering scalp. Symmetric facial expressions  Eyes: PERRL, EOMI, clear sclera, eyebrows present.  Ears/Nose/Mouth/Throat:  Oral mucous membranes moist. No oral ulcers. No palpable pre/post-auricular lymph nodes  Neck: No palpable cervical chain lymph nodes  Respiratory: unlabored breathing on room air, good chest expansion, thorax symmetric  Cardio: Regular rate and rhythm, normal S1 and S2, radial pulses symmetric  GI: Nondistended, soft, non-tender abdomen  Musculoskeletal: Normal muscle bulk and tone, ROM intact, no joint swelling.  Rises from chair and walks unassisted.  Extremities: No ankle swelling, no arm or leg wounds  Neuro: Alert, cognition intact, speech normal. Facial expressions symmetric.  No motor deficits noted. Sensation intact to touch and hot/cold.   Able to stand from seated position unassisted and walks around the room unassisted.  Psychological: Appropriate mood and behavior.  Skin: Warm and dry, no lesions, no rashes    Results:  Diagnostic Results   Lab Results   Component Value Date    WBC 5.6 10/01/2024    HGB 10.3 (L) 10/01/2024    HCT 32.7 (L) 10/01/2024    MCV 99 10/01/2024     10/01/2024     Lab Results   Component Value Date    CALCIUM 8.9 10/01/2024     10/01/2024    K 4.6 10/01/2024    CO2 24 10/01/2024     10/01/2024    BUN 23 10/01/2024    CREATININE 0.92 10/01/2024    ALT 10 10/01/2024    AST 20 10/01/2024       Current Outpatient  Medications:     acetaminophen (Tylenol) 325 mg tablet, Take 2 tablets (650 mg) by mouth every 4 hours if needed for fever (temp greater than 38.0 C) (greater than or equal to 38 C)., Disp: , Rfl:     aflibercept (Eylea) 2 mg/0.05 mL intra-ocular injection, 0.05 mL (2 mg) by intravitreal route 1 time. Left eye, Disp: , Rfl:     amLODIPine (Norvasc) 10 mg tablet, Take 1 tablet (10 mg) by mouth once daily., Disp: 90 tablet, Rfl: 3    aspirin 81 mg EC tablet, Take 1 tablet (81 mg) by mouth once daily., Disp: , Rfl:     cholecalciferol (Vitamin D-3) 50 mcg (2,000 unit) capsule, Take 1 capsule (50 mcg) by mouth early in the morning.., Disp: , Rfl:     cyanocobalamin (Vitamin B-12) 1,000 mcg tablet, Take 1 tablet (1,000 mcg) by mouth once daily., Disp: , Rfl:     folic acid (Folvite) 1 mg tablet, Take 1 tablet (1 mg) by mouth once daily., Disp: , Rfl:     hydrALAZINE (Apresoline) 50 mg tablet, Take 1 tablet (50 mg) by mouth 2 times a day., Disp: 180 tablet, Rfl: 3    insulin degludec (Tresiba FlexTouch U-100) 100 unit/mL (3 mL) injection, INJECT 11 UNITS UNDER THE SKIN AS DIRECTED DAILY, Disp: 15 mL, Rfl: 1    insulin lispro (HumaLOG  KwikPen U-100) 100 unit/mL injection, INJECT UP TO 30 UNITS UNDER THE SKIN DAILY AT MEALS, Disp: 15 mL, Rfl: 5    levothyroxine (Synthroid, Levoxyl) 100 mcg tablet, TAKE 1 TABLET BY MOUTH EVERY DAY, Disp: 90 tablet, Rfl: 0    lidocaine 2 % mucosal jelly (Uro-Jet), APPLY TOPICALLY FOUR TIMES DAILY AS NEEDED FOR MILD PAIN, Disp: , Rfl:     lisinopril 40 mg tablet, Take 1 tablet (40 mg) by mouth once daily., Disp: 90 tablet, Rfl: 0    MediHoney, honey, gel topical gel, APPLY to wound DAILY., Disp: , Rfl:     metoprolol tartrate (Lopressor) 25 mg tablet, Take 1 tablet (25 mg) by mouth 2 times a day., Disp: 60 tablet, Rfl: 0    mupirocin (Bactroban) 2 % ointment, APPLY TOPICALLY TO THE AFFECTED AREA THREE TIMES DAILY FOR 10 DAYS, Disp: , Rfl:     nitroglycerin (Nitrostat) 0.4 mg SL tablet,  Place 1 tablet (0.4 mg) under the tongue every 5 minutes if needed for chest pain., Disp: , Rfl:     ondansetron ODT (Zofran-ODT) 4 mg disintegrating tablet, Take 1 tablet (4 mg) by mouth every 8 hours if needed for nausea or vomiting., Disp: 20 tablet, Rfl: 0    POLYETHYLENE GLYCOL 3350 ORAL, Take 1 packet by mouth once daily as needed (constipation). With 8 oz of fluid as needed, Disp: , Rfl:     pravastatin (Pravachol) 40 mg tablet, Take 1 tablet (40 mg) by mouth once daily., Disp: 90 tablet, Rfl: 3    sulfaSALAzine (Azulfidine) 500 mg DR tablet, Take 2 tablets (1,000 mg) by mouth 2 times a day., Disp: , Rfl:     valACYclovir (Valtrex) 1 gram tablet, Take 1 tablet (1,000 mg) by mouth once daily as needed (cold sore). Twice daily, Disp: , Rfl:      Radiology:    Pathology:    Assessment/Plan:  ? 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  Started IVIg 0.4 mg/kg monthly on 02/07/2014.  Bendamustine started on 02/12/2014, stopped in April of 2014, secondary to side effects.  Started rituximab on 02/19/2014.    No evidence of relapse at this time. Stable Hbg around 10.3, unrelated to CLL. If drops, then we will consider BMB.    Check folate/vitb12 etc as she has been on them for so long and levels were not checked    RTC in 1 year with labs    Diagnoses and all orders for this visit:  CLL (chronic lymphocytic leukemia) (Multi)  -     Iron and TIBC; Future  -     Vitamin B12; Future  -     Ferritin; Future  -     Folate; Future  -     Vitamin D 25-Hydroxy,Total (for eval of Vitamin D levels); Future  -     TSH with reflex to Free T4 if abnormal; Future  -     Clinic Appointment Request Follow up; Future  -     Comprehensive Metabolic Panel; Future  -     CBC and Auto Differential; Future  Acute non-ST segment elevation myocardial infarction (Multi)  -     metoprolol tartrate (Lopressor) 25 mg tablet; Take 1 tablet (25 mg) by mouth 2 times a day.        Performance Status: Asymptomatic    I spent more than 45 minutes for the patient today, including face-to-face conversation, pre-visit preparation, post-visit orders, and others.   Joseph Hollingsworth MD

## 2024-10-07 NOTE — PROGRESS NOTES
Pt seen in office today for an established patient/ new to provider follow up visit with Dr. Joseph Hollingsworth for management of her CLL. She was last seen in our office on 6/18/24 by Miquel Hull CNP.  She is  without complaints today and denies pain. She is accompanied to her visit today by her daughter/ POA, Rosa.     Medications, pharmacy preference and allergies were reviewed with patient and updated in the medical record by MD.     Per orders, labs were obtained on 10/1/24 amd results are to be reviewed in office today by MD with patient and daughter. She is to have additional labs obtained in office today and will return to clinic in 1 year with labs prior to her visit.    Our contact information was given to patient and they were encouraged to contact us with any questions or concerns.     Patient verbalized understanding and agreement regarding discussed information via verbal feedback. Pt escorted to scheduling.

## 2024-10-08 ENCOUNTER — APPOINTMENT (OUTPATIENT)
Dept: ENDOCRINOLOGY | Facility: CLINIC | Age: 84
End: 2024-10-08
Payer: MEDICARE

## 2024-10-08 ENCOUNTER — TELEPHONE (OUTPATIENT)
Dept: HEMATOLOGY/ONCOLOGY | Facility: CLINIC | Age: 84
End: 2024-10-08

## 2024-10-08 VITALS
DIASTOLIC BLOOD PRESSURE: 63 MMHG | BODY MASS INDEX: 23.35 KG/M2 | HEART RATE: 80 BPM | WEIGHT: 115.8 LBS | SYSTOLIC BLOOD PRESSURE: 134 MMHG | HEIGHT: 59 IN

## 2024-10-08 DIAGNOSIS — E03.9 HYPOTHYROIDISM, UNSPECIFIED TYPE: ICD-10-CM

## 2024-10-08 DIAGNOSIS — E78.2 MIXED HYPERLIPIDEMIA: ICD-10-CM

## 2024-10-08 DIAGNOSIS — I10 BENIGN HYPERTENSION: ICD-10-CM

## 2024-10-08 DIAGNOSIS — E10.65 TYPE 1 DIABETES MELLITUS WITH HYPERGLYCEMIA (MULTI): Primary | ICD-10-CM

## 2024-10-08 LAB — POC HEMOGLOBIN A1C: 6.3 % (ref 4.2–6.5)

## 2024-10-08 PROCEDURE — 1159F MED LIST DOCD IN RCRD: CPT | Performed by: INTERNAL MEDICINE

## 2024-10-08 PROCEDURE — 3078F DIAST BP <80 MM HG: CPT | Performed by: INTERNAL MEDICINE

## 2024-10-08 PROCEDURE — 1036F TOBACCO NON-USER: CPT | Performed by: INTERNAL MEDICINE

## 2024-10-08 PROCEDURE — 1126F AMNT PAIN NOTED NONE PRSNT: CPT | Performed by: INTERNAL MEDICINE

## 2024-10-08 PROCEDURE — 3075F SYST BP GE 130 - 139MM HG: CPT | Performed by: INTERNAL MEDICINE

## 2024-10-08 PROCEDURE — 83036 HEMOGLOBIN GLYCOSYLATED A1C: CPT | Performed by: INTERNAL MEDICINE

## 2024-10-08 PROCEDURE — 99214 OFFICE O/P EST MOD 30 MIN: CPT | Performed by: INTERNAL MEDICINE

## 2024-10-08 ASSESSMENT — ENCOUNTER SYMPTOMS
LOSS OF SENSATION IN FEET: 0
OCCASIONAL FEELINGS OF UNSTEADINESS: 0
DEPRESSION: 0

## 2024-10-08 ASSESSMENT — LIFESTYLE VARIABLES
SKIP TO QUESTIONS 9-10: 1
AUDIT-C TOTAL SCORE: 0
HOW OFTEN DO YOU HAVE A DRINK CONTAINING ALCOHOL: NEVER
HOW OFTEN DO YOU HAVE SIX OR MORE DRINKS ON ONE OCCASION: NEVER
HOW MANY STANDARD DRINKS CONTAINING ALCOHOL DO YOU HAVE ON A TYPICAL DAY: PATIENT DOES NOT DRINK

## 2024-10-08 ASSESSMENT — PATIENT HEALTH QUESTIONNAIRE - PHQ9
1. LITTLE INTEREST OR PLEASURE IN DOING THINGS: NOT AT ALL
SUM OF ALL RESPONSES TO PHQ9 QUESTIONS 1 & 2: 0
2. FEELING DOWN, DEPRESSED OR HOPELESS: NOT AT ALL

## 2024-10-08 ASSESSMENT — PAIN SCALES - GENERAL: PAINLEVEL: 0-NO PAIN

## 2024-10-08 NOTE — TELEPHONE ENCOUNTER
At the request of Dr. Joseph Hollingsworth, patient was called and made aware that per Dr. Ruiz, she is to stop taking vit b12 and folic acid and  she is to keep taking vit D. She also wants patient to know that her thyroid medication dose is correct for now. Patient verbalized understanding and agreement regarding discussed information via verbal feedback.

## 2024-10-15 NOTE — PROGRESS NOTES
Subjective      Chief Complaint   Patient presents with    Follow-up          She does have a history of coronary disease and had open heart surgery in 2005 she also has hypertension as well as CLL.  Her last echocardiogram was in 2021 showing normal left ventricular function and aortic sclerosis.  Her last stress test was in 2013   She was seen in July for clearance for rectal prolapse is felt that she had had total knee replacement in April and had an elevation of her troponin is felt this should be held off.  She is feeling well and is active.  She is not complaining of chest discomfort.  No complaints of PND or orthopnea.  The legs are not swelling on her.  NO palpitaions.  Denies sob and the BP is doing well            Review of Systems   Constitutional: Negative. Negative for chills.   HENT: Negative.     Eyes: Negative.    Respiratory:  Negative for cough.    Endocrine: Negative.    Skin: Negative.    Musculoskeletal: Negative.  Negative for falls.   Gastrointestinal: Negative.    Genitourinary: Negative.    Neurological: Negative.    All other systems reviewed and are negative.       Past Surgical History:   Procedure Laterality Date    CARPAL TUNNEL RELEASE Bilateral     CATARACT EXTRACTION W/ INTRAOCULAR LENS  IMPLANT, BILATERAL      Left 8/10/2017, right 8/31/2017    CHOLECYSTECTOMY      COLONOSCOPY      CORONARY ARTERY BYPASS GRAFT  2005    X 3    HYSTERECTOMY      KNEE SURGERY Right 04/17/2024    total    OTHER SURGICAL HISTORY      Bilateral heel spurs    OTHER SURGICAL HISTORY Right 01/2016    Femoral endarterectomy    TOTAL HIP ARTHROPLASTY Left 2012        Active Ambulatory Problems     Diagnosis Date Noted    Senile osteoporosis 10/08/2022    Arterial disease (CMS-HCC) 08/19/2023    Arthritis of both knees 08/19/2023    Pseudophakia 08/19/2023    Vascular calcification 08/19/2023    Atherosclerotic heart disease of native coronary artery with unspecified angina pectoris 08/19/2023     Chondrocalcinosis of left knee 08/19/2023    Chronic ischemic colitis (Multi) 08/19/2023    Chronic kidney disease, stage 3 unspecified (Multi) 10/08/2022    Chronic lymphocytic leukemia of B-cell type not having achieved remission (Multi) 10/08/2022    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    Difficulty walking 08/19/2023    Dermatochalasis of both upper eyelids 08/19/2023    Erosive osteoarthritis 08/19/2023    Essential hypertension 08/19/2023    Extravasation injury 08/19/2023    Injury of head 08/19/2023    Hypothyroidism 08/19/2023    Instability of right knee joint 08/19/2023    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 08/19/2023    Ankylosis of joint of shoulder region 08/19/2023    Knee pain 08/19/2023    Full thickness macular hole of left eye 08/19/2023    Carpal tunnel syndrome 08/19/2023    Heart murmur 08/19/2023    Hyperlipidemia 08/19/2023    Myocardial infarction (Multi) 08/19/2023    Chronic pain 08/19/2023    Insomnia 08/19/2023    Peripheral vascular disease, unspecified (CMS-HCC) 08/19/2023    Osteoarthritis of right knee 08/19/2023    Presence of aortocoronary bypass graft 08/19/2023    Primary localized osteoarthritis of pelvic region and thigh 08/19/2023    Rhinitis 08/19/2023    Shoulder joint pain 08/19/2023    Spondylosis of lumbar spine 08/19/2023    Stenosis of right femoral artery (CMS-HCC) 08/19/2023    Dry eyes 08/19/2023    Unspecified disorder of refraction 08/19/2023    Vascular disorder of intestine, unspecified 08/19/2023    Vitamin D deficiency 08/19/2023    History of coronary artery bypass graft 10/23/2023    Hematochezia 10/23/2023    Gastrointestinal hemorrhage 10/23/2023    Exudative age-related macular degeneration of left eye with inactive choroidal neovascularization 10/23/2023    Dyslipidemia 10/23/2023    Delirium 10/23/2023    Lactic acidosis 10/23/2023    Macular edema 10/23/2023    Metatarsalgia of right foot 06/12/2023    Nausea & vomiting 10/23/2023     Neuropathy 06/12/2023    Onychomycosis 06/12/2023    Pain of great toe 06/12/2023    Callus 06/12/2023    Rectal hemorrhage 10/23/2023    Seizure (Multi) 10/23/2023    Fissure in skin 06/12/2023    Ulcer of right foot (Multi) 06/12/2023    Osteoarthritis of left knee 10/23/2023    Chronic renal impairment 08/19/2023    Arthropathy 06/12/2023    Osteoarthritis 10/23/2023    Benign essential hypertension 10/23/2023    Hypertensive disorder 10/23/2023    Low blood pressure 10/23/2023    Near syncope 10/23/2023    Peripheral vascular disease (CMS-HCC) 06/12/2023    Disorder of thyroid gland 10/23/2023    Colitis 10/23/2023    Ischemic colitis (Multi) 05/30/2017    Pain in joint involving ankle and foot 08/07/2008    Arthralgia of shoulder 10/23/2023    Hip pain 10/23/2023    Pain in joint, ankle and foot 08/07/2008    Sepsis (Multi) 10/23/2023    Type 2 diabetes mellitus 03/28/2022    Dehydration 10/23/2023    Closed fracture of calcaneus 07/02/2008    Arthritis of hip 10/23/2023    Aortic valve disorder 10/23/2023    Anxiety 10/23/2023    Acute urinary tract infection 10/23/2023    Acute non-ST elevation myocardial infarction (NSTEMI) (Multi) 08/19/2023    Accidental fall 10/23/2023    Abnormal gait 08/07/2008    Abnormal computed tomography scan 10/23/2023    Preop cardiovascular exam 03/07/2024    Chronic pain of both knees 04/17/2024    Primary osteoarthritis of right knee 04/17/2024    Urinary retention 04/24/2024    Sinusitis 09/30/2022    Weakness 05/09/2024    Upper respiratory tract infection 09/30/2022    Syncope and collapse 10/23/2023    Strain of neck muscle 05/09/2024    Pneumonia 05/09/2024    Hypertensive urgency 10/08/2022    Fracture of rib 05/09/2024    Dizziness 05/09/2024    Contact with and (suspected) exposure to covid-19 10/08/2022    Concussion without loss of consciousness 05/09/2024    Closed fracture of facial bone (Multi) 05/09/2024    Anemia 11/21/2023    Acidosis, unspecified 10/08/2022     Abnormal findings on examination of genitourinary organs 05/09/2024    Synovitis and tenosynovitis 05/09/2024    Overweight with body mass index (BMI) 25.0-29.9 05/09/2024    Hypokalemia 10/08/2022    Hypocalcemia 05/09/2024    History of total right knee replacement 05/10/2024    Fracture of one rib, right side, initial encounter for closed fracture 06/09/2024    Closed fracture of multiple ribs of right side, initial encounter 06/11/2024     Resolved Ambulatory Problems     Diagnosis Date Noted    Background diabetic retinopathy of left eye determined by examination (Multi) 08/19/2023    Dermatochalasis of left upper eyelid 08/19/2023    Hypoglycemia unawareness associated with type 1 diabetes mellitus (Multi) 08/19/2023    Type 1 diabetes mellitus with hyperglycemia (Multi) 08/19/2023    Type 2 diabetes mellitus with other skin complications 08/19/2023    Type 1 diabetes mellitus with diabetic polyneuropathy 08/19/2023    Type 2 diabetes mellitus without complication (Multi) 08/19/2023    Diabetic polyneuropathy associated with type 2 diabetes mellitus (Multi) 06/12/2023    Hypoglycemia 10/23/2023    Type 1 diabetes mellitus 06/12/2023    Abnormal metabolic state due to diabetes mellitus (Multi) 10/23/2023     Past Medical History:   Diagnosis Date    Benign hypertension     Chronic kidney disease, stage III (moderate) (Multi)     CLL (chronic lymphocytic leukemia) (Multi)     Coronary artery disease     CTS (carpal tunnel syndrome)     Diabetes mellitus (Multi)     Dry eye syndrome of bilateral lacrimal glands     Essential (primary) hypertension     Exudative age-related macular degeneration of left eye, unspecified stage     GI bleed     Hyperlipidemia, unspecified     Hypertension     Hypothyroidism, unspecified type     Macular cyst, hole, or pseudohole, left eye     Miosis     Mixed hyperlipidemia     NSTEMI (non-ST elevated myocardial infarction) (Multi)     Osteoporosis     Retinal edema     Right knee  "pain     Seizure disorder (Multi)         Visit Vitals  /60   Pulse 65   Wt 53.1 kg (117 lb)   LMP  (LMP Unknown)   SpO2 97%   BMI 23.63 kg/m²   OB Status Postmenopausal   Smoking Status Never   BSA 1.49 m²        Objective     Constitutional:       Appearance: Healthy appearance.   Neck:      Vascular: No JVR.   Pulmonary:      Effort: Pulmonary effort is normal.      Breath sounds: Normal breath sounds.   Cardiovascular:      PMI at left midclavicular line. Normal rate. Regular rhythm. Normal S1. Normal S2.       Murmurs: There is a grade 2/6 mid to late systolic murmur.      No gallop.  No click. No rub.   Pulses:     Intact distal pulses.   Abdominal:      Palpations: Abdomen is soft.   Musculoskeletal: Normal range of motion. Skin:     General: Skin is warm and dry.   Neurological:      General: No focal deficit present.            Lab Review:         Lab Results   Component Value Date    CHOL 162 03/06/2023    CHOL 167 02/25/2022    CHOL 163 01/05/2022     Lab Results   Component Value Date    HDL 70 03/06/2023    HDL 74 02/25/2022    HDL 71 01/05/2022     Lab Results   Component Value Date    LDLCALC 67 03/06/2023    LDLCALC 75 02/25/2022    LDLCALC 73 01/05/2022     Lab Results   Component Value Date    TRIG 125 03/06/2023    TRIG 88 02/25/2022    TRIG 97 01/05/2022     No components found for: \"CHOLHDL\"     Assessment/Plan     Preop cardiovascular exam  She is now going to have the rectal prolapse repaired.  No angina and no chf.  The MI was in April after a TKR.  Feel she is doing well and is low risk for the surgery and will clear her cardiac wise.    Hypertensive disorder  The BP is doing well     "

## 2024-10-16 ENCOUNTER — OFFICE VISIT (OUTPATIENT)
Dept: CARDIOLOGY | Facility: CLINIC | Age: 84
End: 2024-10-16
Payer: MEDICARE

## 2024-10-16 VITALS
DIASTOLIC BLOOD PRESSURE: 60 MMHG | SYSTOLIC BLOOD PRESSURE: 134 MMHG | HEART RATE: 65 BPM | WEIGHT: 117 LBS | BODY MASS INDEX: 23.63 KG/M2 | OXYGEN SATURATION: 97 %

## 2024-10-16 DIAGNOSIS — Z01.810 PREOP CARDIOVASCULAR EXAM: Primary | ICD-10-CM

## 2024-10-16 DIAGNOSIS — I10 PRIMARY HYPERTENSION: ICD-10-CM

## 2024-10-16 DIAGNOSIS — I21.4 ACUTE NON-ST SEGMENT ELEVATION MYOCARDIAL INFARCTION (MULTI): ICD-10-CM

## 2024-10-16 PROCEDURE — 99213 OFFICE O/P EST LOW 20 MIN: CPT | Performed by: INTERNAL MEDICINE

## 2024-10-16 RX ORDER — METOPROLOL TARTRATE 25 MG/1
25 TABLET, FILM COATED ORAL 2 TIMES DAILY
Qty: 180 TABLET | Refills: 3 | Status: SHIPPED | OUTPATIENT
Start: 2024-10-16 | End: 2025-10-16

## 2024-10-16 ASSESSMENT — ENCOUNTER SYMPTOMS
EYES NEGATIVE: 1
GASTROINTESTINAL NEGATIVE: 1
MUSCULOSKELETAL NEGATIVE: 1
CHILLS: 0
DEPRESSION: 0
FALLS: 0
CONSTITUTIONAL NEGATIVE: 1
COUGH: 0
LOSS OF SENSATION IN FEET: 1
ENDOCRINE NEGATIVE: 1
NEUROLOGICAL NEGATIVE: 1
OCCASIONAL FEELINGS OF UNSTEADINESS: 0

## 2024-10-16 ASSESSMENT — PAIN SCALES - GENERAL: PAINLEVEL_OUTOF10: 0-NO PAIN

## 2024-10-16 NOTE — ASSESSMENT & PLAN NOTE
She is now going to have the rectal prolapse repaired.  No angina and no chf.  The MI was in April after a TKR.  Feel she is doing well and is low risk for the surgery and will clear her cardiac wise.

## 2024-10-18 ENCOUNTER — OFFICE VISIT (OUTPATIENT)
Dept: OBSTETRICS AND GYNECOLOGY | Facility: CLINIC | Age: 84
End: 2024-10-18
Payer: MEDICARE

## 2024-10-18 DIAGNOSIS — K62.3 RECTAL PROLAPSE: ICD-10-CM

## 2024-10-18 DIAGNOSIS — R33.9 URINARY RETENTION: Primary | ICD-10-CM

## 2024-10-18 LAB
POC APPEARANCE, URINE: CLEAR
POC BILIRUBIN, URINE: NEGATIVE
POC BLOOD, URINE: NEGATIVE
POC COLOR, URINE: YELLOW
POC GLUCOSE, URINE: NEGATIVE MG/DL
POC KETONES, URINE: NEGATIVE MG/DL
POC LEUKOCYTES, URINE: ABNORMAL
POC NITRITE,URINE: NEGATIVE
POC PH, URINE: 6 PH
POC PROTEIN, URINE: NEGATIVE MG/DL
POC SPECIFIC GRAVITY, URINE: 1.01
POC UROBILINOGEN, URINE: 0.2 EU/DL

## 2024-10-18 PROCEDURE — 1159F MED LIST DOCD IN RCRD: CPT | Performed by: OBSTETRICS & GYNECOLOGY

## 2024-10-18 PROCEDURE — 99214 OFFICE O/P EST MOD 30 MIN: CPT | Performed by: OBSTETRICS & GYNECOLOGY

## 2024-10-18 PROCEDURE — 81003 URINALYSIS AUTO W/O SCOPE: CPT | Mod: QW | Performed by: OBSTETRICS & GYNECOLOGY

## 2024-10-18 RX ORDER — SULFAMETHOXAZOLE AND TRIMETHOPRIM 800; 160 MG/1; MG/1
1 TABLET ORAL 2 TIMES DAILY
Qty: 10 TABLET | Refills: 0 | Status: SHIPPED | OUTPATIENT
Start: 2024-10-18 | End: 2024-10-18 | Stop reason: WASHOUT

## 2024-10-18 NOTE — PROGRESS NOTES
[facility] Department of Urogynecology   Jo-Ann Gonzales MD, MPH   [phone]    ASSESSMENT AND PLAN:   84 y.o. female with hx of urinary retention due to bladder stretch injury with a PVR of 2,000mL after knee replacement surgery in April 2024 @ [facility]. Comorbidities include: HTN, PVD, hypothyroidism, type 1 diabetes and type 2 diabetes, hx of B-cell lymphocytic leukemia, stage 3 CKD h/o renal failure, hypoxia, NSTEMI, and hx of seizures.           Diagnoses:   #1 Urinary retention (resolved)  #2 History of bladder stretch injury  #3 rectal prolapse    Plan:   1. Urinary retention, hx of stretch injury to bladder   - PVR = 0 ml by U/S.   - If patient feels she is in retention again or hasn't voided x6 hours, she should ISC. She knows how to do this and feels comfortable doing it. Of note, she is going to have rectal prolapse repair surgery with Dr. Keen. We discussed there is a potential for her to experience retention again after anesthesia and she is at higher risk due to her hx.   - If she were to stay in the hospital for a few days, they may consider placing a catheter. However, patient can advise them she knows how to ISC and they can discuss which option is best for her at the time.    - call the office if you need additional catheter supplies     Follow-up as needed with Dr. Jo-Ann Gonzales.     Scribe Attestation:   I, Alvina Barney, am scribing for virtually, and in the presence of Jo-Ann Gonzales MD MPH on 10/18/2024 at 7:18 PM.     Problem List Items Addressed This Visit          Genitourinary and Reproductive    Urinary retention - Primary    Relevant Orders    POCT UA Automated manually resulted (Completed)      I spent a total of 30 minutes in face to face and non face to face time.     I Dr. Gonzales, personally performed the services described in the documentation as scribed in my presence and confirm it is both complete and accurate.  Jo-Ann Gonzales MD, MPH, FACOG       Jo-Ann Gonzales,  MD, MPH, FACOG     Established    HISTORY OF PRESENT ILLNESS:     Bonnie Jules is a 84 y.o. female who presents for follow up on urinary retention.     Record Review:   - 6/25/24 Dr. Jo-Ann Gonzales note reviewed: Urinary retention, hx of stretch injury, kidney failure - Discharged home from the hospital on 6/11/2024 after falling and in inpatient setting Layton catheter was placed. She reports that she overall feels that she is emptying her bladder better now and rarely has to ISC. Of note, when she ISC her PVR is 100ccs or less.   - 6/11/2024 Dr. Gonzales note RE: 83 year old female with urinary retention due to bladder stretch injury with a PVR of 2,000mL after knee replacement surgery in April 2024 and had setback when she fell that required hospitalization. While she was hospitalized they reinserted her Layton catheter but she had been ISC at home and prior to her fall her PVR ranged between 150-200mL each time she catheterized. We informed her that if she had two PVRs that were less than 150mL or less that she could discontinue ISC at home.      Urinary Symptoms:   - Patient did ISC for about a month and since then has been doing well.   - Denies urine leakage.   - No recent UTIs.   - She doesn't feel the need to ISC anymore.        Past Medical History:     Past Medical History:   Diagnosis Date    Anxiety     Benign hypertension     Chronic ischemic colitis (Multi)     With history of sepsis and infectious gastroenteritis 10/7/2022-10/11/2022    Chronic kidney disease, stage III (moderate) (Multi)     CLL (chronic lymphocytic leukemia) (Multi)     Coronary artery disease     CTS (carpal tunnel syndrome)     Diabetes mellitus (Multi)     Dry eye syndrome of bilateral lacrimal glands     Essential (primary) hypertension     Exudative age-related macular degeneration of left eye, unspecified stage     GI bleed     Hyperlipidemia, unspecified     Hypertension     Hypothyroidism     Hypothyroidism, unspecified  type     Macular cyst, hole, or pseudohole, left eye     Miosis     Mixed hyperlipidemia     NSTEMI (non-ST elevated myocardial infarction) (Multi)     Osteoarthritis     Osteoporosis     Peripheral vascular disease (CMS-HCC)     Retinal edema     Right knee pain     Seizure disorder (Multi)     Type 1 diabetes mellitus with hyperglycemia (Multi)     Unspecified disorder of refraction           Past Surgical History:     Past Surgical History:   Procedure Laterality Date    CARPAL TUNNEL RELEASE Bilateral     CATARACT EXTRACTION W/ INTRAOCULAR LENS  IMPLANT, BILATERAL      Left 8/10/2017, right 8/31/2017    CHOLECYSTECTOMY      COLONOSCOPY      CORONARY ARTERY BYPASS GRAFT  2005    X 3    HYSTERECTOMY      KNEE SURGERY Right 04/17/2024    total    OTHER SURGICAL HISTORY      Bilateral heel spurs    OTHER SURGICAL HISTORY Right 01/2016    Femoral endarterectomy    TOTAL HIP ARTHROPLASTY Left 2012         Medications:     Prior to Admission medications    Medication Sig Start Date End Date Taking? Authorizing Provider   acetaminophen (Tylenol) 325 mg tablet Take 2 tablets (650 mg) by mouth every 4 hours if needed for fever (temp greater than 38.0 C) (greater than or equal to 38 C). 6/11/24  Yes Luna Smith APRN-CNP   aflibercept (Eylea) 2 mg/0.05 mL intra-ocular injection 0.05 mL (2 mg) by intravitreal route 1 time. Left eye   Yes Historical Provider, MD   amLODIPine (Norvasc) 10 mg tablet Take 1 tablet (10 mg) by mouth once daily. 10/18/23 10/18/24 Yes Noah Araujo MD   aspirin 81 mg EC tablet Take 1 tablet (81 mg) by mouth once daily.   Yes Historical Provider, MD   cholecalciferol (Vitamin D-3) 50 mcg (2,000 unit) capsule Take 1 capsule (50 mcg) by mouth early in the morning..   Yes Historical Provider, MD   hydrALAZINE (Apresoline) 50 mg tablet Take 1 tablet (50 mg) by mouth 2 times a day. 3/7/24 3/7/25 Yes Noah Araujo MD   insulin degludec (Tresiba FlexTouch U-100) 100 unit/mL (3 mL) injection  INJECT 11 UNITS UNDER THE SKIN AS DIRECTED DAILY 9/23/24  Yes Bryce Ramirez MD   insulin lispro (HumaLOG  KwikPen U-100) 100 unit/mL injection INJECT UP TO 30 UNITS UNDER THE SKIN DAILY AT MEALS 7/12/24  Yes Vic Wright, APRN-CNP   levothyroxine (Synthroid, Levoxyl) 100 mcg tablet TAKE 1 TABLET BY MOUTH EVERY DAY 9/3/24  Yes Bryce Ramirez MD   lisinopril 40 mg tablet Take 1 tablet (40 mg) by mouth once daily. 9/3/24 12/2/24 Yes Bryce Ramirez MD   metoprolol tartrate (Lopressor) 25 mg tablet Take 1 tablet (25 mg) by mouth 2 times a day. 10/16/24 10/16/25 Yes Noah Araujo MD   nitroglycerin (Nitrostat) 0.4 mg SL tablet Place 1 tablet (0.4 mg) under the tongue every 5 minutes if needed for chest pain.   Yes Historical Provider, MD   polyethylene glycol (Miralax) 17 gram/dose powder Mix 17 g of powder and drink once daily.   Yes Historical Provider, MD   pravastatin (Pravachol) 40 mg tablet Take 1 tablet (40 mg) by mouth once daily. 9/23/24  Yes Bryce Ramirez MD   sulfaSALAzine (Azulfidine) 500 mg DR tablet Take 2 tablets (1,000 mg) by mouth 2 times a day.   Yes Historical Provider, MD   valACYclovir (Valtrex) 1 gram tablet Take 1 tablet (1,000 mg) by mouth once daily as needed (cold sore). Twice daily 11/3/23  Yes Historical Provider, MD   sulfamethoxazole-trimethoprim (Bactrim DS) 800-160 mg tablet Take 1 tablet by mouth 2 times a day for 5 days. 10/18/24 10/18/24 Yes Jo-Ann Gonzales MD MPH   mupirocin (Bactroban) 2 % ointment APPLY TOPICALLY TO THE AFFECTED AREA THREE TIMES DAILY FOR 10 DAYS  Patient not taking: Reported on 10/18/2024 5/24/24   Historical Provider, MD   lidocaine 2 % mucosal jelly (Uro-Jet) APPLY TOPICALLY FOUR TIMES DAILY AS NEEDED FOR MILD PAIN  Patient not taking: Reported on 10/16/2024 6/5/24 10/16/24  Historical Provider, MD   MediHoney, honey, gel topical gel APPLY to wound DAILY.  Patient not taking: Reported on 10/16/2024 6/4/24 10/16/24  Historical Provider, MD    metoprolol tartrate (Lopressor) 25 mg tablet Take 1 tablet (25 mg) by mouth 2 times a day. 10/7/24 10/16/24  Joseph Hollingsworth MD   ondansetron ODT (Zofran-ODT) 4 mg disintegrating tablet Take 1 tablet (4 mg) by mouth every 8 hours if needed for nausea or vomiting.  Patient not taking: Reported on 10/8/2024 6/11/24 10/16/24  Luna Smith, APRN-CNP   POLYETHYLENE GLYCOL 3350 ORAL Take 1 packet by mouth once daily as needed (constipation). With 8 oz of fluid as needed  10/16/24  Historical Provider, MD SARAH  Review of Systems       PHYSICAL EXAM:    LMP  (LMP Unknown)   No LMP recorded (lmp unknown). Patient is postmenopausal.        Well developed, well nourished, in no apparent distress.   Neurologic/Psychiatric:  Awake, Alert and Oriented times 3.  Affect normal.       Data and DIAGNOSTIC STUDIES REVIEWED   No results found.   Lab Results   Component Value Date    URINECULTURE No significant growth 04/03/2024      Lab Results   Component Value Date    GLUCOSE 160 (H) 10/07/2024    CALCIUM 9.3 10/07/2024     10/07/2024    K 4.1 10/07/2024    CO2 25 10/07/2024     10/07/2024    BUN 24 (H) 10/07/2024    CREATININE 0.89 10/07/2024     Lab Results   Component Value Date    WBC 5.0 10/07/2024    HGB 10.8 (L) 10/07/2024    HCT 33.4 (L) 10/07/2024    MCV 97 10/07/2024     10/07/2024

## 2024-10-28 ENCOUNTER — OFFICE VISIT (OUTPATIENT)
Dept: SURGERY | Facility: CLINIC | Age: 84
End: 2024-10-28
Payer: MEDICARE

## 2024-10-28 ENCOUNTER — HOSPITAL ENCOUNTER (OUTPATIENT)
Facility: HOSPITAL | Age: 84
Setting detail: SURGERY ADMIT
End: 2024-10-28
Attending: STUDENT IN AN ORGANIZED HEALTH CARE EDUCATION/TRAINING PROGRAM | Admitting: STUDENT IN AN ORGANIZED HEALTH CARE EDUCATION/TRAINING PROGRAM
Payer: MEDICARE

## 2024-10-28 VITALS
SYSTOLIC BLOOD PRESSURE: 120 MMHG | HEART RATE: 54 BPM | HEIGHT: 59 IN | DIASTOLIC BLOOD PRESSURE: 60 MMHG | OXYGEN SATURATION: 96 % | WEIGHT: 120.8 LBS | BODY MASS INDEX: 24.35 KG/M2 | TEMPERATURE: 98.4 F

## 2024-10-28 DIAGNOSIS — K62.3 RECTAL PROLAPSE: Primary | ICD-10-CM

## 2024-10-28 PROCEDURE — 1036F TOBACCO NON-USER: CPT | Performed by: STUDENT IN AN ORGANIZED HEALTH CARE EDUCATION/TRAINING PROGRAM

## 2024-10-28 PROCEDURE — 99214 OFFICE O/P EST MOD 30 MIN: CPT | Performed by: STUDENT IN AN ORGANIZED HEALTH CARE EDUCATION/TRAINING PROGRAM

## 2024-10-28 PROCEDURE — 3078F DIAST BP <80 MM HG: CPT | Performed by: STUDENT IN AN ORGANIZED HEALTH CARE EDUCATION/TRAINING PROGRAM

## 2024-10-28 PROCEDURE — 3074F SYST BP LT 130 MM HG: CPT | Performed by: STUDENT IN AN ORGANIZED HEALTH CARE EDUCATION/TRAINING PROGRAM

## 2024-10-28 PROCEDURE — 1125F AMNT PAIN NOTED PAIN PRSNT: CPT | Performed by: STUDENT IN AN ORGANIZED HEALTH CARE EDUCATION/TRAINING PROGRAM

## 2024-10-28 PROCEDURE — 1159F MED LIST DOCD IN RCRD: CPT | Performed by: STUDENT IN AN ORGANIZED HEALTH CARE EDUCATION/TRAINING PROGRAM

## 2024-10-28 ASSESSMENT — ENCOUNTER SYMPTOMS
PALPITATIONS: 0
CHEST TIGHTNESS: 0
BLOOD IN STOOL: 0
FEVER: 0
TROUBLE SWALLOWING: 0
NAUSEA: 0
SHORTNESS OF BREATH: 0
BRUISES/BLEEDS EASILY: 0
ABDOMINAL PAIN: 0
DYSURIA: 0
RECTAL PAIN: 1
VOICE CHANGE: 0
DIARRHEA: 0
VOMITING: 0
LOSS OF SENSATION IN FEET: 1
ADENOPATHY: 0
DEPRESSION: 0
ARTHRALGIAS: 0
ANAL BLEEDING: 1
FACIAL ASYMMETRY: 0
SORE THROAT: 0
SPEECH DIFFICULTY: 0
WOUND: 0
OCCASIONAL FEELINGS OF UNSTEADINESS: 1
UNEXPECTED WEIGHT CHANGE: 0
HEMATURIA: 0
HEADACHES: 0
CHILLS: 0

## 2024-10-28 ASSESSMENT — PAIN SCALES - GENERAL: PAINLEVEL_OUTOF10: 5

## 2024-10-28 NOTE — H&P (VIEW-ONLY)
History Of Present Illness  Bonnie Jules is a 84 y.o. female presenting for follow-up with rectal prolapse.  She was seen several months ago for full-thickness rectal prolapse but at that point she was not cleared by cardiology since she recently had an NSTEMI.  She has since seen them and been cleared for surgery.  She reports continued symptoms of pain, blood and mucus per rectum.  Occasional fecal incontinence     Past Medical History  She has a past medical history of Anxiety, Benign hypertension, Chronic ischemic colitis (Multi), Chronic kidney disease, stage III (moderate) (Multi), CLL (chronic lymphocytic leukemia) (Multi), Coronary artery disease, CTS (carpal tunnel syndrome), Diabetes mellitus (Multi), Dry eye syndrome of bilateral lacrimal glands, Essential (primary) hypertension, Exudative age-related macular degeneration of left eye, unspecified stage, GI bleed, Hyperlipidemia, unspecified, Hypertension, Hypothyroidism, Hypothyroidism, unspecified type, Macular cyst, hole, or pseudohole, left eye, Miosis, Mixed hyperlipidemia, NSTEMI (non-ST elevated myocardial infarction) (Multi), Osteoarthritis, Osteoporosis, Peripheral vascular disease (CMS-HCC), Retinal edema, Right knee pain, Seizure disorder (Multi), Type 1 diabetes mellitus with hyperglycemia (Multi), and Unspecified disorder of refraction.    Surgical History  She has a past surgical history that includes Carpal tunnel release (Bilateral); Total hip arthroplasty (Left, 2012); Coronary artery bypass graft (2005); Cholecystectomy; Cataract extraction w/ intraocular lens  implant, bilateral; Hysterectomy; Colonoscopy; Other surgical history; Other surgical history (Right, 01/2016); and Knee surgery (Right, 04/17/2024).     Social History  She reports that she has never smoked. She has been exposed to tobacco smoke. She has never used smokeless tobacco. She reports that she does not currently use alcohol. She reports that she does not currently  use drugs.    Family History  Family History   Problem Relation Name Age of Onset    Diabetes Mother      Diabetes Daughter      Other (RA) Daughter          Allergies  Amoxicillin, Amoxicillin-pot clavulanate, Ciprofloxacin, and Levofloxacin    Review of Systems   Constitutional:  Negative for chills, fever and unexpected weight change.   HENT:  Negative for sneezing, sore throat, trouble swallowing and voice change.    Respiratory:  Negative for chest tightness and shortness of breath.    Cardiovascular:  Negative for chest pain and palpitations.   Gastrointestinal:  Positive for anal bleeding and rectal pain. Negative for abdominal pain, blood in stool, diarrhea, nausea and vomiting.   Endocrine: Negative for cold intolerance and heat intolerance.   Genitourinary:  Negative for decreased urine volume, dysuria and hematuria.   Musculoskeletal:  Negative for arthralgias and gait problem.   Skin:  Negative for rash and wound.   Neurological:  Negative for facial asymmetry, speech difficulty and headaches.   Hematological:  Negative for adenopathy. Does not bruise/bleed easily.   Psychiatric/Behavioral:  Negative for self-injury and suicidal ideas.         Physical Exam  Vitals and nursing note reviewed.   Constitutional:       Appearance: Normal appearance.   HENT:      Head: Normocephalic and atraumatic.      Mouth/Throat:      Mouth: Mucous membranes are moist.      Pharynx: Oropharynx is clear.   Eyes:      Extraocular Movements: Extraocular movements intact.      Pupils: Pupils are equal, round, and reactive to light.   Cardiovascular:      Rate and Rhythm: Normal rate and regular rhythm.      Pulses: Normal pulses.   Pulmonary:      Effort: Pulmonary effort is normal.      Breath sounds: Normal breath sounds.   Abdominal:      General: There is no distension.      Palpations: Abdomen is soft.      Tenderness: There is no abdominal tenderness.   Genitourinary:     Comments: Full-thickness rectal  "prolapse  Musculoskeletal:      Cervical back: Normal range of motion and neck supple.   Skin:     General: Skin is warm and dry.   Neurological:      General: No focal deficit present.      Mental Status: She is alert and oriented to person, place, and time.   Psychiatric:         Mood and Affect: Mood normal.         Behavior: Behavior normal.          Last Recorded Vitals  Blood pressure 120/60, pulse 54, temperature 36.9 °C (98.4 °F), temperature source Oral, height 1.499 m (4' 11\"), weight 54.8 kg (120 lb 12.8 oz), SpO2 96%.    Relevant Results  Flex sig in 2017 showed colitis, but no rectal pathology     Assessment/Plan   Problem List Items Addressed This Visit             ICD-10-CM       Gastrointestinal and Abdominal    Rectal prolapse - Primary K62.3    Relevant Orders    Case Request Operating Room: Proctopexy Robot-Assisted (Completed)     84-year-old female with full-thickness rectal prolapse.  She has been cleared by cardiology.  She is here today with her daughter and we discussed the etiology again of rectal prolapse.  I explained that by fixing the prolapse, we are fixing the symptom but not the underlying issue of the muscle weakness therefore recurrence rates are often higher than we like with rectal prolapse surgery.  I explained both abdominal and perineal approach.  Since she has been cleared by cardiology I recommended an abdominal approach which tends to have a lower recurrence rate.  I explained the procedure in detail including postoperative expectations.  I will be placing her on MiraLAX after surgery to avoid any constipation or straining.  Will also refer her to pelvic floor physical therapy.  All questions were answered.  Will schedule at her convenience.      Cherelle Keen MD    "

## 2024-10-30 ENCOUNTER — TELEPHONE (OUTPATIENT)
Dept: SURGERY | Facility: CLINIC | Age: 84
End: 2024-10-30
Payer: MEDICARE

## 2024-10-30 ENCOUNTER — OFFICE VISIT (OUTPATIENT)
Dept: URGENT CARE | Age: 84
End: 2024-10-30
Payer: MEDICARE

## 2024-10-30 VITALS
DIASTOLIC BLOOD PRESSURE: 74 MMHG | OXYGEN SATURATION: 97 % | WEIGHT: 120 LBS | BODY MASS INDEX: 24.19 KG/M2 | HEART RATE: 62 BPM | RESPIRATION RATE: 18 BRPM | TEMPERATURE: 96.8 F | HEIGHT: 59 IN | SYSTOLIC BLOOD PRESSURE: 116 MMHG

## 2024-10-30 DIAGNOSIS — T14.8XXA OPEN WOUND: Primary | ICD-10-CM

## 2024-10-30 DIAGNOSIS — L08.9: ICD-10-CM

## 2024-10-30 DIAGNOSIS — Z76.89 ENCOUNTER TO ESTABLISH CARE: ICD-10-CM

## 2024-10-30 PROCEDURE — 99203 OFFICE O/P NEW LOW 30 MIN: CPT | Performed by: NURSE PRACTITIONER

## 2024-10-30 PROCEDURE — 3074F SYST BP LT 130 MM HG: CPT | Performed by: NURSE PRACTITIONER

## 2024-10-30 PROCEDURE — 1159F MED LIST DOCD IN RCRD: CPT | Performed by: NURSE PRACTITIONER

## 2024-10-30 PROCEDURE — 3078F DIAST BP <80 MM HG: CPT | Performed by: NURSE PRACTITIONER

## 2024-10-30 RX ORDER — DOXYCYCLINE 100 MG/1
100 CAPSULE ORAL 2 TIMES DAILY
Qty: 14 CAPSULE | Refills: 0 | Status: SHIPPED | OUTPATIENT
Start: 2024-10-30 | End: 2024-11-06

## 2024-10-30 ASSESSMENT — ENCOUNTER SYMPTOMS
ROS SKIN COMMENTS: RIGHT LOWER LEG
WOUND: 1

## 2024-11-04 ENCOUNTER — OFFICE VISIT (OUTPATIENT)
Dept: VASCULAR SURGERY | Facility: CLINIC | Age: 84
End: 2024-11-04
Payer: MEDICARE

## 2024-11-04 VITALS
WEIGHT: 120 LBS | DIASTOLIC BLOOD PRESSURE: 72 MMHG | SYSTOLIC BLOOD PRESSURE: 126 MMHG | HEART RATE: 61 BPM | BODY MASS INDEX: 24.24 KG/M2

## 2024-11-04 DIAGNOSIS — I77.1 ARTERIAL INSUFFICIENCY WITH ISCHEMIC ULCER (MULTI): ICD-10-CM

## 2024-11-04 DIAGNOSIS — I73.9 PAD (PERIPHERAL ARTERY DISEASE) (CMS-HCC): Primary | ICD-10-CM

## 2024-11-04 DIAGNOSIS — R60.0 LEG EDEMA: ICD-10-CM

## 2024-11-04 DIAGNOSIS — L98.499 ARTERIAL INSUFFICIENCY WITH ISCHEMIC ULCER (MULTI): ICD-10-CM

## 2024-11-04 DIAGNOSIS — L97.211 CALF ULCER, RIGHT, LIMITED TO BREAKDOWN OF SKIN: ICD-10-CM

## 2024-11-04 DIAGNOSIS — Z95.820 STATUS POST PERIPHERAL ARTERY ANGIOPLASTY WITH INSERTION OF STENT: ICD-10-CM

## 2024-11-04 PROCEDURE — 3074F SYST BP LT 130 MM HG: CPT | Performed by: NURSE PRACTITIONER

## 2024-11-04 PROCEDURE — 1159F MED LIST DOCD IN RCRD: CPT | Performed by: NURSE PRACTITIONER

## 2024-11-04 PROCEDURE — 99213 OFFICE O/P EST LOW 20 MIN: CPT | Performed by: NURSE PRACTITIONER

## 2024-11-04 PROCEDURE — 99203 OFFICE O/P NEW LOW 30 MIN: CPT | Performed by: NURSE PRACTITIONER

## 2024-11-04 PROCEDURE — 1160F RVW MEDS BY RX/DR IN RCRD: CPT | Performed by: NURSE PRACTITIONER

## 2024-11-04 PROCEDURE — 3078F DIAST BP <80 MM HG: CPT | Performed by: NURSE PRACTITIONER

## 2024-11-04 ASSESSMENT — ENCOUNTER SYMPTOMS
DEPRESSION: 0
LOSS OF SENSATION IN FEET: 1
OCCASIONAL FEELINGS OF UNSTEADINESS: 0

## 2024-11-04 NOTE — PATIENT INSTRUCTIONS
Bonnie,    It was really great to see you again!  Thank you for choosing  vascular surgery for your care.    We discussed your artery test that was completed which did show that you have some moderate blockages in your arteries.  However, since your wound is improving and healing nicely, I do not feel strongly regarding fixing the artery blockage.  If the wound should get worse, please call our office ASAP.

## 2024-11-04 NOTE — PROGRESS NOTES
History Of Present Illness  Bonnie Jules is a 84 y.o. female presenting with a past medical history of peripheral arterial disease with endovascular intervention in 2016.  Unfortunately, I am unable to view the angiogram report from 2016 through [facility] and I do not have records from Dr. Kapadia's office.  The patient states that she was being monitored annually for her peripheral arterial disease through Dr. Kapadia's office.  He recently closed his practice and she is here to establish care with a new vascular provider.  Currently, the patient has a wound to the right anterior lower leg and is going to the [facility].  She had a recent PVR that was ordered by Dr. Pickering.  ABIs were falsely elevated.  TBI on the right 0.32.  TBI in the left 0.58.  The patient denies any symptoms of claudication or ischemic rest pain.  She does report some occasional cramps in her leg at night.  Her wound seems to be healing nicely.     Past Medical History  She has a past medical history of Anxiety, Benign hypertension, Chronic ischemic colitis (Multi), Chronic kidney disease, stage III (moderate) (Multi), CLL (chronic lymphocytic leukemia) (Multi), Coronary artery disease, CTS (carpal tunnel syndrome), Diabetes mellitus (Multi), Dry eye syndrome of bilateral lacrimal glands, Essential (primary) hypertension, Exudative age-related macular degeneration of left eye, unspecified stage, GI bleed, Hyperlipidemia, unspecified, Hypertension, Hypothyroidism, Hypothyroidism, unspecified type, Macular cyst, hole, or pseudohole, left eye, Miosis, Mixed hyperlipidemia, NSTEMI (non-ST elevated myocardial infarction) (Multi), Osteoarthritis, Osteoporosis, Peripheral vascular disease (CMS-HCC), Retinal edema, Right knee pain, Seizure disorder (Multi), Type 1 diabetes mellitus with hyperglycemia (Multi), and Unspecified disorder of refraction.    Surgical History  She has a past surgical history that includes Carpal tunnel release  (Bilateral); Total hip arthroplasty (Left, 2012); Coronary artery bypass graft (2005); Cholecystectomy; Cataract extraction w/ intraocular lens  implant, bilateral; Hysterectomy; Colonoscopy; Other surgical history; Other surgical history (Right, 01/2016); and Knee surgery (Right, 04/17/2024).     Social History  She reports that she has never smoked. She has been exposed to tobacco smoke. She has never used smokeless tobacco. She reports that she does not currently use alcohol. She reports that she does not currently use drugs.    Family History  Family History   Problem Relation Name Age of Onset    Diabetes Mother      Diabetes Daughter      Other (RA) Daughter          Allergies  Amoxicillin, Chlorpheniramine, Amoxicillin-pot clavulanate, Ciprofloxacin, and Levofloxacin    Review of Systems    CONSTITUTIONAL: Denies weight loss, fever and chills.    HEENT: Denies changes in vision and hearing.    RESPIRATORY: Denies SOB and cough.    CV: Denies palpitations and CP.    GI: Denies abdominal pain, nausea, vomiting and diarrhea.    : Denies dysuria and urinary frequency.    MSK: Denies myalgia and joint pain.    SKIN: Denies rash and pruritus.    VASC: Denies claudication, ischemic rest pain.  Positive for right anterior leg wound.    NEUROLOGICAL: Denies headache and syncope.    PSYCHIATRIC: Denies recent changes in mood. Denies anxiety and depression.     Physical Exam    General: Pt is alert and oriented x 3. Pleasant, conversive  HEENT: Head is atraumatic, normocephalic.   Cardiac: Normal S1-S2.  Regular rate and rhythm.  No murmurs.  Respiratory: Lungs clear to auscultation.  No adventitious sounds.  Abdomen: Soft, nondistended, nontender.  Bowel sounds x4 quadrants.  Pulse exam: Palpable brachial and radial pulses bilaterall.  Nonpalpable dorsalis pedis and posterior tibial pulse on the right.  Weakly palpable dorsalis pedis pulse on the left.  Extremities: No significant edema noted.  Extremities are warm  to the touch and normal in color.  Superficial open wound to the right anterior lower leg.  There is some mild surrounding tissue erythema but no induration.  No malodor.  Currently has Aquacel in place.  Neuro: Moves all extremities spontaneously.  No focal deficits.  Psych: Appropriate affect.  Answers questions appropriately.     Last Recorded Vitals  /72   Pulse 61   Wt 54.4 kg (120 lb)     Relevant Results    Current Outpatient Medications   Medication Instructions    acetaminophen (TYLENOL) 650 mg, oral, Every 4 hours PRN    amLODIPine (NORVASC) 10 mg, oral, Daily    aspirin 81 mg, Daily    cholecalciferol (Vitamin D-3) 50 mcg (2,000 unit) capsule 1 capsule, Daily    doxycycline (VIBRAMYCIN) 100 mg, oral, 2 times daily, Take with at least 8 ounces (large glass) of water, do not lie down for 30 minutes after    Eylea 2 mg, Once    hydrALAZINE (APRESOLINE) 50 mg, oral, 2 times daily    insulin degludec (Tresiba FlexTouch U-100) 100 unit/mL (3 mL) injection INJECT 11 UNITS UNDER THE SKIN AS DIRECTED DAILY    insulin lispro (HumaLOG  KwikPen U-100) 100 unit/mL injection INJECT UP TO 30 UNITS UNDER THE SKIN DAILY AT MEALS    levothyroxine (SYNTHROID, LEVOXYL) 100 mcg, oral, Daily    lisinopril 40 mg, oral, Daily    metoprolol tartrate (LOPRESSOR) 25 mg, oral, 2 times daily    nitroglycerin (NITROSTAT) 0.4 mg, Every 5 min PRN    polyethylene glycol (MIRALAX) 17 g, Daily    pravastatin (PRAVACHOL) 40 mg, oral, Daily    sulfaSALAzine (Azulfidine) 500 mg DR tablet 2 tablets, 2 times daily    valACYclovir (VALTREX) 1,000 mg, Daily PRN           Assessment/Plan   Peripheral arterial disease  Status post arterial angioplasty with stenting  Right leg ulcer    Peripheral arterial disease-patient with history of angioplasty and stenting right lower extremity in 2016 by Dr. Kapadia.  Do not have access to medical records to know specifics regarding procedure.  Patient has developed an ulcer to the right lower  extremity which is superficial in nature.  Prior to this ulceration, she had a heel ulceration on the right heel which subsequently healed without vascular intervention.  I reviewed the results of her PVR which showed moderate peripheral arterial disease on the right and no significant arterial disease on the left.    Would recommend close monitoring of the wound on the right anterior lower leg.  Should this get worse or not he will, patient should return to the office ASAP.  Defer wound care to Dr. Pickering at the wound care center.    Follow-up in 6 months with a repeat KIP and arterial duplex.  Continue aspirin and statin.       Luis Miguel Brumfield, APRN-CNP

## 2024-11-05 ENCOUNTER — OFFICE VISIT (OUTPATIENT)
Dept: WOUND CARE | Facility: HOSPITAL | Age: 84
End: 2024-11-05
Payer: MEDICARE

## 2024-11-05 ENCOUNTER — TELEPHONE (OUTPATIENT)
Dept: SURGERY | Facility: CLINIC | Age: 84
End: 2024-11-05
Payer: MEDICARE

## 2024-11-05 DIAGNOSIS — T14.8XXA OPEN WOUND: ICD-10-CM

## 2024-11-05 PROCEDURE — 11042 DBRDMT SUBQ TIS 1ST 20SQCM/<: CPT

## 2024-11-05 PROCEDURE — 99214 OFFICE O/P EST MOD 30 MIN: CPT | Mod: 25

## 2024-11-05 NOTE — TELEPHONE ENCOUNTER
"Spoke to pt's daughter Rosa verified by name/.  Pt is scheduled for Proctopexy Robot-Assisted on 24.  Rosa stated that the pt was cleared for surgery by Dr. Keen  And she wanted to reiterate that the pt has \"a superficial open wound on her right leg\".  Rosa also stated that the pt was previously cleared for surgery by her Cardiologist and   Was cleared by Dr. Brumfield (Vascular) and by Dr. Rios (wound clinic ) today. Advised   Rosa that Dr. Keen advised for the pt to schedule surgery at her convenience.   Rosa verbalized understanding and denies further questions.   "

## 2024-11-07 ENCOUNTER — TELEPHONE (OUTPATIENT)
Dept: SURGERY | Facility: CLINIC | Age: 84
End: 2024-11-07
Payer: MEDICARE

## 2024-11-07 NOTE — TELEPHONE ENCOUNTER
Pt's daughter called stating that her mother is having surgery on 11/14/24 and is supposed to take an antibiotic the day before, but it has not been called in yet.  She wants to know when it will be called in.

## 2024-11-07 NOTE — TELEPHONE ENCOUNTER
Pt is scheduled for proctoplexy on 11/14/24.  Pls send atb rx's to the pt's [facility] pharmacy.

## 2024-11-11 NOTE — TELEPHONE ENCOUNTER
Cherelle Keen MD  You35 minutes ago (8:10 AM)       She does not need the abx, just the miralax bowel prep

## 2024-11-12 ENCOUNTER — OFFICE VISIT (OUTPATIENT)
Dept: WOUND CARE | Facility: HOSPITAL | Age: 84
DRG: 329 | End: 2024-11-12
Payer: MEDICARE

## 2024-11-12 PROCEDURE — 99213 OFFICE O/P EST LOW 20 MIN: CPT

## 2024-11-13 ENCOUNTER — PRE-ADMISSION TESTING (OUTPATIENT)
Dept: PREADMISSION TESTING | Facility: HOSPITAL | Age: 84
DRG: 329 | End: 2024-11-13
Payer: MEDICARE

## 2024-11-13 ENCOUNTER — LAB (OUTPATIENT)
Dept: LAB | Facility: LAB | Age: 84
End: 2024-11-13
Payer: MEDICARE

## 2024-11-13 VITALS
TEMPERATURE: 98.6 F | OXYGEN SATURATION: 99 % | SYSTOLIC BLOOD PRESSURE: 160 MMHG | DIASTOLIC BLOOD PRESSURE: 66 MMHG | HEART RATE: 71 BPM | HEIGHT: 59 IN | WEIGHT: 121.6 LBS | BODY MASS INDEX: 24.52 KG/M2

## 2024-11-13 DIAGNOSIS — I10 PRIMARY HYPERTENSION: ICD-10-CM

## 2024-11-13 DIAGNOSIS — Z01.818 PRE-OP EXAMINATION: Primary | ICD-10-CM

## 2024-11-13 DIAGNOSIS — E10.65 TYPE 1 DIABETES MELLITUS WITH HYPERGLYCEMIA (MULTI): ICD-10-CM

## 2024-11-13 LAB
CHOLEST SERPL-MCNC: 137 MG/DL (ref 0–199)
CHOLEST/HDLC SERPL: 2.3 {RATIO}
CREAT UR-MCNC: 64.6 MG/DL (ref 20–320)
HDLC SERPL-MCNC: 58.6 MG/DL
LDLC SERPL CALC-MCNC: 53 MG/DL
MICROALBUMIN UR-MCNC: 54 MG/L
MICROALBUMIN/CREAT UR: 83.6 UG/MG CREAT
NON HDL CHOLESTEROL: 78 MG/DL (ref 0–149)
TRIGL SERPL-MCNC: 127 MG/DL (ref 0–149)
VLDL: 25 MG/DL (ref 0–40)

## 2024-11-13 PROCEDURE — 99203 OFFICE O/P NEW LOW 30 MIN: CPT

## 2024-11-13 PROCEDURE — 82570 ASSAY OF URINE CREATININE: CPT

## 2024-11-13 PROCEDURE — 82043 UR ALBUMIN QUANTITATIVE: CPT

## 2024-11-13 PROCEDURE — 87081 CULTURE SCREEN ONLY: CPT | Mod: WESLAB

## 2024-11-13 PROCEDURE — 36415 COLL VENOUS BLD VENIPUNCTURE: CPT

## 2024-11-13 PROCEDURE — 80061 LIPID PANEL: CPT

## 2024-11-13 RX ORDER — AMLODIPINE BESYLATE 10 MG/1
10 TABLET ORAL DAILY
Qty: 90 TABLET | Refills: 3 | Status: SHIPPED | OUTPATIENT
Start: 2024-11-13

## 2024-11-13 RX ORDER — CHLORHEXIDINE GLUCONATE ORAL RINSE 1.2 MG/ML
SOLUTION DENTAL
Qty: 473 ML | Refills: 0 | Status: ON HOLD | OUTPATIENT
Start: 2024-11-13 | End: 2024-11-14 | Stop reason: ALTCHOICE

## 2024-11-13 ASSESSMENT — ENCOUNTER SYMPTOMS
RECTAL PAIN: 1
PSYCHIATRIC NEGATIVE: 1
RESPIRATORY NEGATIVE: 1
NEUROLOGICAL NEGATIVE: 1
EYES NEGATIVE: 1
ENDOCRINE NEGATIVE: 1
HEMATOLOGIC/LYMPHATIC NEGATIVE: 1
JOINT SWELLING: 1
CONSTITUTIONAL NEGATIVE: 1
ALLERGIC/IMMUNOLOGIC NEGATIVE: 1

## 2024-11-13 ASSESSMENT — DUKE ACTIVITY SCORE INDEX (DASI)
CAN YOU CLIMB A FLIGHT OF STAIRS OR WALK UP A HILL: YES
CAN YOU DO YARD WORK LIKE RAKING LEAVES, WEEDING OR PUSHING A MOWER: YES
CAN YOU PARTICIPATE IN STRENOUS SPORTS LIKE SWIMMING, SINGLES TENNIS, FOOTBALL, BASKETBALL, OR SKIING: NO
CAN YOU RUN A SHORT DISTANCE: NO
CAN YOU WALK INDOORS, SUCH AS AROUND YOUR HOUSE: YES
CAN YOU DO LIGHT WORK AROUND THE HOUSE LIKE DUSTING OR WASHING DISHES: YES
TOTAL_SCORE: 23.45
CAN YOU PARTICIPATE IN MODERATE RECREATIONAL ACTIVITIES LIKE GOLF, BOWLING, DANCING, DOUBLES TENNIS OR THROWING A BASEBALL OR FOOTBALL: NO
CAN YOU DO HEAVY WORK AROUND THE HOUSE LIKE SCRUBBING FLOORS OR LIFTING AND MOVING HEAVY FURNITURE: NO
CAN YOU HAVE SEXUAL RELATIONS: NO
CAN YOU TAKE CARE OF YOURSELF (EAT, DRESS, BATHE, OR USE TOILET): YES
DASI METS SCORE: 5.6
CAN YOU DO MODERATE WORK AROUND THE HOUSE LIKE VACUUMING, SWEEPING FLOORS OR CARRYING GROCERIES: YES
CAN YOU WALK A BLOCK OR TWO ON LEVEL GROUND: YES

## 2024-11-13 ASSESSMENT — PAIN - FUNCTIONAL ASSESSMENT: PAIN_FUNCTIONAL_ASSESSMENT: 0-10

## 2024-11-13 ASSESSMENT — PAIN SCALES - GENERAL: PAINLEVEL_OUTOF10: 2

## 2024-11-13 ASSESSMENT — PAIN DESCRIPTION - DESCRIPTORS: DESCRIPTORS: DISCOMFORT

## 2024-11-13 NOTE — PREPROCEDURE INSTRUCTIONS
Medication List            Accurate as of November 13, 2024  7:20 AM. Always use your most recent med list.                acetaminophen 325 mg tablet  Commonly known as: Tylenol  Take 2 tablets (650 mg) by mouth every 4 hours if needed for fever (temp greater than 38.0 C) (greater than or equal to 38 C).  Medication Adjustments for Surgery: Take/Use as prescribed     amLODIPine 10 mg tablet  Commonly known as: Norvasc  Take 1 tablet (10 mg) by mouth once daily.  Medication Adjustments for Surgery: Take on the morning of surgery     aspirin 81 mg EC tablet  Medication Adjustments for Surgery: Do Not take on the morning of surgery  Notes to patient: STOP UNTIL AFTER SURGERY     cholecalciferol 50 mcg (2,000 unit) capsule  Commonly known as: Vitamin D-3  Notes to patient: STOP UNTIL AFTER SURGERY     Eylea 2 mg/0.05 mL intra-ocular injection  Generic drug: aflibercept  Notes to patient: NOT DUE AT THIS TIME     HumaLOG  KwikPen U-100 100 unit/mL injection  Generic drug: insulin lispro  INJECT UP TO 30 UNITS UNDER THE SKIN DAILY AT MEALS  Medication Adjustments for Surgery: Take/Use as prescribed     hydrALAZINE 50 mg tablet  Commonly known as: Apresoline  Take 1 tablet (50 mg) by mouth 2 times a day.  Medication Adjustments for Surgery: Take on the morning of surgery     levothyroxine 100 mcg tablet  Commonly known as: Synthroid, Levoxyl  TAKE 1 TABLET BY MOUTH EVERY DAY  Medication Adjustments for Surgery: Take on the morning of surgery     lisinopril 40 mg tablet  Take 1 tablet (40 mg) by mouth once daily.  Medication Adjustments for Surgery: Do Not take on the morning of surgery     metoprolol tartrate 25 mg tablet  Commonly known as: Lopressor  Take 1 tablet (25 mg) by mouth 2 times a day.  Medication Adjustments for Surgery: Take on the morning of surgery     Miralax 17 gram/dose powder  Generic drug: polyethylene glycol  Medication Adjustments for Surgery: Do Not take on the morning of surgery      nitroglycerin 0.4 mg SL tablet  Commonly known as: Nitrostat     pravastatin 40 mg tablet  Commonly known as: Pravachol  Take 1 tablet (40 mg) by mouth once daily.  Medication Adjustments for Surgery: Take/Use as prescribed     sulfaSALAzine 500 mg DR tablet  Commonly known as: Azulfidine  Medication Adjustments for Surgery: Take on the morning of surgery     Tresiba FlexTouch U-100 100 unit/mL (3 mL) injection  Generic drug: insulin degludec  INJECT 11 UNITS UNDER THE SKIN AS DIRECTED DAILY  Medication Adjustments for Surgery: Take on the morning of surgery  Notes to patient: TAKE FULL DOSE MORNING OF SURGERY     valACYclovir 1 gram tablet  Commonly known as: Valtrex  Medication Adjustments for Surgery: Take/Use as prescribed                Preoperative Fasting Guidelines    Why must I stop eating and drinking near surgery time?  With sedation, food or liquid in your stomach can enter your lungs causing serious complications  Increases nausea and vomiting    When do I need to stop eating and drinking before my surgery?  Do not eat any food or drink any liquids after midnight the night before your surgery/procedure.  You may have sips of water to take medications.    PAT DISCHARGE INSTRUCTIONS    Please call the Same Day Surgery (SDS) Department of the hospital where your procedure will be performed after 2:00 PM the day before your surgery. If you are scheduled on a Monday, or a Tuesday following a Monday holiday, you will need to call on the last business day prior to your surgery.    [facility]  [address] [place] [address] [phone]  [facility]  [id number] [place] [address], [address] [phone]  [facility]  [address]  [place] [id number] [phone]    Please let your surgeon know if:      You develop any open sores, shingles, burning or  painful urination as these may increase your risk of an infection.   You no longer wish to have the surgery.   Any other personal circumstances change that may lead to the need to cancel or defer this surgery-such as being sick or getting admitted to any hospital within one week of your planned procedure.    Your contact details change, such as a change of address or phone number.    Starting now:     Please DO NOT drink alcohol or smoke for 24 hours before surgery. It is well known that quitting smoking can make a huge difference to your health and recovery from surgery. The longer you abstain from smoking, the better your chances of a healthy recovery. If you need help with quitting, call 2-339-QUIT-NOW to be connected to a trained counselor who will discuss the best methods to help you quit.     Before your surgery:    Please stop all supplements 7 days prior to surgery. Or as directed by your surgeon.   Please stop taking NSAID pain medicine such as Advil and Motrin 7 days before surgery.    If you develop any fever, cough, cold, rashes, cuts, scratches, scrapes, urinary symptoms or infection anywhere on your body (including teeth and gums) prior to surgery, please call your surgeon’s office as soon as possible. This may require treatment to reduce the chance of cancellation on the day of surgery.    The day before your surgery:   DIET- Please follow the diet instructions at the top of your packet.   Get a good night’s rest.  Use the special soap for bathing if you have been instructed to use one.    Scheduled surgery times may change and you will be notified if this occurs - please check your personal voicemail for any updates.     On the morning of surgery:   Wear comfortable, loose fitting clothes which open in the front. Please do not wear moisturizers, creams, lotions, makeup or perfume.    Please bring with you to surgery:   Photo ID and insurance card   Current list of medicines and allergies   Pacemaker/  Defibrillator/Heart stent cards   CPAP machine and mask    Slings/ splints/ crutches   A copy of your complete advanced directive/DHPOA.    Please do NOT bring with you to surgery:   All jewelry and valuables should be left at home.   Prosthetic devices such as contact lenses, hearing aids, dentures, eyelash extensions, hairpins and body piercings must be removed prior to going in to the surgical suite.    After outpatient surgery:   A responsible adult MUST accompany you at the time of discharge and stay with you for 24 hours after your surgery. You may NOT drive yourself home after surgery.    Do not drive, operate machinery, make critical decisions or do activities that require co-ordination or balance until after a night’s sleep.   Do not drink alcoholic beverages for 24 hours.   Instructions for resuming your medications will be provided by your surgeon.    CALL YOUR DOCTOR AFTER SURGERY IF YOU HAVE:     Chills and/or a fever of 101° F or higher.    Redness, swelling, pus or drainage from your surgical wound or a bad smell from the wound.    Lightheadedness, fainting or confusion.    Persistent vomiting (throwing up) and are not able to eat or drink for 12 hours.    Three or more loose, watery bowel movements in 24 hours (diarrhea).   Difficulty or pain while urinating( after non-urological surgery)    Pain and swelling in your legs, especially if it is only on one side.    Difficulty breathing or are breathing faster than normal.    Any new concerning symptoms.      Patient Information: Pre-Operative Infection Prevention Measures     Why did I have my nose, under my arms, and groin swabbed?  The purpose of the swab is to identify Staphylococcus aureus inside your nose or on your skin.  The swab was sent to the laboratory for culture.  A positive swab/culture for Staphylococcus aureus is called colonization or carriage.      What is Staphylococcus aureus?  Staphylococcus aureus, also known as “staph”, is a germ  found on the skin or in the nose of healthy people.  Sometimes Staphylococcus aureus can get into the body and cause an infection.  This can be minor (such as pimples, boils, or other skin problems).  It might also be serious (such as a blood infection, pneumonia, or a surgical site infection).    What is Staphylococcus aureus colonization or carriage?  Colonization or carriage means that a person has the germ but is not sick from it.  These bacteria can be spread on the hands or when breathing or sneezing.    How is Staphylococcus aureus spread?  It is most often spread by close contact with a person or item that carries it.    What happens if my culture is positive for Staphylococcus aureus?  Your doctor/medical team will use this information to guide any antibiotic treatment which may be necessary.  Regardless of the culture results, we will clean the inside of your nose with a betadine swab just before you have your surgery.      Will I get an infection if I have Staphylococcus aureus in my nose or on my skin?  Anyone can get an infection with Staphylococcus aureus.  However, the best way to reduce your risk of infection is to follow the instructions provided to you for the use of your CHG soap and dental rinse.        Patient Information: Oral/Dental Rinse    What is oral/dental rinse?   It is a mouthwash. It is a way of cleaning the mouth with a germ-killing solution before your surgery.  The solution contains chlorhexidine, commonly known as CHG.   It is used inside the mouth to kill a bacteria known as Staphylococcus aureus.  Let your doctor know if you are allergic to Chlorhexidine.    Why do I need to use CHG oral/dental rinse?  The CHG oral/dental rinse helps to kill a bacteria in your mouth known as Staphylococcus aureus.     This reduces the risk of infection at the surgical site.      Using your CHG oral/dental rinse  STEPS:  Use your CHG oral/dental rinse after you brush your teeth the night before  (at bedtime) and the morning of your surgery.  Follow all directions on your prescription label.    Use the cap on the container to measure 15ml   Swish (gargle if you can) the mouthwash in your mouth for at least 30 seconds, (do not swallow) and spit out  After you use your CHG rinse, do not rinse your mouth with water, drink or eat.  Please refer to the prescription label for the appropriate time to resume oral intake      What side effects might I have using the CHG oral/dental rinse?  CHG rinse will stick to plaque on the teeth.  Brush and floss just before use.  Teeth brushing will help avoid staining of plaque during use.      Patient Information: Home Preoperative Antibacterial Shower      What is a home preoperative antibacterial shower?  This shower is a way of cleaning the skin with a germ-killing solution before surgery.  The solution contains chlorhexidine, commonly known as CHG.  CHG is a skin cleanser with germ-killing ability.  Let your doctor know if you are allergic to chlorhexidine.    Why do I need to take a preoperative antibacterial shower?  Skin is not sterile.  It is best to try to make your skin as free of germs as possible before surgery.  Proper cleansing with a germ-killing soap before surgery can lower the number of germs on your skin.  This helps to reduce the risk of infection at the surgical site.  Following the instructions listed below will help you prepare your skin for surgery.      How do I use the solution?  Steps:  Begin using your CHG soap THE NIGHT BEFORE AND THE MORNING OF your scheduled surgery on ___11/14/24______.    First, wash and rinse your hair using soap. Keep CHG soap away from ear canals and eyes.  Rinse completely, do not condition.  Hair extensions should be removed.  Wash your face with your normal soap and rinse.    Apply the CHG solution to a clean wet washcloth.  Turn the water off or move away from the water spray to avoid premature rinsing of the CHG soap as  you are applying.   Firmly lather your entire body from the neck down.  Do not use on your face.  Pay special attention to the area(s) where your incision(s) will be located unless they are on your face.  Avoid scrubbing your skin too hard.  The important point is to have the CHG soap sit on your skin for 3 minutes.    When the 3 minutes are up, turn on the water and rinse the CHG solution off your body completely.   DO NOT wash with regular soap after you have used the CHG soap solution  Pat yourself dry with a clean, freshly-laundered towel.  DO NOT apply powders, deodorants, or lotions.  Dress in clean, freshly laundered nightclothes.    Be sure to sleep with clean, freshly laundered sheets.  Be aware that CHG will cause stains on fabrics; if you wash them with bleach after use.  Rinse your washcloth and other linens that have contact with CHG completely.  Use only non-chlorine detergents to launder the items used.   The morning of surgery is the fifth day.  Repeat the above steps and dress in clean comfortable clothing     Whom should I contact if I have any questions regarding the use of CHG soap?  Call the [facility] at [phone] if you have any questions.

## 2024-11-13 NOTE — CPM/PAT H&P
CPM/PAT Evaluation       Name: Bonnie Jules (Bonnie Jules)  /Age: 1940/84 y.o.     In-Person       Chief Complaint: Rectal prolapse    HPI: Bonnie Jules is a 84 year old female with complaints of rectal prolapse. She states she has been dealing with this since March. She states she has discomfort from the prolapse and takes tylenol at home that helps. She states she notices blood when she wipes after a bowel movements. She states she has to wear a pad all the time because she is passing a lot of mucus. She denies any issues with urination and having a bowel movement. She denies abdominal pain. She is scheduled for a proctopexy. She denies fever, chills, nausea, vomiting, chest pain, sob, dizziness ,and palpitations    Past Medical History:   Diagnosis Date    Anxiety     Benign hypertension     Chronic ischemic colitis (Multi)     With history of sepsis and infectious gastroenteritis 10/7/2022-10/11/2022    Chronic kidney disease, stage III (moderate) (Multi)     CLL (chronic lymphocytic leukemia) (Multi)     Coronary artery disease     CTS (carpal tunnel syndrome)     Diabetes mellitus (Multi)     Dry eye syndrome of bilateral lacrimal glands     Essential (primary) hypertension     Exudative age-related macular degeneration of left eye, unspecified stage     GI bleed     Hyperlipidemia, unspecified     Hypertension     Hypothyroidism     Hypothyroidism, unspecified type     Macular cyst, hole, or pseudohole, left eye     Miosis     Mixed hyperlipidemia     NSTEMI (non-ST elevated myocardial infarction) (Multi)     Osteoarthritis     Osteoporosis     Peripheral vascular disease (CMS-HCC)     Retinal edema     Right knee pain     Seizure disorder (Multi)     Type 1 diabetes mellitus with hyperglycemia (Multi)     Unspecified disorder of refraction        Past Surgical History:   Procedure Laterality Date    CARPAL TUNNEL RELEASE Bilateral     CATARACT EXTRACTION W/ INTRAOCULAR LENS  IMPLANT, BILATERAL       Left 8/10/2017, right 8/31/2017    CHOLECYSTECTOMY      COLONOSCOPY      CORONARY ARTERY BYPASS GRAFT  2005    X 3    HYSTERECTOMY      KNEE SURGERY Right 04/17/2024    total    OTHER SURGICAL HISTORY      Bilateral heel spurs    OTHER SURGICAL HISTORY Right 01/2016    Femoral endarterectomy    TOTAL HIP ARTHROPLASTY Left 2012       Social History     Tobacco Use    Smoking status: Never     Passive exposure: Past    Smokeless tobacco: Never   Substance Use Topics    Alcohol use: Not Currently     Social History     Substance and Sexual Activity   Drug Use Not Currently         Family History   Problem Relation Name Age of Onset    Diabetes Mother      Diabetes Daughter      Other (RA) Daughter         Allergies   Allergen Reactions    Amoxicillin Hives    Chlorpheniramine Unknown    Amoxicillin-Pot Clavulanate Rash    Ciprofloxacin Rash    Levofloxacin Rash     Current Outpatient Medications   Medication Sig Dispense Refill    acetaminophen (Tylenol) 325 mg tablet Take 2 tablets (650 mg) by mouth every 4 hours if needed for fever (temp greater than 38.0 C) (greater than or equal to 38 C).      amLODIPine (Norvasc) 10 mg tablet Take 1 tablet (10 mg) by mouth once daily. 90 tablet 3    aspirin 81 mg EC tablet Take 1 tablet (81 mg) by mouth once daily.      cholecalciferol (Vitamin D-3) 50 mcg (2,000 unit) capsule Take 1 capsule (50 mcg) by mouth early in the morning..      hydrALAZINE (Apresoline) 50 mg tablet Take 1 tablet (50 mg) by mouth 2 times a day. 180 tablet 3    insulin degludec (Tresiba FlexTouch U-100) 100 unit/mL (3 mL) injection INJECT 11 UNITS UNDER THE SKIN AS DIRECTED DAILY 15 mL 1    insulin lispro (HumaLOG  KwikPen U-100) 100 unit/mL injection INJECT UP TO 30 UNITS UNDER THE SKIN DAILY AT MEALS 15 mL 5    levothyroxine (Synthroid, Levoxyl) 100 mcg tablet TAKE 1 TABLET BY MOUTH EVERY DAY 90 tablet 0    lisinopril 40 mg tablet Take 1 tablet (40 mg) by mouth once daily. 90 tablet 0     metoprolol tartrate (Lopressor) 25 mg tablet Take 1 tablet (25 mg) by mouth 2 times a day. 180 tablet 3    polyethylene glycol (Miralax) 17 gram/dose powder Mix 17 g of powder and drink once daily.      pravastatin (Pravachol) 40 mg tablet Take 1 tablet (40 mg) by mouth once daily. 90 tablet 3    sulfaSALAzine (Azulfidine) 500 mg DR tablet Take 2 tablets (1,000 mg) by mouth 2 times a day.      valACYclovir (Valtrex) 1 gram tablet Take 1 tablet (1,000 mg) by mouth once daily as needed (cold sore). Twice daily      aflibercept (Eylea) 2 mg/0.05 mL intra-ocular injection 0.05 mL (2 mg) by intravitreal route 1 time. Left eye - EVERY 3 MONTHS      chlorhexidine (Peridex) 0.12 % solution Use as directed 473 mL 0    nitroglycerin (Nitrostat) 0.4 mg SL tablet Place 1 tablet (0.4 mg) under the tongue every 5 minutes if needed for chest pain.       No current facility-administered medications for this visit.       Review of Systems   Constitutional: Negative.    HENT: Negative.     Eyes: Negative.    Respiratory: Negative.     Gastrointestinal:  Positive for rectal pain.   Endocrine: Negative.    Genitourinary: Negative.    Musculoskeletal:  Positive for joint swelling (right knee).   Skin: Negative.    Allergic/Immunologic: Negative.    Neurological: Negative.    Hematological: Negative.    Psychiatric/Behavioral: Negative.                Physical Exam  Vitals reviewed.   Constitutional:       Appearance: Normal appearance.   HENT:      Head: Normocephalic and atraumatic.      Nose: Nose normal.      Mouth/Throat:      Mouth: Mucous membranes are moist.      Pharynx: Oropharynx is clear.   Eyes:      Extraocular Movements: Extraocular movements intact.      Conjunctiva/sclera: Conjunctivae normal.   Cardiovascular:      Rate and Rhythm: Normal rate and regular rhythm.      Pulses: Normal pulses.      Heart sounds: Normal heart sounds.   Pulmonary:      Effort: Pulmonary effort is normal.      Breath sounds: Normal breath  "sounds.   Abdominal:      General: Bowel sounds are normal.      Palpations: Abdomen is soft.   Genitourinary:     Comments: Assessment deferred to physician    Musculoskeletal:         General: Swelling present.      Cervical back: Normal range of motion and neck supple.      Right lower leg: Edema present.   Skin:     General: Skin is warm and dry.   Neurological:      General: No focal deficit present.      Mental Status: She is alert and oriented to person, place, and time.   Psychiatric:         Mood and Affect: Mood normal.         Behavior: Behavior normal.         Thought Content: Thought content normal.         Judgment: Judgment normal.          PAT AIRWAY:   Airway:     Mallampati::  III    TM distance::  >3 FB    Neck ROM::  Full  normal            /66   Pulse 71   Temp 37 °C (98.6 °F) (Temporal)   Ht 1.499 m (4' 11\")   Wt 55.2 kg (121 lb 9.6 oz)   LMP  (LMP Unknown)   SpO2 99%   BMI 24.56 kg/m²       ASA:III  APRIL:5.9%  RCRI: 0.9%      DASI Risk Score      Flowsheet Row Pre-Admission Testing from 11/13/2024 in [facility] Pre-Admission Testing from 4/3/2024 in [facility]   Can you take care of yourself (eat, dress, bathe, or use toilet)?  2.75 filed at 11/13/2024 0713 2.75 filed at 04/03/2024 1103   Can you walk indoors, such as around your house? 1.75 filed at 11/13/2024 0713 1.75 filed at 04/03/2024 1103   Can you walk a block or two on level ground?  2.75 filed at 11/13/2024 0713 2.75 filed at 04/03/2024 1103   Can you climb a flight of stairs or walk up a hill? 5.5 filed at 11/13/2024 0713 5.5 filed at 04/03/2024 1103   Can you run a short distance? 0 filed at 11/13/2024 0713 0 filed at 04/03/2024 1103   Can you do light work around the house like dusting or washing dishes? 2.7 filed at 11/13/2024 0713 2.7 filed at 04/03/2024 1103   Can you do moderate work around the house like vacuuming, sweeping floors or carrying groceries? 3.5 filed at 11/13/2024 " 0713 3.5 filed at 04/03/2024 1103   Can you do heavy work around the house like scrubbing floors or lifting and moving heavy furniture?  0 filed at 11/13/2024 0713 0 filed at 04/03/2024 1103   Can you do yard work like raking leaves, weeding or pushing a mower? 4.5 filed at 11/13/2024 0713 4.5 filed at 04/03/2024 1103   Can you have sexual relations? 0 filed at 11/13/2024 0713 0 filed at 04/03/2024 1103   Can you participate in moderate recreational activities like golf, bowling, dancing, doubles tennis or throwing a baseball or football? 0 filed at 11/13/2024 0713 6 filed at 04/03/2024 1103   Can you participate in strenous sports like swimming, singles tennis, football, basketball, or skiing? 0 filed at 11/13/2024 0713 0 filed at 04/03/2024 1103   DASI SCORE 23.45 filed at 11/13/2024 0713 29.45 filed at 04/03/2024 1103   METS Score (Will be calculated only when all the questions are answered) 5.6 filed at 11/13/2024 0713 6.4 filed at 04/03/2024 1103          Caprini DVT Assessment      Flowsheet Row ED to Hosp-Admission (Discharged) from 6/9/2024 in [address] with Nayely Pickering MD, Jonathan Lamb MD and Mike Powell DO Admission (Discharged) from 4/17/2024 in [address] with Rudolph Liriano MD   DVT Score 6 filed at 06/09/2024 1421 16 filed at 04/17/2024 1012   BMI 30 or less filed at 06/09/2024 1421 30 or less filed at 04/17/2024 1012   RETIRED: Current Status -- Swollen legs, Elective major lower extremity arthroplasty filed at 04/17/2024 1012   RETIRED: History Acute myocardial infarction, Prior major surgery filed at 06/09/2024 1421 Prior major surgery, Previous malignancy, Acute myocardial infarction, Sepsis, Inflammatory bowel disease filed at 04/17/2024 1012   RETIRED: Age Over 75 years filed at 06/09/2024 1421 Over 75 years filed at 04/17/2024 1012          Modified Frailty Index    No data to display       CHADS2 Stroke Risk  Current as of  9 minutes ago        N/A 3 to 100%: High Risk   2 to < 3%: Medium Risk   0 to < 2%: Low Risk     Last Change: N/A          This score determines the patient's risk of having a stroke if the patient has atrial fibrillation.        This score is not applicable to this patient. Components are not calculated.          Revised Cardiac Risk Index      Flowsheet Row Pre-Admission Testing from 11/13/2024 in [facility] Pre-Admission Testing from 4/3/2024 in [facility]   High-Risk Surgery (Intraperitoneal, Intrathoracic,Suprainguinal vascular) 0 filed at 11/13/2024 0745 0 filed at 04/03/2024 1142   History of ischemic heart disease (History of MI, History of positive exercuse test, Current chest paint considered due to myocardial ischemia, Use of nitrate therapy, ECG with pathological Q Waves) 1 filed at 11/13/2024 0745 1 filed at 04/03/2024 1142   History of congestive heart failure (pulmonary edemia, bilateral rales or S3 gallop, Paroxysmal nocturnal dyspnea, CXR showing pulmonary vascular redistribution) 0 filed at 11/13/2024 0745 0 filed at 04/03/2024 1142   History of cerebrovascular disease (Prior TIA or stroke) 0 filed at 11/13/2024 0745 0 filed at 04/03/2024 1142   Pre-operative insulin treatment 0 filed at 11/13/2024 0745 1 filed at 04/03/2024 1142   Pre-operative creatinine>2 mg/dl 0 filed at 11/13/2024 0745 0 filed at 04/03/2024 1142   Revised Cardiac Risk Calculator 1 filed at 11/13/2024 0745 2 filed at 04/03/2024 1142          Apfel Simplified Score      Flowsheet Row Pre-Admission Testing from 4/3/2024 in [facility]   Smoking status 1 filed at 04/03/2024 1142   History of motion sickness or PONV  0 filed at 04/03/2024 1142   Use of postoperative opioids 0 filed at 04/03/2024 1142   Apfel Simplified Score Calculator 2 filed at 04/03/2024 1142          Risk Analysis Index Results This Encounter    No data found in the last 10 encounters.       Stop Bang Score       Flowsheet Row Pre-Admission Testing from 11/13/2024 in [facility] Pre-Admission Testing from 4/3/2024 in [facility]   Do you snore loudly? 0 filed at 11/13/2024 0714 0 filed at 04/03/2024 1105   Do you often feel tired or fatigued after your sleep? 0 filed at 11/13/2024 0714 0 filed at 04/03/2024 1105   Has anyone ever observed you stop breathing in your sleep? 0 filed at 11/13/2024 0714 0 filed at 04/03/2024 1105   Do you have or are you being treated for high blood pressure? 1 filed at 11/13/2024 0714 1 filed at 04/03/2024 1105   Recent BMI (Calculated) 24.6 filed at 11/13/2024 0714 25.7 filed at 04/03/2024 1105   Is BMI greater than 35 kg/m2? 0=No filed at 11/13/2024 0714 0=No filed at 04/03/2024 1105   Age older than 50 years old? 1=Yes filed at 11/13/2024 0714 1=Yes filed at 04/03/2024 1105   Is your neck circumference greater than 17 inches (Male) or 16 inches (Female)? 0 filed at 11/13/2024 0714 0 filed at 04/03/2024 1105   Gender - Male 0=No filed at 11/13/2024 0714 0=No filed at 04/03/2024 1105   STOP-BANG Total Score 2 filed at 11/13/2024 0714 2 filed at 04/03/2024 1105          Prodigy: High Risk  Total Score: 16              Prodigy Age Score           ARISCAT Score for Postoperative Pulmonary Complications    No data to display       Marcus Perioperative Risk for Myocardial Infarction or Cardiac Arrest (NICOLLE)    No data to display         Assessment and Plan:     Rectal prolapse : Proctopexy Robot-Assisted   CAD: CABG 2005. NSTEMI in April 2024 after she had right TKR. Cardiac Clearance given by Dr. Araujo 10/16/24:  Preop cardiovascular exam  She is now going to have the rectal prolapse repaired.  No angina and no chf.  The MI was in April after a TKR.  Feel she is doing well and is low risk for the surgery and will clear her cardiac wise.  3. Hypertension: Amlodipine, hydralazine. Lisinopril, metoprolol  4. Hyperlipidemia: Pravastatin  5. Diabetes II: Tresiba,  Humalog  6. CKD III  7. Hypothyroid: Levothyroxine, last TSH 0.61 10/7/24  8. Chronic Lymphocytic leukemia: remission  9. Anterior right lower leg sore: follows [facility]. Sore is healing.      LABS 10/7/24  EKG 7/11/24  TTE 4/22/24  CONCLUSIONS:   1. Left ventricular systolic function is mildly to moderately decreased with a 40-45% estimated ejection fraction.   2. Entire anterior septum, mid anterior segment, apex, and mid inferoseptal segment are abnormal.   3. There is moderate mitral annular calcification.   4. Mildly elevated RVSP.   5. Moderate aortic valve stenosis.   6. There are multiple wall motion abnormalities.    Brianne Reis, APRN-CNP

## 2024-11-14 ENCOUNTER — ANESTHESIA EVENT (OUTPATIENT)
Dept: OPERATING ROOM | Facility: HOSPITAL | Age: 84
End: 2024-11-14
Payer: MEDICARE

## 2024-11-14 ENCOUNTER — HOSPITAL ENCOUNTER (INPATIENT)
Facility: HOSPITAL | Age: 84
LOS: 3 days | Discharge: HOME | DRG: 329 | End: 2024-11-17
Attending: STUDENT IN AN ORGANIZED HEALTH CARE EDUCATION/TRAINING PROGRAM | Admitting: STUDENT IN AN ORGANIZED HEALTH CARE EDUCATION/TRAINING PROGRAM
Payer: MEDICARE

## 2024-11-14 ENCOUNTER — APPOINTMENT (OUTPATIENT)
Dept: CARDIOLOGY | Facility: CLINIC | Age: 84
End: 2024-11-14
Payer: MEDICARE

## 2024-11-14 ENCOUNTER — ANESTHESIA (OUTPATIENT)
Dept: OPERATING ROOM | Facility: HOSPITAL | Age: 84
End: 2024-11-14
Payer: MEDICARE

## 2024-11-14 ENCOUNTER — HOSPITAL ENCOUNTER (EMERGENCY)
Facility: HOSPITAL | Age: 84
Discharge: HOME | DRG: 329 | End: 2024-11-14
Attending: EMERGENCY MEDICINE
Payer: MEDICARE

## 2024-11-14 ENCOUNTER — HOSPITAL ENCOUNTER (OUTPATIENT)
Facility: HOSPITAL | Age: 84
Setting detail: SURGERY ADMIT
End: 2024-11-14
Payer: MEDICARE

## 2024-11-14 VITALS
BODY MASS INDEX: 25.4 KG/M2 | RESPIRATION RATE: 16 BRPM | TEMPERATURE: 97.7 F | HEART RATE: 68 BPM | OXYGEN SATURATION: 99 % | SYSTOLIC BLOOD PRESSURE: 147 MMHG | WEIGHT: 121 LBS | HEIGHT: 58 IN | DIASTOLIC BLOOD PRESSURE: 89 MMHG

## 2024-11-14 DIAGNOSIS — R19.7 DIARRHEA, UNSPECIFIED TYPE: Primary | ICD-10-CM

## 2024-11-14 DIAGNOSIS — N18.30 STAGE 3 CHRONIC KIDNEY DISEASE, UNSPECIFIED WHETHER STAGE 3A OR 3B CKD (MULTI): ICD-10-CM

## 2024-11-14 DIAGNOSIS — S22.31XA FRACTURE OF ONE RIB, RIGHT SIDE, INITIAL ENCOUNTER FOR CLOSED FRACTURE: ICD-10-CM

## 2024-11-14 DIAGNOSIS — I50.22 SYSTOLIC HEART FAILURE, CHRONIC: ICD-10-CM

## 2024-11-14 DIAGNOSIS — R79.89 OTHER SPECIFIED ABNORMAL FINDINGS OF BLOOD CHEMISTRY: ICD-10-CM

## 2024-11-14 DIAGNOSIS — R09.02 HYPOXIA: ICD-10-CM

## 2024-11-14 DIAGNOSIS — K62.3 RECTAL PROLAPSE: Primary | ICD-10-CM

## 2024-11-14 DIAGNOSIS — I25.119 ATHEROSCLEROSIS OF NATIVE CORONARY ARTERY OF NATIVE HEART WITH ANGINA PECTORIS: ICD-10-CM

## 2024-11-14 LAB
GLUCOSE BLD MANUAL STRIP-MCNC: 165 MG/DL (ref 74–99)
GLUCOSE BLD MANUAL STRIP-MCNC: 214 MG/DL (ref 74–99)
GLUCOSE BLD MANUAL STRIP-MCNC: 239 MG/DL (ref 74–99)
GLUCOSE BLD MANUAL STRIP-MCNC: 67 MG/DL (ref 74–99)
STAPHYLOCOCCUS SPEC CULT: NORMAL

## 2024-11-14 PROCEDURE — 3700000002 HC GENERAL ANESTHESIA TIME - EACH INCREMENTAL 1 MINUTE: Performed by: STUDENT IN AN ORGANIZED HEALTH CARE EDUCATION/TRAINING PROGRAM

## 2024-11-14 PROCEDURE — 2720000007 HC OR 272 NO HCPCS: Performed by: STUDENT IN AN ORGANIZED HEALTH CARE EDUCATION/TRAINING PROGRAM

## 2024-11-14 PROCEDURE — 3700000001 HC GENERAL ANESTHESIA TIME - INITIAL BASE CHARGE: Performed by: STUDENT IN AN ORGANIZED HEALTH CARE EDUCATION/TRAINING PROGRAM

## 2024-11-14 PROCEDURE — 7100000001 HC RECOVERY ROOM TIME - INITIAL BASE CHARGE: Performed by: STUDENT IN AN ORGANIZED HEALTH CARE EDUCATION/TRAINING PROGRAM

## 2024-11-14 PROCEDURE — 2500000004 HC RX 250 GENERAL PHARMACY W/ HCPCS (ALT 636 FOR OP/ED)

## 2024-11-14 PROCEDURE — 7100000002 HC RECOVERY ROOM TIME - EACH INCREMENTAL 1 MINUTE: Performed by: STUDENT IN AN ORGANIZED HEALTH CARE EDUCATION/TRAINING PROGRAM

## 2024-11-14 PROCEDURE — 3600000018 HC OR TIME - INITIAL BASE CHARGE - PROCEDURE LEVEL SIX: Performed by: STUDENT IN AN ORGANIZED HEALTH CARE EDUCATION/TRAINING PROGRAM

## 2024-11-14 PROCEDURE — 82947 ASSAY GLUCOSE BLOOD QUANT: CPT

## 2024-11-14 PROCEDURE — S2900 ROBOTIC SURGICAL SYSTEM: HCPCS | Performed by: STUDENT IN AN ORGANIZED HEALTH CARE EDUCATION/TRAINING PROGRAM

## 2024-11-14 PROCEDURE — 45400 LAPAROSCOPIC PROC: CPT | Performed by: STUDENT IN AN ORGANIZED HEALTH CARE EDUCATION/TRAINING PROGRAM

## 2024-11-14 PROCEDURE — 1200000002 HC GENERAL ROOM WITH TELEMETRY DAILY

## 2024-11-14 PROCEDURE — 99100 ANES PT EXTEME AGE<1 YR&>70: CPT | Performed by: ANESTHESIOLOGY

## 2024-11-14 PROCEDURE — 2500000005 HC RX 250 GENERAL PHARMACY W/O HCPCS

## 2024-11-14 PROCEDURE — 2500000004 HC RX 250 GENERAL PHARMACY W/ HCPCS (ALT 636 FOR OP/ED): Performed by: ANESTHESIOLOGY

## 2024-11-14 PROCEDURE — A45400 PR LAP, SURG PROCTOPEXY: Performed by: ANESTHESIOLOGY

## 2024-11-14 PROCEDURE — A45400 PR LAP, SURG PROCTOPEXY

## 2024-11-14 PROCEDURE — 99283 EMERGENCY DEPT VISIT LOW MDM: CPT

## 2024-11-14 PROCEDURE — 0DQP4ZZ REPAIR RECTUM, PERCUTANEOUS ENDOSCOPIC APPROACH: ICD-10-PCS | Performed by: STUDENT IN AN ORGANIZED HEALTH CARE EDUCATION/TRAINING PROGRAM

## 2024-11-14 PROCEDURE — 8E0W4CZ ROBOTIC ASSISTED PROCEDURE OF TRUNK REGION, PERCUTANEOUS ENDOSCOPIC APPROACH: ICD-10-PCS | Performed by: STUDENT IN AN ORGANIZED HEALTH CARE EDUCATION/TRAINING PROGRAM

## 2024-11-14 PROCEDURE — 2500000001 HC RX 250 WO HCPCS SELF ADMINISTERED DRUGS (ALT 637 FOR MEDICARE OP): Performed by: STUDENT IN AN ORGANIZED HEALTH CARE EDUCATION/TRAINING PROGRAM

## 2024-11-14 PROCEDURE — 2500000004 HC RX 250 GENERAL PHARMACY W/ HCPCS (ALT 636 FOR OP/ED): Performed by: STUDENT IN AN ORGANIZED HEALTH CARE EDUCATION/TRAINING PROGRAM

## 2024-11-14 PROCEDURE — 3600000017 HC OR TIME - EACH INCREMENTAL 1 MINUTE - PROCEDURE LEVEL SIX: Performed by: STUDENT IN AN ORGANIZED HEALTH CARE EDUCATION/TRAINING PROGRAM

## 2024-11-14 RX ORDER — LIDOCAINE HYDROCHLORIDE 10 MG/ML
INJECTION, SOLUTION INFILTRATION; PERINEURAL AS NEEDED
Status: DISCONTINUED | OUTPATIENT
Start: 2024-11-14 | End: 2024-11-14

## 2024-11-14 RX ORDER — LIDOCAINE HYDROCHLORIDE 10 MG/ML
0.1 INJECTION, SOLUTION INFILTRATION; PERINEURAL ONCE
Status: DISCONTINUED | OUTPATIENT
Start: 2024-11-14 | End: 2024-11-14

## 2024-11-14 RX ORDER — ETOMIDATE 2 MG/ML
INJECTION INTRAVENOUS AS NEEDED
Status: DISCONTINUED | OUTPATIENT
Start: 2024-11-14 | End: 2024-11-14

## 2024-11-14 RX ORDER — SODIUM CHLORIDE, SODIUM LACTATE, POTASSIUM CHLORIDE, CALCIUM CHLORIDE 600; 310; 30; 20 MG/100ML; MG/100ML; MG/100ML; MG/100ML
INJECTION, SOLUTION INTRAVENOUS CONTINUOUS PRN
Status: DISCONTINUED | OUTPATIENT
Start: 2024-11-14 | End: 2024-11-14

## 2024-11-14 RX ORDER — ROCURONIUM BROMIDE 10 MG/ML
INJECTION, SOLUTION INTRAVENOUS AS NEEDED
Status: DISCONTINUED | OUTPATIENT
Start: 2024-11-14 | End: 2024-11-14

## 2024-11-14 RX ORDER — HYDROMORPHONE HYDROCHLORIDE 0.2 MG/ML
0.2 INJECTION INTRAMUSCULAR; INTRAVENOUS; SUBCUTANEOUS EVERY 5 MIN PRN
Status: DISCONTINUED | OUTPATIENT
Start: 2024-11-14 | End: 2024-11-14

## 2024-11-14 RX ORDER — ALBUTEROL SULFATE 0.83 MG/ML
2.5 SOLUTION RESPIRATORY (INHALATION) ONCE AS NEEDED
Status: DISCONTINUED | OUTPATIENT
Start: 2024-11-14 | End: 2024-11-14

## 2024-11-14 RX ORDER — DEXTROSE 50 % IN WATER (D50W) INTRAVENOUS SYRINGE
25
Status: DISCONTINUED | OUTPATIENT
Start: 2024-11-14 | End: 2024-11-17 | Stop reason: HOSPADM

## 2024-11-14 RX ORDER — ACETAMINOPHEN 325 MG/1
650 TABLET ORAL EVERY 6 HOURS
Status: DISCONTINUED | OUTPATIENT
Start: 2024-11-14 | End: 2024-11-17 | Stop reason: HOSPADM

## 2024-11-14 RX ORDER — ONDANSETRON 4 MG/1
4 TABLET, FILM COATED ORAL EVERY 8 HOURS PRN
Status: DISCONTINUED | OUTPATIENT
Start: 2024-11-14 | End: 2024-11-17 | Stop reason: HOSPADM

## 2024-11-14 RX ORDER — HYDROMORPHONE HYDROCHLORIDE 0.2 MG/ML
0.1 INJECTION INTRAMUSCULAR; INTRAVENOUS; SUBCUTANEOUS EVERY 5 MIN PRN
Status: DISCONTINUED | OUTPATIENT
Start: 2024-11-14 | End: 2024-11-14

## 2024-11-14 RX ORDER — CEFAZOLIN SODIUM 2 G/100ML
INJECTION, SOLUTION INTRAVENOUS AS NEEDED
Status: DISCONTINUED | OUTPATIENT
Start: 2024-11-14 | End: 2024-11-14

## 2024-11-14 RX ORDER — ONDANSETRON HYDROCHLORIDE 2 MG/ML
4 INJECTION, SOLUTION INTRAVENOUS ONCE AS NEEDED
Status: COMPLETED | OUTPATIENT
Start: 2024-11-14 | End: 2024-11-14

## 2024-11-14 RX ORDER — HYDRALAZINE HYDROCHLORIDE 20 MG/ML
5 INJECTION INTRAMUSCULAR; INTRAVENOUS EVERY 30 MIN PRN
Status: DISCONTINUED | OUTPATIENT
Start: 2024-11-14 | End: 2024-11-14

## 2024-11-14 RX ORDER — LISINOPRIL 40 MG/1
40 TABLET ORAL DAILY
Status: DISCONTINUED | OUTPATIENT
Start: 2024-11-15 | End: 2024-11-17

## 2024-11-14 RX ORDER — DEXTROSE 50 % IN WATER (D50W) INTRAVENOUS SYRINGE
12.5
Status: DISCONTINUED | OUTPATIENT
Start: 2024-11-14 | End: 2024-11-17 | Stop reason: HOSPADM

## 2024-11-14 RX ORDER — SODIUM CHLORIDE, SODIUM LACTATE, POTASSIUM CHLORIDE, CALCIUM CHLORIDE 600; 310; 30; 20 MG/100ML; MG/100ML; MG/100ML; MG/100ML
100 INJECTION, SOLUTION INTRAVENOUS CONTINUOUS
Status: DISCONTINUED | OUTPATIENT
Start: 2024-11-14 | End: 2024-11-14 | Stop reason: HOSPADM

## 2024-11-14 RX ORDER — ASPIRIN 81 MG/1
81 TABLET ORAL DAILY
Status: DISCONTINUED | OUTPATIENT
Start: 2024-11-14 | End: 2024-11-17 | Stop reason: HOSPADM

## 2024-11-14 RX ORDER — METOPROLOL TARTRATE 25 MG/1
25 TABLET, FILM COATED ORAL 2 TIMES DAILY
Status: DISCONTINUED | OUTPATIENT
Start: 2024-11-14 | End: 2024-11-17 | Stop reason: HOSPADM

## 2024-11-14 RX ORDER — LEVOTHYROXINE SODIUM 100 UG/1
100 TABLET ORAL DAILY
Status: DISCONTINUED | OUTPATIENT
Start: 2024-11-15 | End: 2024-11-17 | Stop reason: HOSPADM

## 2024-11-14 RX ORDER — LIDOCAINE HYDROCHLORIDE AND EPINEPHRINE 10; 10 UG/ML; MG/ML
INJECTION, SOLUTION INFILTRATION; PERINEURAL AS NEEDED
Status: DISCONTINUED | OUTPATIENT
Start: 2024-11-14 | End: 2024-11-14 | Stop reason: HOSPADM

## 2024-11-14 RX ORDER — PHENYLEPHRINE HCL IN 0.9% NACL 0.4MG/10ML
SYRINGE (ML) INTRAVENOUS AS NEEDED
Status: DISCONTINUED | OUTPATIENT
Start: 2024-11-14 | End: 2024-11-14

## 2024-11-14 RX ORDER — METHOCARBAMOL 500 MG/1
500 TABLET, FILM COATED ORAL EVERY 8 HOURS SCHEDULED
Status: DISCONTINUED | OUTPATIENT
Start: 2024-11-14 | End: 2024-11-17 | Stop reason: HOSPADM

## 2024-11-14 RX ORDER — PROPOFOL 10 MG/ML
INJECTION, EMULSION INTRAVENOUS CONTINUOUS PRN
Status: DISCONTINUED | OUTPATIENT
Start: 2024-11-14 | End: 2024-11-14

## 2024-11-14 RX ORDER — NORETHINDRONE AND ETHINYL ESTRADIOL 0.5-0.035
KIT ORAL AS NEEDED
Status: DISCONTINUED | OUTPATIENT
Start: 2024-11-14 | End: 2024-11-14

## 2024-11-14 RX ORDER — ONDANSETRON HYDROCHLORIDE 2 MG/ML
INJECTION, SOLUTION INTRAVENOUS AS NEEDED
Status: DISCONTINUED | OUTPATIENT
Start: 2024-11-14 | End: 2024-11-14

## 2024-11-14 RX ORDER — ONDANSETRON HYDROCHLORIDE 2 MG/ML
4 INJECTION, SOLUTION INTRAVENOUS EVERY 8 HOURS PRN
Status: DISCONTINUED | OUTPATIENT
Start: 2024-11-14 | End: 2024-11-17 | Stop reason: HOSPADM

## 2024-11-14 RX ORDER — FENTANYL CITRATE 50 UG/ML
INJECTION, SOLUTION INTRAMUSCULAR; INTRAVENOUS AS NEEDED
Status: DISCONTINUED | OUTPATIENT
Start: 2024-11-14 | End: 2024-11-14

## 2024-11-14 RX ORDER — ENOXAPARIN SODIUM 100 MG/ML
40 INJECTION SUBCUTANEOUS DAILY
Status: DISCONTINUED | OUTPATIENT
Start: 2024-11-15 | End: 2024-11-16

## 2024-11-14 RX ORDER — HYDRALAZINE HYDROCHLORIDE 50 MG/1
50 TABLET, FILM COATED ORAL 2 TIMES DAILY
Status: DISCONTINUED | OUTPATIENT
Start: 2024-11-15 | End: 2024-11-17 | Stop reason: HOSPADM

## 2024-11-14 RX ORDER — DEXTROSE, SODIUM CHLORIDE, SODIUM LACTATE, POTASSIUM CHLORIDE, AND CALCIUM CHLORIDE 5; .6; .31; .03; .02 G/100ML; G/100ML; G/100ML; G/100ML; G/100ML
150 INJECTION, SOLUTION INTRAVENOUS CONTINUOUS
Status: DISCONTINUED | OUTPATIENT
Start: 2024-11-14 | End: 2024-11-15

## 2024-11-14 RX ORDER — AMLODIPINE BESYLATE 10 MG/1
10 TABLET ORAL DAILY
Status: DISCONTINUED | OUTPATIENT
Start: 2024-11-15 | End: 2024-11-17

## 2024-11-14 RX ORDER — POLYETHYLENE GLYCOL 3350 17 G/17G
17 POWDER, FOR SOLUTION ORAL 2 TIMES DAILY
Status: DISCONTINUED | OUTPATIENT
Start: 2024-11-15 | End: 2024-11-15 | Stop reason: RX

## 2024-11-14 SDOH — SOCIAL STABILITY: SOCIAL INSECURITY: WERE YOU ABLE TO COMPLETE ALL THE BEHAVIORAL HEALTH SCREENINGS?: YES

## 2024-11-14 SDOH — SOCIAL STABILITY: SOCIAL INSECURITY: WITHIN THE LAST YEAR, HAVE YOU BEEN HUMILIATED OR EMOTIONALLY ABUSED IN OTHER WAYS BY YOUR PARTNER OR EX-PARTNER?: NO

## 2024-11-14 SDOH — SOCIAL STABILITY: SOCIAL INSECURITY
WITHIN THE LAST YEAR, HAVE YOU BEEN RAPED OR FORCED TO HAVE ANY KIND OF SEXUAL ACTIVITY BY YOUR PARTNER OR EX-PARTNER?: NO

## 2024-11-14 SDOH — SOCIAL STABILITY: SOCIAL INSECURITY: DO YOU FEEL UNSAFE GOING BACK TO THE PLACE WHERE YOU ARE LIVING?: NO

## 2024-11-14 SDOH — HEALTH STABILITY: MENTAL HEALTH: HOW OFTEN DO YOU HAVE SIX OR MORE DRINKS ON ONE OCCASION?: NEVER

## 2024-11-14 SDOH — HEALTH STABILITY: MENTAL HEALTH: HOW MANY DRINKS CONTAINING ALCOHOL DO YOU HAVE ON A TYPICAL DAY WHEN YOU ARE DRINKING?: PATIENT DOES NOT DRINK

## 2024-11-14 SDOH — SOCIAL STABILITY: SOCIAL INSECURITY: DOES ANYONE TRY TO KEEP YOU FROM HAVING/CONTACTING OTHER FRIENDS OR DOING THINGS OUTSIDE YOUR HOME?: NO

## 2024-11-14 SDOH — SOCIAL STABILITY: SOCIAL INSECURITY: WITHIN THE LAST YEAR, HAVE YOU BEEN AFRAID OF YOUR PARTNER OR EX-PARTNER?: NO

## 2024-11-14 SDOH — ECONOMIC STABILITY: TRANSPORTATION INSECURITY: IN THE PAST 12 MONTHS, HAS LACK OF TRANSPORTATION KEPT YOU FROM MEDICAL APPOINTMENTS OR FROM GETTING MEDICATIONS?: NO

## 2024-11-14 SDOH — HEALTH STABILITY: PHYSICAL HEALTH: ON AVERAGE, HOW MANY MINUTES DO YOU ENGAGE IN EXERCISE AT THIS LEVEL?: PATIENT DECLINED

## 2024-11-14 SDOH — ECONOMIC STABILITY: HOUSING INSECURITY: IN THE PAST 12 MONTHS, HOW MANY TIMES HAVE YOU MOVED WHERE YOU WERE LIVING?: 0

## 2024-11-14 SDOH — SOCIAL STABILITY: SOCIAL INSECURITY: ABUSE: ADULT

## 2024-11-14 SDOH — ECONOMIC STABILITY: FOOD INSECURITY: WITHIN THE PAST 12 MONTHS, YOU WORRIED THAT YOUR FOOD WOULD RUN OUT BEFORE YOU GOT THE MONEY TO BUY MORE.: NEVER TRUE

## 2024-11-14 SDOH — SOCIAL STABILITY: SOCIAL INSECURITY: HAS ANYONE EVER THREATENED TO HURT YOUR FAMILY OR YOUR PETS?: NO

## 2024-11-14 SDOH — ECONOMIC STABILITY: INCOME INSECURITY: IN THE PAST 12 MONTHS HAS THE ELECTRIC, GAS, OIL, OR WATER COMPANY THREATENED TO SHUT OFF SERVICES IN YOUR HOME?: NO

## 2024-11-14 SDOH — ECONOMIC STABILITY: FOOD INSECURITY: WITHIN THE PAST 12 MONTHS, THE FOOD YOU BOUGHT JUST DIDN'T LAST AND YOU DIDN'T HAVE MONEY TO GET MORE.: NEVER TRUE

## 2024-11-14 SDOH — HEALTH STABILITY: MENTAL HEALTH: HOW OFTEN DO YOU HAVE A DRINK CONTAINING ALCOHOL?: NEVER

## 2024-11-14 SDOH — ECONOMIC STABILITY: HOUSING INSECURITY: AT ANY TIME IN THE PAST 12 MONTHS, WERE YOU HOMELESS OR LIVING IN A SHELTER (INCLUDING NOW)?: NO

## 2024-11-14 SDOH — SOCIAL STABILITY: SOCIAL INSECURITY: HAVE YOU HAD THOUGHTS OF HARMING ANYONE ELSE?: NO

## 2024-11-14 SDOH — HEALTH STABILITY: PHYSICAL HEALTH
ON AVERAGE, HOW MANY DAYS PER WEEK DO YOU ENGAGE IN MODERATE TO STRENUOUS EXERCISE (LIKE A BRISK WALK)?: PATIENT DECLINED

## 2024-11-14 SDOH — ECONOMIC STABILITY: HOUSING INSECURITY: IN THE LAST 12 MONTHS, WAS THERE A TIME WHEN YOU WERE NOT ABLE TO PAY THE MORTGAGE OR RENT ON TIME?: NO

## 2024-11-14 SDOH — SOCIAL STABILITY: SOCIAL INSECURITY: DO YOU FEEL ANYONE HAS EXPLOITED OR TAKEN ADVANTAGE OF YOU FINANCIALLY OR OF YOUR PERSONAL PROPERTY?: NO

## 2024-11-14 SDOH — SOCIAL STABILITY: SOCIAL INSECURITY: ARE THERE ANY APPARENT SIGNS OF INJURIES/BEHAVIORS THAT COULD BE RELATED TO ABUSE/NEGLECT?: NO

## 2024-11-14 SDOH — ECONOMIC STABILITY: FOOD INSECURITY: HOW HARD IS IT FOR YOU TO PAY FOR THE VERY BASICS LIKE FOOD, HOUSING, MEDICAL CARE, AND HEATING?: NOT HARD AT ALL

## 2024-11-14 SDOH — SOCIAL STABILITY: SOCIAL INSECURITY: ARE YOU OR HAVE YOU BEEN THREATENED OR ABUSED PHYSICALLY, EMOTIONALLY, OR SEXUALLY BY ANYONE?: NO

## 2024-11-14 SDOH — HEALTH STABILITY: MENTAL HEALTH: CURRENT SMOKER: 0

## 2024-11-14 ASSESSMENT — PAIN SCALES - GENERAL
PAINLEVEL_OUTOF10: 6
PAINLEVEL_OUTOF10: 0 - NO PAIN
PAINLEVEL_OUTOF10: 6
PAINLEVEL_OUTOF10: 0 - NO PAIN
PAINLEVEL_OUTOF10: 0 - NO PAIN
PAINLEVEL_OUTOF10: 10 - WORST POSSIBLE PAIN
PAINLEVEL_OUTOF10: 2
PAINLEVEL_OUTOF10: 3
PAINLEVEL_OUTOF10: 0 - NO PAIN

## 2024-11-14 ASSESSMENT — COGNITIVE AND FUNCTIONAL STATUS - GENERAL
CLIMB 3 TO 5 STEPS WITH RAILING: A LITTLE
DAILY ACTIVITIY SCORE: 18
HELP NEEDED FOR BATHING: A LITTLE
PERSONAL GROOMING: A LITTLE
MOBILITY SCORE: 22
PATIENT BASELINE BEDBOUND: NO
EATING MEALS: A LITTLE
WALKING IN HOSPITAL ROOM: A LITTLE
DRESSING REGULAR UPPER BODY CLOTHING: A LITTLE
TOILETING: A LITTLE
DRESSING REGULAR LOWER BODY CLOTHING: A LITTLE

## 2024-11-14 ASSESSMENT — PAIN - FUNCTIONAL ASSESSMENT
PAIN_FUNCTIONAL_ASSESSMENT: 0-10

## 2024-11-14 ASSESSMENT — PAIN SCALES - PAIN ASSESSMENT IN ADVANCED DEMENTIA (PAINAD): TOTALSCORE: MEDICATION (SEE MAR)

## 2024-11-14 ASSESSMENT — LIFESTYLE VARIABLES
HOW OFTEN DO YOU HAVE A DRINK CONTAINING ALCOHOL: NEVER
SKIP TO QUESTIONS 9-10: 1
HOW MANY STANDARD DRINKS CONTAINING ALCOHOL DO YOU HAVE ON A TYPICAL DAY: PATIENT DOES NOT DRINK
SKIP TO QUESTIONS 9-10: 1
HOW OFTEN DO YOU HAVE 6 OR MORE DRINKS ON ONE OCCASION: NEVER
AUDIT-C TOTAL SCORE: 0

## 2024-11-14 ASSESSMENT — ACTIVITIES OF DAILY LIVING (ADL)
BATHING: INDEPENDENT
WALKS IN HOME: INDEPENDENT
PATIENT'S MEMORY ADEQUATE TO SAFELY COMPLETE DAILY ACTIVITIES?: YES
FEEDING YOURSELF: INDEPENDENT
ADEQUATE_TO_COMPLETE_ADL: YES
ASSISTIVE_DEVICE: EYEGLASSES;DENTURES UPPER;DENTURES LOWER
LACK_OF_TRANSPORTATION: NO
GROOMING: INDEPENDENT
TOILETING: NEEDS ASSISTANCE
HEARING - LEFT EAR: FUNCTIONAL
JUDGMENT_ADEQUATE_SAFELY_COMPLETE_DAILY_ACTIVITIES: YES
LACK_OF_TRANSPORTATION: NO
DRESSING YOURSELF: INDEPENDENT
HEARING - RIGHT EAR: FUNCTIONAL

## 2024-11-14 ASSESSMENT — PAIN DESCRIPTION - DESCRIPTORS
DESCRIPTORS: ACHING;DISCOMFORT
DESCRIPTORS: ACHING;DISCOMFORT

## 2024-11-14 ASSESSMENT — COLUMBIA-SUICIDE SEVERITY RATING SCALE - C-SSRS
6. HAVE YOU EVER DONE ANYTHING, STARTED TO DO ANYTHING, OR PREPARED TO DO ANYTHING TO END YOUR LIFE?: NO
6. HAVE YOU EVER DONE ANYTHING, STARTED TO DO ANYTHING, OR PREPARED TO DO ANYTHING TO END YOUR LIFE?: NO
1. IN THE PAST MONTH, HAVE YOU WISHED YOU WERE DEAD OR WISHED YOU COULD GO TO SLEEP AND NOT WAKE UP?: NO
2. HAVE YOU ACTUALLY HAD ANY THOUGHTS OF KILLING YOURSELF?: NO
1. IN THE PAST MONTH, HAVE YOU WISHED YOU WERE DEAD OR WISHED YOU COULD GO TO SLEEP AND NOT WAKE UP?: NO
2. HAVE YOU ACTUALLY HAD ANY THOUGHTS OF KILLING YOURSELF?: NO

## 2024-11-14 ASSESSMENT — PAIN DESCRIPTION - LOCATION: LOCATION: ABDOMEN

## 2024-11-14 ASSESSMENT — PAIN DESCRIPTION - PROGRESSION: CLINICAL_PROGRESSION: NOT CHANGED

## 2024-11-14 NOTE — ANESTHESIA PREPROCEDURE EVALUATION
Patient: Bonnie Jules    Procedure Information       Date/Time: 11/14/24 1330    Procedure: Proctopexy Robot-Assisted    Location: [facility] / [facility]    Surgeons: Cherelle Keen MD          Past Medical History:   Diagnosis Date    Anxiety     Benign hypertension     Chronic ischemic colitis (Multi)     With history of sepsis and infectious gastroenteritis 10/7/2022-10/11/2022    Chronic kidney disease, stage III (moderate) (Multi)     CLL (chronic lymphocytic leukemia) (Multi)     Coronary artery disease     CTS (carpal tunnel syndrome)     Diabetes mellitus (Multi)     Dry eye syndrome of bilateral lacrimal glands     Essential (primary) hypertension     Exudative age-related macular degeneration of left eye, unspecified stage     GI bleed     Hyperlipidemia, unspecified     Hypertension     Hypothyroidism     Hypothyroidism, unspecified type     Macular cyst, hole, or pseudohole, left eye     Miosis     Mixed hyperlipidemia     NSTEMI (non-ST elevated myocardial infarction) (Multi)     Osteoarthritis     Osteoporosis     Peripheral vascular disease (CMS-HCC)     Retinal edema     Right knee pain     Seizure disorder (Multi)     Type 1 diabetes mellitus with hyperglycemia (Multi)     Unspecified disorder of refraction         Relevant Problems   Cardiac   (+) Acute non-ST elevation myocardial infarction (NSTEMI) (Multi)   (+) Aortic valve disorder   (+) Atherosclerotic heart disease of native coronary artery with unspecified angina pectoris   (+) Benign essential hypertension   (+) Essential hypertension   (+) Heart murmur   (+) History of coronary artery bypass graft   (+) Hyperlipidemia   (+) Hypertensive disorder   (+) Hypertensive urgency   (+) Myocardial infarction (Multi)   (+) Peripheral vascular disease (CMS-HCC)   (+) Peripheral vascular disease, unspecified (CMS-HCC)   (+) Stenosis of right femoral artery (CMS-HCC)      Pulmonary   (+) Pneumonia      Neuro   (+) Anxiety   (+) Carpal tunnel  syndrome   (+) Seizure (Multi)      GI   (+) Gastrointestinal hemorrhage   (+) 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   (+) Rectal hemorrhage      /Renal   (+) Acute urinary tract infection   (+) Chronic renal impairment      Endocrine   (+) Hypothyroidism   (+) Type 2 diabetes mellitus      Hematology   (+) Anemia   (+) Chronic lymphocytic leukemia of B-cell type not having achieved remission (Multi)   (+) 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      Musculoskeletal   (+) Carpal tunnel syndrome   (+) Chondrocalcinosis of left knee   (+) Erosive osteoarthritis   (+) Osteoarthritis   (+) Osteoarthritis of left knee   (+) Osteoarthritis of right knee   (+) Primary localized osteoarthritis of pelvic region and thigh   (+) Primary osteoarthritis of right knee   (+) Spondylosis of lumbar spine      HEENT   (+) Sinusitis      ID   (+) Acute urinary tract infection   (+) Onychomycosis   (+) Pneumonia   (+) Upper respiratory tract infection     Past Surgical History:   Procedure Laterality Date    CARPAL TUNNEL RELEASE Bilateral     CATARACT EXTRACTION W/ INTRAOCULAR LENS  IMPLANT, BILATERAL      Left 8/10/2017, right 8/31/2017    CHOLECYSTECTOMY      COLONOSCOPY      CORONARY ARTERY BYPASS GRAFT  2005    X 3    HYSTERECTOMY      KNEE SURGERY Right 04/17/2024    total    OTHER SURGICAL HISTORY      Bilateral heel spurs    OTHER SURGICAL HISTORY Right 01/2016    Femoral endarterectomy    TOTAL HIP ARTHROPLASTY Left 2012      Clinical information reviewed:   Tobacco  Allergies  Meds   Med Hx  Surg Hx  OB Status  Fam Hx  Soc   Hx        NPO Detail:  NPO/Void Status  Carbohydrate Drink Given Prior to Surgery? : N  Date of Last Liquid: 11/14/24  Time of Last Liquid: 0930  Date of Last Solid: 11/13/24  Time of Last Solid: 1200  Last Intake Type: Clear fluids (sip of water with am meds)  Time of Last Void: 1230         Physical Exam    Airway  Mallampati: II  TM distance: <3 FB  Neck ROM:  limited     Cardiovascular    Dental    Pulmonary    Abdominal            Anesthesia Plan    History of general anesthesia?: yes  History of complications of general anesthesia?: no    ASA 3     general     The patient is not a current smoker.  Patient was not previously instructed to abstain from smoking on day of procedure.  Patient did not smoke on day of procedure.    intravenous induction   Postoperative administration of opioids is intended.  Anesthetic plan and risks discussed with patient.    Plan discussed with attending.

## 2024-11-14 NOTE — PERIOPERATIVE NURSING NOTE
VSS, pt c/o severe pain, emesis 100 green, accu check 67 on admit and orders received for bolus, hung by VIELKA Arrington. Dr. Vasquez updated. Dr. Keen was lvm by [facility] re: pt staying the night.

## 2024-11-14 NOTE — ED PROVIDER NOTES
EMERGENCY DEPARTMENT ENCOUNTER      Pt Name: Bonnie Jules  MRN: [id number]  Birthdate 1940  Date of evaluation: 11/14/2024  ED Provider: Ratna Pantoja DO     CHIEF COMPLAINT       Chief Complaint   Patient presents with    Diarrhea     Diarrhea sine this am. Has sx for prolapsed rectum today.        HISTORY OF PRESENT ILLNESS    Bonnie Jules is a 84 y.o. who presents to the emergency department with a complaint of diarrhea.  She is scheduled to undergo a proctoscopy plexi later today.  She was given a MiraLAX bowel prep.  She states her effluent was clear around 7 PM.  She went to bed around 10 and then woke up covered in stool.  She has been having diarrhea throughout the course of the night that was brown.  She complains of some slight abdominal pain as well.  She is concerned with the ongoing diarrhea not being clear as well as her symptoms at this may be a recurrence of colitis which she has had previously.  She has no nausea or vomiting.  No other acute complaints.    REVIEW OF SYSTEMS     Focused ROS performed and negative other than as listed in HPI    PAST MEDICAL HISTORY     Past Medical History:   Diagnosis Date    Anxiety     Benign hypertension     Chronic ischemic colitis (Multi)     With history of sepsis and infectious gastroenteritis 10/7/2022-10/11/2022    Chronic kidney disease, stage III (moderate) (Multi)     CLL (chronic lymphocytic leukemia) (Multi)     Coronary artery disease     CTS (carpal tunnel syndrome)     Diabetes mellitus (Multi)     Dry eye syndrome of bilateral lacrimal glands     Essential (primary) hypertension     Exudative age-related macular degeneration of left eye, unspecified stage     GI bleed     Hyperlipidemia, unspecified     Hypertension     Hypothyroidism     Hypothyroidism, unspecified type     Macular cyst, hole, or pseudohole, left eye     Miosis     Mixed hyperlipidemia     NSTEMI (non-ST elevated myocardial infarction) (Multi)     Osteoarthritis      Osteoporosis     Peripheral vascular disease (CMS-HCC)     Retinal edema     Right knee pain     Seizure disorder (Multi)     Type 1 diabetes mellitus with hyperglycemia (Multi)     Unspecified disorder of refraction        SURGICAL HISTORY       Past Surgical History:   Procedure Laterality Date    CARPAL TUNNEL RELEASE Bilateral     CATARACT EXTRACTION W/ INTRAOCULAR LENS  IMPLANT, BILATERAL      Left 8/10/2017, right 8/31/2017    CHOLECYSTECTOMY      COLONOSCOPY      CORONARY ARTERY BYPASS GRAFT  2005    X 3    HYSTERECTOMY      KNEE SURGERY Right 04/17/2024    total    OTHER SURGICAL HISTORY      Bilateral heel spurs    OTHER SURGICAL HISTORY Right 01/2016    Femoral endarterectomy    TOTAL HIP ARTHROPLASTY Left 2012       CURRENT MEDICATIONS       Discharge Medication List as of 11/14/2024  9:09 AM        CONTINUE these medications which have NOT CHANGED    Details   acetaminophen (Tylenol) 325 mg tablet Take 2 tablets (650 mg) by mouth every 4 hours if needed for fever (temp greater than 38.0 C) (greater than or equal to 38 C)., Starting Tue 6/11/2024, No Print      aflibercept (Eylea) 2 mg/0.05 mL intra-ocular injection 0.05 mL (2 mg) by intravitreal route 1 time. Left eye - EVERY 3 MONTHS, Historical Med      amLODIPine (Norvasc) 10 mg tablet TAKE 1 TABLET(10 MG) BY MOUTH EVERY DAY, Starting Wed 11/13/2024, Normal      aspirin 81 mg EC tablet Take 1 tablet (81 mg) by mouth once daily., Historical Med      chlorhexidine (Peridex) 0.12 % solution Use as directed, Normal      cholecalciferol (Vitamin D-3) 50 mcg (2,000 unit) capsule Take 1 capsule (50 mcg) by mouth early in the morning.., Historical Med      hydrALAZINE (Apresoline) 50 mg tablet Take 1 tablet (50 mg) by mouth 2 times a day., Starting Thu 3/7/2024, Until Fri 3/7/2025, Normal      insulin degludec (Tresiba FlexTouch U-100) 100 unit/mL (3 mL) injection INJECT 11 UNITS UNDER THE SKIN AS DIRECTED DAILY, Normal      insulin lispro (HumaLOG   KwikPen U-100) 100 unit/mL injection INJECT UP TO 30 UNITS UNDER THE SKIN DAILY AT MEALS, Normal      levothyroxine (Synthroid, Levoxyl) 100 mcg tablet TAKE 1 TABLET BY MOUTH EVERY DAY, Starting Tue 9/3/2024, Normal      lisinopril 40 mg tablet Take 1 tablet (40 mg) by mouth once daily., Starting Tue 9/3/2024, Until Mon 12/2/2024, Normal      metoprolol tartrate (Lopressor) 25 mg tablet Take 1 tablet (25 mg) by mouth 2 times a day., Starting Wed 10/16/2024, Until Thu 10/16/2025, Normal      nitroglycerin (Nitrostat) 0.4 mg SL tablet Place 1 tablet (0.4 mg) under the tongue every 5 minutes if needed for chest pain., Historical Med      polyethylene glycol (Miralax) 17 gram/dose powder Mix 17 g of powder and drink once daily., Historical Med      pravastatin (Pravachol) 40 mg tablet Take 1 tablet (40 mg) by mouth once daily., Starting Mon 9/23/2024, Normal      sulfaSALAzine (Azulfidine) 500 mg DR tablet Take 2 tablets (1,000 mg) by mouth 2 times a day., Historical Med      valACYclovir (Valtrex) 1 gram tablet Take 1 tablet (1,000 mg) by mouth once daily as needed (cold sore). Twice daily, Starting Fri 11/3/2023, Historical Med             ALLERGIES     Amoxicillin, Chlorpheniramine, Amoxicillin-pot clavulanate, Ciprofloxacin, and Levofloxacin    FAMILY HISTORY       Family History   Problem Relation Name Age of Onset    Diabetes Mother      Diabetes Daughter      Other (RA) Daughter          SOCIAL HISTORY       Social History     Socioeconomic History    Marital status:    Tobacco Use    Smoking status: Never     Passive exposure: Past    Smokeless tobacco: Never   Vaping Use    Vaping status: Never Used   Substance and Sexual Activity    Alcohol use: Not Currently    Drug use: Not Currently    Sexual activity: Defer     Social Drivers of Health     Financial Resource Strain: Low Risk  (6/9/2024)    Overall Financial Resource Strain (CARDIA)     Difficulty of Paying Living Expenses: Not hard at all   Food  Insecurity: No Food Insecurity (6/9/2024)    Hunger Vital Sign     Worried About Running Out of Food in the Last Year: Never true     Ran Out of Food in the Last Year: Never true   Recent Concern: Food Insecurity - Food Insecurity Present (4/18/2024)    Hunger Vital Sign     Worried About Running Out of Food in the Last Year: Often true     Ran Out of Food in the Last Year: Often true   Transportation Needs: No Transportation Needs (6/9/2024)    PRAPARE - Transportation     Lack of Transportation (Medical): No     Lack of Transportation (Non-Medical): No   Physical Activity: Inactive (6/9/2024)    Exercise Vital Sign     Days of Exercise per Week: 0 days     Minutes of Exercise per Session: 0 min   Stress: No Stress Concern Present (6/9/2024)    [employer] [place] [employer] - Occupational Stress Questionnaire     Feeling of Stress : Only a little   Social Connections: Moderately Isolated (6/9/2024)    Social Connection and Isolation Panel [NHANES]     Frequency of Communication with Friends and Family: More than three times a week     Frequency of Social Gatherings with Friends and Family: More than three times a week     Attends Yazidism Services: 1 to 4 times per year     Active Member of Clubs or Organizations: No     Attends Club or Organization Meetings: Never     Marital Status:    Intimate Partner Violence: Not At Risk (6/9/2024)    Humiliation, Afraid, Rape, and Kick questionnaire     Fear of Current or Ex-Partner: No     Emotionally Abused: No     Physically Abused: No     Sexually Abused: No   Housing Stability: Low Risk  (6/9/2024)    Housing Stability Vital Sign     Unable to Pay for Housing in the Last Year: No     Number of Places Lived in the Last Year: 1     Unstable Housing in the Last Year: No       PHYSICAL EXAM       ED Triage Vitals [11/14/24 0815]   Temperature Heart Rate Respirations BP   36.5 °C (97.7 °F) 68 16 147/89      Pulse Ox Temp Source Heart Rate Source Patient  Position   99 % Oral -- Sitting      BP Location FiO2 (%)     Left arm --        General: Appears as stated age, in no acute distress, alert  Head: Head atraumatic; normocephalic  Eyes: normal inspection; no icterus  ENT: mucosa moist without lesion  Neck: Normal inspection, no meningeal signs  Resp: Normal breath sounds, no wheeze or crackles; No respiratory distress  Chest Wall: no tenderness or deformity  Heart: Heart rate and rhythm regular; No Murmurs  Abdomen: Soft, minimal diffuse nonspecific abdominal tenderness; no distention, guarding, rigidity, or rebound  MSK: Normal appearance; Moves all extremities; No Pedal edema  Neuro: Alert; no focal deficits, moves all extremities  Psych: Mood and Affect normal  Skin: Color appropriate; warm; Dry    EMERGENCY DEPARTMENT COURSE/MDM   Patient presents with diarrhea after undergoing a bowel prep with MiraLAX and Dulcolax in preparation for rectal prolapse surgery later today.  On exam she is well-appearing and in no acute distress.  She is concerned with her ongoing diarrhea.  Case discussed with the performing surgeon who states that this is expected with the bowel prep and it does not matter that the effluent is clear and then became brownish.  Patient's daughter present at the bedside.  They are offered labs and imaging given the clinical concern for colitis however I do suspect that this more is secondary to the profound diarrhea from the bowel prep.  I do not wish to delay or postpone this patient's anticipated surgery.  They are agreeable with deferring imaging and blood work and following up with the surgeon later today in the preop area as previously scheduled.  They are to return if symptoms change or worsen in any way should they change her mind and wish for workup.  Reassurance provided that this is currently expected with a bowel prep.    Diagnoses as of 11/14/24 1354   Diarrhea, unspecified type       Meds Administered:  Medications - No data to  display    PROCEDURES   Unless otherwise noted below, none  Procedures      FINAL IMPRESSION      1. Diarrhea, unspecified type          DISPOSITION    Discharge 11/14/2024 09:09:04 AM  As a result of the work-up, the patient was discharged home.  she was informed of her diagnosis and instructed to come back with any concerns or worsening of condition.  she and was agreeable to the plan as discussed above.  she was given the opportunity to ask questions.  All of the patient's questions were answered.    Critical Care time: Not Indicated    (Comment: Please note this report has been produced using speech recognition software and may contain errors related to that system including errors in grammar, punctuation, and spelling, as well as words and phrases that may be inappropriate.  If there are any questions or concerns please feel free to contact the dictating provider for clarification.)    Ratna Pantoja DO (electronically signed)  Emergency Medicine Physician    History provided by: Patient, Family Member, and EMS  Limitations to History: None  External Records Reviewed with Brief Summary: None  Social Determinants of Health which Significantly Impact Care: None identified   EKG Independent Interpretation: EKG not obtained  Independent Result Review and Interpretation: Relevant laboratory and radiographic results were reviewed and independently interpreted by myself.  As necessary, they are commented on in the ED Course.  Chronic conditions affecting the patient's care: As documented above in MDM  The patient was discussed with the following consultants/services: Dr. Keen, general surgery  Care Considerations: As documented above in MDM               Ratna Pantoja DO  11/14/24 1263

## 2024-11-14 NOTE — CARE PLAN
The patient's goals for the shift include      The clinical goals for the shift include pain control, ambulation, tolerate po intake

## 2024-11-14 NOTE — INTERVAL H&P NOTE
H&P reviewed. Pt went to ED this morning since she was concerning she was having diarrhea but hadnt eaten anything since midnight. Hx colitis but denies abdominal pain,nausea, vomiting.Reassured that diarrhea is expected with the bowel prep. No concern for colitis. Proceed with surgery as planned,.

## 2024-11-14 NOTE — OP NOTE
Proctopexy Robot-Assisted Operative Note     Date: 2024  OR Location: [facility]    Name: Bonnie Jules, : 1940, Age: 84 y.o., MRN: [id number], Sex: female    Diagnosis  Pre-op Diagnosis      * Rectal prolapse [K62.3] Post-op Diagnosis     * Rectal prolapse [K62.3]     Procedures  Proctopexy Robot-Assisted  14148 - AR LAPAROSCOPY PROCTOPEXY PROLAPSE      Surgeons      * Cherelle Keen - Primary    Resident/Fellow/Other Assistant:  Surgeons and Role:  * No surgeons found with a matching role *    Staff:   Circulator: Joann  Scrub Person: Raquel  Circulator: Jared  Scrub Person: Cecilio  Surgical Assistant: Gianna  Surgical Assistant: Tyrone Marx Circulator: Marilyn  Scrub Person: Nehemias Marx Scrub: Adarsh    Anesthesia Staff: Anesthesiologist: Rohit Vasquez MD  CRNA: CARMEN Powell-CRNA    Procedure Summary  Anesthesia: General  ASA: III  Estimated Blood Loss: 5mL  Intra-op Medications:   Administrations occurring from 1330 to 1630 on 24:   Medication Name Total Dose   lidocaine-epinephrine (Xylocaine W/EPI) 1 %-1:100,000 injection 40 mL   ceFAZolin (Ancef) IV 2 g in 100 mL dextrose (iso) - premix 2 g   dexAMETHasone (Decadron) 4 mg/mL 4 mg   ePHEDrine 50 mg/mL 10 mg   etomidate 2 mg/mL 14 mg   fentaNYL PF 0.05 mg/mL 25 mcg   LR infusion Cannot be calculated   lidocaine (Xylocaine) 1 % 4 mL   phenylephrine 40 mcg/mL syringe 10 mL 800 mcg   rocuronium (ZeMuron) 50 mg/5 mL injection 50 mg              Anesthesia Record               Intraprocedure I/O Totals          Intake    LR infusion 800.00 mL    Total Intake 800 mL       Output    Urine 200 mL    Total Output 200 mL       Net    Net Volume 600 mL          Specimen: No specimens collected              Drains and/or Catheters:   [REMOVED] Urethral Catheter Non-latex 16 Fr. (Removed)       Findings: Full-thickness rectal prolapse    Indications: Bonnie Jules is an 84 y.o. female who is having surgery for Rectal prolapse [K62.3].      The patient was seen in the preoperative area. The risks, benefits, complications, treatment options, non-operative alternatives, expected recovery and outcomes were discussed with the patient. The possibilities of reaction to medication, pulmonary aspiration, injury to surrounding structures, bleeding, recurrent infection, the need for additional procedures, failure to diagnose a condition, and creating a complication requiring transfusion or operation were discussed with the patient. The patient concurred with the proposed plan, giving informed consent.  The site of surgery was properly noted/marked if necessary per policy. The patient has been actively warmed in preoperative area. Preoperative antibiotics have been ordered and given within 1 hours of incision. Venous thrombosis prophylaxis have been ordered including bilateral sequential compression devices    Procedure Details:   The patient was brought to the operating room and placed on the operating table in the supine position.  General anesthesia was induced.  The patient was transitioned into the lithotomy position in yellowfin stirrups.  There was no prolapse on exam.  Antibiotics were given and the abdomen was prepped and draped.  A timeout was performed.  An incision was made over the umbilicus and a Veress needle was used to gain entrance into the abdomen.  After negative aspiration and positive saline drop test the abdomen was insufflated to 15 mmHg. A robotic trochar was placed through this incision. 3 additional robotic trocars were placed in the abdomen under direct visualization.  The patient was transitioned into Trendelenburg and left side up and the robot was brought onto the field and docked.  I then went over to the console for further management.  The small bowel was retracted out of the pelvis.  The sigmoid colon was noted to be redundant and was retracted cephalad.  An incision was made at the peritoneum in the retrorectal space on the  right side of the colon at the level of the sacrum.  This was taken down to the anterior peritoneal reflection.  The retrorectal plane was taken down to the pelvic floor.  This was confirmed using digital rectal exam.  Once all the redundancy was removed, 0 Ethibond suture was used to suture the mesenteric edge to the top of the sacrum.  3 sutures in total were used in an interrupted fashion.  The rest of the sigmoid colon was allowed to lay within the pelvis.  Digital rectal exam again confirmed that there was appropriate tension and no redundancy within the rectum.  The peritoneal defect was closed with a 2 oh V-Loc suture.  The robot was then undocked and the patient was leveled out.  The ports were removed and closed with Monocryl and Dermabond.  She tolerated the procedure well.  General anesthesia was reversed.  She was transferred to PACU in stable condition.    Complications:  None; patient tolerated the procedure well.    Disposition: PACU - hemodynamically stable.  Condition: stable         Cherelle Claytonluis f  Phone Number: [phone]

## 2024-11-14 NOTE — ANESTHESIA POSTPROCEDURE EVALUATION
Patient: Bonnei Jules    Procedure Summary       Date: 11/14/24 Room / Location: [facility] OR 12 / Virtual [facility]    Anesthesia Start: 1447 Anesthesia Stop: 1709    Procedure: Proctopexy Robot-Assisted (Abdomen) Diagnosis:       Rectal prolapse      (Rectal prolapse [K62.3])    Surgeons: Cherelle Keen MD Responsible Provider: Rohit Vasquez MD    Anesthesia Type: general ASA Status: 3            Anesthesia Type: general    Vitals Value Taken Time   /49 11/14/24 1711   Temp 36.2 °C (97.2 °F) 11/14/24 1705   Pulse 64 11/14/24 1715   Resp 14 11/14/24 1715   SpO2 100 % 11/14/24 1715   Vitals shown include unfiled device data.    Anesthesia Post Evaluation    Patient location during evaluation: PACU  Patient participation: complete - patient participated  Level of consciousness: awake and responsive to light touch  Pain management: adequate  Airway patency: patent  Cardiovascular status: acceptable  Respiratory status: acceptable  Hydration status: acceptable  Postoperative Nausea and Vomiting: none        There were no known notable events for this encounter.

## 2024-11-14 NOTE — ANESTHESIA PROCEDURE NOTES
Airway  Date/Time: 11/14/2024 3:03 PM  Urgency: elective    Airway not difficult    Staffing  Performed: CRNA   Authorized by: Rohit Vasquez MD    Performed by: CARMEN Powell-GARCIA  Patient location during procedure: OR    Indications and Patient Condition  Indications for airway management: anesthesia  Spontaneous Ventilation: absent  Sedation level: deep  Preoxygenated: yes  Patient position: sniffing  Mask difficulty assessment: 1 - vent by mask    Final Airway Details  Final airway type: endotracheal airway      Successful airway: ETT  Cuffed: yes   Successful intubation technique: direct laryngoscopy  Facilitating devices/methods: intubating stylet and cricoid pressure  Endotracheal tube insertion site: oral  Blade: Mel  Blade size: #3  ETT size (mm): 7.0  Cormack-Lehane Classification: grade I - full view of glottis  Placement verified by: chest auscultation and capnometry   Measured from: lips  ETT to lips (cm): 21  Number of attempts at approach: 1    Additional Comments  Atraumatic, dentition and soft tissue as preop following intubation, neck remained neutral throughout

## 2024-11-14 NOTE — ANESTHESIA PROCEDURE NOTES
Peripheral IV  Date/Time: 11/14/2024 3:04 PM  Inserted by: CARMEN Powell-CRNA    Placement  Needle size: 20 G  Laterality: left  Location: arm  Local anesthetic: none  Site prep: alcohol  Technique: anatomical landmarks  Attempts: 1

## 2024-11-14 NOTE — PERIOPERATIVE NURSING NOTE
Assumed care of Patient. Report received from Elo VORA. Initial assessment complete.  VSS on Cardiac Monitor.

## 2024-11-15 ENCOUNTER — APPOINTMENT (OUTPATIENT)
Dept: CARDIOLOGY | Facility: HOSPITAL | Age: 84
DRG: 329 | End: 2024-11-15
Payer: MEDICARE

## 2024-11-15 ENCOUNTER — APPOINTMENT (OUTPATIENT)
Dept: RADIOLOGY | Facility: HOSPITAL | Age: 84
DRG: 329 | End: 2024-11-15
Payer: MEDICARE

## 2024-11-15 LAB
ANION GAP SERPL CALCULATED.3IONS-SCNC: 11 MMOL/L (ref 10–20)
BUN SERPL-MCNC: 14 MG/DL (ref 6–23)
CALCIUM SERPL-MCNC: 8.2 MG/DL (ref 8.6–10.3)
CARDIAC TROPONIN I PNL SERPL HS: 395 NG/L (ref 0–13)
CARDIAC TROPONIN I PNL SERPL HS: 409 NG/L (ref 0–13)
CHLORIDE SERPL-SCNC: 102 MMOL/L (ref 98–107)
CO2 SERPL-SCNC: 24 MMOL/L (ref 21–32)
CREAT SERPL-MCNC: 0.75 MG/DL (ref 0.5–1.05)
EGFRCR SERPLBLD CKD-EPI 2021: 80 ML/MIN/1.73M*2
ERYTHROCYTE [DISTWIDTH] IN BLOOD BY AUTOMATED COUNT: 13.2 % (ref 11.5–14.5)
GLUCOSE BLD MANUAL STRIP-MCNC: 116 MG/DL (ref 74–99)
GLUCOSE BLD MANUAL STRIP-MCNC: 119 MG/DL (ref 74–99)
GLUCOSE BLD MANUAL STRIP-MCNC: 131 MG/DL (ref 74–99)
GLUCOSE BLD MANUAL STRIP-MCNC: 133 MG/DL (ref 74–99)
GLUCOSE BLD MANUAL STRIP-MCNC: 135 MG/DL (ref 74–99)
GLUCOSE BLD MANUAL STRIP-MCNC: 267 MG/DL (ref 74–99)
GLUCOSE BLD MANUAL STRIP-MCNC: 271 MG/DL (ref 74–99)
GLUCOSE BLD MANUAL STRIP-MCNC: 305 MG/DL (ref 74–99)
GLUCOSE BLD MANUAL STRIP-MCNC: 348 MG/DL (ref 74–99)
GLUCOSE BLD MANUAL STRIP-MCNC: <10 MG/DL (ref 74–99)
GLUCOSE SERPL-MCNC: 348 MG/DL (ref 74–99)
HCT VFR BLD AUTO: 32.1 % (ref 36–46)
HGB BLD-MCNC: 9.8 G/DL (ref 12–16)
MCH RBC QN AUTO: 31.5 PG (ref 26–34)
MCHC RBC AUTO-ENTMCNC: 30.5 G/DL (ref 32–36)
MCV RBC AUTO: 103 FL (ref 80–100)
NRBC BLD-RTO: 0 /100 WBCS (ref 0–0)
PLATELET # BLD AUTO: 249 X10*3/UL (ref 150–450)
POTASSIUM SERPL-SCNC: 4 MMOL/L (ref 3.5–5.3)
RBC # BLD AUTO: 3.11 X10*6/UL (ref 4–5.2)
SODIUM SERPL-SCNC: 133 MMOL/L (ref 136–145)
WBC # BLD AUTO: 7.6 X10*3/UL (ref 4.4–11.3)

## 2024-11-15 PROCEDURE — 36415 COLL VENOUS BLD VENIPUNCTURE: CPT | Performed by: NURSE PRACTITIONER

## 2024-11-15 PROCEDURE — 71045 X-RAY EXAM CHEST 1 VIEW: CPT | Performed by: STUDENT IN AN ORGANIZED HEALTH CARE EDUCATION/TRAINING PROGRAM

## 2024-11-15 PROCEDURE — 84484 ASSAY OF TROPONIN QUANT: CPT | Performed by: NURSE PRACTITIONER

## 2024-11-15 PROCEDURE — 99291 CRITICAL CARE FIRST HOUR: CPT | Performed by: HOSPITALIST

## 2024-11-15 PROCEDURE — 99223 1ST HOSP IP/OBS HIGH 75: CPT | Performed by: NURSE PRACTITIONER

## 2024-11-15 PROCEDURE — 2500000001 HC RX 250 WO HCPCS SELF ADMINISTERED DRUGS (ALT 637 FOR MEDICARE OP): Performed by: STUDENT IN AN ORGANIZED HEALTH CARE EDUCATION/TRAINING PROGRAM

## 2024-11-15 PROCEDURE — 94762 N-INVAS EAR/PLS OXIMTRY CONT: CPT

## 2024-11-15 PROCEDURE — 2500000005 HC RX 250 GENERAL PHARMACY W/O HCPCS: Performed by: STUDENT IN AN ORGANIZED HEALTH CARE EDUCATION/TRAINING PROGRAM

## 2024-11-15 PROCEDURE — 2500000004 HC RX 250 GENERAL PHARMACY W/ HCPCS (ALT 636 FOR OP/ED): Performed by: HOSPITALIST

## 2024-11-15 PROCEDURE — 51701 INSERT BLADDER CATHETER: CPT

## 2024-11-15 PROCEDURE — 1200000002 HC GENERAL ROOM WITH TELEMETRY DAILY

## 2024-11-15 PROCEDURE — 2500000004 HC RX 250 GENERAL PHARMACY W/ HCPCS (ALT 636 FOR OP/ED): Performed by: STUDENT IN AN ORGANIZED HEALTH CARE EDUCATION/TRAINING PROGRAM

## 2024-11-15 PROCEDURE — 2500000002 HC RX 250 W HCPCS SELF ADMINISTERED DRUGS (ALT 637 FOR MEDICARE OP, ALT 636 FOR OP/ED): Performed by: STUDENT IN AN ORGANIZED HEALTH CARE EDUCATION/TRAINING PROGRAM

## 2024-11-15 PROCEDURE — 82947 ASSAY GLUCOSE BLOOD QUANT: CPT

## 2024-11-15 PROCEDURE — 80048 BASIC METABOLIC PNL TOTAL CA: CPT | Performed by: STUDENT IN AN ORGANIZED HEALTH CARE EDUCATION/TRAINING PROGRAM

## 2024-11-15 PROCEDURE — 71045 X-RAY EXAM CHEST 1 VIEW: CPT

## 2024-11-15 PROCEDURE — 2500000004 HC RX 250 GENERAL PHARMACY W/ HCPCS (ALT 636 FOR OP/ED): Performed by: NURSE PRACTITIONER

## 2024-11-15 PROCEDURE — 85027 COMPLETE CBC AUTOMATED: CPT | Performed by: STUDENT IN AN ORGANIZED HEALTH CARE EDUCATION/TRAINING PROGRAM

## 2024-11-15 PROCEDURE — 93005 ELECTROCARDIOGRAM TRACING: CPT

## 2024-11-15 PROCEDURE — 93010 ELECTROCARDIOGRAM REPORT: CPT | Performed by: INTERNAL MEDICINE

## 2024-11-15 PROCEDURE — 36415 COLL VENOUS BLD VENIPUNCTURE: CPT | Performed by: STUDENT IN AN ORGANIZED HEALTH CARE EDUCATION/TRAINING PROGRAM

## 2024-11-15 RX ORDER — METHOCARBAMOL 500 MG/1
500 TABLET, FILM COATED ORAL EVERY 8 HOURS PRN
Qty: 20 TABLET | Refills: 0 | Status: SHIPPED | OUTPATIENT
Start: 2024-11-15

## 2024-11-15 RX ORDER — FUROSEMIDE 10 MG/ML
20 INJECTION INTRAMUSCULAR; INTRAVENOUS ONCE
Status: COMPLETED | OUTPATIENT
Start: 2024-11-15 | End: 2024-11-15

## 2024-11-15 RX ORDER — ACETAMINOPHEN 500 MG
500 TABLET ORAL EVERY 6 HOURS PRN
Qty: 30 TABLET | Refills: 0 | Status: SHIPPED | OUTPATIENT
Start: 2024-11-15

## 2024-11-15 RX ORDER — INSULIN LISPRO 100 [IU]/ML
0-5 INJECTION, SOLUTION INTRAVENOUS; SUBCUTANEOUS
Status: DISCONTINUED | OUTPATIENT
Start: 2024-11-15 | End: 2024-11-17 | Stop reason: HOSPADM

## 2024-11-15 RX ORDER — POLYETHYLENE GLYCOL 3350 17 G/17G
17 POWDER, FOR SOLUTION ORAL 2 TIMES DAILY
Status: DISCONTINUED | OUTPATIENT
Start: 2024-11-15 | End: 2024-11-17 | Stop reason: HOSPADM

## 2024-11-15 SDOH — ECONOMIC STABILITY: HOUSING INSECURITY: IN THE LAST 12 MONTHS, WAS THERE A TIME WHEN YOU WERE NOT ABLE TO PAY THE MORTGAGE OR RENT ON TIME?: NO

## 2024-11-15 SDOH — SOCIAL STABILITY: SOCIAL INSECURITY: WITHIN THE LAST YEAR, HAVE YOU BEEN AFRAID OF YOUR PARTNER OR EX-PARTNER?: NO

## 2024-11-15 SDOH — ECONOMIC STABILITY: FOOD INSECURITY: WITHIN THE PAST 12 MONTHS, YOU WORRIED THAT YOUR FOOD WOULD RUN OUT BEFORE YOU GOT THE MONEY TO BUY MORE.: NEVER TRUE

## 2024-11-15 SDOH — SOCIAL STABILITY: SOCIAL INSECURITY: WITHIN THE LAST YEAR, HAVE YOU BEEN HUMILIATED OR EMOTIONALLY ABUSED IN OTHER WAYS BY YOUR PARTNER OR EX-PARTNER?: NO

## 2024-11-15 SDOH — HEALTH STABILITY: PHYSICAL HEALTH: ON AVERAGE, HOW MANY MINUTES DO YOU ENGAGE IN EXERCISE AT THIS LEVEL?: 20 MIN

## 2024-11-15 SDOH — HEALTH STABILITY: PHYSICAL HEALTH: ON AVERAGE, HOW MANY DAYS PER WEEK DO YOU ENGAGE IN MODERATE TO STRENUOUS EXERCISE (LIKE A BRISK WALK)?: 2 DAYS

## 2024-11-15 SDOH — ECONOMIC STABILITY: HOUSING INSECURITY: AT ANY TIME IN THE PAST 12 MONTHS, WERE YOU HOMELESS OR LIVING IN A SHELTER (INCLUDING NOW)?: NO

## 2024-11-15 SDOH — SOCIAL STABILITY: SOCIAL NETWORK: HOW OFTEN DO YOU GET TOGETHER WITH FRIENDS OR RELATIVES?: MORE THAN THREE TIMES A WEEK

## 2024-11-15 SDOH — HEALTH STABILITY: MENTAL HEALTH: HOW OFTEN DO YOU HAVE A DRINK CONTAINING ALCOHOL?: NEVER

## 2024-11-15 SDOH — SOCIAL STABILITY: SOCIAL INSECURITY: ARE YOU MARRIED, WIDOWED, DIVORCED, SEPARATED, NEVER MARRIED, OR LIVING WITH A PARTNER?: WIDOWED

## 2024-11-15 SDOH — HEALTH STABILITY: MENTAL HEALTH: HOW OFTEN DO YOU HAVE SIX OR MORE DRINKS ON ONE OCCASION?: NEVER

## 2024-11-15 SDOH — SOCIAL STABILITY: SOCIAL NETWORK
DO YOU BELONG TO ANY CLUBS OR ORGANIZATIONS SUCH AS CHURCH GROUPS, UNIONS, FRATERNAL OR ATHLETIC GROUPS, OR SCHOOL GROUPS?: NO

## 2024-11-15 SDOH — HEALTH STABILITY: MENTAL HEALTH: HOW MANY DRINKS CONTAINING ALCOHOL DO YOU HAVE ON A TYPICAL DAY WHEN YOU ARE DRINKING?: PATIENT DOES NOT DRINK

## 2024-11-15 SDOH — ECONOMIC STABILITY: TRANSPORTATION INSECURITY: IN THE PAST 12 MONTHS, HAS LACK OF TRANSPORTATION KEPT YOU FROM MEDICAL APPOINTMENTS OR FROM GETTING MEDICATIONS?: NO

## 2024-11-15 SDOH — SOCIAL STABILITY: SOCIAL NETWORK: HOW OFTEN DO YOU ATTEND CHURCH OR RELIGIOUS SERVICES?: 1 TO 4 TIMES PER YEAR

## 2024-11-15 SDOH — ECONOMIC STABILITY: FOOD INSECURITY: WITHIN THE PAST 12 MONTHS, THE FOOD YOU BOUGHT JUST DIDN'T LAST AND YOU DIDN'T HAVE MONEY TO GET MORE.: NEVER TRUE

## 2024-11-15 SDOH — ECONOMIC STABILITY: FOOD INSECURITY: HOW HARD IS IT FOR YOU TO PAY FOR THE VERY BASICS LIKE FOOD, HOUSING, MEDICAL CARE, AND HEATING?: NOT HARD AT ALL

## 2024-11-15 SDOH — HEALTH STABILITY: MENTAL HEALTH
DO YOU FEEL STRESS - TENSE, RESTLESS, NERVOUS, OR ANXIOUS, OR UNABLE TO SLEEP AT NIGHT BECAUSE YOUR MIND IS TROUBLED ALL THE TIME - THESE DAYS?: ONLY A LITTLE

## 2024-11-15 SDOH — SOCIAL STABILITY: SOCIAL NETWORK: HOW OFTEN DO YOU ATTEND MEETINGS OF THE CLUBS OR ORGANIZATIONS YOU BELONG TO?: NEVER

## 2024-11-15 SDOH — ECONOMIC STABILITY: INCOME INSECURITY: IN THE PAST 12 MONTHS HAS THE ELECTRIC, GAS, OIL, OR WATER COMPANY THREATENED TO SHUT OFF SERVICES IN YOUR HOME?: NO

## 2024-11-15 ASSESSMENT — COGNITIVE AND FUNCTIONAL STATUS - GENERAL
HELP NEEDED FOR BATHING: A LITTLE
DAILY ACTIVITIY SCORE: 21
DRESSING REGULAR LOWER BODY CLOTHING: A LITTLE
WALKING IN HOSPITAL ROOM: A LITTLE
MOBILITY SCORE: 20
TOILETING: A LITTLE
STANDING UP FROM CHAIR USING ARMS: A LITTLE
CLIMB 3 TO 5 STEPS WITH RAILING: A LITTLE
MOVING TO AND FROM BED TO CHAIR: A LITTLE

## 2024-11-15 ASSESSMENT — PAIN SCALES - GENERAL
PAINLEVEL_OUTOF10: 2
PAINLEVEL_OUTOF10: 1
PAINLEVEL_OUTOF10: 4

## 2024-11-15 ASSESSMENT — LIFESTYLE VARIABLES
AUDIT-C TOTAL SCORE: 0
SKIP TO QUESTIONS 9-10: 1

## 2024-11-15 ASSESSMENT — PAIN - FUNCTIONAL ASSESSMENT
PAIN_FUNCTIONAL_ASSESSMENT: 0-10
PAIN_FUNCTIONAL_ASSESSMENT: 0-10

## 2024-11-15 ASSESSMENT — ACTIVITIES OF DAILY LIVING (ADL)
LACK_OF_TRANSPORTATION: NO
LACK_OF_TRANSPORTATION: NO

## 2024-11-15 NOTE — NURSING NOTE
Rounding on pt at 1630 and observed what looked like pt sleeping with phone to her ear. Pt opened eyes to her name being called but not responding verbally. Pt diaphoretic. BG taken w/ a critical low <10. Rapid Response called. 25 g  D5 administered.  Vitals obtained, see flowsheet.

## 2024-11-15 NOTE — CONSULTS
Inpatient consult to Medicine  Consult performed by: CARMEN Barillas-CNP  Consult ordered by: EMILIANA Blackmon        Reason For Consult  Hypoglycemia    History Of Present Illness  Bonnie Jules is a 84 y.o. female with a past medical history of CAD status post CABG, NSTEMI postoperatively in the past, PVD, seizure disorder, type 1 diabetes with hyperglycemia, CKD stage III, and benign hypertension who presented to [facility] for anticipated proctopexy robot-assisted surgery for rectal prolapse.  Patient had been dealing with complaints of rectal prolapse since March.  Noted increased discomfort and blood when she wipes after having a bowel movement.  Patient underwent laparoscopic proctopexy prolapse by Dr. Keen on 11/14/2024.      An I team was called today due to severe hypoglycemia.  Patient lethargic.  Patient was given D50.  Blood sugar and mental status did improve.  Insulin regimen was adjusted.  Patient is currently requiring oxygen via nasal cannula.  Per nursing, patient was brought up on 3 L of oxygen from PACU yesterday.  Attempted to wean her off of the oxygen however she did desat into the 80s today and she was placed back on.  Patient states that she does not wear oxygen at home.  Denies chest pain, shortness of breath, fevers, chills, nausea, or vomiting.  Does have mild abdominal discomfort. Denies lightheadedness or dizziness.  Will check stat EKG, troponin, and chest x-ray.  Medicine consulted for management of acute medical issues.     Past Medical History  She has a past medical history of Anxiety, Benign hypertension, Chronic ischemic colitis (Multi), Chronic kidney disease, stage III (moderate) (Multi), CLL (chronic lymphocytic leukemia) (Multi), Coronary artery disease, CTS (carpal tunnel syndrome), Diabetes mellitus (Multi), Dry eye syndrome of bilateral lacrimal glands, Essential (primary) hypertension, Exudative age-related macular degeneration of left eye,  unspecified stage, GI bleed, Hyperlipidemia, unspecified, Hypertension, Hypothyroidism, Hypothyroidism, unspecified type, Macular cyst, hole, or pseudohole, left eye, Miosis, Mixed hyperlipidemia, NSTEMI (non-ST elevated myocardial infarction) (Multi), Osteoarthritis, Osteoporosis, Peripheral vascular disease (CMS-[facility]), Retinal edema, Right knee pain, Seizure disorder (Multi), Type 1 diabetes mellitus with hyperglycemia (Multi), and Unspecified disorder of refraction.    Surgical History  She has a past surgical history that includes Carpal tunnel release (Bilateral); Total hip arthroplasty (Left, 2012); Coronary artery bypass graft (2005); Cholecystectomy; Cataract extraction w/ intraocular lens  implant, bilateral; Hysterectomy; Colonoscopy; Other surgical history; Other surgical history (Right, 01/2016); and Knee surgery (Right, 04/17/2024).     Social History  She reports that she has never smoked. She has been exposed to tobacco smoke. She has never used smokeless tobacco. She reports that she does not currently use alcohol. She reports that she does not currently use drugs.    Family History  Family History   Problem Relation Name Age of Onset    Diabetes Mother      Diabetes Daughter      Other (RA) Daughter          Allergies  Amoxicillin, Chlorpheniramine, Amoxicillin-pot clavulanate, Ciprofloxacin, and Levofloxacin    Review of Systems  As per HPI.  At least 10 systems reviewed and are otherwise negative     Physical Exam  General: Elderly. NAD.  HEENT: PERRL. EOMI. Pink conjunctiva. Trachea midline.  Cardiovascular: S1, S2. RRR. No edema.  Respiratory: Breath sounds clear bilaterally, posterior anterior chest. No cyanosis.  On oxygen via nasal cannula.  GI: Abdomen soft, tender. Bowel sounds present in all quadrants .   : No bladder distention.  MS: YBARRA. No injury or deformity.   Neuro: Alert, oriented. No numbness or paresthesia.   Skin: Warm, dry, and intact.         Last Recorded Vitals  BP (!)  106/47 (BP Location: Left arm, Patient Position: Lying)   Pulse 69   Temp 36.5 °C (97.7 °F) (Temporal)   Resp 18   Wt 55.7 kg (122 lb 12.7 oz)   SpO2 95%     Relevant Results  Lab Results   Component Value Date    GLUCOSE 348 (H) 11/15/2024    CALCIUM 8.2 (L) 11/15/2024     (L) 11/15/2024    K 4.0 11/15/2024    CO2 24 11/15/2024     11/15/2024    BUN 14 11/15/2024    CREATININE 0.75 11/15/2024      Lab Results   Component Value Date    WBC 7.6 11/15/2024    HGB 9.8 (L) 11/15/2024    HCT 32.1 (L) 11/15/2024     (H) 11/15/2024     11/15/2024    No results found.      Assessment/Plan     Diabetes Mellitus with hypoglycemia  Severe hypoglycemia requiring D50  Monitor blood glucose  Sliding scale insulin  Hypoglycemia protocol  hgbA1C 6.7 4/3/24  Ideally would resume her long-acting insulin as soon as possible to prevent extreme highs and lows throughout the day    Rectal prolapse  Status post laparoscopic proctopexy prolapse by Dr. Keen on 11/14/2024  Pain management  Bowel regimen  Incentive spirometer  Out of bed as tolerated  Management per general surgery    Acute hypoxic respiratory failure  Initially on 3 L oxygen via nasal cannula postop  Attempted to wean oxygen today however desatted into the 80s.  Currently on 2 L oxygen via nasal cannula  Baseline is room air  Will check troponin, EKG, stat portable chest x-ray  Encourage incentive spirometer and out of bed as tolerated    CAD status post CABG  Had NSTEMI in April 2024 following right total knee replacement  Continue aspirin  Monitor on telemetry  No chest pain however is hypoxic  Checking troponin and EKG  EKG showed sinus bradycardia, rate 55 bpm, no signs of ischemia    Chronic kidney disease  She is at her baseline renal function  Avoid nephrotoxic medications, renally dose meds    Hypertension  On amlodipine, metoprolol, lisinopril, hydralazine  Blood pressure trending down, monitor close-given 250mL bolus during  rapid    Plan  General Surgery  Monitor glucose every hour x 3 hours then before meals and at bedtime  Insulin regimen adjusted, encourage oral intake  Chest x-ray, EKG, troponin  Monitor on telemetry  Continuous pulse ox  Supplemental oxygen, wean as tolerated  DVT prophylaxis: Lovenox  CBC and BMP in the morning    Addendum: CXR unremarkable Repeat troponin 395. Spoke with Dr. Sousa, updated on patient status. Recommending to give IV Lasix 20mg x one dose. Consult placed for cardiology. Repeat troponins ordered. Called and spoke to the patient's daughter and updated on the plan.    Thank you so much for this consult.  Will continue to follow.          Luna Smith, APRN-CNP

## 2024-11-15 NOTE — NURSING NOTE
Pt nauseated and rates pain 10/10.  MAR giving warning for zofran as she previously received it at 1730. Message sent to attending for prn meds.

## 2024-11-15 NOTE — CARE PLAN
Problem: Fall/Injury  Goal: Not fall by end of shift  Outcome: Progressing  Goal: Be free from injury by end of the shift  Outcome: Progressing  Goal: Verbalize understanding of personal risk factors for fall in the hospital  Outcome: Progressing  Goal: Verbalize understanding of risk factor reduction measures to prevent injury from fall in the home  Outcome: Progressing  Goal: Use assistive devices by end of the shift  Outcome: Progressing  Goal: Pace activities to prevent fatigue by end of the shift  Outcome: Progressing   The patient's goals for the shift include safety     The clinical goals for the shift include pain and nausea control    Over the shift, the patient did make progress toward the following goals.

## 2024-11-15 NOTE — NURSING NOTE
New orders for bg monitoring in place. Pt alert and eating dinner tray. Last  at 1725. Daughter Rosa previously called by RN and given update.

## 2024-11-15 NOTE — DISCHARGE SUMMARY
Discharge Diagnosis  Rectal prolapse  Hyponatremia  Anemia  Hypertension  Hypothyroidism  Anxiety  CKD stage III  CLL  CAD  History of MI    Issues Requiring Follow-Up  Follow-up in 2 weeks regardless and call sooner if any issues  Follow-up with PCP in 2 weeks    Test Results Pending At Discharge  Pending Labs       No current pending labs.          Hospital Course  See all hospital records for all details.  On 11/14 patient underwent elective proctopexy robot-assisted.  Postoperatively she was placed on a regular diabetic diet.  On 11/15 postoperative day 1 she was doing well, feeling well, and stable and ready for discharge home.    Pertinent Physical Exam At Time of Discharge  Physical Exam  See progress note    Home Medications     Medication List      START taking these medications     methocarbamol 500 mg tablet; Commonly known as: Robaxin; Take 1 tablet   (500 mg) by mouth every 8 hours if needed for muscle spasms.     CHANGE how you take these medications     acetaminophen 500 mg tablet; Commonly known as: Tylenol; Take 1 tablet   (500 mg) by mouth every 6 hours if needed for mild pain (1 - 3), moderate   pain (4 - 6), headaches or fever (temp greater than 38.0 C) (Call if any   fevers).; What changed: medication strength, how much to take, when to   take this, reasons to take this     CONTINUE taking these medications     amLODIPine 10 mg tablet; Commonly known as: Norvasc; TAKE 1 TABLET(10   MG) BY MOUTH EVERY DAY   aspirin 81 mg EC tablet   cholecalciferol 50 mcg (2,000 unit) capsule; Commonly known as: Vitamin   D-3   Eylea 2 mg/0.05 mL intra-ocular injection; Generic drug: aflibercept   HumaLOG  KwikPen U-100 100 unit/mL injection; Generic drug:   insulin lispro; INJECT UP TO 30 UNITS UNDER THE SKIN DAILY AT MEALS   hydrALAZINE 50 mg tablet; Commonly known as: Apresoline; Take 1 tablet   (50 mg) by mouth 2 times a day.   levothyroxine 100 mcg tablet; Commonly known as: Synthroid, Levoxyl;   TAKE  1 TABLET BY MOUTH EVERY DAY   lisinopril 40 mg tablet; Take 1 tablet (40 mg) by mouth once daily.   metoprolol tartrate 25 mg tablet; Commonly known as: Lopressor; Take 1   tablet (25 mg) by mouth 2 times a day.   Miralax 17 gram/dose powder; Generic drug: polyethylene glycol   nitroglycerin 0.4 mg SL tablet; Commonly known as: Nitrostat   pravastatin 40 mg tablet; Commonly known as: Pravachol; Take 1 tablet   (40 mg) by mouth once daily.   sulfaSALAzine 500 mg DR tablet; Commonly known as: Azulfidine   Tresiba FlexTouch U-100 100 unit/mL (3 mL) injection; Generic drug:   insulin degludec; INJECT 11 UNITS UNDER THE SKIN AS DIRECTED DAILY     STOP taking these medications     valACYclovir 1 gram tablet; Commonly known as: Valtrex     Outpatient Follow-Up  Future Appointments   Date Time Provider Department Center   11/19/2024  2:30 PM Yanira Angeles PA-C [id number] [facility]   11/22/2024  3:30 PM Tino Rios MD [id number] [facility]   12/16/2024 10:00 AM Deonna Dykes APRN-CNP [id number] [facility]   4/8/2025  1:00 PM Bryce Ramirez MD [id number] [facility]   4/16/2025  2:00 PM Mike Sousa DO [id number] [facility]   5/5/2025 10:00 AM MACARENA BRICEÑO 107 VASCULAR 3 [id number] [facility]   5/5/2025 11:00 AM MACARENA WLBY 107 VASCULAR 1 [id number] [facility]   5/5/2025  1:00 PM Luis Miguel Brumfield APRN-CNP [id number] [facility]   8/26/2025 10:30 AM Stephon Hayward MD [id number] [facility]   10/7/2025 11:30 AM Joseph Hollingsworth MD [id number] [facility]   Follow-up in 2 weeks regardless and call sooner if any issues  Follow-up with PCP in 2 weeks    Jessica Toth APRN-CNP

## 2024-11-15 NOTE — PROGRESS NOTES
[facility] met with pt and daughter at bedside. Pt has an appointment scheduled with Dr Yanira Linda for possible new PCP, states Rx through [facility] in [place]. Pt and daughter decline any needs at [place]. Pt will return home, she lives with her other daughter.      11/15/24 1356   Discharge Planning   Living Arrangements Family members   Support Systems Children;Family members   Type of Residence Private residence   Number of Stairs to Enter Residence 1   Do you have animals or pets at home? No   Who is requesting discharge planning? Provider   Home or Post Acute Services None   Expected Discharge Disposition Home   Financial Resource Strain   How hard is it for you to pay for the very basics like food, housing, medical care, and heating? Not hard   Housing Stability   In the last 12 months, was there a time when you were not able to pay the mortgage or rent on time? N   At any time in the past 12 months, were you homeless or living in a shelter (including now)? N   Transportation Needs   In the past 12 months, has lack of transportation kept you from medical appointments or from getting medications? no   In the past 12 months, has lack of transportation kept you from meetings, work, or from getting things needed for daily living? No   Patient Choice   Patient / Family choosing to utilize agency / facility established prior to hospitalization No   Stroke Family Assessment   Stroke Family Assessment Needed No

## 2024-11-15 NOTE — PROGRESS NOTES
Bonnie Jules is a 84 y.o. female on day 1 of admission presenting with Rectal prolapse.    Subjective   Feels okay and better.  Postop pain is okay.  Taking her regular diabetic diet good.  No nausea or vomiting.  No gas postoperatively yet.  Had a couple loose bowel movements during the night.  No angina, shortness of breath, bleeding, voiding problems.  Getting out of bed.  Does not have an I-S. Says Dr Keen saw her earlier and said she can possibly go home later today     Objective     Vital signs in last 24 hours:  Temp:  [36.2 °C (97.2 °F)-36.9 °C (98.4 °F)] 36.6 °C (97.9 °F)  Heart Rate:  [54-77] 64  Resp:  [12-20] 16  BP: (124-160)/(49-74) 140/59  Heart Rate:  [54-77]   Temp:  [36.2 °C (97.2 °F)-36.9 °C (98.4 °F)]   Resp:  [12-20]   BP: (124-160)/(49-74)   Weight:  [55.7 kg (122 lb 12.7 oz)]   SpO2:  [85 %-100 %]      Intake/Output last 3 Shifts:  I/O last 3 completed shifts:  In: 1100 (19.7 mL/kg) [I.V.:1100 (19.7 mL/kg)]  Out: 900 (16.2 mL/kg) [Urine:800 (0.4 mL/kg/hr); Emesis/NG output:100]  Weight: 55.7 kg     Physical Exam  No acute distress  Not septic appearing  Looks fine and comfortable  Alert and oriented  Heart regular  Lungs clear  No edema  Abdomen soft, positive bowel sounds, very mildly distended consistent with surgery, nontender, glued incisions without any issues    Scheduled medications  acetaminophen, 650 mg, oral, q6h  amLODIPine, 10 mg, oral, Daily  aspirin, 81 mg, oral, Daily  enoxaparin, 40 mg, subcutaneous, Daily  hydrALAZINE, 50 mg, oral, BID  insulin regular, 0-10 Units, subcutaneous, TID AC  levothyroxine, 100 mcg, oral, Daily  lisinopril, 40 mg, oral, Daily  methocarbamol, 500 mg, oral, q8h MARI  metoprolol tartrate, 25 mg, oral, BID  polyethylene glycol, 17 g, oral, BID    Continuous medications     PRN medications  PRN medications: dextrose, dextrose, glucagon, glucagon, ondansetron **OR** ondansetron    Relevant Results  Results from last 7 days   Lab Units 11/15/24  0593    WBC AUTO x10*3/uL 7.6   HEMOGLOBIN g/dL 9.8*   HEMATOCRIT % 32.1*   PLATELETS AUTO x10*3/uL 249      Results from last 7 days   Lab Units 11/15/24  0552   SODIUM mmol/L 133*   POTASSIUM mmol/L 4.0   CHLORIDE mmol/L 102   CO2 mmol/L 24   BUN mg/dL 14   CREATININE mg/dL 0.75   GLUCOSE mg/dL 348*   CALCIUM mg/dL 8.2*        Assessment/Plan   Principal Problem:    Rectal prolapse  Resolved postop day 1 status post proctopexy robot-assisted of 11/14  VSS  Labs as above  Scheduled Tylenol, Robaxin, MiraLAX  Regular diabetic diet with Jovi twice daily  I-S ordered again since patient does not have 1 yet despite being ordered yesterday  Probable discharge home later today    Hyponatremia  Mild-133  Observe    Anemia  Chronic in the computer  9.8 and 32.1 from 10.8 and 33.4  OR EBL 5 mL  Also dilutional  Observe    Hypertension  Stable  Continue with Norvasc, hydralazine, lisinopril, Lopressor    Hypothyroidism  Continue with Synthroid    Diabetes  Sugars elevated  Continue with blood sugars and insulin sliding scale    Anxiety  Stable  No home medications listed for  Observe    CKD stage 3  Observe    CLL  Observe    CAD  History of MI  Stable   Asymptomatic  Continue with baby aspirin and telemetry    DVT prophylaxis  Ambulate  Lovenox    1634-came to iteam that was called due to blood sugar of 10 and patient wasn't responsive. Bp 99/49 and 99/46 with pulse of 65-hospitalist is going to give her a bolus. Discharge cancelled. Spoke with hospitalist at bedside and consult placed for management of DM and hypoglycemia and hypotension. Dr Keen notified.     1730-IM states patient 85% on room air and she is going to order her continuous pulse ox and a chest x-ray and may or may not order a CT. Dr Keen notified.     Jessica Toth, APRN-CNP

## 2024-11-15 NOTE — NURSING NOTE
Bedside shift report complete and assumed care of the pt. Pt sleeping at this time. Chest rise and fall observed, no s/sx of distress. Pt w/little sleep overnight per off going RN. Bed is locked and low and call light is in reach. Will return to complete assessment.

## 2024-11-15 NOTE — DISCHARGE INSTRUCTIONS
Continue to use your incentive spirometer  Frequently move your feet all the way up and all the way down  During the day you should be out of bed, sitting in the chair, and walking around every 1-2 hours  Shower at least daily and wash your incisions well with soap and water  Allow Steri-Strips to fall off on their own  No tub baths/swimming/incisions under water for 2 weeks  Stairs as tolerated  Activity as tolerated with no strenuous activity/pushing/pulling/heavy lifting/lifting more than 10 pounds for 4 weeks  No driving while on pain meds  Medication as prescribed to maintain comfort level  Resume home diet with prioritizing liquids over solids  Follow up with us in 2 weeks regardless and call sooner if any issues  Follow-up with PCP in 2 weeks      Keep incisions clean and dry.  There are dissolvable stitches and waterproof glue.  You can shower and let soap and water run over the incisions.  It is VERY IMPORTANT that you avoid constipation and straining for the first month after surgery (and after).  I recommend taking MiraLAX 3 times a day to start with.  Diarrhea is better than constipation, and you can decrease the dose if needed.  If you are sitting on the toilet and feel like you have to have a bowel movement and are straining and pushing and nothing is happening, get up and walk around and try again at a later time.  As we talked about previously, sometimes once the prolapse is fixed, the pelvic muscles are now weak and stretched out from the prolapse which can cause incontinence.  We can discuss this in clinic if this is an issue.  Call the clinic with questions or concerns.

## 2024-11-15 NOTE — SIGNIFICANT EVENT
Rapid response note      Rapid response called. Patient found to be unresponsive. Accucheck prior to my arrival showing BG of critically low (unable to be quantified).  First dose of dextrose given just before I got there and repeat glucose was 267.   Patient somnolent on my arrival, but woke up easily after about 3 min.    BP 99/67, MAP 58.   Patient profusely diaphoretic-- says she is tired.  HR and pulsox normal.    Patient is an 85 yo female with history of DM normally on insulin admitted for rectal prolapse yesterday. She underwent laparoscopic robot assisted proctopexy on 11/14. Plan was to discharge this afternoon but patient had this episode of hypoglycemia. Normally takes lispro at home but on regular insulin SSI here. PO intake somewhat low here. Normally on long-acting insulin but not getting it here.    A/P:  Hypoglycemia  - profound causing severe metabolic encephalopathy. Resolved after one amp of glucose. BP somewhat low with this event-- will give 250 ml bolus. I also gave patient a coke and some crackers. Should be rechecked (accucheck) in 15 min, and then every 1 hr for 3 hrs (until regular insulin out of system). Will transition to lispro sliding scale.   - Would monitor her overnight given how profound this episode was. Discussed with surgery NP Jessica Toth.    Hospitalist group consulted so we can monitor overnight. Full consult note to follow.    CCT: 31 min

## 2024-11-16 ENCOUNTER — PHARMACY VISIT (OUTPATIENT)
Dept: PHARMACY | Facility: CLINIC | Age: 84
End: 2024-11-16

## 2024-11-16 ENCOUNTER — APPOINTMENT (OUTPATIENT)
Dept: RADIOLOGY | Facility: HOSPITAL | Age: 84
DRG: 329 | End: 2024-11-16
Payer: MEDICARE

## 2024-11-16 ENCOUNTER — APPOINTMENT (OUTPATIENT)
Dept: CARDIOLOGY | Facility: HOSPITAL | Age: 84
DRG: 329 | End: 2024-11-16
Payer: MEDICARE

## 2024-11-16 LAB
ANION GAP SERPL CALCULATED.3IONS-SCNC: 12 MMOL/L (ref 10–20)
BUN SERPL-MCNC: 19 MG/DL (ref 6–23)
CALCIUM SERPL-MCNC: 7.9 MG/DL (ref 8.6–10.3)
CARDIAC TROPONIN I PNL SERPL HS: 323 NG/L (ref 0–13)
CHLORIDE SERPL-SCNC: 102 MMOL/L (ref 98–107)
CO2 SERPL-SCNC: 24 MMOL/L (ref 21–32)
CREAT SERPL-MCNC: 1.04 MG/DL (ref 0.5–1.05)
EGFRCR SERPLBLD CKD-EPI 2021: 54 ML/MIN/1.73M*2
ERYTHROCYTE [DISTWIDTH] IN BLOOD BY AUTOMATED COUNT: 12.9 % (ref 11.5–14.5)
GLUCOSE BLD MANUAL STRIP-MCNC: 203 MG/DL (ref 74–99)
GLUCOSE BLD MANUAL STRIP-MCNC: 246 MG/DL (ref 74–99)
GLUCOSE BLD MANUAL STRIP-MCNC: 278 MG/DL (ref 74–99)
GLUCOSE BLD MANUAL STRIP-MCNC: 305 MG/DL (ref 74–99)
GLUCOSE SERPL-MCNC: 288 MG/DL (ref 74–99)
HCT VFR BLD AUTO: 26.4 % (ref 36–46)
HGB BLD-MCNC: 9.2 G/DL (ref 12–16)
MCH RBC QN AUTO: 31.8 PG (ref 26–34)
MCHC RBC AUTO-ENTMCNC: 34.8 G/DL (ref 32–36)
MCV RBC AUTO: 91 FL (ref 80–100)
NRBC BLD-RTO: 0 /100 WBCS (ref 0–0)
PLATELET # BLD AUTO: 220 X10*3/UL (ref 150–450)
POTASSIUM SERPL-SCNC: 4.1 MMOL/L (ref 3.5–5.3)
RBC # BLD AUTO: 2.89 X10*6/UL (ref 4–5.2)
SODIUM SERPL-SCNC: 134 MMOL/L (ref 136–145)
WBC # BLD AUTO: 6.5 X10*3/UL (ref 4.4–11.3)

## 2024-11-16 PROCEDURE — 36415 COLL VENOUS BLD VENIPUNCTURE: CPT | Performed by: STUDENT IN AN ORGANIZED HEALTH CARE EDUCATION/TRAINING PROGRAM

## 2024-11-16 PROCEDURE — 94762 N-INVAS EAR/PLS OXIMTRY CONT: CPT

## 2024-11-16 PROCEDURE — 93321 DOPPLER ECHO F-UP/LMTD STD: CPT | Performed by: INTERNAL MEDICINE

## 2024-11-16 PROCEDURE — A9540 TC99M MAA: HCPCS | Performed by: STUDENT IN AN ORGANIZED HEALTH CARE EDUCATION/TRAINING PROGRAM

## 2024-11-16 PROCEDURE — 1200000002 HC GENERAL ROOM WITH TELEMETRY DAILY

## 2024-11-16 PROCEDURE — 2500000004 HC RX 250 GENERAL PHARMACY W/ HCPCS (ALT 636 FOR OP/ED): Performed by: NURSE PRACTITIONER

## 2024-11-16 PROCEDURE — 3430000001 HC RX 343 DIAGNOSTIC RADIOPHARMACEUTICALS: Performed by: STUDENT IN AN ORGANIZED HEALTH CARE EDUCATION/TRAINING PROGRAM

## 2024-11-16 PROCEDURE — 2500000002 HC RX 250 W HCPCS SELF ADMINISTERED DRUGS (ALT 637 FOR MEDICARE OP, ALT 636 FOR OP/ED): Performed by: STUDENT IN AN ORGANIZED HEALTH CARE EDUCATION/TRAINING PROGRAM

## 2024-11-16 PROCEDURE — 36415 COLL VENOUS BLD VENIPUNCTURE: CPT | Performed by: NURSE PRACTITIONER

## 2024-11-16 PROCEDURE — 2500000001 HC RX 250 WO HCPCS SELF ADMINISTERED DRUGS (ALT 637 FOR MEDICARE OP): Performed by: STUDENT IN AN ORGANIZED HEALTH CARE EDUCATION/TRAINING PROGRAM

## 2024-11-16 PROCEDURE — 93308 TTE F-UP OR LMTD: CPT

## 2024-11-16 PROCEDURE — 2500000002 HC RX 250 W HCPCS SELF ADMINISTERED DRUGS (ALT 637 FOR MEDICARE OP, ALT 636 FOR OP/ED): Performed by: HOSPITALIST

## 2024-11-16 PROCEDURE — 78580 LUNG PERFUSION IMAGING: CPT | Performed by: NUCLEAR MEDICINE

## 2024-11-16 PROCEDURE — RXMED WILLOW AMBULATORY MEDICATION CHARGE

## 2024-11-16 PROCEDURE — 2500000002 HC RX 250 W HCPCS SELF ADMINISTERED DRUGS (ALT 637 FOR MEDICARE OP, ALT 636 FOR OP/ED): Performed by: NURSE PRACTITIONER

## 2024-11-16 PROCEDURE — 93325 DOPPLER ECHO COLOR FLOW MAPG: CPT | Performed by: INTERNAL MEDICINE

## 2024-11-16 PROCEDURE — 93308 TTE F-UP OR LMTD: CPT | Performed by: INTERNAL MEDICINE

## 2024-11-16 PROCEDURE — 85027 COMPLETE CBC AUTOMATED: CPT | Performed by: NURSE PRACTITIONER

## 2024-11-16 PROCEDURE — 84484 ASSAY OF TROPONIN QUANT: CPT | Performed by: STUDENT IN AN ORGANIZED HEALTH CARE EDUCATION/TRAINING PROGRAM

## 2024-11-16 PROCEDURE — 2500000001 HC RX 250 WO HCPCS SELF ADMINISTERED DRUGS (ALT 637 FOR MEDICARE OP): Performed by: HOSPITALIST

## 2024-11-16 PROCEDURE — 80048 BASIC METABOLIC PNL TOTAL CA: CPT | Performed by: NURSE PRACTITIONER

## 2024-11-16 PROCEDURE — 2500000005 HC RX 250 GENERAL PHARMACY W/O HCPCS: Performed by: STUDENT IN AN ORGANIZED HEALTH CARE EDUCATION/TRAINING PROGRAM

## 2024-11-16 PROCEDURE — 2500000004 HC RX 250 GENERAL PHARMACY W/ HCPCS (ALT 636 FOR OP/ED): Performed by: STUDENT IN AN ORGANIZED HEALTH CARE EDUCATION/TRAINING PROGRAM

## 2024-11-16 PROCEDURE — 82947 ASSAY GLUCOSE BLOOD QUANT: CPT

## 2024-11-16 PROCEDURE — 78580 LUNG PERFUSION IMAGING: CPT

## 2024-11-16 PROCEDURE — 99024 POSTOP FOLLOW-UP VISIT: CPT | Performed by: STUDENT IN AN ORGANIZED HEALTH CARE EDUCATION/TRAINING PROGRAM

## 2024-11-16 PROCEDURE — 2500000005 HC RX 250 GENERAL PHARMACY W/O HCPCS: Performed by: NURSE PRACTITIONER

## 2024-11-16 PROCEDURE — 99232 SBSQ HOSP IP/OBS MODERATE 35: CPT | Performed by: NURSE PRACTITIONER

## 2024-11-16 PROCEDURE — 99222 1ST HOSP IP/OBS MODERATE 55: CPT | Performed by: NURSE PRACTITIONER

## 2024-11-16 RX ORDER — IPRATROPIUM BROMIDE AND ALBUTEROL SULFATE 2.5; .5 MG/3ML; MG/3ML
3 SOLUTION RESPIRATORY (INHALATION) EVERY 2 HOUR PRN
Status: DISCONTINUED | OUTPATIENT
Start: 2024-11-16 | End: 2024-11-17 | Stop reason: HOSPADM

## 2024-11-16 RX ORDER — ENOXAPARIN SODIUM 100 MG/ML
30 INJECTION SUBCUTANEOUS DAILY
Status: DISCONTINUED | OUTPATIENT
Start: 2024-11-16 | End: 2024-11-17 | Stop reason: HOSPADM

## 2024-11-16 RX ORDER — INSULIN GLARGINE 100 [IU]/ML
5 INJECTION, SOLUTION SUBCUTANEOUS DAILY
Status: DISCONTINUED | OUTPATIENT
Start: 2024-11-16 | End: 2024-11-17

## 2024-11-16 RX ORDER — LIDOCAINE 560 MG/1
1 PATCH PERCUTANEOUS; TOPICAL; TRANSDERMAL DAILY
Status: DISCONTINUED | OUTPATIENT
Start: 2024-11-16 | End: 2024-11-17 | Stop reason: HOSPADM

## 2024-11-16 ASSESSMENT — PAIN SCALES - GENERAL
PAINLEVEL_OUTOF10: 0 - NO PAIN
PAINLEVEL_OUTOF10: 0 - NO PAIN

## 2024-11-16 ASSESSMENT — COGNITIVE AND FUNCTIONAL STATUS - GENERAL
PERSONAL GROOMING: A LITTLE
WALKING IN HOSPITAL ROOM: A LITTLE
DRESSING REGULAR UPPER BODY CLOTHING: A LITTLE
TOILETING: A LITTLE
DRESSING REGULAR LOWER BODY CLOTHING: A LITTLE
MOVING TO AND FROM BED TO CHAIR: A LITTLE
HELP NEEDED FOR BATHING: A LITTLE
TOILETING: A LITTLE
MOBILITY SCORE: 19
DRESSING REGULAR LOWER BODY CLOTHING: A LITTLE
STANDING UP FROM CHAIR USING ARMS: A LITTLE
DRESSING REGULAR UPPER BODY CLOTHING: A LITTLE
DAILY ACTIVITIY SCORE: 19
DAILY ACTIVITIY SCORE: 19
PERSONAL GROOMING: A LITTLE
WALKING IN HOSPITAL ROOM: A LITTLE
CLIMB 3 TO 5 STEPS WITH RAILING: A LOT
MOVING TO AND FROM BED TO CHAIR: A LITTLE
CLIMB 3 TO 5 STEPS WITH RAILING: A LOT
STANDING UP FROM CHAIR USING ARMS: A LITTLE
MOBILITY SCORE: 19
HELP NEEDED FOR BATHING: A LITTLE

## 2024-11-16 ASSESSMENT — PAIN - FUNCTIONAL ASSESSMENT: PAIN_FUNCTIONAL_ASSESSMENT: 0-10

## 2024-11-16 NOTE — NURSING NOTE
Time spent at bedside reviewing medication changes  also  overnight pulse ox and pulse ox with ambulation tomorrow

## 2024-11-16 NOTE — CARE PLAN
The patient's goals for the shift include      The clinical goals for the shift include pain control    Over the shift, the patient did not make progress toward the following goals. Barriers to progression include arthritis and chronic pain. Recommendations to address these barriers include medicate for pain and ambulate.

## 2024-11-16 NOTE — ASSESSMENT & PLAN NOTE
Acute hypoxic respiratory failure  Increased O2 demand overnight, now on 4L  CXR results as above  Will check STAT VQ to rule out PE given renal function  Encourage IS  Duonebs PRN  Wean oxygen as tolerated  Consider pulmonary consult if no improvement or worsening hypoxia    CAD s/p CABG  Elevated Troponin  Concern for NSTEMI  Dr. Sousa notified last night, given one time dose of lasix  Had troponin 395, 409, 323  EKG SB  Seen by cardiology CNP, low suspicion for ACS, TTE ordered-if no new changes stable from cardiology standpoint    Diabetes Mellitus with hypoglycemia  Severe hypoglycemia requiring D50  Monitor blood glucose  Sliding scale insulin  Hypoglycemia protocol  hgbA1C 6.7 4/3/24  Will resume Lantus at decreased dose, she does take Tresiba at home.      Rectal prolapse  Status post laparoscopic proctopexy prolapse by Dr. Keen on 11/14/2024  Pain management  Bowel regimen  Incentive spirometer  Out of bed as tolerated  Management per general surgery     Acute on Chronic kidney disease  Creatinine increased this morning  Avoid nephrotoxic medications, renally dose meds  Hold lisinopril for now  Repeat BMP in AM     Hypertension  On amlodipine, metoprolol, lisinopril, hydralazine  Holding lisinopril due to renal function     Plan  General Surgery  Monitor glucose, lantus at decreased dose  STAT VQ to rule out PE  Monitor on telemetry  Continuous pulse ox  Supplemental oxygen, wean as tolerated  DVT prophylaxis: Lovenox  CBC and BMP in the morning   TTE pending, cardiology following    Will continue to follow.

## 2024-11-16 NOTE — CARE PLAN
Problem: Fall/Injury  Goal: Not fall by end of shift  Outcome: Progressing  Goal: Be free from injury by end of the shift  Outcome: Progressing  Goal: Verbalize understanding of personal risk factors for fall in the hospital  Outcome: Progressing  Goal: Verbalize understanding of risk factor reduction measures to prevent injury from fall in the home  Outcome: Progressing  Goal: Use assistive devices by end of the shift  Outcome: Progressing  Goal: Pace activities to prevent fatigue by end of the shift  Outcome: Progressing   The patient's goals for the shift include  rest    The clinical goals for the shift include HDS    Over the shift, the patient did make progress toward the following goals.

## 2024-11-16 NOTE — NURSING NOTE
Pt takes humalog at home (insulin) ordered regular insulin here, dr perez aware, ok to continue regular insulin as ordered

## 2024-11-16 NOTE — NURSING NOTE
Bonnie asking about bp being elevated and bgm elevated can ot be related to her muscle relaxers   sat and talked about med changes in the last 24 hrs  voices understanding

## 2024-11-16 NOTE — CONSULTS
Inpatient consult to Cardiology  Consult performed by: EMILIANA Ray  Consult ordered by: EMILIANA Barillas  Reason for consult: Elevated high-sensitivity troponin        History Of Present Illness:    Bonnie Jules is a 84 y.o. female presenting with planned rectal surgery.  Current with Dr. Araujo.  Past medical history of coronary artery disease with remote coronary bypass.  X 3 in 2005, mild aortic valve stenosis, chronic kidney disease stage III, CLL, hypothyroidism, seizure disorder, diabetes mellitus type 1.  Patient presented for scheduled rectal prolapse repair.  Underwent successful procedure on 11/14.  She was scheduled to be discharged on 1115, however prior to discharge had a significant event where she was found to be unresponsive.  At that point patient's blood sugar was noted to be critically low the patient received IV dextrose and awoke.  Reports no chest pain or pressure palpitations or feeling rapid heart rate.  Patient remained in hospital for hypoglycemia.  At that point an EKG and troponin level were checked despite the patient's lack of chest discomfort.  EKG nonischemic with a sinus bradycardia without acute ST ovation or T wave inversion.  High-sensitivity troponin values of 323, 409 and 395.  Creatinine 1.04.  Our services were consulted for assessment.      Last Recorded Vitals:  Vitals:    11/16/24 0100 11/16/24 0300 11/16/24 0537 11/16/24 0732   BP: 145/62 119/54     BP Location:  Left arm     Patient Position:  Lying     Pulse: 58 59     Resp: 12 12     Temp: 36.5 °C (97.7 °F) 36.7 °C (98.1 °F)     TempSrc: Oral Oral     SpO2: 98% 99%  96%   Weight:  54.6 kg (120 lb 5.9 oz) 54.6 kg (120 lb 5.9 oz)        Last Labs:  CBC - 11/16/2024:  5:17 AM  6.5 9.2 220    26.4      CMP - 11/16/2024:  5:17 AM  7.9 6.7 20 --- 0.4   2.8 4.1 9 65      PTT - 4/22/2024: 10:30 AM  1.0   11.0 45.1     Troponin I, High Sensitivity   Date/Time Value Ref Range Status   11/16/2024  01:19  (HH) 0 - 13 ng/L Final     Comment:     Previous result verified on 11/15/2024 1837 on specimen/case [id number] called with component [facility], [place] for procedure Troponin I, High Sensitivity, Initial with value 395 ng/L.   11/15/2024 07:22  (HH) 0 - 13 ng/L Final     Comment:     Previous result verified on 11/15/2024 1837 on specimen/case [id number] called with component TRP for procedure Troponin I, High Sensitivity, Initial with value 395 ng/L.   11/15/2024 05:57  (HH) 0 - 13 ng/L Final     POC HEMOGLOBIN A1c   Date/Time Value Ref Range Status   10/08/2024 01:12 PM 6.3 4.2 - 6.5 % Final   10/03/2023 11:28 AM 6.7 (A) 4.2 - 6.5 % Final     Hemoglobin A1C   Date/Time Value Ref Range Status   04/03/2024 11:55 AM 6.7 (H) See below % Final     LDL Calculated   Date/Time Value Ref Range Status   11/13/2024 07:51 AM 53 <=99 mg/dL Final     Comment:                                 Near   Borderline      AGE      Desirable  Optimal    High     High     Very High     0-19 Y     0 - 109     ---    110-129   >/= 130     ----    20-24 Y     0 - 119     ---    120-159   >/= 160     ----      >24 Y     0 -  99   100-129  130-159   160-189     >/=190     03/06/2023 10:37 AM 67 65 - 130 MG/DL Final   02/25/2022 10:29 AM 75 65 - 130 MG/DL Final   01/05/2022 09:42 AM 73 65 - 130 MG/DL Final     VLDL   Date/Time Value Ref Range Status   11/13/2024 07:51 AM 25 0 - 40 mg/dL Final      Results for orders placed or performed during the hospital encounter of 11/14/24 (from the past 24 hours)   POCT GLUCOSE   Result Value Ref Range    POCT Glucose 305 (H) 74 - 99 mg/dL   POCT GLUCOSE   Result Value Ref Range    POCT Glucose 271 (H) 74 - 99 mg/dL   POCT GLUCOSE   Result Value Ref Range    POCT Glucose <10 (L) 74 - 99 mg/dL   POCT GLUCOSE   Result Value Ref Range    POCT Glucose 267 (H) 74 - 99 mg/dL   POCT GLUCOSE   Result Value Ref Range    POCT Glucose 135 (H) 74 - 99 mg/dL   POCT GLUCOSE   Result Value Ref  Range    POCT Glucose 133 (H) 74 - 99 mg/dL   Troponin I, High Sensitivity, Initial   Result Value Ref Range    Troponin I, High Sensitivity 395 (HH) 0 - 13 ng/L   POCT GLUCOSE   Result Value Ref Range    POCT Glucose 131 (H) 74 - 99 mg/dL   POCT GLUCOSE   Result Value Ref Range    POCT Glucose 116 (H) 74 - 99 mg/dL   Troponin, High Sensitivity, 1 Hour   Result Value Ref Range    Troponin I, High Sensitivity 409 (HH) 0 - 13 ng/L   POCT GLUCOSE   Result Value Ref Range    POCT Glucose 119 (H) 74 - 99 mg/dL   Troponin I, High Sensitivity   Result Value Ref Range    Troponin I, High Sensitivity 323 (HH) 0 - 13 ng/L   CBC   Result Value Ref Range    WBC 6.5 4.4 - 11.3 x10*3/uL    nRBC 0.0 0.0 - 0.0 /100 WBCs    RBC 2.89 (L) 4.00 - 5.20 x10*6/uL    Hemoglobin 9.2 (L) 12.0 - 16.0 g/dL    Hematocrit 26.4 (L) 36.0 - 46.0 %    MCV 91 80 - 100 fL    MCH 31.8 26.0 - 34.0 pg    MCHC 34.8 32.0 - 36.0 g/dL    RDW 12.9 11.5 - 14.5 %    Platelets 220 150 - 450 x10*3/uL   Basic Metabolic Panel   Result Value Ref Range    Glucose 288 (H) 74 - 99 mg/dL    Sodium 134 (L) 136 - 145 mmol/L    Potassium 4.1 3.5 - 5.3 mmol/L    Chloride 102 98 - 107 mmol/L    Bicarbonate 24 21 - 32 mmol/L    Anion Gap 12 10 - 20 mmol/L    Urea Nitrogen 19 6 - 23 mg/dL    Creatinine 1.04 0.50 - 1.05 mg/dL    eGFR 54 (L) >60 mL/min/1.73m*2    Calcium 7.9 (L) 8.6 - 10.3 mg/dL   POCT GLUCOSE   Result Value Ref Range    POCT Glucose 305 (H) 74 - 99 mg/dL     *Note: Due to a large number of results and/or encounters for the requested time period, some results have not been displayed. A complete set of results can be found in Results Review.       Last I/O:  I/O last 3 completed shifts:  In: 450 (8.2 mL/kg) [P.O.:150; I.V.:300 (5.5 mL/kg)]  Out: 1650 (30.2 mL/kg) [Urine:1650 (0.8 mL/kg/hr)]  Weight: 54.6 kg     Past Cardiology Tests (Last 3 Years):  EKG: Sinus bradycardia without acute ST elevation or T wave inversion      Echo:  Transthoracic Echo (TTE) Limited  04/22/2024  Transthoracic Echo (TTE) Limited    Result Date: 4/22/2024           [facility] [id number] [place], [place] [address]            Phone [phone] TRANSTHORACIC ECHOCARDIOGRAM REPORT  Patient Name:     REBECCA SCOTT      Reading Physician:  [id number] Noah Araujo MD Study Date:       4/22/2024            Ordering Provider:  [id number] YARELI ABRAHAM MRN/PID:          [id number]             Fellow: Accession#:       [id number]         Nurse: Date of           1940 / 83 years Sonographer:        Norma Smart [facility] Birth/Age: Gender:           F                    Additional Staff: Height:           149.86 cm            Admit Date: Weight:           56.70 kg             Admission Status:   Inpatient - Routine BSA / BMI:        1.51 m2 / 25.25      Department          [facility]                   kg/m2                Location: Blood Pressure: 103 /47 mmHg Study Type:    TRANSTHORACIC ECHO (TTE) LIMITED Diagnosis/ICD: Non ST elevation (NSTEMI) myocardial infarction-I21.4 Indication:    NSTEMI CPT Codes:     Echo Limited-77928; Doppler Limited-64756; Color Doppler-66956 Patient History: Valve Disorders:   Aortic Stenosis. Diabetes:          Yes Pertinent History: HTN, Hyperlipidemia, Chest Pain and ASHD, PVD,MI. Study Detail: The following Echo studies were performed: 2D, M-Mode, Doppler and               color flow.  PHYSICIAN INTERPRETATION: Left Ventricle: Left ventricular systolic function is mildly to moderately decreased, with an estimated ejection fraction of 40-45%. There are multiple wall motion abnormalities. The left ventricular cavity size is mild to moderately dilated. Spectral Doppler shows a normal pattern of left ventricular diastolic filling. LV Wall Scoring: The entire anterior septum, mid anterior segment, and apex are akinetic. The mid inferoseptal segment is hypokinetic. All remaining scored segments are  normal. Left Atrium: The left atrium is normal in size. Right Ventricle: The right ventricle is normal in size. There is normal right ventricular global systolic function. Right Atrium: The right atrium is normal in size. Aortic Valve: The aortic valve is trileaflet. There is diffuse moderate aortic valve thickening. There is evidence of moderate aortic valve stenosis. There is no evidence of aortic valve regurgitation. The peak instantaneous gradient of the aortic valve is 25.6 mmHg. The mean gradient of the aortic valve is 14.2 mmHg. Mitral Valve: The mitral valve is normal in structure. There is moderate mitral annular calcification. There is mild mitral valve regurgitation. Tricuspid Valve: The tricuspid valve is structurally normal. There is mild tricuspid regurgitation. The Doppler estimated RVSP is mildly elevated at 39.4 mmHg. Pulmonic Valve: The pulmonic valve is not well visualized. There is no indication of pulmonic valve regurgitation. Pericardium: There is no pericardial effusion noted. Aorta: The aortic root is normal.  CONCLUSIONS:  1. Left ventricular systolic function is mildly to moderately decreased with a 40-45% estimated ejection fraction.  2. Entire anterior septum, mid anterior segment, apex, and mid inferoseptal segment are abnormal.  3. There is moderate mitral annular calcification.  4. Mildly elevated RVSP.  5. Moderate aortic valve stenosis.  6. There are multiple wall motion abnormalities. QUANTITATIVE DATA SUMMARY: LA VOLUME:                             Normal Ranges: LA Vol A4C:       74.3 ml   (22+/-6mL/m2) LA Vol Index A4C: 49.2ml/m2 LA Vol A4C:       69.0 ml AORTIC VALVE:                                    Normal Ranges: AoV Vmax:                2.53 m/s  (<=1.7m/s) AoV Peak P.6 mmHg (<20mmHg) AoV Mean P.2 mmHg (1.7-11.5mmHg) LVOT Max Maldonado:            1.07 m/s  (<=1.1m/s) AoV VTI:                 48.89 cm  (18-25cm) LVOT VTI:                21.34 cm  LVOT Diameter:           1.76 cm   (1.8-2.4cm) AoV Area, VTI:           1.06 cm2  (2.5-5.5cm2) AoV Area,Vmax:           1.04 cm2  (2.5-4.5cm2) AoV Dimensionless Index: 0.44 TRICUSPID VALVE/RVSP:                             Normal Ranges: Peak TR Velocity: 3.02 m/s RV Syst Pressure: 39.4 mmHg (< 30mmHg)  [id number] Noah Araujo MD Electronically signed on 4/22/2024 at 10:08:19 AM  Wall Scoring  ** Final **      Past Medical History:  She has a past medical history of Anxiety, Benign hypertension, Chronic ischemic colitis (Multi), Chronic kidney disease, stage III (moderate) (Multi), CLL (chronic lymphocytic leukemia) (Multi), Coronary artery disease, CTS (carpal tunnel syndrome), Diabetes mellitus (Multi), Dry eye syndrome of bilateral lacrimal glands, Essential (primary) hypertension, Exudative age-related macular degeneration of left eye, unspecified stage, GI bleed, Hyperlipidemia, unspecified, Hypertension, Hypothyroidism, Hypothyroidism, unspecified type, Macular cyst, hole, or pseudohole, left eye, Miosis, Mixed hyperlipidemia, NSTEMI (non-ST elevated myocardial infarction) (Multi), Osteoarthritis, Osteoporosis, Peripheral vascular disease (CMS-HCC), Retinal edema, Right knee pain, Seizure disorder (Multi), Type 1 diabetes mellitus with hyperglycemia (Multi), and Unspecified disorder of refraction.    Past Surgical History:  She has a past surgical history that includes Carpal tunnel release (Bilateral); Total hip arthroplasty (Left, 2012); Coronary artery bypass graft (2005); Cholecystectomy; Cataract extraction w/ intraocular lens  implant, bilateral; Hysterectomy; Colonoscopy; Other surgical history; Other surgical history (Right, 01/2016); and Knee surgery (Right, 04/17/2024).      Social History:  She reports that she has never smoked. She has been exposed to tobacco smoke. She has never used smokeless tobacco. She reports that she does not currently use alcohol. She reports that she does not currently use  drugs.    Family History:  Family History   Problem Relation Name Age of Onset    Diabetes Mother      Diabetes Daughter      Other (RA) Daughter          Allergies:  Amoxicillin, Chlorpheniramine, Amoxicillin-pot clavulanate, Ciprofloxacin, and Levofloxacin    Inpatient Medications:  Scheduled medications   Medication Dose Route Frequency    acetaminophen  650 mg oral q6h    [Held by provider] amLODIPine  10 mg oral Daily    aspirin  81 mg oral Daily    enoxaparin  30 mg subcutaneous Daily    [Held by provider] hydrALAZINE  50 mg oral BID    insulin lispro  0-5 Units subcutaneous TID AC    levothyroxine  100 mcg oral Daily    lidocaine  1 patch transdermal Daily    [Held by provider] lisinopril  40 mg oral Daily    methocarbamol  500 mg oral q8h MARI    metoprolol tartrate  25 mg oral BID    oxygen   inhalation Continuous - Inhalation    polyethylene glycol  17 g oral BID     PRN medications   Medication    dextrose    dextrose    glucagon    glucagon    ondansetron    Or    ondansetron     Continuous Medications   Medication Dose Last Rate     Outpatient Medications:  Current Outpatient Medications   Medication Instructions    acetaminophen (TYLENOL) 650 mg, oral, Every 4 hours PRN    acetaminophen (TYLENOL) 500 mg, oral, Every 6 hours PRN    amLODIPine (NORVASC) 10 mg, oral, Daily    aspirin 81 mg, Daily    cholecalciferol (Vitamin D-3) 50 mcg (2,000 unit) capsule 1 capsule, Daily    Eylea 2 mg, Once    hydrALAZINE (APRESOLINE) 50 mg, oral, 2 times daily    insulin degludec (Tresiba FlexTouch U-100) 100 unit/mL (3 mL) injection INJECT 11 UNITS UNDER THE SKIN AS DIRECTED DAILY    insulin lispro (HumaLOG  KwikPen U-100) 100 unit/mL injection INJECT UP TO 30 UNITS UNDER THE SKIN DAILY AT MEALS    levothyroxine (SYNTHROID, LEVOXYL) 100 mcg, oral, Daily    lisinopril 40 mg, oral, Daily    methocarbamol (ROBAXIN) 500 mg, oral, Every 8 hours PRN    metoprolol tartrate (LOPRESSOR) 25 mg, oral, 2 times daily     nitroglycerin (NITROSTAT) 0.4 mg, Every 5 min PRN    polyethylene glycol (MIRALAX) 17 g, Daily    pravastatin (PRAVACHOL) 40 mg, oral, Daily    sulfaSALAzine (Azulfidine) 500 mg DR tablet 2 tablets, 2 times daily    valACYclovir (VALTREX) 1,000 mg, Daily PRN       Physical Exam:  General: alert, oriented x 3, very pleasant, no obvious distress  HEENT: normal cephalic, atraumatic, no scleral icterus  Neck: No JVD, bruit or thrill, masses or tenderness   Heart: S1/S2, Rate 60, Rhythm regular, no s3 or s4, no murmur, thrill, or heaves at PMI.   Lungs: Clear, equal expansion and excursion, no wheezes, crackles, rhales or rhonci room air.  No conversational dyspnea appreciated.  No tachypnea.  No pain with deep inspiration.   Abdomen: bowel sounds x 4, soft, non-tender to light and deep palpation, No masses, guarding, or CVA tenderness   Genitourinary: deferred   Extremities: No significant upper or lower extremity christianne appreciated.       Assessment/Plan     Elevated high-sensitivity troponin postoperatively  Planned surgical repair of rectal prolapse  Hypoglycemia event  Coronary artery disease  Remote history of coronary bypass grafting x 3  Mild to moderate aortic valve stenosis  Diabetes mellitus type 1  Hypertensive disorder  Hyperlipidemia    Overall impression:    11/16: Consulted for elevated troponins postoperatively.  These troponins are moderately elevated in a flat pattern, not consistent with evolving acute cord syndrome.  The patient has had 0 symptoms suggestive of an acute coronary syndrome.  Her EKG is nonischemic.  She did have a unresponsive episode which was found to be relative to a low glucose level which resolved upon receiving IV dextrose.  Patient does have known significant coronary artery disease and moderate aortic valve stenosis however she is euvolemic on room air with an adequate blood pressure of 119/54.  I have very low suspicion the patient is experiencing any acute coronary syndrome.   However for a level of completeness, will reassess LV with a limited echocardiogram.  She does have a known reduced ejection fraction of 40 to 45%  With multiple wall motion abnormalities.  Barring significant new changes on her echocardiogram today the patient will be considered stable from the cardiac perspective for discharge to home today.  She is current with Dr. Araujo.  She should follow-up in the outpatient setting in next 3 to 4 weeks.      Code Status:  Full Code    I spent 60 minutes in the professional and overall care of this patient.        Maninder Mendoza, APRN-CNP

## 2024-11-16 NOTE — PROGRESS NOTES
Bonnie Jules is a 84 y.o. female on day 2 of admission presenting with Rectal prolapse.      Subjective   Seen and examined.  Awake/alert/oriented.  Denies chest pain, shortness of breath, nausea, or vomiting.  Does have mild abdominal discomfort. Denies lightheadedness or dizziness. Had increased O2 demands overnight, now on 4 L oxygen via nasal cannula.  Baseline is room air.  Troponins were elevated however in a flat pattern.  Was seen by cardiology who have low suspicion for acute coronary syndrome.  Repeat TTE was ordered.  Will also check stat V/Q to rule out PE.       Objective     Last Recorded Vitals  /54 (BP Location: Left arm, Patient Position: Lying)   Pulse 59   Temp 36.7 °C (98.1 °F) (Oral)   Resp 12   Wt 54.6 kg (120 lb 5.9 oz)   SpO2 96%   Intake/Output last 3 Shifts:    Intake/Output Summary (Last 24 hours) at 11/16/2024 0832  Last data filed at 11/16/2024 0300  Gross per 24 hour   Intake 150 ml   Output 650 ml   Net -500 ml       Admission Weight  Weight: 55.7 kg (122 lb 12.7 oz) (11/15/24 0200)    Daily Weight  11/16/24 : 54.6 kg (120 lb 5.9 oz)    Image Results  XR chest 1 view  Narrative: Interpreted By:  Sukhi Salcedo,   STUDY:  XR CHEST 1 VIEW;  11/15/2024 5:13 pm      INDICATION:  Signs/Symptoms:hypoxia.          COMPARISON:  06/10/2024      ACCESSION NUMBER(S):  [id number]      ORDERING CLINICIAN:  THEODORE NAGY      FINDINGS:          The cardiomediastinal silhouette and pulmonary vasculature are within  normal limits. Mild bibasilar atelectasis.  No consolidation, pleural effusion or pneumothorax.      Status post median sternotomy. Epicardial leads.  Cholecystectomy clips.      Impression: No acute cardiopulmonary process.      Mild bibasilar atelectasis.      MACRO:  None.      Signed by: Sukhi Salcedo 11/15/2024 6:47 PM  Dictation workstation:   [id number]      Physical Exam  Vitals reviewed.   Constitutional:       Appearance: Normal appearance.   HENT:       Head: Normocephalic and atraumatic.   Eyes:      Extraocular Movements: Extraocular movements intact.      Conjunctiva/sclera: Conjunctivae normal.   Cardiovascular:      Rate and Rhythm: Normal rate and regular rhythm.   Pulmonary:      Effort: Pulmonary effort is normal.      Breath sounds: No wheezing, rhonchi or rales.      Comments: Breath sounds clear, diminished bilaterally  Abdominal:      General: Bowel sounds are normal.      Palpations: Abdomen is soft.      Tenderness: There is no abdominal tenderness.   Skin:     General: Skin is warm and dry.   Neurological:      General: No focal deficit present.      Mental Status: She is alert and oriented to person, place, and time.         Relevant Results  Lab Results   Component Value Date    GLUCOSE 288 (H) 11/16/2024    CALCIUM 7.9 (L) 11/16/2024     (L) 11/16/2024    K 4.1 11/16/2024    CO2 24 11/16/2024     11/16/2024    BUN 19 11/16/2024    CREATININE 1.04 11/16/2024      Lab Results   Component Value Date    WBC 6.5 11/16/2024    HGB 9.2 (L) 11/16/2024    HCT 26.4 (L) 11/16/2024    MCV 91 11/16/2024     11/16/2024    XR chest 1 view  Result Date: 11/15/2024  No acute cardiopulmonary process.   Mild bibasilar atelectasis.   MACRO: None.   Signed by: Sukhi Salcedo 11/15/2024 6:47 PM Dictation workstation:   [id number]         Assessment/Plan        Assessment & Plan  Rectal prolapse  Acute hypoxic respiratory failure  Increased O2 demand overnight, now on 4L  CXR results as above  Will check STAT VQ to rule out PE given renal function  Encourage IS  Duonebs PRN  Wean oxygen as tolerated  Consider pulmonary consult if no improvement or worsening hypoxia    CAD s/p CABG  Elevated Troponin  Concern for NSTEMI  Dr. Sousa notified last night, given one time dose of lasix  Had troponin 395, 409, 323  EKG SB  Seen by cardiology CNP, low suspicion for ACS, TTE ordered-if no new changes stable from cardiology standpoint    Diabetes Mellitus  with hypoglycemia  Severe hypoglycemia requiring D50  Monitor blood glucose  Sliding scale insulin  Hypoglycemia protocol  hgbA1C 6.7 4/3/24  Will resume Lantus at decreased dose, she does take Tresiba at home.      Rectal prolapse  Status post laparoscopic proctopexy prolapse by Dr. Keen on 11/14/2024  Pain management  Bowel regimen  Incentive spirometer  Out of bed as tolerated  Management per general surgery     Acute on Chronic kidney disease  Creatinine increased this morning  Avoid nephrotoxic medications, renally dose meds  Hold lisinopril for now  Repeat BMP in AM     Hypertension  On amlodipine, metoprolol, lisinopril, hydralazine  Holding lisinopril due to renal function     Plan  General Surgery  Monitor glucose, lantus at decreased dose  STAT VQ to rule out PE  Monitor on telemetry  Continuous pulse ox  Supplemental oxygen, wean as tolerated  DVT prophylaxis: Lovenox  CBC and BMP in the morning   TTE pending, cardiology following    Will continue to follow.                   Luna Smith, APRN-CNP

## 2024-11-16 NOTE — CARE PLAN
Patient stated she does not wear Bipap or Cpap or oxygen at home. Refused Bipap/Cpap; Oxygen increased to 4L with added humidifier for SpO2 93%. (SpO2 89% on 2L n/c).

## 2024-11-17 VITALS
RESPIRATION RATE: 18 BRPM | HEART RATE: 63 BPM | BODY MASS INDEX: 25.16 KG/M2 | SYSTOLIC BLOOD PRESSURE: 163 MMHG | WEIGHT: 120.37 LBS | DIASTOLIC BLOOD PRESSURE: 68 MMHG | OXYGEN SATURATION: 94 % | TEMPERATURE: 97.9 F

## 2024-11-17 LAB
ANION GAP SERPL CALCULATED.3IONS-SCNC: 10 MMOL/L (ref 10–20)
AORTIC VALVE MEAN GRADIENT: 18 MMHG
AORTIC VALVE PEAK VELOCITY: 2.77 M/S
AV PEAK GRADIENT: 31 MMHG
BUN SERPL-MCNC: 26 MG/DL (ref 6–23)
CALCIUM SERPL-MCNC: 8.4 MG/DL (ref 8.6–10.3)
CHLORIDE SERPL-SCNC: 100 MMOL/L (ref 98–107)
CO2 SERPL-SCNC: 27 MMOL/L (ref 21–32)
CREAT SERPL-MCNC: 0.97 MG/DL (ref 0.5–1.05)
EGFRCR SERPLBLD CKD-EPI 2021: 58 ML/MIN/1.73M*2
EJECTION FRACTION: 63 %
GLUCOSE BLD MANUAL STRIP-MCNC: 167 MG/DL (ref 74–99)
GLUCOSE BLD MANUAL STRIP-MCNC: 217 MG/DL (ref 74–99)
GLUCOSE BLD MANUAL STRIP-MCNC: 273 MG/DL (ref 74–99)
GLUCOSE SERPL-MCNC: 230 MG/DL (ref 74–99)
HOLD SPECIMEN: NORMAL
MITRAL VALVE E/A RATIO: 1.9
POTASSIUM SERPL-SCNC: 4.2 MMOL/L (ref 3.5–5.3)
RIGHT VENTRICLE PEAK SYSTOLIC PRESSURE: 53.7 MMHG
SODIUM SERPL-SCNC: 133 MMOL/L (ref 136–145)

## 2024-11-17 PROCEDURE — 2500000004 HC RX 250 GENERAL PHARMACY W/ HCPCS (ALT 636 FOR OP/ED): Performed by: NURSE PRACTITIONER

## 2024-11-17 PROCEDURE — 2500000005 HC RX 250 GENERAL PHARMACY W/O HCPCS: Performed by: STUDENT IN AN ORGANIZED HEALTH CARE EDUCATION/TRAINING PROGRAM

## 2024-11-17 PROCEDURE — 99232 SBSQ HOSP IP/OBS MODERATE 35: CPT | Performed by: NURSE PRACTITIONER

## 2024-11-17 PROCEDURE — 2500000002 HC RX 250 W HCPCS SELF ADMINISTERED DRUGS (ALT 637 FOR MEDICARE OP, ALT 636 FOR OP/ED): Performed by: HOSPITALIST

## 2024-11-17 PROCEDURE — 82947 ASSAY GLUCOSE BLOOD QUANT: CPT

## 2024-11-17 PROCEDURE — 2500000001 HC RX 250 WO HCPCS SELF ADMINISTERED DRUGS (ALT 637 FOR MEDICARE OP): Performed by: NURSE PRACTITIONER

## 2024-11-17 PROCEDURE — 2500000002 HC RX 250 W HCPCS SELF ADMINISTERED DRUGS (ALT 637 FOR MEDICARE OP, ALT 636 FOR OP/ED): Performed by: STUDENT IN AN ORGANIZED HEALTH CARE EDUCATION/TRAINING PROGRAM

## 2024-11-17 PROCEDURE — 2500000004 HC RX 250 GENERAL PHARMACY W/ HCPCS (ALT 636 FOR OP/ED): Performed by: STUDENT IN AN ORGANIZED HEALTH CARE EDUCATION/TRAINING PROGRAM

## 2024-11-17 PROCEDURE — 2500000002 HC RX 250 W HCPCS SELF ADMINISTERED DRUGS (ALT 637 FOR MEDICARE OP, ALT 636 FOR OP/ED): Performed by: NURSE PRACTITIONER

## 2024-11-17 PROCEDURE — 2500000001 HC RX 250 WO HCPCS SELF ADMINISTERED DRUGS (ALT 637 FOR MEDICARE OP): Performed by: HOSPITALIST

## 2024-11-17 PROCEDURE — 2500000001 HC RX 250 WO HCPCS SELF ADMINISTERED DRUGS (ALT 637 FOR MEDICARE OP): Performed by: STUDENT IN AN ORGANIZED HEALTH CARE EDUCATION/TRAINING PROGRAM

## 2024-11-17 PROCEDURE — 36415 COLL VENOUS BLD VENIPUNCTURE: CPT | Performed by: NURSE PRACTITIONER

## 2024-11-17 PROCEDURE — 80048 BASIC METABOLIC PNL TOTAL CA: CPT | Performed by: NURSE PRACTITIONER

## 2024-11-17 PROCEDURE — 2500000005 HC RX 250 GENERAL PHARMACY W/O HCPCS: Performed by: NURSE PRACTITIONER

## 2024-11-17 RX ORDER — INSULIN GLARGINE 100 [IU]/ML
8 INJECTION, SOLUTION SUBCUTANEOUS DAILY
Status: DISCONTINUED | OUTPATIENT
Start: 2024-11-17 | End: 2024-11-17 | Stop reason: HOSPADM

## 2024-11-17 RX ORDER — LISINOPRIL 40 MG/1
20 TABLET ORAL DAILY
Qty: 90 TABLET | Refills: 3 | Status: SHIPPED | OUTPATIENT
Start: 2024-11-17

## 2024-11-17 RX ORDER — AMLODIPINE BESYLATE 10 MG/1
10 TABLET ORAL DAILY
Status: DISCONTINUED | OUTPATIENT
Start: 2024-11-17 | End: 2024-11-17 | Stop reason: HOSPADM

## 2024-11-17 RX ORDER — LISINOPRIL 20 MG/1
20 TABLET ORAL DAILY
Status: DISCONTINUED | OUTPATIENT
Start: 2024-11-17 | End: 2024-11-17 | Stop reason: HOSPADM

## 2024-11-17 ASSESSMENT — COGNITIVE AND FUNCTIONAL STATUS - GENERAL
HELP NEEDED FOR BATHING: A LITTLE
MOBILITY SCORE: 22
DRESSING REGULAR UPPER BODY CLOTHING: A LITTLE
DRESSING REGULAR LOWER BODY CLOTHING: A LITTLE
WALKING IN HOSPITAL ROOM: A LITTLE
CLIMB 3 TO 5 STEPS WITH RAILING: A LITTLE
DAILY ACTIVITIY SCORE: 19
TOILETING: A LITTLE
PERSONAL GROOMING: A LITTLE

## 2024-11-17 ASSESSMENT — PAIN SCALES - GENERAL: PAINLEVEL_OUTOF10: 0 - NO PAIN

## 2024-11-17 NOTE — CARE PLAN
The patient's goals for the shift include      The clinical goals for the shift include discharge home    Over the shift, the patient did not make progress toward the following goals. Barriers to progression include cardiolgy sign off and blood work. Recommendations to address these barriers include phlebotomy in to draw labs and cariology to round.

## 2024-11-17 NOTE — NURSING NOTE
Assumed care of this pt. Pt is sitting in bed bed, breathing even and unlabored on RA. NAD. BSSR completed. Bed low, call light and belongings in reach. Continuing to monitor

## 2024-11-17 NOTE — CARE PLAN
The patient's goals for the shift include      The clinical goals for the shift include no pain, SpO2 WNL    Over the shift, the patient did not make progress toward the following goals. Barriers to progression include SpO2 dropped to 80s while sleeping. Recommendations to address these barriers include encourage cough and deep breathing, pt placed on 2L NC by RT who will assess need for O2 during ambulation later this morning.

## 2024-11-17 NOTE — ASSESSMENT & PLAN NOTE
Acute hypoxic respiratory failure-stable  Now stable on room air  Patient did not qualify for oxygen at night at home  CXR and VQ scan unremarkable   Encourage IS  Stable    CAD s/p CABG  Elevated Troponin  No chest pain  Dr. Sousa notified last night, given one time dose of lasix  Had troponin 395, 409, 323  EKG SB  Seen by cardiology CNP, low suspicion for ACS, stable from cardiology standpoint for discharge    Diabetes Mellitus with hypoglycemia  Severe hypoglycemia requiring D50  Monitor blood glucose  Sliding scale insulin  Hypoglycemia protocol  hgbA1C 6.7 4/3/24  Lantus continued on admission, may resume home Tresiba  Stable     Rectal prolapse  Status post laparoscopic proctopexy prolapse by Dr. Keen on 11/14/2024  Pain management  Bowel regimen  Incentive spirometer  Out of bed as tolerated  Management per general surgery     Acute on Chronic kidney disease  Creatinine increased-improving this morning  Avoid nephrotoxic medications, renally dose meds  Lisinopril resumed by cardiology at decreased dose  Recommend repeat BMP outpatient in a week, follow-up with primary doctor.  Discussed with the patient and her daughter     Hypertension  On amlodipine, metoprolol, lisinopril, hydralazine     Plan  Stable for discharge from medical standpoint.  Patient did qualify for home oxygen, being arranged by RT.  This was discussed with the patient and her daughter.  Repeat BMP in a week, follow-up with her primary care doctor outpatient.  Discussed with Dr. Keen as well.

## 2024-11-17 NOTE — PROGRESS NOTES
Bonnie Jules is a 84 y.o. female on day 3 of admission presenting with Rectal prolapse.    Subjective   Alert and oriented x 3, denies complaints chest pain or pressure palpitations or feeling of rapid heart rate.  Sitting up in chair.       Objective     Physical Exam  Vitals and nursing note reviewed.   Constitutional:       General: She is not in acute distress.     Appearance: She is not ill-appearing or toxic-appearing.   HENT:      Head: Normocephalic and atraumatic.      Nose: Nose normal.      Mouth/Throat:      Mouth: Mucous membranes are moist.      Pharynx: Oropharynx is clear.   Cardiovascular:      Rate and Rhythm: Normal rate and regular rhythm.      Pulses: Normal pulses.      Heart sounds: Normal heart sounds. No murmur heard.     No friction rub. No gallop.   Pulmonary:      Effort: Pulmonary effort is normal.      Breath sounds: Normal breath sounds.   Abdominal:      General: Bowel sounds are normal.      Palpations: Abdomen is soft.   Musculoskeletal:         General: Normal range of motion.      Cervical back: Normal range of motion.      Right lower leg: No edema.      Left lower leg: No edema.   Skin:     General: Skin is warm and dry.      Capillary Refill: Capillary refill takes less than 2 seconds.   Neurological:      Mental Status: She is alert and oriented to person, place, and time. Mental status is at baseline.   Psychiatric:         Mood and Affect: Mood normal.         Behavior: Behavior normal.         Thought Content: Thought content normal.         Judgment: Judgment normal.         Last Recorded Vitals  Blood pressure 163/68, pulse 63, temperature 36.6 °C (97.9 °F), temperature source Oral, resp. rate 18, weight 54.6 kg (120 lb 5.9 oz), SpO2 94%.  Intake/Output last 3 Shifts:  I/O last 3 completed shifts:  In: 820 (15 mL/kg) [P.O.:820]  Out: 250 (4.6 mL/kg) [Urine:250 (0.1 mL/kg/hr)]  Weight: 54.6 kg     Relevant Results  Results for orders placed or performed during the  hospital encounter of 11/14/24 (from the past 24 hours)   POCT GLUCOSE   Result Value Ref Range    POCT Glucose 278 (H) 74 - 99 mg/dL   Transthoracic Echo (TTE) Limited   Result Value Ref Range    AV pk delmi 2.77 m/s    AV mn grad 18 mmHg    MV E/A ratio 1.90     LV EF 63 %    RVSP 53.7 mmHg    AV pk grad 31 mmHg   POCT GLUCOSE   Result Value Ref Range    POCT Glucose 203 (H) 74 - 99 mg/dL   POCT GLUCOSE   Result Value Ref Range    POCT Glucose 246 (H) 74 - 99 mg/dL   POCT GLUCOSE   Result Value Ref Range    POCT Glucose 217 (H) 74 - 99 mg/dL   POCT GLUCOSE   Result Value Ref Range    POCT Glucose 167 (H) 74 - 99 mg/dL     *Note: Due to a large number of results and/or encounters for the requested time period, some results have not been displayed. A complete set of results can be found in Results Review.           Assessment/Plan   Assessment & Plan  Rectal prolapse    Elevated high-sensitivity troponin postoperatively  Planned surgical repair of rectal prolapse  Hypoglycemia event  Coronary artery disease  Remote history of coronary bypass grafting x 3  Mild to moderate aortic valve stenosis  Diabetes mellitus type 1  Hypertensive disorder  Hyperlipidemia     Overall impression:     11/16: Consulted for elevated troponins postoperatively.  These troponins are moderately elevated in a flat pattern, not consistent with evolving acute cord syndrome.  The patient has had 0 symptoms suggestive of an acute coronary syndrome.  Her EKG is nonischemic.  She did have a unresponsive episode which was found to be relative to a low glucose level which resolved upon receiving IV dextrose.  Patient does have known significant coronary artery disease and moderate aortic valve stenosis however she is euvolemic on room air with an adequate blood pressure of 119/54.  I have very low suspicion the patient is experiencing any acute coronary syndrome.  However for a level of completeness, will reassess LV with a limited echocardiogram.   She does have a known reduced ejection fraction of 40 to 45%  With multiple wall motion abnormalities.  Barring significant new changes on her echocardiogram today the patient will be considered stable from the cardiac perspective for discharge to home today.  She is current with Dr. Araujo.  She should follow-up in the outpatient setting in next 3 to 4 weeks.    11/17: Stable overnight.  Denies complaints chest pain or pressure palpitations or feeling of rapid heart rate.  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  Also noted is a moderate valve stenosis with a peak pressure of 34 mean of 18 and a dimensionless index of 0.31.  There are no significant new wall motion abnormalities.  And her ejection fraction actually appears improved from previous.  At this point there is certainly no evidence of acute coronary syndrome.  Will sign off.  Patient to follow-up with Dr. Araujo in the office in the next 2 to 3 weeks for reassessment      I spent 35 minutes in the professional and overall care of this patient.      Maninder Mendoza, APRN-CNP

## 2024-11-17 NOTE — NURSING NOTE
BSSR complete [place] resting with eyes closed breathing even and unlabored call light in reach

## 2024-11-17 NOTE — SIGNIFICANT EVENT
Home Oxygen Certification per JERRY Smith:    On RA at rest, the pt's SPO2 was 94%.     During exertion on RA, the pt maintained an SPO2 of 90% or greater. The RN was present, no complications.     JERRY Smith was notified of the results.

## 2024-11-17 NOTE — NURSING NOTE
Bonnie up in hallway with walker for O2 eval for home   Back to room in chair call light in reach

## 2024-11-17 NOTE — CARE PLAN
Readjusted alarms; patient reaches 88% SpO2 long enough to trigger alarm, then increases back to 90-91% on Room Air.

## 2024-11-17 NOTE — NURSING NOTE
"Bonnie up in chair bgm complete up in chair waiting breakfast fresh nice water given, blinds open and warm blanket provided  \"I think that walk helped my back\"  will ambulate q2 call light in reach   "

## 2024-11-17 NOTE — PROGRESS NOTES
Rebecca Scott is a 84 y.o. female on day 3 of admission presenting with Rectal prolapse.      Subjective   Seen and examined.  Awake/alert/oriented.  Denies chest pain, shortness of breath, nausea, or vomiting.       Objective     Last Recorded Vitals  /68 (BP Location: Right arm, Patient Position: Sitting)   Pulse 63   Temp 36.6 °C (97.9 °F) (Oral)   Resp 18   Wt 54.6 kg (120 lb 5.9 oz)   SpO2 94% Comment: Home Oxygen Certification per NP Luna Smith: On RA at rest, the pt's SPO2 was 94%. During exertion on RA, the pt maintained an SPO2 of 90% or greater. A sleep study was done the previous night and then pt qualified for NC 2L at .  Intake/Output last 3 Shifts:    Intake/Output Summary (Last 24 hours) at 11/17/2024 1124  Last data filed at 11/16/2024 2140  Gross per 24 hour   Intake 340 ml   Output 0 ml   Net 340 ml       Admission Weight  Weight: 55.7 kg (122 lb 12.7 oz) (11/15/24 0200)    Daily Weight  11/16/24 : 54.6 kg (120 lb 5.9 oz)    Image Results  Transthoracic [place] ([place] [address]             Phone [phone]    TRANSTHORACIC ECHOCARDIOGRAM REPORT    Patient Name:       REBECCA SCOTT      Reading Physician:    [id number] [facility]  Study Date:         11/16/2024           Ordering Provider:    [id number] OJ KELLY  MRN/PID:            [id number]             Fellow:  Accession#:         [id number]         Nurse:  Date of Birth/Age:  1940 / 84 years Sonographer:          Hermann Matamoros RDCS  Gender Assigned at  F                    Additional Staff:  Birth:  Height:             147.00 cm            Admit Date:  Weight:             55.00 kg             Admission Status:     Inpatient -                                                                  Routine  BSA / BMI:          1.47 m2 / 25.45      Department Location:  [facility]                      kg/m2  Blood Pressure: 123 /50 mmHg    Study Type:    TRANSTHORACIC ECHO (TTE) LIMITED  Diagnosis/ICD: Elevated Troponin-R79.89  Indication:    Elevated Troponin post operatively  CPT Codes:     Echo Limited-46276; Color Doppler-04866; Doppler Limited-97580    Patient History:  Diabetes:         Yes  Pertinent         CAD, HTN, Hyperlipidemia, Murmur and CHF. NSTEMI, CABG 2005,  History:          PVD.    Study Detail: The following Echo studies were performed: 2D, M-Mode, Doppler and                color flow. Unable to obtain limited for LV function post                operative exam with Elevated Troponin view.       PHYSICIAN INTERPRETATION:  Left Ventricle: The left ventricular systolic function is normal, with a visually estimated ejection fraction of 60-65%. There are no regional wall motion abnormalities. The left ventricular cavity size is normal. Spectral Doppler shows a Grade II (pseudonormal pattern) of left ventricular diastolic filling with an elevated left atrial pressure.  Left Atrium: The left atrium is mildly dilated.  Right Ventricle: The right ventricle is normal in size. There is normal right ventricular global systolic function.  Right Atrium: The right atrium is normal in size.  Aortic Valve: The aortic valve is trileaflet. There is moderate aortic valve cusp calcification. There is evidence of moderate aortic valve stenosis.  The aortic valve dimensionless index is 0.31. There is no evidence of aortic valve regurgitation. The peak instantaneous gradient of the aortic valve is 31 mmHg. The mean gradient of the aortic valve is 18 mmHg.  Mitral Valve: The mitral valve is mildly thickened. There is mild to moderate mitral annular calcification. There is mild to moderate mitral valve regurgitation.  Tricuspid Valve: The tricuspid valve is structurally  normal. There is mild tricuspid regurgitation.  Pulmonic Valve: The pulmonic valve is not well visualized. The pulmonic valve regurgitation was not well visualized.  Pericardium: No pericardial effusion noted.  Aorta: The aortic root is normal.       CONCLUSIONS:   1. The left ventricular systolic function is normal, with a visually estimated ejection fraction of 60-65%.   2. Spectral Doppler shows a Grade II (pseudonormal pattern) of left ventricular diastolic filling with an elevated left atrial pressure.   3. There is normal right ventricular global systolic function.   4. Mild to moderate mitral valve regurgitation.   5. Moderate aortic valve stenosis.   6. There is moderate aortic valve cusp calcification.   7. Aoritc stenosis mean gradient 18 mm Hg, peak gradient 31 mm Hg and dimensionless index 0.31.    QUANTITATIVE DATA SUMMARY:     LV SYSTOLIC FUNCTION BY 2D PLANIMETRY (MOD):                       Normal Ranges:  EF-Visual:      63 %  EF-3DQ:         59 %  LV EF Reported: 63 %       LV DIASTOLIC FUNCTION:           Normal Ranges:  MV Peak E:             1.71 m/s  (0.7-1.2 m/s)  MV Peak A:             0.90 m/s  (0.42-0.7 m/s)  E/A Ratio:             1.90      (1.0-2.2)  MV e'                  0.060 m/s (>8.0)  MV lateral e'          0.08 m/s  MV medial e'           0.04 m/s  E/e' Ratio:            28.53     (<8.0)       MITRAL VALVE:          Normal Ranges:  MV DT:        277 msec (150-240msec)       AORTIC VALVE:                      Normal Ranges:  AoV Vmax:                2.77 m/s  (<=1.7m/s)  AoV Peak P.8 mmHg (<20mmHg)  AoV Mean P.5 mmHg (1.7-11.5mmHg)  LVOT Max Maldonado:            0.95 m/s  (<=1.1m/s)  AoV VTI:                 75.42 cm  (18-25cm)  LVOT VTI:                23.13 cm  AoV Dimensionless Index: 0.31       TRICUSPID VALVE/RVSP:          Normal Ranges:  Peak TR Velocity:     3.56 m/s  RV Syst Pressure:     54 mmHg  (< 30mmHg)  IVC Diam:             1.75 cm        [id number] Mike Sousa DO  Electronically signed on 11/17/2024 at 7:30:17 AM       ** Final **      Physical Exam  Vitals reviewed.   Constitutional:       Appearance: Normal appearance.   HENT:      Head: Normocephalic and atraumatic.   Eyes:      Extraocular Movements: Extraocular movements intact.      Conjunctiva/sclera: Conjunctivae normal.   Cardiovascular:      Rate and Rhythm: Normal rate and regular rhythm.   Pulmonary:      Effort: Pulmonary effort is normal.      Breath sounds: No wheezing, rhonchi or rales.      Comments: Breath sounds clear, diminished bilaterally  Abdominal:      General: Bowel sounds are normal.      Palpations: Abdomen is soft.      Tenderness: There is no abdominal tenderness.   Skin:     General: Skin is warm and dry.   Neurological:      General: No focal deficit present.      Mental Status: She is alert and oriented to person, place, and time.         Relevant Results  Lab Results   Component Value Date    GLUCOSE 230 (H) 11/17/2024    CALCIUM 8.4 (L) 11/17/2024     (L) 11/17/2024    K 4.2 11/17/2024    CO2 27 11/17/2024     11/17/2024    BUN 26 (H) 11/17/2024    CREATININE 0.97 11/17/2024      Lab Results   Component Value Date    WBC 6.5 11/16/2024    HGB 9.2 (L) 11/16/2024    HCT 26.4 (L) 11/16/2024    MCV 91 11/16/2024     11/16/2024    XR chest 1 view  Result Date: 11/15/2024  No acute cardiopulmonary process.   Mild bibasilar atelectasis.   MACRO: None.   Signed by: Sukhi Salcedo 11/15/2024 6:47 PM Dictation workstation:   [id number]         Assessment/Plan        Assessment & Plan  Rectal prolapse  Acute hypoxic respiratory failure-stable  Now stable on room air  Patient did not qualify for oxygen at night at home  CXR and VQ scan unremarkable   Encourage IS  Stable    CAD s/p CABG  Elevated Troponin  No chest pain  Dr. Sousa notified last night, given one time dose of lasix  Had troponin 395, 409, 323  EKG SB  Seen by cardiology CNP, low suspicion for  ACS, stable from cardiology standpoint for discharge    Diabetes Mellitus with hypoglycemia  Severe hypoglycemia requiring D50  Monitor blood glucose  Sliding scale insulin  Hypoglycemia protocol  hgbA1C 6.7 4/3/24  Lantus continued on admission, may resume home Tresiba  Stable     Rectal prolapse  Status post laparoscopic proctopexy prolapse by Dr. Keen on 11/14/2024  Pain management  Bowel regimen  Incentive spirometer  Out of bed as tolerated  Management per general surgery     Acute on Chronic kidney disease  Creatinine increased-improving this morning  Avoid nephrotoxic medications, renally dose meds  Lisinopril resumed by cardiology at decreased dose  Recommend repeat BMP outpatient in a week, follow-up with primary doctor.  Discussed with the patient and her daughter     Hypertension  On amlodipine, metoprolol, lisinopril, hydralazine     Plan  Stable for discharge from medical standpoint.  Patient did qualify for home oxygen, being arranged by RT.  This was discussed with the patient and her daughter.  Repeat BMP in a week, follow-up with her primary care doctor outpatient.  Discussed with Dr. Keen as well.                     Luna Smith, APRPONCE-CNP

## 2024-11-17 NOTE — NURSING NOTE
"Pt blood glucose check per pt request 217 at this time. Pt denies feeling hypoglycemic, states she awoke suddenly and felt \"off\" but now feels fine. Continuing to monitor  "

## 2024-11-17 NOTE — NURSING NOTE
Discharge instructions reviewed with Bonnie and dtyuniel Lee     I reached out to [facility] and left message   They will wait til [facility]

## 2024-11-18 LAB
ATRIAL RATE: 55 BPM
P AXIS: 55 DEGREES
P OFFSET: 179 MS
P ONSET: 116 MS
PR INTERVAL: 188 MS
Q ONSET: 210 MS
QRS COUNT: 9 BEATS
QRS DURATION: 106 MS
QT INTERVAL: 462 MS
QTC CALCULATION(BAZETT): 441 MS
QTC FREDERICIA: 448 MS
R AXIS: -47 DEGREES
T AXIS: 34 DEGREES
T OFFSET: 441 MS
VENTRICULAR RATE: 55 BPM

## 2024-11-19 ENCOUNTER — APPOINTMENT (OUTPATIENT)
Dept: PRIMARY CARE | Facility: CLINIC | Age: 84
End: 2024-11-19
Payer: MEDICARE

## 2024-11-22 ENCOUNTER — APPOINTMENT (OUTPATIENT)
Dept: LAB | Facility: LAB | Age: 84
End: 2024-11-22
Payer: MEDICARE

## 2024-11-22 ENCOUNTER — APPOINTMENT (OUTPATIENT)
Dept: WOUND CARE | Facility: HOSPITAL | Age: 84
End: 2024-11-22
Payer: MEDICARE

## 2024-11-22 ENCOUNTER — LAB (OUTPATIENT)
Dept: LAB | Facility: LAB | Age: 84
End: 2024-11-22
Payer: MEDICARE

## 2024-11-22 DIAGNOSIS — N18.30 STAGE 3 CHRONIC KIDNEY DISEASE, UNSPECIFIED WHETHER STAGE 3A OR 3B CKD (MULTI): ICD-10-CM

## 2024-11-22 LAB
ANION GAP SERPL CALCULATED.3IONS-SCNC: 12 MMOL/L (ref 10–20)
BUN SERPL-MCNC: 25 MG/DL (ref 6–23)
CALCIUM SERPL-MCNC: 8.8 MG/DL (ref 8.6–10.3)
CHLORIDE SERPL-SCNC: 103 MMOL/L (ref 98–107)
CO2 SERPL-SCNC: 27 MMOL/L (ref 21–32)
CREAT SERPL-MCNC: 1.04 MG/DL (ref 0.5–1.05)
EGFRCR SERPLBLD CKD-EPI 2021: 54 ML/MIN/1.73M*2
GLUCOSE SERPL-MCNC: 107 MG/DL (ref 74–99)
POTASSIUM SERPL-SCNC: 4.5 MMOL/L (ref 3.5–5.3)
SODIUM SERPL-SCNC: 137 MMOL/L (ref 136–145)

## 2024-11-22 PROCEDURE — 36415 COLL VENOUS BLD VENIPUNCTURE: CPT

## 2024-11-22 PROCEDURE — 80048 BASIC METABOLIC PNL TOTAL CA: CPT

## 2024-12-02 DIAGNOSIS — S22.31XA FRACTURE OF ONE RIB, RIGHT SIDE, INITIAL ENCOUNTER FOR CLOSED FRACTURE: ICD-10-CM

## 2024-12-02 DIAGNOSIS — E11.69 TYPE 2 DIABETES MELLITUS WITH OTHER SPECIFIED COMPLICATION, UNSPECIFIED WHETHER LONG TERM INSULIN USE (MULTI): Primary | ICD-10-CM

## 2024-12-02 RX ORDER — LEVOTHYROXINE SODIUM 100 UG/1
100 TABLET ORAL DAILY
Qty: 90 TABLET | Refills: 0 | Status: SHIPPED | OUTPATIENT
Start: 2024-12-02

## 2024-12-07 PROBLEM — I35.0 NONRHEUMATIC AORTIC (VALVE) STENOSIS: Status: ACTIVE | Noted: 2024-12-07

## 2024-12-07 PROBLEM — E78.5 DYSLIPIDEMIA: Status: RESOLVED | Noted: 2023-10-23 | Resolved: 2024-12-07

## 2024-12-07 PROBLEM — I10 BENIGN ESSENTIAL HYPERTENSION: Status: RESOLVED | Noted: 2023-10-23 | Resolved: 2024-12-07

## 2024-12-07 NOTE — ASSESSMENT & PLAN NOTE
Patient with rectal prolapse and troponins elevated and flat following surgery in setting of hypoglycemic episode.  No clinical cardiac symptoms.  Currently has no anginal type symptoms.  We will continue standard risk factor modification and will follow on a clinical basis.

## 2024-12-09 ENCOUNTER — OFFICE VISIT (OUTPATIENT)
Dept: CARDIOLOGY | Facility: CLINIC | Age: 84
End: 2024-12-09
Payer: MEDICARE

## 2024-12-09 VITALS — OXYGEN SATURATION: 97 % | HEART RATE: 65 BPM | DIASTOLIC BLOOD PRESSURE: 70 MMHG | SYSTOLIC BLOOD PRESSURE: 128 MMHG

## 2024-12-09 DIAGNOSIS — I25.119 ATHEROSCLEROSIS OF NATIVE CORONARY ARTERY OF NATIVE HEART WITH ANGINA PECTORIS: Primary | ICD-10-CM

## 2024-12-09 DIAGNOSIS — I10 ESSENTIAL HYPERTENSION: ICD-10-CM

## 2024-12-09 DIAGNOSIS — I35.0 NONRHEUMATIC AORTIC (VALVE) STENOSIS: ICD-10-CM

## 2024-12-09 DIAGNOSIS — E78.2 MIXED HYPERLIPIDEMIA: ICD-10-CM

## 2024-12-09 PROCEDURE — 1126F AMNT PAIN NOTED NONE PRSNT: CPT | Performed by: INTERNAL MEDICINE

## 2024-12-09 PROCEDURE — 1036F TOBACCO NON-USER: CPT | Performed by: INTERNAL MEDICINE

## 2024-12-09 PROCEDURE — 3078F DIAST BP <80 MM HG: CPT | Performed by: INTERNAL MEDICINE

## 2024-12-09 PROCEDURE — 99214 OFFICE O/P EST MOD 30 MIN: CPT | Performed by: INTERNAL MEDICINE

## 2024-12-09 PROCEDURE — 1111F DSCHRG MED/CURRENT MED MERGE: CPT | Performed by: INTERNAL MEDICINE

## 2024-12-09 PROCEDURE — 1159F MED LIST DOCD IN RCRD: CPT | Performed by: INTERNAL MEDICINE

## 2024-12-09 PROCEDURE — 3074F SYST BP LT 130 MM HG: CPT | Performed by: INTERNAL MEDICINE

## 2024-12-09 ASSESSMENT — ENCOUNTER SYMPTOMS
SHORTNESS OF BREATH: 0
DYSPNEA ON EXERTION: 0
LOSS OF SENSATION IN FEET: 0
BLURRED VISION: 0
PARESTHESIAS: 0
DEPRESSION: 0
DYSURIA: 0
PALPITATIONS: 0
OCCASIONAL FEELINGS OF UNSTEADINESS: 0
NUMBNESS: 0
HEMATURIA: 0
COUGH: 0
ABDOMINAL PAIN: 0

## 2024-12-09 ASSESSMENT — PAIN SCALES - GENERAL: PAINLEVEL_OUTOF10: 0-NO PAIN

## 2024-12-09 NOTE — PROGRESS NOTES
Subjective   Bonnie Jules is a 84 y.o. female.    Chief Complaint:  Follow-up    HPI  Patient now doing well.  She had a rectal prolapse surgery in the hospital and is made a nice gradual recovery.  She states she feels much better at this time and does not wear home oxygen anymore.  Oxygen saturations are always above 94% and she feels well.  No chest pain, no syncope, no episodes of heart failure.    Review of Systems   Constitutional: Negative for malaise/fatigue.   HENT:  Negative for congestion.    Eyes:  Negative for blurred vision.   Cardiovascular:  Negative for chest pain, dyspnea on exertion and palpitations.   Respiratory:  Negative for cough and shortness of breath.    Musculoskeletal:  Negative for joint pain.   Gastrointestinal:  Negative for abdominal pain.   Genitourinary:  Negative for dysuria and hematuria.   Neurological:  Negative for numbness and paresthesias.       Objective   Constitutional:       Appearance: Not in distress.   Eyes:      Conjunctiva/sclera: Conjunctivae normal.   Neck:      Vascular: JVD normal.   Pulmonary:      Breath sounds: Normal breath sounds. No wheezing. No rhonchi. No rales.   Cardiovascular:      Normal rate. Regular rhythm.      Murmurs: There is a grade 3/6 mid frequency early systolic murmur.      No gallop.  No click. No rub.   Edema:     Pretibial: bilateral 1+ edema of the pretibial area.     Ankle: bilateral 1+ edema of the ankle.     Feet: bilateral 1+ edema of the feet.  Abdominal:      Palpations: Abdomen is soft.   Neurological:      General: No focal deficit present.      Mental Status: Alert.         Lab Review:   Lab on 11/22/2024   Component Date Value    Glucose 11/22/2024 107 (H)     Sodium 11/22/2024 137     Potassium 11/22/2024 4.5     Chloride 11/22/2024 103     Bicarbonate 11/22/2024 27     Anion Gap 11/22/2024 12     Urea Nitrogen 11/22/2024 25 (H)     Creatinine 11/22/2024 1.04     eGFR 11/22/2024 54 (L)     Calcium 11/22/2024 8.8     Admission on 11/14/2024, Discharged on 11/17/2024   Component Date Value    Glucose 11/15/2024 348 (H)     Sodium 11/15/2024 133 (L)     Potassium 11/15/2024 4.0     Chloride 11/15/2024 102     Bicarbonate 11/15/2024 24     Anion Gap 11/15/2024 11     Urea Nitrogen 11/15/2024 14     Creatinine 11/15/2024 0.75     eGFR 11/15/2024 80     Calcium 11/15/2024 8.2 (L)     WBC 11/15/2024 7.6     nRBC 11/15/2024 0.0     RBC 11/15/2024 3.11 (L)     Hemoglobin 11/15/2024 9.8 (L)     Hematocrit 11/15/2024 32.1 (L)     MCV 11/15/2024 103 (H)     MCH 11/15/2024 31.5     MCHC 11/15/2024 30.5 (L)     RDW 11/15/2024 13.2     Platelets 11/15/2024 249     POCT Glucose 11/14/2024 239 (H)     POCT Glucose 11/15/2024 348 (H)     POCT Glucose 11/15/2024 305 (H)     POCT Glucose 11/15/2024 271 (H)     POCT Glucose 11/15/2024 <10 (L)     POCT Glucose 11/15/2024 267 (H)     Ventricular Rate 11/15/2024 55     Atrial Rate 11/15/2024 55     MN Interval 11/15/2024 188     QRS Duration 11/15/2024 106     QT Interval 11/15/2024 462     QTC Calculation(Bazett) 11/15/2024 441     P Axis 11/15/2024 55     R Axis 11/15/2024 -47     T Axis 11/15/2024 34     QRS Count 11/15/2024 9     Q Onset 11/15/2024 210     P Onset 11/15/2024 116     P Offset 11/15/2024 179     T Offset 11/15/2024 441     QTC Fredericia 11/15/2024 448     Troponin I, High Sensiti* 11/15/2024 395 (HH)     POCT Glucose 11/15/2024 135 (H)     POCT Glucose 11/15/2024 133 (H)     Troponin I, High Sensiti* 11/15/2024 409 (HH)     POCT Glucose 11/15/2024 131 (H)     POCT Glucose 11/15/2024 116 (H)     WBC 11/16/2024 6.5     nRBC 11/16/2024 0.0     RBC 11/16/2024 2.89 (L)     Hemoglobin 11/16/2024 9.2 (L)     Hematocrit 11/16/2024 26.4 (L)     MCV 11/16/2024 91     MCH 11/16/2024 31.8     MCHC 11/16/2024 34.8     RDW 11/16/2024 12.9     Platelets 11/16/2024 220     Glucose 11/16/2024 288 (H)     Sodium 11/16/2024 134 (L)     Potassium 11/16/2024 4.1     Chloride 11/16/2024 102      Bicarbonate 11/16/2024 24     Anion Gap 11/16/2024 12     Urea Nitrogen 11/16/2024 19     Creatinine 11/16/2024 1.04     eGFR 11/16/2024 54 (L)     Calcium 11/16/2024 7.9 (L)     POCT Glucose 11/15/2024 119 (H)     Troponin I, High Sensiti* 11/16/2024 323 (HH)     POCT Glucose 11/16/2024 305 (H)     AV pk delmi 11/16/2024 2.77     AV mn grad 11/16/2024 18     MV E/A ratio 11/16/2024 1.90     LV EF 11/16/2024 63     RVSP 11/16/2024 53.7     AV pk grad 11/16/2024 31     POCT Glucose 11/16/2024 278 (H)     POCT Glucose 11/16/2024 203 (H)     POCT Glucose 11/16/2024 246 (H)     POCT Glucose 11/17/2024 217 (H)     POCT Glucose 11/17/2024 167 (H)     Glucose 11/17/2024 230 (H)     Sodium 11/17/2024 133 (L)     Potassium 11/17/2024 4.2     Chloride 11/17/2024 100     Bicarbonate 11/17/2024 27     Anion Gap 11/17/2024 10     Urea Nitrogen 11/17/2024 26 (H)     Creatinine 11/17/2024 0.97     eGFR 11/17/2024 58 (L)     Calcium 11/17/2024 8.4 (L)     Extra Tube 11/17/2024 Hold for add-ons.     POCT Glucose 11/17/2024 273 (H)    Lab on 11/13/2024   Component Date Value    Cholesterol 11/13/2024 137     HDL-Cholesterol 11/13/2024 58.6     Cholesterol/HDL Ratio 11/13/2024 2.3     LDL Calculated 11/13/2024 53     VLDL 11/13/2024 25     Triglycerides 11/13/2024 127     Non HDL Cholesterol 11/13/2024 78     Albumin, Urine Random 11/13/2024 54.0     Creatinine, Urine Random 11/13/2024 64.6     Albumin/Creatinine Ratio 11/13/2024 83.6 (H)    Pre-Admission Testing on 11/13/2024   Component Date Value    Staph/MRSA Screen Culture 11/13/2024 No Staphylococcus aureus isolated    Office Visit on 10/18/2024   Component Date Value    POC Color, Urine 10/18/2024 Yellow     POC Appearance, Urine 10/18/2024 Clear     POC Glucose, Urine 10/18/2024 NEGATIVE     POC Bilirubin, Urine 10/18/2024 NEGATIVE     POC Ketones, Urine 10/18/2024 NEGATIVE     POC Specific Gravity, Ur* 10/18/2024 1.010     POC Blood, Urine 10/18/2024 NEGATIVE     POC PH, Urine  10/18/2024 6.0     POC Protein, Urine 10/18/2024 NEGATIVE     POC Urobilinogen, Urine 10/18/2024 0.2     Poc Nitrite, Urine 10/18/2024 NEGATIVE     POC Leukocytes, Urine 10/18/2024 TRACE (A)        Assessment/Plan   The primary encounter diagnosis was Atherosclerosis of native coronary artery of native heart with angina pectoris. Diagnoses of Nonrheumatic aortic (valve) stenosis, Mixed hyperlipidemia, and Essential hypertension were also pertinent to this visit.    Hyperlipidemia  Dr. Ramirez checked lipids in November: Tchol 137  HDL 58 LDL 53, very good.    Atherosclerotic heart disease of native coronary artery with unspecified angina pectoris  Patient with rectal prolapse and troponins elevated and flat following surgery in setting of hypoglycemic episode.  No clinical cardiac symptoms.  Currently has no anginal type symptoms.  We will continue standard risk factor modification and will follow on a clinical basis.    Essential hypertension  Blood pressure currently well-controlled on the combination of amlodipine, hydralazine, lisinopril and low-dose metoprolol.  Will follow on a regular basis.    Nonrheumatic aortic (valve) stenosis  Moderate aortic valve stenosis on echocardiogram last month.  She is asymptomatic from a cardiac standpoint.  Will continue to follow on a clinical basis and recheck echocardiogram approximately November 2025.  Dimensionless index was 0.31 on most recent study.

## 2024-12-09 NOTE — ASSESSMENT & PLAN NOTE
Moderate aortic valve stenosis on echocardiogram last month.  She is asymptomatic from a cardiac standpoint.  Will continue to follow on a clinical basis and recheck echocardiogram approximately November 2025.  Dimensionless index was 0.31 on most recent study.

## 2024-12-09 NOTE — ASSESSMENT & PLAN NOTE
Blood pressure currently well-controlled on the combination of amlodipine, hydralazine, lisinopril and low-dose metoprolol.  Will follow on a regular basis.

## 2024-12-16 ENCOUNTER — APPOINTMENT (OUTPATIENT)
Dept: SURGERY | Facility: CLINIC | Age: 84
End: 2024-12-16
Payer: MEDICARE

## 2024-12-17 ENCOUNTER — OFFICE VISIT (OUTPATIENT)
Dept: SURGERY | Facility: CLINIC | Age: 84
End: 2024-12-17
Payer: MEDICARE

## 2024-12-17 VITALS
DIASTOLIC BLOOD PRESSURE: 62 MMHG | SYSTOLIC BLOOD PRESSURE: 110 MMHG | HEIGHT: 59 IN | TEMPERATURE: 97.8 F | OXYGEN SATURATION: 97 % | WEIGHT: 115 LBS | BODY MASS INDEX: 23.18 KG/M2 | HEART RATE: 62 BPM

## 2024-12-17 DIAGNOSIS — Z51.89 AFTERCARE: ICD-10-CM

## 2024-12-17 DIAGNOSIS — K62.3 RECTAL PROLAPSE: Primary | ICD-10-CM

## 2024-12-17 PROCEDURE — 1126F AMNT PAIN NOTED NONE PRSNT: CPT | Performed by: NURSE PRACTITIONER

## 2024-12-17 PROCEDURE — 1111F DSCHRG MED/CURRENT MED MERGE: CPT | Performed by: NURSE PRACTITIONER

## 2024-12-17 PROCEDURE — 3078F DIAST BP <80 MM HG: CPT | Performed by: NURSE PRACTITIONER

## 2024-12-17 PROCEDURE — 1159F MED LIST DOCD IN RCRD: CPT | Performed by: NURSE PRACTITIONER

## 2024-12-17 PROCEDURE — 99211 OFF/OP EST MAY X REQ PHY/QHP: CPT | Performed by: NURSE PRACTITIONER

## 2024-12-17 PROCEDURE — 1036F TOBACCO NON-USER: CPT | Performed by: NURSE PRACTITIONER

## 2024-12-17 PROCEDURE — 3074F SYST BP LT 130 MM HG: CPT | Performed by: NURSE PRACTITIONER

## 2024-12-17 ASSESSMENT — ENCOUNTER SYMPTOMS
VOMITING: 0
HEMATOLOGIC/LYMPHATIC NEGATIVE: 1
DIFFICULTY URINATING: 0
MUSCULOSKELETAL NEGATIVE: 1
DIARRHEA: 0
ABDOMINAL DISTENTION: 0
FEVER: 0
CHILLS: 0
NEUROLOGICAL NEGATIVE: 1
PSYCHIATRIC NEGATIVE: 1
EYES NEGATIVE: 1
ABDOMINAL PAIN: 0
SHORTNESS OF BREATH: 0
CONSTIPATION: 0
COUGH: 0
NAUSEA: 0
WOUND: 1
ALLERGIC/IMMUNOLOGIC NEGATIVE: 1
ENDOCRINE NEGATIVE: 1
CHEST TIGHTNESS: 0

## 2024-12-17 ASSESSMENT — PAIN SCALES - GENERAL: PAINLEVEL_OUTOF10: 0-NO PAIN

## 2024-12-17 NOTE — PATIENT INSTRUCTIONS
Wound Care  Do not submerge incisions in pool, bath, or hot tub  Do not peel off Dermabond -it will gradually loosen and fall off  ° You may shower with your back to the water and pat incisions dry  Do not apply any lotions, creams, or ointments to your incisions  Activity  Do not push, pull, or lift anything.  Avoid excessive bending and twisting at the waist.  You may walk stairs.  You may sleep in any position that feels comfortable.  You must get up and walk every hour during the day.  If you plan to take a nap during the day, set an alarm for one hour so you will get up and walk around.  Potential Complications   Wound Infection - redness, increased warmth, pain, thick drainage, fever  Blood Clot/Pulmonary Embolism - calf pain or swelling, chest pain, shortness of breath, racing heart, fast breathing  CALL 9-1-1 WITH ANY CHEST PAIN OR SHORTNESS OF BREATH, otherwise call the office with any concerns at [phone].     No bending, twisting, pulling, pushing,  or lifting over 15 pounds for 4 more weeks.

## 2024-12-17 NOTE — PROGRESS NOTES
"Subjective   Patient ID: Bonnie Jules is a 84 y.o. female who presents for Post-op (Pt is s/p robotic assisted laparoscopic rectopexy with Dr. Keen on 11/18/24.  Pt states she is feeling good.  ).  Pt states that she is doing well since surgery. She has softer bowel movements due to the Miralax, and has not needed to strain to have Bms. No issues with diarrhea. She states that her umbilicus is prominent and \"knotty\". This was explained that it is normal for lap sites to heal this way. She reports no pain or drainage from the site.     No fevers or chills. No rectal pain.         Review of Systems   Constitutional:  Negative for chills and fever.   HENT: Negative.     Eyes: Negative.    Respiratory:  Negative for cough, chest tightness and shortness of breath.    Cardiovascular:  Negative for chest pain and leg swelling.   Gastrointestinal:  Negative for abdominal distention, abdominal pain, constipation, diarrhea, nausea and vomiting.   Endocrine: Negative.    Genitourinary:  Negative for difficulty urinating.   Musculoskeletal: Negative.    Skin:  Positive for wound.   Allergic/Immunologic: Negative.    Neurological: Negative.    Hematological: Negative.    Psychiatric/Behavioral: Negative.         Objective   Physical Exam  Constitutional:       Appearance: Normal appearance.   HENT:      Head: Normocephalic and atraumatic.      Right Ear: Tympanic membrane normal.      Nose: Nose normal.      Mouth/Throat:      Mouth: Mucous membranes are moist.   Eyes:      Pupils: Pupils are equal, round, and reactive to light.   Cardiovascular:      Rate and Rhythm: Normal rate and regular rhythm.      Pulses: Normal pulses.   Pulmonary:      Effort: Pulmonary effort is normal.      Breath sounds: Normal breath sounds.   Abdominal:      General: Bowel sounds are normal. There is no distension.      Tenderness: There is no abdominal tenderness. There is no guarding.      Hernia: No hernia is present.      Comments: " Abdominal lap sites are CDI.    Genitourinary:     Comments: Rectal skin tags present. Intact anus, without rectal protrusion.  Musculoskeletal:         General: Normal range of motion.   Skin:     General: Skin is warm and dry.   Neurological:      General: No focal deficit present.      Mental Status: She is alert.   Psychiatric:         Mood and Affect: Mood normal.         Behavior: Behavior normal.         Assessment/Plan   Diagnoses and all orders for this visit:  Rectal prolapse  Aftercare           Wound Care  Do not submerge incisions in pool, bath, or hot tub  Do not peel off Dermabond -it will gradually loosen and fall off  ° You may shower with your back to the water and pat incisions dry  Do not apply any lotions, creams, or ointments to your incisions  Activity  Do not push, pull, or lift anything.  Avoid excessive bending and twisting at the waist.  You may walk stairs.  You may sleep in any position that feels comfortable.  You must get up and walk every hour during the day.  If you plan to take a nap during the day, set an alarm for one hour so you will get up and walk around.  Potential Complications   Wound Infection - redness, increased warmth, pain, thick drainage, fever  Blood Clot/Pulmonary Embolism - calf pain or swelling, chest pain, shortness of breath, racing heart, fast breathing  CALL 9-1-1 WITH ANY CHEST PAIN OR SHORTNESS OF BREATH, otherwise call the office with any concerns at [phone].     No bending, twisting, pulling, pushing,  or lifting over 15 pounds for 4 more weeks.    12/17/24 2:14 PM

## 2024-12-18 ENCOUNTER — OFFICE VISIT (OUTPATIENT)
Dept: PRIMARY CARE | Facility: CLINIC | Age: 84
End: 2024-12-18
Payer: MEDICARE

## 2024-12-18 ENCOUNTER — LAB (OUTPATIENT)
Dept: LAB | Facility: LAB | Age: 84
End: 2024-12-18
Payer: MEDICARE

## 2024-12-18 VITALS
BODY MASS INDEX: 23.18 KG/M2 | SYSTOLIC BLOOD PRESSURE: 150 MMHG | HEART RATE: 67 BPM | OXYGEN SATURATION: 99 % | WEIGHT: 115 LBS | HEIGHT: 59 IN | DIASTOLIC BLOOD PRESSURE: 72 MMHG

## 2024-12-18 DIAGNOSIS — I10 ESSENTIAL HYPERTENSION: ICD-10-CM

## 2024-12-18 DIAGNOSIS — E11.9 TYPE 2 DIABETES MELLITUS WITHOUT COMPLICATION, WITH LONG-TERM CURRENT USE OF INSULIN (MULTI): ICD-10-CM

## 2024-12-18 DIAGNOSIS — Z76.89 ENCOUNTER TO ESTABLISH CARE: Primary | ICD-10-CM

## 2024-12-18 DIAGNOSIS — E03.9 ACQUIRED HYPOTHYROIDISM: ICD-10-CM

## 2024-12-18 DIAGNOSIS — R51.9 HEADACHE, UNSPECIFIED: Primary | ICD-10-CM

## 2024-12-18 DIAGNOSIS — I25.119 ATHEROSCLEROSIS OF NATIVE CORONARY ARTERY OF NATIVE HEART WITH ANGINA PECTORIS: ICD-10-CM

## 2024-12-18 DIAGNOSIS — Z79.4 TYPE 2 DIABETES MELLITUS WITHOUT COMPLICATION, WITH LONG-TERM CURRENT USE OF INSULIN (MULTI): ICD-10-CM

## 2024-12-18 DIAGNOSIS — E78.2 MIXED HYPERLIPIDEMIA: ICD-10-CM

## 2024-12-18 PROBLEM — J32.9 SINUSITIS: Status: RESOLVED | Noted: 2022-09-30 | Resolved: 2024-12-18

## 2024-12-18 PROBLEM — M11.262 CHONDROCALCINOSIS OF LEFT KNEE: Status: RESOLVED | Noted: 2023-08-19 | Resolved: 2024-12-18

## 2024-12-18 PROBLEM — I70.201: Status: RESOLVED | Noted: 2023-08-19 | Resolved: 2024-12-18

## 2024-12-18 PROBLEM — R11.2 NAUSEA & VOMITING: Status: RESOLVED | Noted: 2023-10-23 | Resolved: 2024-12-18

## 2024-12-18 PROBLEM — M25.559 HIP PAIN: Status: RESOLVED | Noted: 2023-10-23 | Resolved: 2024-12-18

## 2024-12-18 PROBLEM — E86.0 DEHYDRATION: Status: RESOLVED | Noted: 2023-10-23 | Resolved: 2024-12-18

## 2024-12-18 PROBLEM — R26.2 DIFFICULTY WALKING: Status: RESOLVED | Noted: 2023-08-19 | Resolved: 2024-12-18

## 2024-12-18 PROBLEM — I99.8 VASCULAR CALCIFICATION: Status: RESOLVED | Noted: 2023-08-19 | Resolved: 2024-12-18

## 2024-12-18 PROBLEM — Z20.822 CONTACT WITH AND (SUSPECTED) EXPOSURE TO COVID-19: Status: RESOLVED | Noted: 2022-10-08 | Resolved: 2024-12-18
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
## 2024-12-18 PROBLEM — K55.9 ISCHEMIC COLITIS (MULTI): Status: RESOLVED | Noted: 2017-05-30 | Resolved: 2024-12-18

## 2024-12-18 PROBLEM — I25.2 HX OF NON-ST ELEVATION MYOCARDIAL INFARCTION (NSTEMI): Status: ACTIVE | Noted: 2024-12-18

## 2024-12-18 PROBLEM — M17.0 ARTHRITIS OF BOTH KNEES: Status: RESOLVED | Noted: 2023-08-19 | Resolved: 2024-12-18

## 2024-12-18 PROBLEM — G62.9 NEUROPATHY: Status: RESOLVED | Noted: 2023-06-12 | Resolved: 2024-12-18

## 2024-12-18 PROBLEM — F41.9 ANXIETY: Status: RESOLVED | Noted: 2023-10-23 | Resolved: 2024-12-18

## 2024-12-18 PROBLEM — Z96.1 PSEUDOPHAKIA: Status: RESOLVED | Noted: 2023-08-19 | Resolved: 2024-12-18

## 2024-12-18 PROBLEM — I21.9 MYOCARDIAL INFARCTION (MULTI): Status: RESOLVED | Noted: 2023-08-19 | Resolved: 2024-12-18

## 2024-12-18 PROBLEM — M16.10 PRIMARY LOCALIZED OSTEOARTHRITIS OF PELVIC REGION AND THIGH: Status: RESOLVED | Noted: 2023-08-19 | Resolved: 2024-12-18

## 2024-12-18 PROBLEM — S16.1XXA STRAIN OF NECK MUSCLE: Status: RESOLVED | Noted: 2024-05-09 | Resolved: 2024-12-18

## 2024-12-18 PROBLEM — M15.4 EROSIVE OSTEOARTHRITIS: Status: RESOLVED | Noted: 2023-08-19 | Resolved: 2024-12-18

## 2024-12-18 PROBLEM — M25.569 KNEE PAIN: Status: RESOLVED | Noted: 2023-08-19 | Resolved: 2024-12-18

## 2024-12-18 PROBLEM — R55 SYNCOPE AND COLLAPSE: Status: RESOLVED | Noted: 2023-10-23 | Resolved: 2024-12-18

## 2024-12-18 PROBLEM — E87.20 ACIDOSIS, UNSPECIFIED: Status: RESOLVED | Noted: 2022-10-08 | Resolved: 2024-12-18

## 2024-12-18 PROBLEM — E66.3 OVERWEIGHT WITH BODY MASS INDEX (BMI) 25.0-29.9: Status: RESOLVED | Noted: 2024-05-09 | Resolved: 2024-12-18

## 2024-12-18 PROBLEM — R93.89 ABNORMAL COMPUTED TOMOGRAPHY SCAN: Status: RESOLVED | Noted: 2023-10-23 | Resolved: 2024-12-18

## 2024-12-18 PROBLEM — H35.81 MACULAR EDEMA: Status: RESOLVED | Noted: 2023-10-23 | Resolved: 2024-12-18

## 2024-12-18 PROBLEM — R33.9 URINARY RETENTION: Status: RESOLVED | Noted: 2024-04-24 | Resolved: 2024-12-18

## 2024-12-18 PROBLEM — K51.90 ULCERATIVE COLITIS WITHOUT COMPLICATIONS (MULTI): Status: ACTIVE | Noted: 2023-10-23

## 2024-12-18 PROBLEM — M25.561 CHRONIC PAIN OF BOTH KNEES: Status: RESOLVED | Noted: 2024-04-17 | Resolved: 2024-12-18

## 2024-12-18 PROBLEM — G89.29 CHRONIC PAIN OF BOTH KNEES: Status: RESOLVED | Noted: 2024-04-17 | Resolved: 2024-12-18

## 2024-12-18 PROBLEM — M12.9 ARTHROPATHY: Status: RESOLVED | Noted: 2023-06-12 | Resolved: 2024-12-18

## 2024-12-18 PROBLEM — K55.9 VASCULAR DISORDER OF INTESTINE, UNSPECIFIED: Status: RESOLVED | Noted: 2023-08-19 | Resolved: 2024-12-18

## 2024-12-18 PROBLEM — Z95.1 PRESENCE OF AORTOCORONARY BYPASS GRAFT: Status: RESOLVED | Noted: 2023-08-19 | Resolved: 2024-12-18

## 2024-12-18 PROBLEM — G47.00 INSOMNIA: Status: RESOLVED | Noted: 2023-08-19 | Resolved: 2024-12-18

## 2024-12-18 PROBLEM — H02.834 DERMATOCHALASIS OF BOTH UPPER EYELIDS: Status: RESOLVED | Noted: 2023-08-19 | Resolved: 2024-12-18

## 2024-12-18 PROBLEM — C91.11: Status: ACTIVE | Noted: 2022-10-08

## 2024-12-18 PROBLEM — I21.4 ACUTE NON-ST ELEVATION MYOCARDIAL INFARCTION (NSTEMI) (MULTI): Status: RESOLVED | Noted: 2023-08-19 | Resolved: 2024-12-18

## 2024-12-18 PROBLEM — R23.4 FISSURE IN SKIN: Status: RESOLVED | Noted: 2023-06-12 | Resolved: 2024-12-18

## 2024-12-18 PROBLEM — R42 DIZZINESS: Status: RESOLVED | Noted: 2024-05-09 | Resolved: 2024-12-18

## 2024-12-18 PROBLEM — D64.9 ANEMIA: Status: RESOLVED | Noted: 2023-11-21 | Resolved: 2024-12-18

## 2024-12-18 PROBLEM — M19.90 OSTEOARTHRITIS: Status: RESOLVED | Noted: 2023-10-23 | Resolved: 2024-12-18

## 2024-12-18 PROBLEM — K62.5 RECTAL HEMORRHAGE: Status: RESOLVED | Noted: 2023-10-23 | Resolved: 2024-12-18

## 2024-12-18 PROBLEM — G89.29 CHRONIC PAIN: Status: RESOLVED | Noted: 2023-08-19 | Resolved: 2024-12-18

## 2024-12-18 PROBLEM — K62.3 RECTAL PROLAPSE: Status: RESOLVED | Noted: 2024-10-28 | Resolved: 2024-12-18

## 2024-12-18 PROBLEM — R55 NEAR SYNCOPE: Status: RESOLVED | Noted: 2023-10-23 | Resolved: 2024-12-18

## 2024-12-18 PROBLEM — H52.7 UNSPECIFIED DISORDER OF REFRACTION: Status: RESOLVED | Noted: 2023-08-19 | Resolved: 2024-12-18

## 2024-12-18 PROBLEM — Z01.810 PREOP CARDIOVASCULAR EXAM: Status: RESOLVED | Noted: 2024-03-07 | Resolved: 2024-12-18

## 2024-12-18 PROBLEM — S09.90XA INJURY OF HEAD: Status: RESOLVED | Noted: 2023-08-19 | Resolved: 2024-12-18

## 2024-12-18 PROBLEM — R89.9 ABNORMAL FINDINGS ON EXAMINATION OF GENITOURINARY ORGANS: Status: RESOLVED | Noted: 2024-05-09 | Resolved: 2024-12-18

## 2024-12-18 PROBLEM — J31.0 RHINITIS: Status: RESOLVED | Noted: 2023-08-19 | Resolved: 2024-12-18

## 2024-12-18 PROBLEM — R53.1 WEAKNESS: Status: RESOLVED | Noted: 2024-05-09 | Resolved: 2024-12-18

## 2024-12-18 PROBLEM — M25.519 SHOULDER JOINT PAIN: Status: RESOLVED | Noted: 2023-08-19 | Resolved: 2024-12-18

## 2024-12-18 PROBLEM — K51.90 ULCERATIVE COLITIS WITHOUT COMPLICATIONS (MULTI): Status: RESOLVED | Noted: 2023-10-23 | Resolved: 2024-12-18

## 2024-12-18 PROBLEM — M65.90 SYNOVITIS AND TENOSYNOVITIS: Status: RESOLVED | Noted: 2024-05-09 | Resolved: 2024-12-18

## 2024-12-18 PROBLEM — N39.0 ACUTE URINARY TRACT INFECTION: Status: RESOLVED | Noted: 2023-10-23 | Resolved: 2024-12-18

## 2024-12-18 PROBLEM — K92.2 GASTROINTESTINAL HEMORRHAGE: Status: RESOLVED | Noted: 2023-10-23 | Resolved: 2024-12-18

## 2024-12-18 PROBLEM — S22.31XA FRACTURE OF ONE RIB, RIGHT SIDE, INITIAL ENCOUNTER FOR CLOSED FRACTURE: Status: RESOLVED | Noted: 2024-06-09 | Resolved: 2024-12-18

## 2024-12-18 PROBLEM — K92.1 HEMATOCHEZIA: Status: RESOLVED | Noted: 2023-10-23 | Resolved: 2024-12-18

## 2024-12-18 PROBLEM — I35.9 AORTIC VALVE DISORDER: Status: RESOLVED | Noted: 2023-10-23 | Resolved: 2024-12-18

## 2024-12-18 PROBLEM — I77.9 ARTERIAL DISEASE (CMS-HCC): Status: RESOLVED | Noted: 2023-08-19 | Resolved: 2024-12-18

## 2024-12-18 PROBLEM — M47.816 SPONDYLOSIS OF LUMBAR SPINE: Status: RESOLVED | Noted: 2023-08-19 | Resolved: 2024-12-18

## 2024-12-18 PROBLEM — E87.20 LACTIC ACIDOSIS: Status: RESOLVED | Noted: 2023-10-23 | Resolved: 2024-12-18

## 2024-12-18 PROBLEM — M17.11 OSTEOARTHRITIS OF RIGHT KNEE: Status: RESOLVED | Noted: 2023-08-19 | Resolved: 2024-12-18

## 2024-12-18 PROBLEM — I95.9 LOW BLOOD PRESSURE: Status: RESOLVED | Noted: 2023-10-23 | Resolved: 2024-12-18

## 2024-12-18 PROBLEM — I73.9 PERIPHERAL VASCULAR DISEASE, UNSPECIFIED (CMS-HCC): Status: RESOLVED | Noted: 2023-08-19 | Resolved: 2024-12-18

## 2024-12-18 PROBLEM — B35.1 ONYCHOMYCOSIS: Status: RESOLVED | Noted: 2023-06-12 | Resolved: 2024-12-18

## 2024-12-18 PROBLEM — M79.676 PAIN OF GREAT TOE: Status: RESOLVED | Noted: 2023-06-12 | Resolved: 2024-12-18

## 2024-12-18 PROBLEM — E87.6 HYPOKALEMIA: Status: RESOLVED | Noted: 2022-10-08 | Resolved: 2024-12-18

## 2024-12-18 PROBLEM — T14.8XXA EXTRAVASATION INJURY: Status: RESOLVED | Noted: 2023-08-19 | Resolved: 2024-12-18

## 2024-12-18 PROBLEM — I16.0 HYPERTENSIVE URGENCY: Status: RESOLVED | Noted: 2022-10-08 | Resolved: 2024-12-18

## 2024-12-18 PROBLEM — M77.41 METATARSALGIA OF RIGHT FOOT: Status: RESOLVED | Noted: 2023-06-12 | Resolved: 2024-12-18

## 2024-12-18 PROBLEM — R56.9 SEIZURE (MULTI): Status: RESOLVED | Noted: 2023-10-23 | Resolved: 2024-12-18

## 2024-12-18 PROBLEM — A41.9 SEPSIS (MULTI): Status: RESOLVED | Noted: 2023-10-23 | Resolved: 2024-12-18

## 2024-12-18 PROBLEM — S06.0X0A CONCUSSION WITHOUT LOSS OF CONSCIOUSNESS: Status: RESOLVED | Noted: 2024-05-09 | Resolved: 2024-12-18

## 2024-12-18 PROBLEM — H02.831 DERMATOCHALASIS OF BOTH UPPER EYELIDS: Status: RESOLVED | Noted: 2023-08-19 | Resolved: 2024-12-18

## 2024-12-18 PROBLEM — L84 CALLUS: Status: RESOLVED | Noted: 2023-06-12 | Resolved: 2024-12-18

## 2024-12-18 PROBLEM — J18.9 PNEUMONIA: Status: RESOLVED | Noted: 2024-05-09 | Resolved: 2024-12-18

## 2024-12-18 PROBLEM — N18.9 CHRONIC RENAL IMPAIRMENT: Status: RESOLVED | Noted: 2023-08-19 | Resolved: 2024-12-18

## 2024-12-18 PROBLEM — M25.361 INSTABILITY OF RIGHT KNEE JOINT: Status: RESOLVED | Noted: 2023-08-19 | Resolved: 2024-12-18

## 2024-12-18 PROBLEM — E83.51 HYPOCALCEMIA: Status: RESOLVED | Noted: 2024-05-09 | Resolved: 2024-12-18

## 2024-12-18 PROBLEM — S02.92XA CLOSED FRACTURE OF FACIAL BONE (MULTI): Status: RESOLVED | Noted: 2024-05-09 | Resolved: 2024-12-18

## 2024-12-18 PROBLEM — E07.9 DISORDER OF THYROID GLAND: Status: RESOLVED | Noted: 2023-10-23 | Resolved: 2024-12-18

## 2024-12-18 PROBLEM — H04.123 DRY EYES: Status: RESOLVED | Noted: 2023-08-19 | Resolved: 2024-12-18

## 2024-12-18 PROBLEM — R41.0 DELIRIUM: Status: RESOLVED | Noted: 2023-10-23 | Resolved: 2024-12-18

## 2024-12-18 PROBLEM — N18.30 CHRONIC KIDNEY DISEASE, STAGE 3 UNSPECIFIED (MULTI): Status: RESOLVED | Noted: 2022-10-08 | Resolved: 2024-12-18

## 2024-12-18 PROBLEM — M25.519 ARTHRALGIA OF SHOULDER: Status: RESOLVED | Noted: 2023-10-23 | Resolved: 2024-12-18

## 2024-12-18 PROBLEM — S22.39XA FRACTURE OF RIB: Status: RESOLVED | Noted: 2024-05-09 | Resolved: 2024-12-18

## 2024-12-18 PROBLEM — J06.9 UPPER RESPIRATORY TRACT INFECTION: Status: RESOLVED | Noted: 2022-09-30 | Resolved: 2024-12-18

## 2024-12-18 PROBLEM — M17.11 PRIMARY OSTEOARTHRITIS OF RIGHT KNEE: Status: RESOLVED | Noted: 2024-04-17 | Resolved: 2024-12-18

## 2024-12-18 PROBLEM — M25.562 CHRONIC PAIN OF BOTH KNEES: Status: RESOLVED | Noted: 2024-04-17 | Resolved: 2024-12-18

## 2024-12-18 PROBLEM — L97.519 ULCER OF RIGHT FOOT (MULTI): Status: RESOLVED | Noted: 2023-06-12 | Resolved: 2024-12-18

## 2024-12-18 PROBLEM — S22.41XA CLOSED FRACTURE OF MULTIPLE RIBS OF RIGHT SIDE, INITIAL ENCOUNTER: Status: RESOLVED | Noted: 2024-06-11 | Resolved: 2024-12-18

## 2024-12-18 LAB
BASOPHILS # BLD AUTO: 0.01 X10*3/UL (ref 0–0.1)
BASOPHILS NFR BLD AUTO: 0.2 %
CRP SERPL-MCNC: <0.1 MG/DL
EOSINOPHIL # BLD AUTO: 0.15 X10*3/UL (ref 0–0.4)
EOSINOPHIL NFR BLD AUTO: 2.8 %
ERYTHROCYTE [DISTWIDTH] IN BLOOD BY AUTOMATED COUNT: 13.6 % (ref 11.5–14.5)
ERYTHROCYTE [SEDIMENTATION RATE] IN BLOOD BY WESTERGREN METHOD: 3 MM/H (ref 0–30)
HCT VFR BLD AUTO: 37.2 % (ref 36–46)
HGB BLD-MCNC: 11.7 G/DL (ref 12–16)
IMM GRANULOCYTES # BLD AUTO: 0.01 X10*3/UL (ref 0–0.5)
IMM GRANULOCYTES NFR BLD AUTO: 0.2 % (ref 0–0.9)
LYMPHOCYTES # BLD AUTO: 3.46 X10*3/UL (ref 0.8–3)
LYMPHOCYTES NFR BLD AUTO: 65.3 %
MCH RBC QN AUTO: 31.6 PG (ref 26–34)
MCHC RBC AUTO-ENTMCNC: 31.5 G/DL (ref 32–36)
MCV RBC AUTO: 101 FL (ref 80–100)
MONOCYTES # BLD AUTO: 0.5 X10*3/UL (ref 0.05–0.8)
MONOCYTES NFR BLD AUTO: 9.4 %
NEUTROPHILS # BLD AUTO: 1.17 X10*3/UL (ref 1.6–5.5)
NEUTROPHILS NFR BLD AUTO: 22.1 %
NRBC BLD-RTO: 0 /100 WBCS (ref 0–0)
PLATELET # BLD AUTO: 246 X10*3/UL (ref 150–450)
RBC # BLD AUTO: 3.7 X10*6/UL (ref 4–5.2)
WBC # BLD AUTO: 5.3 X10*3/UL (ref 4.4–11.3)

## 2024-12-18 PROCEDURE — 85652 RBC SED RATE AUTOMATED: CPT

## 2024-12-18 PROCEDURE — 1123F ACP DISCUSS/DSCN MKR DOCD: CPT

## 2024-12-18 PROCEDURE — 86140 C-REACTIVE PROTEIN: CPT | Performed by: OPHTHALMOLOGY

## 2024-12-18 PROCEDURE — 1159F MED LIST DOCD IN RCRD: CPT

## 2024-12-18 PROCEDURE — 1158F ADVNC CARE PLAN TLK DOCD: CPT

## 2024-12-18 PROCEDURE — 3078F DIAST BP <80 MM HG: CPT

## 2024-12-18 PROCEDURE — 1036F TOBACCO NON-USER: CPT

## 2024-12-18 PROCEDURE — 1126F AMNT PAIN NOTED NONE PRSNT: CPT

## 2024-12-18 PROCEDURE — 36415 COLL VENOUS BLD VENIPUNCTURE: CPT

## 2024-12-18 PROCEDURE — 3077F SYST BP >= 140 MM HG: CPT

## 2024-12-18 PROCEDURE — 85025 COMPLETE CBC W/AUTO DIFF WBC: CPT | Performed by: OPHTHALMOLOGY

## 2024-12-18 PROCEDURE — 99214 OFFICE O/P EST MOD 30 MIN: CPT

## 2024-12-18 ASSESSMENT — ENCOUNTER SYMPTOMS
DIZZINESS: 0
SHORTNESS OF BREATH: 0
PALPITATIONS: 0
LIGHT-HEADEDNESS: 0
FATIGUE: 0

## 2024-12-18 ASSESSMENT — PAIN SCALES - GENERAL: PAINLEVEL_OUTOF10: 0-NO PAIN

## 2024-12-18 NOTE — PROGRESS NOTES
"Subjective   Patient ID: Bonnie Jules is a 84 y.o. female who presents for Establish Care (Patient was in the hospital back in November, would like to discuss need to not have 02/dd).    Hx CAD (hx of MI/bypass), HTN, HLD, aortic stenosis, IDDM, hypothyroidism, chronic colitis, PAD, CLL (5 years remission)    Other Providers: Dr. Sousa (cardiologist), Dr. Ramirez (endocrinologist), Dr. De La Torre (general surgery, recetal fixation of rectal prolapse), Dr. Hayward/Dr. Darden (ophthalmologist, injections for macular degeneration), aFvio Brumfield CNP (vascular), Dr. Joseph Hollingsworth (oncology), Dr. Jackman (GI)    All medications prescribed through specialist.    HTN: Managed by cardiologist. Currently on 10 mg Amlodipine, 50 mg BID, Hydralazine, and 40 mg Lisinopril. Denies chest pain, heart palpitations, SOB, dizziness, headaches, changes of vision, syncope, leg swelling.      CAD/HLD: patient on 40 mg Pravastatin, baby aspirin, and prn Nitroglycerin. Last Lipid 11/2024.    IDDM/Hypothyodism: managed by endo. Last A1c 6.3 (10/2024). And TSH normal (10/2024). Patient currently on Humolog and Tresiba. And 100 mcg levothyroxine.     Patient has no concerns today except would like letter faxed to medical supplies company to  oxygen tank she does not need or use.         Review of Systems   Constitutional:  Negative for fatigue.   Eyes:  Negative for visual disturbance.   Respiratory:  Negative for shortness of breath.    Cardiovascular:  Negative for chest pain, palpitations and leg swelling.   Neurological:  Negative for dizziness, syncope and light-headedness.       Objective   /72   Pulse 67   Ht 1.486 m (4' 10.5\")   Wt 52.2 kg (115 lb)   LMP  (LMP Unknown)   SpO2 99%   BMI 23.63 kg/m²     Physical Exam  Constitutional:       General: She is not in acute distress.     Appearance: Normal appearance.   Cardiovascular:      Rate and Rhythm: Normal rate and regular rhythm.      Heart sounds: Murmur heard. "   Pulmonary:      Effort: Pulmonary effort is normal.      Breath sounds: Normal breath sounds. No wheezing, rhonchi or rales.   Skin:     General: Skin is warm and dry.      Findings: No rash.   Neurological:      Mental Status: She is alert.   Psychiatric:         Mood and Affect: Mood and affect normal.         Assessment/Plan   Diagnoses and all orders for this visit:  Encounter to establish care  Atherosclerosis of native coronary artery of native heart with angina pectoris  Essential hypertension  Acquired hypothyroidism  Mixed hyperlipidemia  Type 2 diabetes mellitus without complication, with long-term current use of insulin (Multi)  Follow-up 5-6 months for Medicare physical

## 2024-12-18 NOTE — LETTER
December 18, 2024     Bonnie Jules  [id number] [place] [address]    Patient: Bonnie Jules   YOB: 1940   Date of Visit: 12/18/2024       To Whom this may concen:    My patient, Bonnie Jules was seen by for evaluation on 12/18/2024. She is no longer in need of an oxygen tank. Her SpO2 after walking was recording at 99% at today's visit. Please have  the oxygen tank removed from her home.      If you have questions, please do not hesitate to call me, [phone].     Sincerely,       Yanira Angeles PA-C

## 2025-02-24 DIAGNOSIS — S22.31XA FRACTURE OF ONE RIB, RIGHT SIDE, INITIAL ENCOUNTER FOR CLOSED FRACTURE: ICD-10-CM

## 2025-02-24 RX ORDER — LEVOTHYROXINE SODIUM 100 UG/1
100 TABLET ORAL DAILY
Qty: 90 TABLET | Refills: 0 | Status: SHIPPED | OUTPATIENT
Start: 2025-02-24

## 2025-03-31 DIAGNOSIS — I10 ESSENTIAL HYPERTENSION: ICD-10-CM

## 2025-03-31 RX ORDER — HYDRALAZINE HYDROCHLORIDE 50 MG/1
50 TABLET, FILM COATED ORAL 2 TIMES DAILY
Qty: 180 TABLET | Refills: 3 | Status: SHIPPED | OUTPATIENT
Start: 2025-03-31

## 2025-04-08 ENCOUNTER — APPOINTMENT (OUTPATIENT)
Dept: ENDOCRINOLOGY | Facility: CLINIC | Age: 85
End: 2025-04-08
Payer: MEDICARE

## 2025-04-08 VITALS
DIASTOLIC BLOOD PRESSURE: 60 MMHG | BODY MASS INDEX: 23.22 KG/M2 | TEMPERATURE: 60 F | SYSTOLIC BLOOD PRESSURE: 144 MMHG | WEIGHT: 113 LBS

## 2025-04-08 DIAGNOSIS — E78.2 MIXED HYPERLIPIDEMIA: ICD-10-CM

## 2025-04-08 DIAGNOSIS — I10 BENIGN HYPERTENSION: ICD-10-CM

## 2025-04-08 DIAGNOSIS — E03.9 HYPOTHYROIDISM, UNSPECIFIED TYPE: ICD-10-CM

## 2025-04-08 DIAGNOSIS — E10.65 TYPE 1 DIABETES MELLITUS WITH HYPERGLYCEMIA (MULTI): Primary | ICD-10-CM

## 2025-04-08 LAB — POC HEMOGLOBIN A1C: 6.2 % (ref 4.2–6.5)

## 2025-04-08 PROCEDURE — 99214 OFFICE O/P EST MOD 30 MIN: CPT | Performed by: INTERNAL MEDICINE

## 2025-04-08 PROCEDURE — 1123F ACP DISCUSS/DSCN MKR DOCD: CPT | Performed by: INTERNAL MEDICINE

## 2025-04-08 PROCEDURE — 1036F TOBACCO NON-USER: CPT | Performed by: INTERNAL MEDICINE

## 2025-04-08 PROCEDURE — 83036 HEMOGLOBIN GLYCOSYLATED A1C: CPT | Performed by: INTERNAL MEDICINE

## 2025-04-08 PROCEDURE — 3077F SYST BP >= 140 MM HG: CPT | Performed by: INTERNAL MEDICINE

## 2025-04-08 PROCEDURE — 1126F AMNT PAIN NOTED NONE PRSNT: CPT | Performed by: INTERNAL MEDICINE

## 2025-04-08 PROCEDURE — 3078F DIAST BP <80 MM HG: CPT | Performed by: INTERNAL MEDICINE

## 2025-04-08 ASSESSMENT — PATIENT HEALTH QUESTIONNAIRE - PHQ9
SUM OF ALL RESPONSES TO PHQ9 QUESTIONS 1 & 2: 0
2. FEELING DOWN, DEPRESSED OR HOPELESS: NOT AT ALL
1. LITTLE INTEREST OR PLEASURE IN DOING THINGS: NOT AT ALL

## 2025-04-08 ASSESSMENT — PAIN SCALES - GENERAL: PAINLEVEL_OUTOF10: 0-NO PAIN

## 2025-04-08 NOTE — PROGRESS NOTES
HPI   83 yo with Diabetes 1 (dx 1981, +neuropathy/nepropathy/low sugar unawareness), HTN, Dyslipidemia, hypothyroid, osteoporosis, CLL, CVD. A1c was 6.3% (pt was anemic), today 6.2%.      Pt is testing sugars 4 times per day using mp 2. Pt is having low sugars 1-2 times/week no clear pattern. Pt is following a carb controlled diet and knows reasonable carb allowances. Pt is able to afford their medications. Pt has been walking her dog, active around her house.           Taking tresiba 11 (was 12)  units qd, humalog (100-125) with 1/2 unit increase for every 25 increase in sugar, baseline 3-3-4-0.           Taking lisinopril 40mg, amlodipine 10mg, toprol 25mg 1/2 pill bid, hctz 25mg, hydralazine 50mg bid.           Taking pravastatin 40mg qhs for lipids and tolerating.          Taking levothyroxine 100mcg qd, euthyroid without obstructive sx.           Pt has had 3 treatments of IV reclast for osteoporosis sees rheumatology.           90 day mp 2 data: 73% in range, 4% low, pattern: low 100's overnight, after breakfast upper 100's,  low/mid 100's through the day, mid/upper 100's at bedtime, can vary nearly 100 pts.         Current Outpatient Medications:     acetaminophen (Tylenol) 500 mg tablet, Take 1 tablet (500 mg) by mouth every 6 hours if needed for mild pain (1 - 3), moderate pain (4 - 6), headaches or fever (temp greater than 38.0 C) (Call if any fevers)., Disp: 30 tablet, Rfl: 0    aflibercept (Eylea) 2 mg/0.05 mL intra-ocular injection, 0.05 mL (2 mg) by intravitreal route 1 time. Left eye - EVERY 3 MONTHS, Disp: , Rfl:     amLODIPine (Norvasc) 10 mg tablet, TAKE 1 TABLET(10 MG) BY MOUTH EVERY DAY, Disp: 90 tablet, Rfl: 3    aspirin 81 mg EC tablet, Take 1 tablet (81 mg) by mouth once daily., Disp: , Rfl:     cholecalciferol (Vitamin D-3) 50 mcg (2,000 unit) capsule, Take 1 capsule (50 mcg) by mouth early in the morning.., Disp: , Rfl:     hydrALAZINE (Apresoline) 50 mg tablet, TAKE 1 TABLET(50 MG) BY  MOUTH TWICE DAILY, Disp: 180 tablet, Rfl: 3    insulin degludec (Tresiba FlexTouch U-100) 100 unit/mL (3 mL) injection, INJECT 11 UNITS UNDER THE SKIN AS DIRECTED DAILY, Disp: 15 mL, Rfl: 1    insulin lispro (HumaLOG  KwikPen U-100) 100 unit/mL injection, INJECT UP TO 30 UNITS UNDER THE SKIN DAILY AT MEALS, Disp: 15 mL, Rfl: 5    levothyroxine (Synthroid, Levoxyl) 100 mcg tablet, TAKE 1 TABLET BY MOUTH EVERY DAY, Disp: 90 tablet, Rfl: 0    lisinopril 40 mg tablet, Take 0.5 tablets (20 mg) by mouth once daily., Disp: 90 tablet, Rfl: 3    metoprolol tartrate (Lopressor) 25 mg tablet, Take 1 tablet (25 mg) by mouth 2 times a day., Disp: 180 tablet, Rfl: 3    nitroglycerin (Nitrostat) 0.4 mg SL tablet, Place 1 tablet (0.4 mg) under the tongue every 5 minutes if needed for chest pain., Disp: , Rfl:     polyethylene glycol (Miralax) 17 gram/dose powder, Mix 17 g of powder and drink once daily., Disp: , Rfl:     pravastatin (Pravachol) 40 mg tablet, Take 1 tablet (40 mg) by mouth once daily., Disp: 90 tablet, Rfl: 3    sulfaSALAzine (Azulfidine) 500 mg DR tablet, Take 1 tablet (500 mg) by mouth 1 time., Disp: , Rfl:       Allergies as of 04/08/2025 - Reviewed 04/08/2025   Allergen Reaction Noted    Amoxicillin Hives 08/18/2023    Chlorpheniramine Unknown 11/04/2024    Amoxicillin-pot clavulanate Rash 10/03/2023    Ciprofloxacin Rash 08/18/2023    Levofloxacin Rash 08/18/2023         Review of Systems   Cardiology: Lightheadedness-denies.  Chest pain-denies.  Leg edema-denies.  Palpitations-denies.  Respiratory: Cough-denies. Shortness of breath-denies.  Wheezing-denies.  Gastroenterology: Constipation-denies.  Diarrhea-denies.  Heartburn-denies.  Endocrinology: Cold intolerance-denies.  Heat intolerance-denies.  Sweats-denies.  Neurology: Headache-denies.  Tremor-denies.  Neuropathy in extremities-in feet at night  Psychology: Low energy-denies.  Irritability-denies.  Sleep disturbances-denies.      /60 (BP  Location: Right arm, Patient Position: Sitting)   Temp (!) 15.6 °C (60 °F)   Wt 51.3 kg (113 lb)   LMP  (LMP Unknown)   BMI 23.22 kg/m²       Labs:  Lab Results   Component Value Date    WBC 5.3 12/18/2024    NRBC 0.0 12/18/2024    RBC 3.70 (L) 12/18/2024    HGB 11.7 (L) 12/18/2024    HCT 37.2 12/18/2024     12/18/2024     Lab Results   Component Value Date    CALCIUM 8.8 11/22/2024    AST 20 10/07/2024    ALKPHOS 65 10/07/2024    BILITOT 0.4 10/07/2024    PROT 6.7 10/07/2024    ALBUMIN 4.1 10/07/2024    GLOB 2.3 10/11/2022    AGR 1.3 (L) 10/11/2022     11/22/2024    K 4.5 11/22/2024     11/22/2024    CO2 27 11/22/2024    ANIONGAP 12 11/22/2024    BUN 25 (H) 11/22/2024    CREATININE 1.04 11/22/2024    UREACREAUR 18.9 10/11/2022    GLUCOSE 107 (H) 11/22/2024    ALT 9 10/07/2024    EGFR 54 (L) 11/22/2024     Lab Results   Component Value Date    CHOL 137 11/13/2024    TRIG 127 11/13/2024    HDL 58.6 11/13/2024    LDLCALC 53 11/13/2024     Lab Results   Component Value Date    MICROALBCREA 83.6 (H) 11/13/2024     Lab Results   Component Value Date    TSH 0.61 10/07/2024     Lab Results   Component Value Date    VRPCYHDV61 849 10/07/2024     Lab Results   Component Value Date    HGBA1C 6.2 04/08/2025         Physical Exam   General Appearance: pleasant, cooperative, no acute distress  HEENT: no chemosis, no proptosis, no lid lag, no lid retraction  Neck: no lymphadenopathy, no thyromegaly, no dominant thyroid nodules  Heart: no murmurs, regular rate and rhythm, S1 and S2  Lungs: no wheezes, no rhonci, no rales  Extremities: no lower extremity swelling      Assessment/Plan   1. Type 1 diabetes mellitus with hyperglycemia (Multi) (Primary)  -A1c ordered and reviewed  -mp data reviewed  -labs reviewed    -too many lows, lower tresiba from 11 to 10 units: target am sugar 100-150 range  -lower prandial dosage by 1 unit: 100-125 sugars 2-2-3-0, increment 1/2 unit for every 25     2. Benign  hypertension  -on meds near target will follow    3. Mixed hyperlipidemia  -on statin at target, labs reviewed, will follow    4. Hypothyroidism, unspecified type  -euthyroid on exam, will follow         Follow Up:  pharm[facility] 6 months    Medical Decision Making  Complexity of problem: Chronic illness of diabetes mellitus uncontrolled, progressing  Data analyzed and reviewed: Reviewed prior notes, blood glucose data, labs including HgbA1c, lipids, serum chemistries.  Ordered tests.   Risk of complications and morbidities: Is definite because of use of insulin and risk of hypoglycemia.  Prescription medications reviewed and modifications made.  Compliance assessed.  Addressed social determinants of health including food insecurity.

## 2025-04-10 PROBLEM — R01.1 HEART MURMUR: Status: RESOLVED | Noted: 2023-08-19 | Resolved: 2025-04-10

## 2025-04-16 ENCOUNTER — APPOINTMENT (OUTPATIENT)
Facility: CLINIC | Age: 85
End: 2025-04-16
Payer: MEDICARE

## 2025-04-16 DIAGNOSIS — I25.119 ATHEROSCLEROSIS OF NATIVE CORONARY ARTERY OF NATIVE HEART WITH ANGINA PECTORIS: Primary | ICD-10-CM

## 2025-04-16 DIAGNOSIS — E78.2 MIXED HYPERLIPIDEMIA: ICD-10-CM

## 2025-04-16 DIAGNOSIS — I35.0 NONRHEUMATIC AORTIC (VALVE) STENOSIS: ICD-10-CM

## 2025-04-16 DIAGNOSIS — I10 ESSENTIAL HYPERTENSION: ICD-10-CM

## 2025-05-04 NOTE — ASSESSMENT & PLAN NOTE
Dr. Ramirez checked lipid panel back in November: Total cholesterol 137, triglycerides 127, HDL cholesterol 58 and LDL 53.  Good reduction in the LDL cholesterol with the pravastatin at 40 mg daily.  No changes necessary at this time.

## 2025-05-05 ENCOUNTER — ANCILLARY PROCEDURE (OUTPATIENT)
Dept: VASCULAR MEDICINE | Facility: CLINIC | Age: 85
End: 2025-05-05
Payer: MEDICARE

## 2025-05-05 ENCOUNTER — OFFICE VISIT (OUTPATIENT)
Dept: VASCULAR SURGERY | Facility: CLINIC | Age: 85
End: 2025-05-05
Payer: MEDICARE

## 2025-05-05 VITALS
SYSTOLIC BLOOD PRESSURE: 142 MMHG | DIASTOLIC BLOOD PRESSURE: 82 MMHG | WEIGHT: 113 LBS | RESPIRATION RATE: 18 BRPM | HEART RATE: 60 BPM | HEIGHT: 59 IN | BODY MASS INDEX: 22.78 KG/M2

## 2025-05-05 DIAGNOSIS — I73.9 PAD (PERIPHERAL ARTERY DISEASE) (CMS-HCC): Primary | ICD-10-CM

## 2025-05-05 DIAGNOSIS — L98.499 ARTERIAL INSUFFICIENCY WITH ISCHEMIC ULCER (MULTI): ICD-10-CM

## 2025-05-05 DIAGNOSIS — I73.9 PAD (PERIPHERAL ARTERY DISEASE) (CMS-HCC): ICD-10-CM

## 2025-05-05 DIAGNOSIS — I77.1 ARTERIAL INSUFFICIENCY WITH ISCHEMIC ULCER (MULTI): ICD-10-CM

## 2025-05-05 PROCEDURE — 1123F ACP DISCUSS/DSCN MKR DOCD: CPT | Performed by: NURSE PRACTITIONER

## 2025-05-05 PROCEDURE — 93926 LOWER EXTREMITY STUDY: CPT | Performed by: SURGERY

## 2025-05-05 PROCEDURE — 99213 OFFICE O/P EST LOW 20 MIN: CPT | Mod: 25 | Performed by: NURSE PRACTITIONER

## 2025-05-05 PROCEDURE — 3077F SYST BP >= 140 MM HG: CPT | Performed by: NURSE PRACTITIONER

## 2025-05-05 PROCEDURE — 1126F AMNT PAIN NOTED NONE PRSNT: CPT | Performed by: NURSE PRACTITIONER

## 2025-05-05 PROCEDURE — 1159F MED LIST DOCD IN RCRD: CPT | Performed by: NURSE PRACTITIONER

## 2025-05-05 PROCEDURE — 93922 UPR/L XTREMITY ART 2 LEVELS: CPT | Performed by: SURGERY

## 2025-05-05 PROCEDURE — 93922 UPR/L XTREMITY ART 2 LEVELS: CPT

## 2025-05-05 PROCEDURE — 3079F DIAST BP 80-89 MM HG: CPT | Performed by: NURSE PRACTITIONER

## 2025-05-05 PROCEDURE — 99213 OFFICE O/P EST LOW 20 MIN: CPT | Performed by: NURSE PRACTITIONER

## 2025-05-05 PROCEDURE — 1160F RVW MEDS BY RX/DR IN RCRD: CPT | Performed by: NURSE PRACTITIONER

## 2025-05-05 PROCEDURE — 93926 LOWER EXTREMITY STUDY: CPT

## 2025-05-05 PROCEDURE — 1036F TOBACCO NON-USER: CPT | Performed by: NURSE PRACTITIONER

## 2025-05-05 ASSESSMENT — LIFESTYLE VARIABLES
HOW MANY STANDARD DRINKS CONTAINING ALCOHOL DO YOU HAVE ON A TYPICAL DAY: PATIENT DOES NOT DRINK
AUDIT-C TOTAL SCORE: 0
HOW OFTEN DO YOU HAVE SIX OR MORE DRINKS ON ONE OCCASION: NEVER
SKIP TO QUESTIONS 9-10: 1
HOW OFTEN DO YOU HAVE A DRINK CONTAINING ALCOHOL: NEVER

## 2025-05-05 ASSESSMENT — PAIN SCALES - GENERAL: PAINLEVEL_OUTOF10: 0-NO PAIN

## 2025-05-05 ASSESSMENT — ENCOUNTER SYMPTOMS
DEPRESSION: 0
LOSS OF SENSATION IN FEET: 1
OCCASIONAL FEELINGS OF UNSTEADINESS: 1

## 2025-05-05 NOTE — PATIENT INSTRUCTIONS
Bonnie,    It was great to see you again!  Thank you for choosing  vascular surgery for your care.    We discussed your most recent artery testing which shows a stent in your right superficial femoral artery.  There is some blockage within the stent, however, this does not seem to be affecting you negatively.  We will monitor this in 6 months.  Please follow-up with a right lower extremity arterial duplex and an KIP.

## 2025-05-05 NOTE — PROGRESS NOTES
History Of Present Illness  Bonnie Jules is a 84 y.o. female presenting with known peripheral arterial disease and a history of right lower extremity endovascular intervention in 2016.  Patient presents today for routine follow-up and denies any symptoms of claudication, ischemic rest pain, open wounds or sores.  During last visit in November 2024, it was noted she had an anterior right leg ulceration which has subsequently healed.  She does have some cramping in the left leg at night but no specific symptoms of rest pain.  She go shopping regularly and is able to walk the store without difficulty.  She takes aspirin and pravastatin.     Past Medical History  She has a past medical history of Anxiety, Benign hypertension, Chronic ischemic colitis (Multi), Chronic kidney disease, stage III (moderate) (Multi), CLL (chronic lymphocytic leukemia) (Multi), Closed fracture of calcaneus (07/02/2008), Closed fracture of multiple ribs of right side, initial encounter (06/11/2024), Coronary artery disease, CTS (carpal tunnel syndrome), Diabetes mellitus (Multi), Dry eye syndrome of bilateral lacrimal glands, Essential (primary) hypertension, Exudative age-related macular degeneration of left eye, unspecified stage, GI bleed, Hyperlipidemia, unspecified, Hypertension, Hypothyroidism, Hypothyroidism, unspecified type, Macular cyst, hole, or pseudohole, left eye, Miosis, Mixed hyperlipidemia, NSTEMI (non-ST elevated myocardial infarction) (Multi), Osteoarthritis, Osteoporosis, Peripheral vascular disease (CMS-HCC), Rectal prolapse (10/28/2024), Retinal edema, Right knee pain, Seizure disorder (Multi), Type 1 diabetes mellitus with hyperglycemia (Multi), and Unspecified disorder of refraction.    Surgical History  She has a past surgical history that includes Carpal tunnel release (Bilateral); Total hip arthroplasty (Left, 2012); Coronary artery bypass graft (2005); Cholecystectomy; Cataract extraction w/ intraocular lens   implant, bilateral; Hysterectomy; Colonoscopy; Other surgical history; Other surgical history (Right, 01/2016); and Knee surgery (Right, 04/17/2024).     Social History  She reports that she has never smoked. She has been exposed to tobacco smoke. She has never used smokeless tobacco. She reports that she does not currently use alcohol. She reports that she does not use drugs.    Family History  Family History[1]     Allergies  Amoxicillin, Chlorpheniramine, Amoxicillin-pot clavulanate, Ciprofloxacin, and Levofloxacin    Review of Systems    CONSTITUTIONAL: Denies weight loss, fever and chills.     HEENT: Denies changes in vision and hearing.     RESPIRATORY: Denies SOB and cough.     CV: Denies palpitations and CP.     GI: Denies abdominal pain, nausea, vomiting and diarrhea.     : Denies dysuria and urinary frequency.     MSK: Denies myalgia and joint pain.     SKIN: Denies rash and pruritus.     VASC: Denies claudication, ischemic rest pain.  No open wounds or sores     NEUROLOGICAL: Denies headache and syncope.     PSYCHIATRIC: Denies recent changes in mood. Denies anxiety and depression.     Physical Exam    General: Pt is alert and oriented x 3. Pleasant, conversive  HEENT: Head is atraumatic, normocephalic.   Cardiac: Normal S1-S2.  Regular rate and rhythm.  No murmurs.  Respiratory: Lungs clear to auscultation.  No adventitious sounds.  Abdomen: Soft, nondistended, nontender.  Bowel sounds x4 quadrants.  Pulse exam: Palpable brachial and radial pulses bilaterall.  Nonpalpable dorsalis pedis and posterior tibial pulse on the right.  Weakly palpable dorsalis pedis pulse on the left.  Extremities: No significant edema noted.  Extremities are warm to the touch and normal in color.  No open wounds or sores  Neuro: Moves all extremities spontaneously.  No focal deficits.  Psych: Appropriate affect.  Answers questions appropriately.     Last Recorded Vitals  /82   Pulse 60   Resp 18   Wt 51.3 kg (113 lb)      Relevant Results  Current Outpatient Medications   Medication Instructions    acetaminophen (TYLENOL) 500 mg, oral, Every 6 hours PRN    amLODIPine (NORVASC) 10 mg, oral, Daily    aspirin 81 mg, Daily    cholecalciferol (Vitamin D-3) 50 mcg (2,000 unit) capsule 1 capsule, Daily    Eylea 2 mg, Once    hydrALAZINE (APRESOLINE) 50 mg, oral, 2 times daily    insulin degludec (Tresiba FlexTouch U-100) 100 unit/mL (3 mL) injection INJECT 11 UNITS UNDER THE SKIN AS DIRECTED DAILY    insulin lispro (HumaLOG  KwikPen U-100) 100 unit/mL injection INJECT UP TO 30 UNITS UNDER THE SKIN DAILY AT MEALS    levothyroxine (SYNTHROID, LEVOXYL) 100 mcg, oral, Daily    lisinopril 20 mg, oral, Daily    metoprolol tartrate (LOPRESSOR) 25 mg, oral, 2 times daily    nitroglycerin (NITROSTAT) 0.4 mg, Every 5 min PRN    polyethylene glycol (MIRALAX) 17 g, Daily    pravastatin (PRAVACHOL) 40 mg, oral, Daily    sulfaSALAzine (Azulfidine) 500 mg DR tablet 1 tablet, Once      Vascular US Ankle Brachial Index (KIP) Without Exercise  Result Date: 5/5/2025  Preliminary Cardiology [place] [address]            Phone [phone]            Preliminary Vascular Lab Report  [facility] US ANKLE BRACHIAL INDEX (KIP) WITHOUT EXERCISE  Patient Name:     REBECCA Sauceda Physician:  [id number] Yrn Stovall MD Study Date:       5/5/2025        Ordering Provider:  [id number] PEDRITO DEMPSEY MRN/PID:          [id number]        Fellow: Accession#:       [id number]    Technologist:       Eda Lozoya RVHUSAM YOB: 1940       Technologist 2: Gender:           F               Encounter#:         [id number] Admission Status: Outpatient      Location Performed: [facility]  Diagnosis/ICD: Peripheral vascular disease, unspecified-I73.9 CPT Codes:     35200 Peripheral artery KIP Only  Pertinent History: Remote history of angioplasty and stent placement of right                     lower extremity by Dr. Kapadia.  PRELIMINARY CONCLUSIONS:  Right Lower PVR: Evidence of moderate arterial occlusive disease in the right lower extremity at rest. Decreased digital perfusion noted. Monophasic flow is noted in the right posterior tibial artery and right dorsalis pedis artery. Severity of disease called by PVR and Doppler tracings due to non-compressible vessels. Left Lower PVR: No evidence of arterial occlusive disease in the left lower extremity at rest. Normal digital perfusion noted. Multiphasic flow is noted in the left posterior tibial artery and left dorsalis pedis artery.  Imaging & Doppler Findings:  RIGHT Lower PVR                Pressures Ratios Right Posterior Tibial (Ankle) 255 mmHg  1.44 Right Dorsalis Pedis (Ankle)   255 mmHg  1.44 Right Digit (Great Toe)        64 mmHg   0.36   LEFT Lower PVR                Pressures Ratios Left Posterior Tibial (Ankle) 181 mmHg  1.02 Left Dorsalis Pedis (Ankle)   188 mmHg  1.06 Left Digit (Great Toe)        119 mmHg  0.67                      Right     Left Brachial Pressure 175 mmHg 177 mmHg   VASCULAR PRELIMINARY REPORT completed by Eda Lozoya RVT on 5/5/2025 at 11:38:11 AM  ** Final **     Vascular US lower extremity arterial duplex right  Result Date: 5/5/2025  Preliminary Cardiology Report           [facility] [id number] [place] [address]            Phone [phone]         Preliminary Vascular Lab Report  [facility] US LOWER EXTREMITY ARTERIAL DUPLEX RIGHT  Patient Name:     REBECCA Sauceda Physician:  [id number] Stovall MD Study Date:       5/5/2025        Ordering Provider:  [id number] PEDRITO DEMPSEY MRN/PID:          [id number]        Fellow: Accession#:       [id number]    Technologist:       Eda Lozoya RVT YOB: 1940       Technologist 2: Gender:           F               Encounter#:         [id number] Admission Status: Outpatient      Location Performed: [facility]  Diagnosis/ICD: Peripheral  vascular disease, unspecified-I73.9 CPT Codes:     21159 Peripheral artery Lower arterial Duplex limited  Pertinent History: Remote history of angioplasty and stent placement in right                    lower extremity by Dr. Kapadia.  PRELIMINARY CONCLUSIONS:  Right Lower Arterial: There is atherosclerotic, calcified plaque throughout vessels of right lower extremity. Distal external iliac, common and profunda femoral arteries appear patent without significant stenosis. There appears to be a stent within proximal superficial femoral artery; patent. Flow irregularities noted mid to distal SFA with hemodynamically significant stenosis (34cm/s to 102cm/s). Flow visualized in segments throughout popliteal artery and tibioperoneal trunk. Flow seen in segments throughout anterior tibial artery, mid to distal peroneal artery and mid posterior tibial artery; cannot rule out segmental occlusions due to calcified plaque shadowing. Stent: Right SFA stent: Proximal 62sm/s Mid 41cm/s Distal 31cm/s.  Imaging & Doppler Findings:  Right                     Left   PSV                      PSV 77 cm/s         EIA 68 cm/s         CFA 84 cm/s  Profunda Proximal 54 cm/s    SFA Proximal 34 cm/s       SFA Mid 102 cm/s    SFA Distal 85 cm/s      Popliteal 34 cm/s    PAWEL Proximal 18 cm/s       PAWEL Mid 29 cm/s     PAWEL Distal 29 cm/s    Peroneal Mid 14 cm/s   Peroneal Distal 11 cm/s       PTA Mid VASCULAR PRELIMINARY REPORT completed by Eda Lozoya RVT on 5/5/2025 at 11:31:41 AM  ** Final **         Assessment/Plan   Peripheral arterial disease  Status post right lower extremity angioplasty and stenting in 2016.    I reviewed the results of the patient's KIP and arterial duplex independently.  The patient's ABIs are falsely elevated and invalid.  TBI on the right is 0.36.  TBI on the left 0.67.     The ultrasound showed a right superficial femoral artery stent with some irregular flow and stenosis.  Velocity shift from 34 cm/s to 102  cm/s.    Will continue to monitor in 6 months with a right lower extremity arterial duplex as well as ABIs.  No open wounds or sores, no rest pain.    Continue aspirin and statin.    CARMEN Alcala-KELVIN        [1]   Family History  Problem Relation Name Age of Onset    Diabetes Mother      Diabetes Daughter      Other (RA) Daughter

## 2025-05-12 ENCOUNTER — OFFICE VISIT (OUTPATIENT)
Facility: CLINIC | Age: 85
End: 2025-05-12
Payer: MEDICARE

## 2025-05-12 VITALS
OXYGEN SATURATION: 96 % | HEART RATE: 63 BPM | SYSTOLIC BLOOD PRESSURE: 124 MMHG | BODY MASS INDEX: 23.23 KG/M2 | DIASTOLIC BLOOD PRESSURE: 64 MMHG | WEIGHT: 115 LBS

## 2025-05-12 DIAGNOSIS — E78.2 MIXED HYPERLIPIDEMIA: ICD-10-CM

## 2025-05-12 DIAGNOSIS — I35.0 NONRHEUMATIC AORTIC (VALVE) STENOSIS: Primary | ICD-10-CM

## 2025-05-12 DIAGNOSIS — I10 ESSENTIAL HYPERTENSION: ICD-10-CM

## 2025-05-12 DIAGNOSIS — I25.119 ATHEROSCLEROSIS OF NATIVE CORONARY ARTERY OF NATIVE HEART WITH ANGINA PECTORIS: ICD-10-CM

## 2025-05-12 PROCEDURE — 1126F AMNT PAIN NOTED NONE PRSNT: CPT | Performed by: INTERNAL MEDICINE

## 2025-05-12 PROCEDURE — 3074F SYST BP LT 130 MM HG: CPT | Performed by: INTERNAL MEDICINE

## 2025-05-12 PROCEDURE — 99214 OFFICE O/P EST MOD 30 MIN: CPT | Performed by: INTERNAL MEDICINE

## 2025-05-12 PROCEDURE — 3078F DIAST BP <80 MM HG: CPT | Performed by: INTERNAL MEDICINE

## 2025-05-12 PROCEDURE — 1159F MED LIST DOCD IN RCRD: CPT | Performed by: INTERNAL MEDICINE

## 2025-05-12 PROCEDURE — 1036F TOBACCO NON-USER: CPT | Performed by: INTERNAL MEDICINE

## 2025-05-12 RX ORDER — VALACYCLOVIR HYDROCHLORIDE 500 MG/1
500 TABLET, FILM COATED ORAL DAILY
COMMUNITY

## 2025-05-12 ASSESSMENT — ENCOUNTER SYMPTOMS
NUMBNESS: 0
OCCASIONAL FEELINGS OF UNSTEADINESS: 1
SHORTNESS OF BREATH: 0
DEPRESSION: 0
LOSS OF SENSATION IN FEET: 1
DYSURIA: 0
PALPITATIONS: 0
DYSPNEA ON EXERTION: 0
BLURRED VISION: 0
ABDOMINAL PAIN: 0
COUGH: 0
HEMATURIA: 0
PARESTHESIAS: 0

## 2025-05-12 ASSESSMENT — PAIN SCALES - GENERAL: PAINLEVEL_OUTOF10: 0-NO PAIN

## 2025-05-12 ASSESSMENT — LIFESTYLE VARIABLES: TOTAL SCORE: 0

## 2025-05-12 NOTE — ASSESSMENT & PLAN NOTE
Remote coronary artery bypass graft surgery back in May 2005.  Stable with no anginal type symptoms.  We will continue standard risk factor modification and follow on a clinical basis.

## 2025-05-12 NOTE — PROGRESS NOTES
Subjective   Bonnie Jules is a 84 y.o. female.    Chief Complaint:  6 month follow up    HPI  84-year-old female with a history of coronary artery disease and valvular heart disease reports for routine follow-up visit.  She underwent a three-vessel bypass surgery back in May 2005 and has done well from a heart standpoint.  She has developed with age aortic valve stenosis with a mean gradient of 18 mmHg on her last echocardiogram.  Currently she describes no chest pain or anginal symptoms.  She has had no episodes of congestive heart failure and describes no episodes of syncope.  She does have osteoarthritic joint pains and some mild edema in the legs at times but otherwise states she is doing pretty well.    Review of Systems   Constitutional: Negative for malaise/fatigue.   HENT:  Negative for congestion.    Eyes:  Negative for blurred vision.   Cardiovascular:  Negative for chest pain, dyspnea on exertion and palpitations.   Respiratory:  Negative for cough and shortness of breath.    Musculoskeletal:  Negative for joint pain.   Gastrointestinal:  Negative for abdominal pain.   Genitourinary:  Negative for dysuria and hematuria.   Neurological:  Negative for numbness and paresthesias.       Objective   Constitutional:       Appearance: Not in distress.   Eyes:      Conjunctiva/sclera: Conjunctivae normal.   Neck:      Vascular: JVD normal.   Pulmonary:      Breath sounds: Normal breath sounds. No wheezing. No rhonchi. No rales.   Cardiovascular:      Normal rate. Regular rhythm.      Murmurs: There is a grade 3/6 mid frequency midsystolic murmur.      No gallop.  No click. No rub.   Edema:     Pretibial: bilateral trace edema of the pretibial area.     Ankle: bilateral trace edema of the ankle.     Feet: bilateral trace edema of the feet.  Abdominal:      Palpations: Abdomen is soft.   Neurological:      General: No focal deficit present.      Mental Status: Alert.         Lab Review:   Office Visit on  04/08/2025   Component Date Value    POC HEMOGLOBIN A1c 04/08/2025 6.2        Assessment/Plan   The primary encounter diagnosis was Nonrheumatic aortic (valve) stenosis. Diagnoses of Mixed hyperlipidemia, Atherosclerosis of native coronary artery of native heart with angina pectoris, and Essential hypertension were also pertinent to this visit.    Mixed hyperlipidemia  Dr. Ramirez checked lipid panel back in November: Total cholesterol 137, triglycerides 127, HDL cholesterol 58 and LDL 53.  Good reduction in the LDL cholesterol with the pravastatin at 40 mg daily.  No changes necessary at this time.    Nonrheumatic aortic (valve) stenosis  Mild to moderate aortic valve stenosis on last echocardiogram with a mean gradient of 18 mmHg and a dimensionless index of 0.31.  Will plan on repeating echocardiogram on her return visit in the fall.    Essential hypertension  Blood pressure well-controlled on the combination of lisinopril 40 mg daily, metoprolol tartrate 25 mg twice daily, hydralazine 50 mg twice daily and amlodipine 10 mg once daily.  No changes necessary at this time.    Atherosclerotic heart disease of native coronary artery with unspecified angina pectoris  Remote coronary artery bypass graft surgery back in May 2005.  Stable with no anginal type symptoms.  We will continue standard risk factor modification and follow on a clinical basis.

## 2025-05-12 NOTE — ASSESSMENT & PLAN NOTE
Mild to moderate aortic valve stenosis on last echocardiogram with a mean gradient of 18 mmHg and a dimensionless index of 0.31.  Will plan on repeating echocardiogram on her return visit in the fall.

## 2025-05-12 NOTE — ASSESSMENT & PLAN NOTE
Blood pressure well-controlled on the combination of lisinopril 40 mg daily, metoprolol tartrate 25 mg twice daily, hydralazine 50 mg twice daily and amlodipine 10 mg once daily.  No changes necessary at this time.

## 2025-05-16 DIAGNOSIS — E10.65 TYPE 1 DIABETES MELLITUS WITH HYPERGLYCEMIA (MULTI): ICD-10-CM

## 2025-05-16 RX ORDER — INSULIN DEGLUDEC 100 U/ML
INJECTION, SOLUTION SUBCUTANEOUS
Qty: 15 ML | Refills: 1 | Status: SHIPPED | OUTPATIENT
Start: 2025-05-16

## 2025-05-22 DIAGNOSIS — S22.31XA FRACTURE OF ONE RIB, RIGHT SIDE, INITIAL ENCOUNTER FOR CLOSED FRACTURE: ICD-10-CM

## 2025-05-23 RX ORDER — LEVOTHYROXINE SODIUM 100 UG/1
100 TABLET ORAL DAILY
Qty: 90 TABLET | Refills: 0 | Status: SHIPPED | OUTPATIENT
Start: 2025-05-23

## 2025-05-25 ENCOUNTER — APPOINTMENT (OUTPATIENT)
Dept: RADIOLOGY | Facility: HOSPITAL | Age: 85
End: 2025-05-25
Payer: MEDICARE

## 2025-05-25 ENCOUNTER — HOSPITAL ENCOUNTER (EMERGENCY)
Facility: HOSPITAL | Age: 85
Discharge: HOME | End: 2025-05-25
Attending: EMERGENCY MEDICINE
Payer: MEDICARE

## 2025-05-25 ENCOUNTER — APPOINTMENT (OUTPATIENT)
Dept: CARDIOLOGY | Facility: HOSPITAL | Age: 85
End: 2025-05-25
Payer: MEDICARE

## 2025-05-25 VITALS
WEIGHT: 113 LBS | OXYGEN SATURATION: 94 % | TEMPERATURE: 98.8 F | HEART RATE: 69 BPM | DIASTOLIC BLOOD PRESSURE: 62 MMHG | HEIGHT: 59 IN | BODY MASS INDEX: 22.78 KG/M2 | SYSTOLIC BLOOD PRESSURE: 122 MMHG | RESPIRATION RATE: 15 BRPM

## 2025-05-25 DIAGNOSIS — R11.2 NAUSEA AND VOMITING, UNSPECIFIED VOMITING TYPE: ICD-10-CM

## 2025-05-25 DIAGNOSIS — N39.0 URINARY TRACT INFECTION WITHOUT HEMATURIA, SITE UNSPECIFIED: ICD-10-CM

## 2025-05-25 DIAGNOSIS — R10.84 GENERALIZED ABDOMINAL PAIN: ICD-10-CM

## 2025-05-25 DIAGNOSIS — R53.83 MALAISE AND FATIGUE: Primary | ICD-10-CM

## 2025-05-25 DIAGNOSIS — R53.81 MALAISE AND FATIGUE: Primary | ICD-10-CM

## 2025-05-25 DIAGNOSIS — R07.9 CHEST PAIN, UNSPECIFIED TYPE: ICD-10-CM

## 2025-05-25 DIAGNOSIS — R05.1 ACUTE COUGH: ICD-10-CM

## 2025-05-25 LAB
ALBUMIN SERPL BCP-MCNC: 4.1 G/DL (ref 3.4–5)
ALP SERPL-CCNC: 61 U/L (ref 33–136)
ALT SERPL W P-5'-P-CCNC: 15 U/L (ref 7–45)
ANION GAP SERPL CALCULATED.3IONS-SCNC: 15 MMOL/L (ref 10–20)
APPEARANCE UR: ABNORMAL
AST SERPL W P-5'-P-CCNC: 24 U/L (ref 9–39)
BACTERIA #/AREA URNS AUTO: ABNORMAL /HPF
BASOPHILS # BLD MANUAL: 0 X10*3/UL (ref 0–0.1)
BASOPHILS NFR BLD MANUAL: 0 %
BILIRUB SERPL-MCNC: 1.1 MG/DL (ref 0–1.2)
BILIRUB UR STRIP.AUTO-MCNC: NEGATIVE MG/DL
BNP SERPL-MCNC: 971 PG/ML (ref 0–99)
BUN SERPL-MCNC: 31 MG/DL (ref 6–23)
CALCIUM SERPL-MCNC: 9.1 MG/DL (ref 8.6–10.3)
CARDIAC TROPONIN I PNL SERPL HS: 15 NG/L (ref 0–13)
CARDIAC TROPONIN I PNL SERPL HS: 18 NG/L (ref 0–13)
CHLORIDE SERPL-SCNC: 105 MMOL/L (ref 98–107)
CO2 SERPL-SCNC: 22 MMOL/L (ref 21–32)
COLOR UR: ABNORMAL
CREAT SERPL-MCNC: 1.03 MG/DL (ref 0.5–1.05)
EGFRCR SERPLBLD CKD-EPI 2021: 54 ML/MIN/1.73M*2
EOSINOPHIL # BLD MANUAL: 0.06 X10*3/UL (ref 0–0.4)
EOSINOPHIL NFR BLD MANUAL: 1 %
ERYTHROCYTE [DISTWIDTH] IN BLOOD BY AUTOMATED COUNT: 12.9 % (ref 11.5–14.5)
FLUAV RNA RESP QL NAA+PROBE: NOT DETECTED
FLUBV RNA RESP QL NAA+PROBE: NOT DETECTED
GLUCOSE SERPL-MCNC: 167 MG/DL (ref 74–99)
GLUCOSE UR STRIP.AUTO-MCNC: NORMAL MG/DL
HCT VFR BLD AUTO: 34.3 % (ref 36–46)
HGB BLD-MCNC: 11.3 G/DL (ref 12–16)
HOLD SPECIMEN: NORMAL
IMM GRANULOCYTES # BLD AUTO: 0.02 X10*3/UL (ref 0–0.5)
IMM GRANULOCYTES NFR BLD AUTO: 0.4 % (ref 0–0.9)
KETONES UR STRIP.AUTO-MCNC: NEGATIVE MG/DL
LEUKOCYTE ESTERASE UR QL STRIP.AUTO: ABNORMAL
LYMPHOCYTES # BLD MANUAL: 1.79 X10*3/UL (ref 0.8–3)
LYMPHOCYTES NFR BLD MANUAL: 32 %
MCH RBC QN AUTO: 32.7 PG (ref 26–34)
MCHC RBC AUTO-ENTMCNC: 32.9 G/DL (ref 32–36)
MCV RBC AUTO: 99 FL (ref 80–100)
MONOCYTES # BLD MANUAL: 0.06 X10*3/UL (ref 0.05–0.8)
MONOCYTES NFR BLD MANUAL: 1 %
MUCOUS THREADS #/AREA URNS AUTO: ABNORMAL /LPF
NEUTROPHILS # BLD MANUAL: 3.25 X10*3/UL (ref 1.6–5.5)
NEUTS BAND # BLD MANUAL: 0.34 X10*3/UL (ref 0–0.5)
NEUTS BAND NFR BLD MANUAL: 6 %
NEUTS SEG # BLD MANUAL: 2.91 X10*3/UL (ref 1.6–5)
NEUTS SEG NFR BLD MANUAL: 52 %
NITRITE UR QL STRIP.AUTO: ABNORMAL
NRBC BLD-RTO: 0 /100 WBCS (ref 0–0)
PH UR STRIP.AUTO: 6.5 [PH]
PLATELET # BLD AUTO: 196 X10*3/UL (ref 150–450)
POTASSIUM SERPL-SCNC: 3.9 MMOL/L (ref 3.5–5.3)
PROT SERPL-MCNC: 6.4 G/DL (ref 6.4–8.2)
PROT UR STRIP.AUTO-MCNC: ABNORMAL MG/DL
RBC # BLD AUTO: 3.46 X10*6/UL (ref 4–5.2)
RBC # UR STRIP.AUTO: ABNORMAL MG/DL
RBC #/AREA URNS AUTO: ABNORMAL /HPF
RBC MORPH BLD: ABNORMAL
RSV RNA RESP QL NAA+PROBE: NOT DETECTED
SARS-COV-2 RNA RESP QL NAA+PROBE: NOT DETECTED
SODIUM SERPL-SCNC: 138 MMOL/L (ref 136–145)
SP GR UR STRIP.AUTO: 1.01
SQUAMOUS #/AREA URNS AUTO: ABNORMAL /HPF
TOTAL CELLS COUNTED BLD: 100
UROBILINOGEN UR STRIP.AUTO-MCNC: NORMAL MG/DL
VARIANT LYMPHS # BLD MANUAL: 0.45 X10*3/UL (ref 0–0.3)
VARIANT LYMPHS NFR BLD: 8 %
WBC # BLD AUTO: 5.6 X10*3/UL (ref 4.4–11.3)
WBC #/AREA URNS AUTO: >50 /HPF

## 2025-05-25 PROCEDURE — 99285 EMERGENCY DEPT VISIT HI MDM: CPT | Mod: 25 | Performed by: EMERGENCY MEDICINE

## 2025-05-25 PROCEDURE — 36415 COLL VENOUS BLD VENIPUNCTURE: CPT | Performed by: PHYSICIAN ASSISTANT

## 2025-05-25 PROCEDURE — 96375 TX/PRO/DX INJ NEW DRUG ADDON: CPT | Mod: 59

## 2025-05-25 PROCEDURE — 71275 CT ANGIOGRAPHY CHEST: CPT

## 2025-05-25 PROCEDURE — 2500000004 HC RX 250 GENERAL PHARMACY W/ HCPCS (ALT 636 FOR OP/ED): Mod: JZ | Performed by: EMERGENCY MEDICINE

## 2025-05-25 PROCEDURE — 2500000004 HC RX 250 GENERAL PHARMACY W/ HCPCS (ALT 636 FOR OP/ED): Mod: JZ | Performed by: PHYSICIAN ASSISTANT

## 2025-05-25 PROCEDURE — 83880 ASSAY OF NATRIURETIC PEPTIDE: CPT | Performed by: PHYSICIAN ASSISTANT

## 2025-05-25 PROCEDURE — 84484 ASSAY OF TROPONIN QUANT: CPT | Performed by: PHYSICIAN ASSISTANT

## 2025-05-25 PROCEDURE — 80053 COMPREHEN METABOLIC PANEL: CPT | Performed by: PHYSICIAN ASSISTANT

## 2025-05-25 PROCEDURE — 96365 THER/PROPH/DIAG IV INF INIT: CPT | Mod: 59

## 2025-05-25 PROCEDURE — 85027 COMPLETE CBC AUTOMATED: CPT | Performed by: PHYSICIAN ASSISTANT

## 2025-05-25 PROCEDURE — 85007 BL SMEAR W/DIFF WBC COUNT: CPT | Performed by: PHYSICIAN ASSISTANT

## 2025-05-25 PROCEDURE — 93005 ELECTROCARDIOGRAM TRACING: CPT

## 2025-05-25 PROCEDURE — 74177 CT ABD & PELVIS W/CONTRAST: CPT

## 2025-05-25 PROCEDURE — 87637 SARSCOV2&INF A&B&RSV AMP PRB: CPT | Performed by: PHYSICIAN ASSISTANT

## 2025-05-25 PROCEDURE — 2550000001 HC RX 255 CONTRASTS: Performed by: EMERGENCY MEDICINE

## 2025-05-25 PROCEDURE — 87186 SC STD MICRODIL/AGAR DIL: CPT | Mod: WESLAB | Performed by: PHYSICIAN ASSISTANT

## 2025-05-25 PROCEDURE — 96361 HYDRATE IV INFUSION ADD-ON: CPT

## 2025-05-25 PROCEDURE — 81001 URINALYSIS AUTO W/SCOPE: CPT | Performed by: PHYSICIAN ASSISTANT

## 2025-05-25 RX ORDER — CEPHALEXIN 500 MG/1
500 CAPSULE ORAL 3 TIMES DAILY
Qty: 21 CAPSULE | Refills: 0 | Status: SHIPPED | OUTPATIENT
Start: 2025-05-25 | End: 2025-06-01

## 2025-05-25 RX ORDER — FAMOTIDINE 10 MG/ML
20 INJECTION, SOLUTION INTRAVENOUS ONCE
Status: COMPLETED | OUTPATIENT
Start: 2025-05-25 | End: 2025-05-25

## 2025-05-25 RX ORDER — ONDANSETRON HYDROCHLORIDE 2 MG/ML
4 INJECTION, SOLUTION INTRAVENOUS ONCE
Status: COMPLETED | OUTPATIENT
Start: 2025-05-25 | End: 2025-05-25

## 2025-05-25 RX ORDER — MORPHINE SULFATE 2 MG/ML
2 INJECTION, SOLUTION INTRAMUSCULAR; INTRAVENOUS ONCE
Status: COMPLETED | OUTPATIENT
Start: 2025-05-25 | End: 2025-05-25

## 2025-05-25 RX ORDER — CEFTRIAXONE 1 G/50ML
1 INJECTION, SOLUTION INTRAVENOUS ONCE
Status: COMPLETED | OUTPATIENT
Start: 2025-05-25 | End: 2025-05-25

## 2025-05-25 RX ADMIN — CEFTRIAXONE SODIUM 1 G: 1 INJECTION, SOLUTION INTRAVENOUS at 10:40

## 2025-05-25 RX ADMIN — FAMOTIDINE 20 MG: 10 INJECTION INTRAVENOUS at 10:15

## 2025-05-25 RX ADMIN — MORPHINE SULFATE 2 MG: 2 INJECTION, SOLUTION INTRAMUSCULAR; INTRAVENOUS at 10:15

## 2025-05-25 RX ADMIN — ONDANSETRON 4 MG: 2 INJECTION, SOLUTION INTRAMUSCULAR; INTRAVENOUS at 10:15

## 2025-05-25 RX ADMIN — SODIUM CHLORIDE 500 ML: 900 INJECTION, SOLUTION INTRAVENOUS at 10:10

## 2025-05-25 RX ADMIN — IOHEXOL 75 ML: 350 INJECTION, SOLUTION INTRAVENOUS at 11:03

## 2025-05-25 ASSESSMENT — PAIN SCALES - GENERAL
PAINLEVEL_OUTOF10: 5 - MODERATE PAIN
PAINLEVEL_OUTOF10: 0 - NO PAIN
PAINLEVEL_OUTOF10: 0 - NO PAIN
PAINLEVEL_OUTOF10: 2

## 2025-05-25 ASSESSMENT — PAIN DESCRIPTION - DESCRIPTORS
DESCRIPTORS: ACHING
DESCRIPTORS: CRAMPING

## 2025-05-25 ASSESSMENT — PAIN - FUNCTIONAL ASSESSMENT
PAIN_FUNCTIONAL_ASSESSMENT: 0-10

## 2025-05-25 ASSESSMENT — PAIN DESCRIPTION - LOCATION
LOCATION: ABDOMEN
LOCATION: ABDOMEN

## 2025-05-25 NOTE — PROGRESS NOTES
Attestation/Supervisory note for PAOLO Grijalva      The patient is an 84-year-old female presenting to the emergency department by EMS from home for evaluation of generalized malaise, nausea, vomiting, shortness of breath, dyspnea on exertion, cough and congestion, diffuse abdominal pain and intermittent twinges of abdominal pain.  The patient reports that she has been having symptoms over the past several days.  She states her symptoms have been gradually worsening.  The abdominal pain is a diffuse aching pain.  No better or worse.  No radiation.  She states that she has been having intermittent twinges of substernal chest pain but does not have any pain at this time in her chest.  No headache or visual changes.  No focal weakness or numbness.  No fever or chills.  She has had a cough and congestion.  She denies any sick contacts or recent travel.  She has had nausea and vomiting.  No diarrhea or constipation.  No urinary complaints.  No vaginal discharge.  She denies any history of PE or DVT.  She does have a history of CAD with her prior bypass surgery.  EMS was concern for possible new onset atrial fibrillation but the patient states that she has not had any history of atrial fibrillation.  She does have a history of diabetes and hypertension.  All pertinent positives and negatives are recorded above.  All other systems reviewed and otherwise negative.  Vital signs within normal limits.  Physical exam with a well-nourished well-developed female in no acute distress.  HEENT exam within normal limits.  She has no pooling of secretions.  No stridor.  No evidence of airway compromise or respiratory distress.  She does have diffuse abdominal tenderness to palpation.  No rebound or guarding.  No palpable masses.  No flank pain with percussion or palpation.  Pulses are equal bilaterally.  She does not have any gross motor, neurologic or vascular deficits on exam.      EKG with normal sinus rhythm at 83 bpm, leftward axis,  LVH criteria, normal ST segment, normal T waves      IV fluids, IV Pepcid, IV Zofran and IV morphine ordered      Diagnostic labs with anemia, elevated BNP, and evidence of urinary tract infection but otherwise unremarkable.      Initial troponin 15.  Repeat troponin 18      Heart score 3      CT angio chest for pulmonary embolism   Final Result   1.  No pulmonary embolism is detected             MACRO:   None        Signed by: Blanca Sheppard 5/25/2025 11:50 AM   Dictation workstation:   [id number]      CT abdomen pelvis w IV contrast   Final Result   Bladder wall appears mildly thickened the serosal surface suggests   inflammation. These findings can indicate cystitis.        Voluminous colonic feces may indicate constipation             MACRO:   None.        Signed by: Blanca Sheppard 5/25/2025 12:04 PM   Dictation workstation:   [id number]           The patient does not have any evidence of airway compromise or respiratory distress on exam at this time.  She is well-perfused on exam.  She has no evidence of sepsis.  No evidence of a STEMI on EKG or cardiac enzymes.  No events on telemetry.  CT angio chest shows no evidence of PE or dissection.  No evidence of pneumonia or pneumothorax.  No evidence of CHF.  No widening of the mediastinum.  The CT of the abdomen pelvis shows no evidence of acute pancreatitis, cholecystitis, bowel obstruction, diverticulitis or appendicitis.  She does have a large amount of stool indicating constipation but no evidence of impaction.      The patient was released in good condition in the company of her family members.  She will follow-up with her primary care physician within 1 to 2 days for further management of her current symptoms and review of the urine culture results.  She will return to the emergency department sooner with worsening of symptoms or onset of new symptoms.  Rx given for Keflex.            Impression/diagnosis:  Chest pain, intermittent, substernal  Diffuse  abdominal pain  Nausea and vomiting  Malaise and fatigue  Acute lower urinary tract infection  Constipation      I personally saw the patient and made/approve the management plan and take responsibility for the patient management.      I independently interpreted the following study (S) EKG and diagnostic labs      I personally discussed the patient's management with the patient      I reviewed the results of the diagnostic labs and diagnostic imaging.  Formal radiology read was completed by the radiologist.      Luda Danielson MD

## 2025-05-25 NOTE — ED PROVIDER NOTES
HPI   Chief Complaint   Patient presents with    Flu Symptoms       84-year-old female presented emergency department with a chief complaint of cough congestion, nausea, abdominal pain, feeling feverish.  Concern for new onset atrial fibrillation.  Not on anticoagulant.  Patient denies fall or injury.  Denies having bowel movements.  Is able to urinate.  Does have history of coronary artery disease with prior bypass.  No other complaint.              Patient History   Medical History[1]  Surgical History[2]  Family History[3]  Social History[4]    Physical Exam   ED Triage Vitals [05/25/25 0927]   Temperature Heart Rate Respirations BP   37.2 °C (99 °F) 91 18 146/77      SpO2 Temp Source Heart Rate Source Patient Position   -- Oral -- --      BP Location FiO2 (%)     Right arm --       Physical Exam  Vitals and nursing note reviewed.   Constitutional:       Appearance: Normal appearance.   HENT:      Head: Normocephalic.      Nose: Nose normal.      Mouth/Throat:      Mouth: Mucous membranes are moist.   Cardiovascular:      Rate and Rhythm: Normal rate and regular rhythm.   Pulmonary:      Effort: Pulmonary effort is normal.   Abdominal:      General: Abdomen is flat.   Musculoskeletal:         General: Normal range of motion.      Cervical back: Normal range of motion.   Skin:     General: Skin is warm.   Neurological:      General: No focal deficit present.      Mental Status: She is alert and oriented to person, place, and time.   Psychiatric:         Mood and Affect: Mood normal.         Behavior: Behavior normal.           ED Course & MDM   Diagnoses as of 05/25/25 1221   Malaise and fatigue   Chest pain, unspecified type   Generalized abdominal pain   Nausea and vomiting, unspecified vomiting type   Acute cough   Urinary tract infection without hematuria, site unspecified                 No data recorded     Gaurav Coma Scale Score: 15 (05/25/25 0934 : Jennifer Del Cid RN)                           Medical  Decision Making  I have seen and evaluated this patient.  The attending physician has also seen and evaluated this patient.  Vital signs, laboratory testing and diagnostic images if applicable have been reviewed.  All laboratory and imaging is interpreted by myself unless otherwise stated.  Radiology studies are also formally interpreted by radiologist.     CBC without significant leukocytosis or anemia, metabolic panel without significant renal impairment or electrolyte abnormality.  Patient with acute anemia.  No significant electrolyte abnormality.  No margo renal impairment.  Evidence of UTI on urinalysis, bladder thickening on CT imaging.  Cardiac markers without significant elevation or delta change.  Patient offered admission.  Prefers to go home.  Given dose of Rocephin.  Discharged on cephalexin.  Released in stable condition from emergent standpoint.          Labs Reviewed   CBC WITH AUTO DIFFERENTIAL - Abnormal       Result Value    WBC 5.6      nRBC 0.0      RBC 3.46 (*)     Hemoglobin 11.3 (*)     Hematocrit 34.3 (*)     MCV 99      MCH 32.7      MCHC 32.9      RDW 12.9      Platelets 196      Immature Granulocytes %, Automated 0.4      Immature Granulocytes Absolute, Automated 0.02      Narrative:     The previously reported component Neutrophils % is no longer being reported.  The previously reported component Lymphocytes % is no longer being reported.  The previously reported component Monocytes % is no longer being reported.  The previously   reported component Eosinophils % is no longer being reported.  The previously reported component Basophils % is no longer being reported.  The previously reported component Absolute Neutrophils is no longer being reported.  The previously reported   component Absolute Lymphocytes is no longer being reported.  The previously reported component Absolute Monocytes is no longer being reported.  The previously reported component Absolute Eosinophils is no longer  being reported.  The previously reported   component Absolute Basophils is no longer being reported.   COMPREHENSIVE METABOLIC PANEL - Abnormal    Glucose 167 (*)     Sodium 138      Potassium 3.9      Chloride 105      Bicarbonate 22      Anion Gap 15      Urea Nitrogen 31 (*)     Creatinine 1.03      eGFR 54 (*)     Calcium 9.1      Albumin 4.1      Alkaline Phosphatase 61      Total Protein 6.4      AST 24      Bilirubin, Total 1.1      ALT 15     B-TYPE NATRIURETIC PEPTIDE - Abnormal     (*)     Narrative:        <100 pg/mL - Heart failure unlikely  100-299 pg/mL - Intermediate probability of acute heart                  failure exacerbation. Correlate with clinical                  context and patient history.    >=300 pg/mL - Heart Failure likely. Correlate with clinical                  context and patient history.    BNP testing is performed using different testing methodology at [facility] than at other [facility]. Direct result comparisons should only be made within the same method.      URINALYSIS WITH REFLEX CULTURE AND MICROSCOPIC - Abnormal    Color, Urine Light-Yellow      Appearance, Urine Turbid (*)     Specific Gravity, Urine 1.012      pH, Urine 6.5      Protein, Urine 50 (1+) (*)     Glucose, Urine Normal      Blood, Urine 0.03 (TRACE) (*)     Ketones, Urine NEGATIVE      Bilirubin, Urine NEGATIVE      Urobilinogen, Urine Normal      Nitrite, Urine 2+ (*)     Leukocyte Esterase, Urine 500 Ronda/uL (*)    SERIAL TROPONIN-INITIAL - Abnormal    Troponin I, High Sensitivity 15 (*)     Narrative:     Less than 99th percentile of normal range cutoff-  Female and children under 18 years old <14 ng/L; Male <21 ng/L: Negative  Repeat testing should be performed if clinically indicated.     Female and children under 18 years old 14-50 ng/L; Male 21-50 ng/L:  Consistent with possible cardiac damage and possible increased clinical   risk. Serial measurements may help to assess extent  of myocardial damage.     >50 ng/L: Consistent with cardiac damage, increased clinical risk and  myocardial infarction. Serial measurements may help assess extent of   myocardial damage.      NOTE: Children less than 1 year old may have higher baseline troponin   levels and results should be interpreted in conjunction with the overall   clinical context.     NOTE: Troponin I testing is performed using a different   testing methodology at [facility] than at other   [facility]. Direct result comparisons should only   be made within the same method.   SERIAL TROPONIN, 1 HOUR - Abnormal    Troponin I, High Sensitivity 18 (*)     Narrative:     Less than 99th percentile of normal range cutoff-  Female and children under 18 years old <14 ng/L; Male <21 ng/L: Negative  Repeat testing should be performed if clinically indicated.     Female and children under 18 years old 14-50 ng/L; Male 21-50 ng/L:  Consistent with possible cardiac damage and possible increased clinical   risk. Serial measurements may help to assess extent of myocardial damage.     >50 ng/L: Consistent with cardiac damage, increased clinical risk and  myocardial infarction. Serial measurements may help assess extent of   myocardial damage.      NOTE: Children less than 1 year old may have higher baseline troponin   levels and results should be interpreted in conjunction with the overall   clinical context.     NOTE: Troponin I testing is performed using a different   testing methodology at [facility] than at other   [facility]. Direct result comparisons should only   be made within the same method.   MICROSCOPIC ONLY, URINE - Abnormal    WBC, Urine >50 (*)     RBC, Urine 1-2      Squamous Epithelial Cells, Urine 1-9 (SPARSE)      Bacteria, Urine 3+ (*)     Mucus, Urine FEW     MANUAL DIFFERENTIAL - Abnormal    Neutrophils %, Manual 52.0      Bands %, Manual 6.0      Lymphocytes %, Manual 32.0      Monocytes %, Manual  1.0      Eosinophils %, Manual 1.0      Basophils %, Manual 0.0      Atypical Lymphocytes %, Manual 8.0      Seg Neutrophils Absolute, Manual 2.91      Bands Absolute, Manual 0.34      Lymphocytes Absolute, Manual 1.79      Monocytes Absolute, Manual 0.06      Eosinophils Absolute, Manual 0.06      Basophils Absolute, Manual 0.00      Atypical Lymphs Absolute, Manual 0.45 (*)     Total Cells Counted 100      Neutrophils Absolute, Manual 3.25      RBC Morphology No significant RBC morphology present     SARS-COV-2, INFLUENZA A/B AND RSV PCR - Normal    Coronavirus 2019, PCR Not Detected      Flu A Result Not Detected      Flu B Result Not Detected      RSV PCR Not Detected      Narrative:     This assay is an FDA-cleared, in vitro diagnostic nucleic acid amplification test for the qualitative detection and differentiation of SARS CoV-2/ Influenza A/B/ RSV from nasopharyngeal specimens collected from individuals with signs and symptoms of respiratory tract infections, and has been validated for use at [facility]. Negative results do not preclude COVID-19/ Influenza A/B/ RSV infections and should not be used as the sole basis for diagnosis, treatment, or other management decisions. Testing for SARS CoV-2 is recommended only for patients who meet current clinical and/or epidemiological criteria defined by federal, state, or local public health directives.   URINE CULTURE   TROPONIN SERIES- (INITIAL, 1 HR)    Narrative:     The following orders were created for panel order Troponin I Series, High Sensitivity (0, 1 HR).  Procedure                               Abnormality         Status                     ---------                               -----------         ------                     Troponin I, High Sensiti...[[id number]]  Abnormal            Final result               Troponin, High Sensitivi...[[id number]]  Abnormal            Final result                 Please view results for these  tests on the individual orders.   URINALYSIS WITH REFLEX CULTURE AND MICROSCOPIC    Narrative:     The following orders were created for panel order Urinalysis with Reflex Culture and Microscopic.  Procedure                               Abnormality         Status                     ---------                               -----------         ------                     Urinalysis with Reflex C...[[id number]]  Abnormal            Final result               Extra Urine Gray Tube[[id number]]                            In process                   Please view results for these tests on the individual orders.   EXTRA URINE GRAY TUBE     CT angio chest for pulmonary embolism   Final Result   1.  No pulmonary embolism is detected             MACRO:   None        Signed by: Blanca Sheppard 5/25/2025 11:50 AM   Dictation workstation:   [id number]      CT abdomen pelvis w IV contrast   Final Result   Bladder wall appears mildly thickened the serosal surface suggests   inflammation. These findings can indicate cystitis.        Voluminous colonic feces may indicate constipation             MACRO:   None.        Signed by: Blanca Sheppard 5/25/2025 12:04 PM   Dictation workstation:   [id number]        Medications   famotidine PF (Pepcid) injection 20 mg (20 mg intravenous Given 5/25/25 1015)   ondansetron (Zofran) injection 4 mg (4 mg intravenous Given 5/25/25 1015)   sodium chloride 0.9 % bolus 500 mL (0 mL intravenous Stopped 5/25/25 1211)   morphine injection 2 mg (2 mg intravenous Given 5/25/25 1015)   cefTRIAXone (Rocephin) 1 g in dextrose (iso) IV 50 mL (0 g intravenous Stopped 5/25/25 1210)   iohexol (OMNIPaque) 350 mg iodine/mL solution 75 mL (75 mL intravenous Given 5/25/25 1103)     New Prescriptions    CEPHALEXIN (KEFLEX) 500 MG CAPSULE    Take 1 capsule (500 mg) by mouth 3 times a day for 7 days.         Procedure  Procedures         [1]   Past Medical History:  Diagnosis Date    Anxiety     Benign hypertension      Chronic ischemic colitis (Multi)     With history of sepsis and infectious gastroenteritis 10/7/2022-10/11/2022    Chronic kidney disease, stage III (moderate) (Multi)     CLL (chronic lymphocytic leukemia) (Multi)     Closed fracture of calcaneus 07/02/2008    Closed fracture of multiple ribs of right side, initial encounter 06/11/2024    Coronary artery disease     CTS (carpal tunnel syndrome)     Diabetes mellitus (Multi)     Dry eye syndrome of bilateral lacrimal glands     Essential (primary) hypertension     Exudative age-related macular degeneration of left eye, unspecified stage     GI bleed     Hyperlipidemia, unspecified     Hypertension     Hypothyroidism     Hypothyroidism, unspecified type     Macular cyst, hole, or pseudohole, left eye     Miosis     Mixed hyperlipidemia     NSTEMI (non-ST elevated myocardial infarction) (Multi)     Osteoarthritis     Osteoporosis     Peripheral vascular disease     Rectal prolapse 10/28/2024    Retinal edema     Right knee pain     Seizure disorder (Multi)     Type 1 diabetes mellitus with hyperglycemia (Multi)     Unspecified disorder of refraction    [2]   Past Surgical History:  Procedure Laterality Date    CARPAL TUNNEL RELEASE Bilateral     CATARACT EXTRACTION W/ INTRAOCULAR LENS  IMPLANT, BILATERAL      Left 8/10/2017, right 8/31/2017    CHOLECYSTECTOMY      COLONOSCOPY      CORONARY ARTERY BYPASS GRAFT  2005    X 3    HYSTERECTOMY      KNEE SURGERY Right 04/17/2024    total    OTHER SURGICAL HISTORY      Bilateral heel spurs    OTHER SURGICAL HISTORY Right 01/2016    Femoral endarterectomy    TOTAL HIP ARTHROPLASTY Left 2012   [3]   Family History  Problem Relation Name Age of Onset    Diabetes Mother      Diabetes Daughter      Other (RA) Daughter     [4]   Social History  Tobacco Use    Smoking status: Never     Passive exposure: Past    Smokeless tobacco: Never   Vaping Use    Vaping status: Never Used   Substance Use Topics    Alcohol use: Not Currently     Drug use: Never        Jim Grijalva PA-C  05/25/25 7547

## 2025-05-27 ENCOUNTER — TELEPHONE (OUTPATIENT)
Dept: PRIMARY CARE | Facility: CLINIC | Age: 85
End: 2025-05-27
Payer: MEDICARE

## 2025-05-27 DIAGNOSIS — R11.0 NAUSEA: Primary | ICD-10-CM

## 2025-05-27 LAB
ATRIAL RATE: 83 BPM
BACTERIA UR CULT: ABNORMAL
P AXIS: 62 DEGREES
P OFFSET: 179 MS
P ONSET: 141 MS
PR INTERVAL: 142 MS
Q ONSET: 212 MS
QRS COUNT: 14 BEATS
QRS DURATION: 106 MS
QT INTERVAL: 366 MS
QTC CALCULATION(BAZETT): 430 MS
QTC FREDERICIA: 408 MS
R AXIS: -58 DEGREES
T AXIS: 88 DEGREES
T OFFSET: 395 MS
VENTRICULAR RATE: 83 BPM

## 2025-05-27 RX ORDER — ONDANSETRON 4 MG/1
4 TABLET, FILM COATED ORAL EVERY 8 HOURS PRN
Qty: 20 TABLET | Refills: 0 | Status: SHIPPED | OUTPATIENT
Start: 2025-05-27 | End: 2025-06-03

## 2025-05-27 NOTE — TELEPHONE ENCOUNTER
Pt was seen in ER 05/25/25 for UTI - fever, chills - frequent urination - stomach pain - no back pain or burning     Was prescribed cephalexin (Keflex) 500 mg capsule   Side effects to medication: Nausea - stop/continue taking?    >Scheduled patient tomorrow 05/28/25 for ER follow up

## 2025-05-28 ENCOUNTER — OFFICE VISIT (OUTPATIENT)
Dept: PRIMARY CARE | Facility: CLINIC | Age: 85
End: 2025-05-28
Payer: MEDICARE

## 2025-05-28 ENCOUNTER — TELEPHONE (OUTPATIENT)
Dept: PHARMACY | Facility: HOSPITAL | Age: 85
End: 2025-05-28

## 2025-05-28 VITALS
OXYGEN SATURATION: 96 % | WEIGHT: 117 LBS | SYSTOLIC BLOOD PRESSURE: 120 MMHG | HEIGHT: 59 IN | DIASTOLIC BLOOD PRESSURE: 84 MMHG | HEART RATE: 57 BPM | BODY MASS INDEX: 23.59 KG/M2

## 2025-05-28 DIAGNOSIS — B00.1 RECURRENT COLD SORES: ICD-10-CM

## 2025-05-28 DIAGNOSIS — N30.01 ACUTE CYSTITIS WITH HEMATURIA: Primary | ICD-10-CM

## 2025-05-28 PROCEDURE — 1125F AMNT PAIN NOTED PAIN PRSNT: CPT

## 2025-05-28 PROCEDURE — 1036F TOBACCO NON-USER: CPT

## 2025-05-28 PROCEDURE — 3079F DIAST BP 80-89 MM HG: CPT

## 2025-05-28 PROCEDURE — G2211 COMPLEX E/M VISIT ADD ON: HCPCS

## 2025-05-28 PROCEDURE — 3074F SYST BP LT 130 MM HG: CPT

## 2025-05-28 PROCEDURE — 99214 OFFICE O/P EST MOD 30 MIN: CPT

## 2025-05-28 PROCEDURE — 1159F MED LIST DOCD IN RCRD: CPT

## 2025-05-28 RX ORDER — VALACYCLOVIR HYDROCHLORIDE 1 G/1
2000 TABLET, FILM COATED ORAL 2 TIMES DAILY
Qty: 8 TABLET | Refills: 6 | Status: SHIPPED | OUTPATIENT
Start: 2025-05-28 | End: 2025-06-11

## 2025-05-28 ASSESSMENT — ENCOUNTER SYMPTOMS
FEVER: 0
DIARRHEA: 0
NAUSEA: 1
FATIGUE: 1
HEMATURIA: 0
FLANK PAIN: 0
ABDOMINAL PAIN: 1
VOMITING: 0
CHILLS: 1

## 2025-05-28 ASSESSMENT — PAIN SCALES - GENERAL: PAINLEVEL_OUTOF10: 2

## 2025-05-28 NOTE — PATIENT INSTRUCTIONS
Change antibiotic, Cephalexin (Kelfex) to twice daily with food instead of three times a day, and stop medication after evening dose this Sunday 6/1/2025. Go to lab on 6/2/2025 to leave urine sample to make sure infection has fully resolved. Give your body 2-3 weeks after resolution of infection to see if fatigue or chills improve, if they have not can call office to leave me a message update me. Can move medicare physical off by 1-2 months.

## 2025-05-28 NOTE — PROGRESS NOTES
"Subjective   Patient ID: Bonnie Jules is a 84 y.o. female who presents for hospital followup.    Hx CAD (hx of MI/bypass), HTN, HLD, aortic stenosis, IDDM, hypothyroidism, chronic colitis, PAD, CLL (5 years remission)    Other Providers: Dr. Sousa (cardiologist), Dr. Ramirez (endocrinologist), Dr. De La Torre (general surgery, recetal fixation of rectal prolapse), Dr. Hayward/Dr. Darden (ophthalmologist, injections for macular degeneration), Favio Brumfield CNP (vascular), Dr. Joseph Hollingsworth (oncology), Dr. Jackman (GI)    ER visit on 5/25/2025, dx with UTI and given Keflex 500 mg TID for 7 days, she is on day 4. Having some GI upset and nausea with its. Overall feeling a little better but still having fatigue and chills.     Also would like medication for recurrent cold sores.              Review of Systems   Constitutional:  Positive for chills and fatigue. Negative for fever.   Gastrointestinal:  Positive for abdominal pain and nausea. Negative for diarrhea and vomiting.   Genitourinary:  Negative for flank pain and hematuria.       Objective   /84   Pulse 57   Ht 1.486 m (4' 10.5\")   Wt 53.1 kg (117 lb)   LMP  (LMP Unknown)   SpO2 96%   BMI 24.04 kg/m²     Physical Exam  Constitutional:       General: She is not in acute distress.     Appearance: Normal appearance.   Cardiovascular:      Rate and Rhythm: Normal rate and regular rhythm.      Heart sounds: No murmur heard.  Pulmonary:      Effort: Pulmonary effort is normal.      Breath sounds: Normal breath sounds. No wheezing, rhonchi or rales.   Abdominal:      General: Abdomen is flat. Bowel sounds are normal.      Palpations: Abdomen is soft. There is no splenomegaly.      Tenderness: There is abdominal tenderness in the suprapubic area. There is no guarding.   Skin:     General: Skin is warm and dry.      Findings: No rash.   Neurological:      Mental Status: She is alert.   Psychiatric:         Mood and Affect: Mood and affect normal. "         Assessment/Plan   Diagnoses and all orders for this visit:  Acute cystitis with hematuria  -     Urine Culture; Future  -     Urinalysis with Reflex Culture and Microscopic; Future  Recurrent cold sores  -     valACYclovir (Valtrex) 1 gram tablet; Take 2 tablets (2,000 mg) by mouth 2 times a day for 14 days.  Change antibiotic, Cephalexin (Kelfex) to twice daily with food instead of three times a day, and stop medication after evening dose this Sunday 6/1/2025. Go to lab on 6/2/2025 to leave urine sample to make sure infection has fully resolved. Give your body 2-3 weeks after resolution of infection to see if fatigue or chills improve, if they have not can call office to leave me a message update me (can order labs, CBC, iron, ferritin, TSH). Can move medicare physical off by 1-2 months.

## 2025-05-28 NOTE — PROGRESS NOTES
EDPD Note: Rapid Result Review    I reviewed Bonnie TERRY Theresebelgica 's chart regarding a positive urine culture/result that was taken during their recent emergency room visit. The patient was not told about these results prior to leaving the emergency department. Therefore, patient was not contacted as proper education was given by another provider: ARISTEO Angeles (Bonnie's PCP).     Susceptibility data from last 90 days.  Collected Specimen Info Organism Amikacin Amoxicillin/Clavulanate Ampicillin Ampicillin/Sulbactam Cefazolin Cefazolin (uncomplicated UTIs only) Ciprofloxacin Gentamicin Levofloxacin Nitrofurantoin Piperacillin/Tazobactam   05/25/25 Urine from Clean Catch/Voided Escherichia coli  S  I  R  I  S  S  R  S  R  S  S     Collected Specimen Info Organism Trimethoprim/Sulfamethoxazole   05/25/25 Urine from Clean Catch/Voided Escherichia coli  S     Admission on 05/25/2025, Discharged on 05/25/2025   Component Date Value Ref Range Status    WBC 05/25/2025 5.6  4.4 - 11.3 x10*3/uL Final    nRBC 05/25/2025 0.0  0.0 - 0.0 /100 WBCs Final    RBC 05/25/2025 3.46 (L)  4.00 - 5.20 x10*6/uL Final    Hemoglobin 05/25/2025 11.3 (L)  12.0 - 16.0 g/dL Final    Hematocrit 05/25/2025 34.3 (L)  36.0 - 46.0 % Final    MCV 05/25/2025 99  80 - 100 fL Final    MCH 05/25/2025 32.7  26.0 - 34.0 pg Final    MCHC 05/25/2025 32.9  32.0 - 36.0 g/dL Final    RDW 05/25/2025 12.9  11.5 - 14.5 % Final    Platelets 05/25/2025 196  150 - 450 x10*3/uL Final    Immature Granulocytes %, Automated 05/25/2025 0.4  0.0 - 0.9 % Final    Immature Granulocyte Count (IG) includes promyelocytes, myelocytes and metamyelocytes but does not include bands. Percent differential counts (%) should be interpreted in the context of the absolute cell counts (cells/UL).    Immature Granulocytes Absolute, Au* 05/25/2025 0.02  0.00 - 0.50 x10*3/uL Final    Glucose 05/25/2025 167 (H)  74 - 99 mg/dL Final    Sodium 05/25/2025 138  136 - 145 mmol/L Final    Potassium  05/25/2025 3.9  3.5 - 5.3 mmol/L Final    Chloride 05/25/2025 105  98 - 107 mmol/L Final    Bicarbonate 05/25/2025 22  21 - 32 mmol/L Final    Anion Gap 05/25/2025 15  10 - 20 mmol/L Final    Urea Nitrogen 05/25/2025 31 (H)  6 - 23 mg/dL Final    Creatinine 05/25/2025 1.03  0.50 - 1.05 mg/dL Final    eGFR 05/25/2025 54 (L)  >60 mL/min/1.73m*2 Final    Calculations of estimated GFR are performed using the 2021 CKD-EPI Study Refit equation without the race variable for the IDMS-Traceable creatinine methods.  https://jasn.asnjournals.org/content/early/2021/09/22/ASN.4834867765    Calcium 05/25/2025 9.1  8.6 - 10.3 mg/dL Final    Albumin 05/25/2025 4.1  3.4 - 5.0 g/dL Final    Alkaline Phosphatase 05/25/2025 61  33 - 136 U/L Final    Total Protein 05/25/2025 6.4  6.4 - 8.2 g/dL Final    AST 05/25/2025 24  9 - 39 U/L Final    Bilirubin, Total 05/25/2025 1.1  0.0 - 1.2 mg/dL Final    ALT 05/25/2025 15  7 - 45 U/L Final    Patients treated with Sulfasalazine may generate falsely decreased results for ALT.    Ventricular Rate 05/25/2025 83  BPM Final    Atrial Rate 05/25/2025 83  BPM Final    VT Interval 05/25/2025 142  ms Final    QRS Duration 05/25/2025 106  ms Final    QT Interval 05/25/2025 366  ms Final    QTC Calculation(Bazett) 05/25/2025 430  ms Final    P Axis 05/25/2025 62  degrees Final    R Axis 05/25/2025 -58  degrees Final    T Axis 05/25/2025 88  degrees Final    QRS Count 05/25/2025 14  beats Final    Q Onset 05/25/2025 212  ms Final    P Onset 05/25/2025 141  ms Final    P Offset 05/25/2025 179  ms Final    T Offset 05/25/2025 395  ms Final    QTC Fredericia 05/25/2025 408  ms Final    BNP 05/25/2025 971 (H)  0 - 99 pg/mL Final    Coronavirus 2019, PCR 05/25/2025 Not Detected  Not Detected Final    Flu A Result 05/25/2025 Not Detected  Not Detected Final    Flu B Result 05/25/2025 Not Detected  Not Detected Final    RSV PCR 05/25/2025 Not Detected  Not Detected Final    Color, Urine 05/25/2025 Light-Yellow   Light-Yellow, Yellow, Dark-Yellow Final    Appearance, Urine 05/25/2025 Turbid (N)  Clear Final    Specific Gravity, Urine 05/25/2025 1.012  1.005 - 1.035 Final    pH, Urine 05/25/2025 6.5  5.0, 5.5, 6.0, 6.5, 7.0, 7.5, 8.0 Final    Protein, Urine 05/25/2025 50 (1+) (A)  NEGATIVE, 10 (TRACE), 20 (TRACE) mg/dL Final    Glucose, Urine 05/25/2025 Normal  Normal mg/dL Final    Blood, Urine 05/25/2025 0.03 (TRACE) (A)  NEGATIVE mg/dL Final    Ketones, Urine 05/25/2025 NEGATIVE  NEGATIVE mg/dL Final    Bilirubin, Urine 05/25/2025 NEGATIVE  NEGATIVE mg/dL Final    Urobilinogen, Urine 05/25/2025 Normal  Normal mg/dL Final    Nitrite, Urine 05/25/2025 2+ (A)  NEGATIVE Final    Leukocyte Esterase, Urine 05/25/2025 500 Ronda/uL (A)  NEGATIVE Final    Extra Tube 05/25/2025 Hold for add-ons.   Final    Auto resulted.    Troponin I, High Sensitivity 05/25/2025 15 (H)  0 - 13 ng/L Final    Troponin I, High Sensitivity 05/25/2025 18 (H)  0 - 13 ng/L Final    WBC, Urine 05/25/2025 >50 (A)  1-5, NONE /HPF Final    RBC, Urine 05/25/2025 1-2  NONE, 1-2, 3-5 /HPF Final    Squamous Epithelial Cells, Urine 05/25/2025 1-9 (SPARSE)  Reference range not established. /HPF Final    Bacteria, Urine 05/25/2025 3+ (A)  NONE SEEN /HPF Final    Mucus, Urine 05/25/2025 FEW  Reference range not established. /LPF Final    Urine Culture 05/25/2025 >=100,000 CFU/mL Escherichia coli (A)   Final    Neutrophils %, Manual 05/25/2025 52.0  40.0 - 80.0 % Final    Percent differential counts (%) should be interpreted in the context of the absolute cell counts (cells/uL).    Bands %, Manual 05/25/2025 6.0  0.0 - 5.0 % Final    Lymphocytes %, Manual 05/25/2025 32.0  13.0 - 44.0 % Final    Albumin slide prepared to eliminate smudge cells    Monocytes %, Manual 05/25/2025 1.0  2.0 - 10.0 % Final    Eosinophils %, Manual 05/25/2025 1.0  0.0 - 6.0 % Final    Basophils %, Manual 05/25/2025 0.0  0.0 - 2.0 % Final    Atypical Lymphocytes %, Manual 05/25/2025 8.0  0.0 -  2.0 % Final    History of CLL    Seg Neutrophils Absolute, Manual 05/25/2025 2.91  1.60 - 5.00 x10*3/uL Final    Bands Absolute, Manual 05/25/2025 0.34  0.00 - 0.50 x10*3/uL Final    Lymphocytes Absolute, Manual 05/25/2025 1.79  0.80 - 3.00 x10*3/uL Final    Monocytes Absolute, Manual 05/25/2025 0.06  0.05 - 0.80 x10*3/uL Final    Eosinophils Absolute, Manual 05/25/2025 0.06  0.00 - 0.40 x10*3/uL Final    Basophils Absolute, Manual 05/25/2025 0.00  0.00 - 0.10 x10*3/uL Final    Atypical Lymphs Absolute, Manual 05/25/2025 0.45 (H)  0.00 - 0.30 x10*3/uL Final    Total Cells Counted 05/25/2025 100   Final    Neutrophils Absolute, Manual 05/25/2025 3.25  1.60 - 5.50 x10*3/uL Final    RBC Morphology 05/25/2025 No significant RBC morphology present   Final       No further follow up needed from EDPD Team.     If there are any other questions for the ED Post-Discharge Culture Follow Up Team, please contact [phone]. Fax: [phone].    Mike Maher, PharmD

## 2025-05-30 ENCOUNTER — APPOINTMENT (OUTPATIENT)
Dept: PRIMARY CARE | Facility: CLINIC | Age: 85
End: 2025-05-30
Payer: MEDICARE

## 2025-06-02 DIAGNOSIS — N30.01 ACUTE CYSTITIS WITH HEMATURIA: ICD-10-CM

## 2025-06-04 ENCOUNTER — TELEPHONE (OUTPATIENT)
Dept: PRIMARY CARE | Facility: CLINIC | Age: 85
End: 2025-06-04
Payer: MEDICARE

## 2025-06-04 LAB — BACTERIA UR CULT: NORMAL

## 2025-06-10 ENCOUNTER — HOSPITAL ENCOUNTER (OUTPATIENT)
Dept: CARDIOLOGY | Facility: HOSPITAL | Age: 85
Discharge: HOME | End: 2025-06-10
Payer: MEDICARE

## 2025-06-10 DIAGNOSIS — I35.0 NONRHEUMATIC AORTIC (VALVE) STENOSIS: ICD-10-CM

## 2025-06-10 LAB
AORTIC VALVE MEAN GRADIENT: 20 MMHG
AORTIC VALVE PEAK VELOCITY: 3 M/S
AV PEAK GRADIENT: 36 MMHG
AVA (PEAK VEL): 1 CM2
AVA (VTI): 0.98 CM2
EJECTION FRACTION APICAL 4 CHAMBER: 54.8
EJECTION FRACTION: 58 %
LEFT VENTRICULAR OUTFLOW TRACT DIAMETER: 2 CM
LV EJECTION FRACTION BIPLANE: 59 %
MITRAL VALVE E/A RATIO: 1.83
RIGHT VENTRICLE PEAK SYSTOLIC PRESSURE: 27 MMHG

## 2025-06-10 PROCEDURE — 93306 TTE W/DOPPLER COMPLETE: CPT

## 2025-06-10 PROCEDURE — 93306 TTE W/DOPPLER COMPLETE: CPT | Performed by: INTERNAL MEDICINE

## 2025-06-25 ENCOUNTER — OFFICE VISIT (OUTPATIENT)
Dept: SURGERY | Facility: CLINIC | Age: 85
End: 2025-06-25
Payer: MEDICARE

## 2025-06-25 VITALS
BODY MASS INDEX: 22.05 KG/M2 | HEART RATE: 61 BPM | TEMPERATURE: 98.2 F | WEIGHT: 116.8 LBS | SYSTOLIC BLOOD PRESSURE: 142 MMHG | DIASTOLIC BLOOD PRESSURE: 71 MMHG | OXYGEN SATURATION: 93 % | HEIGHT: 61 IN

## 2025-06-25 DIAGNOSIS — K62.3 RECTAL PROLAPSE: ICD-10-CM

## 2025-06-25 DIAGNOSIS — M62.89 PELVIC FLOOR DYSFUNCTION: Primary | ICD-10-CM

## 2025-06-25 PROCEDURE — 3077F SYST BP >= 140 MM HG: CPT | Performed by: STUDENT IN AN ORGANIZED HEALTH CARE EDUCATION/TRAINING PROGRAM

## 2025-06-25 PROCEDURE — 99213 OFFICE O/P EST LOW 20 MIN: CPT | Mod: 25 | Performed by: STUDENT IN AN ORGANIZED HEALTH CARE EDUCATION/TRAINING PROGRAM

## 2025-06-25 PROCEDURE — 1036F TOBACCO NON-USER: CPT | Performed by: STUDENT IN AN ORGANIZED HEALTH CARE EDUCATION/TRAINING PROGRAM

## 2025-06-25 PROCEDURE — 3078F DIAST BP <80 MM HG: CPT | Performed by: STUDENT IN AN ORGANIZED HEALTH CARE EDUCATION/TRAINING PROGRAM

## 2025-06-25 PROCEDURE — 99213 OFFICE O/P EST LOW 20 MIN: CPT | Performed by: STUDENT IN AN ORGANIZED HEALTH CARE EDUCATION/TRAINING PROGRAM

## 2025-06-25 PROCEDURE — 1159F MED LIST DOCD IN RCRD: CPT | Performed by: STUDENT IN AN ORGANIZED HEALTH CARE EDUCATION/TRAINING PROGRAM

## 2025-06-25 PROCEDURE — 46600 DIAGNOSTIC ANOSCOPY SPX: CPT | Performed by: STUDENT IN AN ORGANIZED HEALTH CARE EDUCATION/TRAINING PROGRAM

## 2025-06-25 PROCEDURE — 1125F AMNT PAIN NOTED PAIN PRSNT: CPT | Performed by: STUDENT IN AN ORGANIZED HEALTH CARE EDUCATION/TRAINING PROGRAM

## 2025-06-25 ASSESSMENT — ENCOUNTER SYMPTOMS
ABDOMINAL PAIN: 0
BLOOD IN STOOL: 0
HEMATURIA: 0
DIARRHEA: 0
ADENOPATHY: 0
FACIAL ASYMMETRY: 0
SHORTNESS OF BREATH: 0
SPEECH DIFFICULTY: 0
HEADACHES: 0
SORE THROAT: 0
VOICE CHANGE: 0
ARTHRALGIAS: 0
PALPITATIONS: 0
VOMITING: 0
FEVER: 0
DYSURIA: 0
TROUBLE SWALLOWING: 0
CHILLS: 0
CHEST TIGHTNESS: 0
WOUND: 0
UNEXPECTED WEIGHT CHANGE: 0
BRUISES/BLEEDS EASILY: 0
NAUSEA: 0

## 2025-06-25 ASSESSMENT — PAIN SCALES - GENERAL: PAINLEVEL_OUTOF10: 2

## 2025-06-25 NOTE — PROGRESS NOTES
History Of Present Illness  Bonnie Jules is a 85 y.o. female   History of full-thickness rectal prolapse status post rectopexy on 11/14/2024.  She has been doing well since surgery.  She is taking MiraLAX every other day but still reports occasional difficulty with having bowel movements.  Over the past month or so she has noticed mucus drainage at the time of bowel movements.  She has not noticed any recurrent prolapse.     Past Medical History  She has a past medical history of Anxiety, Benign hypertension, Chronic ischemic colitis (Multi), Chronic kidney disease, stage III (moderate) (Multi), CLL (chronic lymphocytic leukemia) (Multi), Closed fracture of calcaneus (07/02/2008), Closed fracture of multiple ribs of right side, initial encounter (06/11/2024), Coronary artery disease, CTS (carpal tunnel syndrome), Diabetes mellitus (Multi), Dry eye syndrome of bilateral lacrimal glands, Essential (primary) hypertension, Exudative age-related macular degeneration of left eye, unspecified stage, GI bleed, Hyperlipidemia, unspecified, Hypertension, Hypothyroidism, Hypothyroidism, unspecified type, Macular cyst, hole, or pseudohole, left eye, Miosis, Mixed hyperlipidemia, NSTEMI (non-ST elevated myocardial infarction) (Multi), Osteoarthritis, Osteoporosis, Peripheral vascular disease, Rectal prolapse (10/28/2024), Retinal edema, Right knee pain, Seizure disorder (Multi), Type 1 diabetes mellitus with hyperglycemia (Multi), and Unspecified disorder of refraction.    Surgical History  She has a past surgical history that includes Carpal tunnel release (Bilateral); Total hip arthroplasty (Left, 2012); Coronary artery bypass graft (2005); Cholecystectomy; Cataract extraction w/ intraocular lens  implant, bilateral; Hysterectomy; Colonoscopy; Other surgical history; Other surgical history (Right, 01/2016); and Knee surgery (Right, 04/17/2024).     Social History  She reports that she has never smoked. She has been exposed  to tobacco smoke. She has never used smokeless tobacco. She reports that she does not currently use alcohol. She reports that she does not use drugs.    Family History  Family History[1]     Allergies  Amoxicillin, Chlorpheniramine, Amoxicillin-pot clavulanate, Ciprofloxacin, and Levofloxacin    Review of Systems   Constitutional:  Negative for chills, fever and unexpected weight change.   HENT:  Negative for sneezing, sore throat, trouble swallowing and voice change.    Respiratory:  Negative for chest tightness and shortness of breath.    Cardiovascular:  Negative for chest pain and palpitations.   Gastrointestinal:  Negative for abdominal pain, blood in stool, diarrhea, nausea and vomiting.   Endocrine: Negative for cold intolerance and heat intolerance.   Genitourinary:  Negative for decreased urine volume, dysuria and hematuria.   Musculoskeletal:  Negative for arthralgias and gait problem.   Skin:  Negative for rash and wound.   Neurological:  Negative for facial asymmetry, speech difficulty and headaches.   Hematological:  Negative for adenopathy. Does not bruise/bleed easily.   Psychiatric/Behavioral:  Negative for self-injury and suicidal ideas.         Physical Exam  Vitals and nursing note reviewed.   Constitutional:       Appearance: Normal appearance.   HENT:      Head: Normocephalic and atraumatic.      Mouth/Throat:      Mouth: Mucous membranes are moist.      Pharynx: Oropharynx is clear.   Eyes:      Extraocular Movements: Extraocular movements intact.      Pupils: Pupils are equal, round, and reactive to light.   Cardiovascular:      Rate and Rhythm: Normal rate and regular rhythm.      Pulses: Normal pulses.   Pulmonary:      Effort: Pulmonary effort is normal.      Breath sounds: Normal breath sounds.   Abdominal:      General: There is no distension.      Palpations: Abdomen is soft.      Tenderness: There is no abdominal tenderness.   Genitourinary:     Comments: Weakened squeeze, no rectal  "prolapse elicited on exam  Musculoskeletal:      Cervical back: Normal range of motion and neck supple.   Skin:     General: Skin is warm and dry.   Neurological:      General: No focal deficit present.      Mental Status: She is alert and oriented to person, place, and time.   Psychiatric:         Mood and Affect: Mood normal.         Behavior: Behavior normal.          Last Recorded Vitals  Blood pressure 142/71, pulse 61, temperature 36.8 °C (98.2 °F), temperature source Oral, height (!) 1.549 m (5' 1\"), weight 53 kg (116 lb 12.8 oz), SpO2 93%.         Assessment/Plan   Problem List Items Addressed This Visit    None  Visit Diagnoses         Codes      Pelvic floor dysfunction    -  Primary M62.89      Rectal prolapse     K62.3    Relevant Orders    Referral to Physical Therapy          History of rectal prolapse, with continued difficulty having bowel movements and mucus discharge.  There is no evidence of recurrent prolapse on exam.  I have recommended that she take the MiraLAX every day as opposed to every other day in order to avoid constipation and straining.  I have also recommended an addition of a fiber supplement such as Benefiber to help bulk up the stool.  Given her weakened squeeze and pelvic floor dysfunction, have also recommended that she undergo pelvic floor physical therapy to help strengthen and coordinate the pelvic floor muscles.  All questions were answered.  Will have her follow-up with me in 3 months to reassess her symptoms.      Cherelle Keen MD    Patient ID: Bonnie Jules is a 85 y.o. female.    Anoscopy    Date/Time: 6/25/2025 9:15 AM    Performed by: Cherelle Keen MD  Authorized by: Cherelle Keen MD    Consent:     Consent obtained:  Verbal and written    Consent given by:  Patient    Risks, benefits, and alternatives were discussed: yes      Risks discussed:  Bleeding and pain    Alternatives discussed:  No treatment and alternative treatment  Universal protocol:     Procedure " explained and questions answered to patient or proxy's satisfaction: yes      Immediately prior to procedure, a time out was called: yes      Patient identity confirmed:  Verbally with patient  Post-procedure details:     Procedure completion:  Tolerated well, no immediate complications  Comments:      No evidence of full-thickness prolapse, no inflammation seen in the rectum         [1]   Family History  Problem Relation Name Age of Onset    Diabetes Mother      Diabetes Daughter      Other (RA) Daughter

## 2025-07-22 ENCOUNTER — OFFICE VISIT (OUTPATIENT)
Dept: PRIMARY CARE | Facility: CLINIC | Age: 85
End: 2025-07-22
Payer: MEDICARE

## 2025-07-22 VITALS
HEART RATE: 57 BPM | DIASTOLIC BLOOD PRESSURE: 70 MMHG | SYSTOLIC BLOOD PRESSURE: 140 MMHG | OXYGEN SATURATION: 97 % | BODY MASS INDEX: 22.11 KG/M2 | WEIGHT: 117 LBS

## 2025-07-22 DIAGNOSIS — I50.22 SYSTOLIC HEART FAILURE, CHRONIC: ICD-10-CM

## 2025-07-22 DIAGNOSIS — N18.31 STAGE 3A CHRONIC KIDNEY DISEASE (MULTI): ICD-10-CM

## 2025-07-22 DIAGNOSIS — H35.3222 EXUDATIVE AGE-RELATED MACULAR DEGENERATION OF LEFT EYE WITH INACTIVE CHOROIDAL NEOVASCULARIZATION: ICD-10-CM

## 2025-07-22 DIAGNOSIS — E10.311 TYPE 1 DIABETES MELLITUS WITH RETINOPATHY OF BOTH EYES AND MACULAR EDEMA, UNSPECIFIED RETINOPATHY SEVERITY: ICD-10-CM

## 2025-07-22 DIAGNOSIS — C91.11 CHRONIC LYMPHOCYTIC LEUKEMIA IN REMISSION (MULTI): ICD-10-CM

## 2025-07-22 DIAGNOSIS — Z78.0 ASYMPTOMATIC MENOPAUSAL STATE: ICD-10-CM

## 2025-07-22 DIAGNOSIS — Z00.00 ROUTINE GENERAL MEDICAL EXAMINATION AT HEALTH CARE FACILITY: Primary | ICD-10-CM

## 2025-07-22 PROCEDURE — 3078F DIAST BP <80 MM HG: CPT

## 2025-07-22 PROCEDURE — 1159F MED LIST DOCD IN RCRD: CPT

## 2025-07-22 PROCEDURE — 3077F SYST BP >= 140 MM HG: CPT

## 2025-07-22 PROCEDURE — 1126F AMNT PAIN NOTED NONE PRSNT: CPT

## 2025-07-22 PROCEDURE — 1170F FXNL STATUS ASSESSED: CPT

## 2025-07-22 PROCEDURE — 99214 OFFICE O/P EST MOD 30 MIN: CPT

## 2025-07-22 PROCEDURE — 1036F TOBACCO NON-USER: CPT

## 2025-07-22 PROCEDURE — G0439 PPPS, SUBSEQ VISIT: HCPCS

## 2025-07-22 PROCEDURE — 1158F ADVNC CARE PLAN TLK DOCD: CPT

## 2025-07-22 PROCEDURE — 99215 OFFICE O/P EST HI 40 MIN: CPT

## 2025-07-22 ASSESSMENT — ENCOUNTER SYMPTOMS
NAUSEA: 0
DIAPHORESIS: 0
DIARRHEA: 0
FEVER: 0
SHORTNESS OF BREATH: 0
SORE THROAT: 0
VOMITING: 0
WHEEZING: 0
BLOOD IN STOOL: 0
COUGH: 0
ADENOPATHY: 0
HEMATURIA: 0
LIGHT-HEADEDNESS: 0
DIFFICULTY URINATING: 0
DYSURIA: 0
PALPITATIONS: 0

## 2025-07-22 ASSESSMENT — ACTIVITIES OF DAILY LIVING (ADL)
BATHING: INDEPENDENT
STIL DRIVING: NO
MANAGING FINANCES: INDEPENDENT
PILL BOX USED: NO
NEEDS ASSISTANCE WITH FOOD: INDEPENDENT
ADEQUATE_TO_COMPLETE_ADL: YES
FEEDING: INDEPENDENT
USING TELEPHONE: INDEPENDENT
EATING: INDEPENDENT
DRESSING: INDEPENDENT
JUDGMENT_ADEQUATE_SAFELY_COMPLETE_DAILY_ACTIVITIES: YES
USING TRANSPORTATION: INDEPENDENT
DOING HOUSEWORK: INDEPENDENT
TAKING MEDICATION: INDEPENDENT
PREPARING MEALS: INDEPENDENT
GROCERY SHOPPING: INDEPENDENT
TOILETING: INDEPENDENT

## 2025-07-22 ASSESSMENT — COGNITIVE AND FUNCTIONAL STATUS - GENERAL
TRAIL MAKING TEST: PATIENT COMPLETES TRAIL MAKING TEST PROPERLY.
VERBAL FLUENCY - ANIMAL NAMES (0 TO 25): 3

## 2025-07-22 ASSESSMENT — PAIN SCALES - GENERAL: PAINLEVEL_OUTOF10: 0-NO PAIN

## 2025-07-22 NOTE — PROGRESS NOTES
[facility]: FAMILY MEDICINE  MEDICARE WELLNESS EXAM      Bonnie Jules is a 85 y.o. female that is presenting today for Annual Exam (Pt is here for physical / nr).    Subjective   Hx CAD (hx of MI/bypass), HTN, HLD, aortic stenosis, IDDM, hypothyroidism, chronic colitis, PAD, CLL (5 years remission)    Other Providers: Dr. Sousa (cardiologist), Dr. Ramirez (endocrinologist), Dr. De La Torre (general surgery, recetal fixation of rectal prolapse), Dr. Hayward/Dr. Darden (ophthalmologist, injections for macular degeneration), Favio Brumfield CNP (vascular), Dr. Joseph Hollingsworth (oncology), Dr. Jackman (GI)    All medications prescribed through specialist.    HTN: Managed by cardiologist. Currently on 10 mg Amlodipine, 50 mg BID, Hydralazine, and 40 mg Lisinopril. Denies chest pain, heart palpitations, SOB, dizziness, headaches, changes of vision, syncope, leg swelling.      CAD/HLD: patient on 40 mg Pravastatin, baby aspirin, and prn Nitroglycerin. Last Lipid 11/2024.    IDDM/Hypothyodism: managed by endo. Last A1c 6.2 (4/2025). And TSH normal (10/2024). Patient currently on Humolog and Tresiba. And 100 mcg levothyroxine.           Denies changes or concerns with hearing.   Review of Systems   Constitutional:  Negative for diaphoresis and fever.   HENT:  Negative for congestion, hearing loss and sore throat.    Eyes:  Negative for visual disturbance.   Respiratory:  Negative for cough, shortness of breath and wheezing.    Cardiovascular:  Negative for chest pain, palpitations and leg swelling.   Gastrointestinal:  Negative for blood in stool, diarrhea, nausea and vomiting.   Genitourinary:  Negative for difficulty urinating, dysuria and hematuria.   Musculoskeletal:  Negative for gait problem.   Skin:  Negative for rash.   Allergic/Immunologic: Negative for immunocompromised state.   Neurological:  Negative for syncope and light-headedness.   Hematological:  Negative for adenopathy.   Psychiatric/Behavioral:  Negative  for suicidal ideas.      ACTIVITIES OF DAILY LIVING:  Basic ADLs:  Problems with Bathing, Dressing, Toileting, Transferring, Continence, Feeding No  Instrumental ADLs:  Problems with Ability to use phone, Shopping, Cooking, House-keeping, Laundry, Transportation, Medication Management, Finance Management No    Advance Care Planning   Advanced Care Planning was discussed with patient:  The patient has an active surrogate decision-maker on file. The patient does not have an advanced care plan on file.    History    Medical History[1]  Surgical History[2]  Family History[3]  Allergies[4]  Medications Ordered Prior to Encounter[5]  Immunization History   Administered Date(s) Administered    Flu vaccine, quadrivalent, high-dose, preservative free, age 65y+ (FLUZONE) 10/30/2020, 10/04/2021, 10/15/2021    Flu vaccine, trivalent, preservative free, HIGH-DOSE, age 65y+ (Fluzone) 01/16/2018, 10/08/2018, 09/22/2019, 10/11/2022, 09/13/2024    Hepatitis B vaccine, adult *Check Product/Dose* 01/23/1996, 02/27/1996, 10/11/1996    Influenza, Seasonal, Quadrivalent, Adjuvanted 08/30/2023    Influenza, seasonal, injectable 09/28/2012, 09/22/2013    Influenza, seasonal, intradermal, preservative free 12/09/2016    Moderna COVID-19 vaccine, 12 years and older (50mcg/0.5mL)(Spikevax) 10/25/2023, 09/13/2024    Moderna COVID-19 vaccine, bivalent, blue cap/gray label *Check age/dose* 11/04/2022    Moderna SARS-CoV-2 Vaccination 03/17/2021, 04/16/2021, 12/02/2021, 04/21/2022    Pneumococcal conjugate vaccine, 13-valent (PREVNAR 13) 03/22/2018    Pneumococcal conjugate vaccine, 20-valent (PREVNAR 20) 09/18/2023    Pneumococcal polysaccharide vaccine, 23-valent, age 2 years and older (PNEUMOVAX 23) 07/11/2019    RSV, 60 Years And Older (AREXVY) 09/13/2024    Tdap vaccine, age 7 year and older (BOOSTRIX, ADACEL) 03/26/2018    Zoster vaccine, recombinant, adult (SHINGRIX) 05/12/2022, 08/01/2022     Patient's medical history was reviewed and  updated either before or during this encounter.    Objective   Vitals:    07/22/25 1034   BP: 140/70   Pulse: 57   SpO2: 97%      Physical Exam  Constitutional:       General: She is not in acute distress.     Appearance: Normal appearance.   HENT:      Right Ear: Tympanic membrane and ear canal normal.      Left Ear: Tympanic membrane and ear canal normal.      Nose: Nose normal.      Mouth/Throat:      Mouth: Mucous membranes are moist.      Pharynx: Oropharynx is clear. No oropharyngeal exudate or posterior oropharyngeal erythema.     Eyes:      Extraocular Movements: Extraocular movements intact.      Conjunctiva/sclera: Conjunctivae normal.      Pupils: Pupils are equal, round, and reactive to light.     Neck:      Thyroid: No thyroid mass or thyromegaly.     Cardiovascular:      Rate and Rhythm: Normal rate and regular rhythm.      Pulses: Normal pulses.      Heart sounds: Murmur (two distint mumrus of right lower sternum and left upper sternum) heard.      No gallop.   Pulmonary:      Effort: Pulmonary effort is normal.      Breath sounds: No wheezing, rhonchi or rales.   Abdominal:      General: Bowel sounds are normal.      Palpations: Abdomen is soft. There is no hepatomegaly, splenomegaly or mass.      Tenderness: There is no abdominal tenderness. There is no guarding.     Musculoskeletal:         General: Normal range of motion.      Right lower leg: No edema.      Left lower leg: No edema.   Lymphadenopathy:      Cervical: No cervical adenopathy.     Skin:     General: Skin is warm.      Findings: No rash.     Neurological:      General: No focal deficit present.      Mental Status: She is alert.      Motor: Motor function is intact. No weakness.     Psychiatric:         Mood and Affect: Mood normal.         Thought Content: Thought content normal.         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    Yanira Angeles PA-C        [1]   Past Medical History:  Diagnosis Date    Anxiety     Benign hypertension     Chronic ischemic colitis (Multi)     With history of sepsis and infectious gastroenteritis 10/7/2022-10/11/2022    Chronic kidney disease, stage III (moderate) (Multi)     CLL (chronic lymphocytic leukemia) (Multi)     Closed fracture of calcaneus 07/02/2008    Closed fracture of multiple ribs of right side, initial encounter 06/11/2024    Coronary artery disease     CTS (carpal tunnel syndrome)     Diabetes mellitus (Multi)     Dry eye syndrome of bilateral lacrimal glands     Essential (primary) hypertension     Exudative age-related macular degeneration of left eye, unspecified stage     GI bleed     Hyperlipidemia, unspecified     Hypertension     Hypothyroidism     Hypothyroidism, unspecified type     Macular cyst, hole, or pseudohole, left eye     Miosis     Mixed hyperlipidemia     NSTEMI (non-ST elevated myocardial infarction) (Multi)     Osteoarthritis     Osteoporosis     Peripheral vascular disease     Rectal prolapse 10/28/2024    Retinal edema     Right knee pain     Seizure disorder (Multi)     Type 1 diabetes mellitus with hyperglycemia (Multi)     Unspecified disorder of refraction    [2]   Past Surgical History:  Procedure Laterality Date    CARPAL TUNNEL RELEASE Bilateral     CATARACT EXTRACTION W/ INTRAOCULAR  LENS  IMPLANT, BILATERAL      Left 8/10/2017, right 8/31/2017    CHOLECYSTECTOMY      COLONOSCOPY      CORONARY ARTERY BYPASS GRAFT  2005    X 3    HYSTERECTOMY      KNEE SURGERY Right 04/17/2024    total    OTHER SURGICAL HISTORY      Bilateral heel spurs    OTHER SURGICAL HISTORY Right 01/2016    Femoral endarterectomy    TOTAL HIP ARTHROPLASTY Left 2012   [3]   Family History  Problem Relation Name Age of Onset    Diabetes Mother      Diabetes Daughter      Other (RA) Daughter     [4]   Allergies  Allergen Reactions    Amoxicillin Hives    Chlorpheniramine Unknown    Amoxicillin-Pot Clavulanate Rash    Ciprofloxacin Rash    Levofloxacin Rash   [5]   Current Outpatient Medications on File Prior to Visit   Medication Sig Dispense Refill    acetaminophen (Tylenol) 500 mg tablet Take 1 tablet (500 mg) by mouth every 6 hours if needed for mild pain (1 - 3), moderate pain (4 - 6), headaches or fever (temp greater than 38.0 C) (Call if any fevers). 30 tablet 0    aflibercept (Eylea) 2 mg/0.05 mL intra-ocular injection 0.05 mL (2 mg) by intravitreal route 1 time. Left eye - EVERY 3 MONTHS      amLODIPine (Norvasc) 10 mg tablet TAKE 1 TABLET(10 MG) BY MOUTH EVERY DAY 90 tablet 3    aspirin 81 mg EC tablet Take 1 tablet (81 mg) by mouth once daily.      cholecalciferol (Vitamin D-3) 50 mcg (2,000 unit) capsule Take 1 capsule (50 mcg) by mouth early in the morning..      hydrALAZINE (Apresoline) 50 mg tablet TAKE 1 TABLET(50 MG) BY MOUTH TWICE DAILY 180 tablet 3    insulin lispro (HumaLOG  KwikPen U-100) 100 unit/mL injection INJECT UP TO 30 UNITS UNDER THE SKIN DAILY AT MEALS 15 mL 5    levothyroxine (Synthroid, Levoxyl) 100 mcg tablet TAKE 1 TABLET BY MOUTH EVERY DAY 90 tablet 0    lisinopril 40 mg tablet Take 0.5 tablets (20 mg) by mouth once daily. 90 tablet 3    metoprolol tartrate (Lopressor) 25 mg tablet Take 1 tablet (25 mg) by mouth 2 times a day. 180 tablet 3    nitroglycerin (Nitrostat) 0.4 mg SL tablet  Place 1 tablet (0.4 mg) under the tongue every 5 minutes if needed for chest pain.      polyethylene glycol (Miralax) 17 gram/dose powder Mix 17 g of powder and drink once daily.      pravastatin (Pravachol) 40 mg tablet Take 1 tablet (40 mg) by mouth once daily. 90 tablet 3    sulfaSALAzine (Azulfidine) 500 mg DR tablet Take 1 tablet (500 mg) by mouth 1 time.      Tresiba FlexTouch U-100 100 unit/mL (3 mL) pen INJECT 11 UNITS UNDER THE SKIN AS DIRECTED DAILY 15 mL 1    valACYclovir (Valtrex) 500 mg tablet Take 1 tablet (500 mg) by mouth once daily. Prn       No current facility-administered medications on file prior to visit.   [6]   Patient Active Problem List  Diagnosis    Senile osteoporosis    Atherosclerotic heart disease of native coronary artery with unspecified angina pectoris    Chronic ischemic colitis (Multi)    Stage 3a chronic kidney disease (Multi)    Chronic lymphocytic leukemia in remission (Multi)    Cystoid macular degeneration    Essential hypertension    Acquired hypothyroidism    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    Ankylosis of joint of shoulder region    Full thickness macular hole of left eye    Carpal tunnel syndrome    Mixed hyperlipidemia    Vitamin D deficiency    History of coronary artery bypass graft    Exudative age-related macular degeneration of left eye with inactive choroidal neovascularization    Osteoarthritis of left knee    Peripheral vascular disease    Hip pain    Type 2 diabetes mellitus without complication, with long-term current use of insulin    Arthritis of hip    History of total right knee replacement    Nonrheumatic aortic (valve) stenosis    Hx of non-ST elevation myocardial infarction (NSTEMI)    Systolic heart failure, chronic    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

## 2025-08-08 ENCOUNTER — EVALUATION (OUTPATIENT)
Dept: PHYSICAL THERAPY | Facility: CLINIC | Age: 85
End: 2025-08-08
Payer: MEDICARE

## 2025-08-08 DIAGNOSIS — R15.9 COMPLETE FECAL INCONTINENCE: Primary | ICD-10-CM

## 2025-08-08 DIAGNOSIS — N81.89 PELVIC FLOOR WEAKNESS IN FEMALE: ICD-10-CM

## 2025-08-08 PROCEDURE — 97110 THERAPEUTIC EXERCISES: CPT | Mod: GP | Performed by: PHYSICAL THERAPIST

## 2025-08-08 PROCEDURE — 97162 PT EVAL MOD COMPLEX 30 MIN: CPT | Mod: GP | Performed by: PHYSICAL THERAPIST

## 2025-08-09 NOTE — PROGRESS NOTES
Physical Therapy Evaluation    Patient Name: Bonnie Jules  MRN: [id number]  YOB: 1940  Encounter Date: 8/8/2025    Time Entry:  Time Calculation  Start Time: 1110  Stop Time: 1200  Time Calculation (min): 50 min  PT Evaluation Time Entry  PT Evaluation (Moderate) Time Entry: 30  PT Therapeutic Procedures Time Entry  Therapeutic Exercise Time Entry: 20     Rehab Insurance Information:   Visit Count: 1  POC Visits: 8  Auth Required: No  Medicare cert. dates: 08/08/25  Through: 11/06/25     Additional Authorization/Insurance Information: 08/07/2025 MEDICARE A/B, [place] ($0 USED PT/ST)     Rehab Falls Risk Assessment:  Fall Risk Indicated: Yes  Factors for Low Risk for Falls: Age 65 or older  Factors for Moderate Risk For Falls: Sensory deficit  Moderate Fall Risk Interventions: Fall prevention education provided, Optimize clinic environment ensuring safe and accessible pathways    Problem List Items Addressed This Visit           ICD-10-CM    Complete fecal incontinence - Primary R15.9    Pelvic floor weakness in female N81.89       Precautions       Additional Precautions and Protocol Details: Type 1 DM, neuropathy,     Subjective   Patient Education  Do you or those around you need extra help because of problems with hearing, speaking, seeing, moving around, or learning?: No  Are you comfortable filing out medical forms?: Yes  Key Learner  Key Learner: Patient  Primary language for learning: English    History of Present Illness  Bonnie is a 85 y.o. female who reports to physical therapy with a chief concern of full-thickness rectal prolapse status post rectopexy on 11/14/2024.  In June felt like prolapse was reoccuring.  Saw Dr. Keen and said everything looked good but recommended PFPT to strengthen pelvic floor.  Patient also reports fecal incontinence..                      Pain     Patient reports a current pain level of 0/10.               Bladder/Bowel Habits and Symptoms  Bowel  Habits  [place] Stool Form Scale: hard most of the time but can be soft  Frequency of Bowel Movements: 3-5 times per week  Frequency of Bowel Urgency: Occasionally  Bowel Straining/Pushing: Occasionally  Bowel Incomplete Emptying: Occasionally  Constipation: Occasionally  Attempts to Manage Constipation: Laxative - osmotic    Bowel Symptoms  Bowel Incontinence Issues: Fecal - solid  Other Bowel Incontinence Comments: hard stool, doesn't wear a pad;  Frequency of Fecal Leakage: Multiple times per month  Amount of Fecal Leakage: Small  Additional Bowel Symptoms Details: Sometimes can't tell when has to go, and sometimes has a difficult time getting stool out        Review of Systems  Patient reports: Bowel Incontinence, Headache, Sleep Disturbance, and Diabetes  Patient denies: Bladder Incontinence, Chest Pain, Dizziness, Fainting, Fever, Lower Extremity Neurological Deficits, Motion Sickness, Nausea, Night Sweats/Chills, Night Pain, Saddle Numbness, Shortness of Breath, Tinnitus, Weight Gain, and Weight Loss    Additional Red Flag Details: Neuropathy in feet, Type 1 DM. Diabetic retinopathy, hysterectomy;  Osteoporosis, thyroid disorder    Personal Factors  Details on the patient's current diet include: a bottle a day (16 ounces),  coffee, iced tea, milk, The patient's physical activity or sport participation includes: no exercise program, hs to be moving though;   Details on sleep habits include: wakes up about 2-3 oclock in morning and has a had time going back to sleep           Living Arrangements  Living Situation  Housing: Home independently  Living Arrangements: Family members  Support Systems: Family members    Living with daughter, ranch home    Education/Employment       Employment Status: Retired          Objective   Outcome Measure Comments: NIH-CPSI:  Pain: 0, Urinary: 0, Impact: 0 :  total:  0  Posture                 Reduced lumbar lordosis, rounded shoulders, forward head;      Pelvic External  Observations  Consent for Examination: Yes, Chaperone Present: No    Abdominal Assessment  Transversus Abdominis Contraction: Absent  Additional Abdominal Assessment Details: Breathing:  tends to breathe with upper chest,  Poor load transfer with active SLR          Lumbar Range of Motion   Flexion 75%, extension 25%      Hip Strength - Planes of Motion   Right Strength Right Pain Left Strength Left Pain   Flexion (L2) 4   4     Extension 3   3     ABduction 4-   4-     ADduction 4-   4-          Assessment/Plan   Assessment  Bonnie presents with a condition of Moderate complexity.   Presentation of Symptoms: Evolving  Will Comorbidities Impact Care: Yes  DM Type 1,, Open heart surgery 2025, TKA 2024, HTN, diabetic neuropathy, Osteoporosis    Functional Limitations: Other (Comment)  Other Functional Limitations: fecal incontinence, incomplete bowel movements  Impairments: Impaired physical strength, Abnormal or restricted range of motion, Abnormal coordination, Abnormal muscle firing    Patient Goal for Therapy (PT): to avoid any rectal prolapse  Prognosis: Good    Goals  Active       STGs - 4 sessions       Start:  08/08/25    Expected End:  09/22/25       1)  Patient will report 25% fecal incontinence  2)  Patient will perform diaphragmatic breathing properly to relax and contract pelvic floor muscle appropriately  3)  Patient will demonstrate good transverse abdominis activation to stabilize lumbopelvic girdle during ADLs                        LTGs - 8 sessions       Start:  08/08/25    Expected End:  11/06/25       1)  Patient will report nil fecal incontinence   2)  Patient will be able to have bowel movement with improved bowel habits and able to complete eliminate at least 80% of the time  3)  Patient will improve pelvic floor contraction to by 1/2-1 MMT  with coordinated exhale for improved continence   4)  Patient will report no rectal prolapse noted         Patient Stated Goal 1       Start:  08/08/25              Education  Education was done with Patient. The patient's learning style includes Demonstration. The patient Demonstrates understanding.         Access Code: [email or web address] URL: [email or web address] Date: 08/08/2025 Prepared by: Macarena Frias Exercises - Seated Pelvic Floor Contraction with Isometric Hip Adduction  - 1 x daily - 7 x weekly - 1 sets - 10 reps - Pelvic Floor Contractions in Hooklying with Adduction  - 1 x daily - 7 x weekly - 1 sets - 10 reps - Pelvic Floor Contractions in Hooklying with Resisted Abduction  - 1 x daily - 7 x weekly - 1 sets - 10 reps - Seated Pelvic Floor Contraction with Hip Abduction and Resistance Loop  - 1 x daily - 7 x weekly - 1 sets - 10 reps - Seated Pelvic Floor Contraction  - 1 x daily - 7 x weekly - 1 sets - 10 reps - Supine Pelvic Floor Contraction  - 1 x daily - 7 x weekly - 1 sets - 10 reps    Plan  From a physical therapy perspective, the patient would benefit from: Skilled Rehab Services    Planned therapy interventions include: Therapeutic exercise, Therapeutic activities, Neuromuscular re-education, and Manual therapy.    Planned modalities to include: Biofeedback.        Visit Frequency: 1 times Per Week for 10 Weeks.       This plan was discussed with Patient.   Discussion participants: Agreed Upon Plan of Care  Plan details: 8 session total;  POC to be influenced by when patient is able to begin PT treatments based on this providers availability

## 2025-08-12 DIAGNOSIS — E10.65 TYPE 1 DIABETES MELLITUS WITH HYPERGLYCEMIA (MULTI): ICD-10-CM

## 2025-08-12 RX ORDER — INSULIN LISPRO 100 [IU]/ML
INJECTION, SOLUTION SUBCUTANEOUS
Qty: 15 ML | Refills: 5 | Status: SHIPPED | OUTPATIENT
Start: 2025-08-12

## 2025-08-14 ENCOUNTER — HOSPITAL ENCOUNTER (OUTPATIENT)
Dept: RADIOLOGY | Facility: HOSPITAL | Age: 85
Discharge: HOME | End: 2025-08-14
Payer: MEDICARE

## 2025-08-14 ENCOUNTER — RESULTS FOLLOW-UP (OUTPATIENT)
Dept: PRIMARY CARE | Facility: CLINIC | Age: 85
End: 2025-08-14

## 2025-08-14 ENCOUNTER — HOSPITAL ENCOUNTER (OUTPATIENT)
Dept: RADIOLOGY | Facility: CLINIC | Age: 85
Discharge: HOME | End: 2025-08-14
Payer: MEDICARE

## 2025-08-14 ENCOUNTER — OFFICE VISIT (OUTPATIENT)
Dept: ORTHOPEDIC SURGERY | Facility: CLINIC | Age: 85
End: 2025-08-14
Payer: MEDICARE

## 2025-08-14 VITALS — HEIGHT: 61 IN | BODY MASS INDEX: 22.09 KG/M2 | WEIGHT: 117 LBS

## 2025-08-14 DIAGNOSIS — Z96.651 HISTORY OF TOTAL RIGHT KNEE REPLACEMENT: ICD-10-CM

## 2025-08-14 DIAGNOSIS — M25.561 RIGHT KNEE PAIN, UNSPECIFIED CHRONICITY: ICD-10-CM

## 2025-08-14 DIAGNOSIS — M25.561 RIGHT KNEE PAIN, UNSPECIFIED CHRONICITY: Primary | ICD-10-CM

## 2025-08-14 DIAGNOSIS — Z78.0 ASYMPTOMATIC MENOPAUSAL STATE: ICD-10-CM

## 2025-08-14 PROCEDURE — 1036F TOBACCO NON-USER: CPT | Performed by: PHYSICIAN ASSISTANT

## 2025-08-14 PROCEDURE — 99213 OFFICE O/P EST LOW 20 MIN: CPT | Mod: 25 | Performed by: PHYSICIAN ASSISTANT

## 2025-08-14 PROCEDURE — 1125F AMNT PAIN NOTED PAIN PRSNT: CPT | Performed by: PHYSICIAN ASSISTANT

## 2025-08-14 PROCEDURE — 77080 DXA BONE DENSITY AXIAL: CPT | Performed by: RADIOLOGY

## 2025-08-14 PROCEDURE — 77080 DXA BONE DENSITY AXIAL: CPT

## 2025-08-14 PROCEDURE — 99213 OFFICE O/P EST LOW 20 MIN: CPT | Performed by: PHYSICIAN ASSISTANT

## 2025-08-14 PROCEDURE — 73560 X-RAY EXAM OF KNEE 1 OR 2: CPT | Mod: RT

## 2025-08-14 PROCEDURE — 73560 X-RAY EXAM OF KNEE 1 OR 2: CPT | Mod: RIGHT SIDE | Performed by: ORTHOPAEDIC SURGERY

## 2025-08-14 PROCEDURE — 1160F RVW MEDS BY RX/DR IN RCRD: CPT | Performed by: PHYSICIAN ASSISTANT

## 2025-08-14 PROCEDURE — 1159F MED LIST DOCD IN RCRD: CPT | Performed by: PHYSICIAN ASSISTANT

## 2025-08-14 ASSESSMENT — LIFESTYLE VARIABLES
HAS A RELATIVE, FRIEND, DOCTOR, OR ANOTHER HEALTH PROFESSIONAL EXPRESSED CONCERN ABOUT YOUR DRINKING OR SUGGESTED YOU CUT DOWN: NO
HOW OFTEN DO YOU HAVE SIX OR MORE DRINKS ON ONE OCCASION: NEVER
HOW OFTEN DURING THE LAST YEAR HAVE YOU NEEDED AN ALCOHOLIC DRINK FIRST THING IN THE MORNING TO GET YOURSELF GOING AFTER A NIGHT OF HEAVY DRINKING: NEVER
AUDIT TOTAL SCORE: 0
HOW OFTEN DURING THE LAST YEAR HAVE YOU BEEN UNABLE TO REMEMBER WHAT HAPPENED THE NIGHT BEFORE BECAUSE YOU HAD BEEN DRINKING: NEVER
HOW MANY STANDARD DRINKS CONTAINING ALCOHOL DO YOU HAVE ON A TYPICAL DAY: PATIENT DOES NOT DRINK
HOW OFTEN DURING THE LAST YEAR HAVE YOU FOUND THAT YOU WERE NOT ABLE TO STOP DRINKING ONCE YOU HAD STARTED: NEVER
HOW OFTEN DURING THE LAST YEAR HAVE YOU HAD A FEELING OF GUILT OR REMORSE AFTER DRINKING: NEVER
HAVE YOU OR SOMEONE ELSE BEEN INJURED AS A RESULT OF YOUR DRINKING: NO
HOW OFTEN DURING THE LAST YEAR HAVE YOU FAILED TO DO WHAT WAS NORMALLY EXPECTED FROM YOU BECAUSE OF DRINKING: NEVER
AUDIT-C TOTAL SCORE: 0
SKIP TO QUESTIONS 9-10: 1
HOW OFTEN DO YOU HAVE A DRINK CONTAINING ALCOHOL: NEVER

## 2025-08-14 ASSESSMENT — ENCOUNTER SYMPTOMS
DEPRESSION: 0
LOSS OF SENSATION IN FEET: 0
OCCASIONAL FEELINGS OF UNSTEADINESS: 0

## 2025-08-14 ASSESSMENT — PAIN DESCRIPTION - DESCRIPTORS: DESCRIPTORS: SORE

## 2025-08-14 ASSESSMENT — PAIN SCALES - GENERAL
PAINLEVEL_OUTOF10: 3
PAINLEVEL_OUTOF10: 3

## 2025-08-14 ASSESSMENT — PAIN - FUNCTIONAL ASSESSMENT: PAIN_FUNCTIONAL_ASSESSMENT: 0-10

## 2025-08-20 ENCOUNTER — TREATMENT (OUTPATIENT)
Dept: PHYSICAL THERAPY | Facility: CLINIC | Age: 85
End: 2025-08-20
Payer: MEDICARE

## 2025-08-20 DIAGNOSIS — N81.89 PELVIC FLOOR WEAKNESS IN FEMALE: ICD-10-CM

## 2025-08-20 DIAGNOSIS — R15.9 COMPLETE FECAL INCONTINENCE: ICD-10-CM

## 2025-08-20 PROCEDURE — 97530 THERAPEUTIC ACTIVITIES: CPT | Mod: GP | Performed by: PHYSICAL THERAPIST

## 2025-08-20 PROCEDURE — 97110 THERAPEUTIC EXERCISES: CPT | Mod: GP | Performed by: PHYSICAL THERAPIST

## 2025-08-26 ENCOUNTER — APPOINTMENT (OUTPATIENT)
Dept: OPHTHALMOLOGY | Facility: CLINIC | Age: 85
End: 2025-08-26
Payer: MEDICARE

## 2025-08-29 ENCOUNTER — TREATMENT (OUTPATIENT)
Dept: PHYSICAL THERAPY | Facility: CLINIC | Age: 85
End: 2025-08-29
Payer: MEDICARE

## 2025-08-29 DIAGNOSIS — N81.89 PELVIC FLOOR WEAKNESS IN FEMALE: ICD-10-CM

## 2025-08-29 DIAGNOSIS — R15.9 COMPLETE FECAL INCONTINENCE: ICD-10-CM

## 2025-08-29 PROCEDURE — 97110 THERAPEUTIC EXERCISES: CPT | Mod: GP | Performed by: PHYSICAL THERAPIST

## 2025-09-02 DIAGNOSIS — S22.31XA FRACTURE OF ONE RIB, RIGHT SIDE, INITIAL ENCOUNTER FOR CLOSED FRACTURE: ICD-10-CM

## 2025-09-02 RX ORDER — LEVOTHYROXINE SODIUM 100 UG/1
100 TABLET ORAL DAILY
Qty: 90 TABLET | Refills: 0 | Status: SHIPPED | OUTPATIENT
Start: 2025-09-02

## 2025-09-05 ENCOUNTER — APPOINTMENT (OUTPATIENT)
Dept: OPHTHALMOLOGY | Facility: CLINIC | Age: 85
End: 2025-09-05
Payer: MEDICARE

## 2025-09-05 PROBLEM — H35.3232 EXUDATIVE AGE-RELATED MACULAR DEGENERATION OF BOTH EYES WITH INACTIVE CHOROIDAL NEOVASCULARIZATION: Status: ACTIVE | Noted: 2023-10-23

## 2025-09-05 PROBLEM — E10.9 TYPE 1 DIABETES MELLITUS WITHOUT COMPLICATION: Status: ACTIVE | Noted: 2025-07-22

## 2025-09-05 ASSESSMENT — REFRACTION_MANIFEST
OD_CYLINDER: -1.75
OS_SPHERE: +1.50
OS_ADD: +3.00
OD_SPHERE: -0.25
OD_AXIS: 095
METHOD_AUTOREFRACTION: 1
OD_AXIS: 090
OD_CYLINDER: -1.75
OS_CYLINDER: -2.75
OS_CYLINDER: -1.50
OS_AXIS: 090
OD_SPHERE: +0.00
OD_ADD: +3.00
OS_SPHERE: +0.75
OS_AXIS: 085

## 2025-09-05 ASSESSMENT — ENCOUNTER SYMPTOMS
NEUROLOGICAL NEGATIVE: 0
RESPIRATORY NEGATIVE: 0
MUSCULOSKELETAL NEGATIVE: 0
HEMATOLOGIC/LYMPHATIC NEGATIVE: 0
CONSTITUTIONAL NEGATIVE: 0
PSYCHIATRIC NEGATIVE: 0
GASTROINTESTINAL NEGATIVE: 0
EYES NEGATIVE: 0
ENDOCRINE NEGATIVE: 0
CARDIOVASCULAR NEGATIVE: 0
ALLERGIC/IMMUNOLOGIC NEGATIVE: 0

## 2025-09-05 ASSESSMENT — REFRACTION_WEARINGRX
OS_CYLINDER: -1.50
OS_ADD: +3.00
OS_SPHERE: +0.75
OS_AXIS: 085
OD_ADD: +3.00
SPECS_TYPE: BIFOCAL
OD_SPHERE: +0.00
OD_CYLINDER: -1.75
OD_AXIS: 090

## 2025-09-05 ASSESSMENT — TONOMETRY
OS_IOP_MMHG: 11
IOP_METHOD: GOLDMANN APPLANATION
OD_IOP_MMHG: 11

## 2025-09-05 ASSESSMENT — VISUAL ACUITY
OD_CC: 20/20
OS_PH_CC+: -2
OS_CC: 20/80
OS_PH_CC: 20/60
OS_CC+: -1
CORRECTION_TYPE: GLASSES
OD_CC+: -2
METHOD: SNELLEN - LINEAR

## 2025-09-05 ASSESSMENT — SLIT LAMP EXAM - LIDS
COMMENTS: DERMATOCHALASIS - UPPER LID
COMMENTS: DERMATOCHALASIS - UPPER LID

## 2025-09-05 ASSESSMENT — CUP TO DISC RATIO
OS_RATIO: 0.2
OD_RATIO: 0.2

## 2025-09-05 ASSESSMENT — EXTERNAL EXAM - LEFT EYE: OS_EXAM: NORMAL

## 2025-09-05 ASSESSMENT — PAIN SCALES - GENERAL: PAINLEVEL_OUTOF10: 0-NO PAIN

## 2025-09-05 ASSESSMENT — EXTERNAL EXAM - RIGHT EYE: OD_EXAM: NORMAL

## 2025-10-08 ENCOUNTER — APPOINTMENT (OUTPATIENT)
Dept: ENDOCRINOLOGY | Facility: CLINIC | Age: 85
End: 2025-10-08
Payer: MEDICARE

## 2026-09-14 ENCOUNTER — APPOINTMENT (OUTPATIENT)
Dept: OPHTHALMOLOGY | Facility: CLINIC | Age: 86
End: 2026-09-14
Payer: MEDICARE

## (undated) DEVICE — DRAPE, ISOLATION, INCISE, W/POUCH, STERI DRAPE, 125 X 83 IN, DISPOSABLE, STERILE

## (undated) DEVICE — BLADE, SAW, RECIPROCATING, DOUBLE SIDED, THIN, STAINLESS STEEL

## (undated) DEVICE — SUTURE, VICRYL, 2-0, 36 IN, CT-1, UNDYED

## (undated) DEVICE — POSITIONING, THE PINK PAD, PIGAZZI SYSTEM

## (undated) DEVICE — Device

## (undated) DEVICE — WOUND SYSTEM, DEBRIDEMENT & CLEANING, O.R DUOPAK

## (undated) DEVICE — CATHETER TRAY, URETHRAL, FOLEY, 16 FR, SILICONE

## (undated) DEVICE — SEAL, UNIVERSAL 5-8MM  XI

## (undated) DEVICE — OBTURATOR, BLADELESS , SU

## (undated) DEVICE — SEAL, UNIVERSAL, 5-12MM

## (undated) DEVICE — SUTURE, V-LOC, 2-0, 6IN, GS-22, GREEN

## (undated) DEVICE — STAPLER, SKIN, PLUS, WIDE, 35

## (undated) DEVICE — HANDPIECE, INTERPULSE, W/RETRACTABLE COAX FAN, STERILE

## (undated) DEVICE — SOLUTION, IRRIGATION, USP, SODIUM CHLORIDE 0.9%, 3000 ML, BAG

## (undated) DEVICE — SEALER, VESSEL, EXTENDED

## (undated) DEVICE — COVER, TIP HOT SHEARS ENDOWRIST

## (undated) DEVICE — GLOVE, SURGICAL, PROTEXIS PI BLUE W/NEUTHERA, 7.5, PF, LF

## (undated) DEVICE — CEMENT, MIXEVAC III, 10S BOWL, KNEES

## (undated) DEVICE — SUTURE, VICRYL, 2-0, 27 IN, BR/SH 27, VIOLET

## (undated) DEVICE — HEMOSTATIC, MATRIX, SURGIFLO, 8ML

## (undated) DEVICE — SLEEVE, VASO PRESS, CALF GARMENT, MEDIUM, GREEN

## (undated) DEVICE — BANDAGE, ELASTIC, ACE, ACE, DOUBLE LENGTH, 6 X 550 IN, LF

## (undated) DEVICE — STRIP, SKIN CLOSURE, STERI STRIP, REINFORCED, 0.5 X 4 IN

## (undated) DEVICE — SUTURE, ETHIBOND XTRA, 0, SHSH, 36IN, LF

## (undated) DEVICE — DRAPE, SHEET, LARGE, 70 X 85IN, STERILE

## (undated) DEVICE — FACE SHIELD, OPTI-COM

## (undated) DEVICE — SUTURE, MONOCRYL, 4-0, 27 IN, PS-2, UNDYED

## (undated) DEVICE — SOLUTION, IRRIGATION, X RX SODIUM CHL 0.9%, 1000ML BTL

## (undated) DEVICE — TUBING, INSUFFLATION, W/ .3 MICRO FILTER & ADAPTER

## (undated) DEVICE — DRAPE, SHEET, U, W/ADHESIVE STRIP, IMPERVIOUS, 60 X 70 IN, DISPOSABLE, LF, STERILE

## (undated) DEVICE — GLOVE, SURGICAL, PROTEXIS PI , 7.0, PF, LF

## (undated) DEVICE — DRAPE, UNDERBUTTOCKS, W/FLUID CONTROL POUCH

## (undated) DEVICE — BLADE, SAW, DUAL SAGITTAL, 1.9 X 90 X 30

## (undated) DEVICE — TROCAR, KII OPTICAL BLADELESS 5MM Z THREAD 100MM LNGTH

## (undated) DEVICE — SUTURE, VICRYL, 1, 27 IN, CT-1, VIOLET

## (undated) DEVICE — SUTURE, VICRYL, 1, 27 IN, TP-1, VIOLET

## (undated) DEVICE — DRESSING, MEPILEX BORDER, POST-OP AG, 4 X 12 IN

## (undated) DEVICE — DRAPE, ARM XI

## (undated) DEVICE — ADHESIVE, SKIN, DERMABOND ADVANCED, 15CM, PEN-STYLE

## (undated) DEVICE — POSITIONING SYSTEM, PAGAZZI, PATIENT